# Patient Record
Sex: MALE | Race: WHITE | NOT HISPANIC OR LATINO | Employment: OTHER | URBAN - NONMETROPOLITAN AREA
[De-identification: names, ages, dates, MRNs, and addresses within clinical notes are randomized per-mention and may not be internally consistent; named-entity substitution may affect disease eponyms.]

---

## 2017-02-15 ENCOUNTER — COMMUNICATION - GICH (OUTPATIENT)
Dept: INTERNAL MEDICINE | Facility: OTHER | Age: 82
End: 2017-02-15

## 2017-02-15 DIAGNOSIS — I10 ESSENTIAL (PRIMARY) HYPERTENSION: ICD-10-CM

## 2017-02-17 ENCOUNTER — HISTORY (OUTPATIENT)
Dept: INTERNAL MEDICINE | Facility: OTHER | Age: 82
End: 2017-02-17

## 2017-02-17 ENCOUNTER — OFFICE VISIT - GICH (OUTPATIENT)
Dept: INTERNAL MEDICINE | Facility: OTHER | Age: 82
End: 2017-02-17

## 2017-02-17 DIAGNOSIS — R30.0 DYSURIA: ICD-10-CM

## 2017-02-17 DIAGNOSIS — Z53.29 PROCEDURE AND TREATMENT NOT CARRIED OUT BECAUSE OF PATIENT'S DECISION FOR OTHER REASONS: ICD-10-CM

## 2017-02-17 DIAGNOSIS — I10 ESSENTIAL (PRIMARY) HYPERTENSION: ICD-10-CM

## 2017-02-17 DIAGNOSIS — N47.8 OTHER DISORDERS OF PREPUCE: ICD-10-CM

## 2017-02-17 LAB
A/G RATIO - HISTORICAL: 1.1 (ref 1–2)
ALBUMIN SERPL-MCNC: 4 G/DL (ref 3.5–5.7)
ALP SERPL-CCNC: 60 IU/L (ref 34–104)
ALT (SGPT) - HISTORICAL: 7 IU/L (ref 7–52)
ANION GAP - HISTORICAL: 5 (ref 5–18)
AST SERPL-CCNC: 12 IU/L (ref 13–39)
BACTERIA URINE: ABNORMAL BACTERIA/HPF
BILIRUB SERPL-MCNC: 0.6 MG/DL (ref 0.3–1)
BILIRUB UR QL: NEGATIVE
BUN SERPL-MCNC: 17 MG/DL (ref 7–25)
BUN/CREAT RATIO - HISTORICAL: 14
CALCIUM SERPL-MCNC: 9.3 MG/DL (ref 8.6–10.3)
CHLORIDE SERPLBLD-SCNC: 106 MMOL/L (ref 98–107)
CLARITY, URINE: CLEAR CLARITY
CO2 SERPL-SCNC: 24 MMOL/L (ref 21–31)
COLOR UR: YELLOW COLOR
CREAT SERPL-MCNC: 1.19 MG/DL (ref 0.7–1.3)
EPITHELIAL CELLS: ABNORMAL EPI/HPF
ERYTHROCYTE [DISTWIDTH] IN BLOOD BY AUTOMATED COUNT: 11.2 % (ref 11.5–15.5)
GFR IF NOT AFRICAN AMERICAN - HISTORICAL: 59 ML/MIN/1.73M2
GLOBULIN - HISTORICAL: 3.8 G/DL (ref 2–3.7)
GLUCOSE SERPL-MCNC: 139 MG/DL (ref 70–105)
GLUCOSE URINE: NEGATIVE MG/DL
HCT VFR BLD AUTO: 43.1 % (ref 37–53)
HEMOGLOBIN: 14.4 G/DL (ref 13.5–17.5)
KETONES UR QL: NEGATIVE MG/DL
LEUKOCYTE ESTERASE URINE: NEGATIVE
MCH RBC QN AUTO: 30.4 PG (ref 26–34)
MCHC RBC AUTO-ENTMCNC: 33.4 G/DL (ref 32–36)
MCV RBC AUTO: 91 FL (ref 80–100)
NITRITE UR QL STRIP: NEGATIVE
OCCULT BLOOD,URINE - HISTORICAL: ABNORMAL
PH UR: 5.5 [PH]
PLATELET # BLD AUTO: 297 THOU/CU MM (ref 140–440)
PMV BLD: 8.4 FL (ref 6.5–11)
POTASSIUM SERPL-SCNC: 3.9 MMOL/L (ref 3.5–5.1)
PROT SERPL-MCNC: 7.8 G/DL (ref 6.4–8.9)
PROTEIN QUALITATIVE,URINE - HISTORICAL: NEGATIVE MG/DL
RBC - HISTORICAL: ABNORMAL /HPF
RED BLOOD COUNT - HISTORICAL: 4.72 MIL/CU MM (ref 4.3–5.9)
SODIUM SERPL-SCNC: 135 MMOL/L (ref 133–143)
SP GR UR STRIP: 1.01
UROBILINOGEN,QUALITATIVE - HISTORICAL: NORMAL EU/DL
WBC - HISTORICAL: ABNORMAL /HPF
WHITE BLOOD COUNT - HISTORICAL: 11.7 THOU/CU MM (ref 4.5–11)

## 2017-02-17 ASSESSMENT — PATIENT HEALTH QUESTIONNAIRE - PHQ9: SUM OF ALL RESPONSES TO PHQ QUESTIONS 1-9: 0

## 2017-02-19 LAB
CULTURE - HISTORICAL: ABNORMAL
CULTURE - HISTORICAL: ABNORMAL
SUSCEPTIBILITY RESULT - HISTORICAL: ABNORMAL

## 2017-02-20 ENCOUNTER — COMMUNICATION - GICH (OUTPATIENT)
Dept: PEDIATRICS | Facility: OTHER | Age: 82
End: 2017-02-20

## 2017-02-20 DIAGNOSIS — N30.00 ACUTE CYSTITIS WITHOUT HEMATURIA: ICD-10-CM

## 2017-03-03 ENCOUNTER — HISTORY (OUTPATIENT)
Dept: UROLOGY | Facility: OTHER | Age: 82
End: 2017-03-03

## 2017-03-03 ENCOUNTER — OFFICE VISIT - GICH (OUTPATIENT)
Dept: UROLOGY | Facility: OTHER | Age: 82
End: 2017-03-03

## 2017-03-03 DIAGNOSIS — N47.1 PHIMOSIS: ICD-10-CM

## 2017-03-14 ENCOUNTER — HOSPITAL ENCOUNTER (OUTPATIENT)
Dept: SURGERY | Facility: OTHER | Age: 82
Discharge: HOME | End: 2017-03-14
Attending: UROLOGY | Admitting: UROLOGY

## 2017-03-14 ENCOUNTER — SURGERY (OUTPATIENT)
Dept: SURGERY | Facility: OTHER | Age: 82
End: 2017-03-14

## 2017-03-14 DIAGNOSIS — N47.1 PHIMOSIS: ICD-10-CM

## 2017-03-14 DIAGNOSIS — N47.8 OTHER DISORDERS OF PREPUCE: ICD-10-CM

## 2017-03-22 ENCOUNTER — HISTORY (OUTPATIENT)
Dept: UROLOGY | Facility: OTHER | Age: 82
End: 2017-03-22

## 2017-03-23 ENCOUNTER — HISTORY (OUTPATIENT)
Dept: FAMILY MEDICINE | Facility: OTHER | Age: 82
End: 2017-03-23

## 2017-03-23 ENCOUNTER — OFFICE VISIT - GICH (OUTPATIENT)
Dept: FAMILY MEDICINE | Facility: OTHER | Age: 82
End: 2017-03-23

## 2017-03-23 DIAGNOSIS — J06.9 ACUTE UPPER RESPIRATORY INFECTION: ICD-10-CM

## 2017-03-27 ENCOUNTER — COMMUNICATION - GICH (OUTPATIENT)
Dept: FAMILY MEDICINE | Facility: OTHER | Age: 82
End: 2017-03-27

## 2017-03-27 DIAGNOSIS — J40 BRONCHITIS: ICD-10-CM

## 2017-04-02 ENCOUNTER — HISTORY (OUTPATIENT)
Dept: EMERGENCY MEDICINE | Facility: OTHER | Age: 82
End: 2017-04-02

## 2017-04-18 ENCOUNTER — AMBULATORY - GICH (OUTPATIENT)
Dept: SCHEDULING | Facility: OTHER | Age: 82
End: 2017-04-18

## 2017-04-18 ENCOUNTER — OFFICE VISIT - GICH (OUTPATIENT)
Dept: INTERNAL MEDICINE | Facility: OTHER | Age: 82
End: 2017-04-18

## 2017-04-18 ENCOUNTER — HISTORY (OUTPATIENT)
Dept: INTERNAL MEDICINE | Facility: OTHER | Age: 82
End: 2017-04-18

## 2017-04-18 DIAGNOSIS — N00.7 ACUTE NEPHRITIC SYNDROME WITH DIFFUSE CRESCENTIC GLOMERULONEPHRITIS: ICD-10-CM

## 2017-04-18 DIAGNOSIS — B96.5 PSEUDOMONAS (AERUGINOSA) (MALLEI) (PSEUDOMALLEI) AS THE CAUSE OF DISEASES CLASSIFIED ELSEWHERE: ICD-10-CM

## 2017-04-18 DIAGNOSIS — N17.9 ACUTE KIDNEY FAILURE (H): ICD-10-CM

## 2017-04-18 DIAGNOSIS — N01.3 RAPIDLY PROGRESSIVE NEPHRITIC SYNDROME WITH DIFFUSE MESANGIAL PROLIFERATIVE GLOMERULONEPHRITIS: ICD-10-CM

## 2017-04-18 DIAGNOSIS — D53.9 NUTRITIONAL ANEMIA: ICD-10-CM

## 2017-04-18 DIAGNOSIS — R76.8 OTHER SPECIFIED ABNORMAL IMMUNOLOGICAL FINDINGS IN SERUM: ICD-10-CM

## 2017-04-18 DIAGNOSIS — Z09 ENCOUNTER FOR FOLLOW-UP EXAMINATION AFTER COMPLETED TREATMENT FOR CONDITIONS OTHER THAN MALIGNANT NEOPLASM: ICD-10-CM

## 2017-04-18 DIAGNOSIS — N39.0 URINARY TRACT INFECTION: ICD-10-CM

## 2017-04-18 DIAGNOSIS — R60.0 LOCALIZED EDEMA: ICD-10-CM

## 2017-04-18 LAB
A/G RATIO - HISTORICAL: 0.9 (ref 1–2)
ALBUMIN SERPL-MCNC: 3.1 G/DL (ref 3.5–5.7)
ALP SERPL-CCNC: 68 IU/L (ref 34–104)
ALT (SGPT) - HISTORICAL: 17 IU/L (ref 7–52)
ANION GAP - HISTORICAL: 10 (ref 5–18)
AST SERPL-CCNC: 14 IU/L (ref 13–39)
BILIRUB SERPL-MCNC: 0.3 MG/DL (ref 0.3–1)
BUN SERPL-MCNC: 109 MG/DL (ref 7–25)
BUN/CREAT RATIO - HISTORICAL: 22
CALCIUM SERPL-MCNC: 8 MG/DL (ref 8.6–10.3)
CHLORIDE SERPLBLD-SCNC: 106 MMOL/L (ref 98–107)
CO2 SERPL-SCNC: 20 MMOL/L (ref 21–31)
CREAT SERPL-MCNC: 4.97 MG/DL (ref 0.7–1.3)
EOSINOPHIL NFR BLD AUTO: 1 %
EOSINOPHILS - ABS (DIFF) - HISTORICAL: 0.2 THOU/CU MM
ERYTHROCYTE [DISTWIDTH] IN BLOOD BY AUTOMATED COUNT: 15 % (ref 11.5–15.5)
GFR IF NOT AFRICAN AMERICAN - HISTORICAL: 11 ML/MIN/1.73M2
GLOBULIN - HISTORICAL: 3.4 G/DL (ref 2–3.7)
GLUCOSE SERPL-MCNC: 158 MG/DL (ref 70–105)
HCT VFR BLD AUTO: 24 % (ref 37–53)
HEMOGLOBIN: 7.9 G/DL (ref 13.5–17.5)
LYMPHOCYTES - ABS (DIFF) - HISTORICAL: 0.8 THOU/CU MM (ref 0.9–2.9)
LYMPHOCYTES NFR BLD AUTO: 4 % (ref 20–44)
MANUAL NRBC PER 100 CELLS - HISTORICAL: 0 /100 CELLS
MCH RBC QN AUTO: 28.9 PG (ref 26–34)
MCHC RBC AUTO-ENTMCNC: 33 G/DL (ref 32–36)
MCV RBC AUTO: 88 FL (ref 80–100)
MONOCYTES - ABS (DIFF) - HISTORICAL: 0.6 THOU/CU MM
MONOCYTES NFR BLD AUTO: 3 %
NEUTROPHILS - ABS (DIFF) - HISTORICAL: 19 THOU/CU MM (ref 1.7–7)
NEUTROPHILS NFR BLD AUTO: 92 % (ref 42–72)
NRBC - HISTORICAL: 0 %
PHOSPHATE SERPL-MCNC: 2.8 MG/DL (ref 2.5–5)
PLATELET # BLD AUTO: 326 THOU/CU MM (ref 140–440)
PMV BLD: 7.2 FL (ref 6.5–11)
POIKILOCYTOSIS: ABNORMAL
POTASSIUM SERPL-SCNC: 5 MMOL/L (ref 3.5–5.1)
PROT SERPL-MCNC: 6.5 G/DL (ref 6.4–8.9)
RED BLOOD COUNT - HISTORICAL: 2.73 MIL/CU MM (ref 4.3–5.9)
SODIUM SERPL-SCNC: 136 MMOL/L (ref 133–143)
TOTAL COUNTED - HISTORICAL: 100
WBC ADJUSTED - HISTORICAL: 20.6 THOU/CU MM
WHITE BLOOD COUNT - HISTORICAL: 20.6 THOU/CU MM (ref 4.5–11)

## 2017-04-18 ASSESSMENT — PATIENT HEALTH QUESTIONNAIRE - PHQ9: SUM OF ALL RESPONSES TO PHQ QUESTIONS 1-9: 0

## 2017-04-24 ENCOUNTER — AMBULATORY - GICH (OUTPATIENT)
Dept: LAB | Facility: OTHER | Age: 82
End: 2017-04-24

## 2017-04-24 ENCOUNTER — HISTORY (OUTPATIENT)
Dept: UROLOGY | Facility: OTHER | Age: 82
End: 2017-04-24

## 2017-04-24 ENCOUNTER — OFFICE VISIT - GICH (OUTPATIENT)
Dept: UROLOGY | Facility: OTHER | Age: 82
End: 2017-04-24

## 2017-04-24 DIAGNOSIS — N47.8 OTHER DISORDERS OF PREPUCE: ICD-10-CM

## 2017-04-24 DIAGNOSIS — N48.83 ACQUIRED BURIED PENIS: ICD-10-CM

## 2017-04-24 DIAGNOSIS — D53.9 NUTRITIONAL ANEMIA: ICD-10-CM

## 2017-04-24 DIAGNOSIS — N17.9 ACUTE KIDNEY FAILURE (H): ICD-10-CM

## 2017-04-24 DIAGNOSIS — R31.9 HEMATURIA: ICD-10-CM

## 2017-04-24 LAB
A/G RATIO - HISTORICAL: 1.5 (ref 1–2)
ALBUMIN SERPL-MCNC: 3.2 G/DL (ref 3.5–5.7)
ALP SERPL-CCNC: 68 IU/L (ref 34–104)
ALT (SGPT) - HISTORICAL: 13 IU/L (ref 7–52)
ANION GAP - HISTORICAL: 13 (ref 5–18)
AST SERPL-CCNC: 12 IU/L (ref 13–39)
BILIRUB SERPL-MCNC: 0.5 MG/DL (ref 0.3–1)
BUN SERPL-MCNC: 97 MG/DL (ref 7–25)
BUN/CREAT RATIO - HISTORICAL: 21
CALCIUM SERPL-MCNC: 8 MG/DL (ref 8.6–10.3)
CHLORIDE SERPLBLD-SCNC: 108 MMOL/L (ref 98–107)
CO2 SERPL-SCNC: 19 MMOL/L (ref 21–31)
CREAT SERPL-MCNC: 4.56 MG/DL (ref 0.7–1.3)
ERYTHROCYTE [DISTWIDTH] IN BLOOD BY AUTOMATED COUNT: 16.6 % (ref 11.5–15.5)
GFR IF NOT AFRICAN AMERICAN - HISTORICAL: 12 ML/MIN/1.73M2
GLOBULIN - HISTORICAL: 2.2 G/DL (ref 2–3.7)
GLUCOSE SERPL-MCNC: 91 MG/DL (ref 70–105)
HCT VFR BLD AUTO: 20.4 % (ref 37–53)
HEMOGLOBIN: 7 G/DL (ref 13.5–17.5)
MCH RBC QN AUTO: 29.7 PG (ref 26–34)
MCHC RBC AUTO-ENTMCNC: 34.3 G/DL (ref 32–36)
MCV RBC AUTO: 87 FL (ref 80–100)
PLATELET # BLD AUTO: 203 THOU/CU MM (ref 140–440)
PMV BLD: 7.4 FL (ref 6.5–11)
POTASSIUM SERPL-SCNC: 5 MMOL/L (ref 3.5–5.1)
PROT SERPL-MCNC: 5.4 G/DL (ref 6.4–8.9)
RED BLOOD COUNT - HISTORICAL: 2.36 MIL/CU MM (ref 4.3–5.9)
SODIUM SERPL-SCNC: 140 MMOL/L (ref 133–143)
WHITE BLOOD COUNT - HISTORICAL: 13.1 THOU/CU MM (ref 4.5–11)

## 2017-04-25 ENCOUNTER — OFFICE VISIT - GICH (OUTPATIENT)
Dept: INTERNAL MEDICINE | Facility: OTHER | Age: 82
End: 2017-04-25

## 2017-04-25 ENCOUNTER — HISTORY (OUTPATIENT)
Dept: INTERNAL MEDICINE | Facility: OTHER | Age: 82
End: 2017-04-25

## 2017-04-25 DIAGNOSIS — R76.8 OTHER SPECIFIED ABNORMAL IMMUNOLOGICAL FINDINGS IN SERUM: ICD-10-CM

## 2017-04-25 DIAGNOSIS — N00.7 ACUTE NEPHRITIC SYNDROME WITH DIFFUSE CRESCENTIC GLOMERULONEPHRITIS: ICD-10-CM

## 2017-04-25 DIAGNOSIS — N17.8 OTHER ACUTE KIDNEY FAILURE (CODE): ICD-10-CM

## 2017-04-25 DIAGNOSIS — E87.20 ACIDOSIS: ICD-10-CM

## 2017-04-25 DIAGNOSIS — D64.9 ANEMIA: ICD-10-CM

## 2017-04-25 DIAGNOSIS — N01.3 RAPIDLY PROGRESSIVE NEPHRITIC SYNDROME WITH DIFFUSE MESANGIAL PROLIFERATIVE GLOMERULONEPHRITIS: ICD-10-CM

## 2017-04-26 ENCOUNTER — HOSPITAL ENCOUNTER (OUTPATIENT)
Dept: INFUSION THERAPY | Facility: OTHER | Age: 82
End: 2017-04-26

## 2017-04-27 ENCOUNTER — AMBULATORY - GICH (OUTPATIENT)
Dept: SCHEDULING | Facility: OTHER | Age: 82
End: 2017-04-27

## 2017-04-30 ENCOUNTER — HISTORY (OUTPATIENT)
Dept: EMERGENCY MEDICINE | Facility: OTHER | Age: 82
End: 2017-04-30

## 2017-05-01 ENCOUNTER — HISTORY (OUTPATIENT)
Dept: EMERGENCY MEDICINE | Facility: OTHER | Age: 82
End: 2017-05-01

## 2017-05-04 ENCOUNTER — HISTORY (OUTPATIENT)
Dept: MEDSURG UNIT | Facility: OTHER | Age: 82
End: 2017-05-04

## 2017-05-05 ENCOUNTER — AMBULATORY - GICH (OUTPATIENT)
Dept: FAMILY MEDICINE | Facility: OTHER | Age: 82
End: 2017-05-05

## 2017-05-05 ENCOUNTER — COMMUNICATION - GICH (OUTPATIENT)
Dept: INTERNAL MEDICINE | Facility: OTHER | Age: 82
End: 2017-05-05

## 2017-05-05 DIAGNOSIS — E11.65 TYPE 2 DIABETES MELLITUS WITH HYPERGLYCEMIA (H): ICD-10-CM

## 2017-05-05 DIAGNOSIS — R73.9 HYPERGLYCEMIA: ICD-10-CM

## 2017-05-05 DIAGNOSIS — Z79.4 LONG TERM CURRENT USE OF INSULIN (H): ICD-10-CM

## 2017-05-05 DIAGNOSIS — I10 ESSENTIAL (PRIMARY) HYPERTENSION: ICD-10-CM

## 2017-05-05 DIAGNOSIS — J18.9 PNEUMONIA: ICD-10-CM

## 2017-05-09 ENCOUNTER — AMBULATORY - GICH (OUTPATIENT)
Dept: LAB | Facility: OTHER | Age: 82
End: 2017-05-09

## 2017-05-09 ENCOUNTER — HOSPITAL ENCOUNTER (OUTPATIENT)
Dept: RADIOLOGY | Facility: OTHER | Age: 82
End: 2017-05-09
Attending: INTERNAL MEDICINE

## 2017-05-09 ENCOUNTER — COMMUNICATION - GICH (OUTPATIENT)
Dept: INTERNAL MEDICINE | Facility: OTHER | Age: 82
End: 2017-05-09

## 2017-05-09 ENCOUNTER — HISTORY (OUTPATIENT)
Dept: INTERNAL MEDICINE | Facility: OTHER | Age: 82
End: 2017-05-09

## 2017-05-09 ENCOUNTER — OFFICE VISIT - GICH (OUTPATIENT)
Dept: INTERNAL MEDICINE | Facility: OTHER | Age: 82
End: 2017-05-09

## 2017-05-09 ENCOUNTER — AMBULATORY - GICH (OUTPATIENT)
Dept: EDUCATION SERVICES | Facility: OTHER | Age: 82
End: 2017-05-09

## 2017-05-09 DIAGNOSIS — E11.22 TYPE 2 DIABETES MELLITUS WITH DIABETIC CHRONIC KIDNEY DISEASE (H): ICD-10-CM

## 2017-05-09 DIAGNOSIS — R73.9 HYPERGLYCEMIA: ICD-10-CM

## 2017-05-09 DIAGNOSIS — N18.5 CHRONIC KIDNEY DISEASE, STAGE V (H): ICD-10-CM

## 2017-05-09 DIAGNOSIS — Z09 ENCOUNTER FOR FOLLOW-UP EXAMINATION AFTER COMPLETED TREATMENT FOR CONDITIONS OTHER THAN MALIGNANT NEOPLASM: ICD-10-CM

## 2017-05-09 DIAGNOSIS — J18.9 PNEUMONIA: ICD-10-CM

## 2017-05-09 DIAGNOSIS — I10 ESSENTIAL (PRIMARY) HYPERTENSION: ICD-10-CM

## 2017-05-09 LAB
ALBUMIN SERPL-MCNC: 3.4 G/DL (ref 3.5–5.7)
ANION GAP - HISTORICAL: 10 (ref 5–18)
BUN SERPL-MCNC: 68 MG/DL (ref 7–25)
BUN/CREAT RATIO - HISTORICAL: 15
CALCIUM SERPL-MCNC: 8.4 MG/DL (ref 8.6–10.3)
CHLORIDE SERPLBLD-SCNC: 100 MMOL/L (ref 98–107)
CO2 SERPL-SCNC: 20 MMOL/L (ref 21–31)
CREAT SERPL-MCNC: 4.41 MG/DL (ref 0.7–1.3)
ERYTHROCYTE [DISTWIDTH] IN BLOOD BY AUTOMATED COUNT: 16.4 % (ref 11.5–15.5)
GFR IF NOT AFRICAN AMERICAN - HISTORICAL: 13 ML/MIN/1.73M2
GLUCOSE SERPL-MCNC: 200 MG/DL (ref 70–105)
HCT VFR BLD AUTO: 32.1 % (ref 37–53)
HEMOGLOBIN: 10.6 G/DL (ref 13.5–17.5)
MCH RBC QN AUTO: 29.1 PG (ref 26–34)
MCHC RBC AUTO-ENTMCNC: 32.9 G/DL (ref 32–36)
MCV RBC AUTO: 89 FL (ref 80–100)
PHOSPHATE SERPL-MCNC: 2.2 MG/DL (ref 2.5–5)
PLATELET # BLD AUTO: 274 THOU/CU MM (ref 140–440)
PMV BLD: 5.8 FL (ref 6.5–11)
POTASSIUM SERPL-SCNC: 4.6 MMOL/L (ref 3.5–5.1)
RED BLOOD COUNT - HISTORICAL: 3.63 MIL/CU MM (ref 4.3–5.9)
SODIUM SERPL-SCNC: 130 MMOL/L (ref 133–143)
WHITE BLOOD COUNT - HISTORICAL: 9.4 THOU/CU MM (ref 4.5–11)

## 2017-05-09 ASSESSMENT — PATIENT HEALTH QUESTIONNAIRE - PHQ9: SUM OF ALL RESPONSES TO PHQ QUESTIONS 1-9: 0

## 2017-05-11 ENCOUNTER — TRANSFERRED RECORDS (OUTPATIENT)
Dept: HEALTH INFORMATION MANAGEMENT | Facility: CLINIC | Age: 82
End: 2017-05-11

## 2017-05-11 ENCOUNTER — HISTORY (OUTPATIENT)
Dept: EMERGENCY MEDICINE | Facility: OTHER | Age: 82
End: 2017-05-11

## 2017-05-11 ENCOUNTER — MEDICAL CORRESPONDENCE (OUTPATIENT)
Facility: CLINIC | Age: 82
End: 2017-05-11
Payer: COMMERCIAL

## 2017-05-11 PROCEDURE — 93306 TTE W/DOPPLER COMPLETE: CPT | Mod: 26 | Performed by: INTERNAL MEDICINE

## 2017-05-17 ENCOUNTER — HISTORY (OUTPATIENT)
Dept: EMERGENCY MEDICINE | Facility: OTHER | Age: 82
End: 2017-05-17

## 2017-05-18 ENCOUNTER — COMMUNICATION - GICH (OUTPATIENT)
Dept: FAMILY MEDICINE | Facility: OTHER | Age: 82
End: 2017-05-18

## 2017-05-19 ENCOUNTER — AMBULATORY - GICH (OUTPATIENT)
Dept: UROLOGY | Facility: OTHER | Age: 82
End: 2017-05-19

## 2017-05-19 ENCOUNTER — HISTORY (OUTPATIENT)
Dept: UROLOGY | Facility: OTHER | Age: 82
End: 2017-05-19

## 2017-05-19 DIAGNOSIS — R31.9 HEMATURIA: ICD-10-CM

## 2017-05-19 DIAGNOSIS — N48.83 ACQUIRED BURIED PENIS: ICD-10-CM

## 2017-05-24 ENCOUNTER — COMMUNICATION - GICH (OUTPATIENT)
Dept: FAMILY MEDICINE | Facility: OTHER | Age: 82
End: 2017-05-24

## 2017-05-25 ENCOUNTER — AMBULATORY - GICH (OUTPATIENT)
Dept: SCHEDULING | Facility: OTHER | Age: 82
End: 2017-05-25

## 2017-06-01 ENCOUNTER — HISTORY (OUTPATIENT)
Dept: INTERNAL MEDICINE | Facility: OTHER | Age: 82
End: 2017-06-01

## 2017-06-01 ENCOUNTER — OFFICE VISIT - GICH (OUTPATIENT)
Dept: INTERNAL MEDICINE | Facility: OTHER | Age: 82
End: 2017-06-01

## 2017-06-01 DIAGNOSIS — D64.9 ANEMIA: ICD-10-CM

## 2017-06-01 DIAGNOSIS — N18.5 CHRONIC KIDNEY DISEASE, STAGE V (H): ICD-10-CM

## 2017-06-01 DIAGNOSIS — L03.116 CELLULITIS OF LEFT LOWER EXTREMITY: ICD-10-CM

## 2017-06-01 DIAGNOSIS — N01.3 RAPIDLY PROGRESSIVE NEPHRITIC SYNDROME WITH DIFFUSE MESANGIAL PROLIFERATIVE GLOMERULONEPHRITIS: ICD-10-CM

## 2017-06-01 ASSESSMENT — PATIENT HEALTH QUESTIONNAIRE - PHQ9: SUM OF ALL RESPONSES TO PHQ QUESTIONS 1-9: 0

## 2017-06-09 ENCOUNTER — HISTORY (OUTPATIENT)
Dept: INTERNAL MEDICINE | Facility: OTHER | Age: 82
End: 2017-06-09

## 2017-06-09 ENCOUNTER — HISTORY (OUTPATIENT)
Dept: MEDSURG UNIT | Facility: OTHER | Age: 82
End: 2017-06-09

## 2017-06-09 ENCOUNTER — OFFICE VISIT - GICH (OUTPATIENT)
Dept: INTERNAL MEDICINE | Facility: OTHER | Age: 82
End: 2017-06-09

## 2017-06-09 DIAGNOSIS — D84.9 IMMUNODEFICIENCY (H): ICD-10-CM

## 2017-06-09 DIAGNOSIS — R60.0 LOCALIZED EDEMA: ICD-10-CM

## 2017-06-09 DIAGNOSIS — N01.3 RAPIDLY PROGRESSIVE NEPHRITIC SYNDROME WITH DIFFUSE MESANGIAL PROLIFERATIVE GLOMERULONEPHRITIS: ICD-10-CM

## 2017-06-09 DIAGNOSIS — L03.116 CELLULITIS OF LEFT LOWER EXTREMITY: ICD-10-CM

## 2017-06-09 DIAGNOSIS — N18.5 CHRONIC KIDNEY DISEASE, STAGE V (H): ICD-10-CM

## 2017-06-09 LAB
A/G RATIO - HISTORICAL: 1.2 (ref 1–2)
ABSOLUTE BASOPHILS - HISTORICAL: 0 THOU/CU MM
ABSOLUTE EOSINOPHILS - HISTORICAL: 0 THOU/CU MM
ABSOLUTE LYMPHOCYTES - HISTORICAL: 0.9 THOU/CU MM (ref 0.9–2.9)
ABSOLUTE MONOCYTES - HISTORICAL: 0.3 THOU/CU MM
ABSOLUTE NEUTROPHILS - HISTORICAL: 21.4 THOU/CU MM (ref 1.7–7)
ALBUMIN SERPL-MCNC: 3.1 G/DL (ref 3.5–5.7)
ALP SERPL-CCNC: 72 IU/L (ref 34–104)
ALT (SGPT) - HISTORICAL: 10 IU/L (ref 7–52)
ANION GAP - HISTORICAL: 15 (ref 5–18)
AST SERPL-CCNC: 10 IU/L (ref 13–39)
BASOPHILS # BLD AUTO: 0.1 %
BILIRUB SERPL-MCNC: 0.4 MG/DL (ref 0.3–1)
BUN SERPL-MCNC: 115 MG/DL (ref 7–25)
BUN/CREAT RATIO - HISTORICAL: 22
C-REACTIVE PROTEIN - HISTORICAL: 11.3 MG/DL
CALCIUM SERPL-MCNC: 8.3 MG/DL (ref 8.6–10.3)
CHLORIDE SERPLBLD-SCNC: 100 MMOL/L (ref 98–107)
CO2 SERPL-SCNC: 20 MMOL/L (ref 21–31)
CREAT SERPL-MCNC: 5.18 MG/DL (ref 0.7–1.3)
EOSINOPHIL NFR BLD AUTO: 0 %
ERYTHROCYTE [DISTWIDTH] IN BLOOD BY AUTOMATED COUNT: 19 % (ref 11.5–15.5)
ERYTHROCYTE [SEDIMENTATION RATE] IN BLOOD: 85 MM/HR
GFR IF NOT AFRICAN AMERICAN - HISTORICAL: 11 ML/MIN/1.73M2
GLOBULIN - HISTORICAL: 2.6 G/DL (ref 2–3.7)
GLUCOSE SERPL-MCNC: 220 MG/DL (ref 70–105)
HCT VFR BLD AUTO: 22.7 % (ref 37–53)
HEMOGLOBIN: 7.6 G/DL (ref 13.5–17.5)
LYMPHOCYTES NFR BLD AUTO: 3.8 % (ref 20–44)
MCH RBC QN AUTO: 30 PG (ref 26–34)
MCHC RBC AUTO-ENTMCNC: 33.5 G/DL (ref 32–36)
MCV RBC AUTO: 90 FL (ref 80–100)
MONOCYTES NFR BLD AUTO: 1.2 %
NEUTROPHILS NFR BLD AUTO: 92.3 % (ref 42–72)
PLATELET # BLD AUTO: 219 THOU/CU MM (ref 140–440)
PMV BLD: 10 FL (ref 6.5–11)
POTASSIUM SERPL-SCNC: 4.5 MMOL/L (ref 3.5–5.1)
PROT SERPL-MCNC: 5.7 G/DL (ref 6.4–8.9)
RED BLOOD COUNT - HISTORICAL: 2.53 MIL/CU MM (ref 4.3–5.9)
SODIUM SERPL-SCNC: 135 MMOL/L (ref 133–143)
WHITE BLOOD COUNT - HISTORICAL: 23.1 THOU/CU MM (ref 4.5–11)

## 2017-06-09 ASSESSMENT — PATIENT HEALTH QUESTIONNAIRE - PHQ9: SUM OF ALL RESPONSES TO PHQ QUESTIONS 1-9: 0

## 2017-06-13 ENCOUNTER — AMBULATORY - GICH (OUTPATIENT)
Dept: EDUCATION SERVICES | Facility: OTHER | Age: 82
End: 2017-06-13

## 2017-06-13 DIAGNOSIS — Z79.4 LONG TERM CURRENT USE OF INSULIN (H): ICD-10-CM

## 2017-06-13 DIAGNOSIS — E11.622 TYPE 2 DIABETES MELLITUS WITH OTHER SKIN ULCER (CODE) (H): ICD-10-CM

## 2017-06-14 LAB
CULTURE - HISTORICAL: NORMAL
CULTURE - HISTORICAL: NORMAL

## 2017-06-15 ENCOUNTER — COMMUNICATION - GICH (OUTPATIENT)
Dept: FAMILY MEDICINE | Facility: OTHER | Age: 82
End: 2017-06-15

## 2017-06-23 ENCOUNTER — HISTORY (OUTPATIENT)
Dept: INTERNAL MEDICINE | Facility: OTHER | Age: 82
End: 2017-06-23

## 2017-06-23 ENCOUNTER — OFFICE VISIT - GICH (OUTPATIENT)
Dept: INTERNAL MEDICINE | Facility: OTHER | Age: 82
End: 2017-06-23

## 2017-06-23 DIAGNOSIS — R60.0 LOCALIZED EDEMA: ICD-10-CM

## 2017-06-23 DIAGNOSIS — Z09 ENCOUNTER FOR FOLLOW-UP EXAMINATION AFTER COMPLETED TREATMENT FOR CONDITIONS OTHER THAN MALIGNANT NEOPLASM: ICD-10-CM

## 2017-06-23 DIAGNOSIS — N01.3 RAPIDLY PROGRESSIVE NEPHRITIC SYNDROME WITH DIFFUSE MESANGIAL PROLIFERATIVE GLOMERULONEPHRITIS: ICD-10-CM

## 2017-06-23 DIAGNOSIS — N18.5 CHRONIC KIDNEY DISEASE, STAGE V (H): ICD-10-CM

## 2017-06-23 DIAGNOSIS — I10 ESSENTIAL (PRIMARY) HYPERTENSION: ICD-10-CM

## 2017-06-23 DIAGNOSIS — L03.116 CELLULITIS OF LEFT LOWER EXTREMITY: ICD-10-CM

## 2017-06-23 DIAGNOSIS — I89.0 LYMPHEDEMA, NOT ELSEWHERE CLASSIFIED: ICD-10-CM

## 2017-06-30 ENCOUNTER — AMBULATORY - GICH (OUTPATIENT)
Dept: SCHEDULING | Facility: OTHER | Age: 82
End: 2017-06-30

## 2017-07-07 ENCOUNTER — OFFICE VISIT - GICH (OUTPATIENT)
Dept: FAMILY MEDICINE | Facility: OTHER | Age: 82
End: 2017-07-07

## 2017-07-07 ENCOUNTER — HOSPITAL ENCOUNTER (OUTPATIENT)
Dept: RADIOLOGY | Facility: OTHER | Age: 82
End: 2017-07-07
Attending: FAMILY MEDICINE

## 2017-07-07 ENCOUNTER — HISTORY (OUTPATIENT)
Dept: FAMILY MEDICINE | Facility: OTHER | Age: 82
End: 2017-07-07

## 2017-07-07 ENCOUNTER — COMMUNICATION - GICH (OUTPATIENT)
Dept: FAMILY MEDICINE | Facility: OTHER | Age: 82
End: 2017-07-07

## 2017-07-07 DIAGNOSIS — M54.9 DORSALGIA: ICD-10-CM

## 2017-07-07 DIAGNOSIS — N39.0 URINARY TRACT INFECTION: ICD-10-CM

## 2017-07-07 DIAGNOSIS — M48.50XA COLLAPSED VERTEBRA, NOT ELSEWHERE CLASSIFIED, SITE UNSPECIFIED, INITIAL ENCOUNTER FOR FRACTURE (H): ICD-10-CM

## 2017-07-07 LAB
BACTERIA URINE: ABNORMAL BACTERIA/HPF
BILIRUB UR QL: NEGATIVE
CLARITY, URINE: ABNORMAL CLARITY
COLOR UR: YELLOW COLOR
EPITHELIAL CELLS: ABNORMAL EPI/HPF
GLUCOSE URINE: NEGATIVE MG/DL
KETONES UR QL: NEGATIVE MG/DL
LEUKOCYTE ESTERASE URINE: ABNORMAL
NITRITE UR QL STRIP: POSITIVE
OCCULT BLOOD,URINE - HISTORICAL: ABNORMAL
PH UR: 5 [PH]
PROTEIN QUALITATIVE,URINE - HISTORICAL: 100 MG/DL
RBC - HISTORICAL: >100 /HPF
SP GR UR STRIP: 1.01
UROBILINOGEN,QUALITATIVE - HISTORICAL: NORMAL EU/DL
WBC - HISTORICAL: >100 /HPF

## 2017-07-11 ENCOUNTER — HOSPITAL ENCOUNTER (OUTPATIENT)
Dept: PHYSICAL THERAPY | Facility: OTHER | Age: 82
Setting detail: THERAPIES SERIES
End: 2017-07-11
Attending: INTERNAL MEDICINE

## 2017-07-11 DIAGNOSIS — I89.0 LYMPHEDEMA, NOT ELSEWHERE CLASSIFIED: ICD-10-CM

## 2017-07-11 DIAGNOSIS — R60.0 LOCALIZED EDEMA: ICD-10-CM

## 2017-07-12 ENCOUNTER — HOSPITAL ENCOUNTER (OUTPATIENT)
Dept: PHYSICAL THERAPY | Facility: OTHER | Age: 82
Setting detail: THERAPIES SERIES
End: 2017-07-12

## 2017-07-18 ENCOUNTER — HOSPITAL ENCOUNTER (OUTPATIENT)
Dept: PHYSICAL THERAPY | Facility: OTHER | Age: 82
Setting detail: THERAPIES SERIES
End: 2017-07-18
Attending: INTERNAL MEDICINE

## 2017-07-20 ENCOUNTER — COMMUNICATION - GICH (OUTPATIENT)
Dept: FAMILY MEDICINE | Facility: OTHER | Age: 82
End: 2017-07-20

## 2017-07-20 ENCOUNTER — HOSPITAL ENCOUNTER (OUTPATIENT)
Dept: PHYSICAL THERAPY | Facility: OTHER | Age: 82
Setting detail: THERAPIES SERIES
End: 2017-07-20
Attending: INTERNAL MEDICINE

## 2017-07-20 DIAGNOSIS — R12 HEARTBURN: ICD-10-CM

## 2017-07-27 ENCOUNTER — AMBULATORY - GICH (OUTPATIENT)
Dept: SCHEDULING | Facility: OTHER | Age: 82
End: 2017-07-27

## 2017-07-27 ENCOUNTER — OFFICE VISIT - GICH (OUTPATIENT)
Dept: INTERNAL MEDICINE | Facility: OTHER | Age: 82
End: 2017-07-27

## 2017-07-27 ENCOUNTER — HISTORY (OUTPATIENT)
Dept: INTERNAL MEDICINE | Facility: OTHER | Age: 82
End: 2017-07-27

## 2017-07-27 DIAGNOSIS — R60.0 LOCALIZED EDEMA: ICD-10-CM

## 2017-07-27 DIAGNOSIS — N18.5 CHRONIC KIDNEY DISEASE, STAGE V (H): ICD-10-CM

## 2017-07-27 DIAGNOSIS — R73.9 HYPERGLYCEMIA: ICD-10-CM

## 2017-07-27 DIAGNOSIS — R76.8 OTHER SPECIFIED ABNORMAL IMMUNOLOGICAL FINDINGS IN SERUM: ICD-10-CM

## 2017-07-27 DIAGNOSIS — N01.3 RAPIDLY PROGRESSIVE NEPHRITIC SYNDROME WITH DIFFUSE MESANGIAL PROLIFERATIVE GLOMERULONEPHRITIS: ICD-10-CM

## 2017-07-27 DIAGNOSIS — D84.9 IMMUNODEFICIENCY (H): ICD-10-CM

## 2017-07-27 DIAGNOSIS — I89.0 LYMPHEDEMA, NOT ELSEWHERE CLASSIFIED: ICD-10-CM

## 2017-07-27 DIAGNOSIS — S39.012A STRAIN OF MUSCLE, FASCIA AND TENDON OF LOWER BACK, INITIAL ENCOUNTER: ICD-10-CM

## 2017-07-27 ASSESSMENT — PATIENT HEALTH QUESTIONNAIRE - PHQ9: SUM OF ALL RESPONSES TO PHQ QUESTIONS 1-9: 0

## 2017-08-29 ENCOUNTER — AMBULATORY - GICH (OUTPATIENT)
Dept: SCHEDULING | Facility: OTHER | Age: 82
End: 2017-08-29

## 2017-09-02 ENCOUNTER — COMMUNICATION - GICH (OUTPATIENT)
Dept: FAMILY MEDICINE | Facility: OTHER | Age: 82
End: 2017-09-02

## 2017-09-02 DIAGNOSIS — R33.9 RETENTION OF URINE: ICD-10-CM

## 2017-10-03 ENCOUNTER — COMMUNICATION - GICH (OUTPATIENT)
Dept: INTERNAL MEDICINE | Facility: OTHER | Age: 82
End: 2017-10-03

## 2017-10-03 DIAGNOSIS — R60.0 LOCALIZED EDEMA: ICD-10-CM

## 2017-10-19 ENCOUNTER — AMBULATORY - GICH (OUTPATIENT)
Dept: SCHEDULING | Facility: OTHER | Age: 82
End: 2017-10-19

## 2017-10-25 ENCOUNTER — OFFICE VISIT - GICH (OUTPATIENT)
Dept: INTERNAL MEDICINE | Facility: OTHER | Age: 82
End: 2017-10-25

## 2017-10-25 ENCOUNTER — HISTORY (OUTPATIENT)
Dept: INTERNAL MEDICINE | Facility: OTHER | Age: 82
End: 2017-10-25

## 2017-10-25 DIAGNOSIS — D84.9 IMMUNODEFICIENCY (H): ICD-10-CM

## 2017-10-25 DIAGNOSIS — R76.8 OTHER SPECIFIED ABNORMAL IMMUNOLOGICAL FINDINGS IN SERUM: ICD-10-CM

## 2017-10-25 DIAGNOSIS — D51.9 VITAMIN B12 DEFICIENCY ANEMIA: ICD-10-CM

## 2017-10-25 DIAGNOSIS — N01.3 RAPIDLY PROGRESSIVE NEPHRITIC SYNDROME WITH DIFFUSE MESANGIAL PROLIFERATIVE GLOMERULONEPHRITIS: ICD-10-CM

## 2017-10-25 DIAGNOSIS — N18.5 CHRONIC KIDNEY DISEASE, STAGE V (H): ICD-10-CM

## 2017-10-30 ENCOUNTER — COMMUNICATION - GICH (OUTPATIENT)
Dept: INTERNAL MEDICINE | Facility: OTHER | Age: 82
End: 2017-10-30

## 2017-10-30 DIAGNOSIS — R33.9 RETENTION OF URINE: ICD-10-CM

## 2017-11-01 ENCOUNTER — AMBULATORY - GICH (OUTPATIENT)
Dept: FAMILY MEDICINE | Facility: OTHER | Age: 82
End: 2017-11-01

## 2017-11-01 DIAGNOSIS — D51.9 VITAMIN B12 DEFICIENCY ANEMIA: ICD-10-CM

## 2017-11-08 ENCOUNTER — AMBULATORY - GICH (OUTPATIENT)
Dept: FAMILY MEDICINE | Facility: OTHER | Age: 82
End: 2017-11-08

## 2017-11-15 ENCOUNTER — AMBULATORY - GICH (OUTPATIENT)
Dept: FAMILY MEDICINE | Facility: OTHER | Age: 82
End: 2017-11-15

## 2017-12-06 ENCOUNTER — AMBULATORY - GICH (OUTPATIENT)
Dept: FAMILY MEDICINE | Facility: OTHER | Age: 82
End: 2017-12-06

## 2017-12-06 DIAGNOSIS — D51.9 VITAMIN B12 DEFICIENCY ANEMIA: ICD-10-CM

## 2017-12-13 ENCOUNTER — AMBULATORY - GICH (OUTPATIENT)
Dept: FAMILY MEDICINE | Facility: OTHER | Age: 82
End: 2017-12-13

## 2017-12-15 ENCOUNTER — AMBULATORY - GICH (OUTPATIENT)
Dept: SCHEDULING | Facility: OTHER | Age: 82
End: 2017-12-15

## 2017-12-27 ENCOUNTER — AMBULATORY - GICH (OUTPATIENT)
Dept: FAMILY MEDICINE | Facility: OTHER | Age: 82
End: 2017-12-27

## 2017-12-27 DIAGNOSIS — D51.9 VITAMIN B12 DEFICIENCY ANEMIA: ICD-10-CM

## 2017-12-27 NOTE — PROGRESS NOTES
Patient Information     Patient Name MRN Sex Altaf Neves [id number] Male [id number]      Progress Notes by Valentino Machado MD at 2017  9:40 AM     Author:  Valentino Machado MD Service:  (none) Author Type:  Physician     Filed:  2017  6:08 PM Encounter Date:  2017 Status:  Signed     :  Valentino Machado MD (Physician)            Nursing Notes:   Angelica Lei  2017  9:54 AM  Signed  Patient presents to the clinic for swelling and redness of the left lower leg over the past several weeks.  Warmth and redness is present from the left ankle to above the knee over the past 2 days.  Patient denies fever at this time.    Angelica Lei LPN        2017 9:44 AM    Altaf Chao presents to clinic today for:   Chief Complaint    Patient presents with      Leg Pain/problem     HPI: Mr. Chao is a 83 y.o. male who presents today for evaluation of above.     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 Went to ER. Got steri-strips.     Wife noted streaking redness from left shin wound on Tuesday - getting worse - she advised he get in for evaluation today.   Reports some fatigue and some left shin pain, overall has minimal symptoms.    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  Planned to recheck labs today, however he states he just had labs drawn.     Anemia, orders placed to recheck blood counts today however he just had labs drawn and declines.      Mr. Chao's Body mass index is 27.86 kg/(m^2). This is out of the normal range for a 83 y.o. Normal range for ages 18+ is between 18.5 and 24.9. To lose weight we reviewed risks and benefits of  appropriate options such as diet, exercise, and medications. Patient's strategy will be  none; patient is not ready to act   BP Readings from Last 1 Encounters:06/01/17 : 124/66  Mr. Espinoza blood pressure is out of the normal range for adults. Per JNC-8 guidelines normal adult blood pressure is < 120/80, pre-hypertensive is between 120/80 and 139/89, and hypertension is 140/90 or greater. Risks of hypertension were discussed. Patient's strategy will be increased activity and reduced salt intake    Functional Capacity: less than or about 4 METS.  Uses walker.   Patient reports no current symptoms of fevers, chills, nausea/vomiting.   No cough. No shortness of breath.   No change in bowel/bladder habits. No melena, hematochezia. No Hematuria.   No palpitations.  No orthopnea/paroxysmal nocturnal dyspnea   No vision or hearing issues.   No significant mood issues   mild bruising.     ALESSANDRA:  No flowsheet data found.    PHQ9:  PHQ Depression Screening 5/9/2017 6/1/2017   Date of PHQ exam (doc flow) 5/9/2017 6/1/2017   1. Lack of interest/pleasure 0 - Not at all 0 - Not at all   2. Feeling down/depressed 0 - Not at all 0 - Not at all   PHQ-2 TOTAL SCORE 0 0   3. Trouble sleeping 0 - Not at all 0 - Not at all   4. Decreased energy 0 - Not at all 0 - Not at all   5. Appetite change 0 - Not at all 0 - Not at all   6. Feelings of failure 0 - Not at all 0 - Not at all   7. Trouble concentrating 0 - Not at all 0 - Not at all   8. Activity level 0 - Not at all 0 - Not at all   9. Hurting yourself 0 - Not at all 0 - Not at all   PHQ-9 TOTAL SCORE 0 0   PHQ-9 Severity Level none none   Functional Impairment not difficult at all not difficult at all        I have personally reviewed the past medical history, past surgical history, medications, allergies, family and social history as listed below, on 6/1/2017.    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     Acquired buried penis      Refusal of statin medication at discharge 02/17/2017     H/O total hip arthroplasty - Left 6/30/2014 06/30/2014     Dyslipidemia 04/10/2014     Bilateral leg edema 01/08/2014     Hip stiffness 01/08/2014     H/O bilateral inguinal hernia repair 09/04/2013     History of tobacco abuse 08/15/2013     Pityriasis rosea 08/07/2012     Essential hypertension 08/07/2012     Past Medical History:     Diagnosis  Date     Acute renal failure (ARF) (HC) 04/2017     ESSENTIAL HYPERTENSION 8/7/2012     Osteoarthrosis, unspecified whether generalized or localized, pelvic region and thigh      Other and unspecified hyperlipidemia      Pityriasis rosea      Right inguinal hernia 8/12/2013    Right Inguinal hernia with incarceration, massive [[id number]][      Tachycardia, unspecified      Past Surgical History:      Procedure  Laterality Date     CIRCUMCISION  03/14/2017    Dr. [facility]       GENITAL SURGERY MALE  8/20/13    Dorsal Slit       HERNIA REPAIR Right 8/20/13    RIH with mesh       HERNIA REPAIR Left 4/15/14    LIH with mesh       RI SUBTALAR ATHROEREISIS      bone Spurs excision on Toe       RI TOTAL HIP ARTHROPLASTY Left 6/30/14     Current Outpatient Prescriptions       Medication  Sig Dispense Refill     amLODIPine (NORVASC) 5 mg tablet Take 1 tablet by mouth once daily. 90 tablet 3     blood sugar diagnostic (BLOOD GLUCOSE TEST) strip Dispense item covered by pt ins. E11.65 IDDM type II, uncontrolled  "- Test 4 times/day. Reason: New diabetes 100 Strip prn     blood-glucose meter Dispense meter, test strips, lancets covered by pt ins. E11.65 IDDM type II, uncontrolled - Test 4 times/day. Reason: New diabetes 1 Device 0     cephalexin (KEFLEX) 250 mg capsule Take 1 capsule by mouth 4 times daily for 10 days. 40 capsule 0     dicloxacillin (DYNAPEN) 250 mg capsule Take 1 capsule by mouth 4 times daily before meals and at bedtime for 10 days. 40 capsule 0     famotidine (PEPCID) 20 mg tablet Take 20 mg by mouth once daily.  2     furosemide (LASIX) 20 mg tablet Take 1 tablet by mouth every morning. 90 tablet 3     insulin aspart (NOVOLOG) 100 unit/mL solution for injection Inject 0-5 Units subcutaneous 3 times daily with meals. 100 mL prn     lancets Dispense item covered by pt ins. E11.65 IDDM type II, uncontrolled - Test 3 times/day. (Wants Barrel lancets) 100 Each 11     lancets Dispense item covered by pt ins. E11.65 IDDM type II, uncontrolled - Test 4 times/day. Reason: New diabetes 100 Each prn     LANTUS SOLOSTAR 100 unit/mL (3 mL) pen Inject 3 Units subcutaneous at bedtime.  99     lisinopril (PRINIVIL; ZESTRIL) 2.5 mg tablet Take 1 tablet by mouth once daily in the evening. 90 tablet 3     pen needle, diabetic 31 gauge x 5/16\" For administering insulin at home. Dx: R73.9 350 Each 3     predniSONE (DELTASONE) 20 mg tablet Take 60 mg by mouth once daily.       sodium bicarbonate 650 mg tablet Take 1 tablet by mouth 3 times daily.  0     tamsulosin (FLOMAX) 0.4 mg capsule Take 1 capsule by mouth once daily after a meal. 30 capsule 3     No Known Allergies  Family History       Problem   Relation Age of Onset     Other  Son 12     neuroblastoma at 13 yo  at 14.        Genetic  No Family History      No known FHx of DM, CAD, CVA       Family Status     Relation  Status     Mother  at age 94     Father      Sister Alive     Brother     Lung CA - 2-3 ppd tobacco      Brother  at " "age 67    Aneurysm      Brother Alive     Brother Alive     Brother Alive     Child Alive     Child Alive     Son  at age 14    neuroblastoma at 11 yo  at 14.       Son      No Family History      Social History     Social History        Marital status:       Spouse name: Lucita     Number of children:  3     Years of education:  N/A     Occupational History       Retired      Social History Main Topics          Smoking status:   Former Smoker      Types:  Cigarettes      Quit date:  1976      Smokeless tobacco:   Never Used      Alcohol use   0.5 oz/week     1 Glasses of wine per week        Comment: occasionally       Drug use:   No      Sexual activity:   Not Currently      Other Topics  Concern     Not on file      Social History Narrative      - Wife Lucita.     3 kids - oldest son - neuroblastoma at 11 yo  at 14.     [employer] - Worked in [employer] -- [employer] and .            Pertinent ROS was performed and was negative as noted in HPI above.     EXAM:   Vitals:     17 0946   BP: 124/66   Pulse: 72   Temp: 98.3  F (36.8  C)   TempSrc: Tympanic   Weight: 88.1 kg (194 lb 3 oz)   Height: 1.778 m (5' 10\")     BP Readings from Last 3 Encounters:    17 124/66   17 160/97   17 143/72     Wt Readings from Last 3 Encounters:    17 88.1 kg (194 lb 3 oz)   17 84.8 kg (187 lb)   17 85.3 kg (188 lb)     Estimated body mass index is 27.86 kg/(m^2) as calculated from the following:    Height as of this encounter: 1.778 m (5' 10\").    Weight as of this encounter: 88.1 kg (194 lb 3 oz).     EXAM:  Constitutional: well groomed / good hygiene, casual dress  Lymphatic Exam: shoddy left inguinal adenopathy.   Pulmonary: Lungs are clear to auscultation bilaterally, without wheezes or crackles  Cardiovascular Exam: regular rate and rhythm, peripheral pulses brisk, 1-2+ pedal edema present  Gastrointestinal " Exam: Obese  Integument: Cellulitis of left shin, streaking up lateral / anterior left lower leg to knee. Open area with steri-strips, draining serous fluid on anterior mid shin.   Neurologic Exam: CN 3-12 grossly intact   Musculoskeletal Exam: Walks with walker. Gait and station appear grossly normal  Psychiatric Exam: Awake and Alert, Affect and mood appropriate  Speech is fluent, Thought process is normal    INVESTIGATIONS:  Results for orders placed or performed during the hospital encounter of 05/11/17      Protime-INR      Result  Value Ref Range    INR 1.1 <1.3    PROTIME 11.0 (L) 11.9 - 15.2 sec   Comprehensive Metabolic Panel      Result  Value Ref Range    SODIUM 136 133 - 143 mmol/L    POTASSIUM 4.6 3.5 - 5.1 mmol/L    CHLORIDE 101 98 - 107 mmol/L    CO2,TOTAL 22 21 - 31 mmol/L    ANION GAP 13 5 - 18                    GLUCOSE 162 (H) 70 - 105 mg/dL    CALCIUM 8.1 (L) 8.6 - 10.3 mg/dL    BUN 67 (H) 7 - 25 mg/dL    CREATININE 4.30 (H) 0.70 - 1.30 mg/dL    BUN/CREAT RATIO           16                    GFR if African American 16 (L) >60 ml/min/1.73m2    GFR if not African American 13 (L) >60 ml/min/1.73m2    ALBUMIN 3.1 (L) 3.5 - 5.7 g/dL    PROTEIN,TOTAL 5.9 (L) 6.4 - 8.9 g/dL    GLOBULIN                  2.8 2.0 - 3.7 g/dL    A/G RATIO 1.1 1.0 - 2.0                    BILIRUBIN,TOTAL 0.4 0.3 - 1.0 mg/dL    ALK PHOSPHATASE 68 34 - 104 IU/L    ALT (SGPT) 10 7 - 52 IU/L    AST (SGOT) 11 (L) 13 - 39 IU/L   Magnesium      Result  Value Ref Range    MAGNESIUM 1.8 (L) 1.9 - 2.7 mg/dL   Brain Natriuretic Peptide      Result  Value Ref Range    BRAIN EMRE PEPTIDE  (H) <100 pg/mL   Urinalysis with Reflex Microscopic if Positive      Result  Value Ref Range    COLOR                     Yellow Yellow Color    CLARITY                   Clear Clear Clarity    SPECIFIC GRAVITY,URINE    1.010 1.010, 1.015, 1.020, 1.025                    PH,URINE                  6.0 6.0, 7.0, 8.0, 5.5, 6.5, 7.5, 8.5                     UROBILINOGEN,QUALITATIVE  Normal Normal EU/dl    PROTEIN, URINE 100 (A) Negative mg/dL    GLUCOSE, URINE 100 (A) Negative mg/dL    KETONES,URINE             Negative Negative mg/dL    BILIRUBIN,URINE           Negative Negative                    OCCULT BLOOD,URINE        Large (A) Negative                    NITRITE                   Negative Negative                    LEUKOCYTE ESTERASE        Negative Negative                   CBC WITH AUTO DIFFERENTIAL      Result  Value Ref Range    WHITE BLOOD COUNT         9.5 4.5 - 11.0 thou/cu mm    RED BLOOD COUNT           3.36 (L) 4.30 - 5.90 mil/cu mm    HEMOGLOBIN                9.6 (L) 13.5 - 17.5 g/dL    HEMATOCRIT                29.8 (L) 37.0 - 53.0 %    MCV                       89 80 - 100 fL    MCH                       28.7 26.0 - 34.0 pg    MCHC                      32.4 32.0 - 36.0 g/dL    RDW                       15.7 (H) 11.5 - 15.5 %    PLATELET COUNT            270 140 - 440 thou/cu mm    MPV                       5.5 (L) 6.5 - 11.0 fL    NEUTROPHILS               86.9 (H) 42.0 - 72.0 %    LYMPHOCYTES               8.2 (L) 20.0 - 44.0 %    MONOCYTES                 4.4 <12.0 %    EOSINOPHILS               0.3 <8.0 %    BASOPHILS                 0.1 <3.0 %    ABSOLUTE NEUTROPHILS      8.3 (H) 1.7 - 7.0 thou/cu mm    ABSOLUTE LYMPHOCYTES      0.8 (L) 0.9 - 2.9 thou/cu mm    ABSOLUTE MONOCYTES        0.4 <0.9 thou/cu mm    ABSOLUTE EOSINOPHILS      0.0 <0.5 thou/cu mm    ABSOLUTE BASOPHILS        0.0 <0.3 thou/cu mm   URINALYSIS MICROSCOPIC      Result  Value Ref Range    RBC 6-10 (A) 0-2, None Seen /HPF    WBC 6-10 (A) 0-2, 3-5, None Seen /HPF    BACTERIA                  Few None Seen, Rare, Occasional, Few Bacteria/HPF    EPITHELIAL CELLS          None Seen None Seen, Few Epi/HPF   Hemoglobin  A1C      Result  Value Ref Range    HEMOGLOBIN A1C MONITORING (POCT) 6.1 4.0 - 6.2 %    ESTIMATED AVERAGE GLUCOSE  128 mg/dL   Glucose Random      Result   Value Ref Range    GLUCOSE,RANDOM 415 (H) 70 - 105 mg/dL   PHOSPHORUS      Result  Value Ref Range    PHOSPHORUS 2.5 2.5 - 5.0 mg/dL   CBC W PLT NO DIFF      Result  Value Ref Range    WHITE BLOOD COUNT         12.1 (H) 4.5 - 11.0 thou/cu mm    RED BLOOD COUNT           3.12 (L) 4.30 - 5.90 mil/cu mm    HEMOGLOBIN                9.0 (L) 13.5 - 17.5 g/dL    HEMATOCRIT                27.1 (L) 37.0 - 53.0 %    MCV                       87 80 - 100 fL    MCH                       29.0 26.0 - 34.0 pg    MCHC                      33.4 32.0 - 36.0 g/dL    RDW                       16.0 (H) 11.5 - 15.5 %    PLATELET COUNT            242 140 - 440 thou/cu mm    MPV                       6.3 (L) 6.5 - 11.0 fL   BASIC METABOLIC PANEL      Result  Value Ref Range    SODIUM 136 133 - 143 mmol/L    POTASSIUM 4.2 3.5 - 5.1 mmol/L    CHLORIDE 104 98 - 107 mmol/L    CO2,TOTAL 22 21 - 31 mmol/L    ANION GAP 10 5 - 18                    GLUCOSE 50 (L) 70 - 105 mg/dL    CALCIUM 7.9 (L) 8.6 - 10.3 mg/dL    BUN 68 (H) 7 - 25 mg/dL    CREATININE 4.24 (H) 0.70 - 1.30 mg/dL    BUN/CREAT RATIO           16                    GFR if African American 16 (L) >60 ml/min/1.73m2    GFR if not African American 13 (L) >60 ml/min/1.73m2   BASIC METABOLIC PANEL      Result  Value Ref Range    SODIUM 138 133 - 143 mmol/L    POTASSIUM 4.1 3.5 - 5.1 mmol/L    CHLORIDE 103 98 - 107 mmol/L    CO2,TOTAL 24 21 - 31 mmol/L    ANION GAP 11 5 - 18                    GLUCOSE 99 70 - 105 mg/dL    CALCIUM 8.1 (L) 8.6 - 10.3 mg/dL    BUN 71 (H) 7 - 25 mg/dL    CREATININE 4.23 (H) 0.70 - 1.30 mg/dL    BUN/CREAT RATIO           17                    GFR if African American 16 (L) >60 ml/min/1.73m2    GFR if not African American 14 (L) >60 ml/min/1.73m2   PHOSPHORUS      Result  Value Ref Range    PHOSPHORUS 2.1 (L) 2.5 - 5.0 mg/dL   CBC WITH AUTO DIFFERENTIAL      Result  Value Ref Range    WHITE BLOOD COUNT         8.2 4.5 - 11.0 thou/cu mm    RED BLOOD COUNT            3.12 (L) 4.30 - 5.90 mil/cu mm    HEMOGLOBIN                9.0 (L) 13.5 - 17.5 g/dL    HEMATOCRIT                26.8 (L) 37.0 - 53.0 %    MCV                       86 80 - 100 fL    MCH                       28.9 26.0 - 34.0 pg    MCHC                      33.6 32.0 - 36.0 g/dL    RDW                       16.3 (H) 11.5 - 15.5 %    PLATELET COUNT            224 140 - 440 thou/cu mm    MPV                       6.4 (L) 6.5 - 11.0 fL    NEUTROPHILS               74.6 (H) 42.0 - 72.0 %    LYMPHOCYTES               18.3 (L) 20.0 - 44.0 %    MONOCYTES                 6.8 <12.0 %    EOSINOPHILS               0.0 <8.0 %    BASOPHILS                 0.3 <3.0 %    ABSOLUTE NEUTROPHILS      6.1 1.7 - 7.0 thou/cu mm    ABSOLUTE LYMPHOCYTES      1.5 0.9 - 2.9 thou/cu mm    ABSOLUTE MONOCYTES        0.6 <0.9 thou/cu mm    ABSOLUTE EOSINOPHILS      0.0 <0.5 thou/cu mm    ABSOLUTE BASOPHILS        0.0 <0.3 thou/cu mm   BASIC METABOLIC PANEL      Result  Value Ref Range    SODIUM 136 133 - 143 mmol/L    POTASSIUM 4.6 3.5 - 5.1 mmol/L    CHLORIDE 101 98 - 107 mmol/L    CO2,TOTAL 22 21 - 31 mmol/L    ANION GAP 13 5 - 18                    GLUCOSE 124 (H) 70 - 105 mg/dL    CALCIUM 8.1 (L) 8.6 - 10.3 mg/dL    BUN 71 (H) 7 - 25 mg/dL    CREATININE 4.21 (H) 0.70 - 1.30 mg/dL    BUN/CREAT RATIO           17                    GFR if African American 16 (L) >60 ml/min/1.73m2    GFR if not African American 14 (L) >60 ml/min/1.73m2       ASSESSMENT AND PLAN:  Altaf was seen today for leg pain/problem.    Diagnoses and all orders for this visit:    Cellulitis of leg without foot, left  -     cephalexin (KEFLEX) 250 mg capsule; Take 1 capsule by mouth 4 times daily for 10 days.  -     dicloxacillin (DYNAPEN) 250 mg capsule; Take 1 capsule by mouth 4 times daily before meals and at bedtime for 10 days.    CKD (chronic kidney disease) stage 5, GFR 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 ml/min (HC)  -     Cancel: RENAL FUNCTION PANEL; Future    Primary pauci-immune  necrotizing and crescentic glomerulonephritis    Anemia, unspecified type  -     Cancel: CBC WITH DIFFERENTIAL; Future      lab results and schedule of future lab studies reviewed with patient, reviewed diet, exercise and weight control, recommended sodium restriction    -- Expected clinical course discussed   -- Medications and their side effects discussed    Discussed hospitalization, patient feels okay at this time and wants to try oral antibiotics as an outpatient.  Advised close follow-up if new or worsening symptoms.    The ASCVD Risk score (Keiry DC Jr, et al., 2013) failed to calculate for the following reasons:    The 2013 ASCVD risk score is only valid for ages 40 to 79    Altaf is also recommended to eat a heart-healthy diet, do regular aerobic exercises, maintain a desirable body weight, and avoid tobacco products. These recommendations are from the American Heart Association (AHA) which stresses the importance of lifestyle changes to lower cardiovascular disease risk.     Return for -- Keep clinic follow-up on 6/9/2017 as scheduled, follow-up cellulitis.    Patient Instructions   1. Cellulitis of leg without foot, left    Start:  - cephalexin (KEFLEX) 250 mg capsule; Take 1 capsule by mouth 4 times daily for 10 days.  Dispense: 40 capsule; Refill: 0  - dicloxacillin (DYNAPEN) 250 mg capsule; Take 1 capsule by mouth 4 times daily before meals and at bedtime for 10 days.  Dispense: 40 capsule; Refill: 0    2. CKD (chronic kidney disease) stage 5, GFR 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 ml/min (HC)  3. Primary pauci-immune necrotizing and crescentic glomerulonephritis  4. Anemia, unspecified type      -- Labs just checked today by nephrology.    -- Keep clinic follow-up on 6/9/2017 as scheduled, follow-up cellulitis    Valentino Machado MD

## 2017-12-27 NOTE — PROGRESS NOTES
Patient Information     Patient Name MRN Sex     Altaf Chao [id number] Male 1934      Progress Notes by Valentino Machado MD at 2017 10:40 AM     Author:  Valentino Machado MD Service:  (none) Author Type:  Physician     Filed:  2017 12:45 PM Encounter Date:  2017 Status:  Signed     :  Valentino Machado MD (Physician)            Nursing Notes:   Angelica Lei  2017 10:49 AM  Signed  Patient presents to the clinic for follow up with left leg cellulitis.      Angelica Lei LPN        2017 10:36 AM    Altaf Chao presents to clinic today for:   Chief Complaint    Patient presents with      Follow Up     HPI: Mr. Chao is a 83 y.o. male who presents today for evaluation of above.     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  Kidney function had stabilized at CKD stage V range.  He has been following with nephrology.    Cellulitis developed about a week ago.  Reports that initially got better with the Keflex and dicloxacillin.  However the last couple days he's had significant worsening of the swelling, warmth, redness and pain.  Reports this morning is very warm and tender.    Advised that due to his outpatient oral antibiotic failure, needs IV antibiotics and hospitalization.  He expresses understanding.  I spoke with the hospitalist who like blood cultures inflammatory labs and CBC and CMP ordered.  These have been placed.    + He is much more ill appearing today than previously.  He is quite pale.    Mr. Chao's Body mass index is 26.4 kg/(m^2). This is out of the normal range for a 83  y.o. Normal range for ages 18+ is between 18.5 and 24.9. To lose weight we reviewed risks and benefits of appropriate options such as diet, exercise, and medications. Patient's strategy will be  none; patient is not ready to act   BP Readings from Last 1 Encounters:06/09/17 : 130/72  Mr. Espinoza blood pressure is out of the normal range for adults. Per JNC-8 guidelines normal adult blood pressure is < 120/80, pre-hypertensive is between 120/80 and 139/89, and hypertension is 140/90 or greater. Risks of hypertension were discussed. Patient's strategy will be reduced salt intake    Functional Capacity: less than or about 4 METS. + Walks with walker  Patient reports no current symptoms of nausea/vomiting.   No cough. No reported shortness of breath.   No change in bowel/bladder habits. No melena, hematochezia. No Hematuria.   No palpitations.  No orthopnea/paroxysmal nocturnal dyspnea   No vision or hearing issues.   No significant mood issues   No bruising.     ALESSANDRA:  No flowsheet data found.    PHQ9:  PHQ Depression Screening 6/1/2017 6/9/2017   Date of PHQ exam (doc flow) 6/1/2017 6/9/2017   1. Lack of interest/pleasure 0 - Not at all 0 - Not at all   2. Feeling down/depressed 0 - Not at all 0 - Not at all   PHQ-2 TOTAL SCORE 0 0   3. Trouble sleeping 0 - Not at all 0 - Not at all   4. Decreased energy 0 - Not at all 0 - Not at all   5. Appetite change 0 - Not at all 0 - Not at all   6. Feelings of failure 0 - Not at all 0 - Not at all   7. Trouble concentrating 0 - Not at all 0 - Not at all   8. Activity level 0 - Not at all 0 - Not at all   9. Hurting yourself 0 - Not at all 0 - Not at all   PHQ-9 TOTAL SCORE 0 0   PHQ-9 Severity Level none none   Functional Impairment not difficult at all not difficult at all        I have personally reviewed the past medical history, past surgical history, medications, allergies, family and social history as listed below, on 6/9/2017.    Patient Active Problem List      Diagnosis  Date Noted     Immunocompromised patient (HC) 2017     Cellulitis 2017     Bilateral edema of lower extremity 2017     Steroid-induced hyperglycemia 2017     Hematoma 2017     Anemia of chronic disease 2017     CKD (chronic kidney disease) stage 5, GFR 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 ml/min (HC) 2017     Anemia 2017     Metabolic acidosis 2017     Acute crescentic glomerulonephritis 2017     Primary pauci-immune necrotizing and crescentic glomerulonephritis 2017     Antineutrophil cytoplasmic antibody (ANCA) positive 2017     Acquired buried penis      Refusal of statin medication at discharge 2017     H/O total hip arthroplasty - Left 2014     Dyslipidemia 04/10/2014     Bilateral leg edema 2014     Hip stiffness 2014     H/O bilateral inguinal hernia repair 2013     History of tobacco abuse 08/15/2013     Pityriasis rosea 2012     Essential hypertension 2012     Past Medical History:     Diagnosis  Date     Acute renal failure (ARF) (HC) 2017     ESSENTIAL HYPERTENSION 2012     Osteoarthrosis, unspecified whether generalized or localized, pelvic region and thigh      Other and unspecified hyperlipidemia      Pityriasis rosea      Right inguinal hernia 2013    Right Inguinal hernia with incarceration, massive [[id number]][      Tachycardia, unspecified      Past Surgical History:      Procedure  Laterality Date     CIRCUMCISION  2017    Dr. Heidi GREENBERG       GENITAL SURGERY MALE  13    Dorsal Slit       HERNIA REPAIR Right 13    RIH with mesh       HERNIA REPAIR Left 4/15/14    LIH with mesh       NE SUBTALAR ATHROEREISIS      bone Spurs excision on Toe       NE TOTAL HIP ARTHROPLASTY Left 14     No current outpatient prescriptions on file.     No Known Allergies  Family History       Problem   Relation Age of Onset     Other  Son 12     neuroblastoma at 13 yo  at 14.         "Genetic  No Family History      No known FHx of DM, CAD, CVA       Family Status     Relation  Status     Mother  at age 94     Father      Sister Alive     Brother     Lung CA - 2-3 ppd tobacco      Brother  at age 67    Aneurysm      Brother Alive     Brother Alive     Brother Alive     Child Alive     Child Alive     Son  at age 14    neuroblastoma at 13 yo  at 14.       Son      No Family History      Social History     Social History        Marital status:       Spouse name: Lucita     Number of children:  3     Years of education:  N/A     Occupational History       Retired      Social History Main Topics          Smoking status:   Former Smoker      Types:  Cigarettes      Quit date:  1976      Smokeless tobacco:   Never Used      Alcohol use   0.5 oz/week     1 Glasses of wine per week        Comment: occasionally       Drug use:   No      Sexual activity:   Not Currently      Other Topics  Concern     Not on file      Social History Narrative      - Wife Lucita.     [place] kids - oldest son - neuroblastoma at 13 yo  at 14.     [employer] - Worked in [employer] -- [place] and .          Pertinent ROS was performed and was negative as noted in HPI above.     EXAM:   Vitals:     17 1038   BP: 130/72   Pulse: 84   Temp: 99.8  F (37.7  C)   TempSrc: Tympanic   Weight: 83.5 kg (184 lb)     BP Readings from Last 3 Encounters:    17 138/56   17 130/72   17 124/66     Wt Readings from Last 3 Encounters:    17 83.5 kg (184 lb)   17 88.1 kg (194 lb 3 oz)   17 84.8 kg (187 lb)     Estimated body mass index is 26.4 kg/(m^2) as calculated from the following:    Height as of 17: 1.778 m (5' 10\").    Weight as of this encounter: 83.5 kg (184 lb).     EXAM:  Constitutional: well groomed / good hygiene, casual dress, pale and ill-appearing.  Eyes:  Extraocular muscles " intact, Sclera non-icteric, Conjunctiva without erythema  Lymphatic Exam: Non-palpable nodes in neck, clavicular regions  Pulmonary: Lungs are clear to auscultation bilaterally, without wheezes or crackles  Cardiovascular Exam: regular rate and rhythm, 1-2+ pedal edema present  Gastrointestinal Exam: Obese, Soft, non-tender, non-distended, positive bowel sounds  Integument: Left lower extremity with swelling, warmth, tenderness, serous fluid drainage from new anterior pretibial skin wound.  Neurologic Exam: CN 3-12 grossly intact   Musculoskeletal Exam: Mild generalized weakness.  Walks with walker.  Psychiatric Exam: Awake and Alert, Affect and mood appropriate    INVESTIGATIONS:  Results for orders placed or performed in visit on 06/09/17      COMPLETE METABOLIC PANEL      Result  Value Ref Range    SODIUM 135 133 - 143 mmol/L    POTASSIUM 4.5 3.5 - 5.1 mmol/L    CHLORIDE 100 98 - 107 mmol/L    CO2,TOTAL 20 (L) 21 - 31 mmol/L    ANION GAP 15 5 - 18                    GLUCOSE 220 (H) 70 - 105 mg/dL    CALCIUM 8.3 (L) 8.6 - 10.3 mg/dL     (H) 7 - 25 mg/dL    CREATININE 5.18 (H) 0.70 - 1.30 mg/dL    BUN/CREAT RATIO           22                    GFR if African American 13 (L) >60 ml/min/1.73m2    GFR if not African American 11 (L) >60 ml/min/1.73m2    ALBUMIN 3.1 (L) 3.5 - 5.7 g/dL    PROTEIN,TOTAL 5.7 (L) 6.4 - 8.9 g/dL    GLOBULIN                  2.6 2.0 - 3.7 g/dL    A/G RATIO 1.2 1.0 - 2.0                    BILIRUBIN,TOTAL 0.4 0.3 - 1.0 mg/dL    ALK PHOSPHATASE 72 34 - 104 IU/L    ALT (SGPT) 10 7 - 52 IU/L    AST (SGOT) 10 (L) 13 - 39 IU/L   CRP, inflammatory      Result  Value Ref Range    C-REACTIVE PROTEIN 11.298 (H) <1.000 mg/dL   CBC WITH AUTO DIFFERENTIAL      Result  Value Ref Range    WHITE BLOOD COUNT         23.1 (H) 4.5 - 11.0 thou/cu mm    RED BLOOD COUNT           2.53 (L) 4.30 - 5.90 mil/cu mm    HEMOGLOBIN                7.6 (L) 13.5 - 17.5 g/dL    HEMATOCRIT                22.7 (L) 37.0  - 53.0 %    MCV                       90 80 - 100 fL    MCH                       30.0 26.0 - 34.0 pg    MCHC                      33.5 32.0 - 36.0 g/dL    RDW                       19.0 (H) 11.5 - 15.5 %    PLATELET COUNT            219 140 - 440 thou/cu mm    MPV                       10.0 6.5 - 11.0 fL    NEUTROPHILS               92.3 (H) 42.0 - 72.0 %    LYMPHOCYTES               3.8 (L) 20.0 - 44.0 %    MONOCYTES                 1.2 <12.0 %    EOSINOPHILS               0.0 <8.0 %    BASOPHILS                 0.1 <3.0 %    ABSOLUTE NEUTROPHILS      21.4 (H) 1.7 - 7.0 thou/cu mm    ABSOLUTE LYMPHOCYTES      0.9 0.9 - 2.9 thou/cu mm    ABSOLUTE MONOCYTES        0.3 <0.9 thou/cu mm    ABSOLUTE EOSINOPHILS      0.0 <0.5 thou/cu mm    ABSOLUTE BASOPHILS        0.0 <0.3 thou/cu mm       ASSESSMENT AND PLAN:  Altaf was seen today for follow up.    Diagnoses and all orders for this visit:    Cellulitis of left lower extremity - Failed outpatient treatment  -     CBC WITH DIFFERENTIAL; Future  -     COMPLETE METABOLIC PANEL; Future  -     BLOOD CULTURE; Future  -     BLOOD CULTURE; Future  -     SEDIMENTATION RATE; Future  -     CRP, inflammatory; Future  -     CBC WITH DIFFERENTIAL  -     COMPLETE METABOLIC PANEL  -     BLOOD CULTURE  -     BLOOD CULTURE  -     SEDIMENTATION RATE  -     CRP, inflammatory  -     CBC WITH AUTO DIFFERENTIAL    Bilateral edema of lower extremity    CKD (chronic kidney disease) stage 5, GFR 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 ml/min (HC)    Primary pauci-immune necrotizing and crescentic glomerulonephritis    Immunocompromised patient (HC)      -- Expected clinical course discussed     Return if symptoms worsen or fail to improve.    Patient Instructions   Left leg cellulitis, initially improved, however with subsequent failure of outpatient regimen cephalexin and dicloxacillin.    Recommend hospitalization.    Spoke with hospitalist who accepted patient for admission.  He wanted CRP, ESR, blood cultures, CBC  and CMP ordered.    Needs IV antibiotics.    Return as needed for follow-up with Dr. Machado.    Clinic : [phone]  Appointment line: [phone]      Valentino Machado MD

## 2017-12-28 NOTE — TELEPHONE ENCOUNTER
Patient Information     Patient Name MRN Sex Altaf Neves [id number] Male 1934      Telephone Encounter by Ambrose Mejia RN at 2017  9:38 AM     Author:  Ambrose Mejia RN Service:  (none) Author Type:  NURS- Registered Nurse     Filed:  2017  9:45 AM Encounter Date:  2017 Status:  Signed     :  Ambrose Mejia RN (NURS- Registered Nurse)            [facility]    Office visit in the past 12 months or per provider note.    Last visit with Valentino Machado MD was on: 17 in [facility]  Next visit with Valentino Machado MD is on: 10/25/17  Next visit with Family Practice is on: No future appointment listed in this department    Max refill for 12 months from last office visit or per provider note.    Chart review shows that patient was seen by PCP last on 17. Office visit notes on that date do review use of flomax as noted below:    He continues on Flomax and states he does seem to empty his bladder fairly well.    No changes noted in relation to use of flomax in office visit notes on that date. Patient was to follow up in 3 months time. Office visit already scheduled as noted above. Writer will refill rx as requested at this time.    Prescription refilled per RN Medication Refill Policy.................... Ambrose Mejia RN ....................  2017   9:44 AM

## 2017-12-28 NOTE — TELEPHONE ENCOUNTER
Patient Information     Patient Name MRN Sex Altfa Neves [id number] Male 1934      Telephone Encounter by Kat Saucedo MD at 2017  2:14 PM     Author:  Kat Saucedo MD Service:  (none) Author Type:  Physician     Filed:  2017  2:16 PM Encounter Date:  2017 Status:  Signed     :  Kat Saucedo MD (Physician)            Call pt, he has bladder infection, sent prescription for antibiotics that he needs to start.  Also, it looks like he has a compression fracture in his spine. Recommend he use tylenol as needed this weekend and follow-up next week in clinic at main [facility]    Kat Kidd MD  2:16 PM 2017

## 2017-12-28 NOTE — PROGRESS NOTES
Patient Information     Patient Name MRN Sex     Altaf Chao [id number] Male 1934      Progress Notes by Valentino Machado MD at 2017  1:20 PM     Author:  Valentino Machado MD Service:  (none) Author Type:  Physician     Filed:  2017  2:00 PM Encounter Date:  2017 Status:  Signed     :  Valentino Machado MD (Physician)            Nursing Notes:   Sapna Wallace  2017  1:44 PM  Signed  Patient presents to follow up from his hospital stay on -.  Sapna Wallace LPN .............2017  1:37 PM    Altaf Chao presents to clinic today for:   Chief Complaint    Patient presents with      Follow Up     HPI: Mr. Chao is a 83 y.o. male who presents today for evaluation of above.     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  He has had a lot of leg swelling issues since his hip replacements but much worse since developing acute autoimmune glomerulonephritis.  He still follows with nephrology.  His creatinine dropped into the 5.0 range.  Glomerular filtration rate is 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.    Patient was treated initially with oral antibiotics but failed outpatient treatment and was subsequently hospitalized.  He has now finished up his antibiotics and reports feeling much better.    + weights are stable, about 181 lbs. Urinating well. Reports feeling good.  States that his appetite is good.  He has been active again with the [employer] [facility] [employer].    Advised patient, that since his leg swelling issues seem to be such  a persistent problem, he likely has some lymphedema in conjunction with dependent edema.  Recommended physical therapy/lymphedema evaluation and treatment.  He does have some below knee compression stockings at home but states that they get quite tight and are quite painful especially of the left leg.  He still has a small draining ulceration on the left anterior pretibial area.  Bandages there but is dry at this time.      Hypertension is controlled.  Initially was elevated but recheck came back normal.  He has been getting systolic blood pressures in the 120s and 130s at home.    Mr. Russells Body mass index is 26.09 kg/(m^2). This is out of the normal range for a 83 y.o. Normal range for ages 18+ is between 18.5 and 24.9. To lose weight we reviewed risks and benefits of appropriate options such as diet, exercise, and medications. Patient's strategy will be  self-directed nutrition plan and self-directed exercise program   BP Readings from Last 1 Encounters:06/23/17 : 138/76  Mr. Espinoza blood pressure is out of the normal range for adults. Per JNC-8 guidelines normal adult blood pressure is < 120/80, pre-hypertensive is between 120/80 and 139/89, and hypertension is 140/90 or greater. Risks of hypertension were discussed. Patient's strategy will be weight loss, increased activity and reduced salt intake    Functional Capacity: less than 4 METS.   + reports stable shortness of breath on exertion.   Patient reports no current symptoms of fevers, chills, nausea/vomiting.   No cough. No resting shortness of breath.   No change in bowel/bladder habits. No melena, hematochezia. No Hematuria.   No rashes. No palpitations.  + left leg wound -- is still draining, but much less than before.   No orthopnea/paroxysmal nocturnal dyspnea   No vision or hearing issues.   No significant mood issues   No bruising.     ALESSANDRA:  No flowsheet data found.    PHQ9:  PHQ Depression Screening 6/9/2017 6/23/2017   Date of PHQ exam (doc flow)  6/9/2017 6/23/2017   1. Lack of interest/pleasure 0 - Not at all 0 - Not at all   2. Feeling down/depressed 0 - Not at all 0 - Not at all   PHQ-2 TOTAL SCORE 0 0   3. Trouble sleeping 0 - Not at all -   4. Decreased energy 0 - Not at all -   5. Appetite change 0 - Not at all -   6. Feelings of failure 0 - Not at all -   7. Trouble concentrating 0 - Not at all -   8. Activity level 0 - Not at all -   9. Hurting yourself 0 - Not at all -   PHQ-9 TOTAL SCORE 0 -   PHQ-9 Severity Level none -   Functional Impairment not difficult at all -        I have personally reviewed the past medical history, past surgical history, medications, allergies, family and social history as listed below, on 6/23/2017.    Patient Active Problem List      Diagnosis Date Noted     Lymphedema of both lower extremities 06/23/2017     Immunocompromised patient (HC) 06/09/2017     Cellulitis 06/09/2017     Bilateral edema of lower extremity 05/11/2017     Steroid-induced hyperglycemia 05/04/2017     Hematoma 05/04/2017     Anemia of chronic disease 05/03/2017     CKD (chronic kidney disease) stage 5, GFR 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 ml/min (HC) 05/01/2017     Anemia 04/25/2017     Metabolic acidosis 04/25/2017     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     Acquired buried penis      Refusal of statin medication at discharge 02/17/2017     H/O total hip arthroplasty - Left 6/30/2014 06/30/2014     Dyslipidemia 04/10/2014     Bilateral leg edema 01/08/2014     Hip stiffness 01/08/2014     H/O bilateral inguinal hernia repair 09/04/2013     History of tobacco abuse 08/15/2013     Pityriasis rosea 08/07/2012     Essential hypertension 08/07/2012     Past Medical History:     Diagnosis  Date     Acute renal failure (ARF) (HC) 04/2017     ESSENTIAL HYPERTENSION 8/7/2012     Osteoarthrosis, unspecified whether  "generalized or localized, pelvic region and thigh      Other and unspecified hyperlipidemia      Pityriasis rosea      Right inguinal hernia 8/12/2013    Right Inguinal hernia with incarceration, massive [[id number]][      Tachycardia, unspecified      Past Surgical History:      Procedure  Laterality Date     CIRCUMCISION  03/14/2017    Dr. Heidi GREENBERG       GENITAL SURGERY MALE  8/20/13    Dorsal Slit       HERNIA REPAIR Right 8/20/13    RIH with mesh       HERNIA REPAIR Left 4/15/14    LIH with mesh       NC SUBTALAR ATHROEREISIS      bone Spurs excision on Toe       NC TOTAL HIP ARTHROPLASTY Left 6/30/14     Current Outpatient Prescriptions       Medication  Sig Dispense Refill     amLODIPine (NORVASC) 5 mg tablet Take 1 tablet by mouth once daily. 90 tablet 3     blood sugar diagnostic (BLOOD GLUCOSE TEST) strip Dispense item covered by pt ins. E11.65 IDDM type II, uncontrolled - Test 4 times/day. Reason: New diabetes 100 Strip prn     blood-glucose meter Dispense meter, test strips, lancets covered by pt ins. E11.65 IDDM type II, uncontrolled - Test 4 times/day. Reason: New diabetes 1 Device 0     famotidine (PEPCID) 20 mg tablet Take 20 mg by mouth once daily.  2     furosemide (LASIX) 40 mg tablet Take 1 tablet by mouth every morning. 30 tablet 1     insulin aspart (NOVOLOG) 100 unit/mL solution for injection Inject 0-5 Units subcutaneous 3 times daily with meals. 100 mL prn     lancets Dispense item covered by pt ins. E11.65 IDDM type II, uncontrolled - Test 3 times/day. (Wants Barrel lancets) 100 Each 11     LANTUS SOLOSTAR 100 unit/mL (3 mL) pen Inject 2 Units subcutaneous at bedtime.       pen needle, diabetic 31 gauge x 5/16\" For administering insulin at home. Dx: R73.9 350 Each 3     predniSONE (DELTASONE) 10 mg tablet Take 3 tablets by mouth once daily with a meal. 10 tablet 0     sodium bicarbonate 650 mg tablet Take 1 tablet by mouth once daily.       tamsulosin (FLOMAX) 0.4 mg capsule Take 1 capsule " by mouth once daily after a meal. 30 capsule 3     No Known Allergies  Family History       Problem   Relation Age of Onset     Other  Son 12     neuroblastoma at 11 yo  at 14.        Genetic  No Family History      No known FHx of DM, CAD, CVA       Family Status     Relation  Status     Mother  at age 94     Father      Sister Alive     Brother     Lung CA - 2-3 ppd tobacco      Brother  at age 67    Aneurysm      Brother Alive     Brother Alive     Brother Alive     Child Alive     Child Alive     Son  at age 14    neuroblastoma at 11 yo  at 14.       Son      No Family History      Social History     Social History        Marital status:       Spouse name: Lucita     Number of children:  3     Years of education:  N/A     Occupational History       Retired      Social History Main Topics          Smoking status:   Former Smoker      Types:  Cigarettes      Quit date:  1976      Smokeless tobacco:   Never Used      Alcohol use   0.5 oz/week     1 Glasses of wine per week        Comment: occasionally       Drug use:   No      Sexual activity:   Not Currently      Other Topics  Concern     Not on file      Social History Narrative      - Wife Lucita.     3 kids - oldest son - neuroblastoma at 11 yo  at 14.     [employer] - Worked in Adult [employer] -- [place] and .          Pertinent ROS was performed and was negative as noted in HPI above.     EXAM:   Vitals:     17 1339   BP: 138/76   Pulse: 84   Temp: 97  F (36.1  C)   TempSrc: Tympanic   Weight: 82.5 kg (181 lb 12.8 oz)     BP Readings from Last 3 Encounters:    17 138/76   17 155/75   17 130/72     Wt Readings from Last 3 Encounters:    17 82.5 kg (181 lb 12.8 oz)   17 86.4 kg (190 lb 7.6 oz)   17 83.5 kg (184 lb)     Estimated body mass index is 26.09 kg/(m^2) as calculated from the following:    Height  "as of 6/1/17: 1.778 m (5' 10\").    Weight as of this encounter: 82.5 kg (181 lb 12.8 oz).     EXAM:  Constitutional: well groomed / good hygiene, casual dress  Lymphatic Exam: Non-palpable nodes in neck, clavicular regions  Pulmonary: Few bilateral basilar crackles, otherwise lungs are clear to auscultation bilaterally  Cardiovascular Exam: regular rate and rhythm, 2-3+ pedal edema present  Gastrointestinal Exam: Obese, Soft, non-tender, non-distended, positive bowel sounds   Integument: bilateral lower extremities still with significant edema.  Dry bandage overlying small left pretibial shin ulceration, did have some drainage recently.  Neurologic Exam: CN 3-12 grossly intact   Musculoskeletal Exam: + walks with walker. Gait and station appear grossly normal  Psychiatric Exam: Awake and Alert, Affect and mood appropriate  Speech is fluent, Thought process is normal    INVESTIGATIONS:  Results for orders placed or performed during the hospital encounter of 06/09/17      FOLIC ACID      Result  Value Ref Range    FOLIC ACID 11.4 >=5.2 ng/mL   Hemoglobin  A1C      Result  Value Ref Range    HEMOGLOBIN A1C MONITORING (POCT) 7.2 (H) 4.0 - 6.2 %    ESTIMATED AVERAGE GLUCOSE  160 mg/dL   IRON PLUS IRON BINDING CAP      Result  Value Ref Range    IRON 20 (L) 50 - 212 ug/dL    UIBC (UNSATURATED)  142.68 mg/dL    IRON BINDING CAPACITY, CALCULATED  162.68 (L) 245.00 - 400.00 ug/dL    IRON, % SATURATION  12 (L) 20 - 55 %   RETICULOCYTES      Result  Value Ref Range    RETIC%                    4.3 (H) 0.5 - 2.0 %    RETIC (ABSOLUTE) 0.086 >0.018 mil/uL   FERRITIN      Result  Value Ref Range    FERRITIN 1206.9 (H) 23.9 - 336.2 ng/mL   VITAMIN B12      Result  Value Ref Range    VITAMIN B12 163 (L) 180 - 914 pg/mL   BASIC METABOLIC PANEL      Result  Value Ref Range    SODIUM 136 133 - 143 mmol/L    POTASSIUM 4.7 3.5 - 5.1 mmol/L    CHLORIDE 103 98 - 107 mmol/L    CO2,TOTAL 19 (L) 21 - 31 mmol/L    ANION GAP 14 5 - 18           "          GLUCOSE 121 (H) 70 - 105 mg/dL    CALCIUM 7.9 (L) 8.6 - 10.3 mg/dL     (H) 7 - 25 mg/dL    CREATININE 5.49 (H) 0.70 - 1.30 mg/dL    BUN/CREAT RATIO           21                    GFR if African American 12 (L) >60 ml/min/1.73m2    GFR if not African American 10 (L) >60 ml/min/1.73m2   CBC WITH AUTO DIFFERENTIAL      Result  Value Ref Range    WHITE BLOOD COUNT         16.3 (H) 4.5 - 11.0 thou/cu mm    RED BLOOD COUNT           2.01 (L) 4.30 - 5.90 mil/cu mm    HEMOGLOBIN                5.8 (LL) 13.5 - 17.5 g/dL    HEMATOCRIT                18.1 (L) 37.0 - 53.0 %    MCV                       90 80 - 100 fL    MCH                       28.9 26.0 - 34.0 pg    MCHC                      32.0 32.0 - 36.0 g/dL    RDW                       18.6 (H) 11.5 - 15.5 %    PLATELET COUNT            169 140 - 440 thou/cu mm    MPV                       10.2 6.5 - 11.0 fL    NEUTROPHILS               83.1 (H) 42.0 - 72.0 %    LYMPHOCYTES               11.4 (L) 20.0 - 44.0 %    MONOCYTES                 3.1 <12.0 %    EOSINOPHILS               0.2 <8.0 %    BASOPHILS                 0.1 <3.0 %    ABSOLUTE NEUTROPHILS      13.6 (H) 1.7 - 7.0 thou/cu mm    ABSOLUTE LYMPHOCYTES      1.9 0.9 - 2.9 thou/cu mm    ABSOLUTE MONOCYTES        0.5 <0.9 thou/cu mm    ABSOLUTE EOSINOPHILS      0.0 <0.5 thou/cu mm    ABSOLUTE BASOPHILS        0.0 <0.3 thou/cu mm   VANCOMYCIN RANDOM      Result  Value Ref Range    VANCOMYCIN,RANDOM 10.6 7.0 - 45.0 ug/mL    DATE OF LAST DOSE,RANDOM  6/9/2017                    TIME OF LAST DOSE,RANDOM  3:09 PM                   CBC W PLT NO DIFF      Result  Value Ref Range    WHITE BLOOD COUNT         18.7 (H) 4.5 - 11.0 thou/cu mm    RED BLOOD COUNT           2.98 (L) 4.30 - 5.90 mil/cu mm    HEMOGLOBIN                9.1 (L) 13.5 - 17.5 g/dL    HEMATOCRIT                26.5 (L) 37.0 - 53.0 %    MCV                       89 80 - 100 fL    MCH                       30.5 26.0 - 34.0 pg    MCHC                       34.3 32.0 - 36.0 g/dL    RDW                       17.3 (H) 11.5 - 15.5 %    PLATELET COUNT            167 140 - 440 thou/cu mm    MPV                       10.5 6.5 - 11.0 fL   BASIC METABOLIC PANEL      Result  Value Ref Range    SODIUM 134 133 - 143 mmol/L    POTASSIUM 4.6 3.5 - 5.1 mmol/L    CHLORIDE 103 98 - 107 mmol/L    CO2,TOTAL 19 (L) 21 - 31 mmol/L    ANION GAP 12 5 - 18                    GLUCOSE 363 (H) 70 - 105 mg/dL    CALCIUM 7.8 (L) 8.6 - 10.3 mg/dL     (H) 7 - 25 mg/dL    CREATININE 5.10 (H) 0.70 - 1.30 mg/dL    BUN/CREAT RATIO           21                    GFR if African American 13 (L) >60 ml/min/1.73m2    GFR if not African American 11 (L) >60 ml/min/1.73m2   CBC WITH AUTO DIFFERENTIAL      Result  Value Ref Range    WHITE BLOOD COUNT         13.2 (H) 4.5 - 11.0 thou/cu mm    RED BLOOD COUNT           2.82 (L) 4.30 - 5.90 mil/cu mm    HEMOGLOBIN                8.5 (L) 13.5 - 17.5 g/dL    HEMATOCRIT                24.7 (L) 37.0 - 53.0 %    MCV                       88 80 - 100 fL    MCH                       30.1 26.0 - 34.0 pg    MCHC                      34.4 32.0 - 36.0 g/dL    RDW                       17.4 (H) 11.5 - 15.5 %    PLATELET COUNT            182 140 - 440 thou/cu mm    MPV                       10.0 6.5 - 11.0 fL    NEUTROPHILS               84.0 (H) 42.0 - 72.0 %    LYMPHOCYTES               10.0 (L) 20.0 - 44.0 %    MONOCYTES                 4.3 <12.0 %    EOSINOPHILS               0.5 <8.0 %    BASOPHILS                 0.0 <3.0 %    ABSOLUTE NEUTROPHILS      11.1 (H) 1.7 - 7.0 thou/cu mm    ABSOLUTE LYMPHOCYTES      1.3 0.9 - 2.9 thou/cu mm    ABSOLUTE MONOCYTES        0.6 <0.9 thou/cu mm    ABSOLUTE EOSINOPHILS      0.1 <0.5 thou/cu mm    ABSOLUTE BASOPHILS        0.0 <0.3 thou/cu mm   BASIC METABOLIC PANEL      Result  Value Ref Range    SODIUM 137 133 - 143 mmol/L    POTASSIUM 4.6 3.5 - 5.1 mmol/L    CHLORIDE 105 98 - 107 mmol/L    CO2,TOTAL 20 (L) 21  - 31 mmol/L    ANION GAP 12 5 - 18                    GLUCOSE 79 70 - 105 mg/dL    CALCIUM 7.7 (L) 8.6 - 10.3 mg/dL     (H) 7 - 25 mg/dL    CREATININE 5.13 (H) 0.70 - 1.30 mg/dL    BUN/CREAT RATIO           20                    GFR if African American 13 (L) >60 ml/min/1.73m2    GFR if not African American 11 (L) >60 ml/min/1.73m2   TYPE & SCREEN      Result  Value Ref Range    ABORH                     O Rh Positive                    ANTIBODY SCREEN Negative Negative                    SPECIMEN EXPIRATION DATE/TIME 6/13/2017 06:00        ASSESSMENT AND PLAN:  Altaf was seen today for follow up.    Diagnoses and all orders for this visit:    Hospital discharge follow-up    Cellulitis of left lower extremity    Bilateral edema of lower extremity  -     AMB CONSULT TO PHYSICAL THERAPY; Future    CKD (chronic kidney disease) stage 5, GFR 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 ml/min (HC)    Primary pauci-immune necrotizing and crescentic glomerulonephritis    Lymphedema of both lower extremities  -     AMB CONSULT TO PHYSICAL THERAPY; Future    Essential hypertension    lab results and schedule of future lab studies reviewed with patient, reviewed diet, exercise and weight control, recommended sodium restriction, cardiovascular risk and specific lipid/LDL goals reviewed    -- Expected clinical course discussed   -- Medications and their side effects discussed    25 minutes spent in face-to-face interaction with patient with greater than 50% spent in counseling and care coordination of listed medical problems.      Altaf is also recommended to eat a heart-healthy diet, do regular aerobic exercises, maintain a desirable body weight, and avoid tobacco products. These recommendations are from the American Heart Association (AHA) which stresses the importance of lifestyle changes to lower cardiovascular disease risk.     Return in about 1 month (around 7/23/2017) for -- follow-up legs / edema.    Patient Instructions   The left leg  cellulitis is looking much better.    Ongoing issues with chronic leg swelling, likely that you have some underlying lymphedema.    Physical therapy referral sent  - they will call with date/time of appointment.    -- Angelia -- Start: Lymphedema evaluation / treatments.     Weights are stable.     -- Watch and reduce your sodium / salt intake.     Get your legs up to above your heart level - 2 to 4 times daily to help with leg swelling.     Consider wearing compression stockings -- put on in AM and remove in PM.     Compression Stockings:   -- Knee high   -- 20 mmHg   -- Wear when you get up until bedtime   -- For more fashionable compression stockings try [employer], [email or web address], [phone]      Return in approximately 1 month(s), or sooner as needed for follow-up with Dr. Machado.  -- follow-up legs / edema    Clinic : [phone]  Appointment line: [phone]      Valentino Machado MD

## 2017-12-28 NOTE — PROGRESS NOTES
Patient Information     Patient Name MRN Sex     Altaf Chao [id number] Male 1934      Progress Notes by Valentino Machado MD at 2017 11:20 AM     Author:  Valentino Machado MD Service:  (none) Author Type:  Physician     Filed:  2017  1:52 AM Encounter Date:  2017 Status:  Signed     :  Valentino Machado MD (Physician)            Nursing Notes:   Angelica Lei  2017 11:51 AM  Signed  Patient presents to the clinic for follow up with left leg cellulitis/swelling.    Angelica Lei LPN        2017 11:42 AM    Altaf Chao presents to clinic today for:   Chief Complaint    Patient presents with      Follow Up     HPI: Mr. Chao is a 83 y.o. male who presents today for evaluation of above.     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  She did some leg wraps.  States that they really help.  He's been doing some compression stockings at home, leg elevation treatments -- states that these have been helping now as well.    Patient is brought in by his wife today.    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  He's been having a lot of bruising because of this steroid use.    Patient continues to be immunocompromised at this time.    Chronic kidney disease, creatinine recently elevated, he wanted his labs reviewed from [facility].    -- Outside lab records requested.  See below.  These  are reviewed with him and his wife today.  He is not taking any NSAIDs.  He does continue to take bicarbonate tablets daily.    He continues on Flomax and states he does seem to empty his bladder fairly well.    Low back pain, having a lot of localized low back pain.  Certain positions he does very well has no pain at all however when he standing for a little while or even walking with his walker having a lot more back pain.  I had him stand up and show me where his back is hurting and when he uses his walker what happens.  He is actually standing very hunched over leaning over his walker.  Advised that he probably has some lumbar strain or sprain, overworking his low back.  I raised his walker up today 1 inch on all 4 legs and he reports substantial improvement in low back pain with this.  Recommended some stretching, exercises printed and reviewed with him and his wife today.  Warm packs as needed.    Mr. Espinoza Body mass index is 23.98 kg/(m^2). This is within the normal range for a 83 y.o. Normal range for ages 18+ is between 18.5 and 24.9.   BP Readings from Last 1 Encounters:07/27/17 : 122/70  Mr. Espinoza blood pressure is out of the normal range for adults. Per JNC-8 guidelines normal adult blood pressure is < 120/80, pre-hypertensive is between 120/80 and 139/89, and hypertension is 140/90 or greater. Risks of hypertension were discussed. Patient's strategy will be weight loss, increased activity and reduced salt intake    Functional Capacity: about 4 METS.  -- using walker more. Getting stronger. Leg swelling is doing better with lymphedema treatments.   Patient reports no current symptoms of fevers, chills, nausea/vomiting.   No cough. No shortness of breath.   No change in bowel/bladder habits. No melena, hematochezia. No Hematuria.   No rashes. No palpitations.  No orthopnea/paroxysmal nocturnal dyspnea   No vision or hearing issues.   No significant mood issues   + very easy bruising.     ALESSANDRA:  No  flowsheet data found.    PHQ9:  PHQ Depression Screening 7/7/2017 7/27/2017   Date of PHQ exam (doc flow) 7/7/2017 7/27/2017   1. Lack of interest/pleasure 0 - Not at all 0 - Not at all   2. Feeling down/depressed 0 - Not at all 0 - Not at all   PHQ-2 TOTAL SCORE 0 0   3. Trouble sleeping - 0 - Not at all   4. Decreased energy - 0 - Not at all   5. Appetite change - 0 - Not at all   6. Feelings of failure - 0 - Not at all   7. Trouble concentrating - 0 - Not at all   8. Activity level - 0 - Not at all   9. Hurting yourself - 0 - Not at all   PHQ-9 TOTAL SCORE - 0   PHQ-9 Severity Level - none   Functional Impairment - not difficult at all   Some recent data might be hidden          I have personally reviewed the past medical history, past surgical history, medications, allergies, family and social history as listed below, on 7/27/2017.    Patient Active Problem List      Diagnosis Date Noted     Lymphedema of both lower extremities 06/23/2017     Immunocompromised patient (HC) 06/09/2017     Bilateral edema of lower extremity 05/11/2017     Steroid-induced hyperglycemia 05/04/2017     Hematoma 05/04/2017     Anemia of chronic disease 05/03/2017     CKD (chronic kidney disease) stage 5, GFR 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 ml/min (HC) 05/01/2017     Anemia 04/25/2017     Metabolic acidosis 04/25/2017     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     Acquired buried penis      Refusal of statin medication at discharge 02/17/2017     H/O total hip arthroplasty - Left 6/30/2014 06/30/2014     Dyslipidemia 04/10/2014     Bilateral leg edema 01/08/2014     Hip stiffness 01/08/2014     H/O bilateral inguinal hernia repair 09/04/2013     History of tobacco abuse 08/15/2013     Pityriasis rosea 08/07/2012     Essential hypertension 08/07/2012     Past Medical History:     Diagnosis  Date      "Acute renal failure (ARF) () 04/2017     ESSENTIAL HYPERTENSION 8/7/2012     Osteoarthrosis, unspecified whether generalized or localized, pelvic region and thigh      Other and unspecified hyperlipidemia      Pityriasis rosea      Right inguinal hernia 8/12/2013    Right Inguinal hernia with incarceration, massive [[id number]][      Tachycardia, unspecified      Past Surgical History:      Procedure  Laterality Date     CIRCUMCISION  03/14/2017    Dr. Heidi GREENBERG       GENITAL SURGERY MALE  8/20/13    Dorsal Slit       HERNIA REPAIR Right 8/20/13    RIH with mesh       HERNIA REPAIR Left 4/15/14    LIH with mesh       MD SUBTALAR ATHROEREISIS      bone Spurs excision on Toe       MD TOTAL HIP ARTHROPLASTY Left 6/30/14     Current Outpatient Prescriptions       Medication  Sig Dispense Refill     amLODIPine (NORVASC) 5 mg tablet Take 1 tablet by mouth once daily. 90 tablet 3     blood sugar diagnostic (BLOOD GLUCOSE TEST) strip Dispense item covered by pt ins. E11.65 IDDM type II, uncontrolled - Test 4 times/day. Reason: New diabetes 100 Strip prn     blood-glucose meter Dispense meter, test strips, lancets covered by pt ins. E11.65 IDDM type II, uncontrolled - Test 4 times/day. Reason: New diabetes 1 Device 0     famotidine (PEPCID) 20 mg tablet TAKE 1 TABLET BY MOUTH EVERY DAY 90 tablet 3     furosemide (LASIX) 40 mg tablet Take 1 tablet by mouth every morning. 30 tablet 1     insulin aspart (NOVOLOG) 100 unit/mL solution for injection Inject 0-5 Units subcutaneous 3 times daily with meals. 100 mL prn     lancets Dispense item covered by pt ins. E11.65 IDDM type II, uncontrolled - Test 3 times/day. (Wants Barrel lancets) 100 Each 11     LANTUS SOLOSTAR 100 unit/mL (3 mL) pen Inject 2 Units subcutaneous at bedtime.       pen needle, diabetic 31 gauge x 5/16\" For administering insulin at home. Dx: R73.9 350 Each 3     predniSONE (DELTASONE) 20 mg tablet Take 20 mg by mouth once daily. Tapering down from 30 mg daily.  " 1     sodium bicarbonate 650 mg tablet Take 1 tablet by mouth once daily.       tamsulosin (FLOMAX) 0.4 mg capsule Take 1 capsule by mouth once daily after a meal. 30 capsule 3     No Known Allergies  Family History       Problem   Relation Age of Onset     Other  Son 12     neuroblastoma at 13 yo  at 14.        Genetic  No Family History      No known FHx of DM, CAD, CVA       Family Status     Relation  Status     Mother  at age 94     Father      Sister Alive     Brother     Lung CA - 2-3 ppd tobacco      Brother  at age 67    Aneurysm      Brother Alive     Brother Alive     Brother Alive     Child Alive     Child Alive     Son  at age 14    neuroblastoma at 13 yo  at 14.       Son      No Family History      Social History     Social History        Marital status:       Spouse name: Lucita     Number of children:  3     Years of education:  N/A     Occupational History       Retired      Social History Main Topics          Smoking status:   Former Smoker      Types:  Cigarettes      Quit date:  1976      Smokeless tobacco:   Never Used      Alcohol use   0.5 oz/week     1 Glasses of wine per week        Comment: occasionally       Drug use:   No      Sexual activity:   Not Currently      Other Topics  Concern     Not on file      Social History Narrative      - Wife Lucita.     3 kids - oldest son - neuroblastoma at 13 yo  at 14.     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          Pertinent ROS was performed and was negative as noted in HPI above.     EXAM:   Vitals:     17 1144   BP: 122/70   Pulse: 96   Weight: 75.8 kg (167 lb 2 oz)     BP Readings from Last 3 Encounters:    17 122/70   17 128/84   17 138/76     Wt Readings from Last 3 Encounters:    17 75.8 kg (167 lb 2 oz)   17 81.2 kg (179 lb)   17 82.5 kg (181 lb 12.8 oz)  "    Estimated body mass index is 23.98 kg/(m^2) as calculated from the following:    Height as of 6/1/17: 1.778 m (5' 10\").    Weight as of this encounter: 75.8 kg (167 lb 2 oz).     EXAM:  Constitutional: Pleasant, alert, appropriate appearance for age. No acute distress  Lymphatic Exam: Non-palpable nodes in neck, clavicular regions  Pulmonary: Lungs are clear to auscultation bilaterally, without wheezes or crackles  Cardiovascular Exam: regular rate and rhythm, trace pedal edema present  Gastrointestinal Exam: Soft, non-tender, non-distended, positive bowel sounds  Integument: multiple bruises on arms.   Neurologic Exam: CN 3-12 grossly intact   Musculoskeletal Exam: walks with walker. Moves upper and lower extremities symmetrically, No focal weakness  Psychiatric Exam: Awake and Alert, Affect and mood appropriate  Speech is fluent, Thought process is normal    INVESTIGATIONS:  Results for orders placed or performed in visit on 07/07/17      URINALYSIS W REFLEX MICROSCOPIC IF POSITIVE      Result  Value Ref Range    COLOR                     Yellow Yellow Color    CLARITY                   Cloudy (A) Clear Clarity    SPECIFIC GRAVITY,URINE    1.010 1.010, 1.015, 1.020, 1.025                    PH,URINE                  5.0 (A) 6.0, 7.0, 8.0, 5.5, 6.5, 7.5, 8.5                    UROBILINOGEN,QUALITATIVE  Normal Normal EU/dl    PROTEIN, URINE 100 (A) Negative mg/dL    GLUCOSE, URINE Negative Negative mg/dL    KETONES,URINE             Negative Negative mg/dL    BILIRUBIN,URINE           Negative Negative                    OCCULT BLOOD,URINE        Large (A) Negative                    NITRITE                   Positive (A) Negative                    LEUKOCYTE ESTERASE        Large (A) Negative                   URINALYSIS MICROSCOPIC      Result  Value Ref Range    RBC >100 (A) 0-2, None Seen /HPF    WBC >100 (A) 0-2, 3-5, None Seen /HPF    BACTERIA                  Many (A) None Seen, Rare, Occasional, Few " Bacteria/HPF    EPITHELIAL CELLS          None Seen None Seen, Few Epi/HPF       ASSESSMENT AND PLAN:  Altaf was seen today for follow up.    Diagnoses and all orders for this visit:    Lymphedema of both lower extremities    Bilateral edema of lower extremity    Steroid-induced hyperglycemia    Immunocompromised patient (HC)    Primary pauci-immune necrotizing and crescentic glomerulonephritis    Antineutrophil cytoplasmic antibody (ANCA) positive    CKD (chronic kidney disease) stage 5, GFR 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 ml/min (HC)    Lumbar strain, initial encounter    lab results and schedule of future lab studies reviewed with patient, reviewed diet, exercise and weight control, recommended sodium restriction, cardiovascular risk and specific lipid/LDL goals reviewed    -- Expected clinical course discussed   -- Medications and their side effects discussed    25 minutes spent in face-to-face interaction with patient and wife with greater than 50% spent in counseling and care coordination of listed medical problems.      The ASCVD Risk score (Weott ROSIE Jr, et al., 2013) failed to calculate for the following reasons:    The 2013 ASCVD risk score is only valid for ages 40 to 79    Altaf is also recommended to eat a heart-healthy diet, do regular aerobic exercises, maintain a desirable body weight, and avoid tobacco products. These recommendations are from the American Heart Association (AHA) which stresses the importance of lifestyle changes to lower cardiovascular disease risk.     Return in about 3 months (around 10/27/2017) for -- follow-up blood pressure, leg swelling.    Patient Instructions     Leg swelling is doing much better!  Glad the lymphedema treatments have been so helpful.     Continue current recommendations from Angelia.     Blood pressures are well controlled today.    CREATININE (mg/dL)    Date Value   06/12/2017 5.13 (H)      Weight is down 12 pounds in past 20 days.     Start:  Mucinex DM - helps thin the  phlegm and settle the cough, without causing drowsiness.   - Maximum strength (buy the generic)    Use walker with higher legs. Suspect you are having lumbar strain / sprain.   -- see printed information.   -- use warm packs as needed.       Outside records requested -- Creatinine 7/21/2017 -- at [facility] was 5.25.     Return in approximately 3 month(s), or sooner as needed for follow-up with Dr. Machado.  -- follow-up blood pressure, leg swelling    Clinic : [phone]  Appointment line: [phone]      Valentino Machado MD

## 2017-12-28 NOTE — TELEPHONE ENCOUNTER
Patient Information     Patient Name MRN Sex Altaf Neves [id number] Male 1934      Telephone Encounter by Kelly Palacios RN at 2017  1:15 PM     Author:  Kelly Palacios RN Service:  (none) Author Type:  NURS- Registered Nurse     Filed:  2017  1:20 PM Encounter Date:  2017 Status:  Signed     :  Kelly Palacios RN (NURS- Registered Nurse)            Request physician consideration to refill Famotidine 20 mgas requested. Listed as historical on snapshot.   H2 Blockers  Office visit in the past 12 months or per provider note.  Last visit with Valentino Machado MD was on 17  Max refill for 12 months from last office visit or per provider note.  Unable to complete prescription refill per RN Medication Refill Policy.................... Kelly Palacios RN ....................  2017   1:16 PM

## 2017-12-28 NOTE — TELEPHONE ENCOUNTER
Patient Information     Patient Name MRN Sex Altaf Neves [id number] Male 1934      Telephone Encounter by Helena Means at 2017  2:51 PM     Author:  Helena Means Service:  (none) Author Type:  (none)     Filed:  2017  2:52 PM Encounter Date:  2017 Status:  Signed     :  Helena Means            Patient notified of results and instructions as requested by Dr Saucedo.

## 2017-12-28 NOTE — TELEPHONE ENCOUNTER
Patient Information     Patient Name MRN Sex Altaf Neves [id number] Male 1934      Telephone Encounter by Mary Ann Rodríguez RN at 10/5/2017 11:48 AM     Author:  Mary Ann Rodríguez RN Service:  (none) Author Type:  NURS- Registered Nurse     Filed:  10/5/2017 11:51 AM Encounter Date:  10/3/2017 Status:  Signed     :  Mary Ann Rodríguez RN (NURS- Registered Nurse)            furosemide (LASIX) 40 mg tablet  TAKE 1 TABLET BY MOUTH EVERY MORNING       Disp: 30 tablet Refills: 1    Class: eRx Start: 10/3/2017    For: Bilateral leg edema  Documented:3 months ago  Last refill:2017  To be filled at: [facility] [address] [address] [address].Phone: [phone]    Last visit with KIMANI MACHADO was on: 2017 in [facility]  PCP:  Kimani Machado MD    Lasix noted on current medication list as prescribed by outside MD Gilberto Adams on 17    Appointment scheduled for 10/25/17 with Dr. Machado    Will route to Kimani Machado MD for review and consideration of refill.  Unable to complete prescription refill per RN Medication Refill Policy.................... MARY ANN RODRÍGUEZ RN ....................  10/5/2017   11:49 AM

## 2017-12-28 NOTE — TELEPHONE ENCOUNTER
"Patient Information     Patient Name MRN Altaf Tsang [id number] Male 1934      Telephone Encounter by Mary Ann Rodríguez RN at 10/30/2017  9:44 AM     Author:  Mary Ann Rodríguez RN Service:  (none) Author Type:  NURS- Registered Nurse     Filed:  10/30/2017  9:52 AM Encounter Date:  10/30/2017 Status:  Signed     :  Mary Ann Rodríguez RN (NURS- Registered Nurse)            [facility]    Office visit in the past 12 months or per provider note.    Last visit with KIMANI MURILLO was on: 10/25/2017 in [place] [facility]  Next visit with KIMANI MURILLO is on: No future appointment listed with this provider  Next visit with Internal Medicine is on: No future appointment listed in this department    Max refill for 12 months from last office visit or per provider note.    Per OV on 17  \"He continues on Flomax and states he does seem to empty his bladder fairly well.\"    No medication changes noted in LOV on 10/25/17 and patient to follow up in 6 months..    Prescription refilled per RN Medication Refill Policy.................... MARY ANN RODRÍGUEZ RN ....................  10/30/2017   9:51 AM              "

## 2017-12-28 NOTE — PATIENT INSTRUCTIONS
Patient Information     Patient Name MRN Sex Altaf Neves [id number] Male 1934      Patient Instructions by Valentino Machado MD at 2017  1:20 PM     Author:  Valentino Mahcado MD  Service:  (none) Author Type:  Physician     Filed:  2017  1:51 PM  Encounter Date:  2017 Status:  Addendum     :  Valentino Machado MD (Physician)        Related Notes: Original Note by Valentino Machado MD (Physician) filed at 2017  1:50 PM            The left leg cellulitis is looking much better.    Ongoing issues with chronic leg swelling, likely that you have some underlying lymphedema.    Physical therapy referral sent  - they will call with date/time of appointment.    -- Angelia -- Start: Lymphedema evaluation / treatments.     Weights are stable.     -- Watch and reduce your sodium / salt intake.     Get your legs up to above your heart level - 2 to 4 times daily to help with leg swelling.     Consider wearing compression stockings -- put on in AM and remove in PM.     Compression Stockings:   -- Knee high   -- 20 mmHg   -- Wear when you get up until bedtime   -- For more fashionable compression stockings try [employer], http://[employer], 618.328.9976      Return in approximately 1 month(s), or sooner as needed for follow-up with Dr. Machado.  -- follow-up legs / edema    Clinic : [phone]  Appointment line: [phone]

## 2017-12-28 NOTE — PROGRESS NOTES
Patient Information     Patient Name MRN Sex Altaf Neves [id number] Male 1934      Progress Notes by Mar Samayoa PT at 2017  2:24 PM     Author:  Mar Samayoa PT Service:  (none) Author Type:  PT- Physical Therapist     Filed:  2017  3:19 PM Date of Service:  2017  2:24 PM Status:  Signed     :  Mar Samayoa PT (PT- Physical Therapist)            [facility]  Outpatient PT - Daily Note        Date of Service: 2017   Visit #2    Patient Name: Altaf Chao   YOB: 1934   Referring MD/Provider: Valentino Machado MD  Diagnosis:bilateral edema of lower extremities, chronic kidney disease   Treatment Diagnosis:bilateral lower extremity edema  Insurance: Medicare  Start of Service: 17  Certification Dates: Start of Service: 17   Medicare/MA Re-Cert Due: 17      Subjective        Pain Ratin = no pain, comfortable / Location:  legs    No issues with wraps, no discomfort. Still has wraps on lower legs.    Objective  Measurements Performed:  seated  Circumferential Measurements:    Left Right   2017     MTP 24.3 cm  25.3    5cm+ 22.5  22.4    ankle 26.4  25.6    30cm distal to SPB 30.0  29.7    20cm distal to SPB 36.0  34.9    10cm distal to SPB 38.3  38.3                        Today's Intervention:    Lower extremities cleansed, eucerin lotion applied.   Application of compression wraps with tg, molelast, artiflex, comprilan 8cm x3, figure 8 pattern. patient instructed to keep wraps in place for 2 days as long as there is no pain.    Home Exercise Program: leg elevation    Assessment    Therapist Assessment:  Patient presents with bilateral lower extremity lymphedema due to chronic kidney disease. Lymphedema appears to be stage 3, moderate severity. Will use compression to reduce edema and transition to use of compression garment.           Goals:  Patient goal (time reference required): reduce leg  swelling .  Long term goal: Lymphedema is to remain stable for 12 months following the completion of treatment.       Functional goals: Patient is to be independent in their Home Exercise Program in 8 weeks.  Decrease volume excess by 10 percent to improve cosmesis, fit of clothing and/or shoes, and to reduce the risk of infection in 8 weeks.  Patient or caregiver will be independent with self bandaging in 8 weeks.  Patient or caregiver will be independent with donning and doffing compression garments in 8 weeks.     Patient participated in goal selection and understand(s) the plan of care: Yes   Patient Potential for Achieving Desired Outcome:  Fair      Response to Intervention:  Tolerated wraps well, decrease in edema       Plan    Treatment Plan / Targeted Outcomes:     Frequency:   16 visits     Duration of Treatment: 8 weeks    Planned Interventions:  Possible physical therapy interventions include but are not limited to:   Home Exercise Program development  Therapeutic Exercise (ROM & Strengthening)  Manual Therapy  Therapeutic Activities    Plan for next visit:  Reassess edema    Student or PTA has been instructed in and demonstrates skills necessary to carry out above stated treatment plan: No    Thank you for your referral to [facility].  Please call with any questions, concerns or comments.  [phone]

## 2017-12-28 NOTE — PROGRESS NOTES
Patient Information     Patient Name MRN Sex Altaf Neves [id number] Male 1934      Progress Notes by Valentino Machado MD at 10/25/2017 10:40 AM     Author:  Valentino Machado MD Service:  (none) Author Type:  Physician     Filed:  10/25/2017  1:01 PM Encounter Date:  10/25/2017 Status:  Signed     :  Valentino Machado MD (Physician)            Nursing Notes:   Andreina Herman  10/25/2017 10:56 AM  Signed  Pt presents for follow up anemia and kidney failure.  Andreina Herman    Altaf Chao presents to clinic today for:   Chief Complaint     Patient presents with       Follow Up      Anemia and Kidney failure     HPI: Mr. Chao is a 83 y.o. male who presents today for evaluation of above.     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 Reduced prednisone down 5 mg daily -- now taking 10 mg daily.     Anemia, improved with injections from nephrology, likely erythropoietin.  He just saw nephrology last week.  They do not give him another Procrit injection because his hemoglobin level was 12.0.    Upon review of his labs, he is noted to have iron deficiency of vitamin B12.  B12 level previously was in the 100 range.  Start B12 injections.  First one today.    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  See above.    Mr. Chao's Body mass index is 24.62 kg/(m^2). This is within the normal range for a 83 y.o. Normal range for ages 18+ is between 18.5 and 24.9.   BP Readings from Last 1 Encounters:10/25/17 : 134/78  Mr. Russells blood pressure is out of the normal range for adults. Per JNC-8 guidelines normal adult blood pressure is  < 120/80, pre-hypertensive is between 120/80 and 139/89, and hypertension is 140/90 or greater. Risks of hypertension were discussed. Patient's strategy will be reduced salt intake    Functional Capacity: about  4 METS.  Walks with a cane/walker.  Mobility is moderately impaired.  Patient reports no current symptoms of fevers, chills, nausea/vomiting.   No cough. No resting shortness of breath.   No change in bowel/bladder habits. No melena, hematochezia. No Hematuria.   No rashes. No palpitations.  No orthopnea/paroxysmal nocturnal dyspnea   No vision or hearing issues.   No significant mood issues   + Easy bruising.     ALESSANDRA:  No flowsheet data found.    PHQ9:  PHQ Depression Screening 7/27/2017 10/25/2017   Date of PHQ exam (doc flow) 7/27/2017 10/25/2017   1. Lack of interest/pleasure 0 - Not at all 0 - Not at all   2. Feeling down/depressed 0 - Not at all 0 - Not at all   PHQ-2 TOTAL SCORE 0 0   3. Trouble sleeping 0 - Not at all -   4. Decreased energy 0 - Not at all -   5. Appetite change 0 - Not at all -   6. Feelings of failure 0 - Not at all -   7. Trouble concentrating 0 - Not at all -   8. Activity level 0 - Not at all -   9. Hurting yourself 0 - Not at all -   PHQ-9 TOTAL SCORE 0 -   PHQ-9 Severity Level none -   Functional Impairment not difficult at all -   Some recent data might be hidden          I have personally reviewed the past medical history, past surgical history, medications, allergies, family and social history as listed below, on 10/25/2017.    Patient Active Problem List      Diagnosis Date Noted     Anemia due to vitamin B12 deficiency 10/25/2017     Lymphedema of both lower extremities 06/23/2017     Immunocompromised patient (HC) 06/09/2017     Bilateral edema of lower extremity 05/11/2017     Steroid-induced hyperglycemia 05/04/2017     Hematoma 05/04/2017     Anemia of chronic disease 05/03/2017     CKD (chronic kidney disease) stage 5, GFR 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 ml/min (HC) 05/01/2017      Anemia 04/25/2017     Metabolic acidosis 04/25/2017     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     Acquired buried penis      Refusal of statin medication at discharge 02/17/2017     H/O total hip arthroplasty - Left 6/30/2014 06/30/2014     Dyslipidemia 04/10/2014     Bilateral leg edema 01/08/2014     Hip stiffness 01/08/2014     H/O bilateral inguinal hernia repair 09/04/2013     History of tobacco abuse 08/15/2013     Pityriasis rosea 08/07/2012     Essential hypertension 08/07/2012     Past Medical History:     Diagnosis  Date     Acute renal failure (ARF) (HC) 04/2017     ESSENTIAL HYPERTENSION 8/7/2012     Osteoarthrosis, unspecified whether generalized or localized, pelvic region and thigh      Other and unspecified hyperlipidemia      Pityriasis rosea      Right inguinal hernia 8/12/2013    Right Inguinal hernia with incarceration, massive [[id number]][      Tachycardia, unspecified      Past Surgical History:      Procedure  Laterality Date     CIRCUMCISION  03/14/2017    Dr. [facility]       GENITAL SURGERY MALE  8/20/13    Dorsal Slit       HERNIA REPAIR Right 8/20/13    RIH with mesh       HERNIA REPAIR Left 4/15/14    LIH with mesh       TX SUBTALAR ATHROEREISIS      bone Spurs excision on Toe       TX TOTAL HIP ARTHROPLASTY Left 6/30/14     Current Outpatient Prescriptions       Medication  Sig Dispense Refill     amLODIPine (NORVASC) 5 mg tablet Take 1 tablet by mouth once daily. 90 tablet 3     blood sugar diagnostic (BLOOD GLUCOSE TEST) strip Dispense item covered by pt ins. E11.65 IDDM type II, uncontrolled - Test 4 times/day. Reason: New diabetes 100 Strip prn     blood-glucose meter Dispense meter, test strips, lancets covered by pt ins. E11.65 IDDM type II, uncontrolled - Test 4 times/day. Reason: New diabetes 1 Device 0     cyanocobalamin  "(VITAMIN B12) 1,000 mcg/mL injection Inject 1 mL intramuscular Weekly x4 doses, then every 3-4 weeks.  1000 mcg 20     famotidine (PEPCID) 20 mg tablet TAKE 1 TABLET BY MOUTH EVERY DAY 90 tablet 3     furosemide (LASIX) 40 mg tablet TAKE 1 TABLET BY MOUTH EVERY MORNING 90 tablet 3     insulin aspart (NOVOLOG) 100 unit/mL solution for injection Inject 0-5 Units subcutaneous 3 times daily with meals. 100 mL prn     lancets Dispense item covered by pt ins. E11.65 IDDM type II, uncontrolled - Test 3 times/day. (Wants Barrel lancets) 100 Each 11     LANTUS SOLOSTAR 100 unit/mL (3 mL) pen Inject 2 Units subcutaneous at bedtime.       pen needle, diabetic 31 gauge x 5/16\" For administering insulin at home. Dx: R73.9 350 Each 3     predniSONE (DELTASONE) 20 mg tablet Take 20 mg by mouth once daily. Tapering down from 30 mg daily.  1     sodium bicarbonate 650 mg tablet Take 1 tablet by mouth once daily.       tamsulosin (FLOMAX) 0.4 mg capsule Take 1 capsule by mouth once daily after a meal. 30 capsule 1     No Known Allergies  Family History       Problem   Relation Age of Onset     Other  Son 12     neuroblastoma at 13 yo  at 14.        Genetic  No Family History      No known FHx of DM, CAD, CVA       Family Status     Relation  Status     Mother  at age 94     Father      Sister Alive     Brother     Lung CA - 2-3 ppd tobacco      Brother  at age 67    Aneurysm      Brother Alive     Brother Alive     Brother Alive     Child Alive     Child Alive     Son  at age 14    neuroblastoma at 13 yo  at 14.       Son      No Family History      Social History     Social History        Marital status:       Spouse name: Lucita     Number of children:  3     Years of education:  N/A     Occupational History       Retired      Social History Main Topics          Smoking status:   Former Smoker      Types:  Cigarettes      Quit date:  1976      Smokeless tobacco:   Never Used " "     Alcohol use   0.5 oz/week     1 Glasses of wine per week        Comment: occasionally       Drug use:   No      Sexual activity:   Not Currently      Other Topics  Concern     Not on file      Social History Narrative      - Wife Claudia Rose kids - oldest son - neuroblastoma at 11 yo  at 14.     [employer] - Worked in Adult [employer] -- [place] and .          Pertinent ROS was performed and was negative as noted in HPI above.     EXAM:   Vitals:     10/25/17 1051   BP: 134/78   Pulse: 72   Weight: 77.8 kg (171 lb 9.6 oz)     BP Readings from Last 3 Encounters:    10/25/17 134/78   17 122/70   17 128/84     Wt Readings from Last 3 Encounters:    10/25/17 77.8 kg (171 lb 9.6 oz)   17 75.8 kg (167 lb 2 oz)   17 81.2 kg (179 lb)     Estimated body mass index is 24.62 kg/(m^2) as calculated from the following:    Height as of 17: 1.778 m (5' 10\").    Weight as of this encounter: 77.8 kg (171 lb 9.6 oz).     EXAM:  Constitutional: well groomed / good hygiene, casual dress  Eyes:  Extraocular muscles intact, Sclera non-icteric, Conjunctiva without erythema  Lymphatic Exam: Non-palpable nodes in neck, clavicular regions  Pulmonary: Lungs are clear to auscultation bilaterally, without wheezes or crackles  Cardiovascular Exam: regular rate and rhythm, 1+ pedal edema present  Gastrointestinal Exam: Soft, non-tender, non-distended, positive bowel sounds  Integument: + easy bruising.   Neurologic Exam: CN 3-12 grossly intact   Musculoskeletal Exam: walks with cane. Moves slowly.   Psychiatric Exam: Awake and Alert, Affect and mood appropriate  Speech is fluent, Thought process is normal    INVESTIGATIONS:  VITAMIN B12 (pg/mL)    Date Value   06/10/2017 163 (L)        Results for orders placed or performed in visit on 17      URINALYSIS W REFLEX MICROSCOPIC IF POSITIVE      Result  Value Ref Range    COLOR                     Yellow " Yellow Color    CLARITY                   Cloudy (A) Clear Clarity    SPECIFIC GRAVITY,URINE    1.010 1.010, 1.015, 1.020, 1.025                    PH,URINE                  5.0 (A) 6.0, 7.0, 8.0, 5.5, 6.5, 7.5, 8.5                    UROBILINOGEN,QUALITATIVE  Normal Normal EU/dl    PROTEIN, URINE 100 (A) Negative mg/dL    GLUCOSE, URINE Negative Negative mg/dL    KETONES,URINE             Negative Negative mg/dL    BILIRUBIN,URINE           Negative Negative                    OCCULT BLOOD,URINE        Large (A) Negative                    NITRITE                   Positive (A) Negative                    LEUKOCYTE ESTERASE        Large (A) Negative                   URINALYSIS MICROSCOPIC      Result  Value Ref Range    RBC >100 (A) 0-2, None Seen /HPF    WBC >100 (A) 0-2, 3-5, None Seen /HPF    BACTERIA                  Many (A) None Seen, Rare, Occasional, Few Bacteria/HPF    EPITHELIAL CELLS          None Seen None Seen, Few Epi/HPF       ASSESSMENT AND PLAN:  Altaf was seen today for follow up.    Diagnoses and all orders for this visit:    Anemia due to vitamin B12 deficiency, unspecified B12 deficiency type  -     cyanocobalamin 1,000 mcg injection (VITAMIN B12); Inject 1 mL intramuscular one time.  -     cyanocobalamin (VITAMIN B12) 1,000 mcg/mL injection; Inject 1 mL intramuscular Weekly x4 doses, then every 3-4 weeks.   -     NM ADMIN VACC INITIAL    CKD (chronic kidney disease) stage 5, GFR 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 ml/min (HC)    Primary pauci-immune necrotizing and crescentic glomerulonephritis    Antineutrophil cytoplasmic antibody (ANCA) positive    Immunocompromised patient (HC)    lab results and schedule of future lab studies reviewed with patient, reviewed diet, exercise and weight control, recommended sodium restriction, cardiovascular risk and specific lipid/LDL goals reviewed    -- Expected clinical course discussed   -- Medications and their side effects discussed    Altaf is also recommended to eat a  heart-healthy diet, do regular aerobic exercises, maintain a desirable body weight, and avoid tobacco products. These recommendations are from the American Heart Association (AHA) which stresses the importance of lifestyle changes to lower cardiovascular disease risk.     Return in about 6 months (around 4/25/2018) for -- Follow-up vitamin B12 deficiency.    Patient Instructions   [facility] labs 10/17/2017   - sodium 144, creatinine 5.06, potassium 4.4, chloride 107, CO2 22, BUN 86, GFR 11, glucose 133, albumin 3.4, phosphorus 3.2.      - Vitamin D 39  - iron level showed total iron was 132, TIBC 230, iron saturation 57, transferrin 164, PTH 95.5    Hemoglobin 12.0    Vitamin B12 deficiency:    Vitamin B12 deficiency can cause numerous symptoms.  Fatigue and malaise, low energy levels, low blood counts or anemia, poor mood or depression, neuropathy or pins and needles/burning sensation of the hands and feet.    -- trial of B12 shot today.     -- Omeprazole (Proton Pump Inhibitors in general) and metformin can lower B12 levels (inhibits absorption).      -- Schedule B12 shot once weekly in clinic -- with shot nurse x4 doses ---- then every 3 to 4 weeks.       Return in approximately 6 month(s), or sooner as needed for follow-up with Dr. Machado.  -- Follow-up vitamin B12 deficiency    Clinic : [phone]  Appointment line: [phone]      Valentino Machado MD

## 2017-12-28 NOTE — TELEPHONE ENCOUNTER
Patient Information     Patient Name MRN Sex Altaf Neves [id number] Male 1934      Telephone Encounter by Elicia Tapia NP at 6/15/2017 10:54 AM     Author:  Elicia Tapia NP Service:  (none) Author Type:  PHYS- Nurse Practitioner     Filed:  6/15/2017 10:57 AM Encounter Date:  6/15/2017 Status:  Signed     :  Elicia Tapia NP (PHYS- Nurse Practitioner)            Follow up call today re: blood sugars. Pt misunderstood when to check blood sugars so has not been checking at lunch time.     Fasting the past 2 days have been relatively good (172, 116).     He had a high after eating some food at [employer]: 476.   Took 5 units Novolog and woke up with a 116.     Continue Lantus 2 units for now. Continue Novolog 1 unit with food at meals plus add sliding scale if 150 or higher.   Start checking before meals 3x/day.     Follow up next week.     Elicia Tapia NP ....................  6/15/2017   10:57 AM

## 2017-12-28 NOTE — PROGRESS NOTES
Patient Information     Patient Name MRN Sex Altaf Neves [id number] Male 1934      Progress Notes by Mar Samayoa PT at 2017  2:02 PM     Author:  Mar Samayoa PT Service:  (none) Author Type:  PT- Physical Therapist     Filed:  2017  2:20 PM Date of Service:  2017  2:02 PM Status:  Signed     :  Mar Samayoa PT (PT- Physical Therapist)            [facility]  Outpatient PT - Daily Note        Date of Service: 2017   Visit #3    Patient Name: Altaf Chao   YOB: 1934   Referring MD/Provider: Valentino Machado MD  Diagnosis:bilateral edema of lower extremities, chronic kidney disease   Treatment Diagnosis:bilateral lower extremity edema  Insurance: Medicare  Start of Service: 17  Certification Dates: Start of Service: 17   Medicare/[place] Re-Cert Due: 17      Subjective        Pain Ratin = no pain, comfortable / Location:  legs    No issues with wraps, no discomfort. Legs looked really good when wraps were taken off. Slowly start to swell during the day. Interested in compression stockings    Objective    Today's Intervention:    Measured for compression stockings, knee high  Ankle right 27.2cm left 26.6  Calf rigth 37.3 left 36.0  Length 42.5 cm    patinet plans to stop at [employer] and purchase stockings. Will bring to next visit along with spouse to work on donning.    Home Exercise Program: leg elevation    Assessment    Therapist Assessment:  Patient presents with bilateral lower extremity lymphedema due to chronic kidney disease. Lymphedema appears to be stage 3, moderate severity. Will use compression to reduce edema and transition to use of compression garment.           Goals:  Patient goal (time reference required): reduce leg swelling .  Long term goal: Lymphedema is to remain stable for 12 months following the completion of treatment.       Functional goals: Patient is to be independent in  their Home Exercise Program in 8 weeks.  Decrease volume excess by 10 percent to improve cosmesis, fit of clothing and/or shoes, and to reduce the risk of infection in 8 weeks.  Patient or caregiver will be independent with self bandaging in 8 weeks.  Patient or caregiver will be independent with donning and doffing compression garments in 8 weeks.     Patient participated in goal selection and understand(s) the plan of care: Yes   Patient Potential for Achieving Desired Outcome:  Fair      Response to Intervention:  Tolerated wraps well, decrease in edema       Plan    Treatment Plan / Targeted Outcomes:     Frequency:   16 visits     Duration of Treatment: 8 weeks    Planned Interventions:  Possible physical therapy interventions include but are not limited to:   Home Exercise Program development  Therapeutic Exercise (ROM & Strengthening)  Manual Therapy  Therapeutic Activities    Plan for next visit:  Work with patient and spouse on donning stockings.    Student or PTA has been instructed in and demonstrates skills necessary to carry out above stated treatment plan: No    Thank you for your referral to [facility].  Please call with any questions, concerns or comments.  [phone]

## 2017-12-28 NOTE — PROGRESS NOTES
Patient Information     Patient Name MRN Sex Altaf Neves [id number] Male 1934      Progress Notes by Elicia Tapia NP at 2017  9:30 AM     Author:  Elicia Tapia NP Service:  (none) Author Type:  PHYS- Nurse Practitioner     Filed:  2017 11:50 AM Encounter Date:  2017 Status:  Signed     :  Elicia Tapia NP (PHYS- Nurse Practitioner)            Subjective:  Pt and wife present to clinic today for diabetes type 2, education and medication management. Diabetes prednisone induced and difficult control, CKD 5. Was admitted to hospital Friday evening,  and discharged  for infection of leg wound, cellulitis. Currently wound is covered with bandage, no drainage. Pt reports prednisone dose decreased from 60 mg/day to 30 mg/day yesterday. Will need to see how this affects blood sugars.     Blood sugar log book reviewed. Fasting all within goal range. Then rest of day increases to 200's. However, most recent blood sugar check was  before hospital admission. Fasting this morning was 292, which is unusual for him. Had been using sliding scale. No hypoglycemia. Will be seeing nephrologist Dr. DOSHI in [place] .     Also reports his lisinopril was stopped recently.     Reviewed carbs and insulin dosing. Recent A1c : 7.2      Past Medical History:     Diagnosis  Date     Acute renal failure (ARF) () 2017     ESSENTIAL HYPERTENSION 2012     Osteoarthrosis, unspecified whether generalized or localized, pelvic region and thigh      Other and unspecified hyperlipidemia      Pityriasis rosea      Right inguinal hernia 2013    Right Inguinal hernia with incarceration, massive [[id number]][      Tachycardia, unspecified        Past Surgical History:      Procedure  Laterality Date     CIRCUMCISION  2017    Dr. Heidi GREENBERG       GENITAL SURGERY MALE  13    Dorsal Slit       HERNIA REPAIR Right 13    [facility] with mesh        "HERNIA REPAIR Left 4/15/14    [facility] with mesh       MT SUBTALAR ATHROEREISIS      bone Spurs excision on Toe       MT TOTAL HIP ARTHROPLASTY Left 14       Review of patient's allergies indicates no known allergies.    Current Outpatient Prescriptions on File Prior to Visit       Medication  Sig Dispense Refill     amLODIPine (NORVASC) 5 mg tablet Take 1 tablet by mouth once daily. 90 tablet 3     blood sugar diagnostic (BLOOD GLUCOSE TEST) strip Dispense item covered by pt ins. E11.65 IDDM type II, uncontrolled - Test 4 times/day. Reason: New diabetes 100 Strip prn     blood-glucose meter Dispense meter, test strips, lancets covered by pt ins. E11.65 IDDM type II, uncontrolled - Test 4 times/day. Reason: New diabetes 1 Device 0     famotidine (PEPCID) 20 mg tablet Take 20 mg by mouth once daily.  2     furosemide (LASIX) 40 mg tablet Take 1 tablet by mouth every morning. 30 tablet 1     insulin aspart (NOVOLOG) 100 unit/mL solution for injection Inject 0-5 Units subcutaneous 3 times daily with meals. 100 mL prn     lancets Dispense item covered by pt ins. E11.65 IDDM type II, uncontrolled - Test 3 times/day. (Wants Barrel lancets) 100 Each 11     LANTUS SOLOSTAR 100 unit/mL (3 mL) pen Inject 2 Units subcutaneous at bedtime.       levoFLOXacin (LEVAQUIN) 250 mg tablet Take 1 tablet by mouth every 48 hours for 6 days. 3 tablet 0     pen needle, diabetic 31 gauge x 5/16\" For administering insulin at home. Dx: R73.9 350 Each 3     predniSONE (DELTASONE) 10 mg tablet Take 3 tablets by mouth once daily with a meal. 10 tablet 0     sodium bicarbonate 650 mg tablet Take 1 tablet by mouth once daily.       tamsulosin (FLOMAX) 0.4 mg capsule Take 1 capsule by mouth once daily after a meal. 30 capsule 3     No current facility-administered medications on file prior to visit.        Family History       Problem   Relation Age of Onset     Other  Son 12     neuroblastoma at 13 yo  at 14.        Genetic  No Family History  " "    No known FHx of DM, CAD, CVA         Social History     Social History        Marital status:       Spouse name: Lucita     Number of children:  3     Years of education:  N/A     Occupational History       Retired      Social History Main Topics          Smoking status:   Former Smoker      Types:  Cigarettes      Quit date:  1976      Smokeless tobacco:   Never Used      Alcohol use   0.5 oz/week     1 Glasses of wine per week        Comment: occasionally       Drug use:   No      Sexual activity:   Not Currently      Other Topics  Concern     Not on file      Social History Narrative      - Wife Lucita.     3 kids - oldest son - neuroblastoma at 11 yo  at 14.     [employer] [facility] [employer] - Worked in [employer] -- [place] and .            Review of Systems: \"I feel good today.\"  General:  Denies fever, night sweats  HEENT: Denies blurred vision  Respiratory: Denies difficulty breathing, SOB, cough  Cardiac: Denies chest pain, chest pressure  Gastrointestinal:  Denies abdominal pain, constipation, diarrhea  Endocrine:  Denies polydipsia, polyphagia, polyuria   +leaking fluid from right arm.       Objective:   HEMOGLOBIN A1C MONITORING (POCT) (%)    Date Value   2017 7.2 (H)     HEMOGLOBIN A1C SCREENING (% Total Hgb)    Date Value   04/10/2014 5.7     SODIUM      Date Value Ref Range Status   2017 137 133 - 143 mmol/L Final     POTASSIUM      Date Value Ref Range Status   2017 4.6 3.5 - 5.1 mmol/L Final     CHLORIDE      Date Value Ref Range Status   2017 105 98 - 107 mmol/L Final     CO2,TOTAL      Date Value Ref Range Status   2017 20 (L) 21 - 31 mmol/L Final     ANION GAP      Date Value Ref Range Status   2017 12 5 - 18                 Final     GLUCOSE      Date Value Ref Range Status   2017 79 70 - 105 mg/dL Final     BUN      Date Value Ref Range Status   2017 101 (H) 7 - 25 mg/dL Final "     CREATININE      Date Value Ref Range Status   06/12/2017 5.13 (H) 0.70 - 1.30 mg/dL Final     BUN/CREAT RATIO                Date Value Ref Range Status   06/12/2017 20                 Final     CALCIUM      Date Value Ref Range Status   06/12/2017 7.7 (L) 8.6 - 10.3 mg/dL Final     LDL CHOLESTEROL (mg/dL)    Date Value   09/11/2014 150 (H)     TRIGLYCERIDES (mg/dL)    Date Value   09/11/2014 113        Pleasant and alert without distress. Affect normal.   Skin color pink, ecchymotic, thin.   Extremities with edema. Small wound left shin, no drainage. No surrounding redness. Right arm and hand edema, ecchymosis--appears to be from infiltrated IVs in antecubital and wrist area from recent hospital stay.   Gait is stable, uses walker.       Assessment/Plan:  1. Diabetes type 2, with prednisone induced hyperglycemia and CKD 5 without dialysis   A. Continue Lantus 2 units at bedtime   B. Novolog 1 unit each meal if blood sugar 100 or higher and eating 2 carb choices   C. Novolog sliding scale    D. If with reduced prednisone from 60 mg to 30 mg, watch blood sugars closely. If fasting blood sugars consistently less than 100, may trial stopping Lantus. If PreLunch and PreSupper blood sugars less than 100, then trial stopping scheduled insulin and just do sliding scale if get over 150.   Goal blood sugars 100-200. Avoid hypoglycemia with co-morbidities.    E. Follow up in 2 weeks, may have follow up phone call Thursday, June 15th      A total of 25 minutes was spent with this patient, of which more than 50% was spent in counseling and/or coordination of care regarding diabetes type 2, reviewed chart and labs, blood sugar log book (last recorded June 8th), carbs and insulin plan, treatment plan.

## 2017-12-28 NOTE — PATIENT INSTRUCTIONS
Patient Information     Patient Name MRN Sex Altaf Neves [id number] Male 1934      Patient Instructions by Valentino Machado MD at 2017 11:20 AM     Author:  Valentino Machado MD  Service:  (none) Author Type:  Physician     Filed:  2017 12:07 PM  Encounter Date:  2017 Status:  Addendum     :  Valentino Machado MD (Physician)        Related Notes: Original Note by Valentino Machado MD (Physician) filed at 2017 11:59 AM            Leg swelling is doing much better!  Glad the lymphedema treatments have been so helpful.     Continue current recommendations from Angelia.     Blood pressures are well controlled today.    CREATININE (mg/dL)    Date Value   2017 5.13 (H)      Weight is down 12 pounds in past 20 days.     Start:  Mucinex DM - helps thin the phlegm and settle the cough, without causing drowsiness.   - Maximum strength (buy the generic)    Use walker with higher legs. Suspect you are having lumbar strain / sprain.   -- see printed information.   -- use warm packs as needed.       Outside records requested -- Creatinine 2017 -- at [facility] was 5.25.     Return in approximately 3 month(s), or sooner as needed for follow-up with Dr. Machado.  -- follow-up blood pressure, leg swelling    Clinic : [phone]  Appointment line: [phone]

## 2017-12-28 NOTE — PROGRESS NOTES
Patient Information     Patient Name MRN Sex Altaf Neves [id number] Male 1934      Progress Notes by Kat Saucedo MD at 2017 12:45 PM     Author:  Kat Saucedo MD Service:  (none) Author Type:  Physician     Filed:  2017  3:19 PM Encounter Date:  2017 Status:  Signed     :  Kat Saucedo MD (Physician)            SUBJECTIVE:   Altaf Chao is a 83 y.o. male seen for evaluation who presents with low back pain.  DOI:   Mechanism of Injury:  Unknown  Gradual onset of low back pain over the past week, worse when changing positions in bed.  Constant dull ache    Description of pain:  moderate aching and burning constant   Radiation of pain: no    Pain rated 7/10 at it's worst and 2/10 at it's best.  Pain course: unchanged  Worse with: turning or rotating affected area  Improved by: nothing tried  ROM: decreased due to pain  Numbness, tingling, weakness of extremities:  no  Bladder or bowel dysfunction:  Increase urination and some burning, no blood in urine  Other associated symptoms: yes,   Previous injury:  No fever, chills  Problems taking NSAIDS in the past:  yes    Past Medical History:     Diagnosis  Date     Acute renal failure (ARF) () 2017     ESSENTIAL HYPERTENSION 2012     Osteoarthrosis, unspecified whether generalized or localized, pelvic region and thigh      Other and unspecified hyperlipidemia      Pityriasis rosea      Right inguinal hernia 2013    Right Inguinal hernia with incarceration, massive [[id number]][      Tachycardia, unspecified      Past Surgical History:      Procedure  Laterality Date     CIRCUMCISION  2017    Dr. Heidi [facility]       GENITAL SURGERY MALE  13    Dorsal Slit       HERNIA REPAIR Right 13    [facility] with mesh       HERNIA REPAIR Left 4/15/14    [facility] with mesh       NH SUBTALAR ATHROEREISIS      bone Spurs excision on Toe       NH TOTAL HIP ARTHROPLASTY Left 14      Allergies: Review of  "patient's allergies indicates no known allergies.  Current Outpatient Prescriptions       Medication  Sig Dispense Refill     amLODIPine (NORVASC) 5 mg tablet Take 1 tablet by mouth once daily. 90 tablet 3     blood sugar diagnostic (BLOOD GLUCOSE TEST) strip Dispense item covered by pt ins. E11.65 IDDM type II, uncontrolled - Test 4 times/day. Reason: New diabetes 100 Strip prn     blood-glucose meter Dispense meter, test strips, lancets covered by pt ins. E11.65 IDDM type II, uncontrolled - Test 4 times/day. Reason: New diabetes 1 Device 0     ciprofloxacin HCl (CIPRO) 500 mg tablet Take 1 tablet by mouth 2 times daily for 10 days. 20 tablet 0     famotidine (PEPCID) 20 mg tablet Take 20 mg by mouth once daily.  2     furosemide (LASIX) 40 mg tablet Take 1 tablet by mouth every morning. 30 tablet 1     insulin aspart (NOVOLOG) 100 unit/mL solution for injection Inject 0-5 Units subcutaneous 3 times daily with meals. 100 mL prn     lancets Dispense item covered by pt ins. E11.65 IDDM type II, uncontrolled - Test 3 times/day. (Wants Barrel lancets) 100 Each 11     LANTUS SOLOSTAR 100 unit/mL (3 mL) pen Inject 2 Units subcutaneous at bedtime.       pen needle, diabetic 31 gauge x 5/16\" For administering insulin at home. Dx: R73.9 350 Each 3     predniSONE (DELTASONE) 10 mg tablet Take 3 tablets by mouth once daily with a meal. 10 tablet 0     sodium bicarbonate 650 mg tablet Take 1 tablet by mouth once daily.       tamsulosin (FLOMAX) 0.4 mg capsule Take 1 capsule by mouth once daily after a meal. 30 capsule 3     No current facility-administered medications for this visit.      Medications have been reviewed by me and are current to the best of my knowledge and ability.      OBJECTIVE:  /84  Pulse (!) 116  Wt 81.2 kg (179 lb)  BMI 25.68 kg/m2.  General: healthy,alert,cooperative  Inspection of back: normal; no gross deformities, erythema, edema, ecchymosis, atrophy or fasiculations.  Palpation:  vertebrae " nontender  ROM: flexion decreased   FABERE normal bilaterally, Femoral trochanter, sciatic notch, and sacroiliac areas bilaterally non tender, normal toe walk- heel walk, Toe and foot dorsi-flexor strength normal.  Special maneuvers: Left Straight Leg Raise negative and Right Straight Leg Raise negative  Strength: normal  Neurovascular: capillary refill immediate  X-ray: sent from  clinic to main building for xray, multiple compression fracture  Abdomen normal BS, soft , non tender, no masses, skin exam normal, gait normal.    Recent Labs       07/07/17   1318   COLOR  Yellow   CLARITY  Cloudy A   SPECGRAV  1.010   PHURINE  5.0 A   UROBILINOGEN  Normal   PROTEINUA  100 A       Recent Labs       07/07/17   1318   GLUCOSEUA  Negative   KETONESUA  Negative   BILIURINE  Negative   BLOODUA  Large A   NITRITE  Positive A   LEUKOCYTE  Large A       ASSESSMENT:    ICD-10-CM    1. Back pain, unspecified back location, unspecified back pain laterality, unspecified chronicity M54.9 URINALYSIS W REFLEX MICROSCOPIC IF POSITIVE      URINALYSIS W REFLEX MICROSCOPIC IF POSITIVE      URINALYSIS MICROSCOPIC      URINALYSIS MICROSCOPIC   2. Mid back pain M54.9 XR SPINE THORACIC 3 VIEWS      XR SPINE LUMBAR 4 VIEWS   3. Urinary tract infection, site unspecified N39.0 ciprofloxacin HCl (CIPRO) 500 mg tablet       PLAN:  Start on cipro, awaiting culture  Multiple compression fractures, unknown age, now on prednisone  Discussed pain management, possible kyphoplasty  Needs follow-up next week, if no improvement in pain, will proceed with possible kypho    The following are recommended to the patient: rest the injured area as much as practical, instructed to use OTC pain meds.  Return to clinic in 1 weeks or as needed if these symptoms worsen or fail to improve as anticipated.    Kat Kidd MD  3:18 PM 7/11/2017

## 2017-12-29 NOTE — PATIENT INSTRUCTIONS
Patient Information     Patient Name MRN Sex Altaf Neves [id number] Male 1934      Patient Instructions by Valentino Machado MD at 10/25/2017 10:40 AM     Author:  Valentino Machado MD  Service:  (none) Author Type:  Physician     Filed:  10/25/2017 11:02 AM  Encounter Date:  10/25/2017 Status:  Addendum     :  Valentino Machado MD (Physician)        Related Notes: Original Note by Valentino Machado MD (Physician) filed at 10/25/2017 11:02 AM            [facility] labs 10/17/2017   - sodium 144, creatinine 5.06, potassium 4.4, chloride 107, CO2 22, BUN 86, GFR 11, glucose 133, albumin 3.4, phosphorus 3.2.      - Vitamin D 39  - iron level showed total iron was 132, TIBC 230, iron saturation 57, transferrin 164, PTH 95.5    Hemoglobin 12.0    Vitamin B12 deficiency:    Vitamin B12 deficiency can cause numerous symptoms.  Fatigue and malaise, low energy levels, low blood counts or anemia, poor mood or depression, neuropathy or pins and needles/burning sensation of the hands and feet.    -- trial of B12 shot today.     -- Omeprazole (Proton Pump Inhibitors in general) and metformin can lower B12 levels (inhibits absorption).      -- Schedule B12 shot once weekly in clinic -- with shot nurse x4 doses ---- then every 3 to 4 weeks.       Return in approximately 6 month(s), or sooner as needed for follow-up with Dr. Machado.  -- Follow-up vitamin B12 deficiency    Clinic : [phone]  Appointment line: [phone]

## 2017-12-29 NOTE — PATIENT INSTRUCTIONS
Patient Information     Patient Name MRN Sex Altaf Neves [id number] Male 1934      Patient Instructions by Valentino Machado MD at 2017 10:40 AM     Author:  Valentino Machado MD Service:  (none) Author Type:  Physician     Filed:  2017 10:57 AM Encounter Date:  2017 Status:  Signed     :  Valentino Machado MD (Physician)            Left leg cellulitis, initially improved, however with subsequent failure of outpatient regimen cephalexin and dicloxacillin.    Recommend hospitalization.    Spoke with hospitalist who accepted patient for admission.  He wanted CRP, ESR, blood cultures, CBC and CMP ordered.    Needs IV antibiotics.    Return as needed for follow-up with Dr. Machado.    Clinic : [phone]  Appointment line: [phone]

## 2017-12-29 NOTE — PATIENT INSTRUCTIONS
Patient Information     Patient Name MRN Sex Altaf Neves [id number] Male 1934      Patient Instructions by Elicia Tapia NP at 2017  9:30 AM     Author:  Elicia Tapia NP  Service:  (none) Author Type:  PHYS- Nurse Practitioner     Filed:  2017 10:10 AM  Encounter Date:  2017 Status:  Addendum     :  Elicia Tapia NP (PHYS- Nurse Practitioner)        Related Notes: Original Note by Elicia Tapia NP (PHYS- Nurse Practitioner) filed at 2017  9:24 AM            Continue Lantus 2 units at bedtime    Novolog 1 unit with each meal if blood sugar 100 or higher and eating 2 carb choices (apple, crackers, toast, grapes,cereal, pasta)     1. If seeing blood sugars trending down with decrease in prednisone------If fasting blood sugars less than 100 consistently, then stop Lantus.   If before lunch and before supper blood sugars, less than 100, then stop scheduled Novolog and just do sliding scale and see me to review blood sugars.     Follow up in 2 weeks.     Novolog SLIDING SCALE for correction of high blood sugars.    Blood Glucose       Units of Novolog insulin    150  to  199 = 1 unit of extra insulin    200  to  249 = 2 units of extra insulin    250  to  299 = 3 units of extra insulin    300  to  349 = 4 units of extra insulin    Equal to or greater than 350 =  5 units of extra insulin    Call me Thursday to review blood sugars. Elicia [phone]    May cover leaking areas with gauze and paper tape.         Diabetes is a progressive disease and takes a lot of work and dedication on a daily basis to keep in good control. I am here to help support you during this process.       Check blood sugar 3 times a day  Before breakfast, before supper, and 2 hours after the start of supper    Record in log book and bring glucometer and log book to every clinic visit    Watch carb intake/portions 2-4 carb choices (30-60 gm) at each meal three times a day and 0-1 carb  "choices (0-15 gm) for snacks between meals. If desire to lose weight, try to stay closer to lower end of carb choices at mealtimes and no snacks.    Activity recommendations: 150 min/week      Recommendations    To best take care of your self with diabetes, I recommend the followin. Diabetes can affect your eyes/vision: please schedule annual dilated eye exams with your eye doctor    2. Diabetes can increase your risk of cavities, gum disease and tooth/bone loss: please schedule routine dental checks and regular flossing/brushing    3. Diabetes can affect the nerves in your body, especially the long nerve down to your feet. Please routinely check feet to ensure skin intact, no persistant reddened areas, and no sores that are not healing or may be infected. If you see a sore or reddened area that is concerning to you, please see your provider right away.     4. I also recommend good blood pressure (less than 140/90), \"bad\" cholesterol LDL less than 100, A1c less than 8, take a daily aspirin if your provider recommends, and no tobacco use.     5. On a routine basis, your provider will check labs to make sure kidneys are working properly, check cholesterol levels, and average blood sugar (A1c).    6. Diabetes increases your risk for stroke and heart attack. I recommend trying to keep blood sugar in good control by reaching the goals stated below.     Goals  Fasting and premeal:   2 hours after the start of a meal: less than 200  Bedtime: 100-160  A1c: less than 8          "

## 2017-12-29 NOTE — PATIENT INSTRUCTIONS
Patient Information     Patient Name MRN Sex Altaf Neves [id number] Male 1934      Patient Instructions by Valentino Machado MD at 2017  9:40 AM     Author:  Valentino Machado MD  Service:  (none) Author Type:  Physician     Filed:  2017 10:00 AM  Encounter Date:  2017 Status:  Addendum     :  Valentino Machado MD (Physician)        Related Notes: Original Note by Valentino Machado MD (Physician) filed at 2017  9:59 AM            1. Cellulitis of leg without foot, left    Start:  - cephalexin (KEFLEX) 250 mg capsule; Take 1 capsule by mouth 4 times daily for 10 days.  Dispense: 40 capsule; Refill: 0  - dicloxacillin (DYNAPEN) 250 mg capsule; Take 1 capsule by mouth 4 times daily before meals and at bedtime for 10 days.  Dispense: 40 capsule; Refill: 0    2. CKD (chronic kidney disease) stage 5, GFR 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 ml/min (HC)  3. Primary pauci-immune necrotizing and crescentic glomerulonephritis  4. Anemia, unspecified type      -- Labs just checked today by nephrology.    -- Keep clinic follow-up on 2017 as scheduled, follow-up cellulitis

## 2017-12-29 NOTE — DISCHARGE SUMMARY
Patient Information     Patient Name MRN Sex Altaf Neves [id number] Male 1934      Discharge Summaries by Mar Samayoa PT at 2017 10:27 AM     Author:  Mar Samayoa PT Service:  (none) Author Type:  PT- Physical Therapist     Filed:  2017 11:32 AM Date of Service:  2017 10:27 AM Status:  Signed     :  Mar Samayoa PT (PT- Physical Therapist)            [facility]  Outpatient PT - Daily Note/Discharge Summary        Date of Service: 2017   Visit #4    Patient Name: Altaf Chao   YOB: 1934   Referring MD/Provider: Valentino Machado MD  Diagnosis:bilateral edema of lower extremities, chronic kidney disease   Treatment Diagnosis:bilateral lower extremity edema  Insurance: Medicare  Start of Service: 17  Certification Dates: Start of Service: 17   Medicare/MA Re-Cert Due: 17      Subjective        Pain Ratin = no pain, comfortable / Location:  legs    Acquired stockings. Happy with decrease in swelling with compression.    G codes and Modifier based on patient's presentation and clinical judgement:     Goal Primary G Code and Modifier:    The Patient's G Code Goal would be: Other PT/OT Primary    The Patient's Impairment, Limitation or Restriction Modifier goal would be best described as: CJ - 20% - 40% Impairment.      Discharge Primary G Code and Modifier:      The Patient's status upon Discharge is Other PT/OT Primary    The Patient's Impairment, Limitation or Restriction Modifier would be best described as CJ - 20% - 40% Impairment.     Objective    Today's Intervention:    Patient and spouse present. Spouse feels comfortable donning stockings.    Stockings applied to lower legs, appropriate fit. Advised to wear daily during waking hours, off at night.    Home Exercise Program: leg elevation    Assessment    Therapist Assessment:  Patient presents with bilateral lower extremity lymphedema  due to chronic kidney disease. Lymphedema appears to be stage 3, moderate severity. Will use compression to reduce edema and transition to use of compression garment.           Goals:  Patient goal (time reference required): reduce leg swelling . NA  Long term goal: Lymphedema is to remain stable for 12 months following the completion of treatment.       Functional goals: Patient is to be independent in their Home Exercise Program in 8 weeks.  Decrease volume excess by 10 percent to improve cosmesis, fit of clothing and/or shoes, and to reduce the risk of infection in 8 weeks. NA  Patient or caregiver will be independent with self bandaging in 8 weeks. NA  Patient or caregiver will be independent with donning and doffing compression garments in 8 weeks. Goal met     Patient participated in goal selection and understand(s) the plan of care: Yes   Patient Potential for Achieving Desired Outcome:  Fair      Response to Intervention:  Tolerated stockings well.       Plan    Treatment Plan / Targeted Outcomes:     Frequency:   16 visits     Duration of Treatment: 8 weeks    Planned Interventions:  Possible physical therapy interventions include but are not limited to:   Home Exercise Program development  Therapeutic Exercise (ROM & Strengthening)  Manual Therapy  Therapeutic Activities    Plan for next visit: PT discontinued    Student or PTA has been instructed in and demonstrates skills necessary to carry out above stated treatment plan: No    Thank you for your referral to [facility] & [facility].  Please call with any questions, concerns or comments.  [phone]

## 2017-12-30 NOTE — NURSING NOTE
Patient Information     Patient Name MRN Sex Altaf Neves [id number] Male 1934      Nursing Note by Angelica Lei at 2017 10:40 AM     Author:  Angelica Lei Service:  (none) Author Type:  (none)     Filed:  2017 10:49 AM Encounter Date:  2017 Status:  Signed     :  Angelica Lei            Patient presents to the clinic for follow up with left leg cellulitis.      Angelica Lei LPN        2017 10:36 AM

## 2017-12-30 NOTE — NURSING NOTE
Patient Information     Patient Name MRN Sex Altaf Neves [id number] Male 1934      Nursing Note by Angelica Lei at 2017  9:40 AM     Author:  Angelica Lei Service:  (none) Author Type:  (none)     Filed:  2017  9:54 AM Encounter Date:  2017 Status:  Signed     :  Angelica Lei            Patient presents to the clinic for swelling and redness of the left lower leg over the past several weeks.  Warmth and redness is present from the left ankle to above the knee over the past 2 days.  Patient denies fever at this time.    Angelica Lei LPN        2017 9:44 AM

## 2017-12-30 NOTE — NURSING NOTE
Patient Information     Patient Name MRN Altaf Tsang [id number] Male 1934      Nursing Note by Sapna Wallace at 2017  1:20 PM     Author:  Sapna Wallace Service:  (none) Author Type:  (none)     Filed:  2017  1:44 PM Encounter Date:  2017 Status:  Signed     :  Sapna Wallace            Patient presents to follow up from his hospital stay on -.  Sapna Wallace LPN .............2017  1:37 PM

## 2017-12-30 NOTE — NURSING NOTE
Patient Information     Patient Name MRN Sex Altaf Neves [id number] Male 1934      Nursing Note by Andreina Herman at 10/25/2017 10:40 AM     Author:  Andreina Herman Service:  (none) Author Type:  (none)     Filed:  10/25/2017 10:56 AM Encounter Date:  10/25/2017 Status:  Signed     :  Andreina Herman            Pt presents for follow up anemia and kidney failure.  Andreina Herman

## 2017-12-30 NOTE — INITIAL ASSESSMENTS
Patient Information     Patient Name MRN Sex Altaf Neves [id number] Male 1934      Initial Assessments by Mar Samayoa PT at 2017  2:27 PM     Author:  Mar Samayoa PT Service:  (none) Author Type:  PT- Physical Therapist     Filed:  2017  3:50 PM Date of Service:  2017  2:27 PM Status:  Signed     :  Mar Samayoa PT (PT- Physical Therapist)            [facility]  Outpatient PT - Initial Evaluation  Lymphedema Lower Extremity Eval        Date of Service: 2017     Patient Name: Altaf Chao   YOB: 1934   Referring MD/Provider: Valentino Machado MD  Diagnosis: bilateral edema of lower extremities, chronic kidney disease  Treatment Diagnosis: bilateral lower extremity edema  Insurance: Medicare  Start of Service: 17  Certification Dates: Start of Service: 17   Medicare/MA Re-Cert Due: 17    Living Situations:  Independent in Living Situation     Preadmission Functional Mobility: Independent with Assistive Device,       Walkers,  Type:  2 Wheeled Walker  Precautions:  none  Cognition:  Oriented to Person, Place, and Time.     Were cultural / age or other special adaptations needed? No      Patient is a vulnerable adult: No      Patient is aware of diagnosis: Yes      Risks and benefits explained: Yes    Subjective      History of Onset: chronic lower leg edema associated with kidney failure. Recent hospitilzation for ANCA-associated glomerulonephritis. Hands were also very swollen, they are better. Edema is better in morning when he wakes, increases when on off. Will elevated 1.5 to 2 hours during the day. Had stockings, would bind too tight below knees.     Weeping Fluid: during hospitalization at contusion site anterior left lower leg.       Initial Pain Ratin = no pain, comfortable / Location:  NA  Aggrevating Factors: dependent position  Easing Factors: elevation  Functional Limitations: increased  edema increases difficulty with walking due to heaviness    Patient is free of the following contraindications to complete decongestive therapy:  Acute infection-not on antibiotics  Uncompensated congestive heart failure  Active malignancy-non palliative  Acute DVT prior to medical management  Obstructive Peripheral Arterial Disease  Patient does have chronic kidney disease stage 5, but has been cleared by Dr. Portillo, Nephrology at [facility], for treatment.    Current Medications:  Reviewed (see chart)    Drug Allergies:  Reviewed (see chart)  ?   Latex Allergy:  none    Significant PMHX:    Reviewed, see EHR    Previous Lymphedema Therapy: none    G codes and Modifier based on patient's presentation and clinical judgement:     Current Primary G Code and Modifier:    Per the Patient's intake and/or assessment the Primary G Code is: Other PT/OT Primary .   The Patient's Impairment, Limitation or Restriction Modifier would be best described as: CK - 40% - 60% Impairment.     Goal Primary G Code and Modifier:    The Patient's G Code Goal would be: Other PT/OT Primary    The Patient's Impairment, Limitation or Restriction Modifier goal would be best described as: CJ - 20% - 40% Impairment.           EXAMINATION  Items left blank indicate that the test was inappropriate or not meaningful at the time of evaluation and therefore not performed.    Test and Measures  Posture:  flexed posture seated and standing  Location and Description of Edema:   Lower extremities, noted stage 3 skin changes  Edema (Tone & Texture):   Fibrotic, soft, dry skin        Skin Integrity:      Condition:  Open Wounds left anterior/lateral lower leg     Skin Temperature:  Cool    Sensation:  General Sensation Intact    Stemmer's Sign:  Positive (not able to pinch skin on 2nd digit).     Pulses:    Location Symmetrical Diminished Absent   Femoral x     Dorsal Pedal  x        Measurements Performed:  seated  Circumferential  Measurements:    Left Right   7/11/2017     MTP 25.4 cm  26.5    5cm+ 25.2  26.5    ankle 29.5  29.0    30cm distal to SPB 36.0  36.3    20cm distal to SPB 40.2  37.5    10cm distal to SPB 37.2  38.0                   Today's Intervention:   Advised daily lotion use due to dryness of skin to reduce risk for open areas and infection.    Compression wraps applied to lower extremities in seated position with: tg size 7, molelast, artiflex, comprilan 8cm x2    assisted patient with donning shoes. Able to ambulate safety with FWW.    Patient will keep wraps in place until appointment with PT 7/12/17 at 2:30pm. Will remove wraps with any discomfort.    ASSESSMENT      Therapist Assessment / Clinical Impression:  Patient presents with bilateral lower extremity lymphedema due to chronic kidney disease. Lymphedema appears to be stage 3, moderate severity. Will use compression to reduce edema and transition to use of compression garment.    Functional Impairment(s): Difficulty with Ambulation:  due to leg edema, heaviness     Prior Function:  Not Limited    Physical Impairment(s):       MUSCULOSKELETAL:  Edema      Goals:  Patient goal (time reference required): reduce leg swelling .  Long term goal: Lymphedema is to remain stable for 12 months following the completion of treatment.      Functional goals: Patient is to be independent in their Home Exercise Program in 8 weeks.  Decrease volume excess by 10 percent to improve cosmesis, fit of clothing and/or shoes, and to reduce the risk of infection in 8 weeks.  Patient or caregiver will be independent with self bandaging in 8 weeks.  Patient or caregiver will be independent with donning and doffing compression garments in 8 weeks.    Patient participated in goal selection and understand(s) the plan of care: Yes   Patient Potential for Achieving Desired Outcome:  Fair    Response to Intervention: tolerated wraps well    PLAN    Treatment Plan / Targeted Outcomes:      Frequency:   16 visits     Duration of Treatment: 8 weeks     Planned Interventions:  Possible physical therapy interventions include but are not limited to:   x Therapeutic Exercise (ROM & strengthening) x Gait Training   x Manual Therapy  Medical Equipment    Self Care/Home Management Training  Vasopneumatic Device   x Therapeutic Activity  Other     Plan for next visit:  Assess tolerance of compression wraps and change in edema    Student or PTA has been instructed in and demonstrates skills necessary to carry out above stated treatment plan: No    Thank you for your referral to [facility].  Please call with any questions, concerns or comments.  [phone]    The signature, of the referring medical provider, on this document indicates certification of the above prescribed plan of care and is medically necessary.

## 2017-12-30 NOTE — NURSING NOTE
Patient Information     Patient Name MRN Sex Altaf Neves [id number] Male 1934      Nursing Note by Angelica Lei at 2017 11:20 AM     Author:  Angelica Lei Service:  (none) Author Type:  (none)     Filed:  2017 11:51 AM Encounter Date:  2017 Status:  Signed     :  Angelica Lei            Patient presents to the clinic for follow up with left leg cellulitis/swelling.    Angelica Lei LPN        2017 11:42 AM

## 2018-01-03 NOTE — PROCEDURES
Patient Information     Patient Name MRN Sex Altaf Neves [id number] Male 1934      Procedures by Bethel Gordon MD at 3/14/2017  1:41 PM     Author:  Bethel Gordon MD Service:  (none) Author Type:  Physician     Filed:  3/14/2017  1:45 PM Date of Service:  3/14/2017  1:41 PM Status:  Signed     :  Bethel Gordon MD (Physician)            Preoperative diagnosis  Buried penis    Postoperative diagnosis  Buried penis    Procedure performed  Plastic operation to repair buried penis    Surgeon  Bethel Gordon MD    Surgeon(s)/Proceduralist(s) and Assistants (if any)  Surgeon(s):  Bethel Gordon MD  Assistant: Chaitanya Hassan CST  Circulator: Ayah Talbert RN  Circulator Set Up: Ruma Rodriguez RN  PACU Nurse: Jerilyn Main RN; Lisa Lainez RN  Pre Op Nurse: Moraima Jauregui RN  Phase II Nurse: Sharon Sotelo RN  Scrub: Lisa Ellis CST    Specimen(s)  no    (EBL) Estimated blood loss (ml)  5    Anesthesia  General endotracheal anesthesia  10 mL local injection    Complications  None    Indications  82 y.o. male agreed to undergo the above named procedure after discussion of the alternatives, risks and benefits.  He had been circumcised many years ago and since developed a buried penis and the glans could not be examined..  Informed consent was obtained.    Procedure  The patient was taken to the operating room and placed supine on the operating table.  Pre-operative antibiotics were administered.  Bilateral lower extremity SCDs were placed.  After induction of general anesthesia the patient was maintained in supine position.  The patient's genitalia was trimmed, prepped and draped in a sterile fashion and a time-out was performed.      A penile block was performed prior to the procedure with instillation of 5mL of 0.5% bupivacaine at the 10 and 2 o'clock position as well as circumferentially around the buried penis.    A straight clamp was used to crush the suprapubic skin  at the 12:00 and 6:00 position.  I incised these areas and bleeding vessels were cauterized.   I closed in a Y-Z plasty fashion with 3-0 chromic suture thereby allowing exposure of the glans penis.  Ointment with gauze and Coban wrap were loosely applied to the incision.    The patient was awakened, extubated and taken to recovery unit without difficulty.   At the end of the procedure all instrument, needle and sponge counts were correct x 2.    Plan  Follow up in 6 weeks for a wound check

## 2018-01-03 NOTE — NURSING NOTE
Patient Information     Patient Name MRN Altaf Tsang [id number] Male 1934      Nursing Note by Angelica Lei at 2017  2:50 PM     Author:  Angelica Lei Service:  (none) Author Type:  (none)     Filed:  2017  9:31 AM Encounter Date:  2017 Status:  Signed     :  Angelica Lei            Patient presents to the clinic for medication management.    Angelica Lei LPN        2017 9:19 AM

## 2018-01-03 NOTE — INTERVAL H&P NOTE
Patient Information     Patient Name MRN Sex Altaf Neves [id number] Male 1934      Interval H&P Note by Bethel Gordon MD at 3/14/2017 11:05 AM     Author:  Bethel Gordon MD Service:  (none) Author Type:  Physician     Filed:  3/14/2017 11:05 AM Date of Service:  3/14/2017 11:05 AM Status:  Signed     :  Bethel Gordon MD (Physician)            History and Physical Update    The history and physical has been reviewed and the patient has been examined.  There are no interim changes to the patient's history or physical condition.    BETHEL GORDON MD          Source Note     Author:  Bethel Gordon MD Service:  (none) Author Type:  Physician    Filed:  3/3/2017  8:43 AM Date of Service:  3/3/2017  8:30 AM Status:  Signed    :  Bethel Gordon MD (Physician)              I was asked to see this patient by Valentino Machado MD and provide my opinion about the following:  Phimosis    Type of Visit  Consult    Chief Complaint  Phimosis    HPI  Mr. Chao is a 82 y.o. uncircumcised male who presents with phimosis.  He first noticed problems with his phimosis about 1 year ago.  It does interfere with voiding.  It does cause discomfort.  He has had a recent UTI diagnosed as well as a UTI about 1 year ago.      Past Medical History  He  has a past medical history of ESSENTIAL HYPERTENSION (2012); Osteoarthrosis, unspecified whether generalized or localized, pelvic region and thigh; Other and unspecified hyperlipidemia; Pityriasis rosea; Right inguinal hernia (2013); and Tachycardia, unspecified.  Patient Active Problem List     Diagnosis  Code     Pityriasis rosea L42     ESSENTIAL HYPERTENSION I10     History of tobacco abuse Z87.891     H/O bilateral inguinal hernia repair Z98.890, Z87.19     Bilateral leg edema R60.0     Hip stiffness M25.659     Dyslipidemia E78.5     H/O total hip arthroplasty - Left 2014 Z96.649     S/P total hip arthroplasty Z96.649     Foreskin problem N47.8      Refusal of statin medication at discharge Z53.29       Past Surgical History  He  has a past surgical history that includes subtalar athroereisis; hernia repair (Right, 13); genital surgery male (13); hernia repair (Left, 4/15/14); and total hip arthroplasty (Left, 14).    Medications  He has a current medication list which includes the following prescription(s): amlodipine and lisinopril.    Allergies  No Known Allergies    Social History  He  reports that he quit smoking about 41 years ago. His smoking use included Cigarettes. He has never used smokeless tobacco. He reports that he drinks about 0.5 oz of alcohol per week  He reports that he does not use illicit drugs.  No drug abuse.    Family History  Family History       Problem   Relation Age of Onset     Other  Son 12     neuroblastoma at 11 yo  at 14.        Genetic  No Family History      No known FHx of DM, CAD, CVA         Review of Systems  I personally reviewed the ROS with the patient.    Nursing Notes:   Eula Vides  3/3/2017  8:34 AM  Signed  Here for foreskin problem.  Review of Systems:    Weight loss:    No     Recent fever/chills:  No   Night sweats:   Yes  Current skin rash:  No   Recent hair loss:  No  Heat intolerance:  No   Cold intolerance:  No  Chest pain:   No   Palpitations:   No  Shortness of breath:  No   Wheezing:   No  Constipation:    Yes   Diarrhea:   No   Nausea:   No   Vomiting:   No   Kidney/side pain:  No   Back pain:   No  Frequent headaches:  No   Dizziness:     No  Leg swelling:   No   Calf pain:    No        Parents, brothers or sisters with history of kidney cancer?   No  Parents, brothers or sisters with history of bladder cancer: No    Eula Vides LPN  3/3/2017  8:18 AM          Physical Exam  Vitals:     17 0816   BP: 110/60   Resp: 12   Weight: 85.1 kg (187 lb 9.6 oz)     Constitutional: No acute distress.  Alert and cooperative   Head: NCAT  Eyes: Conjunctivae normal  Cardiovascular:  Regular rate and rhythm  Pulmonary/Chest: Respirations are even and non-labored bilaterally, no audible wheezing  CTAB  Abdominal: Soft. No distension, tenderness, masses or guarding.   Neurological: A + O x 3.  Cranial Nerves II-XII grossly intact.  Extremities: SUSI x 4, Warm. No clubbing.  No cyanosis.    Skin: Pink, warm and dry.  No visible rashes noted.  Psychiatric:  Normal mood and affect  Back:  No left CVA tenderness.  No right CVA tenderness.  Genitourinary  Uncircumcised phallus with tight foreskin, unable to retract  Some areas c/w BXO  Normal pubic hair distribution.  Testicles descended bilaterally.    Labs  CREATININE (mg/dL)    Date Value   02/17/2017 1.19         Recent Labs       02/17/17   0954   COLOR  Yellow   CLARITY  Clear   SPECGRAV  1.015   PHURINE  5.5   UROBILINOGEN  Normal   PROTEINUA  Negative       Recent Labs       02/17/17   0954   GLUCOSEUA  Negative   KETONESUA  Negative   BLOODUA  Small A   NITRITE  Negative   LEUKOCYTE  Negative     Lab Results      Component  Value Date/Time    WBCUA None Seen 02/17/2017 09:54 AM    RBCUA 3-5 (A) 02/17/2017 09:54 AM    BACTERIAUA None Seen 02/17/2017 09:54 AM    EPITHELIALUA None Seen 02/17/2017 09:54 AM       Assessment  Mr. Chao is a 82 y.o. uncircumcised male who presents with symptomatic (UTIs and difficulty voiding) phimosis.    We discussed the surgery and risks in detail today including, but are not limited to, anesthesia risks, infection, bleeding, injury to adjacent structures including the urethra, issues with wound healing, altered penile sensation, appearance of decrease in penile size and need to abstain from sexual activity for 6 weeks to allow complete healing.     Plan  The patient was scheduled and consented for circumcision in the OR under choice anesthesia.

## 2018-01-03 NOTE — PROGRESS NOTES
Patient Information     Patient Name MRN Sex Altaf Neves [id number] Male 1934      Progress Notes by Valentino Machado MD at 2017  2:50 PM     Author:  Valentino Machado MD Service:  (none) Author Type:  Physician     Filed:  2017 12:48 PM Encounter Date:  2017 Status:  Signed     :  Valentino Machado MD (Physician)            Nursing Notes:   Angelica Lei  2017  9:31 AM  Signed  Patient presents to the clinic for medication management.    Angelica Lei LPN        2017 9:19 AM    Altaf Chao presents to clinic today for:   Chief Complaint    Patient presents with      Medication Management     HPI: Mr. Chao is a 82 y.o. male who presents today for evaluation of above.     (I10) Essential hypertension  (primary encounter diagnosis)  (N47.8) Foreskin problem  (R30.0) Dysuria  (Z53.29) Refusal of statin medication at discharge     Hypertension, doing well.  Tolerating medications.  Check labs today.  Medications refilled.    Doing Very well after hip replacements and hernia surgery -- reports ambulation is much easier.  No abdominal pain issues.    Patient does report having some foreskin problems however, states that this seems to have been only a problem since his large abdominal hernia surgery.  States that the skin somewhat rough he is worried that it may be contributing to infection issues.  He is having some dysuria at the moment.  - Check urinalysis.    Hyperlipidemia, we discussed statin therapy today, he refuses.  Chart updated.    Mr. Chao's Body mass index is 26.93 kg/(m^2). This is within the normal range for a 82 y.o. Normal range for ages 18-64 is between 18.5 and 24.9; normal range for ages 65+ is 23-30.   BP Readings from Last 1 Encounters:17 : 124/72  Mr. Russells blood pressure is out of the normal range for adults. Per JNC-8 guidelines normal adult blood pressure is < 120/80, pre-hypertensive is between 120/80 and 139/89, and hypertension is  140/90 or greater. Risks of hypertension were discussed. Patient's strategy will be weight loss, increased activity and reduced salt intake    Functional Capacity: > 4 METS.   Reports that he can climb a flight of stairs without any chest pain/heaviness or shortness of breath.   Patient reports no current symptoms of fevers, chills, nausea/vomiting.   No cough. No shortness of breath.   No change in bowel habits. No melena, hematochezia. No Hematuria.     + some dysuria, cloudy urine. Taking cranberry tablets and pushing oral hydration.     No rashes. No palpitations.  No orthopnea/paroxysmal nocturnal dyspnea   No vision or hearing issues.   No significant mood issues   No bruising.     ALESSANDRA:  No flowsheet data found.    PHQ9:  PHQ Depression Screening 8/5/2016 2/17/2017   Date of PHQ exam (doc flow) 8/5/2016 2/17/2017   1. Lack of interest/pleasure 0 - Not at all 0 - Not at all   2. Feeling down/depressed 0 - Not at all 0 - Not at all   PHQ-2 TOTAL SCORE 0 0   3. Trouble sleeping 0 - Not at all 0 - Not at all   4. Decreased energy 0 - Not at all 0 - Not at all   5. Appetite change 0 - Not at all 0 - Not at all   6. Feelings of failure 0 - Not at all 0 - Not at all   7. Trouble concentrating 0 - Not at all 0 - Not at all   8. Activity level 0 - Not at all 0 - Not at all   9. Hurting yourself 0 - Not at all 0 - Not at all   PHQ-9 TOTAL SCORE 0 0   PHQ-9 Severity Level none none   Functional Impairment not difficult at all not difficult at all        I have personally reviewed the past medical history, past surgical history, medications, allergies, family and social history as listed below, on 2/17/2017.    Patient Active Problem List      Diagnosis Date Noted     Foreskin problem 02/17/2017     Refusal of statin medication at discharge 02/17/2017     S/P total hip arthroplasty 09/18/2014     H/O total hip arthroplasty - Left 6/30/2014 06/30/2014     Dyslipidemia 04/10/2014     Bilateral leg edema 01/08/2014     Hip  stiffness 2014     H/O bilateral inguinal hernia repair 2013     History of tobacco abuse 08/15/2013     Pityriasis rosea 2012     ESSENTIAL HYPERTENSION 2012     Past Medical History      Diagnosis   Date     ESSENTIAL HYPERTENSION  2012     Osteoarthrosis, unspecified whether generalized or localized, pelvic region and thigh       Other and unspecified hyperlipidemia       Pityriasis rosea       Right inguinal hernia  2013     Right Inguinal hernia with incarceration, massive [[id number]][      Tachycardia, unspecified       Past Surgical History       Procedure   Laterality Date     Pr subtalar athroereisis        bone Spurs excision on Toe       Hernia repair  Right 13     RIH with mesh       Genital surgery male   13     Dorsal Slit       Hernia repair  Left 4/15/14     LIH with mesh       Pr total hip arthroplasty  Left 14     Current Outpatient Prescriptions       Medication  Sig Dispense Refill     amLODIPine (NORVASC) 5 mg tablet Take 1 tablet by mouth once daily. 90 tablet 3     lisinopril (PRINIVIL; ZESTRIL) 2.5 mg tablet Take 1 tablet by mouth once daily in the evening. 90 tablet 3     No Known Allergies  Family History       Problem   Relation Age of Onset     Genetic  No Family History      No known FHx of DM, CAD, CVA       Other  Son 12     neuroblastoma at 11 yo  at 14.        Family Status     Relation  Status     Mother  at age 94     Father      Sister Alive     Brother     Lung CA - 2-3 ppd tobacco      Brother  at age 67    Aneurysm      Brother Alive     Brother Alive     Brother Alive     Child Alive     Child Alive     Son  at age 14    neuroblastoma at 11 yo  at 14.       Social History     Social History        Marital status:       Spouse name: Lucita     Number of children:  3     Years of education:  N/A     Occupational History       Retired      Social History Main Topics          Smoking  "status:   Former Smoker      Types:  Cigarettes      Quit date:  1976      Smokeless tobacco:   Never Used      Alcohol use   0.5 oz/week     1 Glasses of wine per week        Comment: occasionally       Drug use:   No      Sexual activity:   Not on file      Other Topics  Concern     Not on file      Social History Narrative      - Wife Claudia Rose kids - oldest son - neuroblastoma at 11 yo  at 14.     [employer] [facility] [employer] - Worked in [employer] -- [employer] and . D         Pertinent ROS was performed and was negative as noted in HPI above.     EXAM:   Vitals:     17 0920   BP: 124/72   Pulse: 72   Temp: 98  F (36.7  C)   TempSrc: Tympanic   Weight: 86.4 kg (190 lb 6 oz)   Height: 1.791 m (5' 10.5\")     BP Readings from Last 3 Encounters:    17 124/72   16 130/74   11/16/15 138/70     Wt Readings from Last 3 Encounters:    17 86.4 kg (190 lb 6 oz)   16 89.1 kg (196 lb 6 oz)   11/16/15 88.1 kg (194 lb 3.2 oz)     Estimated body mass index is 26.93 kg/(m^2) as calculated from the following:    Height as of this encounter: 1.791 m (5' 10.5\").    Weight as of this encounter: 86.4 kg (190 lb 6 oz).     EXAM:  Constitutional: Pleasant, alert, appropriate appearance for age. No acute distress  ENT: Normocephalic, Atraumatic, Thyroid without nodules or tenderness   Nose/Mouth: Oral pharynx without erythema or exudates, Mucous membranes are moist, Nose is patent bilaterally, no rhinorrhea, Dental hygeine adequate and Dentition is intact   Eyes:  Extraocular muscles intact, Sclera non-icteric, Conjunctiva without erythema  Lymphatic Exam: Non-palpable nodes in neck, clavicular regions  Pulmonary: Lungs are clear to auscultation bilaterally, without wheezes or crackles  Cardiovascular Exam: regular rate and rhythm, brisk carotid upstroke without bruits, peripheral pulses very brisk, no pedal edema, no murmur, click, rub or gallop " appreciated  Gastrointestinal Exam: Soft, non-tender, non-distended, positive bowel sounds   Integument: No abnormal rashes, sores, or ulcerations noted  Neurologic Exam: CN 2-12 grossly intact Nonfocal; symmetric DTRs, normal gross motor movement, tone, and coordination. No tremor.  Sensation intact to light touch in upper and lower extremities  Musculoskeletal Exam: Moves upper and lower extremities symmetrically, No focal weakness  Gait and station appear grossly normal  Psychiatric Exam: Awake and Alert, Affect and mood appropriate  Speech is fluent, Thought process is normal    INVESTIGATIONS:  Results for orders placed or performed in visit on 02/17/17      CBC W PLT NO DIFF      Result  Value Ref Range    WHITE BLOOD COUNT         11.7 (H) 4.5 - 11.0 thou/cu mm    RED BLOOD COUNT           4.72 4.30 - 5.90 mil/cu mm    HEMOGLOBIN                14.4 13.5 - 17.5 g/dL    HEMATOCRIT                43.1 37.0 - 53.0 %    MCV                       91 80 - 100 fL    MCH                       30.4 26.0 - 34.0 pg    MCHC                      33.4 32.0 - 36.0 g/dL    RDW                       11.2 (L) 11.5 - 15.5 %    PLATELET COUNT            297 140 - 440 thou/cu mm    MPV                       8.4 6.5 - 11.0 fL   COMP METABOLIC PANEL      Result  Value Ref Range    SODIUM 135 133 - 143 mmol/L    POTASSIUM 3.9 3.5 - 5.1 mmol/L    CHLORIDE 106 98 - 107 mmol/L    CO2,TOTAL 24 21 - 31 mmol/L    ANION GAP 5 5 - 18                    GLUCOSE 139 (H) 70 - 105 mg/dL    CALCIUM 9.3 8.6 - 10.3 mg/dL    BUN 17 7 - 25 mg/dL    CREATININE 1.19 0.70 - 1.30 mg/dL    BUN/CREAT RATIO           14                    GFR if African American >60 >60 ml/min/1.73m2    GFR if not African American 59 (L) >60 ml/min/1.73m2    ALBUMIN 4.0 3.5 - 5.7 g/dL    PROTEIN,TOTAL 7.8 6.4 - 8.9 g/dL    GLOBULIN                  3.8 (H) 2.0 - 3.7 g/dL    A/G RATIO 1.1 1.0 - 2.0                    BILIRUBIN,TOTAL 0.6 0.3 - 1.0 mg/dL    ALK PHOSPHATASE 60  34 - 104 IU/L    ALT (SGPT) 7 7 - 52 IU/L    AST (SGOT) 12 (L) 13 - 39 IU/L   URINALYSIS W REFLEX MICROSCOPIC IF POSITIVE      Result  Value Ref Range    COLOR                     Yellow Yellow Color    CLARITY                   Clear Clear Clarity    SPECIFIC GRAVITY,URINE    1.015 1.010, 1.015, 1.020, 1.025                    PH,URINE                  5.5 6.0, 7.0, 8.0, 5.5, 6.5, 7.5, 8.5                    UROBILINOGEN,QUALITATIVE  Normal Normal EU/dl    PROTEIN, URINE Negative Negative mg/dL    GLUCOSE, URINE Negative Negative mg/dL    KETONES,URINE             Negative Negative mg/dL    BILIRUBIN,URINE           Negative Negative                    OCCULT BLOOD,URINE        Small (A) Negative                    NITRITE                   Negative Negative                    LEUKOCYTE ESTERASE        Negative Negative                   URINALYSIS MICROSCOPIC      Result  Value Ref Range    RBC 3-5 (A) 0-2, None Seen /HPF    WBC None Seen 0-2, 3-5, None Seen /HPF    BACTERIA                  None Seen None Seen, Rare, Occasional, Few Bacteria/HPF    EPITHELIAL CELLS          None Seen None Seen, Few Epi/HPF       ASSESSMENT AND PLAN:  Altaf was seen today for medication management.    Diagnoses and all orders for this visit:    Essential hypertension  -     CBC W PLT NO DIFF; Future  -     COMPLETE METABOLIC PANEL; Future  -     amLODIPine (NORVASC) 5 mg tablet; Take 1 tablet by mouth once daily.  -     lisinopril (PRINIVIL; ZESTRIL) 2.5 mg tablet; Take 1 tablet by mouth once daily in the evening.    Foreskin problem  -     AMB CONSULT TO UROLOGY; Future    Dysuria  -     URINALYSIS W REFLEX MICROSCOPIC IF POSITIVE; Future  -     URINE CULTURE; Future  -     CBC W PLT NO DIFF; Future  -     COMPLETE METABOLIC PANEL; Future    Refusal of statin medication at discharge      lab results and schedule of future lab studies reviewed with patient, reviewed diet, exercise and weight control, recommended sodium  restriction, cardiovascular risk and specific lipid/LDL goals reviewed -- Declines Statins.     -- Expected clinical course discussed   -- Medications and their side effects discussed    25 minutes spent in face-to-face interaction with patient (separate from separately billed procedures) with greater than 50% spent in counseling and care coordination of listed medical problems.      The ASCVD Risk score (Keiryerica VELEZ Jr., et al., 2013) failed to calculate for the following reasons:    The 2013 ASCVD risk score is only valid for ages 40 to 79    Altaf is also recommended to eat a heart-healthy diet, do regular aerobic exercises, maintain a desirable body weight, and avoid tobacco products. These recommendations are from the American Heart Association (AHA) which stresses the importance of lifestyle changes to lower cardiovascular disease risk.     Return in about 1 year (around 2/17/2018).    Patient Instructions   Medications refilled.     Labs today.     Urinalysis and Urine Culture ordered.   -- if infection, will notify you of results.     Due to foreskin issues -- Urology clinic referral was sent - Dr. Gordon - they will call with date/time of appointment.       Return in approximately 1 year, or sooner as needed for follow-up with Dr. Machado.    Clinic : [phone]  Appointment line: [phone]      Valentino Machado MD

## 2018-01-03 NOTE — PROGRESS NOTES
Patient Information     Patient Name MRN Sex Altaf Neves [id number] Male 1934      Progress Notes by Bethel Gordon MD at 3/3/2017  8:30 AM     Author:  Bethel Gordon MD  Service:  (none) Author Type:  Physician     Filed:  3/3/2017  8:43 AM  Encounter Date:  3/3/2017 Status:  Addendum     :  Bethel Gordon MD (Physician)        Related Notes: Original Note by Bethel Gordon MD (Physician) filed at 3/3/2017  8:37 AM            I was asked to see this patient by Valentino Machado MD and provide my opinion about the following:  Phimosis    Type of Visit  Consult    Chief Complaint  Phimosis    HPI  Mr. Chao is a 82 y.o. uncircumcised male who presents with phimosis.  He first noticed problems with his phimosis about 1 year ago.  It does interfere with voiding.  It does cause discomfort.  He has had a recent UTI diagnosed as well as a UTI about 1 year ago.      Past Medical History  He  has a past medical history of ESSENTIAL HYPERTENSION (2012); Osteoarthrosis, unspecified whether generalized or localized, pelvic region and thigh; Other and unspecified hyperlipidemia; Pityriasis rosea; Right inguinal hernia (2013); and Tachycardia, unspecified.  Patient Active Problem List     Diagnosis  Code     Pityriasis rosea L42     ESSENTIAL HYPERTENSION I10     History of tobacco abuse Z87.891     H/O bilateral inguinal hernia repair Z98.890, Z87.19     Bilateral leg edema R60.0     Hip stiffness M25.659     Dyslipidemia E78.5     H/O total hip arthroplasty - Left 2014 Z96.649     S/P total hip arthroplasty Z96.649     Foreskin problem N47.8     Refusal of statin medication at discharge Z53.29       Past Surgical History  He  has a past surgical history that includes subtalar athroereisis; hernia repair (Right, 13); genital surgery male (13); hernia repair (Left, 4/15/14); and total hip arthroplasty (Left, 14).    Medications  He has a current medication list which includes  the following prescription(s): amlodipine and lisinopril.    Allergies  No Known Allergies    Social History  He  reports that he quit smoking about 41 years ago. His smoking use included Cigarettes. He has never used smokeless tobacco. He reports that he drinks about 0.5 oz of alcohol per week  He reports that he does not use illicit drugs.  No drug abuse.    Family History  Family History       Problem   Relation Age of Onset     Other  Son 12     neuroblastoma at 13 yo  at 14.        Genetic  No Family History      No known FHx of DM, CAD, CVA         Review of Systems  I personally reviewed the ROS with the patient.    Nursing Notes:   Eula Vides  3/3/2017  8:34 AM  Signed  Here for foreskin problem.  Review of Systems:    Weight loss:    No     Recent fever/chills:  No   Night sweats:   Yes  Current skin rash:  No   Recent hair loss:  No  Heat intolerance:  No   Cold intolerance:  No  Chest pain:   No   Palpitations:   No  Shortness of breath:  No   Wheezing:   No  Constipation:    Yes   Diarrhea:   No   Nausea:   No   Vomiting:   No   Kidney/side pain:  No   Back pain:   No  Frequent headaches:  No   Dizziness:     No  Leg swelling:   No   Calf pain:    No        Parents, brothers or sisters with history of kidney cancer?   No  Parents, brothers or sisters with history of bladder cancer: No    Eula Vides LPN  3/3/2017  8:18 AM          Physical Exam  Vitals:     17 0816   BP: 110/60   Resp: 12   Weight: 85.1 kg (187 lb 9.6 oz)     Constitutional: No acute distress.  Alert and cooperative   Head: NCAT  Eyes: Conjunctivae normal  Cardiovascular: Regular rate and rhythm  Pulmonary/Chest: Respirations are even and non-labored bilaterally, no audible wheezing  CTAB  Abdominal: Soft. No distension, tenderness, masses or guarding.   Neurological: A + O x 3.  Cranial Nerves II-XII grossly intact.  Extremities: SUSI x 4, Warm. No clubbing.  No cyanosis.    Skin: Pink, warm and dry.  No visible  rashes noted.  Psychiatric:  Normal mood and affect  Back:  No left CVA tenderness.  No right CVA tenderness.  Genitourinary  Uncircumcised phallus with tight foreskin, unable to retract  Some areas c/w BXO  Normal pubic hair distribution.  Testicles descended bilaterally.    Labs  CREATININE (mg/dL)    Date Value   02/17/2017 1.19         Recent Labs       02/17/17   0954   COLOR  Yellow   CLARITY  Clear   SPECGRAV  1.015   PHURINE  5.5   UROBILINOGEN  Normal   PROTEINUA  Negative       Recent Labs       02/17/17   0954   GLUCOSEUA  Negative   KETONESUA  Negative   BLOODUA  Small A   NITRITE  Negative   LEUKOCYTE  Negative     Lab Results      Component  Value Date/Time    WBCUA None Seen 02/17/2017 09:54 AM    RBCUA 3-5 (A) 02/17/2017 09:54 AM    BACTERIAUA None Seen 02/17/2017 09:54 AM    EPITHELIALUA None Seen 02/17/2017 09:54 AM       Assessment  Mr. Chao is a 82 y.o. uncircumcised male who presents with symptomatic (UTIs and difficulty voiding) phimosis.    We discussed the surgery and risks in detail today including, but are not limited to, anesthesia risks, infection, bleeding, injury to adjacent structures including the urethra, issues with wound healing, altered penile sensation, appearance of decrease in penile size and need to abstain from sexual activity for 6 weeks to allow complete healing.     Plan  The patient was scheduled and consented for circumcision in the OR under choice anesthesia.

## 2018-01-03 NOTE — OR ANESTHESIA
Patient Information     Patient Name MRN Sex Altaf Neves [id number] Male 1934      OR Anesthesia by Ashley Milian MD at 3/14/2017  2:58 PM     Author:  Ashley Milian MD Service:  (none) Author Type:  PHYS- Anesthesiologist     Filed:  3/14/2017  2:58 PM Date of Service:  3/14/2017  2:58 PM Status:  Signed     :  Ashley Milian MD (PHYS- Anesthesiologist)            Anesthesia Post Operative Care Note    Name: Altaf Chao  MRN:   [id number]  :    1934       Procedure Done:  See Surgeon Note   Case Cancelled for Anesthetic Reason:  No      Anesthesia Technique    Anesthetic Type:  General     Airway Management:  LMA     Oral Trauma:  No    Intraoperative Course   Hemodynamics:  Stable    Ventilation Normal:  Yes Lung Sounds:  Normal      PACU Course      Nondepolarizer Used: No    Reintubation:  No   Hemodynamics:  Stable      Hydration: Euvolemic   Temperature:  36.1 - 38.3      Mental Status:  Awake, alert, follows commands   Pain Management:  Adequate   Regional Block:  No   Anesthesia Complications:  None      Vital Signs:  Temp: 96.8  F (36  C)  Pulse: 70  BP: 113/68  Resp: 22  SpO2: 97 %    O2 Device: Room Air    NON-VERBAL PAIN SCALE  Face: No particular expression or smile  Activity (Movement): Lying quietly, normal position  Guarding: Lying quietly  Physiologic I (Vital Signs): Stable vital signs/no change 4 hrs  Physiologic II: Warm, dry, skin  Total Score: 0    Level of Nausea: None        Active Lines:  Patient Lines/Drains/Airways Status    Active Line     Name: Placement date: Placement time: Site: Days:    PERIPHERAL VAD Left Wrist 20 17   1035   Wrist   less than 1                Intake & Output:       Labs:  No results for input(s): VV8MXVWWCGS, IOE9XEQJGMPN, PHARTERIAL, QJO4WFOJXNIC, V6GVKHANSZOJ in the last 24 hours.    No results for input(s): MAGNESIUM in the last 24 hours.    No results for input(s): GLUCOSEMETER in the last 720  hours.        HOME HOLGUIN MD ....................  3/14/2017   2:58 PM

## 2018-01-03 NOTE — H&P
Patient Information     Patient Name MRN Sex Altaf Neves [id number] Male 1934      H&P (View-Only) by Bethel Gordon MD at 3/3/2017  8:30 AM     Author:  Bethel Gordon MD Service:  (none) Author Type:  Physician     Filed:  3/3/2017  8:43 AM Date of Service:  3/3/2017  8:30 AM Status:  Signed     :  Bethel Gordon MD (Physician)            I was asked to see this patient by Valentino Machado MD and provide my opinion about the following:  Phimosis    Type of Visit  Consult    Chief Complaint  Phimosis    HPI  Mr. Chao is a 82 y.o. uncircumcised male who presents with phimosis.  He first noticed problems with his phimosis about 1 year ago.  It does interfere with voiding.  It does cause discomfort.  He has had a recent UTI diagnosed as well as a UTI about 1 year ago.      Past Medical History  He  has a past medical history of ESSENTIAL HYPERTENSION (2012); Osteoarthrosis, unspecified whether generalized or localized, pelvic region and thigh; Other and unspecified hyperlipidemia; Pityriasis rosea; Right inguinal hernia (2013); and Tachycardia, unspecified.  Patient Active Problem List     Diagnosis  Code     Pityriasis rosea L42     ESSENTIAL HYPERTENSION I10     History of tobacco abuse Z87.891     H/O bilateral inguinal hernia repair Z98.890, Z87.19     Bilateral leg edema R60.0     Hip stiffness M25.659     Dyslipidemia E78.5     H/O total hip arthroplasty - Left 2014 Z96.649     S/P total hip arthroplasty Z96.649     Foreskin problem N47.8     Refusal of statin medication at discharge Z53.29       Past Surgical History  He  has a past surgical history that includes subtalar athroereisis; hernia repair (Right, 13); genital surgery male (13); hernia repair (Left, 4/15/14); and total hip arthroplasty (Left, 14).    Medications  He has a current medication list which includes the following prescription(s): amlodipine and lisinopril.    Allergies  No Known  Allergies    Social History  He  reports that he quit smoking about 41 years ago. His smoking use included Cigarettes. He has never used smokeless tobacco. He reports that he drinks about 0.5 oz of alcohol per week  He reports that he does not use illicit drugs.  No drug abuse.    Family History  Family History       Problem   Relation Age of Onset     Other  Son 12     neuroblastoma at 11 yo  at 14.        Genetic  No Family History      No known FHx of DM, CAD, CVA         Review of Systems  I personally reviewed the ROS with the patient.    Nursing Notes:   Eula Vides  3/3/2017  8:34 AM  Signed  Here for foreskin problem.  Review of Systems:    Weight loss:    No     Recent fever/chills:  No   Night sweats:   Yes  Current skin rash:  No   Recent hair loss:  No  Heat intolerance:  No   Cold intolerance:  No  Chest pain:   No   Palpitations:   No  Shortness of breath:  No   Wheezing:   No  Constipation:    Yes   Diarrhea:   No   Nausea:   No   Vomiting:   No   Kidney/side pain:  No   Back pain:   No  Frequent headaches:  No   Dizziness:     No  Leg swelling:   No   Calf pain:    No        Parents, brothers or sisters with history of kidney cancer?   No  Parents, brothers or sisters with history of bladder cancer: No    Eula Vides LPN  3/3/2017  8:18 AM          Physical Exam  Vitals:     17 0816   BP: 110/60   Resp: 12   Weight: 85.1 kg (187 lb 9.6 oz)     Constitutional: No acute distress.  Alert and cooperative   Head: NCAT  Eyes: Conjunctivae normal  Cardiovascular: Regular rate and rhythm  Pulmonary/Chest: Respirations are even and non-labored bilaterally, no audible wheezing  CTAB  Abdominal: Soft. No distension, tenderness, masses or guarding.   Neurological: A + O x 3.  Cranial Nerves II-XII grossly intact.  Extremities: SUSI x 4, Warm. No clubbing.  No cyanosis.    Skin: Pink, warm and dry.  No visible rashes noted.  Psychiatric:  Normal mood and affect  Back:  No left CVA tenderness.   No right CVA tenderness.  Genitourinary  Uncircumcised phallus with tight foreskin, unable to retract  Some areas c/w BXO  Normal pubic hair distribution.  Testicles descended bilaterally.    Labs  CREATININE (mg/dL)    Date Value   02/17/2017 1.19         Recent Labs       02/17/17   0954   COLOR  Yellow   CLARITY  Clear   SPECGRAV  1.015   PHURINE  5.5   UROBILINOGEN  Normal   PROTEINUA  Negative       Recent Labs       02/17/17   0954   GLUCOSEUA  Negative   KETONESUA  Negative   BLOODUA  Small A   NITRITE  Negative   LEUKOCYTE  Negative     Lab Results      Component  Value Date/Time    WBCUA None Seen 02/17/2017 09:54 AM    RBCUA 3-5 (A) 02/17/2017 09:54 AM    BACTERIAUA None Seen 02/17/2017 09:54 AM    EPITHELIALUA None Seen 02/17/2017 09:54 AM       Assessment  Mr. Chao is a 82 y.o. uncircumcised male who presents with symptomatic (UTIs and difficulty voiding) phimosis.    We discussed the surgery and risks in detail today including, but are not limited to, anesthesia risks, infection, bleeding, injury to adjacent structures including the urethra, issues with wound healing, altered penile sensation, appearance of decrease in penile size and need to abstain from sexual activity for 6 weeks to allow complete healing.     Plan  The patient was scheduled and consented for circumcision in the OR under choice anesthesia.

## 2018-01-03 NOTE — PATIENT INSTRUCTIONS
Patient Information     Patient Name MRN Sex Altaf Neves [id number] Male 1934      Patient Instructions by Valentino Machado MD at 2017  2:50 PM     Author:  Valentino Machado MD  Service:  (none) Author Type:  Physician     Filed:  2017  9:35 AM  Encounter Date:  2017 Status:  Addendum     :  Valentino Machado MD (Physician)        Related Notes: Original Note by Valentino Machado MD (Physician) filed at 2017  9:35 AM            Medications refilled.     Labs today.     Urinalysis and Urine Culture ordered.   -- if infection, will notify you of results.     Due to foreskin issues -- Urology clinic referral was sent - Dr. Gordon - they will call with date/time of appointment.       Return in approximately 1 year, or sooner as needed for follow-up with Dr. Machado.    Clinic : [phone]  Appointment line: [phone]

## 2018-01-03 NOTE — NURSING NOTE
Patient Information     Patient Name MRN Altaf Tsang [id number] Male 1934      Nursing Note by Eula Vides at 3/3/2017  8:30 AM     Author:  Eula Vides Service:  (none) Author Type:  (none)     Filed:  3/3/2017  8:34 AM Encounter Date:  3/3/2017 Status:  Signed     :  Eula Vides            Here for foreskin problem.  Review of Systems:    Weight loss:    No     Recent fever/chills:  No   Night sweats:   Yes  Current skin rash:  No   Recent hair loss:  No  Heat intolerance:  No   Cold intolerance:  No  Chest pain:   No   Palpitations:   No  Shortness of breath:  No   Wheezing:   No  Constipation:    Yes   Diarrhea:   No   Nausea:   No   Vomiting:   No   Kidney/side pain:  No   Back pain:   No  Frequent headaches:  No   Dizziness:     No  Leg swelling:   No   Calf pain:    No        Parents, brothers or sisters with history of kidney cancer?   No  Parents, brothers or sisters with history of bladder cancer: No    Eula Vides LPN  3/3/2017  8:18 AM

## 2018-01-03 NOTE — OR POSTOP
Patient Information     Patient Name MRN Sex Altaf Neves [id number] Male 1934      OR PostOp by Sharon Sotelo RN at 3/14/2017  3:05 PM     Author:  Sharon Sotelo RN Service:  (none) Author Type:  NURS- Registered Nurse     Filed:  3/14/2017  3:09 PM Date of Service:  3/14/2017  3:05 PM Status:  Signed     :  Sharon Sotelo RN (NURS- Registered Nurse)            Discharge Note    Data:  Altaf Chao has been discharged home at 1505 via wheelchair accompanied by Registered Nurse and Family.      Action:  Written discharge/follow-up instructions were provided to patient. Prescriptions were written and sent with patient.  Belongings sent with patient. Medications from home sent with patient/family: Not Applicable  Equipment none .     Response:  Patient verbalized understanding of discharge instructions, reason for discharge, and necessary follow-up appointments.     Sharon Sotelo RN

## 2018-01-03 NOTE — OR NURSING
Patient Information     Patient Name MRN Sex Altaf Neves [id number] Male 1934      OR Nursing by Ayah Talbert RN at 3/14/2017  1:16 PM     Author:  Ayah Talbert RN Service:  (none) Author Type:  NURS- Registered Nurse     Filed:  3/14/2017  1:16 PM Date of Service:  3/14/2017  1:16 PM Status:  Signed     :  Ayah Talbert RN (NURS- Registered Nurse)            Hands off report received from Monet Jauregui RN before transfer to Operating Room.

## 2018-01-03 NOTE — TELEPHONE ENCOUNTER
Patient Information     Patient Name MRN Sex Altaf Neves [id number] Male 1934      Telephone Encounter by Mary Ann Tapia RN at 2/15/2017 10:40 AM     Author:  Mary Ann Tapia RN Service:  (none) Author Type:  NURS- Registered Nurse     Filed:  2/15/2017 11:27 AM Encounter Date:  2/15/2017 Status:  Signed     :  Mary Ann Tapia RN (NURS- Registered Nurse)            amLODIPine (NORVASC) 5 mg tablet  TAKE ONE TABLET BY MOUTH ONE TIME DAILY       Disp: 90 tablet Refills: 3    Class: eRx Start: 2/15/2017    For: Essential hypertension  Originally ordered: 3 years ago by Kimani Machado MD  Last refill: 2016  LISINOPRIL 2.5 MG TABLET  In chart as: lisinopril (PRINIVIL; ZESTRIL) 2.5 mg tablet  TAKE ONE TABLET BY MOUTH ONE TIME DAILY IN THE EVENING       Disp: 90 tablet Refills: 3    Class: eRx Start: 2/15/2017    For: Essential hypertension  Originally ordered: 3 years ago by Kimani Machado MD  Last refill: 2016  To [facility] filled at: [facility] [address] [place] [address].Phone: [phone]    Last visit with KIMANI MACHADO was on: 2016 in [facility]  Next visit with KIMANI MACHADO is on: No future appointment listed with this provider  Next visit with Internal Medicine is on: No future appointment listed in this department  BP Readings from Last 4 Encounters:    16 130/74   11/16/15 138/70   10/14/14 98/50   14 99/49       Lab test requirements:  Creatinine and Potassium annually, if ordering lab, order BMP.  CREATININE (mg/dL)    Date Value   2015 1.16     POTASSIUM (mmol/L)    Date Value   2015 4.3     Patient states

## 2018-01-03 NOTE — TELEPHONE ENCOUNTER
Patient Information     Patient Name MRN Altaf Tsang [id number] Male 1934      Telephone Encounter by Smitha Gloria at 2017  8:16 AM     Author:  Smitha Gloria Service:  (none) Author Type:  (none)     Filed:  2017  8:17 AM Encounter Date:  2017 Status:  Signed     :  Smitha Gloria            Patient notified of the medication at the pharmacy.  Smitha Gloria LPN ....................  2017   8:17 AM

## 2018-01-03 NOTE — OR NURSING
Patient Information     Patient Name MRN Sex Altaf Neves [id number] Male 1934      OR Nursing by Jerilyn Main RN at 3/14/2017  2:16 PM     Author:  Jerilyn Main RN Service:  (none) Author Type:  NURS- Registered Nurse     Filed:  3/14/2017  2:17 PM Date of Service:  3/14/2017  2:16 PM Status:  Signed     :  Jerilyn Main RN (NURS- Registered Nurse)            PACU Respiratory Event Documentation     1) Episodes of Apnea greater than or equal to 10 seconds:no  2) Bradypnea - less than 8 breaths per minute: no  3) Pain score on 0 to 10 scale: no  4) Pain-sedation mismatch (yes or no):no  5) Repeated 02 desaturation less than 90% (yes or no): no    Anesthesia notified? (yes or no): no  Any of the above events occuring repeatedly in separate 30 minute intervals may be considered recurrent PACU respiratory events.    PACU Transfer Note    Altaf Chao transferred to DSU via cart.  Equipment used for transport:  None.  Accompanied by:  Registered Nurse    Patient stable and meets phase 1 discharge criteria for transport from PACU.

## 2018-01-03 NOTE — OR ANESTHESIA
Patient Information     Patient Name MRN Sex Altaf Neves [id number] Male 1934      OR Anesthesia by Ashley Milian MD at 3/14/2017 10:55 AM     Author:  Ashley Milian MD  Service:  (none) Author Type:  PHYS- Anesthesiologist     Filed:  3/14/2017 10:58 AM  Date of Service:  3/14/2017 10:55 AM Status:  Addendum     :  Ashley Milian MD (PHYS- Anesthesiologist)        Related Notes: Original Note by Ashley Milian MD (PHYS- Anesthesiologist) filed at 3/14/2017 10:57 AM                                                           ANESTHESIA ASSESSMENT    Date: 3/14/17 Time: 10:55 AM      Patient:  Altaf Chao    Procedure(s) (LRB):  CIRCUMCISION ADULT (N/A)    Past Medical History      Diagnosis   Date     ESSENTIAL HYPERTENSION  2012     Osteoarthrosis, unspecified whether generalized or localized, pelvic region and thigh       Other and unspecified hyperlipidemia       Pityriasis rosea       Right inguinal hernia  2013     Right Inguinal hernia with incarceration, massive [[id number]][      Tachycardia, unspecified         Past Surgical History       Procedure   Laterality Date     Pr subtalar athroereisis        bone Spurs excision on Toe       Hernia repair  Right 13     RIH with mesh       Genital surgery male   13     Dorsal Slit       Hernia repair  Left 4/15/14     LIH with mesh       Pr total hip arthroplasty  Left 14       Family History       Problem   Relation Age of Onset     Other  Son 12     neuroblastoma at 11 yo  at 14.        Genetic  No Family History      No known FHx of DM, CAD, CVA         Patient Active Problem List     Diagnosis  Code     Pityriasis rosea L42     ESSENTIAL HYPERTENSION I10     History of tobacco abuse Z87.891     H/O bilateral inguinal hernia repair Z98.890, Z87.19     Bilateral leg edema R60.0     Hip stiffness M25.659     Dyslipidemia E78.5     H/O total hip arthroplasty - Left 2014 Z96.649     S/P total  "hip arthroplasty Z96.649     Foreskin problem N47.8     Refusal of statin medication at discharge Z53.29       Prescriptions Prior to Admission       Medication  Sig Dispense Refill     amLODIPine (NORVASC) 5 mg tablet Take 1 tablet by mouth once daily. 90 tablet 3     lisinopril (PRINIVIL; ZESTRIL) 2.5 mg tablet Take 1 tablet by mouth once daily in the evening. 90 tablet 3       Allergies:No Known Allergies    Review of Systems:  GERD: No  Chest pain: No  Shortness of breath: No (Sinus congestion today.)  Recent fever: No  Poor exercise tolerance: No  Bleeding tendency: No  Pregnant: No  Anesthesia Complications: Other  (Urinary retention following repair of incarcerated hernia under GETA + ITN; also had a black eye following same surgery possibly from eye tape.)      History     Smoking Status       Former Smoker      Types: Cigarettes     Quit date: 1976   Smokeless Tobacco       Never Used      Social History     Social History        Marital status:       Spouse name: Lucita     Number of children:  3     Years of education:  N/A     Occupational History       Retired      Social History Main Topics          Smoking status:   Former Smoker      Types:  Cigarettes      Quit date:  1976      Smokeless tobacco:   Never Used      Alcohol use   0.5 oz/week     1 Glasses of wine per week        Comment: occasionally       Drug use:   No      Sexual activity:   Not on file      Other Topics  Concern     Not on file      Social History Narrative      - Wife Lucita.     3 kids - oldest son - neuroblastoma at 11 yo  at 14.     [employer] - Worked in Adult [employer] -- [place] and . [place]           Physical Examination:  Visit Vitals       /72     Pulse 83     Temp 98.6  F (37  C)     Resp 16     Ht 1.791 m (5' 10.5\")     Wt 85.1 kg (187 lb 9.6 oz)     SpO2 95%     BMI 26.54 kg/m2    Body mass index is 26.54 kg/(m^2). Body surface area is 2.06 " meters squared.  Dental Condition: Good (Multiple gold crowns on the back.)    Mallampati Score (Airway): II (Decreased TMD.)  Cardiovascular: Normal  Pulmonary: Normal  Other: N/A    [facility]    No results for input(s): SODIUM, POTASSIUM, CHLORIDE, PO2XYIPV, ANIONGAP, BUN, CREATININE, BUNCREARATIO, CALCIUM, GLUCOSE, GLUCOSEMETER, KETONES, MAGNESIUM, WBC, HGB, HCT, PLT, ABORH, RHTYPE, PREGURINE, BHCGQL, HCGBETAQUANT, INR in the last 72 hours.          Assessment/Plan:  ASA Class: III  Risk of dental injury discussed: Yes  NPO status confirmed: Yes  Anesthetic Plan:  General (Patient prefers GA - LMA okay.)  Risk/Benefit/Alt discussed: Yes  Questions answered: Yes  Emergency Case?: No  Labs/ECG/Radiology Reviewed?: Yes      H&P Reviewed.  Patient Examined.      Provider Electronic Signature:  HOME HOLGUIN MD

## 2018-01-03 NOTE — H&P
Patient Information     Patient Name MRN Sex Altaf Neves [id number] Male 1934      H&P by Bethel Gordon MD at 3/3/2017  8:30 AM     Author:  Bethel Gordon MD Service:  (none) Author Type:  Physician     Filed:  3/3/2017  8:43 AM Encounter Date:  3/3/2017 Status:  Signed     :  Bethel Gordon MD (Physician)            I was asked to see this patient by Valentino Machado MD and provide my opinion about the following:  Phimosis    Type of Visit  Consult    Chief Complaint  Phimosis    HPI  Mr. Chao is a 82 y.o. uncircumcised male who presents with phimosis.  He first noticed problems with his phimosis about 1 year ago.  It does interfere with voiding.  It does cause discomfort.  He has had a recent UTI diagnosed as well as a UTI about 1 year ago.      Past Medical History  He  has a past medical history of ESSENTIAL HYPERTENSION (2012); Osteoarthrosis, unspecified whether generalized or localized, pelvic region and thigh; Other and unspecified hyperlipidemia; Pityriasis rosea; Right inguinal hernia (2013); and Tachycardia, unspecified.  Patient Active Problem List     Diagnosis  Code     Pityriasis rosea L42     ESSENTIAL HYPERTENSION I10     History of tobacco abuse Z87.891     H/O bilateral inguinal hernia repair Z98.890, Z87.19     Bilateral leg edema R60.0     Hip stiffness M25.659     Dyslipidemia E78.5     H/O total hip arthroplasty - Left 2014 Z96.649     S/P total hip arthroplasty Z96.649     Foreskin problem N47.8     Refusal of statin medication at discharge Z53.29       Past Surgical History  He  has a past surgical history that includes subtalar athroereisis; hernia repair (Right, 13); genital surgery male (13); hernia repair (Left, 4/15/14); and total hip arthroplasty (Left, 14).    Medications  He has a current medication list which includes the following prescription(s): amlodipine and lisinopril.    Allergies  No Known Allergies    Social History  He   reports that he quit smoking about 41 years ago. His smoking use included Cigarettes. He has never used smokeless tobacco. He reports that he drinks about 0.5 oz of alcohol per week  He reports that he does not use illicit drugs.  No drug abuse.    Family History  Family History       Problem   Relation Age of Onset     Other  Son 12     neuroblastoma at 11 yo  at 14.        Genetic  No Family History      No known FHx of DM, CAD, CVA         Review of Systems  I personally reviewed the ROS with the patient.    Nursing Notes:   Eula Vides  3/3/2017  8:34 AM  Signed  Here for foreskin problem.  Review of Systems:    Weight loss:    No     Recent fever/chills:  No   Night sweats:   Yes  Current skin rash:  No   Recent hair loss:  No  Heat intolerance:  No   Cold intolerance:  No  Chest pain:   No   Palpitations:   No  Shortness of breath:  No   Wheezing:   No  Constipation:    Yes   Diarrhea:   No   Nausea:   No   Vomiting:   No   Kidney/side pain:  No   Back pain:   No  Frequent headaches:  No   Dizziness:     No  Leg swelling:   No   Calf pain:    No        Parents, brothers or sisters with history of kidney cancer?   No  Parents, brothers or sisters with history of bladder cancer: No    Eual Vides LPN  3/3/2017  8:18 AM          Physical Exam  Vitals:     17 0816   BP: 110/60   Resp: 12   Weight: 85.1 kg (187 lb 9.6 oz)     Constitutional: No acute distress.  Alert and cooperative   Head: NCAT  Eyes: Conjunctivae normal  Cardiovascular: Regular rate and rhythm  Pulmonary/Chest: Respirations are even and non-labored bilaterally, no audible wheezing  CTAB  Abdominal: Soft. No distension, tenderness, masses or guarding.   Neurological: A + O x 3.  Cranial Nerves II-XII grossly intact.  Extremities: SUSI x 4, Warm. No clubbing.  No cyanosis.    Skin: Pink, warm and dry.  No visible rashes noted.  Psychiatric:  Normal mood and affect  Back:  No left CVA tenderness.  No right CVA  tenderness.  Genitourinary  Uncircumcised phallus with tight foreskin, unable to retract  Some areas c/w BXO  Normal pubic hair distribution.  Testicles descended bilaterally.    Labs  CREATININE (mg/dL)    Date Value   02/17/2017 1.19         Recent Labs       02/17/17   0954   COLOR  Yellow   CLARITY  Clear   SPECGRAV  1.015   PHURINE  5.5   UROBILINOGEN  Normal   PROTEINUA  Negative       Recent Labs       02/17/17   0954   GLUCOSEUA  Negative   KETONESUA  Negative   BLOODUA  Small A   NITRITE  Negative   LEUKOCYTE  Negative     Lab Results      Component  Value Date/Time    WBCUA None Seen 02/17/2017 09:54 AM    RBCUA 3-5 (A) 02/17/2017 09:54 AM    BACTERIAUA None Seen 02/17/2017 09:54 AM    EPITHELIALUA None Seen 02/17/2017 09:54 AM       Assessment  Mr. Chao is a 82 y.o. uncircumcised male who presents with symptomatic (UTIs and difficulty voiding) phimosis.    We discussed the surgery and risks in detail today including, but are not limited to, anesthesia risks, infection, bleeding, injury to adjacent structures including the urethra, issues with wound healing, altered penile sensation, appearance of decrease in penile size and need to abstain from sexual activity for 6 weeks to allow complete healing.     Plan  The patient was scheduled and consented for circumcision in the OR under choice anesthesia.

## 2018-01-03 NOTE — TELEPHONE ENCOUNTER
Patient Information     Patient Name MRN Sex Altaf Neves [id number] Male 1934      Telephone Encounter by Hansel Pineda MD at 2017  7:56 AM     Author:  Hansel Pineda MD Service:  (none) Author Type:  Physician     Filed:  2017  7:56 AM Encounter Date:  2017 Status:  Signed     :  Hansel Pineda MD (Physician)            ----- Message from Kee Tavera RN sent at 2017  7:53 AM CST -----  Patient was not started on an antibiotic when seen on 17 for dysuria.  KEE TAVERA RN ....................  2017   7:52 AM

## 2018-01-04 NOTE — PROGRESS NOTES
Patient Information     Patient Name MRN Sex Altaf Neves [id number] Male 1934      Progress Notes by Luis Oswald MD at 3/23/2017 11:30 AM     Author:  Luis Oswald MD Service:  (none) Author Type:  Physician     Filed:  3/24/2017 10:03 AM Encounter Date:  3/23/2017 Status:  Signed     :  Luis Oswald MD (Physician)            SUBJECTIVE:    Altaf Chao is a 82 y.o. male who presents for chest cold    HPI Comments: Patient arrives here for chest cold. It's been going on for about 2 weeks. Associated with some chills. Not eating decrease appetite. Also reports increasing fatigue. 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      No Known Allergies,   Family History       Problem   Relation Age of Onset     Other  Son 12     neuroblastoma at 13 yo  at 14.        Genetic  No Family History      No known FHx of DM, CAD, CVA     ,   Current Outpatient Prescriptions on File Prior to Visit       Medication  Sig Dispense Refill     amLODIPine (NORVASC) 5 mg tablet Take 1 tablet by mouth once daily. 90 tablet 3     lisinopril (PRINIVIL; ZESTRIL) 2.5 mg tablet Take 1 tablet by mouth once daily in the evening. 90 tablet 3     No current facility-administered medications on file prior to visit.    ,   Past Surgical History       Procedure   Laterality Date     Pr subtalar athroereisis        bone Spurs excision on Toe       Hernia repair  Right 13     RIH with mesh       Genital surgery male   13     Dorsal Slit       Hernia repair  Left 4/15/14     LIH with mesh       Pr total hip arthroplasty  Left 14     Circumcision   2017     Dr. Heidi GREENBERG     ,   Social History        Substance Use Topics          Smoking status:   Former Smoker      Types:  Cigarettes      Quit date:  1976      Smokeless tobacco:   Never Used      Alcohol use   0.5 oz/week     1 Glasses of wine per week        Comment: occasionally      and   Social History        Substance  Use Topics          Smoking status:   Former Smoker      Types:  Cigarettes      Quit date:  1/1/1976      Smokeless tobacco:   Never Used      Alcohol use   0.5 oz/week     1 Glasses of wine per week        Comment: occasionally         REVIEW OF SYSTEMS:  ROS    OBJECTIVE:  /64  Pulse 100  Temp 97.6  F (36.4  C) (Temporal)  Wt 85.8 kg (189 lb 3.2 oz)  BMI 26.76 kg/m2    EXAM:   Physical Exam   Constitutional: He is well-developed, well-nourished, and in no distress.   Cardiovascular: Normal rate, regular rhythm and normal heart sounds.    No murmur heard.  Pulmonary/Chest: Effort normal and breath sounds normal. No respiratory distress. He has no wheezes.   Abdominal: Soft.   Musculoskeletal: Normal range of motion.   Neurological: He is alert.     Chest x-ray unremarkable    ASSESSMENT/PLAN:    ICD-10-CM    1. URI, acute J06.9 codeine-guaiFENesin (ROBITUSSIN AC)  mg/5 mL liquid        Plan:  Likely URI. Recommended supportive care. Start Robitussin. If no improvement into next week call consider antibiotic therapy.

## 2018-01-04 NOTE — PROGRESS NOTES
Patient Information     Patient Name MRN Sex     Altaf Chao [id number] Male 1934      Progress Notes by Valentino Machado MD at 2017  9:10 AM     Author:  Valentino Machado MD Service:  (none) Author Type:  Physician     Filed:  2017 11:05 AM Encounter Date:  2017 Status:  Signed     :  Valentino Machado MD (Physician)            Nursing Notes:   Rocio Montelongo  2017  9:09 AM  Signed  Patient is here for follow up anemia, kidney .  Rocio Montelongo LPN ....................2017  8:58 AM    Altaf Chao presents to clinic today for:   Chief Complaint     Patient presents with       Follow Up      anemia , kidneys     HPI: Mr. Chao is a 83 y.o. male who presents today for evaluation of above.     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 Getting rituximab infusion tomorrow.     + appetite is getting better. Albumin is getting better.     Anemia is slightly worse. States that overall he is starting to feel better.  Eating better.  Food is starting to taste good again.    Patient still having metabolic acidosis.   Creatinine is elevated.  Start bicarbonate tablets.    Mr. Chao's Body mass index is 27.84 kg/(m^2). This is within the normal range for a 83 y.o. Normal range for ages 18-64 is between 18.5 and 24.9; normal range for ages 65+ is 23-30.   BP Readings from Last 1 Encounters:17 : 128/80  Mr. Espinoza blood pressure is out of the normal range for adults. Per JNC-8 guidelines normal adult blood pressure is < 120/80, pre-hypertensive is between 120/80 and 139/89, and hypertension is 140/90 or  greater. Risks of hypertension were discussed. Patient's strategy will be increased activity and reduced salt intake    Functional Capacity: about 4 METS.   Reports that he can climb a flight of stairs without any chest pain/heaviness   + stable shortness of breath on exertion noted.   Patient reports no current symptoms of fevers, chills, nausea/vomiting.   No cough. No resting shortness of breath.   No change in bowel/bladder habits. No melena, hematochezia. No Hematuria.   No palpitations.  No orthopnea/paroxysmal nocturnal dyspnea   No vision or hearing issues.   No significant mood issues   No bruising.     ALESSANDRA:  No flowsheet data found.    PHQ9:  PHQ Depression Screening 4/18/2017 4/25/2017   Date of PHQ exam (doc flow) 4/18/2017 -   1. Lack of interest/pleasure 0 - Not at all 0 - Not at all   2. Feeling down/depressed 0 - Not at all 0 - Not at all   PHQ-2 TOTAL SCORE 0 0   3. Trouble sleeping 0 - Not at all -   4. Decreased energy 0 - Not at all -   5. Appetite change 0 - Not at all -   6. Feelings of failure 0 - Not at all -   7. Trouble concentrating 0 - Not at all -   8. Activity level 0 - Not at all -   9. Hurting yourself 0 - Not at all -   PHQ-9 TOTAL SCORE 0 -   PHQ-9 Severity Level none -   Functional Impairment not difficult at all -        I have personally reviewed the past medical history, past surgical history, medications, allergies, family and social history as listed below, on 4/25/2017.    Patient Active Problem List      Diagnosis Date Noted     Anemia 04/25/2017     Metabolic acidosis 04/25/2017     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     Acquired buried penis      Refusal of statin medication at discharge 02/17/2017     S/P total hip arthroplasty 09/18/2014     H/O total hip arthroplasty - Left 6/30/2014 06/30/2014     Dyslipidemia  04/10/2014     Bilateral leg edema 2014     Hip stiffness 2014     H/O bilateral inguinal hernia repair 2013     History of tobacco abuse 08/15/2013     Pityriasis rosea 2012     ESSENTIAL HYPERTENSION 2012     Past Medical History:     Diagnosis  Date     ESSENTIAL HYPERTENSION 2012     Osteoarthrosis, unspecified whether generalized or localized, pelvic region and thigh      Other and unspecified hyperlipidemia      Pityriasis rosea      Right inguinal hernia 2013    Right Inguinal hernia with incarceration, massive [[id number]][      Tachycardia, unspecified      Past Surgical History:      Procedure  Laterality Date     CIRCUMCISION  2017    Dr. Heidi GREENBERG       GENITAL SURGERY MALE  13    Dorsal Slit       HERNIA REPAIR Right 13    RIH with mesh       HERNIA REPAIR Left 4/15/14    LIH with mesh       MS SUBTALAR ATHROEREISIS      bone Spurs excision on Toe       MS TOTAL HIP ARTHROPLASTY Left 14     Current Outpatient Prescriptions       Medication  Sig Dispense Refill     amLODIPine (NORVASC) 5 mg tablet Take 1 tablet by mouth once daily. 90 tablet 3     famotidine (PEPCID) 20 mg tablet Take 20 mg by mouth once daily.  2     lisinopril (PRINIVIL; ZESTRIL) 2.5 mg tablet Take 1 tablet by mouth once daily in the evening. 90 tablet 3     predniSONE (DELTASONE) 20 mg tablet Take 60 mg by mouth once daily.       RENVELA 800 mg tab tablet Take 800 mg by mouth 3 times daily.  1     sodium bicarbonate 650 mg tablet Take 1 tablet by mouth 3 times daily. -- For Kidneys 270 tablet 3     torsemide (DEMADEX) 10 mg tablet Take 1 tablet by mouth EVERY OTHER day, total of 3 tablets - for leg swelling 3 tablet 0     No Known Allergies  Family History       Problem   Relation Age of Onset     Other  Son 12     neuroblastoma at 13 yo  at 14.        Genetic  No Family History      No known FHx of DM, CAD, CVA       Family Status     Relation  Status     Mother   "at age 94     Father      Sister Alive     Brother     Lung CA - 2-3 ppd tobacco      Brother  at age 67    Aneurysm      Brother Alive     Brother Alive     Brother Alive     Child Alive     Child Alive     Son  at age 14    neuroblastoma at 11 yo  at 14.       Son      No Family History      Social History     Social History        Marital status:       Spouse name: Lucita     Number of children:  3     Years of education:  N/A     Occupational History       Retired      Social History Main Topics          Smoking status:   Former Smoker      Types:  Cigarettes      Quit date:  1976      Smokeless tobacco:   Never Used      Alcohol use   0.5 oz/week     1 Glasses of wine per week        Comment: occasionally       Drug use:   No      Sexual activity:   Not on file      Other Topics  Concern     Not on file      Social History Narrative      - Wife Lucita.     3 kids - oldest son - neuroblastoma at 11 yo  at 14.     [employer] [employer] - Worked in [employer] -- [place] and . [employer]           Pertinent ROS was performed and was negative as noted in HPI above.     EXAM:   Vitals:     17 0855   BP: 128/80   Pulse: 82   Weight: 88 kg (194 lb)   Height: 1.778 m (5' 10\")     BP Readings from Last 3 Encounters:    17 128/80   17 138/62   17 148/80     Wt Readings from Last 3 Encounters:    17 88 kg (194 lb)   17 87.3 kg (192 lb 6.4 oz)   17 88.2 kg (194 lb 6 oz)     Estimated body mass index is 27.84 kg/(m^2) as calculated from the following:    Height as of this encounter: 1.778 m (5' 10\").    Weight as of this encounter: 88 kg (194 lb).     EXAM:  Constitutional: well groomed / good hygiene, casual dress -- older than stated age.   Lymphatic Exam: Non-palpable nodes in neck, clavicular regions  Pulmonary: Lungs are clear to auscultation bilaterally, without wheezes or " crackles  Cardiovascular Exam: regular rate and rhythm, 1-2+ diffuse edema present  Gastrointestinal Exam: Obese, Soft, non-tender, non-distended, positive bowel sounds  Integument: multiple small superficial bruises on arms.   Neurologic Exam: CN 3-12 grossly intact   Musculoskeletal Exam: walks with walker. + Very edematous, generalized.   Psychiatric Exam: Awake and Alert, Affect and mood appropriate  Speech is fluent, Thought process is normal    INVESTIGATIONS:  - see below.     Recent Labs         04/24/17   0720  04/18/17   1012  04/02/17   0950   WBC  13.1 H  20.6 H  12.9 H   HGB  7.0 L  7.9 L  7.9 L   HCT  20.4 L  24.0 L  24.1 L   MCV  87  88  87   PLT  203  326  438   INR   --    --   1.4 H       Recent Labs         04/24/17   0720  04/18/17   1012  04/02/17   0950   SODIUM  140  136  130 L   POTASSIUM  5.0  5.0  5.6 H   CHLORIDE  108 H  106  95 L   OI8TBABR  19 L  20 L  17 L   BUN  97 H  109 H  96 H   CREATININE  4.56 H  4.97 H  10.06 HH   GLUCOSE  91  158 H  98   CALCIUM  8.0 L  8.0 L  7.7 L     Recent Labs         04/24/17   0720  04/18/17   1012  04/02/17   0950   PHOSPHORUS   --   2.8   --    ALBUMIN  3.2 L  3.1 L  2.5 L   BILITOTAL  0.5  0.3  0.4   ALT  13  17  10   AST  12 L  14  13   PROTEIN  5.4 L  6.5  6.3 L     Recent Labs       04/02/17   0950   TROPONINI  <0.030   BNP  366 H         ASSESSMENT AND PLAN:  Altaf was seen today for follow up.    Diagnoses and all orders for this visit:    Acute renal failure with other specified pathological lesion in kidney (HC)  -     sodium bicarbonate 650 mg tablet; Take 1 tablet by mouth 3 times daily. -- For Kidneys    Antineutrophil cytoplasmic antibody (ANCA) positive    Primary pauci-immune necrotizing and crescentic glomerulonephritis    Acute crescentic glomerulonephritis    Anemia, unspecified type    Metabolic acidosis  -     sodium bicarbonate 650 mg tablet; Take 1 tablet by mouth 3 times daily. -- For Kidneys      lab results and schedule of  future lab studies reviewed with patient, reviewed diet, exercise and weight control, recommended sodium restriction, cardiovascular risk and specific lipid/LDL goals reviewed    -- Expected clinical course discussed   -- Medications and their side effects discussed    25 minutes spent in face-to-face interaction with patient (separate from separately billed procedures) with greater than 50% spent in counseling and care coordination of listed medical problems.      The ASCVD Risk score (Keiryerica VELEZ Jr, et al., 2013) failed to calculate for the following reasons:    The 2013 ASCVD risk score is only valid for ages 40 to 79    Return in about 6 months (around 10/25/2017).    Patient Instructions     1. Acute renal failure with other specified pathological lesion in kidney (HC)    Blood acid is getting abnormal (dropping your Total CO2 level).   Start:  - sodium bicarbonate 650 mg tablet; Take 1 tablet by mouth 3 times daily. -- For Kidneys  Dispense: 270 tablet; Refill: 3    2. Antineutrophil cytoplasmic antibody (ANCA) positive  3. Primary pauci-immune necrotizing and crescentic glomerulonephritis  4. Acute crescentic glomerulonephritis    5. Anemia, unspecified type    -- Hemoglobin has dropped slightly - but this could be due to dilution, due to all the fluid you are holding in your skin.     6. Metabolic acidosis  - sodium bicarbonate 650 mg tablet; Take 1 tablet by mouth 3 times daily. -- For Kidneys  Dispense: 270 tablet; Refill: 3        Results for orders placed or performed in visit on 04/24/17      CBC W PLT NO DIFF      Result  Value Ref Range    WHITE BLOOD COUNT         13.1 (H) 4.5 - 11.0 thou/cu mm    RED BLOOD COUNT           2.36 (L) 4.30 - 5.90 mil/cu mm    HEMOGLOBIN                7.0 (L) 13.5 - 17.5 g/dL    HEMATOCRIT                20.4 (L) 37.0 - 53.0 %    MCV                       87 80 - 100 fL    MCH                       29.7 26.0 - 34.0 pg    MCHC                      34.3 32.0 - 36.0 g/dL     RDW                       16.6 (H) 11.5 - 15.5 %    PLATELET COUNT            203 140 - 440 thou/cu mm    MPV                       7.4 6.5 - 11.0 fL   COMPLETE METABOLIC PANEL      Result  Value Ref Range    SODIUM 140 133 - 143 mmol/L    POTASSIUM 5.0 3.5 - 5.1 mmol/L    CHLORIDE 108 (H) 98 - 107 mmol/L    CO2,TOTAL 19 (L) 21 - 31 mmol/L    ANION GAP 13 5 - 18                    GLUCOSE 91 70 - 105 mg/dL    CALCIUM 8.0 (L) 8.6 - 10.3 mg/dL    BUN 97 (H) 7 - 25 mg/dL    CREATININE 4.56 (H) 0.70 - 1.30 mg/dL    BUN/CREAT RATIO           21                    GFR if African American 15 (L) >60 ml/min/1.73m2    GFR if not African American 12 (L) >60 ml/min/1.73m2    ALBUMIN 3.2 (L) 3.5 - 5.7 g/dL    PROTEIN,TOTAL 5.4 (L) 6.4 - 8.9 g/dL    GLOBULIN                  2.2 2.0 - 3.7 g/dL    A/G RATIO 1.5 1.0 - 2.0                    BILIRUBIN,TOTAL 0.5 0.3 - 1.0 mg/dL    ALK PHOSPHATASE 68 34 - 104 IU/L    ALT (SGPT) 13 7 - 52 IU/L    AST (SGOT) 12 (L) 13 - 39 IU/L          Return in approximately 6 month(s), or sooner as needed for follow-up with Dr. Machado.    Clinic : [phone]  Appointment line: [phone]      Valentino Machado MD

## 2018-01-04 NOTE — TELEPHONE ENCOUNTER
Patient Information     Patient Name MRN Sex Altaf Neves [id number] Male 1934      Telephone Encounter by Chrissy Dumas at 3/27/2017 11:03 AM     Author:  Chrissy Dumas Service:  (none) Author Type:  (none)     Filed:  3/27/2017 11:06 AM Encounter Date:  3/27/2017 Status:  Signed     :  Chrissy Dumas            Pt was seen on the  and he is not better, still has the chills,cough has phlegm and is wondering about a different medication, was told to call if he is not better.  Chrissy Dumas ....................  3/27/2017   11:05 AM

## 2018-01-04 NOTE — NURSING NOTE
Patient Information     Patient Name MRN Altaf Tsang [id number] Male 1934      Nursing Note by Nicky Smalls at 3/23/2017 11:30 AM     Author:  Nicky Smalls Service:  (none) Author Type:  (none)     Filed:  3/23/2017 11:37 AM Encounter Date:  3/23/2017 Status:  Signed     :  Nicky Smalls            Patient here for chest cold that started after surgery two weeks ago. Cough, chills and fever. Nicky Smalls LPN .......................3/23/2017  11:34 AM

## 2018-01-04 NOTE — NURSING NOTE
Patient Information     Patient Name MRN Sex Altaf Neves [id number] Male 1934      Nursing Note by Rocio Montelongo at 2017  9:10 AM     Author:  Rocio Montelongo Service:  (none) Author Type:  (none)     Filed:  2017  9:09 AM Encounter Date:  2017 Status:  Signed     :  Rocio Montelongo            Patient is here for follow up anemia, kidney .  Rocio Montelongo LPN ....................2017  8:58 AM

## 2018-01-04 NOTE — PATIENT INSTRUCTIONS
Patient Information     Patient Name MRN Sex Altaf Neves [id number] Male 1934      Patient Instructions by Valentino Machado MD at 2017  9:10 AM     Author:  Valentino Machado MD  Service:  (none) Author Type:  Physician     Filed:  2017  9:19 AM  Encounter Date:  2017 Status:  Addendum     :  Valentino Machado MD (Physician)        Related Notes: Original Note by Valentino Machado MD (Physician) filed at 2017  9:18 AM            1. Acute renal failure with other specified pathological lesion in kidney (HC)    Blood acid is getting abnormal (dropping your Total CO2 level).   Start:  - sodium bicarbonate 650 mg tablet; Take 1 tablet by mouth 3 times daily. -- For Kidneys  Dispense: 270 tablet; Refill: 3    2. Antineutrophil cytoplasmic antibody (ANCA) positive  3. Primary pauci-immune necrotizing and crescentic glomerulonephritis  4. Acute crescentic glomerulonephritis    5. Anemia, unspecified type    -- Hemoglobin has dropped slightly - but this could be due to dilution, due to all the fluid you are holding in your skin.     6. Metabolic acidosis  - sodium bicarbonate 650 mg tablet; Take 1 tablet by mouth 3 times daily. -- For Kidneys  Dispense: 270 tablet; Refill: 3        Results for orders placed or performed in visit on 17      CBC W PLT NO DIFF      Result  Value Ref Range    WHITE BLOOD COUNT         13.1 (H) 4.5 - 11.0 thou/cu mm    RED BLOOD COUNT           2.36 (L) 4.30 - 5.90 mil/cu mm    HEMOGLOBIN                7.0 (L) 13.5 - 17.5 g/dL    HEMATOCRIT                20.4 (L) 37.0 - 53.0 %    MCV                       87 80 - 100 fL    MCH                       29.7 26.0 - 34.0 pg    MCHC                      34.3 32.0 - 36.0 g/dL    RDW                       16.6 (H) 11.5 - 15.5 %    PLATELET COUNT            203 140 - 440 thou/cu mm    MPV                       7.4 6.5 - 11.0 fL   COMPLETE METABOLIC PANEL      Result  Value Ref Range    SODIUM 140 133 - 143  mmol/L    POTASSIUM 5.0 3.5 - 5.1 mmol/L    CHLORIDE 108 (H) 98 - 107 mmol/L    CO2,TOTAL 19 (L) 21 - 31 mmol/L    ANION GAP 13 5 - 18                    GLUCOSE 91 70 - 105 mg/dL    CALCIUM 8.0 (L) 8.6 - 10.3 mg/dL    BUN 97 (H) 7 - 25 mg/dL    CREATININE 4.56 (H) 0.70 - 1.30 mg/dL    BUN/CREAT RATIO           21                    GFR if African American 15 (L) >60 ml/min/1.73m2    GFR if not African American 12 (L) >60 ml/min/1.73m2    ALBUMIN 3.2 (L) 3.5 - 5.7 g/dL    PROTEIN,TOTAL 5.4 (L) 6.4 - 8.9 g/dL    GLOBULIN                  2.2 2.0 - 3.7 g/dL    A/G RATIO 1.5 1.0 - 2.0                    BILIRUBIN,TOTAL 0.5 0.3 - 1.0 mg/dL    ALK PHOSPHATASE 68 34 - 104 IU/L    ALT (SGPT) 13 7 - 52 IU/L    AST (SGOT) 12 (L) 13 - 39 IU/L          Return in approximately 6 month(s), or sooner as needed for follow-up with Dr. Machado.    Clinic : [phone]  Appointment line: [phone]

## 2018-01-04 NOTE — PATIENT INSTRUCTIONS
Patient Information     Patient Name MRN Sex Altaf Neves [id number] Male 1934      Patient Instructions by Valentino Machado MD at 2017  9:30 AM     Author:  Valentino Machado MD  Service:  (none) Author Type:  Physician     Filed:  2017 10:32 AM  Encounter Date:  2017 Status:  Addendum     :  Valentino Machado MD (Physician)        Related Notes: Original Note by Valentino Machado MD (Physician) filed at 2017 10:30 AM            Significant Leg swelling noted on examination today.    Breathing is reported as stable.    Leg swelling could be due to your recent severe illness, poor nutrition status, heart failure, kidney failure, or a number of other causes.    Check labs today.    Take torsemide 10 mg tablet --- around lunchtime on , ---  --- and .    Outside Records Requested from [employer].     Use walker regularly.         Low-salt heart healthy diet:    Eat a diet that is low in sodium (salt). Salt increases fluid retention and should be avoided.  Limit salt in your diet to 1,500 milligrams (mg) per day.      - remove the salt shaker, do not have it on the table when you are eating, or in the kitchen when you are cooking     - do not eat seasoned salts, pickles, olives, salted soups and snacks, regular cold cuts and cheeses, regular TV dinners     - learn to read food labels:          - low sodium is 140 mg or less per serving.          - beware of foods with 400 - 600 mg sodium per serving     - make food choices that are considered heart healthy  - eat more fresh fruits and vegetables:       - aim for two or more servings of fruit each day       - eat three or more servings of vegetables each day  - eat whole grains  - eat more foods you make at home  - eat more chicken, fish, and lean pork  - eat less red meat    - bake, grill, or broil meats    - limit fried foods  - eat two to three servings of low-fat or  fat-free dairy foods each day  - use these sparingly, vegetable oil and spray, tub or squeeze margarine, low or non-fat salad dressing    - read labels to avoid trans-fats     Weigh yourself each morning after you urinate but before you eat breakfast.   Keep a record of your weight to show your caregiver.   A sudden weight gain can mean fluid buildup.     Call your Health Care Provider if you have any of the following: (identify yourself as a heart failure patient having the following symptoms):  - trouble breathing or shortness of breath more than usual    - new or worsening shortness of breath or occurring when lying flat  - chest discomfort (not relieved by nitroglycerin)  - swollen feet, ankles and legs more than usual  - fatigue, weakness, loss of energy  - coughing at night or chronic cough  - nausea with abdominal swelling, pain and tenderness      Some patients require extra doses of their water pill / Diuretic if:   - weight gain of  3 pounds in one day (1.4 kilograms)  - weight gain of 5 pounds in one week (2.3 kilograms)      As always, if you have questions or concerns about your health, please call your regular health care provider or your heart failure clinic.    FOR EMERGENCY DIAL 911        Return in approximately 1 week(s), or sooner as needed for follow-up with Dr. Machado.    - Follow-up kidney failure, anemia  - get labs 1 days prior to appointment    Clinic : [phone]  Appointment line: [phone]

## 2018-01-04 NOTE — PROGRESS NOTES
Patient Information     Patient Name MRN Sex Altaf Neves [id number] Male 1934      Progress Notes by Valentino Machado MD at 2017  9:30 AM     Author:  Valentino Machado MD Service:  (none) Author Type:  Physician     Filed:  2017  1:04 PM Encounter Date:  2017 Status:  Signed     :  Valentino Machado MD (Physician)            Nursing Notes:   Angelica Lei  2017  9:49 AM  Signed  Patient presents to the clinic for follow up with hospital stay for kidney failure.  Patient started to have bilateral leg swelling at the beginning of hospital stay on 17.      Angelica Lei LPN        2017 9:21 AM    Altaf Chao presents to clinic today for:   Chief Complaint    Patient presents with      Concerns     HPI: Mr. Chao is a 83 y.o. male who presents today for evaluation of above.     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  He was discharged home.   - Patient appears to be - overall with moderate improvement, still reports significant fatigue, significant leg swelling has developed.  Wife brings patient in today.  - Discharge summary and recommendations for outpatient provider are requested through care everywhere.      Outside hospital records reviewed.  -- Patient needs repeat labs today as well as labs in about 1 week.  -- Need to closely follow renal function, crescentic glomerulonephritis- currently on prednisone and rituximab, watch blood glucose and steroid side effects.     Based on what  "occurred in the visit today:  Previous medication(s) were discontinued or altered? Yes  Previous medication(s) were suspended pending consultation? No  New medication(s) started? Yes    Patient had extensive workup while hospitalized, also started on rituximab treatments.    -- Patient was started on oral Renvela TID -- for hyperphosphatemia.     Patient was noted to have acute tubular necrosis and interstitial nephritis in addition to greater than 50% of his glomeruli being involved by necrotizing and crescentic lesions.    [facility] 4/4/2017 -- IMPRESSION: Successful ultrasound-guided percutaneous random right renal biopsy. Histopathology:   Final Dx Right kidney, ultrasound guided needle core biopsies (includes [facility] pathology consultation):  - Primary diagnosis: ANCA-PR3 glomerulonephritis (PR3 positive/clinical).  - Pattern of injury: Severe necrotizing and crescentic glomerulonephritis.  - Class: Crescentic class.  - Additional findings: Acute tubular necrosis, extensive interstitial nephritis, foci of necrosis and hemorrhage (see comment).  - Electron microscopy pending.        Comments Renal biopsy clinical information sheet and additional clinical features are also reviewed, recent elevation in serum creatinine with mild hematuria and proteinuria, elevated ESR including strong-positive vasculitis screen serology (PR3 IgG 408 [normal less than 100], MPO negative). Preliminary findings similar to above were also discussed earlier with Dr. Dickson Portillo by Dr. Bubba Ricketts on 04/05/2017.   Final diagnosis follows [facility] renal pathology consultation (Dr. MARIANA Sanders), consultation comment follows: \"This renal biopsy shows an aggressive necrotizing and crescentic glomerulonephritis.  More than 50% of the glomeruli are involved by necrotizing and crescentic lesions.  There is also a component of acute tubular injury and interstitial nephritis present.  In addition, a focus of fibrinoid necrosis " "and hemorrhage is also noted in the interstitium.  There is moderately extensive tubular atrophy and interstitial fibrosis present.          Walking with walker. Patient is concerned about his balance and strength.    2-3+ Pitting generalized edema. Using walker. States that his legs are very weak and \"rubbery\".  Overall feels that his strength is down.    Now on prednisone, Rituxan. -- Next IV rituximab dose to be scheduled on 4/24/2017.    -- Also on Renvela -- due to his renal failure and hyperphosphatemia.    Per hospital records, patient was  Noted by physical therapy to have ADL impairment score of 32.79% impaired.    Patient was diagnosed with Pseudomonas UTI.  Sent home on ciprofloxacin.   --> Due to high dose prednisone, started on antibiotic prophylaxis with Bactrim DS, 3 times weekly.    Started on famotidine for heartburn.    Started on prednisone andSevelamer carbonate 800 mg tablet 3 times daily.    Lisinopril was discontinued.    Amlodipine/Norvasc was continued at 5 mg daily.    Low Albumin -- concerned about poor nutrition as a confounding factor to his edema.  Patient states he has been eating pickles, as well as some other salty foods.  We talked today about dietary changes and restrictions and need for sodium avoidance.    Mr. Chao's Body mass index is 27.5 kg/(m^2). This is within the normal range for a 83 y.o. Normal range for ages 18-64 is between 18.5 and 24.9; normal range for ages 65+ is 23-30.   BP Readings from Last 1 Encounters:04/18/17 : 148/80  Mr. Russells blood pressure is out of the normal range for adults. Per JNC-8 guidelines normal adult blood pressure is < 120/80, pre-hypertensive is between 120/80 and 139/89, and hypertension is 140/90 or greater. Risks of hypertension were discussed. Patient's strategy will be reduced salt intake    Functional Capacity: about 4 METS.    Denies exertional chest pain/heaviness or shortness of breath.   Patient reports no current symptoms of " fevers, chills, nausea/vomiting.   No cough. No shortness of breath.   No change in bowel/bladder habits. No melena, hematochezia. No Hematuria.   No rashes. No palpitations.  No orthopnea/paroxysmal nocturnal dyspnea   No vision or hearing issues.   No significant mood issues   No bruising.     ALESSANDRA:  No flowsheet data found.    PHQ9:  PHQ Depression Screening 3/23/2017 4/18/2017   Date of PHQ exam (doc flow) 3/23/2017 4/18/2017   1. Lack of interest/pleasure 0 - Not at all 0 - Not at all   2. Feeling down/depressed 0 - Not at all 0 - Not at all   PHQ-2 TOTAL SCORE 0 0   3. Trouble sleeping - 0 - Not at all   4. Decreased energy - 0 - Not at all   5. Appetite change - 0 - Not at all   6. Feelings of failure - 0 - Not at all   7. Trouble concentrating - 0 - Not at all   8. Activity level - 0 - Not at all   9. Hurting yourself - 0 - Not at all   PHQ-9 TOTAL SCORE - 0   PHQ-9 Severity Level - none   Functional Impairment - not difficult at all        I have personally reviewed the past medical history, past surgical history, medications, allergies, family and social history as listed below, on 4/18/2017.    Patient Active Problem List      Diagnosis Date Noted     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     Acquired buried penis      Foreskin problem 02/17/2017     Refusal of statin medication at discharge 02/17/2017     S/P total hip arthroplasty 09/18/2014     H/O total hip arthroplasty - Left 6/30/2014 06/30/2014     Dyslipidemia 04/10/2014     Bilateral leg edema 01/08/2014     Hip stiffness 01/08/2014     H/O bilateral inguinal hernia repair 09/04/2013     History of tobacco abuse 08/15/2013     Pityriasis rosea 08/07/2012     ESSENTIAL HYPERTENSION 08/07/2012     Past Medical History:     Diagnosis  Date     ESSENTIAL HYPERTENSION 8/7/2012     Osteoarthrosis,  unspecified whether generalized or localized, pelvic region and thigh      Other and unspecified hyperlipidemia      Pityriasis rosea      Right inguinal hernia 2013    Right Inguinal hernia with incarceration, massive [[id number]][      Tachycardia, unspecified      Past Surgical History:      Procedure  Laterality Date     CIRCUMCISION  2017    Dr. Gordon - [facility]       GENITAL SURGERY MALE  13    Dorsal Slit       HERNIA REPAIR Right 13    RIH with mesh       HERNIA REPAIR Left 4/15/14    LIH with mesh       KS SUBTALAR ATHROEREISIS      bone Spurs excision on Toe       KS TOTAL HIP ARTHROPLASTY Left 14     Current Outpatient Prescriptions       Medication  Sig Dispense Refill     amLODIPine (NORVASC) 5 mg tablet Take 1 tablet by mouth once daily. 90 tablet 3     famotidine (PEPCID) 20 mg tablet Take 20 mg by mouth once daily.  2     lisinopril (PRINIVIL; ZESTRIL) 2.5 mg tablet Take 1 tablet by mouth once daily in the evening. 90 tablet 3     predniSONE (DELTASONE) 20 mg tablet Take 60 mg by mouth once daily.       RENVELA 800 mg tab tablet Take 800 mg by mouth 3 times daily.  1     torsemide (DEMADEX) 10 mg tablet Take 1 tablet by mouth EVERY OTHER day, total of 3 tablets - for leg swelling 3 tablet 0     trimethoprim-sulfamethoxazole, 160-800 mg, (BACTRIM DS, SEPTRA DS) tablet Take 1 tablet by mouth. Daily on Mon, Wed, Fri       No Known Allergies  Family History       Problem   Relation Age of Onset     Other  Son 12     neuroblastoma at 13 yo  at 14.        Genetic  No Family History      No known FHx of DM, CAD, CVA       Family Status     Relation  Status     Mother  at age 94     Father      Sister Alive     Brother     Lung CA - 2-3 ppd tobacco      Brother  at age 67    Aneurysm      Brother Alive     Brother Alive     Brother Alive     Child Alive     Child Alive     Son  at age 14    neuroblastoma at 13 yo  at 14.       Son      No  "Family History      Social History     Social History        Marital status:       Spouse name: Lucita     Number of children:  3     Years of education:  N/A     Occupational History       Retired      Social History Main Topics          Smoking status:   Former Smoker      Types:  Cigarettes      Quit date:  1976      Smokeless tobacco:   Never Used      Alcohol use   0.5 oz/week     1 Glasses of wine per week        Comment: occasionally       Drug use:   No      Sexual activity:   Not on file      Other Topics  Concern     Not on file      Social History Narrative      - Wife Lucita.     3 kids - oldest son - neuroblastoma at 11 yo  at 14.     [employer] - Worked in [employer] -- [place] and . D           Complete ROS was performed and was negative unless otherwise noted in HPI above.     EXAM:   Vitals:     17 0925   BP: 148/80   Pulse: 84   Temp: 96.6  F (35.9  C)   TempSrc: Tympanic   Weight: 88.2 kg (194 lb 6 oz)   Height: 1.791 m (5' 10.5\")     BP Readings from Last 3 Encounters:    17 148/80   17 135/73   17 126/64     Wt Readings from Last 3 Encounters:    17 88.2 kg (194 lb 6 oz)   17 85.7 kg (189 lb)   17 85.8 kg (189 lb 3.2 oz)     Estimated body mass index is 27.5 kg/(m^2) as calculated from the following:    Height as of this encounter: 1.791 m (5' 10.5\").    Weight as of this encounter: 88.2 kg (194 lb 6 oz).     EXAM:  Constitutional: well groomed / good hygiene, casual dress, somewhat pale and ill-appearing.    ENT: Normocephalic, Atraumatic, Thyroid without nodules or tenderness   Nose/Mouth: Oral pharynx without erythema or exudates and Nose is patent bilaterally, no rhinorrhea   Eyes:  Extraocular muscles intact, Sclera non-icteric, Conjunctiva without erythema  Lymphatic Exam: Non-palpable nodes in neck, clavicular regions  Pulmonary: Lungs are clear to auscultation bilaterally, " without wheezes or crackles  Cardiovascular Exam: regular rate and rhythm, 2-3+ generalized pitting edema present  Gastrointestinal Exam: Soft, non-tender, non-distended, positive bowel sounds  Integument: Wound dressing and bandages on his right elbow.  These are clean dry and intact and are not removed today.    Neurologic Exam: CN 3-12 grossly intact   Musculoskeletal Exam: Significant edema.  Walks with walker.    Psychiatric Exam: Awake and Alert, Affect and mood appropriate  Speech is fluent, Thought process is normal    INVESTIGATIONS:  Hospital records requested and reviewed.  Pathology report reviewed with patient.    LABS:   Results for orders placed or performed in visit on 04/18/17      COMPLETE METABOLIC PANEL      Result  Value Ref Range    SODIUM 136 133 - 143 mmol/L    POTASSIUM 5.0 3.5 - 5.1 mmol/L    CHLORIDE 106 98 - 107 mmol/L    CO2,TOTAL 20 (L) 21 - 31 mmol/L    ANION GAP 10 5 - 18                    GLUCOSE 158 (H) 70 - 105 mg/dL    CALCIUM 8.0 (L) 8.6 - 10.3 mg/dL     (H) 7 - 25 mg/dL    CREATININE 4.97 (H) 0.70 - 1.30 mg/dL    BUN/CREAT RATIO           22                    GFR if African American 14 (L) >60 ml/min/1.73m2    GFR if not African American 11 (L) >60 ml/min/1.73m2    ALBUMIN 3.1 (L) 3.5 - 5.7 g/dL    PROTEIN,TOTAL 6.5 6.4 - 8.9 g/dL    GLOBULIN                  3.4 2.0 - 3.7 g/dL    A/G RATIO 0.9 (L) 1.0 - 2.0                    BILIRUBIN,TOTAL 0.3 0.3 - 1.0 mg/dL    ALK PHOSPHATASE 68 34 - 104 IU/L    ALT (SGPT) 17 7 - 52 IU/L    AST (SGOT) 14 13 - 39 IU/L   PHOSPHORUS      Result  Value Ref Range    PHOSPHORUS 2.8 2.5 - 5.0 mg/dL   CBC WITH AUTO DIFFERENTIAL      Result  Value Ref Range    WHITE BLOOD COUNT         20.6 (H) 4.5 - 11.0 thou/cu mm    RED BLOOD COUNT           2.73 (L) 4.30 - 5.90 mil/cu mm    HEMOGLOBIN                7.9 (L) 13.5 - 17.5 g/dL    HEMATOCRIT                24.0 (L) 37.0 - 53.0 %    MCV                       88 80 - 100 fL    MCH                        28.9 26.0 - 34.0 pg    MCHC                      33.0 32.0 - 36.0 g/dL    RDW                       15.0 11.5 - 15.5 %    PLATELET COUNT            326 140 - 440 thou/cu mm    MPV                       7.2 6.5 - 11.0 fL   MANUAL DIFFERENTIAL      Result  Value Ref Range    ADJUSTED WBC 20.6 thou/cu mm    NEUTROPHILS RELATIVE 92.0 (H) 42.0 - 72.0 %    LYMPHOCYTES RELATIVE 4.0 (L) 20.0 - 44.0 %    MONOCYTES RELATIVE 3.0 <12.0 %    EOSINOPHILS RELATIVE 1.0 <8.0 %    LYMPHOCYTES ABSOLUTE 0.8 (L) 0.9 - 2.9 thou/cu mm    MONOCYTES ABSOLUTE 0.6 <0.9 thou/cu mm    EOSINOPHILS ABSOLUTE 0.2 <0.5 thou/cu mm    NRBC                      0.0 %    TOTAL COUNTED 100     MANUAL NRBC  CELLS 0.00 /100 CELLS    POIKILOCYTOSIS 2+     NEUTROPHILS ABSOLUTE 19.0 (H) 1.7 - 7.0 thou/cu mm       ASSESSMENT AND PLAN:  Altaf was seen today for concerns.    Diagnoses and all orders for this visit:    Hospital discharge follow-up    Primary pauci-immune necrotizing and crescentic glomerulonephritis    Antineutrophil cytoplasmic antibody (ANCA) positive    Acute crescentic glomerulonephritis  -     COMPLETE METABOLIC PANEL; Future  -     COMPLETE METABOLIC PANEL    Acute renal failure, unspecified acute renal failure type  -     COMPLETE METABOLIC PANEL; Future  -     PHOSPHORUS; Future  -     COMPLETE METABOLIC PANEL  -     PHOSPHORUS  -     COMPLETE METABOLIC PANEL; Future    Unspecified deficiency anemia  -     CBC WITH DIFFERENTIAL; Future  -     CBC WITH DIFFERENTIAL  -     CBC WITH AUTO DIFFERENTIAL  -     CBC W PLT NO DIFF; Future  -     MANUAL DIFFERENTIAL; Future  -     MANUAL DIFFERENTIAL    Pseudomonas urinary tract infection    Bilateral leg edema  -     torsemide (DEMADEX) 10 mg tablet; Take 1 tablet by mouth EVERY OTHER day, total of 3 tablets - for leg swelling      lab results and schedule of future lab studies reviewed with patient, reviewed diet, exercise and weight control, recommended sodium restriction,  cardiovascular risk and specific lipid/LDL goals reviewed    -- Expected clinical course discussed   -- Medications and their side effects discussed    40 minutes spent in face-to-face interaction with patient (separate from separately billed procedures) with greater than 50% spent in counseling and care coordination of listed medical problems.      Return in about 8 days (around 4/26/2017) for - Follow-up kidney failure, anemia.    Patient Instructions   Significant Leg swelling noted on examination today.    Breathing is reported as stable.    Leg swelling could be due to your recent severe illness, poor nutrition status, heart failure, kidney failure, or a number of other causes.    Check labs today.    Take torsemide 10 mg tablet --- around lunchtime on Tuesday, April 18, --- Thursday, April 20 --- and Saturday, April 22.    Outside Records Requested from [facility].     Use walker regularly.         Low-salt heart healthy diet:    Eat a diet that is low in sodium (salt). Salt increases fluid retention and should be avoided.  Limit salt in your diet to 1,500 milligrams (mg) per day.      - remove the salt shaker, do not have it on the table when you are eating, or in the kitchen when you are cooking     - do not eat seasoned salts, pickles, olives, salted soups and snacks, regular cold cuts and cheeses, regular TV dinners     - learn to read food labels:          - low sodium is 140 mg or less per serving.          - beware of foods with 400 - 600 mg sodium per serving     - make food choices that are considered heart healthy  - eat more fresh fruits and vegetables:       - aim for two or more servings of fruit each day       - eat three or more servings of vegetables each day  - eat whole grains  - eat more foods you make at home  - eat more chicken, fish, and lean pork  - eat less red meat    - bake, grill, or broil meats    - limit fried foods  - eat two to three servings of low-fat or fat-free dairy  foods each day  - use these sparingly, vegetable oil and spray, tub or squeeze margarine, low or non-fat salad dressing    - read labels to avoid trans-fats     Weigh yourself each morning after you urinate but before you eat breakfast.   Keep a record of your weight to show your caregiver.   A sudden weight gain can mean fluid buildup.     Call your Health Care Provider if you have any of the following: (identify yourself as a heart failure patient having the following symptoms):  - trouble breathing or shortness of breath more than usual    - new or worsening shortness of breath or occurring when lying flat  - chest discomfort (not relieved by nitroglycerin)  - swollen feet, ankles and legs more than usual  - fatigue, weakness, loss of energy  - coughing at night or chronic cough  - nausea with abdominal swelling, pain and tenderness      Some patients require extra doses of their water pill / Diuretic if:   - weight gain of  3 pounds in one day (1.4 kilograms)  - weight gain of 5 pounds in one week (2.3 kilograms)      As always, if you have questions or concerns about your health, please call your regular health care provider or your heart failure clinic.    FOR EMERGENCY DIAL 911        Return in approximately 1 week(s), or sooner as needed for follow-up with Dr. Machado.    - Follow-up kidney failure, anemia  - get labs 1 days prior to appointment    Clinic : [phone]  Appointment line: [phone]      Valentino Machado MD

## 2018-01-04 NOTE — PROGRESS NOTES
Patient Information     Patient Name MRN Sex Altaf Neves [id number] Male 1934      Progress Notes by Moraima Jauregui RN at 2017  2:15 PM     Author:  Moraima Jauregui RN  Service:  (none) Author Type:  NURS- Registered Nurse     Filed:  2017  2:51 PM  Date of Service:  2017  2:15 PM Status:  Addendum     :  Moraima Jauregui RN (NURS- Registered Nurse)        Related Notes: Original Note by Moraima Jauregui RN (NURS- Registered Nurse) filed at 2017  2:40 PM            Pt here for Dose 1 of Rituxan.  No labs indicated nor ordered.  IV inserted, flushed, and infusing without incident.   Pt tolerated infusion without any incident.  No planned scheduled return at this time.  Pt is scheduled to meet with his nephrologist Misael in the near future.  Pt left ambulatory with wife at his side.

## 2018-01-04 NOTE — NURSING NOTE
Patient Information     Patient Name MRN Sex Altaf Neves [id number] Male 1934      Nursing Note by Andreina Peters RN at 2017  9:00 AM     Author:  Andreina Peters RN Service:  (none) Author Type:  NURS- Registered Nurse     Filed:  2017  9:06 AM Encounter Date:  2017 Status:  Signed     :  Andreina Peters RN (NURS- Registered Nurse)            Review of Systems:    Weight loss:    Yes     Recent fever/chills:  No   Night sweats:   No  Current skin rash:  No   Recent hair loss:  No  Heat intolerance:  No   Cold intolerance:  Yes  Chest pain:   No   Palpitations:   No  Shortness of breath:  No   Wheezing:   No  Constipation:    No   Diarrhea:   No   Nausea:   No   Vomiting:   No   Kidney/side pain:  No   Back pain:   No  Frequent headaches:  No   Dizziness:     No  Leg swelling:   Yes   Calf pain:    No

## 2018-01-04 NOTE — NURSING NOTE
Patient Information     Patient Name MRN Sex Altaf Neves [id number] Male 1934      Nursing Note by Angelica Lei at 2017  9:30 AM     Author:  Angelica Lei Service:  (none) Author Type:  (none)     Filed:  2017  9:49 AM Encounter Date:  2017 Status:  Signed     :  Angelica Lei            Patient presents to the clinic for follow up with hospital stay for kidney failure.  Patient started to have bilateral leg swelling at the beginning of hospital stay on 17.      Angelica Lei LPN        2017 9:21 AM

## 2018-01-04 NOTE — PROGRESS NOTES
Patient Information     Patient Name MRN Sex     Altaf Chao [id number] Male 1934      Progress Notes by Bethel Gordon MD at 2017  9:00 AM     Author:  Bethel Gordon MD Service:  (none) Author Type:  Physician     Filed:  2017 11:33 AM Encounter Date:  2017 Status:  Signed     :  Bethel Gordon MD (Physician)            Type of Visit  Established    Chief Complaint  Buried penis  Hematuria    HPI  Mr. Chao is a 83 y.o. male follows up with recent procedure for buried penis.  He underwent dorsal slit in .  I saw him with complaints of difficulty voiding.  On exam it was clear that he get a buried penis which complicated his voiding.  He underwent repair of this area 6 weeks ago.  He is voiding much better today and is very happy with the improvement.    He now complains of gross hematuria in the last 2 weeks.  He was an inpatient in [place] and diagnosed with glomerulonephritis.  He has not had a cystoscopy.  The bleeding persisted and included passing clots.  He is being managed for the renal failure associated with this new diagnosis.      Review of Systems  I reviewed the ROS the patient today.    Nursing Notes:   Andreina Peters RN  2017  9:06 AM  Signed  Review of Systems:    Weight loss:    Yes     Recent fever/chills:  No   Night sweats:   No  Current skin rash:  No   Recent hair loss:  No  Heat intolerance:  No   Cold intolerance:  Yes  Chest pain:   No   Palpitations:   No  Shortness of breath:  No   Wheezing:   No  Constipation:    No   Diarrhea:   No   Nausea:   No   Vomiting:   No   Kidney/side pain:  No   Back pain:   No  Frequent headaches:  No   Dizziness:     No  Leg swelling:   Yes   Calf pain:    No    Family History  Family History       Problem   Relation Age of Onset     Other  Son 12     neuroblastoma at 13 yo  at 14.        Genetic  No Family History      No known FHx of DM, CAD, CVA         Physical Exam  Vitals:     17 0904   BP: 138/62  "  Pulse: 84   Resp: 12   Weight: 87.3 kg (192 lb 6.4 oz)   Height: 1.803 m (5' 11\")   Constitutional: NAD, WDWN.  Cardiovascular: Regular rate.  Pulmonary/Chest: Respirations are even and non-labored bilaterally.  Abdominal: Soft. No distension, tenderness, masses or guarding. No CVA tenderness.  Extremities: SUSI x 4, Warm. No clubbing.  No cyanosis.    Skin: Pink, warm and dry.  No rashes noted.  Genitourinary: nonpalpable bladder    Labs  CREATININE (mg/dL)    Date Value   04/24/2017 4.56 (H)       Radiographic Studies  4/2/2017  CT Stone  IMPRESSION:       1.  Gates catheter in the bladder. Asymmetric bowel thickening in the bladder.   Recommend further evaluation for a bladder infection or neoplasm if clinically   indicated.    2.  Diverticulosis of the rectosigmoid. Mild adjacent inflammatory changes may   represent mild diverticulitis in the appropriate clinical setting. No signs of   perforation, abscess, or bleeding     Assessment  Mr. Chao is a 83 y.o. male follows up with recent buried penis repair who has healed well.  He is able to void better now after the procedure.  He does have acute renal failure from path diagnosed glomerulonephritis.  He has had persistent gross hematuria with clots in this last week. Which is most likely due to his glomerulonephritis    Plan  Cysto and PVR at next visit given the hematuria.        "

## 2018-01-05 NOTE — TELEPHONE ENCOUNTER
Patient Information     Patient Name MRN Sex Altaf Neves [id number] Male 1934      Telephone Encounter by Ruma Orozco at 2017  3:04 PM     Author:  Ruma Orozco Service:  (none) Author Type:  (none)     Filed:  2017  3:06 PM Encounter Date:  2017 Status:  Signed     :  Ruma Orozco            Per M/S/P nurse - patient desperately needs a hospital f/u next week.

## 2018-01-05 NOTE — TELEPHONE ENCOUNTER
Patient Information     Patient Name MRN Sex Altaf Neves [id number] Male 1934      Telephone Encounter by Elicia Tapia NP at 2017 10:07 AM     Author:  Elicia Tapia NP Service:  (none) Author Type:  PHYS- Nurse Practitioner     Filed:  2017 10:13 AM Encounter Date:  2017 Status:  Signed     :  Elicia Tapia NP (PHYS- Nurse Practitioner)            Spoke with patient re: blood sugars.     Continues to take Lantus 3 units and Novolog sliding scale.     Fastin-123  PreLunch: 110-143, one 221  PreSupper: 171-279    Plan:  1. Decrease Lantus 2 units  2. Continue Novolog sliding scale  3. Schedule Novolog 1 unit with lunch if blood sugar 100 or higher to help lower blood sugars by supper.     Pt did not add Novolog to breakfast and now lunch blood sugars are improved. Continue to watch closely.     Elicia Tapia NP ....................  2017   10:13 AM

## 2018-01-05 NOTE — PATIENT INSTRUCTIONS
Patient Information     Patient Name MRN Altaf Tsang [id number] Male 1934      Patient Instructions by Andreina Peters RN at 2017 10:00 AM     Author:  Andreina Peters RN Service:  (none) Author Type:  NURS- Registered Nurse     Filed:  2017 10:04 AM Encounter Date:  2017 Status:  Signed     :  Andreina Peters RN (NURS- Registered Nurse)            Home Care after Cystoscopy  Follow these guidelines for your care after your procedure.    Activity  No limitations    Bathing or showering  No limitations    Symptoms  You may notice some burning with urination but this usually resolves after 1-2 days.  You may also notice small amounts of blood in your urine.  Please increase water intake for the next few days to help with these symptoms.    Contacts  General Questions: [phone]  Appointments:  [phone]  Emergencies:  [phone]    When to call the clinic  If you develop any of the following symptoms please call the clinic immediately.  If the clinic is closed please be seen at an urgent care clinic or the Emergency Department.  - Burning with urination that worsens after 2 days  - Unable to urinate causing severe pelvic pain  - Fevers of greater than 101 degrees F  - Flank pain that is not responding to pain medication    Follow up  Please follow up as discussed at the appointment.

## 2018-01-05 NOTE — PROGRESS NOTES
Patient Information     Patient Name MRN Sex Altaf Neves [id number] Male 1934      Progress Notes by Bethel Gordon MD at 2017 10:00 AM     Author:  Bethel Gordon MD Service:  (none) Author Type:  Physician     Filed:  2017 10:33 AM Encounter Date:  2017 Status:  Signed     :  Bethel Gordon MD (Physician)            Preprocedure diagnosis  Hematuria    Postprocedure diagnosis  Hematuria  debris and bladder    Procedure  Flexible Cystourethroscopy and bladder barbotage    Surgeon  Bethel Gordon MD    Anesthesia  2% lidocaine jelly intraurethrally    Complications  None    Indications  83 y.o. male undergoing a flexible cystoscopy for the above mentioned indications.    Findings  Cystoscopic findings included a normal anterior urethra.    There was not a prominent median lobe.    The lateral lobes were obstructive in appearance.  The bladder appeared to be normal capacity.    There were no tumors, stones or foreign bodies.    there was a large amount of dependent debris as well as trabeculation and small diverticula throughout the bladder.  The orifices were slit-shaped and in their normal location.    Procedure  The patient was placed in supine position and prepped and draped in sterile fashion with lidocaine jelly per urethra for anesthesia.    I passed a lubricated 14F flexible cystoscope through the penile urethra and into the bladder.  I hand irrigated over 500 mL of saline in the bladder in order to remove a large amount of debris making visualization difficult.  Once this was completed the bladder was completely visualized.  The cystoscope was retroflexed and the bladder neck and prostate visualized.    The cystoscope was slowly withdrawn while visualizing the urethra and the procedure terminated.    The patient tolerated the procedure well.      Post-Void Residual  A post-void residual was measured by ultrasonic bladder scanner.  199 mL    Plan  follow up in 1 year with a  PVR

## 2018-01-05 NOTE — PATIENT INSTRUCTIONS
Patient Information     Patient Name MRN Sex Altaf Neves [id number] Male 1934      Patient Instructions by Valentino Machado MD at 2017  1:40 PM     Author:  Valentino Machado MD  Service:  (none) Author Type:  Physician     Filed:  2017  2:38 PM  Encounter Date:  2017 Status:  Addendum     :  Valentino Machado MD (Physician)        Related Notes: Original Note by Valentino Machado MD (Physician) filed at 2017  2:38 PM            Labs yesterday showed kidney function has improved slightly, previously creatinine was 5.1 now it is down to 4.4.  Random blood sugar was quite high at 200.  Your albumin level which is a generic measurement of nutrition has been improving.    White blood cell count is normal.  Hemoglobin has improved previously as well as 6.7, now up to 10.6.    Continue antibiotics until gone.     Chest x-ray today.    --> Eat what you want -- use more insulin to cover the food.     Return in approximately 3 month(s), or sooner as needed for follow-up with Dr. Machado.  -- follow-up breathing    Clinic : [phone]  Appointment line: [phone]

## 2018-01-05 NOTE — PROGRESS NOTES
Patient Information     Patient Name MRN Sex     Altaf Chao [id number] Male 1934      Progress Notes by Valentino Machado MD at 2017  1:40 PM     Author:  Valentino Machado MD Service:  (none) Author Type:  Physician     Filed:  5/10/2017  1:27 PM Encounter Date:  2017 Status:  Signed     :  Valentino Machado MD (Physician)            Nursing Notes:   Angelica Lei  2017  2:30 PM  Signed  Patient presents to the clinic for hospital follow up with diabetes and kidney complications.    Angelica Lei LPN        2017 1:46 PM    Altaf Chao presents to clinic today for:   Chief Complaint    Patient presents with      Hospital F/U     HPI: Mr. Chao is a 83 y.o. male who presents today for evaluation of above.     (Z09) Hospital discharge follow-up  (primary encounter diagnosis)  (J18.9) Pneumonia due to infectious organism, unspecified laterality, unspecified part of lung  (R73.9) Steroid-induced hyperglycemia     - Patient was recently hospitalized at [facility] from 2017 until 2017.  He was hospitalized with pneumonia.  Also has stage V chronic kidney disease, recently diagnosed with glomerulonephritis.  Following with nephrology.  Needs labs and chest x-ray rechecked today.  These are ordered.  - Patient appears to be doing well since discharge - reports his energy is slowly improving.  His edema is quite generalized but is also reportedly improving slightly.  States that he is urinating well.  - Discharge summary and recommendations for outpatient provider are reviewed.  -- Home Care - not indicated.    Based on what occurred in the visit today:  Previous medication(s) were discontinued or altered? No  Previous medication(s) were suspended pending consultation? No  New medication(s) started? No    Patient was discharged on Ceftin 500 mg daily, renal dosing.  Blood culture times one can back positive for Escherichia coli.  He was initially treated with  cefepime.    Patient is on high-dose steroids due to his glomerulonephritis.  Patient has had some significant anemia recently.  He has required several units of blood transfusions.   HEMOGLOBIN                (g/dL)    Date Value   05/09/2017 10.6 (L)      Due to his steroid-induced hyperglycemia, currently taking insulin.  He just saw diabetic education today, he is frustrated now that he needs to change his diet.  Advised patient he does not have diabetes prior to the onset of his high-dose steroids and that his sugars are high due to the steroids.  Advised to increase his insulin dosing to cover whatever foods he wants to eat.  He states he is 83 and he has lived a good life and he understands life will not go on forever.  He wants to continue to eat whatever foods he wants in moderation.  Advised that this is probably just fine.     Chest x-ray today shows that his pleural effusions have improved.      Mr. Russells Body mass index is 29.13 kg/(m^2). This is out of the normal range for a 83 y.o. Normal range for ages 18+ is between 18.5 and 24.9. To lose weight we reviewed risks and benefits of appropriate options such as diet, exercise, and medications. Patient's strategy will be  self-directed nutrition plan and self-directed exercise program   BP Readings from Last 1 Encounters:05/09/17 : 142/66  Mr. Espinoza blood pressure is out of the normal range for adults. Per JNC-8 guidelines normal adult blood pressure is < 120/80, pre-hypertensive is between 120/80 and 139/89, and hypertension is 140/90 or greater. Risks of hypertension were discussed. Patient's strategy will be weight loss, increased activity and reduced salt intake    Functional Capacity: about 4 METS. - using walker. + shortness of breath on exertion noted.   Patient reports no current symptoms of fevers, chills, nausea/vomiting.   No cough. No resting shortness of breath.   No change in bowel/bladder habits. No melena, hematochezia. No Hematuria.    No rashes. No palpitations.  No orthopnea/paroxysmal nocturnal dyspnea   No vision or hearing issues.   No significant mood issues   + Easy bruising.     ALESSANDRA:  No flowsheet data found.    PHQ9:  PHQ Depression Screening 4/25/2017 5/9/2017   Date of PHQ exam (doc flow) - 5/9/2017   1. Lack of interest/pleasure 0 - Not at all 0 - Not at all   2. Feeling down/depressed 0 - Not at all 0 - Not at all   PHQ-2 TOTAL SCORE 0 0   3. Trouble sleeping - 0 - Not at all   4. Decreased energy - 0 - Not at all   5. Appetite change - 0 - Not at all   6. Feelings of failure - 0 - Not at all   7. Trouble concentrating - 0 - Not at all   8. Activity level - 0 - Not at all   9. Hurting yourself - 0 - Not at all   PHQ-9 TOTAL SCORE - 0   PHQ-9 Severity Level - none   Functional Impairment - not difficult at all        I have personally reviewed the past medical history, past surgical history, medications, allergies, family and social history as listed below, on 5/9/2017.    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     Acquired buried penis      Refusal of statin medication at discharge 02/17/2017     S/P total hip arthroplasty 09/18/2014     H/O total hip arthroplasty - Left 6/30/2014 06/30/2014     Dyslipidemia 04/10/2014     Bilateral leg edema 01/08/2014     Hip stiffness 01/08/2014     H/O bilateral inguinal hernia repair 09/04/2013     History of  tobacco abuse 08/15/2013     Pityriasis rosea 08/07/2012     Essential hypertension 08/07/2012     Past Medical History:     Diagnosis  Date     Acute renal failure (ARF) () 04/2017     ESSENTIAL HYPERTENSION 8/7/2012     Osteoarthrosis, unspecified whether generalized or localized, pelvic region and thigh      Other and unspecified hyperlipidemia      Pityriasis rosea      Right inguinal hernia 8/12/2013    Right Inguinal hernia with incarceration, massive [[id number]][      Tachycardia, unspecified      Past Surgical History:      Procedure  Laterality Date     CIRCUMCISION  03/14/2017    Dr. Heidi GREENBERG       GENITAL SURGERY MALE  8/20/13    Dorsal Slit       HERNIA REPAIR Right 8/20/13    RIH with mesh       HERNIA REPAIR Left 4/15/14    LIH with mesh       NV SUBTALAR ATHROEREISIS      bone Spurs excision on Toe       NV TOTAL HIP ARTHROPLASTY Left 6/30/14     Current Outpatient Prescriptions       Medication  Sig Dispense Refill     amLODIPine (NORVASC) 5 mg tablet Take 1 tablet by mouth once daily. 90 tablet 3     blood sugar diagnostic (BLOOD GLUCOSE TEST) strip Dispense item covered by pt ins. E11.65 IDDM type II, uncontrolled - Test 4 times/day. Reason: New diabetes 100 Strip prn     blood-glucose meter Dispense meter, test strips, lancets covered by pt ins. E11.65 IDDM type II, uncontrolled - Test 4 times/day. Reason: New diabetes 1 Device 0     cefuroxime axetil (CEFTIN) 500 mg tablet Take 1 tablet by mouth once daily for 10 days. Indications: Lower Respiratory Infections 10 tablet 0     famotidine (PEPCID) 20 mg tablet Take 20 mg by mouth once daily.  2     furosemide (LASIX) 20 mg tablet Take 20 mg by mouth every Monday, Wednesday and Friday.       insulin aspart (NOVOLOG) 100 unit/mL solution for injection Inject 0-5 Units subcutaneous 3 times daily with meals. 100 mL prn     insulin glargine (LANTUS) 100 unit/mL injection Inject 4 Units subcutaneous before bedtime. 10 mL prn     lancets Dispense item  "covered by pt ins. E11.65 IDDM type II, uncontrolled - Test 3 times/day. (Wants Barrel lancets) 100 Each 11     lancets Dispense item covered by pt ins. E11.65 IDDM type II, uncontrolled - Test 4 times/day. Reason: New diabetes 100 Each prn     lisinopril (PRINIVIL; ZESTRIL) 2.5 mg tablet Take 1 tablet by mouth once daily in the evening. 90 tablet 3     pen needle, diabetic 31 gauge x 5/16\" For administering insulin at home. Dx: R73.9 350 Each 3     predniSONE (DELTASONE) 20 mg tablet Take 60 mg by mouth once daily.       RENVELA 800 mg tab tablet Take 800 mg by mouth 3 times daily.  1     sodium bicarbonate 650 mg tablet Take 1 tablet by mouth 3 times daily.  0     tamsulosin (FLOMAX) 0.4 mg capsule Take 1 capsule by mouth once daily after a meal. 30 capsule 3     No Known Allergies  Family History       Problem   Relation Age of Onset     Other  Son 12     neuroblastoma at 11 yo  at 14.        Genetic  No Family History      No known FHx of DM, CAD, CVA       Family Status     Relation  Status     Mother  at age 94     Father      Sister Alive     Brother     Lung CA - 2-3 ppd tobacco      Brother  at age 67    Aneurysm      Brother Alive     Brother Alive     Brother Alive     Child Alive     Child Alive     Son  at age 14    neuroblastoma at 11 yo  at 14.       Son      No Family History      Social History     Social History        Marital status:       Spouse name: Lucita     Number of children:  3     Years of education:  N/A     Occupational History       Retired      Social History Main Topics          Smoking status:   Former Smoker      Types:  Cigarettes      Quit date:  1976      Smokeless tobacco:   Never Used      Alcohol use   0.5 oz/week     1 Glasses of wine per week        Comment: occasionally       Drug use:   No      Sexual activity:   Not on file      Other Topics  Concern     Not on file      Social History Narrative      - Wife " "Lucita.     3 kids - oldest son - neuroblastoma at 13 yo  at 14.     [employer] - Worked in [employer] -- [place] and . [employer]           Complete ROS was performed and was negative unless otherwise noted in HPI above.     EXAM:   Vitals:     17 1347   BP: 142/66   Pulse: 84   Temp: 97.2  F (36.2  C)   TempSrc: Tympanic   Weight: 92.1 kg (203 lb)   Height: 1.778 m (5' 10\")     BP Readings from Last 3 Encounters:    17 142/66   17 143/73   17 163/97     Wt Readings from Last 3 Encounters:    17 92.1 kg (203 lb)   17 89.5 kg (197 lb 5 oz)   17 89.4 kg (197 lb)     Estimated body mass index is 29.13 kg/(m^2) as calculated from the following:    Height as of this encounter: 1.778 m (5' 10\").    Weight as of this encounter: 92.1 kg (203 lb).     EXAM:  Constitutional: well groomed / good hygiene, casual dress, + chronically ill appearing.   ENT: Normocephalic, Atraumatic  Eyes:  Extraocular muscles intact, Sclera non-icteric, Conjunctiva without erythema  Lymphatic Exam: Non-palpable nodes in neck, clavicular regions  Pulmonary: Mild exertional dyspnea.  and Few bilateral basilar crackles, otherwise lungs are clear to auscultation bilaterally  Cardiovascular Exam: regular rate and rhythm, 2+ pedal edema present  Gastrointestinal Exam: Obese  Soft, non-tender, non-distended, positive bowel sounds  Integument: + easy bruising on arms  Neurologic Exam: CN 3-12 grossly intact   Musculoskeletal Exam: Walks slowly with his walker.    Psychiatric Exam: Awake and Alert, Affect and mood appropriate  Speech is fluent, Thought process is normal    INVESTIGATIONS:  Hospital records reviewed.      LABS:   Results for orders placed or performed in visit on 17      CBC W PLT NO DIFF      Result  Value Ref Range    WHITE BLOOD COUNT         9.4 4.5 - 11.0 thou/cu mm    RED BLOOD COUNT           3.63 (L) 4.30 - 5.90 mil/cu mm    HEMOGLOBIN   "              10.6 (L) 13.5 - 17.5 g/dL    HEMATOCRIT                32.1 (L) 37.0 - 53.0 %    MCV                       89 80 - 100 fL    MCH                       29.1 26.0 - 34.0 pg    MCHC                      32.9 32.0 - 36.0 g/dL    RDW                       16.4 (H) 11.5 - 15.5 %    PLATELET COUNT            274 140 - 440 thou/cu mm    MPV                       5.8 (L) 6.5 - 11.0 fL   RENAL FUNCTION PANEL      Result  Value Ref Range    SODIUM 130 (L) 133 - 143 mmol/L    POTASSIUM 4.6 3.5 - 5.1 mmol/L    CHLORIDE 100 98 - 107 mmol/L    CO2,TOTAL 20 (L) 21 - 31 mmol/L    ANION GAP 10 5 - 18                    GLUCOSE 200 (H) 70 - 105 mg/dL    CALCIUM 8.4 (L) 8.6 - 10.3 mg/dL    BUN 68 (H) 7 - 25 mg/dL    CREATININE 4.41 (H) 0.70 - 1.30 mg/dL    BUN/CREAT RATIO           15                    GFR if African American 16 (L) >60 ml/min/1.73m2    GFR if not African American 13 (L) >60 ml/min/1.73m2    PHOSPHORUS 2.2 (L) 2.5 - 5.0 mg/dL    ALBUMIN 3.4 (L) 3.5 - 5.7 g/dL     HEMOGLOBIN A1C MONITORING (POCT) (%)    Date Value   05/02/2017 7.2 (H)     HEMOGLOBIN A1C SCREENING (% Total Hgb)    Date Value   04/10/2014 5.7        ASSESSMENT AND PLAN:  Altaf was seen today for hospital f/u.    Diagnoses and all orders for this visit:    Hospital discharge follow-up  -     XR CHEST 2 VIEWS PA AND LATERAL; Future    Pneumonia due to infectious organism, unspecified laterality, unspecified part of lung  -     XR CHEST 2 VIEWS PA AND LATERAL; Future    Steroid-induced hyperglycemia  -     lancets; Dispense item covered by pt ins. E11.65 IDDM type II, uncontrolled - Test 3 times/day. (Wants Barrel lancets)    lab results and schedule of future lab studies reviewed with patient, reviewed diet, exercise and weight control, recommended sodium restriction, cardiovascular risk and specific lipid/LDL goals reviewed    -- Expected clinical course discussed   -- Medications and their side effects discussed    25 minutes spent in  face-to-face interaction with patient (separate from separately billed procedures) with greater than 50% spent in counseling and care coordination of listed medical problems.      Return in about 3 months (around 8/9/2017) for -- follow-up breathing.    Patient Instructions   Labs yesterday showed kidney function has improved slightly, previously creatinine was 5.1 now it is down to 4.4.  Random blood sugar was quite high at 200.  Your albumin level which is a generic measurement of nutrition has been improving.    White blood cell count is normal.  Hemoglobin has improved previously as well as 6.7, now up to 10.6.    Continue antibiotics until gone.     Chest x-ray today.    --> Eat what you want -- use more insulin to cover the food.     Return in approximately 3 month(s), or sooner as needed for follow-up with Dr. Machado.  -- follow-up breathing    Clinic : [phone]  Appointment line: [phone]    Valentino Machado MD

## 2018-01-05 NOTE — TELEPHONE ENCOUNTER
Patient Information     Patient Name MRN Sex Altaf Neves [id number] Male 1934      Telephone Encounter by Angelica Lei at 2017  9:21 AM     Author:  Angelica Lei Service:  (none) Author Type:  (none)     Filed:  2017  9:21 AM Encounter Date:  2017 Status:  Signed     :  Angelica Lei            Patient notified of providers note and will come in prior to diabetic education office visit.    Angelica Lei LPN        2017 9:21 AM

## 2018-01-05 NOTE — PATIENT INSTRUCTIONS
Patient Information     Patient Name MRN Sex Altaf Neves [id number] Male 1934      Patient Instructions by Elicia Tapia NP at 2017 12:30 PM     Author:  Elicia Tapia NP Service:  (none) Author Type:  PHYS- Nurse Practitioner     Filed:  2017  1:36 PM Encounter Date:  2017 Status:  Signed     :  Elicia Tapia NP (PHYS- Nurse Practitioner)            Novolog SLIDING SCALE for correction of high blood sugars.    Blood Glucose       Units of Novolog insulin    150  to  199 = 1 unit of extra insulin    200  to  249 = 2 units of extra insulin    250  to  299 = 3 units of extra insulin    300  to  349 = 4 units of extra insulin    Equal to or greater than 350 =  5 units of extra insulin    Decrease Lantus to 3 units at bedtime  May check bedtime blood sugar every other night to get a good idea of where you are running with your blood sugars.     If low blood sugar, less than 70, may drink 1/2 cup juice, fruit snacks--15 grams carb, 1 tbsp or less    Phone call next Wed, May 17th in morning and review blood sugars.       Diabetes is a progressive disease and takes a lot of work and dedication on a daily basis to keep in good control. I am here to help support you during this process.       Check blood sugar 4 times a day  Before breakfast, before lunch, before supper, and bedtime    Record in log book and bring glucometer and log book to every clinic visit    Watch carb intake/portions 2-3 carb choices (30-45 gm) at each meal three times a day and 0-1 carb choices (0-15 gm) for snacks between meals. If desire to lose weight, try to stay closer to lower end of carb choices at mealtimes and no snacks.    Activity recommendations: 150 min/week      Recommendations    To best take care of your self with diabetes, I recommend the followin. Diabetes can affect your eyes/vision: please schedule annual dilated eye exams with your eye doctor    2. Diabetes can increase your  "risk of cavities, gum disease and tooth/bone loss: please schedule routine dental checks and regular flossing/brushing    3. Diabetes can affect the nerves in your body, especially the long nerve down to your feet. Please routinely check feet to ensure skin intact, no persistant reddened areas, and no sores that are not healing or may be infected. If you see a sore or reddened area that is concerning to you, please see your provider right away.     4. I also recommend good blood pressure (less than 140/90), \"bad\" cholesterol LDL less than 100, A1c less than 8, take a daily aspirin if your provider recommends, and no tobacco use.     5. On a routine basis, your provider will check labs to make sure kidneys are working properly, check cholesterol levels, and average blood sugar (A1c).    6. Diabetes increases your risk for stroke and heart attack. I recommend trying to keep blood sugar in good control by reaching the goals stated below.     Goals  Fasting and premeal:   2 hours after the start of a meal: less than 200  Bedtime: 100-150  A1c: less than 8            "

## 2018-01-05 NOTE — NURSING NOTE
Patient Information     Patient Name MRN Sex Altaf Neves [id number] Male 1934      Nursing Note by Andreina Peters RN at 2017 10:00 AM     Author:  Andreina Peters RN Service:  (none) Author Type:  NURS- Registered Nurse     Filed:  2017 10:25 AM Encounter Date:  2017 Status:  Signed     :  Andreina Peters RN (NURS- Registered Nurse)            Patient positioned in supine position, perineum area prepped with chlorhexidene Gluconate and patient draped per sterile technique. Per verbal order read back by Bethel Gordon MD, Urojet 10mL 2% lidocaine jelly to be instilled into urethra.  Urojet- 10ml 2% Lidocaine jelly instilled into the urethra.    Urojet 2%  Lot#: [id number]  Expiration date:   : [employer]  NDC: [address]-7    Alexander Protocol    A. Pre-procedure verification complete yes  1-relevant information / documentation available, reviewed and properly matched to the patient; 2-consent accurate and complete, 3-equipment and supplies available    B. Site marking complete N/A  Site marked if not in continuous attendance with patient    C. TIME OUT completed yes  Time Out was conducted just prior to starting procedure to verify the eight required elements: 1-patient identity, 2-consent accurate and complete, 3-position, 4-correct side/site marked (if applicable), 5-procedure, 6-relevant images / results properly labeled and displayed (if applicable), 7-antibiotics / irrigation fluids (if applicable), 8-safety precautions.    After procedure perineum area rinsed. Discharge instructions reviewed with patient. Patient verbalized understanding of discharge instructions and discharged ambulatory.

## 2018-01-05 NOTE — TELEPHONE ENCOUNTER
Patient Information     Patient Name MRN Sex Altaf Neves [id number] Male 1934      Telephone Encounter by Valentino Machado MD at 2017  8:41 AM     Author:  Valentino Machado MD Service:  (none) Author Type:  Physician     Filed:  2017  8:42 AM Encounter Date:  2017 Status:  Signed     :  Valentino Machado MD (Physician)            Chest x-ray and labs ordered, please have patient get them on the morning of Tuesday, 2017.    -- He needs these for his posthospital follow-up visit.    Valentino Machado MD

## 2018-01-05 NOTE — NURSING NOTE
Patient Information     Patient Name MRN Sex Altaf Neves [id number] Male 1934      Nursing Note by Andreina Peters RN at 2017 10:00 AM     Author:  Andreina Peters RN Service:  (none) Author Type:  NURS- Registered Nurse     Filed:  2017 10:42 AM Encounter Date:  2017 Status:  Signed     :  Andreina Peters RN (NURS- Registered Nurse)            Ciprofloxacin 500mg tablet by mouth one time now ordered by Bethel Gordon MD.  Medication administered per verbal order   Lot # [id number]  Exp. Oct 2018  Patient tolerated well.  Andreina Peters RN..................2017  10:38 AM

## 2018-01-05 NOTE — PROGRESS NOTES
Patient Information     Patient Name MRN Sex Altaf Neves [id number] Male 1934      Progress Notes by Elicia Tapia NP at 2017 12:30 PM     Author:  Elicia Tapia NP Service:  (none) Author Type:  PHYS- Nurse Practitioner     Filed:  2017  2:42 PM Encounter Date:  2017 Status:  Signed     :  Elicia Tapia NP (PHYS- Nurse Practitioner)            Subjective:  Pt and spouse present to clinic today for diabetes type 2, education and medication management. 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 Pt followed by nephrology with GFR 12 and Cr 4.82. Want tighter blood sugar control to help protect kidneys from progressing, however also takes prednisone 60 mg daily.  He was started on Lantus 4 units and Novolog sliding scale. He has been doing this regimen for about 3-4 days. Doing well. Has had some low fastings, down to 51. Reviewed carbs and activity goals as able.     He is quite restricted on his food intake due to watching carbs, potassium, and sodium.   Occasionally had a couple blood sugars in evening up to 300's, otherwise fairly well controlled rest of day.     Will watch blood sugars more closely for another week and may need to schedule Novolog with lunch and/or supper. No bedtime blood sugars to see how Novolog affecting supper reading when high.         Past Medical History:     Diagnosis  Date     Acute renal failure (ARF) () 2017     ESSENTIAL HYPERTENSION 2012     Osteoarthrosis, unspecified whether generalized or localized, pelvic region and thigh      Other and unspecified hyperlipidemia      Pityriasis rosea      Right inguinal hernia 2013    Right Inguinal hernia with incarceration, massive [[id number]][      Tachycardia, unspecified        Past Surgical History:      Procedure  Laterality Date     CIRCUMCISION  2017    Dr. Heidi GREENBERG       GENITAL SURGERY MALE  13    Dorsal Slit       HERNIA REPAIR Right 13    [facility]  with mesh       HERNIA REPAIR Left 4/15/14    [facility] with mesh       OR SUBTALAR ATHROEREISIS      bone Spurs excision on Toe       OR TOTAL HIP ARTHROPLASTY Left 6/30/14       Review of patient's allergies indicates no known allergies.    Current Outpatient Prescriptions on File Prior to Visit       Medication  Sig Dispense Refill     amLODIPine (NORVASC) 5 mg tablet Take 1 tablet by mouth once daily. 90 tablet 3     blood sugar diagnostic (BLOOD GLUCOSE TEST) strip Dispense item covered by pt ins. E11.65 IDDM type II, uncontrolled - Test 4 times/day. Reason: New diabetes 100 Strip prn     blood-glucose meter Dispense meter, test strips, lancets covered by pt ins. E11.65 IDDM type II, uncontrolled - Test 4 times/day. Reason: New diabetes 1 Device 0     cefuroxime axetil (CEFTIN) 500 mg tablet Take 1 tablet by mouth once daily for 10 days. Indications: Lower Respiratory Infections 10 tablet 0     famotidine (PEPCID) 20 mg tablet Take 20 mg by mouth once daily.  2     furosemide (LASIX) 20 mg tablet Take 20 mg by mouth every Monday, Wednesday and Friday.       insulin aspart (NOVOLOG) 100 unit/mL solution for injection Inject 0-5 Units subcutaneous 3 times daily with meals. 100 mL prn     insulin glargine (LANTUS) 100 unit/mL injection Inject 4 Units subcutaneous before bedtime. 10 mL prn     lancets Dispense item covered by pt ins. E11.65 IDDM type II, uncontrolled - Test 4 times/day. Reason: New diabetes 100 Each prn     lisinopril (PRINIVIL; ZESTRIL) 2.5 mg tablet Take 1 tablet by mouth once daily in the evening. 90 tablet 3     predniSONE (DELTASONE) 20 mg tablet Take 60 mg by mouth once daily.       RENVELA 800 mg tab tablet Take 800 mg by mouth 3 times daily.  1     sodium bicarbonate 650 mg tablet Take 1 tablet by mouth 3 times daily.  0     tamsulosin (FLOMAX) 0.4 mg capsule Take 1 capsule by mouth once daily after a meal. 30 capsule 3     No current facility-administered medications on file prior to visit.         Family History       Problem   Relation Age of Onset     Other  Son 12     neuroblastoma at 13 yo  at 14.        Genetic  No Family History      No known FHx of DM, CAD, CVA         Social History     Social History        Marital status:       Spouse name: Lucita     Number of children:  3     Years of education:  N/A     Occupational History       Retired      Social History Main Topics          Smoking status:   Former Smoker      Types:  Cigarettes      Quit date:  1976      Smokeless tobacco:   Never Used      Alcohol use   0.5 oz/week     1 Glasses of wine per week        Comment: occasionally       Drug use:   No      Sexual activity:   Not on file      Other Topics  Concern     Not on file      Social History Narrative      - Wife Lucita.     3 kids - oldest son - neuroblastoma at 13 yo  at 14.     [employer] - Worked in Adult [employer] -- [place] and . D           Review of Systems:  General:  Denies weight changes   HEENT: Denies blurred vision  Neurologic: Denies parasthesias   Endocrine:  Denies polydipsia, polyphagia, polyuria         Objective:   HEMOGLOBIN A1C MONITORING (POCT) (%)    Date Value   2017 7.2 (H)     HEMOGLOBIN A1C SCREENING (% Total Hgb)    Date Value   04/10/2014 5.7     SODIUM      Date Value Ref Range Status   2017 130 (L) 133 - 143 mmol/L Final     POTASSIUM      Date Value Ref Range Status   2017 4.6 3.5 - 5.1 mmol/L Final     CHLORIDE      Date Value Ref Range Status   2017 100 98 - 107 mmol/L Final     CO2,TOTAL      Date Value Ref Range Status   2017 20 (L) 21 - 31 mmol/L Final     ANION GAP      Date Value Ref Range Status   2017 10 5 - 18                 Final     GLUCOSE      Date Value Ref Range Status   2017 200 (H) 70 - 105 mg/dL Final     BUN      Date Value Ref Range Status   2017 68 (H) 7 - 25 mg/dL Final     CREATININE      Date Value Ref Range  Status   05/09/2017 4.41 (H) 0.70 - 1.30 mg/dL Final     BUN/CREAT RATIO                Date Value Ref Range Status   05/09/2017 15                 Final     CALCIUM      Date Value Ref Range Status   05/09/2017 8.4 (L) 8.6 - 10.3 mg/dL Final     TRIGLYCERIDES (mg/dL)    Date Value   09/11/2014 113     LDL CHOLESTEROL (mg/dL)    Date Value   09/11/2014 150 (H)        Pleasant and alert without distress. Affect normal.   Skin color pink. Extremities without edema.   Gait is stable. Uses a cane.        Assessment/Plan:  1. Diabetes type 2, controlled, new diagnosis   A. Decrease lantus 3 units at bedtime   B. Novolog continue sliding scale   C. Reviewed carbs and activity   D. Reviewed treatment of low blood sugars   E. Will have follow up May 25th with nephrology--may be able to decrease Prednisone   F. Phone follow up next week to review blood sugars and possibly schedule Novolog if needed    A total of 45 minutes was spent with this patient, of which more than 50% was spent in counseling and/or coordination of care regarding diabetes type 2 steroid induced, reviewed blood sugars, insulin dosing, sliding scale, carbs and activity, treatment plan.

## 2018-01-05 NOTE — TELEPHONE ENCOUNTER
Patient Information     Patient Name MRN Sex Altaf Neves [id number] Male 1934      Telephone Encounter by Angelica Lei at 2017  3:41 PM     Author:  Angelica Lei Service:  (none) Author Type:  (none)     Filed:  2017  3:51 PM Encounter Date:  2017 Status:  Signed     :  Angelica Lei            Patient placed with Valentino Machado MD 17, at 1320.    Angelica Lei LPN        2017 3:41 PM

## 2018-01-05 NOTE — NURSING NOTE
Patient Information     Patient Name MRN Sex Altaf Neves [id number] Male 1934      Nursing Note by Andreina Peters RN at 2017 10:00 AM     Author:  Andreina Peters RN Service:  (none) Author Type:  NURS- Registered Nurse     Filed:  2017 10:13 AM Encounter Date:  2017 Status:  Signed     :  Andreina Peters RN (NURS- Registered Nurse)            Post-Void Residual  Verbal order read back from Bethel Gordon MD to perform a post-void residual bladder scan.  A post-void residual was measured by ultrasonic bladder scanner.  198 mL  Andreina Peters RN.........2017...10:10 AM

## 2018-01-05 NOTE — TELEPHONE ENCOUNTER
Patient Information     Patient Name MRN Sex Altaf Neves [id number] Male 1934      Telephone Encounter by Ambrose Mejia RN at 2017 10:19 AM     Author:  Ambrose Mejia RN Service:  (none) Author Type:  NURS- Registered Nurse     Filed:  2017 10:25 AM Encounter Date:  2017 Status:  Signed     :  Ambrose Mejia RN (NURS- Registered Nurse)            Diabetic Supplies    Office visit in the past 12 months.    Last visit with KIMANI MURILLO was on: 2017 in [facility]  Next visit with KIMANI MURILLO is on: 10/25/2017 in [facility]  Next visit with Internal Medicine is on: 10/25/2017 in [facility]    Max refill for 12 months from last office visit.    Needs not be listed on Med List to fill.  Need new RX every 12 months or if exceeds the limitations set by Medicare, then every 6 months.  Also when testing frequency is changed is there a need to obtain a new order.  A refill request does not need to be approved by the ordering physician-a beneficiary or their caregiver may request refills.  Physicians are not required to fill out additional forms such as home testing results for suppliers or provide additional documentation unless the supplier is audited and the  is requesting such documentation.      Faxed rx request received for patient's insulin pen needle tips. Chart review shows that patient currently with no order for insulin pen needles in chart, however patient was most recently started on insulin pens by Dr. Uribe on 17. Writer will send in rx for insulin pen needles as requested.    Prescription refilled per RN Medication Refill Policy.................... Ambrose Mejia RN ....................  2017   10:22 AM

## 2018-01-05 NOTE — NURSING NOTE
Patient Information     Patient Name MRN Sex Altaf Neves [id number] Male 1934      Nursing Note by Angelica Lei at 2017  1:40 PM     Author:  Angelica Lei Service:  (none) Author Type:  (none)     Filed:  2017  2:30 PM Encounter Date:  2017 Status:  Signed     :  Angelica Lei            Patient presents to the clinic for hospital follow up with diabetes and kidney complications.    Angelica Lei LPN        2017 1:46 PM

## 2018-01-05 NOTE — ADDENDUM NOTE
Patient Information     Patient Name MRN Sex Altaf Neves [id number] Male 1934      Addendum Note by Kimani Murillo MD at 2017  8:42 AM     Author:  Kimani Murillo MD Service:  (none) Author Type:  Physician     Filed:  2017  8:42 AM Encounter Date:  2017 Status:  Signed     :  Kimani Murillo MD (Physician)       Addended by: KIMANI MURILLO on: 2017 08:42 AM        Modules accepted: Orders

## 2018-01-05 NOTE — TELEPHONE ENCOUNTER
Patient Information     Patient Name MRN Sex Altaf Neves [id number] Male 1934      Telephone Encounter by Elicia Tapia NP at 2017 11:26 AM     Author:  Elicia Tapia NP Service:  (none) Author Type:  PHYS- Nurse Practitioner     Filed:  2017 11:51 AM Encounter Date:  2017 Status:  Signed     :  Elicia Tapia NP (PHYS- Nurse Practitioner)            Spoke with patient re: blood sugars.     Pt continues to have some fasting low blood sugars in the 60's. Highest fasting is 104. Blood sugars then vary the rest of the day.     Currently taking Lantus 3 units and Novolog sliding scale. He has never needed Novolog in morning but then runs higher by lunch and supper. He has also been using his sliding scale at bedtime and sometimes this is only 3-3.5 hours after taking Novolog with supper. This may be contributing to some of this low blood sugars by morning.     Fastin-104  PreLunch: 143-307  PreSupper: 146-395  Bedtime: 226    Plan:  1. Pt to stop using sliding scale at bedtime  2. Continue Lantus 3 units at bedtime  3. If blood sugar over 100 fasting, then take Novolog 2 units for food at breakfast to avoid large fluctuations in blood sugar by lunch. If less than 100 fasting, then NO Novolog.   4. Phone follow up next week.

## 2018-01-17 ENCOUNTER — AMBULATORY - GICH (OUTPATIENT)
Dept: FAMILY MEDICINE | Facility: OTHER | Age: 83
End: 2018-01-17

## 2018-01-25 VITALS
WEIGHT: 194.19 LBS | HEART RATE: 72 BPM | DIASTOLIC BLOOD PRESSURE: 66 MMHG | BODY MASS INDEX: 27.8 KG/M2 | TEMPERATURE: 98.3 F | SYSTOLIC BLOOD PRESSURE: 124 MMHG | DIASTOLIC BLOOD PRESSURE: 78 MMHG | SYSTOLIC BLOOD PRESSURE: 134 MMHG | HEIGHT: 70 IN | WEIGHT: 171.6 LBS | HEART RATE: 72 BPM | BODY MASS INDEX: 24.62 KG/M2

## 2018-01-25 VITALS
WEIGHT: 187 LBS | TEMPERATURE: 99.8 F | HEART RATE: 84 BPM | HEART RATE: 84 BPM | HEIGHT: 70 IN | BODY MASS INDEX: 27.21 KG/M2 | BODY MASS INDEX: 23.98 KG/M2 | DIASTOLIC BLOOD PRESSURE: 64 MMHG | DIASTOLIC BLOOD PRESSURE: 80 MMHG | BODY MASS INDEX: 29.06 KG/M2 | WEIGHT: 189.2 LBS | DIASTOLIC BLOOD PRESSURE: 70 MMHG | WEIGHT: 194.38 LBS | HEART RATE: 84 BPM | DIASTOLIC BLOOD PRESSURE: 72 MMHG | HEIGHT: 71 IN | BODY MASS INDEX: 26.76 KG/M2 | SYSTOLIC BLOOD PRESSURE: 126 MMHG | DIASTOLIC BLOOD PRESSURE: 66 MMHG | HEART RATE: 76 BPM | BODY MASS INDEX: 26.4 KG/M2 | WEIGHT: 203 LBS | WEIGHT: 167.13 LBS | TEMPERATURE: 97.6 F | HEIGHT: 70 IN | HEART RATE: 96 BPM | SYSTOLIC BLOOD PRESSURE: 130 MMHG | WEIGHT: 184 LBS | HEART RATE: 100 BPM | SYSTOLIC BLOOD PRESSURE: 148 MMHG | TEMPERATURE: 97.2 F | RESPIRATION RATE: 12 BRPM | SYSTOLIC BLOOD PRESSURE: 122 MMHG | SYSTOLIC BLOOD PRESSURE: 142 MMHG | BODY MASS INDEX: 26.77 KG/M2 | TEMPERATURE: 96.6 F

## 2018-01-25 VITALS
WEIGHT: 181.8 LBS | HEART RATE: 84 BPM | SYSTOLIC BLOOD PRESSURE: 138 MMHG | TEMPERATURE: 97 F | DIASTOLIC BLOOD PRESSURE: 76 MMHG | BODY MASS INDEX: 26.09 KG/M2

## 2018-01-25 VITALS
HEART RATE: 72 BPM | BODY MASS INDEX: 25.68 KG/M2 | SYSTOLIC BLOOD PRESSURE: 124 MMHG | WEIGHT: 190.38 LBS | DIASTOLIC BLOOD PRESSURE: 84 MMHG | WEIGHT: 179 LBS | SYSTOLIC BLOOD PRESSURE: 128 MMHG | HEART RATE: 116 BPM | DIASTOLIC BLOOD PRESSURE: 72 MMHG | TEMPERATURE: 98 F | HEIGHT: 71 IN | BODY MASS INDEX: 26.65 KG/M2

## 2018-01-25 VITALS
DIASTOLIC BLOOD PRESSURE: 80 MMHG | BODY MASS INDEX: 27.77 KG/M2 | SYSTOLIC BLOOD PRESSURE: 128 MMHG | HEIGHT: 70 IN | HEART RATE: 82 BPM | WEIGHT: 194 LBS

## 2018-01-25 VITALS — SYSTOLIC BLOOD PRESSURE: 110 MMHG | RESPIRATION RATE: 12 BRPM | DIASTOLIC BLOOD PRESSURE: 60 MMHG | WEIGHT: 187.6 LBS

## 2018-01-25 VITALS
HEART RATE: 84 BPM | HEIGHT: 71 IN | RESPIRATION RATE: 12 BRPM | SYSTOLIC BLOOD PRESSURE: 138 MMHG | WEIGHT: 192.4 LBS | BODY MASS INDEX: 26.94 KG/M2 | DIASTOLIC BLOOD PRESSURE: 62 MMHG

## 2018-01-31 ASSESSMENT — PATIENT HEALTH QUESTIONNAIRE - PHQ9
SUM OF ALL RESPONSES TO PHQ QUESTIONS 1-9: 0

## 2018-02-07 ENCOUNTER — AMBULATORY - GICH (OUTPATIENT)
Dept: FAMILY MEDICINE | Facility: OTHER | Age: 83
End: 2018-02-07

## 2018-02-08 ENCOUNTER — AMBULATORY - GICH (OUTPATIENT)
Dept: SCHEDULING | Facility: OTHER | Age: 83
End: 2018-02-08

## 2018-02-12 ENCOUNTER — DOCUMENTATION ONLY (OUTPATIENT)
Dept: FAMILY MEDICINE | Facility: OTHER | Age: 83
End: 2018-02-12

## 2018-02-12 PROBLEM — D63.8 ANEMIA OF CHRONIC DISEASE: Status: ACTIVE | Noted: 2017-05-03

## 2018-02-12 PROBLEM — N18.5 CKD (CHRONIC KIDNEY DISEASE) STAGE 5, GFR LESS THAN 15 ML/MIN (H): Status: ACTIVE | Noted: 2017-05-01

## 2018-02-12 PROBLEM — T14.8XXA HEMATOMA: Status: ACTIVE | Noted: 2017-05-04

## 2018-02-12 PROBLEM — N05.8 PRIMARY PAUCI-IMMUNE NECROTIZING AND CRESCENTIC GLOMERULONEPHRITIS: Status: ACTIVE | Noted: 2017-04-18

## 2018-02-12 PROBLEM — Z53.20 REFUSAL OF STATIN MEDICATION AT DISCHARGE: Status: ACTIVE | Noted: 2017-02-17

## 2018-02-12 PROBLEM — D51.9 ANEMIA DUE TO VITAMIN B12 DEFICIENCY: Status: ACTIVE | Noted: 2017-10-25

## 2018-02-12 PROBLEM — R60.0 BILATERAL EDEMA OF LOWER EXTREMITY: Status: ACTIVE | Noted: 2017-05-11

## 2018-02-12 PROBLEM — R76.8 ANTINEUTROPHIL CYTOPLASMIC ANTIBODY (ANCA) POSITIVE: Status: ACTIVE | Noted: 2017-04-18

## 2018-02-12 PROBLEM — I89.0 LYMPHEDEMA OF BOTH LOWER EXTREMITIES: Status: ACTIVE | Noted: 2017-06-23

## 2018-02-12 PROBLEM — N05.7 PRIMARY PAUCI-IMMUNE NECROTIZING AND CRESCENTIC GLOMERULONEPHRITIS: Status: ACTIVE | Noted: 2017-04-18

## 2018-02-12 PROBLEM — D64.9 ANEMIA: Status: ACTIVE | Noted: 2017-04-25

## 2018-02-12 PROBLEM — N00.7: Status: ACTIVE | Noted: 2017-04-18

## 2018-02-12 PROBLEM — R73.9 STEROID-INDUCED HYPERGLYCEMIA: Status: ACTIVE | Noted: 2017-05-04

## 2018-02-12 PROBLEM — N48.83 ACQUIRED BURIED PENIS: Status: ACTIVE | Noted: 2018-02-12

## 2018-02-12 PROBLEM — T38.0X5A STEROID-INDUCED HYPERGLYCEMIA: Status: ACTIVE | Noted: 2017-05-04

## 2018-02-12 PROBLEM — E87.20 METABOLIC ACIDOSIS: Status: ACTIVE | Noted: 2017-04-25

## 2018-02-12 PROBLEM — D84.9 IMMUNOCOMPROMISED PATIENT (H): Status: ACTIVE | Noted: 2017-06-09

## 2018-02-12 RX ORDER — AMLODIPINE BESYLATE 5 MG/1
5 TABLET ORAL DAILY
COMMUNITY
Start: 2017-02-17 | End: 2018-02-28

## 2018-02-12 RX ORDER — CYANOCOBALAMIN 1000 UG/ML
1000 INJECTION, SOLUTION INTRAMUSCULAR; SUBCUTANEOUS
COMMUNITY
Start: 2017-12-27 | End: 2018-11-28

## 2018-02-12 RX ORDER — FAMOTIDINE 20 MG/1
20 TABLET, FILM COATED ORAL DAILY
COMMUNITY
Start: 2017-07-25 | End: 2018-07-10

## 2018-02-12 RX ORDER — SODIUM BICARBONATE 650 MG/1
650 TABLET ORAL DAILY
COMMUNITY
End: 2020-03-24

## 2018-02-12 RX ORDER — INSULIN PUMP SYRINGE, 3 ML
EACH MISCELLANEOUS
COMMUNITY
Start: 2017-05-05 | End: 2021-07-14

## 2018-02-12 RX ORDER — FUROSEMIDE 40 MG
40 TABLET ORAL EVERY MORNING
COMMUNITY
Start: 2017-10-05 | End: 2018-08-14

## 2018-02-12 RX ORDER — TAMSULOSIN HYDROCHLORIDE 0.4 MG/1
0.4 CAPSULE ORAL DAILY
COMMUNITY
Start: 2017-10-30 | End: 2018-04-26

## 2018-02-12 RX ORDER — PEN NEEDLE, DIABETIC 30 GX5/16"
NEEDLE, DISPOSABLE MISCELLANEOUS
COMMUNITY
Start: 2017-05-09 | End: 2018-08-09

## 2018-02-12 RX ORDER — PREDNISONE 20 MG/1
20 TABLET ORAL DAILY
COMMUNITY
Start: 2017-06-30 | End: 2018-08-14 | Stop reason: DRUGHIGH

## 2018-02-12 NOTE — NURSING NOTE
Patient Information     Patient Name MRN Sex Altaf Neves [id number] Male 1934      Nursing Note by Mahsa Rodrigez RN at 2017  9:30 AM     Author:  Mahsa Rodrigez RN Service:  (none) Author Type:  NURS- Registered Nurse     Filed:  2017 10:12 AM Encounter Date:  2017 Status:  Signed     :  Mahsa Rodrigez RN (NURS- Registered Nurse)            Pt has had 4 B 12 shots already and now is to every 3 to 4 weeks for shots. Last was 17 so next due 17. MAHSA RODRIGEZ RN ....................  2017   10:11 AM

## 2018-02-28 ENCOUNTER — ALLIED HEALTH/NURSE VISIT (OUTPATIENT)
Dept: FAMILY MEDICINE | Facility: OTHER | Age: 83
End: 2018-02-28
Attending: INTERNAL MEDICINE
Payer: MEDICARE

## 2018-02-28 DIAGNOSIS — I10 ESSENTIAL HYPERTENSION: Primary | ICD-10-CM

## 2018-02-28 DIAGNOSIS — D51.9 ANEMIA DUE TO VITAMIN B12 DEFICIENCY: Primary | ICD-10-CM

## 2018-02-28 PROCEDURE — 96372 THER/PROPH/DIAG INJ SC/IM: CPT

## 2018-02-28 PROCEDURE — 99207 ZZC NO CHARGE NURSE ONLY: CPT

## 2018-02-28 PROCEDURE — 25000128 H RX IP 250 OP 636: Performed by: INTERNAL MEDICINE

## 2018-02-28 RX ORDER — CYANOCOBALAMIN 1000 UG/ML
1000 INJECTION, SOLUTION INTRAMUSCULAR; SUBCUTANEOUS
Status: DISCONTINUED | OUTPATIENT
Start: 2018-02-28 | End: 2018-07-26 | Stop reason: CLARIF

## 2018-02-28 RX ADMIN — CYANOCOBALAMIN 1000 MCG: 1000 INJECTION, SOLUTION INTRAMUSCULAR at 09:26

## 2018-03-07 RX ORDER — AMLODIPINE BESYLATE 5 MG/1
TABLET ORAL
Qty: 90 TABLET | Refills: 0 | Status: SHIPPED | OUTPATIENT
Start: 2018-03-07 | End: 2018-06-14

## 2018-03-07 NOTE — TELEPHONE ENCOUNTER
Prescription approved per [facility] Ref[facility].    Per last office visit notes on 10-25-17, patient has CKD with high creatinine reviewed by Valentino Machado. Patient was to continue on medications and return in 6 months. Appointment scheduled for 3-21-18 as requested by PCP.  Moraima Rodas RN on 3/7/2018 at 9:49 AM

## 2018-03-09 ENCOUNTER — DOCUMENTATION ONLY (OUTPATIENT)
Dept: FAMILY MEDICINE | Facility: OTHER | Age: 83
End: 2018-03-09

## 2018-03-21 ENCOUNTER — ALLIED HEALTH/NURSE VISIT (OUTPATIENT)
Dept: FAMILY MEDICINE | Facility: OTHER | Age: 83
End: 2018-03-21
Attending: INTERNAL MEDICINE
Payer: MEDICARE

## 2018-03-21 DIAGNOSIS — D51.9 ANEMIA DUE TO VITAMIN B12 DEFICIENCY: Primary | ICD-10-CM

## 2018-03-21 PROCEDURE — 96372 THER/PROPH/DIAG INJ SC/IM: CPT

## 2018-03-21 PROCEDURE — 25000128 H RX IP 250 OP 636: Performed by: INTERNAL MEDICINE

## 2018-03-21 RX ADMIN — CYANOCOBALAMIN 1000 MCG: 1000 INJECTION, SOLUTION INTRAMUSCULAR at 09:25

## 2018-04-10 RX ADMIN — CYANOCOBALAMIN 1000 MCG: 1000 INJECTION, SOLUTION INTRAMUSCULAR at 10:01

## 2018-04-11 ENCOUNTER — ALLIED HEALTH/NURSE VISIT (OUTPATIENT)
Dept: FAMILY MEDICINE | Facility: OTHER | Age: 83
End: 2018-04-11
Attending: INTERNAL MEDICINE
Payer: MEDICARE

## 2018-04-11 DIAGNOSIS — D63.8 ANEMIA OF CHRONIC DISEASE: Primary | ICD-10-CM

## 2018-04-11 PROCEDURE — 25000128 H RX IP 250 OP 636: Performed by: INTERNAL MEDICINE

## 2018-04-11 PROCEDURE — 96372 THER/PROPH/DIAG INJ SC/IM: CPT

## 2018-04-11 PROCEDURE — 99207 ZZC NO CHARGE NURSE ONLY: CPT

## 2018-04-26 DIAGNOSIS — R33.9 URINARY RETENTION: Primary | ICD-10-CM

## 2018-04-27 RX ORDER — TAMSULOSIN HYDROCHLORIDE 0.4 MG/1
CAPSULE ORAL
Qty: 90 CAPSULE | Refills: 1 | Status: SHIPPED | OUTPATIENT
Start: 2018-04-27 | End: 2018-08-14

## 2018-05-02 ENCOUNTER — ALLIED HEALTH/NURSE VISIT (OUTPATIENT)
Dept: FAMILY MEDICINE | Facility: OTHER | Age: 83
End: 2018-05-02
Attending: INTERNAL MEDICINE
Payer: MEDICARE

## 2018-05-02 DIAGNOSIS — D51.9 ANEMIA DUE TO VITAMIN B12 DEFICIENCY: Primary | ICD-10-CM

## 2018-05-02 PROCEDURE — 99207 ZZC NO CHARGE NURSE ONLY: CPT

## 2018-05-02 PROCEDURE — 96372 THER/PROPH/DIAG INJ SC/IM: CPT

## 2018-05-02 PROCEDURE — 25000128 H RX IP 250 OP 636: Performed by: INTERNAL MEDICINE

## 2018-05-02 RX ADMIN — CYANOCOBALAMIN 1000 MCG: 1000 INJECTION, SOLUTION INTRAMUSCULAR at 09:25

## 2018-05-08 DIAGNOSIS — R31.9 HEMATURIA: Primary | ICD-10-CM

## 2018-05-10 ENCOUNTER — OFFICE VISIT (OUTPATIENT)
Dept: UROLOGY | Facility: OTHER | Age: 83
End: 2018-05-10
Attending: UROLOGY
Payer: MEDICARE

## 2018-05-10 VITALS
BODY MASS INDEX: 24.39 KG/M2 | DIASTOLIC BLOOD PRESSURE: 80 MMHG | SYSTOLIC BLOOD PRESSURE: 144 MMHG | WEIGHT: 170 LBS | RESPIRATION RATE: 14 BRPM

## 2018-05-10 DIAGNOSIS — R33.9 URINARY RETENTION: Primary | ICD-10-CM

## 2018-05-10 PROCEDURE — 99213 OFFICE O/P EST LOW 20 MIN: CPT | Performed by: UROLOGY

## 2018-05-10 PROCEDURE — G0463 HOSPITAL OUTPT CLINIC VISIT: HCPCS | Mod: 25

## 2018-05-10 PROCEDURE — 51798 US URINE CAPACITY MEASURE: CPT | Performed by: UROLOGY

## 2018-05-10 RX ORDER — FINASTERIDE 5 MG/1
5 TABLET, FILM COATED ORAL DAILY
Qty: 90 TABLET | Refills: 3 | Status: SHIPPED | OUTPATIENT
Start: 2018-05-10 | End: 2019-04-21

## 2018-05-10 ASSESSMENT — PAIN SCALES - GENERAL: PAINLEVEL: NO PAIN (0)

## 2018-05-10 NOTE — PROGRESS NOTES
Type of Visit  Established    Chief Complaint  Urinary retention    HPI  Mr. Chao is a 83 y.o. 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  He has been taking Flomax for a few years.  He denies recent hematuria.  He has been voiding subjectively much better since the buried penis repair performed in the last year.  He denies frequent UTIs.      Review of Systems  I reviewed the ROS the patient today.    Nursing Notes:   Josee Eden LPN  5/10/2018  9:29 AM  Signed  Pt presents to clinic for a one year follow up on hematuria    Review of Systems:    Weight loss:    No     Recent fever/chills:  No   Night sweats:   No  Current skin rash:  No   Recent hair loss:  No  Heat intolerance:  No   Cold intolerance:  No  Chest pain:   No   Palpitations:   No  Shortness of breath:  No   Wheezing:   No  Constipation:    No   Diarrhea:   No   Nausea:   No   Vomiting:   No   Kidney/side pain:  No   Back pain:   No  Frequent headaches:  No   Dizziness:     No  Leg swelling:   No   Calf pain:    No    Post-Void Residual  A post-void residual was measured by ultrasonic bladder scanner.  >412  mL      Family History  Family History   Problem Relation Age of Onset     Other Son 12     neuroblastoma at 11 yo  at 14.      Genetic No Family History      No known FHx of DM, CAD, CVA       Physical Exam  /80 (BP Location: Left arm, Patient Position: Sitting, Cuff Size: Adult Regular)  Resp 14  Wt 77.1 kg (170 lb)  BMI 24.39 kg/m2  Constitutional: NAD, WDWN.  Cardiovascular: Regular rate.  Pulmonary/Chest: Respirations are even and non-labored bilaterally.  Abdominal: Soft. No distension, tenderness, masses or guarding. No CVA tenderness.  Extremities: SUSI x 4, Warm. No clubbing.  No cyanosis.    Skin: Pink, warm and dry.  No rashes noted.  Genitourinary: nonpalpable bladder    Labs  CREATININE (mg/dL)   Date Value   2017 4.56 (H)       Radiographic  Studies  4/2/2017  CT Stone  IMPRESSION:       1.  Gates catheter in the bladder. Asymmetric bowel thickening in the bladder.   Recommend further evaluation for a bladder infection or neoplasm if clinically   indicated.    2.  Diverticulosis of the rectosigmoid. Mild adjacent inflammatory changes may   represent mild diverticulitis in the appropriate clinical setting. No signs of   perforation, abscess, or bleeding     Post-Void Residual  A post-void residual was measured by ultrasonic bladder scanner.  412 mL today  199 mL (previously recorded)    Assessment  Mr. Chao is a 83 y.o. male follows up with recent buried penis repair who has healed well.  He is able to void better now after the procedure.  We discussed side effects of finasteride including, but not limited to, sexual dysfunction such as erectile issues/low libido and false decrease in PSA.    Plan  Start finasteride 5mg daily  Continue Flomax  Follow-up in 1 year with [facility]

## 2018-05-10 NOTE — MR AVS SNAPSHOT
"              After Visit Summary   5/10/2018    Altaf Chao    MRN: [id number]           Patient Information     Date Of Birth          4/9/1934        Visit Information        Provider Department      5/10/2018 9:00 AM Bethel Gordon MD [facility]        Today's Diagnoses     Urinary retention    -  1       Follow-ups after your visit        Your next 10 appointments already scheduled     May 23, 2018  9:00 AM CDT   Nurse Only with GH INJECTION NURSE   [facility] ([facility])    [address]  [place] [address] [phone]              Who to contact     If you have questions or need follow up information about today's clinic visit or your schedule please contact [facility] directly at [phone].  Normal or non-critical lab and imaging results will be communicated to you by Dapperhart, letter or phone within 4 business days after the clinic has received the results. If you do not hear from us within 7 days, please contact the clinic through Dapperhart or phone. If you have a critical or abnormal lab result, we will notify you by phone as soon as possible.  Submit refill requests through OBOOK or call your pharmacy and they will forward the refill request to us. Please allow 3 business days for your refill to be completed.          Additional Information About Your Visit        Dapperhart Information     OBOOK lets you send messages to your doctor, view your test results, renew your prescriptions, schedule appointments and more. To sign up, go to [email or web address] . Click on \"Log in\" on the left side of the screen, which will take you to the Welcome page. Then click on \"Sign up Now\" on the right side of the page.     You will be asked to enter the access code listed below, as well as some personal information. Please follow the directions to create your username and password.     Your access code is: " [email or web address]  Expires: 2018 10:31 AM     Your access code will  in 90 days. If you need help or a new code, please call your [place] clinic or [phone].        Care EveryWhere ID     This is your Care EveryWhere ID. This could be used by other organizations to access your [facility] medical records  [id number]        Your Vitals Were     Respirations BMI (Body Mass Index)                14 24.39 kg/m2           Blood Pressure from Last 3 Encounters:   05/10/18 144/80   10/25/17 134/78   17 122/70    Weight from Last 3 Encounters:   05/10/18 77.1 kg (170 lb)   10/25/17 77.8 kg (171 lb 9.6 oz)   17 75.8 kg (167 lb 2 oz)              We Performed the Following     POST-VOID RESIDUAL BLADDER SCAN          Today's Medication Changes          These changes are accurate as of 5/10/18  3:30 PM.  If you have any questions, ask your nurse or doctor.               Start taking these medicines.        Dose/Directions    finasteride 5 MG tablet   Commonly known as:  PROSCAR   Used for:  Urinary retention   Started by:  Bethel Gordon MD        Dose:  5 mg   Take 1 tablet (5 mg) by mouth daily   Quantity:  90 tablet   Refills:  3            Where to get your medicines      These medications were sent to [facility] [address] [facility] - [place], [place] - [address]  [address] [facility] [address]     Phone:  [phone]     finasteride 5 MG tablet                Primary Care Provider Office Phone # Fax #    Valentino Machado [phone] [address] [facility] [address]        Equal Access to Services     [facility]: [address], herbert arias, kelly elpe. So [facility] [phone].    ATENCIÓN: Si habla español, tiene a poe disposición servicios gratuitos de asistencia lingüística. Llame al [phone].    We comply with applicable federal civil rights laws and [place] laws. We do  not discriminate on the basis of race, color, national origin, age, disability, sex, sexual orientation, or gender identity.            Thank you!     Thank you for choosing [facility] [place]  for your care. Our goal is always to provide you with excellent care. Hearing back from our patients is one way we can continue to improve our services. Please take a few minutes to complete the written survey that you may receive in the mail after your visit with us. Thank you!             Your Updated Medication List - Protect others around you: Learn how to safely use, store and throw away your medicines at www.disposemymeds.org.          This list is accurate as of 5/10/18  3:30 PM.  Always use your most recent med list.                   Brand Name Dispense Instructions for use Diagnosis    amLODIPine 5 MG tablet    NORVASC    90 tablet    TAKE 1 TABLET BY MOUTH ONCE DAILY.    Essential hypertension       blood glucose lancets standard    no brand specified     Dispense item covered by pt ins. E11.65 IDDM type II, uncontrolled - Test 3 times/day. (Wants Barrel lancets)        blood glucose monitoring test strip    no brand specified     Dispense item covered by pt ins. E11.65 IDDM type II, uncontrolled - Test 4 times/day. Reason: New diabetes        cyanocobalamin 1000 MCG/ML injection    VITAMIN B12     Inject 1,000 mcg into the muscle        famotidine 20 MG tablet    PEPCID     Take 20 mg by mouth daily        FIFTY50 GLUCOSE METER 2.0 w/Device Kit      Dispense meter, test strips, lancets covered by pt ins. E11.65 IDDM type II, uncontrolled - Test 4 times/day. Reason: New diabetes        finasteride 5 MG tablet    PROSCAR    90 tablet    Take 1 tablet (5 mg) by mouth daily    Urinary retention       furosemide 40 MG tablet    LASIX     Take 40 mg by mouth every morning        insulin aspart 100 UNIT/ML injection    NovoLOG PEN     Inject 5 Units Subcutaneous 3 times daily (with meals)        LANTUS  "SOLOSTAR 100 UNIT/ML injection   Generic drug:  insulin glargine      Inject 2 Units Subcutaneous At Bedtime        pen needles 5/16\" 31G X 8 MM Misc      For administering insulin at home. Dx: R73.9        predniSONE 20 MG tablet    DELTASONE     Take 20 mg by mouth daily Tapering down from 30 mg daily.        sodium bicarbonate 650 MG tablet      Take 650 mg by mouth daily        tamsulosin 0.4 MG capsule    FLOMAX    90 capsule    TAKE 1 CAPSULE BY MOUTH ONCE DAILY AFTER A MEAL.    Urinary retention         "

## 2018-05-10 NOTE — NURSING NOTE
Pt presents to clinic for a one year follow up on hematuria    Review of Systems:    Weight loss:    No     Recent fever/chills:  No   Night sweats:   No  Current skin rash:  No   Recent hair loss:  No  Heat intolerance:  No   Cold intolerance:  No  Chest pain:   No   Palpitations:   No  Shortness of breath:  No   Wheezing:   No  Constipation:    No   Diarrhea:   No   Nausea:   No   Vomiting:   No   Kidney/side pain:  No   Back pain:   No  Frequent headaches:  No   Dizziness:     No  Leg swelling:   No   Calf pain:    No    Post-Void Residual  A post-void residual was measured by ultrasonic bladder scanner.  >412  mL

## 2018-05-25 ENCOUNTER — ALLIED HEALTH/NURSE VISIT (OUTPATIENT)
Dept: FAMILY MEDICINE | Facility: OTHER | Age: 83
End: 2018-05-25
Attending: INTERNAL MEDICINE
Payer: MEDICARE

## 2018-05-25 DIAGNOSIS — D51.9 ANEMIA DUE TO VITAMIN B12 DEFICIENCY: Primary | ICD-10-CM

## 2018-05-25 PROCEDURE — 25000128 H RX IP 250 OP 636: Performed by: INTERNAL MEDICINE

## 2018-05-25 PROCEDURE — 96372 THER/PROPH/DIAG INJ SC/IM: CPT

## 2018-05-25 RX ADMIN — CYANOCOBALAMIN 1000 MCG: 1000 INJECTION, SOLUTION INTRAMUSCULAR at 10:34

## 2018-06-14 ENCOUNTER — ALLIED HEALTH/NURSE VISIT (OUTPATIENT)
Dept: FAMILY MEDICINE | Facility: OTHER | Age: 83
End: 2018-06-14
Attending: INTERNAL MEDICINE
Payer: MEDICARE

## 2018-06-14 DIAGNOSIS — I10 ESSENTIAL HYPERTENSION: ICD-10-CM

## 2018-06-14 DIAGNOSIS — D51.9 ANEMIA DUE TO VITAMIN B12 DEFICIENCY: Primary | ICD-10-CM

## 2018-06-14 PROCEDURE — 96372 THER/PROPH/DIAG INJ SC/IM: CPT

## 2018-06-14 PROCEDURE — 25000128 H RX IP 250 OP 636: Performed by: INTERNAL MEDICINE

## 2018-06-14 RX ADMIN — CYANOCOBALAMIN 1000 MCG: 1000 INJECTION, SOLUTION INTRAMUSCULAR at 09:30

## 2018-06-14 NOTE — LETTER
June 18, 2018      Altaf Chao  [address] [place] [address]        A refill request was received from your pharmacy for Amlodipine.    Additional refills require an office visit with Dr. Machado for a follow up.    Please call [phone] to schedule appointment.      Sincerely,      Refill Nurse

## 2018-06-18 RX ORDER — AMLODIPINE BESYLATE 5 MG/1
TABLET ORAL
Qty: 90 TABLET | Refills: 0 | Status: SHIPPED | OUTPATIENT
Start: 2018-06-18 | End: 2018-08-14

## 2018-06-18 NOTE — TELEPHONE ENCOUNTER
Routing refill request to provider for review/approval because:  Labs out of range:  Creatinine  Patient needs to be seen because:  Patient to follow up in 6 months (4/2018)    Letter sent.    Dr. Machado out of office will route to covering team let for review and consideration.    Mary Ann Tapia RN on 6/18/2018 at 9:27 AM

## 2018-07-05 ENCOUNTER — ALLIED HEALTH/NURSE VISIT (OUTPATIENT)
Dept: FAMILY MEDICINE | Facility: OTHER | Age: 83
End: 2018-07-05
Attending: INTERNAL MEDICINE
Payer: MEDICARE

## 2018-07-05 DIAGNOSIS — D51.9 ANEMIA DUE TO VITAMIN B12 DEFICIENCY: Primary | ICD-10-CM

## 2018-07-05 PROCEDURE — 25000128 H RX IP 250 OP 636: Performed by: INTERNAL MEDICINE

## 2018-07-05 PROCEDURE — 96372 THER/PROPH/DIAG INJ SC/IM: CPT

## 2018-07-05 PROCEDURE — 99207 ZZC NO CHARGE NURSE ONLY: CPT

## 2018-07-05 RX ADMIN — CYANOCOBALAMIN 1000 MCG: 1000 INJECTION, SOLUTION INTRAMUSCULAR at 09:33

## 2018-07-10 DIAGNOSIS — R12 HEARTBURN: ICD-10-CM

## 2018-07-12 PROBLEM — R12 HEARTBURN: Status: ACTIVE | Noted: 2018-07-12

## 2018-07-12 RX ORDER — FAMOTIDINE 20 MG/1
TABLET, FILM COATED ORAL
Qty: 90 TABLET | Refills: 0 | Status: SHIPPED | OUTPATIENT
Start: 2018-07-12 | End: 2018-08-14

## 2018-07-19 ENCOUNTER — TRANSFERRED RECORDS (OUTPATIENT)
Dept: HEALTH INFORMATION MANAGEMENT | Facility: OTHER | Age: 83
End: 2018-07-19

## 2018-07-23 NOTE — PROGRESS NOTES
Patient Information     Patient Name  Altaf Chao MRN  [id number] Sex  Male   1934      Letter by Valentino Machado MD at      Author:  Valentino Machado MD Service:  (none) Author Type:  (none)    Filed:   Encounter Date:  2017 Status:  (Other)           Altaf Chao  [address]  [place] [address]          May 10, 2017    Dear Mr. Chao:    Following are the tests completed during your last clinic visit.  The results of these tests are included below.      2017  - Exam: XR CHEST 2 VIEWS PA AND LATERAL     History: Hospital discharge follow-up     Technique: 2 views.     Findings: Comparison is made with 2017.     Heart is stable in size. There are bilateral effusions, better seen on the lateral view. These are smaller than on the prior exam. No confluent infiltrate is seen. There is a hiatal hernia.     Impression: Decrease in size of pleural effusions.     Electronically Signed By: Mary Lou Kwan M.D. on 2017 2:50 PM    Results for orders placed or performed in visit on 17      CBC W PLT NO DIFF      Result  Value Ref Range    WHITE BLOOD COUNT         9.4 4.5 - 11.0 thou/cu mm    RED BLOOD COUNT           3.63 (L) 4.30 - 5.90 mil/cu mm    HEMOGLOBIN                10.6 (L) 13.5 - 17.5 g/dL    HEMATOCRIT                32.1 (L) 37.0 - 53.0 %    MCV                       89 80 - 100 fL    MCH                       29.1 26.0 - 34.0 pg    MCHC                      32.9 32.0 - 36.0 g/dL    RDW                       16.4 (H) 11.5 - 15.5 %    PLATELET COUNT            274 140 - 440 thou/cu mm    MPV                       5.8 (L) 6.5 - 11.0 fL   RENAL FUNCTION PANEL      Result  Value Ref Range    SODIUM 130 (L) 133 - 143 mmol/L    POTASSIUM 4.6 3.5 - 5.1 mmol/L    CHLORIDE 100 98 - 107 mmol/L    CO2,TOTAL 20 (L) 21 - 31 mmol/L    ANION GAP 10 5 - 18                    GLUCOSE 200 (H) 70 - 105 mg/dL    CALCIUM 8.4 (L) 8.6 - 10.3 mg/dL    BUN 68 (H) 7 - 25 mg/dL     CREATININE 4.41 (H) 0.70 - 1.30 mg/dL    BUN/CREAT RATIO           15                    GFR if African American 16 (L) >60 ml/min/1.73m2    GFR if not African American 13 (L) >60 ml/min/1.73m2    PHOSPHORUS 2.2 (L) 2.5 - 5.0 mg/dL    ALBUMIN 3.4 (L) 3.5 - 5.7 g/dL         If you have any further questions or problems contact my office at  [phone]   --- or send [employer] message --- otherwise schedule an appointment.    Clinic : [phone]  Appointment line: [phone]     Thank you,    Valentino Machado MD    Internal Medicine  [facility]     Reviewed and electronically signed by provider.

## 2018-07-23 NOTE — PROGRESS NOTES
Patient Information     Patient Name  Altaf Chao MRN  [id number] Sex  Male   1934      Letter by Valentino Machado MD at      Author:  Valentino Machado MD Service:  (none) Author Type:  (none)    Filed:   Encounter Date:  2017 Status:  (Other)           Altaf Chao  [address]  [facility] [address]          2017    Dear Mr. Chao:    Following are the tests completed during your last clinic visit.  The results of these tests are included below.      Kidney function has improved.    Low hemoglobin/Anemia persists.    Phosphorus levels have normalized.    If your bicarbonate level also called CO2, total -- is still low at your next lab recheck, would consider initiation of oral bicarbonate tablets.    Results for orders placed or performed in visit on 17      COMPLETE METABOLIC PANEL      Result  Value Ref Range    SODIUM 136 133 - 143 mmol/L    POTASSIUM 5.0 3.5 - 5.1 mmol/L    CHLORIDE 106 98 - 107 mmol/L    CO2,TOTAL 20 (L) 21 - 31 mmol/L    ANION GAP 10 5 - 18                    GLUCOSE 158 (H) 70 - 105 mg/dL    CALCIUM 8.0 (L) 8.6 - 10.3 mg/dL     (H) 7 - 25 mg/dL    CREATININE 4.97 (H) 0.70 - 1.30 mg/dL    BUN/CREAT RATIO           22                    GFR if African American 14 (L) >60 ml/min/1.73m2    GFR if not African American 11 (L) >60 ml/min/1.73m2    ALBUMIN 3.1 (L) 3.5 - 5.7 g/dL    PROTEIN,TOTAL 6.5 6.4 - 8.9 g/dL    GLOBULIN                  3.4 2.0 - 3.7 g/dL    A/G RATIO 0.9 (L) 1.0 - 2.0                    BILIRUBIN,TOTAL 0.3 0.3 - 1.0 mg/dL    ALK PHOSPHATASE 68 34 - 104 IU/L    ALT (SGPT) 17 7 - 52 IU/L    AST (SGOT) 14 13 - 39 IU/L   PHOSPHORUS      Result  Value Ref Range    PHOSPHORUS 2.8 2.5 - 5.0 mg/dL   CBC WITH AUTO DIFFERENTIAL      Result  Value Ref Range    WHITE BLOOD COUNT         20.6 (H) 4.5 - 11.0 thou/cu mm    RED BLOOD COUNT           2.73 (L) 4.30 - 5.90 mil/cu mm    HEMOGLOBIN                7.9 (L) 13.5 - 17.5 g/dL     HEMATOCRIT                24.0 (L) 37.0 - 53.0 %    MCV                       88 80 - 100 fL    MCH                       28.9 26.0 - 34.0 pg    MCHC                      33.0 32.0 - 36.0 g/dL    RDW                       15.0 11.5 - 15.5 %    PLATELET COUNT            326 140 - 440 thou/cu mm    MPV                       7.2 6.5 - 11.0 fL   MANUAL DIFFERENTIAL      Result  Value Ref Range    ADJUSTED WBC 20.6 thou/cu mm    NEUTROPHILS RELATIVE 92.0 (H) 42.0 - 72.0 %    LYMPHOCYTES RELATIVE 4.0 (L) 20.0 - 44.0 %    MONOCYTES RELATIVE 3.0 <12.0 %    EOSINOPHILS RELATIVE 1.0 <8.0 %    LYMPHOCYTES ABSOLUTE 0.8 (L) 0.9 - 2.9 thou/cu mm    MONOCYTES ABSOLUTE 0.6 <0.9 thou/cu mm    EOSINOPHILS ABSOLUTE 0.2 <0.5 thou/cu mm    NRBC                      0.0 %    TOTAL COUNTED 100     MANUAL NRBC  CELLS 0.00 /100 CELLS    POIKILOCYTOSIS 2+     NEUTROPHILS ABSOLUTE 19.0 (H) 1.7 - 7.0 thou/cu mm         If you have any further questions or problems contact my office at  [phone]   --- or send [employer] message --- otherwise schedule an appointment.    Clinic : [phone]  Appointment line: [phone]     Thank you,    Valentino Machado MD    Internal Medicine  [facility]     Reviewed and electronically signed by provider.

## 2018-07-23 NOTE — PROGRESS NOTES
Patient Information     Patient Name  Altaf Chao MRN  [id number] Sex  Male   1934      Letter by Valentino Machado MD at      Author:  Valentino Machado MD Service:  (none) Author Type:  (none)    Filed:   Encounter Date:  2017 Status:  (Other)           Altaf Chao  [address]  [facility] [address]          2017    Dear Mr. Chao:    Following are the tests completed during your last clinic visit.  The results of these tests are included below.      Hemoglobin levels have dropped slightly.    Kidney function has improved slightly but is still not very good.  Kidneys are only working at about 12%.    Results for orders placed or performed in visit on 17      CBC W PLT NO DIFF      Result  Value Ref Range    WHITE BLOOD COUNT         13.1 (H) 4.5 - 11.0 thou/cu mm    RED BLOOD COUNT           2.36 (L) 4.30 - 5.90 mil/cu mm    HEMOGLOBIN                7.0 (L) 13.5 - 17.5 g/dL    HEMATOCRIT                20.4 (L) 37.0 - 53.0 %    MCV                       87 80 - 100 fL    MCH                       29.7 26.0 - 34.0 pg    MCHC                      34.3 32.0 - 36.0 g/dL    RDW                       16.6 (H) 11.5 - 15.5 %    PLATELET COUNT            203 140 - 440 thou/cu mm    MPV                       7.4 6.5 - 11.0 fL   COMPLETE METABOLIC PANEL      Result  Value Ref Range    SODIUM 140 133 - 143 mmol/L    POTASSIUM 5.0 3.5 - 5.1 mmol/L    CHLORIDE 108 (H) 98 - 107 mmol/L    CO2,TOTAL 19 (L) 21 - 31 mmol/L    ANION GAP 13 5 - 18                    GLUCOSE 91 70 - 105 mg/dL    CALCIUM 8.0 (L) 8.6 - 10.3 mg/dL    BUN 97 (H) 7 - 25 mg/dL    CREATININE 4.56 (H) 0.70 - 1.30 mg/dL    BUN/CREAT RATIO           21                    GFR if African American 15 (L) >60 ml/min/1.73m2    GFR if not African American 12 (L) >60 ml/min/1.73m2    ALBUMIN 3.2 (L) 3.5 - 5.7 g/dL    PROTEIN,TOTAL 5.4 (L) 6.4 - 8.9 g/dL    GLOBULIN                  2.2 2.0 - 3.7 g/dL    A/G RATIO 1.5 1.0 - 2.0                     BILIRUBIN,TOTAL 0.5 0.3 - 1.0 mg/dL    ALK PHOSPHATASE 68 34 - 104 IU/L    ALT (SGPT) 13 7 - 52 IU/L    AST (SGOT) 12 (L) 13 - 39 IU/L         If you have any further questions or problems contact my office at  [phone]   --- or send [employer] message --- otherwise schedule an appointment.    Clinic : [phone]  Appointment line: [phone]     Thank you,    Valentino Machado MD    Internal Medicine  [facility]     Reviewed and electronically signed by provider.

## 2018-07-24 NOTE — PROGRESS NOTES
Patient Information     Patient Name  Altaf Chao MRN  [id number] Sex  Male   1934      Letter by Valentino Machado MD at      Author:  Valentino Machado MD Service:  (none) Author Type:  (none)    Filed:   Encounter Date:  2017 Status:  (Other)           Altaf Chao  [address] [facility] [address]          2017    Dear Mr. Chao:    Following are the tests completed during your last clinic visit.  The results of these tests are included below.      Hope you're recovery from this acute illness is quick.    Results for orders placed or performed in visit on 17      COMPLETE METABOLIC PANEL      Result  Value Ref Range    SODIUM 135 133 - 143 mmol/L    POTASSIUM 4.5 3.5 - 5.1 mmol/L    CHLORIDE 100 98 - 107 mmol/L    CO2,TOTAL 20 (L) 21 - 31 mmol/L    ANION GAP 15 5 - 18                    GLUCOSE 220 (H) 70 - 105 mg/dL    CALCIUM 8.3 (L) 8.6 - 10.3 mg/dL     (H) 7 - 25 mg/dL    CREATININE 5.18 (H) 0.70 - 1.30 mg/dL    BUN/CREAT RATIO           22                    GFR if African American 13 (L) >60 ml/min/1.73m2    GFR if not African American 11 (L) >60 ml/min/1.73m2    ALBUMIN 3.1 (L) 3.5 - 5.7 g/dL    PROTEIN,TOTAL 5.7 (L) 6.4 - 8.9 g/dL    GLOBULIN                  2.6 2.0 - 3.7 g/dL    A/G RATIO 1.2 1.0 - 2.0                    BILIRUBIN,TOTAL 0.4 0.3 - 1.0 mg/dL    ALK PHOSPHATASE 72 34 - 104 IU/L    ALT (SGPT) 10 7 - 52 IU/L    AST (SGOT) 10 (L) 13 - 39 IU/L   CRP, inflammatory      Result  Value Ref Range    C-REACTIVE PROTEIN 11.298 (H) <1.000 mg/dL   CBC WITH AUTO DIFFERENTIAL      Result  Value Ref Range    WHITE BLOOD COUNT         23.1 (H) 4.5 - 11.0 thou/cu mm    RED BLOOD COUNT           2.53 (L) 4.30 - 5.90 mil/cu mm    HEMOGLOBIN                7.6 (L) 13.5 - 17.5 g/dL    HEMATOCRIT                22.7 (L) 37.0 - 53.0 %    MCV                       90 80 - 100 fL    MCH                       30.0 26.0 - 34.0 pg    MCHC                      33.5 32.0 -  36.0 g/dL    RDW                       19.0 (H) 11.5 - 15.5 %    PLATELET COUNT            219 140 - 440 thou/cu mm    MPV                       10.0 6.5 - 11.0 fL    NEUTROPHILS               92.3 (H) 42.0 - 72.0 %    LYMPHOCYTES               3.8 (L) 20.0 - 44.0 %    MONOCYTES                 1.2 <12.0 %    EOSINOPHILS               0.0 <8.0 %    BASOPHILS                 0.1 <3.0 %    ABSOLUTE NEUTROPHILS      21.4 (H) 1.7 - 7.0 thou/cu mm    ABSOLUTE LYMPHOCYTES      0.9 0.9 - 2.9 thou/cu mm    ABSOLUTE MONOCYTES        0.3 <0.9 thou/cu mm    ABSOLUTE EOSINOPHILS      0.0 <0.5 thou/cu mm    ABSOLUTE BASOPHILS        0.0 <0.3 thou/cu mm         If you have any further questions or problems contact my office at  [phone]   --- or send [employer] message --- otherwise schedule an appointment.    Clinic : [phone]  Appointment line: [phone]     Thank you,    Valentino Machado MD    Internal Medicine  [facility] and [facility]     Reviewed and electronically signed by provider.

## 2018-07-24 NOTE — PROGRESS NOTES
Patient Information     Patient Name  Altaf Chao MRN  [id number] Sex  Male   1934      Letter by Valentino Machado MD at      Author:  Valentino Machado MD Service:  (none) Author Type:  (none)    Filed:   Encounter Date:  2017 Status:  (Other)           Altaf Chao  [address]  [facility] [address]          2017    Dear Mr. Chao:    Following are the tests completed during your last clinic visit.  The results of these tests are included below.      Urinalysis is abnormal, showing small amount of blood.  No obvious infection noted based on urinalysis alone.    Urine culture is pending.    Results for orders placed or performed in visit on 17      CBC W PLT NO DIFF      Result  Value Ref Range    WHITE BLOOD COUNT         11.7 (H) 4.5 - 11.0 thou/cu mm    RED BLOOD COUNT           4.72 4.30 - 5.90 mil/cu mm    HEMOGLOBIN                14.4 13.5 - 17.5 g/dL    HEMATOCRIT                43.1 37.0 - 53.0 %    MCV                       91 80 - 100 fL    MCH                       30.4 26.0 - 34.0 pg    MCHC                      33.4 32.0 - 36.0 g/dL    RDW                       11.2 (L) 11.5 - 15.5 %    PLATELET COUNT            297 140 - 440 thou/cu mm    MPV                       8.4 6.5 - 11.0 fL   COMP METABOLIC PANEL      Result  Value Ref Range    SODIUM 135 133 - 143 mmol/L    POTASSIUM 3.9 3.5 - 5.1 mmol/L    CHLORIDE 106 98 - 107 mmol/L    CO2,TOTAL 24 21 - 31 mmol/L    ANION GAP 5 5 - 18                    GLUCOSE 139 (H) 70 - 105 mg/dL    CALCIUM 9.3 8.6 - 10.3 mg/dL    BUN 17 7 - 25 mg/dL    CREATININE 1.19 0.70 - 1.30 mg/dL    BUN/CREAT RATIO           14                    GFR if African American >60 >60 ml/min/1.73m2    GFR if not African American 59 (L) >60 ml/min/1.73m2    ALBUMIN 4.0 3.5 - 5.7 g/dL    PROTEIN,TOTAL 7.8 6.4 - 8.9 g/dL    GLOBULIN                  3.8 (H) 2.0 - 3.7 g/dL    A/G RATIO 1.1 1.0 - 2.0                    BILIRUBIN,TOTAL 0.6 0.3 - 1.0  mg/dL    ALK PHOSPHATASE 60 34 - 104 IU/L    ALT (SGPT) 7 7 - 52 IU/L    AST (SGOT) 12 (L) 13 - 39 IU/L   URINALYSIS W REFLEX MICROSCOPIC IF POSITIVE      Result  Value Ref Range    COLOR                     Yellow Yellow Color    CLARITY                   Clear Clear Clarity    SPECIFIC GRAVITY,URINE    1.015 1.010, 1.015, 1.020, 1.025                    PH,URINE                  5.5 6.0, 7.0, 8.0, 5.5, 6.5, 7.5, 8.5                    UROBILINOGEN,QUALITATIVE  Normal Normal EU/dl    PROTEIN, URINE Negative Negative mg/dL    GLUCOSE, URINE Negative Negative mg/dL    KETONES,URINE             Negative Negative mg/dL    BILIRUBIN,URINE           Negative Negative                    OCCULT BLOOD,URINE        Small (A) Negative                    NITRITE                   Negative Negative                    LEUKOCYTE ESTERASE        Negative Negative                   URINALYSIS MICROSCOPIC      Result  Value Ref Range    RBC 3-5 (A) 0-2, None Seen /HPF    WBC None Seen 0-2, 3-5, None Seen /HPF    BACTERIA                  None Seen None Seen, Rare, Occasional, Few Bacteria/HPF    EPITHELIAL CELLS          None Seen None Seen, Few Epi/HPF         If you have any further questions or problems contact my office at  [phone]   --- or send [employer] message --- otherwise schedule an appointment.    Clinic : [phone]  Appointment line: [phone]     Thank you,    Valentino Machado MD    Internal Medicine  [facility] and [facility]     Reviewed and electronically signed by provider.

## 2018-07-26 ENCOUNTER — ALLIED HEALTH/NURSE VISIT (OUTPATIENT)
Dept: FAMILY MEDICINE | Facility: OTHER | Age: 83
End: 2018-07-26
Attending: INTERNAL MEDICINE
Payer: MEDICARE

## 2018-07-26 DIAGNOSIS — D51.9 ANEMIA DUE TO VITAMIN B12 DEFICIENCY, UNSPECIFIED B12 DEFICIENCY TYPE: Primary | ICD-10-CM

## 2018-07-26 PROCEDURE — 96372 THER/PROPH/DIAG INJ SC/IM: CPT

## 2018-07-26 PROCEDURE — 25000128 H RX IP 250 OP 636: Performed by: INTERNAL MEDICINE

## 2018-07-26 RX ORDER — CYANOCOBALAMIN 1000 UG/ML
1000 INJECTION, SOLUTION INTRAMUSCULAR; SUBCUTANEOUS CONTINUOUS PRN
Status: DISCONTINUED | OUTPATIENT
Start: 2018-07-26 | End: 2018-11-21

## 2018-07-26 RX ORDER — CYANOCOBALAMIN 1000 UG/ML
1000 INJECTION, SOLUTION INTRAMUSCULAR; SUBCUTANEOUS CONTINUOUS PRN
Status: DISCONTINUED | OUTPATIENT
Start: 2018-07-26 | End: 2018-07-26 | Stop reason: CLARIF

## 2018-07-26 RX ADMIN — CYANOCOBALAMIN 1000 MCG: 1000 INJECTION, SOLUTION INTRAMUSCULAR at 09:36

## 2018-08-09 DIAGNOSIS — R73.9 STEROID-INDUCED HYPERGLYCEMIA: Primary | ICD-10-CM

## 2018-08-09 DIAGNOSIS — T38.0X5A STEROID-INDUCED HYPERGLYCEMIA: Primary | ICD-10-CM

## 2018-08-10 PROBLEM — E87.5 HYPERKALEMIA: Status: ACTIVE | Noted: 2017-04-03

## 2018-08-10 PROBLEM — N39.0 PSEUDOMONAS URINARY TRACT INFECTION: Status: ACTIVE | Noted: 2017-04-15

## 2018-08-10 PROBLEM — E87.1 HYPONATREMIA: Status: ACTIVE | Noted: 2017-04-03

## 2018-08-10 PROBLEM — B96.5 PSEUDOMONAS URINARY TRACT INFECTION: Status: ACTIVE | Noted: 2017-04-15

## 2018-08-13 RX ORDER — PEN NEEDLE, DIABETIC 31 GX5/16"
NEEDLE, DISPOSABLE MISCELLANEOUS
Qty: 300 EACH | Refills: 0 | Status: SHIPPED | OUTPATIENT
Start: 2018-08-13 | End: 2018-08-14

## 2018-08-13 NOTE — TELEPHONE ENCOUNTER
Prescription approved per [facility] Refill Protocol.  LOV; 10/25/17    Mary Ann Tapia RN on 8/13/2018 at 4:08 PM

## 2018-08-14 ENCOUNTER — OFFICE VISIT (OUTPATIENT)
Dept: INTERNAL MEDICINE | Facility: OTHER | Age: 83
End: 2018-08-14
Attending: INTERNAL MEDICINE
Payer: COMMERCIAL

## 2018-08-14 VITALS
HEART RATE: 92 BPM | DIASTOLIC BLOOD PRESSURE: 70 MMHG | BODY MASS INDEX: 24.39 KG/M2 | WEIGHT: 170 LBS | SYSTOLIC BLOOD PRESSURE: 124 MMHG

## 2018-08-14 DIAGNOSIS — N18.5 CKD (CHRONIC KIDNEY DISEASE) STAGE 5, GFR LESS THAN 15 ML/MIN (H): ICD-10-CM

## 2018-08-14 DIAGNOSIS — N05.8 PRIMARY PAUCI-IMMUNE NECROTIZING AND CRESCENTIC GLOMERULONEPHRITIS: ICD-10-CM

## 2018-08-14 DIAGNOSIS — T38.0X5A STEROID-INDUCED HYPERGLYCEMIA: Primary | ICD-10-CM

## 2018-08-14 DIAGNOSIS — R73.9 STEROID-INDUCED HYPERGLYCEMIA: Primary | ICD-10-CM

## 2018-08-14 DIAGNOSIS — E78.2 MIXED HYPERLIPIDEMIA: ICD-10-CM

## 2018-08-14 DIAGNOSIS — R33.9 URINARY RETENTION: ICD-10-CM

## 2018-08-14 DIAGNOSIS — R12 HEARTBURN: ICD-10-CM

## 2018-08-14 DIAGNOSIS — N05.7 PRIMARY PAUCI-IMMUNE NECROTIZING AND CRESCENTIC GLOMERULONEPHRITIS: ICD-10-CM

## 2018-08-14 DIAGNOSIS — I10 ESSENTIAL HYPERTENSION: ICD-10-CM

## 2018-08-14 PROCEDURE — G0463 HOSPITAL OUTPT CLINIC VISIT: HCPCS

## 2018-08-14 PROCEDURE — 99214 OFFICE O/P EST MOD 30 MIN: CPT | Performed by: INTERNAL MEDICINE

## 2018-08-14 RX ORDER — TAMSULOSIN HYDROCHLORIDE 0.4 MG/1
CAPSULE ORAL
Qty: 90 CAPSULE | Refills: 3 | Status: SHIPPED | OUTPATIENT
Start: 2018-08-14 | End: 2020-03-24

## 2018-08-14 RX ORDER — FUROSEMIDE 40 MG
40 TABLET ORAL EVERY MORNING
Qty: 90 TABLET | Refills: 3 | Status: SHIPPED | OUTPATIENT
Start: 2018-08-14 | End: 2019-09-22

## 2018-08-14 RX ORDER — FAMOTIDINE 20 MG/1
20 TABLET, FILM COATED ORAL DAILY
Qty: 90 TABLET | Refills: 3 | Status: SHIPPED | OUTPATIENT
Start: 2018-08-14 | End: 2019-09-30

## 2018-08-14 RX ORDER — AMLODIPINE BESYLATE 5 MG/1
TABLET ORAL
Qty: 90 TABLET | Refills: 3 | Status: SHIPPED | OUTPATIENT
Start: 2018-08-14 | End: 2019-07-25

## 2018-08-14 RX ORDER — PREDNISONE 5 MG/1
2.5 TABLET ORAL DAILY
COMMUNITY
Start: 2018-07-19 | End: 2020-03-24

## 2018-08-14 ASSESSMENT — ANXIETY QUESTIONNAIRES
1. FEELING NERVOUS, ANXIOUS, OR ON EDGE: NOT AT ALL
GAD7 TOTAL SCORE: 0
7. FEELING AFRAID AS IF SOMETHING AWFUL MIGHT HAPPEN: NOT AT ALL
IF YOU CHECKED OFF ANY PROBLEMS ON THIS QUESTIONNAIRE, HOW DIFFICULT HAVE THESE PROBLEMS MADE IT FOR YOU TO DO YOUR WORK, TAKE CARE OF THINGS AT HOME, OR GET ALONG WITH OTHER PEOPLE: NOT DIFFICULT AT ALL
5. BEING SO RESTLESS THAT IT IS HARD TO SIT STILL: NOT AT ALL
3. WORRYING TOO MUCH ABOUT DIFFERENT THINGS: NOT AT ALL
2. NOT BEING ABLE TO STOP OR CONTROL WORRYING: NOT AT ALL
6. BECOMING EASILY ANNOYED OR IRRITABLE: NOT AT ALL

## 2018-08-14 ASSESSMENT — ENCOUNTER SYMPTOMS
CONFUSION: 0
LIGHT-HEADEDNESS: 0
ARTHRALGIAS: 0
PALPITATIONS: 0
WHEEZING: 0
MYALGIAS: 0
BRUISES/BLEEDS EASILY: 1
DYSURIA: 0
ABDOMINAL PAIN: 0
VOMITING: 0
FATIGUE: 1
COUGH: 0
NAUSEA: 0
DIARRHEA: 0
EYE PAIN: 0
HEMATURIA: 0
AGITATION: 0
DIZZINESS: 0
CHILLS: 0
SHORTNESS OF BREATH: 0
FEVER: 0

## 2018-08-14 ASSESSMENT — PAIN SCALES - GENERAL: PAINLEVEL: NO PAIN (0)

## 2018-08-14 ASSESSMENT — PATIENT HEALTH QUESTIONNAIRE - PHQ9: 5. POOR APPETITE OR OVEREATING: NOT AT ALL

## 2018-08-14 NOTE — PROGRESS NOTES
"Nursing Notes:   Angelica Lei LPN  8/14/2018 11:13 AM  Signed  Patient presents to the clinic for medication refills, patient only wants insulin pen needles filled at this time.      Chief Complaint   Patient presents with     Clinic Care Coordination - Follow-up       Initial There were no vitals taken for this visit. Estimated body mass index is 24.39 kg/(m^2) as calculated from the following:    Height as of 6/1/17: 5' 10\" (1.778 m).    Weight as of 5/10/18: 170 lb (77.1 kg).  Medication Reconciliation: complete    Angelica Lei LPN    Nursing note reviewed with patient.  Accurracy and completeness verified.   Mr. Chao is a 84 year old male who:  Patient presents with:  Clinic Care Coordination - Follow-up    HPI     ICD-10-CM    1. Steroid-induced hyperglycemia R73.9 insulin pen needle (B-D U/F) 31G X 8 MM    T38.0X5A    2. Essential hypertension I10 amLODIPine (NORVASC) 5 MG tablet     furosemide (LASIX) 40 MG tablet   3. CKD (chronic kidney disease) stage 5, GFR 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 ml/min (H) N18.5    4. 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 Mixed hyperlipidemia E78.2    6. Heartburn R12 famotidine (PEPCID) 20 MG tablet   7. Urinary retention R33.9 tamsulosin (FLOMAX) 0.4 MG capsule     Steroid-induced hyperglycemia, continues with very low-dose insulin.  Needs insulin pen refills.    Hypertension, at this point is doing well with Lasix and Norvasc.  Needs refills.    Stage V chronic kidney disease, this was due to glomerulonephritis.  Still following with nephrology.  Creatinine at baseline at this time is currently around 5.  Continues on prednisone.  Prednisone recently reduced.  Advised likely would need some insulin reduction as well.  States that depending on what he eats really affects his blood sugars.    Hyperlipidemia, currently controlled, managed with diet.    Heartburn, continue Pepcid.  Needs refills today.    Urinary retention, continues on Flomax and " finasteride.  Needs refills today.  Reports that he has been emptying his bladder better.    Functional Capacity: > or about  4 METS.   No orthopnea/paroxysmal nocturnal dyspnea  Review of Systems   Constitutional: Positive for fatigue. Negative for chills and fever.   HENT: Negative for congestion and hearing loss.    Eyes: Negative for pain and visual disturbance.   Respiratory: Negative for cough, shortness of breath and wheezing.    Cardiovascular: Negative for chest pain and palpitations.   Gastrointestinal: Negative for abdominal pain, diarrhea, nausea and vomiting.   Endocrine: Negative for cold intolerance and heat intolerance.   Genitourinary: Negative for dysuria and hematuria.   Musculoskeletal: Negative for arthralgias and myalgias.   Skin: Negative for pallor.   Allergic/Immunologic: Negative for immunocompromised state.   Neurological: Negative for dizziness and light-headedness.   Hematological: Bruises/bleeds easily.   Psychiatric/Behavioral: Negative for agitation and confusion.      ALESSANDRA:   ALESSANDRA-7 SCORE 8/14/2018   Total Score 0     PHQ9:  PHQ-9 SCORE 6/9/2017 7/27/2017 8/14/2018   Total Score 0 0 0       I have personally reviewed the past medical history, past surgical history, medications, allergies, family and social history as listed below, on 8/14/2018.    No Known Allergies    Current Outpatient Prescriptions   Medication Sig Dispense Refill     amLODIPine (NORVASC) 5 MG tablet TAKE 1 TABLET BY MOUTH ONCE DAILY. 90 tablet 3     blood glucose (NO BRAND SPECIFIED) lancets standard Dispense item covered by pt ins. E11.65 IDDM type II, uncontrolled - Test 3 times/day. (Wants Barrel lancets)       blood glucose monitoring (NO BRAND SPECIFIED) test strip Dispense item covered by pt ins. E11.65 IDDM type II, uncontrolled - Test 4 times/day. Reason: New diabetes       Blood Glucose Monitoring Suppl (FIFTY50 GLUCOSE METER 2.0) W/DEVICE KIT Dispense meter, test strips, lancets covered by pt ins. E11.65  IDDM type II, uncontrolled - Test 4 times/day. Reason: New diabetes       cyanocobalamin (VITAMIN B12) 1000 MCG/ML injection Inject 1,000 mcg into the muscle       famotidine (PEPCID) 20 MG tablet Take 1 tablet (20 mg) by mouth daily 90 tablet 3     finasteride (PROSCAR) 5 MG tablet Take 1 tablet (5 mg) by mouth daily 90 tablet 3     furosemide (LASIX) 40 MG tablet Take 1 tablet (40 mg) by mouth every morning 90 tablet 3     insulin aspart (NOVOLOG PEN) 100 UNIT/ML injection Inject 5 Units Subcutaneous 3 times daily (with meals)       insulin glargine (LANTUS SOLOSTAR) 100 UNIT/ML injection Inject 2 Units Subcutaneous At Bedtime       insulin pen needle (B-D U/F) 31G X 8 MM Inject Subcutaneous At Bedtime Use 1 pen needles daily or as directed. 300 each 3     predniSONE (DELTASONE) 5 MG tablet Take 2.5 mg by mouth daily       sodium bicarbonate 650 MG tablet Take 650 mg by mouth daily       tamsulosin (FLOMAX) 0.4 MG capsule TAKE 1 CAPSULE BY MOUTH ONCE DAILY AFTER A MEAL. 90 capsule 3     [DISCONTINUED] amLODIPine (NORVASC) 5 MG tablet TAKE 1 TABLET BY MOUTH ONCE DAILY. 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     Priority: Medium     Anemia due to vitamin B12 deficiency 10/25/2017     Priority: Medium     Lymphedema of both lower extremities 06/23/2017     Priority: Medium     Immunocompromised patient (H) 06/09/2017     Priority: Medium     Bilateral edema of lower extremity 05/11/2017     Priority: Medium     Hematoma 05/04/2017     Priority: Medium     Steroid-induced hyperglycemia 05/04/2017     Priority: Medium     Anemia of chronic disease 05/03/2017     Priority: Medium     CKD (chronic kidney disease) stage 5, GFR 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 ml/min (H) 05/01/2017     Priority: Medium     Anemia 04/25/2017     Priority: Medium      Metabolic acidosis 04/25/2017     Priority: Medium     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     Priority: Medium     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     Priority: Medium     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     Priority: Medium     Pseudomonas urinary tract infection 04/15/2017     Priority: Medium     Hyperkalemia 04/03/2017     Priority: Medium     Hyponatremia 04/03/2017     Priority: Medium     Refusal of statin medication at discharge 02/17/2017     Priority: Medium     H/O total hip arthroplasty 06/30/2014     Priority: Medium     Mixed hyperlipidemia 04/10/2014     Priority: Medium     Bilateral leg edema 01/08/2014     Priority: Medium     Hip stiffness 01/08/2014     Priority: Medium     H/O bilateral inguinal hernia repair 09/04/2013     Priority: Medium     History of tobacco abuse 08/15/2013     Priority: Medium     Essential hypertension 08/07/2012     Priority: Medium     Pityriasis rosea 08/07/2012     Priority: Medium     Past Medical History:   Diagnosis Date     Acute kidney failure (H)     04/2017     Essential (primary) hypertension     8/7/2012     Hyperlipidemia     No Comments Provided     Osteoarthritis of hip     No Comments Provided     Pityriasis rosea     No Comments Provided     Tachycardia     No Comments Provided     Unilateral inguinal hernia without obstruction or gangrene     8/12/2013,Right Inguinal hernia with incarceration, massive [[id number]][     Past Surgical History:   Procedure Laterality Date     CIRCUMCISION      03/14/2017,Dr. Heidi GREENBERG     OTHER SURGICAL HISTORY      99479.9,GA SUBTALAR ATHROEREISIS,bone Spurs excision on Toe     OTHER SURGICAL HISTORY      8/20/13,,HERNIA REPAIR,Right,RIH with mesh     OTHER SURGICAL HISTORY      8/20/13,04192,GENITAL SURGERY MALE,Dorsal Slit     OTHER SURGICAL HISTORY      4/15/14,,HERNIA REPAIR,Left,LIH with mesh     OTHER SURGICAL  "HISTORY      14,03542.0,RI TOTAL HIP ARTHROPLASTY,Left     Social History     Social History     Marital status:      Spouse name: Lucita     Number of children: N/A     Years of education: N/A     Social History Main Topics     Smoking status: Former Smoker     Types: Cigarettes     Quit date: 1976     Smokeless tobacco: Never Used     Alcohol use 0.5 oz/week      Comment: Alcoholic Drinks/day: occasionally     Drug use: None      Comment: Drug use: No     Sexual activity: Not Currently     Other Topics Concern     None     Social History Narrative     - Wife Lucita.   3 kids - oldest son - neuroblastoma at 13 yo  at 14.   [employer] - Worked in [employer] -- [place] and .     Family History   Problem Relation Age of Onset     Other - See Comments Son 12     neuroblastoma at 13 yo  at 14.     Genetic Disorder No family hx of      Genetic,No known FHx of DM, CAD, CVA       EXAM:   Vitals:    18 1050   BP: 124/70   BP Location: Left arm   Patient Position: Sitting   Cuff Size: Adult Regular   Pulse: 92   Weight: 170 lb (77.1 kg)       Current Pain Score: No Pain (0)     BP Readings from Last 3 Encounters:   18 124/70   05/10/18 144/80   10/25/17 134/78    Wt Readings from Last 3 Encounters:   18 170 lb (77.1 kg)   05/10/18 170 lb (77.1 kg)   10/25/17 171 lb 9.6 oz (77.8 kg)      Estimated body mass index is 24.39 kg/(m^2) as calculated from the following:    Height as of 17: 5' 10\" (1.778 m).    Weight as of this encounter: 170 lb (77.1 kg).     Physical Exam   Constitutional: He appears well-developed and well-nourished.   HENT:   Mouth/Throat: No oropharyngeal exudate.   Eyes: No scleral icterus.   Cardiovascular: Normal rate and regular rhythm.    Pulmonary/Chest: Effort normal. He has no wheezes.   Abdominal: Soft.   Musculoskeletal: He exhibits no edema, tenderness or deformity.   Lymphadenopathy:     He has " no cervical adenopathy.   Neurological: He is alert. No cranial nerve deficit.   Skin: Skin is warm.   + easy arm bruising   Psychiatric: He has a normal mood and affect.      INVESTIGATIONS:  Results for orders placed or performed during the hospital encounter of 07/07/17   XR Thoracic Spine 3 Views    Narrative    Procedure: XR SPINE THORACIC 3 VIEWS    HISTORY:  Mid back pain.    TECHNIQUE: Thoracic spine 3 views.    COMPARISON: Chest x-ray 05/11/2017    FINDINGS:    There are multiple compression deformities in the lower thoracic spine, new from prior studies. These are likely at the T10, T11, and T12 levels. There is mild disc space narrowing and degenerative endplate changes at several levels.    There is a hiatal hernia, unchanged.      Impression    Multiple compression fractures in the lower thoracic spine are technically age indeterminate but new from prior studies. Consider follow-up MRI.    Electronically Signed By: Moraima Arvizu M.D. on 7/7/2017 2:11 PM   XR Lumbar Spine G/E 4 Views    Narrative    Procedure: XR SPINE LUMBAR 4 VIEWS    HISTORY:  Mid back pain.    TECHNIQUE: Lumbar spine 3 views.    COMPARISON: CT abdomen pelvis 05/05/2017    FINDINGS:    There is moderate S-shaped scoliosis.  There are compression fractures of L1 and L2, new from the prior CT.  Disc spaces are narrowed at all levels with associated degenerative endplate changes and osteophytes. There is also multilevel facet arthrosis..      Impression    Compression fractures at L1 and L2, new from the prior CT but technically age indeterminate. Consider follow-up MRI.    Electronically Signed By: Moraima Arvizu M.D. on 7/7/2017 2:12 PM       ASSESSMENT AND PLAN:  Problem List Items Addressed This Visit        Digestive    Heartburn    Relevant Medications    famotidine (PEPCID) 20 MG tablet       Endocrine    Mixed hyperlipidemia    Steroid-induced hyperglycemia - Primary    Relevant Medications    insulin pen needle (B-D U/F)  31G X 8 MM       Circulatory    Essential hypertension    Relevant Medications    amLODIPine (NORVASC) 5 MG tablet    furosemide (LASIX) 40 MG tablet       Musculoskeletal and Integumentary    Primary pauci-immune necrotizing and crescentic glomerulonephritis       Urinary    CKD (chronic kidney disease) stage 5, GFR 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 ml/min (H)      Other Visit Diagnoses     Urinary retention        Relevant Medications    tamsulosin (FLOMAX) 0.4 MG capsule        reviewed diet, exercise and weight control, recommended sodium restriction  -- Expected clinical course discussed    -- Medications and their side effects discussed    Patient Instructions   Kidney function at [facility] has been stable.     + Will likely need to reduce your insulin dose, with lowering of your prednisone dose.     Medications refilled.     Return as needed for follow-up with Dr. Machado.    Clinic : [phone]  Appointment line: [phone]      Valentino Machado MD  Internal Medicine  [facility]

## 2018-08-14 NOTE — NURSING NOTE
"Patient presents to the clinic for medication refills, patient only wants insulin pen needles filled at this time.      Chief Complaint   Patient presents with     Clinic Care Coordination - Follow-up       Initial There were no vitals taken for this visit. Estimated body mass index is 24.39 kg/(m^2) as calculated from the following:    Height as of 6/1/17: 5' 10\" (1.778 m).    Weight as of 5/10/18: 170 lb (77.1 kg).  Medication Reconciliation: complete    Angelica Lei LPN    "

## 2018-08-14 NOTE — PATIENT INSTRUCTIONS
Kidney function at [facility] has been stable.     + Will likely need to reduce your insulin dose, with lowering of your prednisone dose.     Medications refilled.     Return as needed for follow-up with Dr. Machado.    Clinic : [phone]  Appointment line: [phone]

## 2018-08-14 NOTE — MR AVS SNAPSHOT
After Visit Summary   8/14/2018    Altaf Chao    MRN: [id number]           Patient Information     Date Of Birth          4/9/1934        Visit Information        Provider Department      8/14/2018 10:40 AM Valentino Machado MD [facility]        Today's Diagnoses     Steroid-induced hyperglycemia    -  1    Essential hypertension        CKD (chronic kidney disease) stage 5, GFR 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 ml/min (H)        Primary pauci-immune necrotizing and crescentic glomerulonephritis        Mixed hyperlipidemia        Heartburn        Urinary retention          Care Instructions    Kidney function at [facility] has been stable.     + Will likely need to reduce your insulin dose, with lowering of your prednisone dose.     Medications refilled.     Return as needed for follow-up with Dr. Machado.    Clinic : [phone]  Appointment line: [phone]            Follow-ups after your visit        Your next 10 appointments already scheduled     Aug 16, 2018  9:15 AM CDT   Nurse Only with [facility] INJECTION NURSE   [facility]    [address]  [place] [id number] [phone]            Sep 06, 2018 10:15 AM CDT   Nurse Only with GH INJECTION NURSE   [facility] ([facility])    [address]  [place] [id number] [phone]              Who to contact     If you have questions or need follow up information about today's clinic visit or your schedule please contact [facility] directly at [phone].  Normal or non-critical lab and imaging results will be communicated to you by MyChart, letter or phone within 4 business days after the clinic has received the results. If you do not hear from us within 7 days, please contact the clinic through MyChart or phone. If you have a critical or abnormal lab result, we will notify you by phone  as soon as possible.  Submit refill requests through [employer] or call your pharmacy and they will forward the refill request to us. Please allow 3 business days for your refill to be completed.          Additional Information About Your Visit        Care EveryWhere ID     This is your Care EveryWhere ID. This could be used by other organizations to access your [place] medical records  [id number]        Your Vitals Were     Pulse BMI (Body Mass Index)                92 24.39 kg/m2           Blood Pressure from Last 3 Encounters:   08/14/18 124/70   05/10/18 144/80   10/25/17 134/78    Weight from Last 3 Encounters:   08/14/18 170 lb (77.1 kg)   05/10/18 170 lb (77.1 kg)   10/25/17 171 lb 9.6 oz (77.8 kg)              Today, you had the following     No orders found for display         Today's Medication Changes          These changes are accurate as of 8/14/18 11:20 AM.  If you have any questions, ask your nurse or doctor.               These medicines have changed or have updated prescriptions.        Dose/Directions    famotidine 20 MG tablet   Commonly known as:  PEPCID   This may have changed:  See the new instructions.   Used for:  Heartburn   Changed by:  Valentino Machado MD        Dose:  20 mg   Take 1 tablet (20 mg) by mouth daily   Quantity:  90 tablet   Refills:  3       insulin pen needle 31G X 8 MM   Commonly known as:  B-D U/F   This may have changed:  See the new instructions.   Used for:  Steroid-induced hyperglycemia   Changed by:  Valentino Machado MD        Inject Subcutaneous At Bedtime Use 1 pen needles daily or as directed.   Quantity:  300 each   Refills:  3       predniSONE 5 MG tablet   Commonly known as:  DELTASONE   This may have changed:  Another medication with the same name was removed. Continue taking this medication, and follow the directions you see here.   Changed by:  Valentino Machado MD        Dose:  2.5 mg   Take 2.5 mg by mouth daily   Refills:  0            Where to get your  medicines      These medications were sent to [id number] [facility] - [place], [place] - [address]  [address] [place] [facility] [address]     Phone:  [phone]     amLODIPine 5 MG tablet    famotidine 20 MG tablet    furosemide 40 MG tablet    insulin pen needle 31G X 8 MM    tamsulosin 0.4 MG capsule                Primary Care Provider Office Phone # Fax #    Valentino Machado [phone]       [address]  [facility] [address]        Equal Access to Services     [facility]: Hadii aad ku hadasho Soomaali, waaxda luqadaha, qaybta kaalmada adeegyada, kelly cohen . So [facility] [phone].    ATENCIÓN: Si habla español, tiene a poe disposición servicios gratuitos de asistencia lingüística. [facility] [phone].    We comply with applicable federal civil rights laws and [place] laws. We do not discriminate on the basis of race, color, national origin, age, disability, sex, sexual orientation, or gender identity.            Thank you!     Thank you for choosing [facility] AND [facility]  for your care. Our goal is always to provide you with excellent care. Hearing back from our patients is one way we can continue to improve our services. Please take a few minutes to complete the written survey that you may receive in the mail after your visit with us. Thank you!             Your Updated Medication List - Protect others around you: Learn how to safely use, store and throw away your medicines at www.disposemymeds.org.          This list is accurate as of 8/14/18 11:20 AM.  Always use your most recent med list.                   Brand Name Dispense Instructions for use Diagnosis    amLODIPine 5 MG tablet    NORVASC    90 tablet    TAKE 1 TABLET BY MOUTH ONCE DAILY.    Essential hypertension       blood glucose lancets standard    no brand specified     Dispense item covered by pt ins. E11.65 IDDM type II, uncontrolled - Test 3 times/day.  (Wants Barrel lancets)        blood glucose monitoring test strip    no brand specified     Dispense item covered by pt ins. E11.65 IDDM type II, uncontrolled - Test 4 times/day. Reason: New diabetes        cyanocobalamin 1000 MCG/ML injection    VITAMIN B12     Inject 1,000 mcg into the muscle        famotidine 20 MG tablet    PEPCID    90 tablet    Take 1 tablet (20 mg) by mouth daily    Heartburn       FIFTY50 GLUCOSE METER 2.0 w/Device Kit      Dispense meter, test strips, lancets covered by pt ins. E11.65 IDDM type II, uncontrolled - Test 4 times/day. Reason: New diabetes        finasteride 5 MG tablet    PROSCAR    90 tablet    Take 1 tablet (5 mg) by mouth daily    Urinary retention       furosemide 40 MG tablet    LASIX    90 tablet    Take 1 tablet (40 mg) by mouth every morning    Essential hypertension       insulin aspart 100 UNIT/ML injection    NovoLOG PEN     Inject 5 Units Subcutaneous 3 times daily (with meals)        insulin pen needle 31G X 8 MM    B-D U/F    300 each    Inject Subcutaneous At Bedtime Use 1 pen needles daily or as directed.    Steroid-induced hyperglycemia       LANTUS SOLOSTAR 100 UNIT/ML injection   Generic drug:  insulin glargine      Inject 2 Units Subcutaneous At Bedtime        predniSONE 5 MG tablet    DELTASONE     Take 2.5 mg by mouth daily        sodium bicarbonate 650 MG tablet      Take 650 mg by mouth daily        tamsulosin 0.4 MG capsule    FLOMAX    90 capsule    TAKE 1 CAPSULE BY MOUTH ONCE DAILY AFTER A MEAL.    Urinary retention

## 2018-08-15 ASSESSMENT — PATIENT HEALTH QUESTIONNAIRE - PHQ9: SUM OF ALL RESPONSES TO PHQ QUESTIONS 1-9: 0

## 2018-08-15 ASSESSMENT — ANXIETY QUESTIONNAIRES: GAD7 TOTAL SCORE: 0

## 2018-08-16 ENCOUNTER — ALLIED HEALTH/NURSE VISIT (OUTPATIENT)
Dept: FAMILY MEDICINE | Facility: OTHER | Age: 83
End: 2018-08-16
Attending: INTERNAL MEDICINE
Payer: MEDICARE

## 2018-08-16 DIAGNOSIS — D64.9 ANEMIA: Primary | ICD-10-CM

## 2018-08-16 PROCEDURE — 96372 THER/PROPH/DIAG INJ SC/IM: CPT

## 2018-08-16 PROCEDURE — 25000128 H RX IP 250 OP 636: Performed by: INTERNAL MEDICINE

## 2018-08-16 RX ADMIN — CYANOCOBALAMIN 1000 MCG: 1000 INJECTION, SOLUTION INTRAMUSCULAR at 09:21

## 2018-09-06 ENCOUNTER — ALLIED HEALTH/NURSE VISIT (OUTPATIENT)
Dept: FAMILY MEDICINE | Facility: OTHER | Age: 83
End: 2018-09-06
Attending: INTERNAL MEDICINE
Payer: MEDICARE

## 2018-09-06 DIAGNOSIS — D63.8 ANEMIA OF CHRONIC DISEASE: Primary | ICD-10-CM

## 2018-09-06 PROCEDURE — 25000128 H RX IP 250 OP 636: Performed by: INTERNAL MEDICINE

## 2018-09-06 PROCEDURE — 96372 THER/PROPH/DIAG INJ SC/IM: CPT

## 2018-09-06 RX ADMIN — CYANOCOBALAMIN 1000 MCG: 1000 INJECTION, SOLUTION INTRAMUSCULAR at 10:12

## 2018-09-13 DIAGNOSIS — I10 ESSENTIAL HYPERTENSION: ICD-10-CM

## 2018-09-14 RX ORDER — AMLODIPINE BESYLATE 5 MG/1
TABLET ORAL
Qty: 90 TABLET | Refills: 0 | OUTPATIENT
Start: 2018-09-14

## 2018-09-14 NOTE — TELEPHONE ENCOUNTER
Too soon. Rx filled 8-14-18 for #90 X 3 refills. Refills left. Moraima Rodas RN on 9/14/2018 at 3:52 PM

## 2018-09-27 ENCOUNTER — ALLIED HEALTH/NURSE VISIT (OUTPATIENT)
Dept: FAMILY MEDICINE | Facility: OTHER | Age: 83
End: 2018-09-27
Attending: INTERNAL MEDICINE
Payer: MEDICARE

## 2018-09-27 DIAGNOSIS — D63.8 ANEMIA OF CHRONIC DISEASE: ICD-10-CM

## 2018-09-27 DIAGNOSIS — D64.9 ANEMIA: Primary | ICD-10-CM

## 2018-09-27 PROCEDURE — 96372 THER/PROPH/DIAG INJ SC/IM: CPT

## 2018-09-27 PROCEDURE — 25000128 H RX IP 250 OP 636: Performed by: INTERNAL MEDICINE

## 2018-09-27 RX ADMIN — CYANOCOBALAMIN 1000 MCG: 1000 INJECTION, SOLUTION INTRAMUSCULAR at 09:26

## 2018-10-09 ENCOUNTER — TRANSFERRED RECORDS (OUTPATIENT)
Dept: HEALTH INFORMATION MANAGEMENT | Facility: OTHER | Age: 83
End: 2018-10-09

## 2018-10-22 ENCOUNTER — ALLIED HEALTH/NURSE VISIT (OUTPATIENT)
Dept: FAMILY MEDICINE | Facility: OTHER | Age: 83
End: 2018-10-22
Attending: INTERNAL MEDICINE
Payer: MEDICARE

## 2018-10-22 DIAGNOSIS — D63.8 ANEMIA OF CHRONIC DISEASE: ICD-10-CM

## 2018-10-22 DIAGNOSIS — D51.9 ANEMIA DUE TO VITAMIN B12 DEFICIENCY: Primary | ICD-10-CM

## 2018-10-22 PROCEDURE — 25000128 H RX IP 250 OP 636: Performed by: INTERNAL MEDICINE

## 2018-10-22 PROCEDURE — 96372 THER/PROPH/DIAG INJ SC/IM: CPT

## 2018-10-22 RX ADMIN — CYANOCOBALAMIN 1000 MCG: 1000 INJECTION, SOLUTION INTRAMUSCULAR at 14:26

## 2018-11-08 DIAGNOSIS — T38.0X5A STEROID-INDUCED HYPERGLYCEMIA: ICD-10-CM

## 2018-11-08 DIAGNOSIS — R73.9 STEROID-INDUCED HYPERGLYCEMIA: ICD-10-CM

## 2018-11-12 ENCOUNTER — ALLIED HEALTH/NURSE VISIT (OUTPATIENT)
Dept: FAMILY MEDICINE | Facility: OTHER | Age: 83
End: 2018-11-12
Attending: INTERNAL MEDICINE
Payer: MEDICARE

## 2018-11-12 DIAGNOSIS — D64.9 ANEMIA: Primary | ICD-10-CM

## 2018-11-12 PROCEDURE — 96372 THER/PROPH/DIAG INJ SC/IM: CPT

## 2018-11-12 PROCEDURE — 25000128 H RX IP 250 OP 636: Performed by: INTERNAL MEDICINE

## 2018-11-12 RX ORDER — PEN NEEDLE, DIABETIC 31 GX5/16"
NEEDLE, DISPOSABLE MISCELLANEOUS
Refills: 0 | OUTPATIENT
Start: 2018-11-12

## 2018-11-12 RX ADMIN — CYANOCOBALAMIN 1000 MCG: 1000 INJECTION, SOLUTION INTRAMUSCULAR at 09:14

## 2018-11-12 NOTE — TELEPHONE ENCOUNTER
Filled 8-14-18 for #300 X 3 refills. [facility] confirmed receipt. Moraima Rodas RN on 11/12/2018 at 9:04 AM

## 2018-11-20 DIAGNOSIS — D51.9 ANEMIA DUE TO VITAMIN B12 DEFICIENCY, UNSPECIFIED B12 DEFICIENCY TYPE: ICD-10-CM

## 2018-11-20 NOTE — PROGRESS NOTES
Please reorder the B12 shot for Altaf he will  18. He presently does every 21 days . Last level was checked 6/10/2017 and was 183. Thank you . Elle Rodrigez RN on 2018 at 10:17 AM

## 2018-11-21 RX ORDER — CYANOCOBALAMIN 1000 UG/ML
1000 INJECTION, SOLUTION INTRAMUSCULAR; SUBCUTANEOUS CONTINUOUS PRN
Status: DISCONTINUED | OUTPATIENT
Start: 2018-11-21 | End: 2019-11-25

## 2018-12-03 ENCOUNTER — ALLIED HEALTH/NURSE VISIT (OUTPATIENT)
Dept: FAMILY MEDICINE | Facility: OTHER | Age: 83
End: 2018-12-03
Attending: INTERNAL MEDICINE
Payer: MEDICARE

## 2018-12-03 DIAGNOSIS — D63.8 ANEMIA OF CHRONIC DISEASE: Primary | ICD-10-CM

## 2018-12-03 PROCEDURE — 25000128 H RX IP 250 OP 636: Performed by: INTERNAL MEDICINE

## 2018-12-03 PROCEDURE — 96372 THER/PROPH/DIAG INJ SC/IM: CPT

## 2018-12-03 RX ADMIN — CYANOCOBALAMIN 1000 MCG: 1000 INJECTION, SOLUTION INTRAMUSCULAR; SUBCUTANEOUS at 09:36

## 2018-12-27 ENCOUNTER — ALLIED HEALTH/NURSE VISIT (OUTPATIENT)
Dept: FAMILY MEDICINE | Facility: OTHER | Age: 83
End: 2018-12-27
Attending: INTERNAL MEDICINE
Payer: MEDICARE

## 2018-12-27 DIAGNOSIS — D51.9 ANEMIA DUE TO VITAMIN B12 DEFICIENCY, UNSPECIFIED B12 DEFICIENCY TYPE: Primary | ICD-10-CM

## 2018-12-27 PROCEDURE — 25000128 H RX IP 250 OP 636: Performed by: INTERNAL MEDICINE

## 2018-12-27 PROCEDURE — 96372 THER/PROPH/DIAG INJ SC/IM: CPT

## 2018-12-27 RX ADMIN — CYANOCOBALAMIN 1000 MCG: 1000 INJECTION, SOLUTION INTRAMUSCULAR; SUBCUTANEOUS at 09:17

## 2018-12-27 NOTE — NURSING NOTE
Prior to injection, verified patient identity using patient's name and date of birth.  Due to injection administration, patient instructed to remain in clinic for 15 minutes  afterwards, and to report any adverse reaction to me immediately. Patient declined due to the weather.    B12    Drug Amount Wasted:  None.  Vial/Syringe: Single dose vial  Expiration Date:  Aug 2020  Zoila Romeo RN on 12/27/2018 at 9:15 AM

## 2019-01-17 ENCOUNTER — ALLIED HEALTH/NURSE VISIT (OUTPATIENT)
Dept: FAMILY MEDICINE | Facility: OTHER | Age: 84
End: 2019-01-17
Attending: INTERNAL MEDICINE
Payer: MEDICARE

## 2019-01-17 DIAGNOSIS — D51.9 ANEMIA DUE TO VITAMIN B12 DEFICIENCY: Primary | ICD-10-CM

## 2019-01-17 PROCEDURE — 25000128 H RX IP 250 OP 636: Performed by: INTERNAL MEDICINE

## 2019-01-17 PROCEDURE — 96372 THER/PROPH/DIAG INJ SC/IM: CPT

## 2019-01-17 RX ADMIN — CYANOCOBALAMIN 1000 MCG: 1000 INJECTION, SOLUTION INTRAMUSCULAR; SUBCUTANEOUS at 09:19

## 2019-01-17 NOTE — PROGRESS NOTES
Verified name and date of birth . New injections instructed to wait 15 min post injection in the lobby and to report any symptoms of a reaction to nursing .  Elle Rodrigez RN on 1/17/2019 at 9:24 AM

## 2019-02-07 ENCOUNTER — ALLIED HEALTH/NURSE VISIT (OUTPATIENT)
Dept: FAMILY MEDICINE | Facility: OTHER | Age: 84
End: 2019-02-07
Attending: INTERNAL MEDICINE
Payer: MEDICARE

## 2019-02-07 DIAGNOSIS — D63.8 ANEMIA OF CHRONIC DISEASE: Primary | ICD-10-CM

## 2019-02-07 PROCEDURE — 25000128 H RX IP 250 OP 636: Performed by: INTERNAL MEDICINE

## 2019-02-07 PROCEDURE — 96372 THER/PROPH/DIAG INJ SC/IM: CPT

## 2019-02-07 RX ADMIN — CYANOCOBALAMIN 1000 MCG: 1000 INJECTION, SOLUTION INTRAMUSCULAR; SUBCUTANEOUS at 09:15

## 2019-02-07 NOTE — PROGRESS NOTES
Verified name and date of birth . New injections instructed to wait 15 min post injection in the lobby and to report any symptoms of a reaction to nursing .  Elle Rodrigez RN on 2/7/2019 at 9:16 AM

## 2019-03-05 ENCOUNTER — ALLIED HEALTH/NURSE VISIT (OUTPATIENT)
Dept: FAMILY MEDICINE | Facility: OTHER | Age: 84
End: 2019-03-05
Attending: INTERNAL MEDICINE
Payer: MEDICARE

## 2019-03-05 DIAGNOSIS — D63.8 ANEMIA OF CHRONIC DISEASE: Primary | ICD-10-CM

## 2019-03-05 PROCEDURE — 96372 THER/PROPH/DIAG INJ SC/IM: CPT

## 2019-03-05 PROCEDURE — 25000128 H RX IP 250 OP 636: Performed by: INTERNAL MEDICINE

## 2019-03-05 RX ADMIN — CYANOCOBALAMIN 1000 MCG: 1000 INJECTION, SOLUTION INTRAMUSCULAR; SUBCUTANEOUS at 09:28

## 2019-03-07 ENCOUNTER — TRANSFERRED RECORDS (OUTPATIENT)
Dept: HEALTH INFORMATION MANAGEMENT | Facility: OTHER | Age: 84
End: 2019-03-07

## 2019-03-26 ENCOUNTER — ALLIED HEALTH/NURSE VISIT (OUTPATIENT)
Dept: FAMILY MEDICINE | Facility: OTHER | Age: 84
End: 2019-03-26
Attending: INTERNAL MEDICINE
Payer: MEDICARE

## 2019-03-26 DIAGNOSIS — D51.9 ANEMIA DUE TO VITAMIN B12 DEFICIENCY: ICD-10-CM

## 2019-03-26 DIAGNOSIS — D64.9 ANEMIA: Primary | ICD-10-CM

## 2019-03-26 PROCEDURE — 25000128 H RX IP 250 OP 636: Performed by: INTERNAL MEDICINE

## 2019-03-26 PROCEDURE — 96372 THER/PROPH/DIAG INJ SC/IM: CPT

## 2019-03-26 RX ADMIN — CYANOCOBALAMIN 1000 MCG: 1000 INJECTION, SOLUTION INTRAMUSCULAR; SUBCUTANEOUS at 09:39

## 2019-04-16 ENCOUNTER — ALLIED HEALTH/NURSE VISIT (OUTPATIENT)
Dept: FAMILY MEDICINE | Facility: OTHER | Age: 84
End: 2019-04-16
Attending: INTERNAL MEDICINE
Payer: MEDICARE

## 2019-04-16 DIAGNOSIS — Z23 NEED FOR VACCINATION: Primary | ICD-10-CM

## 2019-04-16 DIAGNOSIS — D63.8 ANEMIA OF CHRONIC DISEASE: ICD-10-CM

## 2019-04-16 DIAGNOSIS — Z23 NEED FOR PROPHYLACTIC VACCINATION AND INOCULATION AGAINST INFLUENZA: ICD-10-CM

## 2019-04-16 PROCEDURE — 25000128 H RX IP 250 OP 636: Performed by: INTERNAL MEDICINE

## 2019-04-16 PROCEDURE — G0009 ADMIN PNEUMOCOCCAL VACCINE: HCPCS

## 2019-04-16 PROCEDURE — 90471 IMMUNIZATION ADMIN: CPT

## 2019-04-16 PROCEDURE — 90662 IIV NO PRSV INCREASED AG IM: CPT

## 2019-04-16 PROCEDURE — 90670 PCV13 VACCINE IM: CPT

## 2019-04-16 PROCEDURE — G0008 ADMIN INFLUENZA VIRUS VAC: HCPCS

## 2019-04-16 PROCEDURE — 96372 THER/PROPH/DIAG INJ SC/IM: CPT

## 2019-04-16 RX ADMIN — CYANOCOBALAMIN 1000 MCG: 1000 INJECTION, SOLUTION INTRAMUSCULAR; SUBCUTANEOUS at 08:54

## 2019-04-16 NOTE — PROGRESS NOTES
Injectable Influenza Immunization Documentation    1.  Is the person to be vaccinated sick today?   No    2. Does the person to be vaccinated have an allergy to a component   of the vaccine?   No  Egg Allergy Algorithm Link    3. Has the person to be vaccinated ever had a serious reaction   to influenza vaccine in the past?   No    4. Has the person to be vaccinated ever had Guillain-Barré syndrome?   No  Has had Flu shot in the past . Pt denies allergies to yeast gelatin neosporin eggs thimerasol or latex or past reactions to vaccinations. Verified name and date of birth  Copy of MIIC given. Pt instructed to wait 15 min post injection in lobby and to report any reactions to nursing.  Pt to return 1 year for p 23 vaccine  Form completed by Elle Rodrigez RN on 4/16/2019 at 8:49 AM

## 2019-04-21 DIAGNOSIS — R33.9 URINARY RETENTION: ICD-10-CM

## 2019-04-24 RX ORDER — FINASTERIDE 5 MG/1
TABLET, FILM COATED ORAL
Qty: 90 TABLET | Refills: 0 | Status: SHIPPED | OUTPATIENT
Start: 2019-04-24 | End: 2019-07-25

## 2019-04-24 NOTE — TELEPHONE ENCOUNTER
"  Requested Prescriptions   Pending Prescriptions Disp Refills     finasteride (PROSCAR) 5 MG tablet [Pharmacy Med Name: FINASTERIDE 5 MG TABLET] 90 tablet 3     Sig: TAKE 1 TABLET BY MOUTH EVERY DAY       Alpha Blockers Passed - 4/21/2019  8:26 AM        Passed - Blood pressure under 140/90 in past 12 months     BP Readings from Last 3 Encounters:   08/14/18 124/70   05/10/18 144/80   10/25/17 134/78                 Passed - Recent (12 mo) or future (30 days) visit within the authorizing provider's specialty     Patient had office visit in the last 12 months or has a visit in the next 30 days with authorizing provider or within the authorizing provider's specialty.  See \"Patient Info\" tab in inbasket, or \"Choose Columns\" in Meds & Orders section of the refill encounter.              Passed - Patient does not have Tadalafil, Vardenafil, or Sildenafil on their medication list        Passed - Medication is active on med list        Passed - Patient is 18 years of age or older        LOV-08/14/2018-Urinary retention, continues on Flomax and finasteride.  Needs refills today.  Reports that he has been emptying his bladder better.       "

## 2019-05-01 DIAGNOSIS — T38.0X5A STEROID-INDUCED HYPERGLYCEMIA: Primary | ICD-10-CM

## 2019-05-01 DIAGNOSIS — R73.9 STEROID-INDUCED HYPERGLYCEMIA: Primary | ICD-10-CM

## 2019-05-03 NOTE — TELEPHONE ENCOUNTER
"Pharmacy sig of 4 unit does not match sig in chart of 2 unit. Contacted patient who states, \" I take 2 clicks of the Lantus at bedtime\". Parker'd up as patient states is taking. Patient will be due for annual OV in August 2019    Lantus  LOV-08/14/2018    Future Office visit:    Next 5 appointments (look out 90 days)    May 07, 2019  9:30 AM CDT  Nurse Only with  INJECTION NURSE  [facility] [address]  [place] [address]  [phone]           Routing refill request to provider for review/approval. Unable to complete prescription refill per RNMedication Refill Policy.................... Kelly Palacios ....................  5/3/2019   2:57 PM            "

## 2019-05-07 ENCOUNTER — ALLIED HEALTH/NURSE VISIT (OUTPATIENT)
Dept: FAMILY MEDICINE | Facility: OTHER | Age: 84
End: 2019-05-07
Attending: INTERNAL MEDICINE
Payer: MEDICARE

## 2019-05-07 DIAGNOSIS — D51.9 ANEMIA DUE TO VITAMIN B12 DEFICIENCY, UNSPECIFIED B12 DEFICIENCY TYPE: Primary | ICD-10-CM

## 2019-05-07 PROCEDURE — 25000128 H RX IP 250 OP 636: Performed by: INTERNAL MEDICINE

## 2019-05-07 PROCEDURE — 96372 THER/PROPH/DIAG INJ SC/IM: CPT

## 2019-05-07 RX ADMIN — CYANOCOBALAMIN 1000 MCG: 1000 INJECTION, SOLUTION INTRAMUSCULAR; SUBCUTANEOUS at 09:25

## 2019-05-07 NOTE — PROGRESS NOTES
Patient's  and name verified. B-12 injection given in right deltoid. Instructed patient to wait 15 mins in lobby and report any adverse side effects to nurse.    Jo Dominguez RN on 2019 at 9:42 AM

## 2019-05-28 ENCOUNTER — ALLIED HEALTH/NURSE VISIT (OUTPATIENT)
Dept: FAMILY MEDICINE | Facility: OTHER | Age: 84
End: 2019-05-28
Attending: INTERNAL MEDICINE
Payer: MEDICARE

## 2019-05-28 DIAGNOSIS — D51.9 ANEMIA DUE TO VITAMIN B12 DEFICIENCY, UNSPECIFIED B12 DEFICIENCY TYPE: Primary | ICD-10-CM

## 2019-05-28 PROCEDURE — 25000128 H RX IP 250 OP 636: Performed by: INTERNAL MEDICINE

## 2019-05-28 PROCEDURE — 96372 THER/PROPH/DIAG INJ SC/IM: CPT

## 2019-05-28 RX ADMIN — CYANOCOBALAMIN 1000 MCG: 1000 INJECTION, SOLUTION INTRAMUSCULAR; SUBCUTANEOUS at 10:46

## 2019-05-28 NOTE — PROGRESS NOTES
Verified patient's first and last name, and . Patient stated reason for visit today is to receive B 12 shot. Vitamin B 12 injection prepared and administered as ordered. Patient left clinic ambulatory.      Miya Mendes RN on 2019 at 10:49 AM

## 2019-06-03 ENCOUNTER — TRANSFERRED RECORDS (OUTPATIENT)
Dept: HEALTH INFORMATION MANAGEMENT | Facility: OTHER | Age: 84
End: 2019-06-03

## 2019-06-18 ENCOUNTER — ALLIED HEALTH/NURSE VISIT (OUTPATIENT)
Dept: FAMILY MEDICINE | Facility: OTHER | Age: 84
End: 2019-06-18
Attending: INTERNAL MEDICINE
Payer: MEDICARE

## 2019-06-18 DIAGNOSIS — D51.9 ANEMIA DUE TO VITAMIN B12 DEFICIENCY, UNSPECIFIED B12 DEFICIENCY TYPE: Primary | ICD-10-CM

## 2019-06-18 PROCEDURE — 96372 THER/PROPH/DIAG INJ SC/IM: CPT

## 2019-06-18 PROCEDURE — 25000128 H RX IP 250 OP 636: Performed by: INTERNAL MEDICINE

## 2019-06-18 RX ADMIN — CYANOCOBALAMIN 1000 MCG: 1000 INJECTION, SOLUTION INTRAMUSCULAR; SUBCUTANEOUS at 09:20

## 2019-06-18 NOTE — PROGRESS NOTES
Verified name and date of birth .(B12 shot) administered as ordered . Patient reports no concerns with previous injections . Patient  instructed  to wait 15 min post injection in the lobby and to report any symptoms of a reaction to nursing . Pt left ambulatory.

## 2019-06-20 ENCOUNTER — OFFICE VISIT (OUTPATIENT)
Dept: INTERNAL MEDICINE | Facility: OTHER | Age: 84
End: 2019-06-20
Attending: INTERNAL MEDICINE
Payer: MEDICARE

## 2019-06-20 VITALS
TEMPERATURE: 97 F | HEART RATE: 80 BPM | HEIGHT: 69 IN | WEIGHT: 174.13 LBS | BODY MASS INDEX: 25.79 KG/M2 | SYSTOLIC BLOOD PRESSURE: 126 MMHG | DIASTOLIC BLOOD PRESSURE: 68 MMHG | RESPIRATION RATE: 20 BRPM | OXYGEN SATURATION: 97 %

## 2019-06-20 DIAGNOSIS — D84.9 IMMUNOCOMPROMISED PATIENT (H): ICD-10-CM

## 2019-06-20 DIAGNOSIS — E78.2 MIXED HYPERLIPIDEMIA: ICD-10-CM

## 2019-06-20 DIAGNOSIS — Z01.818 PREOP GENERAL PHYSICAL EXAM: Primary | ICD-10-CM

## 2019-06-20 DIAGNOSIS — N05.8 PRIMARY PAUCI-IMMUNE NECROTIZING AND CRESCENTIC GLOMERULONEPHRITIS: ICD-10-CM

## 2019-06-20 DIAGNOSIS — R76.8 ANTINEUTROPHIL CYTOPLASMIC ANTIBODY (ANCA) POSITIVE: ICD-10-CM

## 2019-06-20 DIAGNOSIS — R73.9 STEROID-INDUCED HYPERGLYCEMIA: ICD-10-CM

## 2019-06-20 DIAGNOSIS — N18.5 CKD (CHRONIC KIDNEY DISEASE) STAGE 5, GFR LESS THAN 15 ML/MIN (H): ICD-10-CM

## 2019-06-20 DIAGNOSIS — R73.9 HYPERGLYCEMIA: ICD-10-CM

## 2019-06-20 DIAGNOSIS — N05.7 PRIMARY PAUCI-IMMUNE NECROTIZING AND CRESCENTIC GLOMERULONEPHRITIS: ICD-10-CM

## 2019-06-20 DIAGNOSIS — T38.0X5A STEROID-INDUCED HYPERGLYCEMIA: ICD-10-CM

## 2019-06-20 DIAGNOSIS — H25.9 AGE-RELATED CATARACT OF BOTH EYES, UNSPECIFIED AGE-RELATED CATARACT TYPE: ICD-10-CM

## 2019-06-20 LAB
ALBUMIN SERPL-MCNC: 4.4 G/DL (ref 3.5–5.7)
ALBUMIN UR-MCNC: ABNORMAL MG/DL
ALP SERPL-CCNC: 85 U/L (ref 34–104)
ALT SERPL W P-5'-P-CCNC: 8 U/L (ref 7–52)
ANION GAP SERPL CALCULATED.3IONS-SCNC: 11 MMOL/L (ref 3–14)
APPEARANCE UR: ABNORMAL
AST SERPL W P-5'-P-CCNC: 17 U/L (ref 13–39)
BACTERIA #/AREA URNS HPF: ABNORMAL /HPF
BILIRUB SERPL-MCNC: 0.5 MG/DL (ref 0.3–1)
BILIRUB UR QL STRIP: NEGATIVE
BUN SERPL-MCNC: 89 MG/DL (ref 7–25)
CALCIUM SERPL-MCNC: 9.3 MG/DL (ref 8.6–10.3)
CHLORIDE SERPL-SCNC: 106 MMOL/L (ref 98–107)
CHOLEST SERPL-MCNC: 194 MG/DL
CO2 SERPL-SCNC: 22 MMOL/L (ref 21–31)
COLOR UR AUTO: YELLOW
CREAT SERPL-MCNC: 4.88 MG/DL (ref 0.7–1.3)
ERYTHROCYTE [DISTWIDTH] IN BLOOD BY AUTOMATED COUNT: 12.6 % (ref 10–15)
GFR SERPL CREATININE-BSD FRML MDRD: 11 ML/MIN/{1.73_M2}
GLUCOSE SERPL-MCNC: 126 MG/DL (ref 70–105)
GLUCOSE UR STRIP-MCNC: NEGATIVE MG/DL
HBA1C MFR BLD: 6.5 % (ref 4–6)
HCT VFR BLD AUTO: 36.8 % (ref 40–53)
HDLC SERPL-MCNC: 30 MG/DL (ref 23–92)
HGB BLD-MCNC: 12.4 G/DL (ref 13.3–17.7)
HGB UR QL STRIP: ABNORMAL
KETONES UR STRIP-MCNC: NEGATIVE MG/DL
LDLC SERPL CALC-MCNC: 131 MG/DL
LEUKOCYTE ESTERASE UR QL STRIP: ABNORMAL
MCH RBC QN AUTO: 31 PG (ref 26.5–33)
MCHC RBC AUTO-ENTMCNC: 33.7 G/DL (ref 31.5–36.5)
MCV RBC AUTO: 92 FL (ref 78–100)
NITRATE UR QL: POSITIVE
NONHDLC SERPL-MCNC: 164 MG/DL
PH UR STRIP: 5.5 PH (ref 5–9)
PLATELET # BLD AUTO: 246 10E9/L (ref 150–450)
POTASSIUM SERPL-SCNC: 4.1 MMOL/L (ref 3.5–5.1)
PROT SERPL-MCNC: 6.8 G/DL (ref 6.4–8.9)
RBC # BLD AUTO: 4 10E12/L (ref 4.4–5.9)
RBC #/AREA URNS AUTO: ABNORMAL /HPF
SODIUM SERPL-SCNC: 139 MMOL/L (ref 134–144)
SOURCE: ABNORMAL
SP GR UR STRIP: 1.01 (ref 1–1.03)
TRIGL SERPL-MCNC: 164 MG/DL
UROBILINOGEN UR STRIP-ACNC: 0.2 EU/DL (ref 0.2–1)
WBC # BLD AUTO: 9.7 10E9/L (ref 4–11)
WBC #/AREA URNS AUTO: ABNORMAL /HPF

## 2019-06-20 PROCEDURE — 81001 URINALYSIS AUTO W/SCOPE: CPT | Mod: ZL | Performed by: INTERNAL MEDICINE

## 2019-06-20 PROCEDURE — G0463 HOSPITAL OUTPT CLINIC VISIT: HCPCS | Mod: 25

## 2019-06-20 PROCEDURE — 99214 OFFICE O/P EST MOD 30 MIN: CPT | Performed by: INTERNAL MEDICINE

## 2019-06-20 PROCEDURE — 80053 COMPREHEN METABOLIC PANEL: CPT | Mod: ZL | Performed by: INTERNAL MEDICINE

## 2019-06-20 PROCEDURE — 85027 COMPLETE CBC AUTOMATED: CPT | Mod: ZL | Performed by: INTERNAL MEDICINE

## 2019-06-20 PROCEDURE — 36415 COLL VENOUS BLD VENIPUNCTURE: CPT | Mod: ZL | Performed by: INTERNAL MEDICINE

## 2019-06-20 PROCEDURE — 83036 HEMOGLOBIN GLYCOSYLATED A1C: CPT | Mod: ZL | Performed by: INTERNAL MEDICINE

## 2019-06-20 PROCEDURE — 80061 LIPID PANEL: CPT | Mod: ZL | Performed by: INTERNAL MEDICINE

## 2019-06-20 PROCEDURE — 82043 UR ALBUMIN QUANTITATIVE: CPT | Mod: ZL | Performed by: INTERNAL MEDICINE

## 2019-06-20 PROCEDURE — G0463 HOSPITAL OUTPT CLINIC VISIT: HCPCS

## 2019-06-20 ASSESSMENT — ENCOUNTER SYMPTOMS
MYALGIAS: 0
HEMATURIA: 0
DIARRHEA: 0
FATIGUE: 1
PALPITATIONS: 0
COUGH: 0
CHILLS: 0
DYSURIA: 0
SHORTNESS OF BREATH: 0
ARTHRALGIAS: 0
VOMITING: 0
BRUISES/BLEEDS EASILY: 1
DIZZINESS: 0
CONFUSION: 0
FEVER: 0
NAUSEA: 0
WHEEZING: 0
EYE PAIN: 0
ABDOMINAL PAIN: 0
AGITATION: 0
LIGHT-HEADEDNESS: 0
BACK PAIN: 0

## 2019-06-20 ASSESSMENT — ANXIETY QUESTIONNAIRES
3. WORRYING TOO MUCH ABOUT DIFFERENT THINGS: NOT AT ALL
1. FEELING NERVOUS, ANXIOUS, OR ON EDGE: NOT AT ALL
2. NOT BEING ABLE TO STOP OR CONTROL WORRYING: NOT AT ALL
IF YOU CHECKED OFF ANY PROBLEMS ON THIS QUESTIONNAIRE, HOW DIFFICULT HAVE THESE PROBLEMS MADE IT FOR YOU TO DO YOUR WORK, TAKE CARE OF THINGS AT HOME, OR GET ALONG WITH OTHER PEOPLE: NOT DIFFICULT AT ALL
7. FEELING AFRAID AS IF SOMETHING AWFUL MIGHT HAPPEN: NOT AT ALL
GAD7 TOTAL SCORE: 0
6. BECOMING EASILY ANNOYED OR IRRITABLE: NOT AT ALL
5. BEING SO RESTLESS THAT IT IS HARD TO SIT STILL: NOT AT ALL

## 2019-06-20 ASSESSMENT — MIFFLIN-ST. JEOR: SCORE: 1457.27

## 2019-06-20 ASSESSMENT — PAIN SCALES - GENERAL: PAINLEVEL: NO PAIN (0)

## 2019-06-20 ASSESSMENT — PATIENT HEALTH QUESTIONNAIRE - PHQ9
SUM OF ALL RESPONSES TO PHQ QUESTIONS 1-9: 0
5. POOR APPETITE OR OVEREATING: NOT AT ALL

## 2019-06-20 NOTE — NURSING NOTE
"Patient presents to the clinic for pre-op exam.    Previous A1C is at goal of <8  No results found for: A1C  Urine microalbumin:creatine: n/a  Foot exam Podiatry 06/2019  Eye exam 05/2019    Tobacco User no  Patient is not on a daily aspirin  Patient is not on a Statin.  Blood pressure today of:     BP Readings from Last 1 Encounters:   08/14/18 124/70      is at the goal of <139/89 for diabetics.  Chief Complaint   Patient presents with     Pre-Op Exam       Initial /68 (BP Location: Right arm, Patient Position: Sitting, Cuff Size: Adult Regular)   Pulse 80   Temp 97  F (36.1  C) (Tympanic)   Resp 20   Ht 1.74 m (5' 8.5\")   Wt 79 kg (174 lb 2 oz)   SpO2 97%   BMI 26.09 kg/m   Estimated body mass index is 26.09 kg/m  as calculated from the following:    Height as of this encounter: 1.74 m (5' 8.5\").    Weight as of this encounter: 79 kg (174 lb 2 oz).  Medication Reconciliation: complete    Angelica Lei, CORINA          "

## 2019-06-20 NOTE — LETTER
June 26, 2019      Altaf Chao  [address] [place] [address]        Dear ,    We are writing to inform you of your test results.    Hemoglobin/anemia has improved.    LDL cholesterol level is slightly elevated.    Kidney function/creatinine has improved.    Hemoglobin A1c is slightly elevated, showing diabetes.    Urinalysis is concerning for possible bladder infection.    --- If you are having bladder infection symptoms, please let us know and we can collect another urine sample and check a urine culture    Resulted Orders   Lipid Profile   Result Value Ref Range    Cholesterol 194 <200 mg/dL    Triglycerides 164 (H) <150 mg/dL      Comment:      Borderline high:  150-199 mg/dl  High:             200-499 mg/dl  Very high:       >499 mg/dl      HDL Cholesterol 30 23 - 92 mg/dL    LDL Cholesterol Calculated 131 (H) <100 mg/dL      Comment:      Above desirable:  100-129 mg/dl  Borderline High:  130-159 mg/dL  High:             160-189 mg/dL  Very high:       >189 mg/dl      Non HDL Cholesterol 164 (H) <130 mg/dL      Comment:      Above Desirable:  130-159 mg/dl  Borderline high:  160-189 mg/dl  High:             190-219 mg/dl  Very high:       >219 mg/dl     Hemoglobin A1c   Result Value Ref Range    Hemoglobin A1C 6.5 (H) 4.0 - 6.0 %   CBC with platelets   Result Value Ref Range    WBC 9.7 4.0 - 11.0 10e9/L    RBC Count 4.00 (L) 4.4 - 5.9 10e12/L    Hemoglobin 12.4 (L) 13.3 - 17.7 g/dL    Hematocrit 36.8 (L) 40.0 - 53.0 %    MCV 92 78 - 100 fl    MCH 31.0 26.5 - 33.0 pg    MCHC 33.7 31.5 - 36.5 g/dL    RDW 12.6 10.0 - 15.0 %    Platelet Count 246 150 - 450 10e9/L   Comprehensive metabolic panel   Result Value Ref Range    Sodium 139 134 - 144 mmol/L    Potassium 4.1 3.5 - 5.1 mmol/L    Chloride 106 98 - 107 mmol/L    Carbon Dioxide 22 21 - 31 mmol/L    Anion Gap 11 3 - 14 mmol/L    Glucose 126 (H) 70 - 105 mg/dL    Urea Nitrogen 89 (H) 7 - 25 mg/dL    Creatinine 4.88 (H) 0.70 - 1.30 mg/dL    GFR  Estimate 11 (L) >60 mL/min/[1.73_m2]    GFR Estimate If Black 14 (L) >60 mL/min/[1.73_m2]    Calcium 9.3 8.6 - 10.3 mg/dL    Bilirubin Total 0.5 0.3 - 1.0 mg/dL    Albumin 4.4 3.5 - 5.7 g/dL    Protein Total 6.8 6.4 - 8.9 g/dL    Alkaline Phosphatase 85 34 - 104 U/L    ALT 8 7 - 52 U/L    AST 17 13 - 39 U/L   *UA reflex to Microscopic   Result Value Ref Range    Color Urine Yellow     Appearance Urine Slightly Cloudy       Comment:      CORRECTED ON 06/20 AT 1242: PREVIOUSLY REPORTED AS Clear    Glucose Urine Negative NEG^Negative mg/dL    Bilirubin Urine Negative NEG^Negative    Ketones Urine Negative NEG^Negative mg/dL    Specific Gravity Urine 1.010 1.000 - 1.030    Blood Urine Small (A) NEG^Negative    pH Urine 5.5 5.0 - 9.0 pH    Protein Albumin Urine Trace (A) NEG^Negative mg/dL    Urobilinogen Urine 0.2 0.2 - 1.0 EU/dL    Nitrite Urine Positive (A) NEG^Negative    Leukocyte Esterase Urine Large (A) NEG^Negative    Source Midstream Urine    Albumin Random Urine Quantitative with Creat Ratio   Result Value Ref Range    Creatinine Urine 60 mg/dL    Albumin Urine mg/L 75 mg/L    Albumin Urine mg/g Cr 124.67 (H) 0 - 17 mg/g Cr   Urine Microscopic   Result Value Ref Range    WBC Urine  (A) OTO5^0 - 5 /HPF    RBC Urine 5-10 (A) OTO2^O - 2 /HPF    Bacteria Urine Moderate (A) NEG^Negative /HPF       If you have any questions or concerns, please call the clinic at the number listed above.       Sincerely,        Valentino Machado MD

## 2019-06-20 NOTE — PATIENT INSTRUCTIONS
Before Your Surgery      Call your surgeon if there is any change in your health. This includes signs of a cold or flu (such as a sore throat, runny nose, cough, rash or fever).    Do not smoke, drink alcohol or take over the counter medicine (unless your surgeon or primary care doctor tells you to) for the 24 hours before and after surgery.    If you take prescribed drugs: Follow your doctor s orders about which medicines to take and which to stop until after surgery.    Eating and drinking prior to surgery: follow the instructions from your surgeon    Take a shower or bath the night before surgery. Use the soap your surgeon gave you to gently clean your skin. If you do not have soap from your surgeon, use your regular soap. Do not shave or scrub the surgery site.  Wear clean pajamas and have clean sheets on your bed.     Diabetes Medication Use  ----- SKIP lantus the night before surgery while NPO (fasting)  -----Hold short acting insulin (e.g. Novolog, Humalog) while NPO (fasting)

## 2019-06-20 NOTE — PROGRESS NOTES
[facility]  [address]  [place] [address]  [phone]  Dept: [phone]    PRE-OP EVALUATION:  Today's date: 2019    Altaf Chao (: 1934) presents for pre-operative evaluation assessment as requested by Dr. Haines.  He requires evaluation and anesthesia risk assessment prior to undergoing surgery/procedure for treatment of cataracts.    Proposed Surgery/ Procedure: Cataracts -- right then left eye  Date of Surgery/ Procedure: 19 and 19  Time of Surgery/ Procedure: unknown  Hospital/Surgical Facility: [facility]  Fax number for surgical facility: n/a  Primary Physician: Valentino Machado  Type of Anesthesia Anticipated: Local    Patient has a Health Care Directive or Living Will:  NO    1. NO - Do you have a history of heart attack, stroke, stent, bypass or surgery on an artery in the head, neck, heart or legs?  2. NO - Do you ever have any pain or discomfort in your chest?  3. NO - Do you have a history of  Heart Failure?  4. NO - Are you troubled by shortness of breath when: walking on the level, up a slight hill or at night?  5. NO - Do you currently have a cold, bronchitis or other respiratory infection?  6. NO - Do you have a cough, shortness of breath or wheezing?  7. NO - Do you sometimes get pains in the calves of your legs when you walk?  8. NO - Do you or anyone in your family have previous history of blood clots?  9. NO - Do you or does anyone in your family have a serious bleeding problem such as prolonged bleeding following surgeries or cuts?  10. NO - Have you ever had problems with anemia or been told to take iron pills?  11. NO - Have you had any abnormal blood loss such as black, tarry or bloody stools, or abnormal vaginal bleeding?  12. YES - Have you ever had a blood transfusion?  13. NO - Have you or any of your relatives ever had problems with anesthesia?  14. NO - Do you have sleep apnea, excessive snoring  or daytime drowsiness?  15. NO - Do you have any prosthetic heart valves?  16. YES- Do you have prosthetic joints?  17. NO - Is there any chance that you may be pregnant?      HPI:       ICD-10-CM    1. Preop general physical exam Z01.818    2. Age-related cataract of both eyes, unspecified age-related cataract type H25.9    3. Steroid-induced hyperglycemia R73.9     T38.0X5A    4. Mixed hyperlipidemia E78.2 Lipid Profile   5. Hyperglycemia R73.9 Albumin Random Urine Quantitative with Creat Ratio     Hemoglobin A1c     *UA reflex to Microscopic   6. CKD (chronic kidney disease) stage 5, GFR 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 ml/min (H) N18.5    7. 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 Immunocompromised patient (H) D84.9      HPI related to upcoming procedure: Patient has been having progressive vision issues, he is left eye dominant.  Currently scheduled for right eye cataract followed by left eye.  No eye pain.    History of steroid-induced hyperglycemia.  He is immunocompromised.  Has history of necrotizing glomerulonephritis.  Currently with stage V CKD.  Not on dialysis.  Still urinating okay.  Continues with Lasix 40 mg daily.    Blood pressures have been controlled.  Hypertension is well managed.    Has autoimmune antibodies, CHIDI positive.  Continues on prednisone.    Insulin-dependent diabetes, managing with mealtime and long-acting insulin.  Doing well.  Needs labs today.  He will hold his Lantus and not take mealtime insulin when n.p.o.    Mixed hyperlipidemia, check lipid panel.    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     Priority: Medium     Anemia due to vitamin B12 deficiency 10/25/2017     Priority: Medium     Lymphedema of both lower extremities  06/23/2017     Priority: Medium     Immunocompromised patient (H) 06/09/2017     Priority: Medium     Bilateral edema of lower extremity 05/11/2017     Priority: Medium     Hematoma 05/04/2017     Priority: Medium     Steroid-induced hyperglycemia 05/04/2017     Priority: Medium     Anemia of chronic disease 05/03/2017     Priority: Medium     CKD (chronic kidney disease) stage 5, GFR 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 ml/min (H) 05/01/2017     Priority: Medium     Anemia 04/25/2017     Priority: Medium     Metabolic acidosis 04/25/2017     Priority: Medium     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     Priority: Medium     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     Priority: Medium     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     Priority: Medium     Pseudomonas urinary tract infection 04/15/2017     Priority: Medium     Hyperkalemia 04/03/2017     Priority: Medium     Hyponatremia 04/03/2017     Priority: Medium     Refusal of statin medication at discharge 02/17/2017     Priority: Medium     H/O total hip arthroplasty 06/30/2014     Priority: Medium     Mixed hyperlipidemia 04/10/2014     Priority: Medium     Bilateral leg edema 01/08/2014     Priority: Medium     Hip stiffness 01/08/2014     Priority: Medium     H/O bilateral inguinal hernia repair 09/04/2013     Priority: Medium     History of tobacco abuse 08/15/2013     Priority: Medium     Essential hypertension 08/07/2012     Priority: Medium     Pityriasis rosea 08/07/2012     Priority: Medium      Past Medical History:   Diagnosis Date     Acute kidney failure (H)     04/2017     Essential (primary) hypertension     8/7/2012     Hyperlipidemia     No Comments Provided     Osteoarthritis of hip     No Comments Provided     Pityriasis rosea     No Comments Provided     Tachycardia     No Comments Provided     Unilateral inguinal hernia without obstruction or gangrene     8/12/2013,Right Inguinal hernia with  incarceration, massive [[id number]][     Past Surgical History:   Procedure Laterality Date     CIRCUMCISION      03/14/2017,Dr. Heidi GREENBERG     OTHER SURGICAL HISTORY      90082.9,[place] SUBTALAR ATHROEREISIS,bone Spurs excision on Toe     OTHER SURGICAL HISTORY      8/20/13,,HERNIA REPAIR,Right,RIH with mesh     OTHER SURGICAL HISTORY      8/20/13,09843,GENITAL SURGERY MALE,Dorsal Slit     OTHER SURGICAL HISTORY      4/15/14,,HERNIA REPAIR,Left,LIH with mesh     OTHER SURGICAL HISTORY      6/30/14,68014.0,[place] TOTAL HIP ARTHROPLASTY,Left     Current Outpatient Medications   Medication Sig Dispense Refill     amLODIPine (NORVASC) 5 MG tablet TAKE 1 TABLET BY MOUTH ONCE DAILY. 90 tablet 3     blood glucose (NO BRAND SPECIFIED) lancets standard Dispense item covered by pt ins. E11.65 IDDM type II, uncontrolled - Test 3 times/day. (Wants Barrel lancets)       blood glucose monitoring (NO BRAND SPECIFIED) test strip Dispense item covered by pt ins. E11.65 IDDM type II, uncontrolled - Test 4 times/day. Reason: New diabetes       Blood Glucose Monitoring Suppl (FIFTY50 GLUCOSE METER 2.0) W/DEVICE KIT Dispense meter, test strips, lancets covered by pt ins. E11.65 IDDM type II, uncontrolled - Test 4 times/day. Reason: New diabetes       famotidine (PEPCID) 20 MG tablet Take 1 tablet (20 mg) by mouth daily 90 tablet 3     finasteride (PROSCAR) 5 MG tablet TAKE 1 TABLET BY MOUTH EVERY DAY 90 tablet 0     furosemide (LASIX) 40 MG tablet Take 1 tablet (40 mg) by mouth every morning 90 tablet 3     insulin glargine (LANTUS SOLOSTAR PEN) 100 UNIT/ML pen Inject 2 Units Subcutaneous At Bedtime 15 mL 3     insulin pen needle (B-D U/F) 31G X 8 MM Inject Subcutaneous At Bedtime Use 1 pen needles daily or as directed. 300 each 3     predniSONE (DELTASONE) 5 MG tablet Take 2.5 mg by mouth daily       sodium bicarbonate 650 MG tablet Take 650 mg by mouth daily       tamsulosin (FLOMAX) 0.4 MG capsule TAKE 1 CAPSULE BY MOUTH ONCE DAILY  "AFTER A MEAL. 90 capsule 3     OTC products: no recent use of OTC ASA, NSAIDS or Steroids    No Known Allergies   Latex Allergy: NO    Social History     Tobacco Use     Smoking status: Former Smoker     Types: Cigarettes     Last attempt to quit: 1976     Years since quittin.4     Smokeless tobacco: Never Used   Substance Use Topics     Alcohol use: Yes     Alcohol/week: 0.5 oz     Comment: occ beer     History   Drug Use Unknown       REVIEW OF SYSTEMS:   Review of Systems   Constitutional: Positive for fatigue. Negative for chills and fever.   HENT: Negative for congestion and hearing loss.    Eyes: Positive for visual disturbance (  + cataracts). Negative for pain.   Respiratory: Negative for cough, shortness of breath and wheezing.    Cardiovascular: Negative for chest pain and palpitations.   Gastrointestinal: Negative for abdominal pain, diarrhea, nausea and vomiting.   Endocrine: Negative for cold intolerance and heat intolerance.   Genitourinary: Negative for dysuria and hematuria.   Musculoskeletal: Positive for gait problem. Negative for arthralgias, back pain and myalgias.   Skin: Negative for pallor.   Allergic/Immunologic: Negative for immunocompromised state.   Neurological: Negative for dizziness and light-headedness.   Hematological: Bruises/bleeds easily.   Psychiatric/Behavioral: Negative for agitation and confusion.       EXAM:   /68 (BP Location: Right arm, Patient Position: Sitting, Cuff Size: Adult Regular)   Pulse 80   Temp 97  F (36.1  C) (Tympanic)   Resp 20   Ht 1.74 m (5' 8.5\")   Wt 79 kg (174 lb 2 oz)   SpO2 97%   BMI 26.09 kg/m    Physical Exam   Constitutional: He appears well-developed and well-nourished. No distress.   HENT:   Head: Normocephalic and atraumatic.   Eyes: Conjunctivae are normal. No scleral icterus.   Neck: Neck supple.   Cardiovascular: Normal rate and regular rhythm.   Pulmonary/Chest: Effort normal and breath sounds normal.   Abdominal: Soft. " There is no tenderness.   Musculoskeletal: He exhibits no edema or deformity.   Lymphadenopathy:     He has no cervical adenopathy.   Neurological: He is alert.   Skin: Skin is warm and dry. No rash noted. He is not diaphoretic.   Some resolving bruises on the arms bilaterally   Psychiatric: He has a normal mood and affect.       DIAGNOSTICS:   No labs or EKG required for low risk surgery (cataract, skin procedure, breast biopsy, etc)    Recent Labs   Lab Test 06/12/17  0505 06/11/17  1043 06/11/17  0939 06/10/17  1629  05/11/17  0918  05/05/17  0533   HGB  --   --  8.5* 9.1*   < >  --    < >  --    PLT  --   --  182 167   < >  --    < >  --    INR  --   --   --   --   --  1.1  --  1.1    134  --   --    < >  --    < >  --    POTASSIUM 4.6 4.6  --   --    < >  --    < >  --    CR 5.13* 5.10*  --   --    < >  --    < >  --     < > = values in this interval not displayed.      Labs at [facility] 6/7/2019:  Hemoglobin 12.1.   5/31/2019 -creatinine 5.03  --- Both of these are stable.    IMPRESSION:   Reason for surgery/procedure: Cataracts -- right then left eye  Diagnosis/reason for consult: Preoperative cardiopulmonary risk stratification    The proposed surgical procedure is considered LOW risk.    REVISED CARDIAC RISK INDEX  The patient has the following serious cardiovascular risks for perioperative complications such as (MI, PE, VFib and 3  AV Block):  Diabetes Mellitus (on Insulin)  Serum Creatinine >2.0 mg/dl  INTERPRETATION: 2 risks: Class III (moderate risk - 6.6% complication rate)    The patient has the following additional risks for perioperative complications:  No identified additional risks      ICD-10-CM    1. Preop general physical exam Z01.818    2. Age-related cataract of both eyes, unspecified age-related cataract type H25.9    3. Steroid-induced hyperglycemia R73.9     T38.0X5A    4. Mixed hyperlipidemia E78.2 Lipid Profile   5. Hyperglycemia R73.9 Albumin Random Urine Quantitative with  Creat Ratio     Hemoglobin A1c     *UA reflex to Microscopic   6. CKD (chronic kidney disease) stage 5, GFR 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 ml/min (H) N18.5    7. 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 Immunocompromised patient (H) D84.9        RECOMMENDATIONS:       Anemia  Anemia and does not require treatment prior to surgery.  Monitor Hemoglobin postoperatively.      --Patient is to take all scheduled medications on the day of surgery EXCEPT for modifications listed below.    Diabetes Medication Use  ----- SKIP lantus the night before surgery while NPO (fasting)  -----Hold short acting insulin (e.g. Novolog, Humalog) while NPO (fasting)      APPROVAL GIVEN to proceed with proposed procedure, without further diagnostic evaluation       Signed Electronically by: Valentino Machado MD    Copy of this evaluation report is provided to requesting physician.    [facility] Preop Guidelines    Revised Cardiac Risk Index

## 2019-06-21 LAB
CREAT UR-MCNC: 60 MG/DL
MICROALBUMIN UR-MCNC: 75 MG/L
MICROALBUMIN/CREAT UR: 124.67 MG/G CR (ref 0–17)

## 2019-06-21 ASSESSMENT — ANXIETY QUESTIONNAIRES: GAD7 TOTAL SCORE: 0

## 2019-07-16 ENCOUNTER — ALLIED HEALTH/NURSE VISIT (OUTPATIENT)
Dept: FAMILY MEDICINE | Facility: OTHER | Age: 84
End: 2019-07-16
Attending: INTERNAL MEDICINE
Payer: MEDICARE

## 2019-07-16 DIAGNOSIS — D51.9 ANEMIA DUE TO VITAMIN B12 DEFICIENCY, UNSPECIFIED B12 DEFICIENCY TYPE: Primary | ICD-10-CM

## 2019-07-16 PROCEDURE — 25000128 H RX IP 250 OP 636: Performed by: INTERNAL MEDICINE

## 2019-07-16 PROCEDURE — 96372 THER/PROPH/DIAG INJ SC/IM: CPT

## 2019-07-16 RX ADMIN — CYANOCOBALAMIN 1000 MCG: 1000 INJECTION, SOLUTION INTRAMUSCULAR; SUBCUTANEOUS at 09:33

## 2019-07-16 NOTE — PROGRESS NOTES
Verified name and date of birth .(B12 shot) administered as ordered . Patient reports no concerns with previous injections . Patient  instructed  to wait 15 min post injection in the lobby and to report any symptoms of a reaction to nursing . Pt left ambulatory.  Elle Rodrigez RN on 7/16/2019 at 9:34 AM

## 2019-07-25 DIAGNOSIS — I10 ESSENTIAL HYPERTENSION: ICD-10-CM

## 2019-07-25 DIAGNOSIS — R33.9 URINARY RETENTION: ICD-10-CM

## 2019-07-25 RX ORDER — FINASTERIDE 5 MG/1
TABLET, FILM COATED ORAL
Qty: 90 TABLET | Refills: 0 | Status: SHIPPED | OUTPATIENT
Start: 2019-07-25 | End: 2020-03-24

## 2019-07-25 NOTE — TELEPHONE ENCOUNTER
To Urology  to call patient to schedule an appointment.  No further refills to be given until seen.  Andreina Peters RN......July 25, 2019...3:39 PM

## 2019-07-25 NOTE — TELEPHONE ENCOUNTER
5/10/18 Plan      Start finasteride 5mg daily  Continue Flomax  Follow-up in 1 year with [facility]      Refill given 4/29/19 for 90

## 2019-07-29 ENCOUNTER — OFFICE VISIT (OUTPATIENT)
Dept: UROLOGY | Facility: OTHER | Age: 84
End: 2019-07-29
Attending: UROLOGY
Payer: MEDICARE

## 2019-07-29 VITALS
RESPIRATION RATE: 20 BRPM | BODY MASS INDEX: 26.07 KG/M2 | SYSTOLIC BLOOD PRESSURE: 160 MMHG | DIASTOLIC BLOOD PRESSURE: 92 MMHG | WEIGHT: 174 LBS

## 2019-07-29 DIAGNOSIS — N40.1 URINARY RETENTION DUE TO BENIGN PROSTATIC HYPERPLASIA: ICD-10-CM

## 2019-07-29 DIAGNOSIS — R33.9 URINARY RETENTION: Primary | ICD-10-CM

## 2019-07-29 DIAGNOSIS — R33.8 URINARY RETENTION DUE TO BENIGN PROSTATIC HYPERPLASIA: ICD-10-CM

## 2019-07-29 PROCEDURE — 99213 OFFICE O/P EST LOW 20 MIN: CPT | Performed by: UROLOGY

## 2019-07-29 PROCEDURE — 51798 US URINE CAPACITY MEASURE: CPT | Performed by: UROLOGY

## 2019-07-29 PROCEDURE — G0463 HOSPITAL OUTPT CLINIC VISIT: HCPCS | Mod: 25

## 2019-07-29 RX ORDER — AMLODIPINE BESYLATE 5 MG/1
TABLET ORAL
Qty: 90 TABLET | Refills: 3 | Status: SHIPPED | OUTPATIENT
Start: 2019-07-29 | End: 2020-07-21

## 2019-07-29 RX ORDER — FINASTERIDE 5 MG/1
5 TABLET, FILM COATED ORAL DAILY
Qty: 90 TABLET | Refills: 3 | Status: SHIPPED | OUTPATIENT
Start: 2019-07-29 | End: 2020-07-29

## 2019-07-29 RX ORDER — TAMSULOSIN HYDROCHLORIDE 0.4 MG/1
0.4 CAPSULE ORAL EVERY EVENING
Qty: 90 CAPSULE | Refills: 3 | Status: SHIPPED | OUTPATIENT
Start: 2019-07-29 | End: 2020-07-29

## 2019-07-29 ASSESSMENT — PAIN SCALES - GENERAL: PAINLEVEL: NO PAIN (0)

## 2019-07-29 NOTE — PROGRESS NOTES
Type of Visit  Established    Chief Complaint  Urinary retention    HPI  Mr. Chao is a 85 y.o. 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  He has been taking Flomax for a few years.  About 1 year ago he started finasteride for maximal medical therapy.  He reports no UTIs in the last year.  No episodes of acute urinary retention requiring catheterization.  He denies dysuria and gross hematuria today.    He has been voiding subjectively much better since the buried penis repair performed 2 years ago.      Review of Systems  I reviewed the ROS the patient today.    Nursing Notes:   Josee Eden, LPN  2019  3:00 PM  Signed  Pt presents to clinic for a one year med follow up    Review of Systems:    Weight loss:    No     Recent fever/chills:  No   Night sweats:   No  Current skin rash:  No   Recent hair loss:  No  Heat intolerance:  No   Cold intolerance:  No  Chest pain:   No   Palpitations:   No  Shortness of breath:  No   Wheezing:   No  Constipation:    No   Diarrhea:   No   Nausea:   No   Vomiting:   No   Kidney/side pain:  No   Back pain:   No  Frequent headaches:  No   Dizziness:     No  Leg swelling:   No   Calf pain:    No    Post-Void Residual  A post-void residual was measured by ultrasonic bladder scanner.  290 mL      Family History  Family History   Problem Relation Age of Onset     Other Son 12     neuroblastoma at 11 yo  at 14.      Genetic No Family History      No known FHx of DM, CAD, CVA       Physical Exam  BP (!) 160/92 (BP Location: Left arm, Patient Position: Sitting, Cuff Size: Adult Regular)   Resp 20   Wt 78.9 kg (174 lb)   BMI 26.07 kg/m    Constitutional: NAD, WDWN.  Cardiovascular: Regular rate.  Pulmonary/Chest: Respirations are even and non-labored bilaterally.  Abdominal: Soft. No distension, tenderness, masses or guarding. No CVA tenderness.  Extremities: SUSI x 4, Warm. No clubbing.  No cyanosis.    Skin: Pink,  warm and dry.  No rashes noted.  Genitourinary: nonpalpable bladder    Labs  Results for LAUREN MURPHY (MRN [id number]) as of 7/29/2019 15:03   6/20/2019 12:19   Creatinine 4.88 (H)     Radiographic Studies  4/2/2017  CT Stone  IMPRESSION:       1.  Gates catheter in the bladder. Asymmetric bowel thickening in the bladder.   Recommend further evaluation for a bladder infection or neoplasm if clinically   indicated.    2.  Diverticulosis of the rectosigmoid. Mild adjacent inflammatory changes may   represent mild diverticulitis in the appropriate clinical setting. No signs of   perforation, abscess, or bleeding     Post-Void Residual  A post-void residual was measured by ultrasonic bladder scanner.  290 mL today  412 mL today  199 mL (previously recorded)    Assessment  Mr. Murphy is a 85 y.o. male follows up with recent buried penis repair who has healed well.  He is able to void better now after the procedure.  We discussed side effects of finasteride including, but not limited to, sexual dysfunction such as erectile issues/low libido and false decrease in PSA.    Plan  Continue Flomax and finasteride  Follow-up annually with [facility]

## 2019-07-29 NOTE — NURSING NOTE
Pt presents to clinic for a one year med follow up    Review of Systems:    Weight loss:    No     Recent fever/chills:  No   Night sweats:   No  Current skin rash:  No   Recent hair loss:  No  Heat intolerance:  No   Cold intolerance:  No  Chest pain:   No   Palpitations:   No  Shortness of breath:  No   Wheezing:   No  Constipation:    No   Diarrhea:   No   Nausea:   No   Vomiting:   No   Kidney/side pain:  No   Back pain:   No  Frequent headaches:  No   Dizziness:     No  Leg swelling:   No   Calf pain:    No    Post-Void Residual  A post-void residual was measured by ultrasonic bladder scanner.  290 mL

## 2019-07-29 NOTE — TELEPHONE ENCOUNTER
Routing refill request to provider for review/approval because:  Labs out of range:  Creatinine    LOV: 6/20/19  Mary Ann Tapia RN on 7/29/2019 at 2:19 PM

## 2019-08-06 ENCOUNTER — ALLIED HEALTH/NURSE VISIT (OUTPATIENT)
Dept: FAMILY MEDICINE | Facility: OTHER | Age: 84
End: 2019-08-06
Attending: INTERNAL MEDICINE
Payer: MEDICARE

## 2019-08-06 DIAGNOSIS — D51.9 ANEMIA DUE TO VITAMIN B12 DEFICIENCY, UNSPECIFIED B12 DEFICIENCY TYPE: Primary | ICD-10-CM

## 2019-08-06 PROCEDURE — 25000128 H RX IP 250 OP 636: Performed by: INTERNAL MEDICINE

## 2019-08-06 PROCEDURE — 96372 THER/PROPH/DIAG INJ SC/IM: CPT

## 2019-08-06 RX ADMIN — CYANOCOBALAMIN 1000 MCG: 1000 INJECTION, SOLUTION INTRAMUSCULAR; SUBCUTANEOUS at 09:21

## 2019-08-06 NOTE — PROGRESS NOTES
Verified name and date of birth .(B12 shot ) administered as ordered . Patient reports no concerns with previous injections . Patient  instructed  to wait 15 min post injection in the lobby and to report any symptoms of a reaction to nursing . Pt left ambulatory.  Elle Rodrigez RN on 8/6/2019 at 9:25 AM

## 2019-08-27 ENCOUNTER — ALLIED HEALTH/NURSE VISIT (OUTPATIENT)
Dept: FAMILY MEDICINE | Facility: OTHER | Age: 84
End: 2019-08-27
Attending: INTERNAL MEDICINE
Payer: MEDICARE

## 2019-08-27 VITALS — DIASTOLIC BLOOD PRESSURE: 72 MMHG | SYSTOLIC BLOOD PRESSURE: 132 MMHG

## 2019-08-27 DIAGNOSIS — D51.9 ANEMIA DUE TO VITAMIN B12 DEFICIENCY, UNSPECIFIED B12 DEFICIENCY TYPE: Primary | ICD-10-CM

## 2019-08-27 PROCEDURE — 25000128 H RX IP 250 OP 636: Performed by: INTERNAL MEDICINE

## 2019-08-27 PROCEDURE — 96372 THER/PROPH/DIAG INJ SC/IM: CPT

## 2019-08-27 RX ADMIN — CYANOCOBALAMIN 1000 MCG: 1000 INJECTION, SOLUTION INTRAMUSCULAR; SUBCUTANEOUS at 09:13

## 2019-08-27 NOTE — PROGRESS NOTES
Verified name and date of birth .(B 12 shot and a BP check for him as well ) administered as ordered . Patient reports no concerns with previous injections . Patient  instructed  to wait 15 min post injection in the lobby and to report any symptoms of a reaction to nursing . Pt left ambulatory.  /60 on first check then to 146/78 and then last check was 132/72

## 2019-09-17 ENCOUNTER — ALLIED HEALTH/NURSE VISIT (OUTPATIENT)
Dept: FAMILY MEDICINE | Facility: OTHER | Age: 84
End: 2019-09-17
Attending: INTERNAL MEDICINE
Payer: MEDICARE

## 2019-09-17 DIAGNOSIS — D51.9 ANEMIA DUE TO VITAMIN B12 DEFICIENCY: Primary | ICD-10-CM

## 2019-09-17 PROCEDURE — 96372 THER/PROPH/DIAG INJ SC/IM: CPT

## 2019-09-17 PROCEDURE — 25000128 H RX IP 250 OP 636: Performed by: INTERNAL MEDICINE

## 2019-09-17 RX ADMIN — CYANOCOBALAMIN 1000 MCG: 1000 INJECTION, SOLUTION INTRAMUSCULAR; SUBCUTANEOUS at 09:22

## 2019-09-17 NOTE — PROGRESS NOTES
The following medication was given:     MEDICATION: Vitamin B12  1000 mcg  ROUTE: IM  SITE: Deltoid - Right  DOSE: 1000 mcg/ml  LOT #: [id number]  :  [employer]  EXPIRATION DATE:  FEB 21  NDC: 2086-5089-52    Single dose vial-no waste    Pt tolerated procedure and left ambulatory.    Tg Andrade RN  ....................  9/17/2019   9:25 AM

## 2019-09-22 DIAGNOSIS — I10 ESSENTIAL HYPERTENSION: ICD-10-CM

## 2019-09-23 DIAGNOSIS — T38.0X5A STEROID-INDUCED HYPERGLYCEMIA: ICD-10-CM

## 2019-09-23 DIAGNOSIS — R73.9 STEROID-INDUCED HYPERGLYCEMIA: ICD-10-CM

## 2019-09-23 RX ORDER — FUROSEMIDE 40 MG
40 TABLET ORAL EVERY MORNING
Qty: 90 TABLET | Refills: 2 | Status: SHIPPED | OUTPATIENT
Start: 2019-09-23 | End: 2020-03-24

## 2019-09-23 NOTE — TELEPHONE ENCOUNTER
"Requested Prescriptions   Pending Prescriptions Disp Refills     furosemide (LASIX) 40 MG tablet [Pharmacy Med Name: FUROSEMIDE 40 MG TABLET] 90 tablet 3     Sig: TAKE 1 TABLET (40 MG) BY MOUTH EVERY MORNING       Diuretics (Including Combos) Protocol Failed - 9/22/2019  8:45 AM        Failed - Normal serum creatinine on file in past 12 months     Recent Labs   Lab Test 06/20/19  1219   CR 4.88*              Passed - Blood pressure under 140/90 in past 12 months     BP Readings from Last 3 Encounters:   08/27/19 132/72   07/29/19 (!) 160/92   06/20/19 126/68                 Passed - Recent (12 mo) or future (30 days) visit within the authorizing provider's specialty     Patient had office visit in the last 12 months or has a visit in the next 30 days with authorizing provider or within the authorizing provider's specialty.  See \"Patient Info\" tab in inbasket, or \"Choose Columns\" in Meds & Orders section of the refill encounter.              Passed - Medication is active on med list        Passed - Patient is age 18 or older        Passed - Normal serum potassium on file in past 12 months     Recent Labs   Lab Test 06/20/19  1219   POTASSIUM 4.1                    Passed - Normal serum sodium on file in past 12 months     Recent Labs   Lab Test 06/20/19  1219               LOV- 6/20/2019  Prescription refilled per RN Medication RefillPolicy.................... Keri Law RN ....................  9/23/2019   1:55 PM        "

## 2019-09-26 RX ORDER — PEN NEEDLE, DIABETIC 31 GX5/16"
NEEDLE, DISPOSABLE MISCELLANEOUS
Qty: 100 EACH | Refills: 0 | Status: SHIPPED | OUTPATIENT
Start: 2019-09-26 | End: 2019-12-20

## 2019-09-26 NOTE — TELEPHONE ENCOUNTER
"Requested Prescriptions   Pending Prescriptions Disp Refills     B-D U/F 31G X 8 MM insulin pen needle [Pharmacy Med Name: BD UF SHORT PEN NEEDLE [id number]]  3     Sig: INJECT SUBCUTANEOUS AT BEDTIME USE 1 PEN NEEDLES DAILY OR AS DIRECTED.       Diabetic Supplies Protocol Passed - 9/23/2019  4:14 PM        Passed - Medication is active on med list        Passed - Patient is 18 years of age or older        Passed - Recent (6 mo) or future (30 days) visit within the authorizing provider's specialty     Patient had office visit in the last 6 months or has a visit in the next 30 days with authorizing provider.  See \"Patient Info\" tab in inbasket, or \"Choose Columns\" in Meds & Orders section of the refill encounter.            lov 6/20/19  Kayy Ventura RN on 9/26/2019 at 9:32 AM    "

## 2019-09-30 DIAGNOSIS — R12 HEARTBURN: ICD-10-CM

## 2019-10-01 RX ORDER — FAMOTIDINE 20 MG/1
TABLET, FILM COATED ORAL
Qty: 90 TABLET | Refills: 2 | Status: SHIPPED | OUTPATIENT
Start: 2019-10-01 | End: 2020-03-24

## 2019-10-01 NOTE — TELEPHONE ENCOUNTER
"Requested Prescriptions   Pending Prescriptions Disp Refills     famotidine (PEPCID) 20 MG tablet [Pharmacy Med Name: FAMOTIDINE 20 MG TABLET] 90 tablet 3     Sig: TAKE 1 TABLET BY MOUTH EVERY DAY       H2 Blockers Protocol Passed - 9/30/2019  1:42 AM        Passed - Patient is age 12 or older        Passed - Recent (12 mo) or future (30 days) visit within the authorizing provider's specialty     Patient has had an office visit with the authorizing provider or a provider within the authorizing providers department within the previous 12 mos or has a future within next 30 days. See \"Patient Info\" tab in inbasket, or \"Choose Columns\" in Meds & Orders section of the refill encounter.              Passed - Medication is active on med list        LOV 6/20/19  Prescription approved per [facility].  Brenda J. Goodell, RN on 10/1/2019 at 3:47 PM    "

## 2019-10-08 ENCOUNTER — ALLIED HEALTH/NURSE VISIT (OUTPATIENT)
Dept: FAMILY MEDICINE | Facility: OTHER | Age: 84
End: 2019-10-08
Attending: INTERNAL MEDICINE
Payer: MEDICARE

## 2019-10-08 DIAGNOSIS — D51.9 ANEMIA DUE TO VITAMIN B12 DEFICIENCY: Primary | ICD-10-CM

## 2019-10-08 PROCEDURE — 96372 THER/PROPH/DIAG INJ SC/IM: CPT

## 2019-10-08 PROCEDURE — 25000128 H RX IP 250 OP 636: Performed by: INTERNAL MEDICINE

## 2019-10-08 RX ADMIN — CYANOCOBALAMIN 1000 MCG: 1000 INJECTION, SOLUTION INTRAMUSCULAR; SUBCUTANEOUS at 10:24

## 2019-10-08 NOTE — PROGRESS NOTES
The following medication was given:     MEDICATION: Vitamin B12  1000 mcg  ROUTE: IM  SITE: Deltoid - Left  DOSE: 1000 mcg/mL  LOT #: [id number]  :  [employer]  EXPIRATION DATE:  5/21  NDC: 78256-194-87  Single dose vial-no waste    Pt tolerated injection and left ambulatory.    Tg Andrade RN  ....................  10/8/2019   10:30 AM        .

## 2019-10-29 ENCOUNTER — ALLIED HEALTH/NURSE VISIT (OUTPATIENT)
Dept: FAMILY MEDICINE | Facility: OTHER | Age: 84
End: 2019-10-29
Attending: INTERNAL MEDICINE
Payer: MEDICARE

## 2019-10-29 DIAGNOSIS — D51.9 ANEMIA DUE TO VITAMIN B12 DEFICIENCY, UNSPECIFIED B12 DEFICIENCY TYPE: Primary | ICD-10-CM

## 2019-10-29 PROCEDURE — 96372 THER/PROPH/DIAG INJ SC/IM: CPT

## 2019-10-29 PROCEDURE — 25000128 H RX IP 250 OP 636: Performed by: INTERNAL MEDICINE

## 2019-10-29 RX ADMIN — CYANOCOBALAMIN 1000 MCG: 1000 INJECTION, SOLUTION INTRAMUSCULAR; SUBCUTANEOUS at 09:23

## 2019-10-29 NOTE — PROGRESS NOTES
Verified patient's first and last name, and . Patient stated reason for visit today is to receive B 12. Patient denied any concerns with previous injections. Vitamin B 12 injection prepared and administered IM into right deltoid as ordered. Administration of medication documented in MAR (see MAR for further information regarding dose, lot #, NDC #, expiration date). Patient left clinic ambulatory.       Miya WILDER, RN on 10/29/2019 at 9:24 AM

## 2019-11-19 ENCOUNTER — ALLIED HEALTH/NURSE VISIT (OUTPATIENT)
Dept: FAMILY MEDICINE | Facility: OTHER | Age: 84
End: 2019-11-19
Attending: INTERNAL MEDICINE
Payer: MEDICARE

## 2019-11-19 DIAGNOSIS — D51.9 ANEMIA DUE TO VITAMIN B12 DEFICIENCY, UNSPECIFIED B12 DEFICIENCY TYPE: Primary | ICD-10-CM

## 2019-11-19 PROCEDURE — 96372 THER/PROPH/DIAG INJ SC/IM: CPT

## 2019-11-19 PROCEDURE — 25000128 H RX IP 250 OP 636: Performed by: INTERNAL MEDICINE

## 2019-11-19 RX ADMIN — CYANOCOBALAMIN 1000 MCG: 1000 INJECTION, SOLUTION INTRAMUSCULAR; SUBCUTANEOUS at 09:28

## 2019-11-19 NOTE — PROGRESS NOTES
Verified patient's first and last name, and . Patient stated reason for visit today is to receive B 12. Patient denied adverse reactions and concerns with previous injections. Vitamin B 12 prepared and administered IM into left deltoid as ordered. Administration of medication documented in MAR (see MAR for further information regarding dose, lot #, NDC #, expiration date). Patient left clinic room ambulatory.       Miya WILDER, RN on 2019 at 9:29 AM

## 2019-11-19 NOTE — PROGRESS NOTES
Order renewal required for Vitamin B 12 injection. Order tian'd up. Will route to PCP for review and consideration.       Miya HYDEN, RN on 11/19/2019 at 9:31 AM

## 2019-11-19 NOTE — Clinical Note
Patient in need of order renewal for Vitamin B 12 injections. Order tian'd up for consideration. Miya HYDEN, RN on 11/19/2019 at 9:31 AM

## 2019-11-20 RX ORDER — CYANOCOBALAMIN 1000 UG/ML
1000 INJECTION, SOLUTION INTRAMUSCULAR; SUBCUTANEOUS CONTINUOUS PRN
Status: ACTIVE | OUTPATIENT
Start: 2019-12-10 | End: 2020-12-08

## 2019-12-19 DIAGNOSIS — R73.9 STEROID-INDUCED HYPERGLYCEMIA: ICD-10-CM

## 2019-12-19 DIAGNOSIS — T38.0X5A STEROID-INDUCED HYPERGLYCEMIA: ICD-10-CM

## 2019-12-20 RX ORDER — PEN NEEDLE, DIABETIC 31 GX5/16"
NEEDLE, DISPOSABLE MISCELLANEOUS
Qty: 100 EACH | Refills: 1 | Status: SHIPPED | OUTPATIENT
Start: 2019-12-20 | End: 2020-06-12

## 2019-12-20 NOTE — TELEPHONE ENCOUNTER
Patient is due for diabetes check with Dr. Machado.  Called him and he stated he will make an appointment in the near future.  Prescription approved per [facility] Refill Protocol.

## 2019-12-24 ENCOUNTER — ALLIED HEALTH/NURSE VISIT (OUTPATIENT)
Dept: FAMILY MEDICINE | Facility: OTHER | Age: 84
End: 2019-12-24
Attending: INTERNAL MEDICINE
Payer: MEDICARE

## 2019-12-24 DIAGNOSIS — D51.9 ANEMIA DUE TO VITAMIN B12 DEFICIENCY, UNSPECIFIED B12 DEFICIENCY TYPE: Primary | ICD-10-CM

## 2019-12-24 PROCEDURE — 96372 THER/PROPH/DIAG INJ SC/IM: CPT

## 2019-12-24 PROCEDURE — 25000128 H RX IP 250 OP 636: Performed by: INTERNAL MEDICINE

## 2019-12-24 RX ADMIN — CYANOCOBALAMIN 1000 MCG: 1000 INJECTION, SOLUTION INTRAMUSCULAR; SUBCUTANEOUS at 08:11

## 2019-12-24 NOTE — PROGRESS NOTES
Verified patient's first and last name, and . Patient stated reason for visit today is to receive B 12. Patient denied adverse reactions and concerns with previous injections. Vitamin B 12 prepared and administered IM as previously ordered by provider. Administration of medication documented in MAR (see MAR for further information regarding dose, lot #, NDC #, expiration date). Patient left clinic room ambulatory.       Miya WILDER, RN on 2019 at 8:12 AM

## 2020-01-15 ENCOUNTER — ALLIED HEALTH/NURSE VISIT (OUTPATIENT)
Dept: FAMILY MEDICINE | Facility: OTHER | Age: 85
End: 2020-01-15
Attending: INTERNAL MEDICINE
Payer: MEDICARE

## 2020-01-15 DIAGNOSIS — E53.8 VITAMIN B 12 DEFICIENCY: Primary | ICD-10-CM

## 2020-01-15 PROCEDURE — 25000128 H RX IP 250 OP 636: Performed by: INTERNAL MEDICINE

## 2020-01-15 PROCEDURE — 96372 THER/PROPH/DIAG INJ SC/IM: CPT

## 2020-01-15 RX ADMIN — CYANOCOBALAMIN 1000 MCG: 1000 INJECTION, SOLUTION INTRAMUSCULAR; SUBCUTANEOUS at 09:33

## 2020-01-15 NOTE — PROGRESS NOTES
Verified patient's name and . Patient stated reason for visit today is to receive B 12. Patient denied adverse reactions and concerns with previous injections. Vitamin B12 prepared and administered IM as previously ordered by provider. Administration of medication documented in MAR (see MAR for further information regarding dose, lot #, NDC #, expiration date). Patient left clinic ambulatory.       Miya HYDEN, RN on 1/15/2020 at 9:33 AM

## 2020-02-19 ENCOUNTER — ALLIED HEALTH/NURSE VISIT (OUTPATIENT)
Dept: FAMILY MEDICINE | Facility: OTHER | Age: 85
End: 2020-02-19
Attending: INTERNAL MEDICINE
Payer: MEDICARE

## 2020-02-19 DIAGNOSIS — E53.8 VITAMIN B 12 DEFICIENCY: Primary | ICD-10-CM

## 2020-02-19 PROCEDURE — 96372 THER/PROPH/DIAG INJ SC/IM: CPT

## 2020-02-19 PROCEDURE — 25000128 H RX IP 250 OP 636: Performed by: INTERNAL MEDICINE

## 2020-02-19 RX ADMIN — CYANOCOBALAMIN 1000 MCG: 1000 INJECTION, SOLUTION INTRAMUSCULAR; SUBCUTANEOUS at 09:35

## 2020-02-19 NOTE — PROGRESS NOTES
Verified patient's name and . Patient stated reason for visit today is to receive B 12. Patient denied adverse reactions and concerns with previous injections. Vitamin B 12 prepared and administered IM as previously ordered by provider. Administration of medication documented in MAR (see MAR for further information regarding dose, lot #, NDC #, expiration date). Patient left clinic room ambulatory.       Miya WILDER, RN on 2020 at 9:36 AM

## 2020-03-11 ENCOUNTER — ALLIED HEALTH/NURSE VISIT (OUTPATIENT)
Dept: FAMILY MEDICINE | Facility: OTHER | Age: 85
End: 2020-03-11
Attending: INTERNAL MEDICINE
Payer: MEDICARE

## 2020-03-11 DIAGNOSIS — E53.8 VITAMIN B 12 DEFICIENCY: Primary | ICD-10-CM

## 2020-03-11 PROCEDURE — 25000128 H RX IP 250 OP 636: Performed by: INTERNAL MEDICINE

## 2020-03-11 PROCEDURE — 96372 THER/PROPH/DIAG INJ SC/IM: CPT

## 2020-03-11 RX ADMIN — CYANOCOBALAMIN 1000 MCG: 1000 INJECTION, SOLUTION INTRAMUSCULAR; SUBCUTANEOUS at 09:24

## 2020-03-11 NOTE — PROGRESS NOTES
Verified patient's name and . Patient stated reason for visit today is to receive B 12. Patient denied adverse reactions and concerns with previous injections. Vitamin B 12 prepared and administered IM as ordered. Administration of medication documented in MAR (see MAR for further information regarding dose, lot #, NDC #, expiration date). Patient left clinic room ambulatory.       Miya HYDEN, RN on 3/11/2020 at 9:25 AM

## 2020-03-24 ENCOUNTER — VIRTUAL VISIT (OUTPATIENT)
Dept: INTERNAL MEDICINE | Facility: OTHER | Age: 85
End: 2020-03-24
Attending: INTERNAL MEDICINE
Payer: COMMERCIAL

## 2020-03-24 DIAGNOSIS — N05.8 PRIMARY PAUCI-IMMUNE NECROTIZING AND CRESCENTIC GLOMERULONEPHRITIS: ICD-10-CM

## 2020-03-24 DIAGNOSIS — N18.5 CKD (CHRONIC KIDNEY DISEASE) STAGE 5, GFR LESS THAN 15 ML/MIN (H): ICD-10-CM

## 2020-03-24 DIAGNOSIS — T38.0X5A STEROID-INDUCED HYPERGLYCEMIA: ICD-10-CM

## 2020-03-24 DIAGNOSIS — I10 BENIGN ESSENTIAL HYPERTENSION: Primary | ICD-10-CM

## 2020-03-24 DIAGNOSIS — R12 HEARTBURN: ICD-10-CM

## 2020-03-24 DIAGNOSIS — N05.7 PRIMARY PAUCI-IMMUNE NECROTIZING AND CRESCENTIC GLOMERULONEPHRITIS: ICD-10-CM

## 2020-03-24 DIAGNOSIS — R73.9 STEROID-INDUCED HYPERGLYCEMIA: ICD-10-CM

## 2020-03-24 PROCEDURE — 99213 OFFICE O/P EST LOW 20 MIN: CPT | Mod: TEL | Performed by: INTERNAL MEDICINE

## 2020-03-24 RX ORDER — PREDNISONE 5 MG/1
2.5 TABLET ORAL DAILY
Qty: 135 TABLET | Refills: 3 | Status: SHIPPED | OUTPATIENT
Start: 2020-03-24 | End: 2021-03-30

## 2020-03-24 RX ORDER — FAMOTIDINE 20 MG/1
20 TABLET, FILM COATED ORAL DAILY
Qty: 90 TABLET | Refills: 3 | Status: SHIPPED | OUTPATIENT
Start: 2020-03-24 | End: 2021-03-09

## 2020-03-24 RX ORDER — FUROSEMIDE 40 MG
40 TABLET ORAL EVERY MORNING
Qty: 90 TABLET | Refills: 3 | Status: SHIPPED | OUTPATIENT
Start: 2020-03-24 | End: 2021-03-19

## 2020-03-24 RX ORDER — SODIUM BICARBONATE 650 MG/1
650 TABLET ORAL DAILY
Qty: 90 TABLET | Refills: 3 | Status: SHIPPED | OUTPATIENT
Start: 2020-03-24 | End: 2021-03-22

## 2020-03-24 NOTE — PROGRESS NOTES
"Telephone visit for medication check, patient requesting only Prednisone refill-patient reports he is okay with Lantus prescription.  Previous A1C is at goal of <8  Lab Results   Component Value Date    A1C 6.5 06/20/2019     Urine microalbumin:creatine: n/a  Foot exam Podiatry every 8 weeks, 3-4-20  Eye exam Spring 2019    Tobacco User no  Patient is not on a daily aspirin  Patient is on a Statin.  Blood pressure today of:     BP Readings from Last 1 Encounters:   08/27/19 132/72      is at the goal of <139/89 for diabetics.    Angelica LINDAMonsterBetty LPN 3/24/2020 10:17 AM      Altaf Chao is a 85 year old male who is being evaluated via a billable telephone visit.      The patient has been notified of following:     \"This telephone visit will be conducted via a call between you and your physician/provider. We have found that certain health care needs can be provided without the need for a physical exam.  This service lets us provide the care you need with a short phone conversation.  If a prescription is necessary we can send it directly to your pharmacy.  If lab work is needed we can place an order for that and you can then stop by our lab to have the test done at a later time.    If during the course of the call the physician/provider feels a telephone visit is not appropriate, you will not be charged for this service.\"     Altaf Chao complains of    Chief Complaint   Patient presents with     Medication Problem       I have reviewed and updated the patient's Past Medical History, Social History, Family History and Medication List.    ALLERGIES  Patient has no known allergies.    Assessment/Plan:    ICD-10-CM    1. Benign essential hypertension  I10 furosemide (LASIX) 40 MG tablet   2. CKD (chronic kidney disease) stage 5, GFR 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 ml/min (H)  N18.5 predniSONE (DELTASONE) 5 MG tablet     sodium bicarbonate 650 MG tablet   3. Steroid-induced hyperglycemia  R73.9     T38.0X5A    4. 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  Tolerating current medication regimen.  Needs refills of furosemide.    Stage V chronic kidney disease, this is due to immune glomerulonephritis.  Continues on daily prednisone.  Currently 2.5 mg daily.  Due to the daily steroids, he has steroid-induced hyperglycemia.  Has been doing Lantus insulin 2 units at bedtime, reports he has plenty of insulin at this time.    Heartburn, ongoing.  Using famotidine.  Needs refills.  Does get heartburn if he does not use the medication.    Otherwise reports he is doing well.  He is trying to limit his outings from home to reduce his exposure to the virus pandemic.    Phone call duration:  15:14 minutes    Advised to call as needed, otherwise return as needed for follow-up with Dr. Machado.    Clinic : [phone]  Appointment line: [phone]     Valentino Machado MD

## 2020-04-01 ENCOUNTER — ALLIED HEALTH/NURSE VISIT (OUTPATIENT)
Dept: FAMILY MEDICINE | Facility: OTHER | Age: 85
End: 2020-04-01
Attending: INTERNAL MEDICINE
Payer: MEDICARE

## 2020-04-01 DIAGNOSIS — E53.8 VITAMIN B 12 DEFICIENCY: Primary | ICD-10-CM

## 2020-04-01 PROCEDURE — 25000128 H RX IP 250 OP 636: Performed by: INTERNAL MEDICINE

## 2020-04-01 PROCEDURE — 96372 THER/PROPH/DIAG INJ SC/IM: CPT

## 2020-04-01 RX ADMIN — CYANOCOBALAMIN 1000 MCG: 1000 INJECTION, SOLUTION INTRAMUSCULAR; SUBCUTANEOUS at 09:19

## 2020-04-01 NOTE — PROGRESS NOTES
Verified patient's name and . Patient stated reason for visit today is to receive B 12. Patient denied adverse reactions and concerns with previous injections. Vitamin B 12 prepared and administered IM as ordered. Administration of medication documented in MAR (see MAR for further information regarding dose, lot #, NDC #, expiration date). Patient left clinic room ambulatory.       Miya HYDEN, RN on 2020 at 9:20 AM

## 2020-04-22 ENCOUNTER — ALLIED HEALTH/NURSE VISIT (OUTPATIENT)
Dept: FAMILY MEDICINE | Facility: OTHER | Age: 85
End: 2020-04-22
Attending: INTERNAL MEDICINE
Payer: MEDICARE

## 2020-04-22 DIAGNOSIS — E53.8 VITAMIN B 12 DEFICIENCY: Primary | ICD-10-CM

## 2020-04-22 PROCEDURE — 25000128 H RX IP 250 OP 636: Performed by: INTERNAL MEDICINE

## 2020-04-22 PROCEDURE — 96372 THER/PROPH/DIAG INJ SC/IM: CPT

## 2020-04-22 RX ADMIN — CYANOCOBALAMIN 1000 MCG: 1000 INJECTION, SOLUTION INTRAMUSCULAR; SUBCUTANEOUS at 08:35

## 2020-04-22 NOTE — PROGRESS NOTES
Verified patient's first and last name, and . Patient stated reason for visit today is to receive B12. Patient denied any concerns with previous injections. B12 prepared and administered IM into left deltoid as ordered. Administration of medication documented in MAR (see MAR for further information regarding dose, lot #, NDC #, expiration date). Patient encouraged to wait in lobby for 15 minutes post-injection and notify staff immediately of any reaction.       Pete Siddiqui RN ....................  2020   8:25 AM

## 2020-05-13 ENCOUNTER — ALLIED HEALTH/NURSE VISIT (OUTPATIENT)
Dept: FAMILY MEDICINE | Facility: OTHER | Age: 85
End: 2020-05-13
Attending: INTERNAL MEDICINE
Payer: MEDICARE

## 2020-05-13 DIAGNOSIS — E53.8 VITAMIN B 12 DEFICIENCY: Primary | ICD-10-CM

## 2020-05-13 PROCEDURE — 25000128 H RX IP 250 OP 636: Performed by: INTERNAL MEDICINE

## 2020-05-13 PROCEDURE — 96372 THER/PROPH/DIAG INJ SC/IM: CPT

## 2020-05-13 RX ADMIN — CYANOCOBALAMIN 1000 MCG: 1000 INJECTION, SOLUTION INTRAMUSCULAR; SUBCUTANEOUS at 08:08

## 2020-05-13 NOTE — PROGRESS NOTES
Verified patient's name and . Patient stated reason for visit today is to receive B 12. Patient denied adverse reactions and concerns with previous injections. Vitamin B 12 prepared and administered IM as ordered. Administration of medication documented in MAR (see MAR for further information regarding dose, lot #, NDC #, expiration date). Patient left clinic room ambulatory.       Miya HYDEN, RN on 2020 at 8:08 AM

## 2020-05-23 DIAGNOSIS — T38.0X5A STEROID-INDUCED HYPERGLYCEMIA: ICD-10-CM

## 2020-05-23 DIAGNOSIS — R73.9 STEROID-INDUCED HYPERGLYCEMIA: ICD-10-CM

## 2020-05-27 RX ORDER — INSULIN GLARGINE 100 [IU]/ML
INJECTION, SOLUTION SUBCUTANEOUS
Refills: 3 | OUTPATIENT
Start: 2020-05-27

## 2020-05-27 NOTE — TELEPHONE ENCOUNTER
Refill denied at this time. Spoke to patient and he has an abundant supply of Lantus Solostar pen. Thea Noble RN on 5/27/2020 at 9:02 AM

## 2020-06-04 DIAGNOSIS — R73.9 STEROID-INDUCED HYPERGLYCEMIA: ICD-10-CM

## 2020-06-04 DIAGNOSIS — T38.0X5A STEROID-INDUCED HYPERGLYCEMIA: ICD-10-CM

## 2020-06-04 NOTE — TELEPHONE ENCOUNTER
Routing refill request to provider for review/approval because:  Labs not current:  HGBA1C    Last refill  Insulin Glargine 100unit/ml  5/5/2019  15ml 3 Refills    LOV:3/24/2020  Future 6/10/2020  Thea Noble RN on 6/4/2020 at 10:52 AM

## 2020-06-10 ENCOUNTER — ALLIED HEALTH/NURSE VISIT (OUTPATIENT)
Dept: FAMILY MEDICINE | Facility: OTHER | Age: 85
End: 2020-06-10
Attending: INTERNAL MEDICINE
Payer: MEDICARE

## 2020-06-10 DIAGNOSIS — E53.8 VITAMIN B 12 DEFICIENCY: Primary | ICD-10-CM

## 2020-06-10 PROCEDURE — 96372 THER/PROPH/DIAG INJ SC/IM: CPT

## 2020-06-10 PROCEDURE — 25000128 H RX IP 250 OP 636: Performed by: INTERNAL MEDICINE

## 2020-06-10 RX ADMIN — CYANOCOBALAMIN 1000 MCG: 1000 INJECTION, SOLUTION INTRAMUSCULAR; SUBCUTANEOUS at 08:16

## 2020-06-10 NOTE — PROGRESS NOTES
Injection  Verified patient's name and . Patient stated reason for visit today is to receive B 12. Patient denied adverse reactions and concerns with previous injections. Vitamin B 12 prepared and administered IM as ordered. Administration of medication documented in MAR (see MAR for further information regarding dose, lot #, NDC #, expiration date). Patient left clinic room ambulatory.       Miya HYDEN, RN on 6/10/2020 at 8:16 AM

## 2020-06-12 DIAGNOSIS — R73.9 STEROID-INDUCED HYPERGLYCEMIA: ICD-10-CM

## 2020-06-12 DIAGNOSIS — T38.0X5A STEROID-INDUCED HYPERGLYCEMIA: ICD-10-CM

## 2020-06-12 RX ORDER — PEN NEEDLE, DIABETIC 31 GX5/16"
NEEDLE, DISPOSABLE MISCELLANEOUS
Qty: 100 EACH | Refills: 1 | Status: SHIPPED | OUTPATIENT
Start: 2020-06-12 | End: 2021-01-25

## 2020-06-12 NOTE — TELEPHONE ENCOUNTER
Refill Request for: B-D U/F 31G X 8 MM insulin pen needle   Received From: [id number]  Last Written Prescription Date: 12/20/2019 for 100 each and 1 refills  LOV: 3/24/2020 with PCP via Virtual Visit  Next Appointment: No upcoming office visit on file during initial refill review with PCP  Protocol: Diabetic Supplies Protocol Passed - 06/12/2020 11:02 AM    Prescription approved per [facility] Medication Refill Protocol.      Miya HYDEN, RN on 6/12/2020 at 11:06 AM

## 2020-07-08 ENCOUNTER — ALLIED HEALTH/NURSE VISIT (OUTPATIENT)
Dept: FAMILY MEDICINE | Facility: OTHER | Age: 85
End: 2020-07-08
Attending: INTERNAL MEDICINE
Payer: MEDICARE

## 2020-07-08 DIAGNOSIS — D51.9 ANEMIA DUE TO VITAMIN B12 DEFICIENCY, UNSPECIFIED B12 DEFICIENCY TYPE: Primary | ICD-10-CM

## 2020-07-08 PROCEDURE — 96372 THER/PROPH/DIAG INJ SC/IM: CPT

## 2020-07-08 PROCEDURE — 25000128 H RX IP 250 OP 636: Performed by: INTERNAL MEDICINE

## 2020-07-08 RX ADMIN — CYANOCOBALAMIN 1000 MCG: 1000 INJECTION, SOLUTION INTRAMUSCULAR; SUBCUTANEOUS at 08:15

## 2020-07-08 NOTE — PROGRESS NOTES
Injection  Verified patient's name and . Patient stated reason for visit today is to receive B 12. Patient denied adverse reactions and concerns with previous injections. Vitamin B 12 prepared and administered IM as ordered. Administration of medication documented in MAR (see MAR for further information regarding dose, lot #, NDC #, expiration date). Patient left clinic room ambulatory.       Miya HYDEN, RN on 2020 at 8:15 AM

## 2020-07-29 ENCOUNTER — OFFICE VISIT (OUTPATIENT)
Dept: UROLOGY | Facility: OTHER | Age: 85
End: 2020-07-29
Attending: UROLOGY
Payer: MEDICARE

## 2020-07-29 VITALS
SYSTOLIC BLOOD PRESSURE: 124 MMHG | BODY MASS INDEX: 27.06 KG/M2 | DIASTOLIC BLOOD PRESSURE: 80 MMHG | OXYGEN SATURATION: 97 % | RESPIRATION RATE: 16 BRPM | HEART RATE: 100 BPM | WEIGHT: 180.6 LBS

## 2020-07-29 DIAGNOSIS — R33.9 URINARY RETENTION: Primary | ICD-10-CM

## 2020-07-29 PROCEDURE — 99213 OFFICE O/P EST LOW 20 MIN: CPT | Performed by: UROLOGY

## 2020-07-29 PROCEDURE — G0463 HOSPITAL OUTPT CLINIC VISIT: HCPCS

## 2020-07-29 PROCEDURE — 51798 US URINE CAPACITY MEASURE: CPT | Performed by: UROLOGY

## 2020-07-29 PROCEDURE — G0463 HOSPITAL OUTPT CLINIC VISIT: HCPCS | Mod: 25

## 2020-07-29 RX ORDER — TAMSULOSIN HYDROCHLORIDE 0.4 MG/1
0.4 CAPSULE ORAL EVERY EVENING
Qty: 90 CAPSULE | Refills: 3 | Status: SHIPPED | OUTPATIENT
Start: 2020-07-29 | End: 2021-07-14

## 2020-07-29 RX ORDER — FINASTERIDE 5 MG/1
5 TABLET, FILM COATED ORAL DAILY
Qty: 90 TABLET | Refills: 3 | Status: SHIPPED | OUTPATIENT
Start: 2020-07-29 | End: 2021-05-24

## 2020-07-29 ASSESSMENT — PAIN SCALES - GENERAL: PAINLEVEL: NO PAIN (0)

## 2020-07-29 NOTE — PROGRESS NOTES
Type of Visit  Established    Chief Complaint  Urinary retention    HPI  Mr. Chao is a 86 y.o. 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  He has been taking Flomax for a few years.  About 2 years ago he started finasteride and now takes maximal medical therapy with both medications.  No UTIs in the last year.  No episodes of acute urinary retention requiring an ED visit.  Denies new changes in dribbling or incontinence.    He has been voiding subjectively much better since the buried penis repair performed 3 years ago.      Review of Systems  I reviewed the ROS the patient today.    Nursing Notes:   Luba Kearney LPN  2020  1:52 PM  Addendum  Chief Complaint   Patient presents with     Follow Up     1 year urinary retention   Patient presents to the clinic today for a follow up for a 1 year urinary retention.  Review of Systems:    Weight loss:    No     Recent fever/chills:  No   Night sweats:   No  Current skin rash:  No   Recent hair loss:  No  Heat intolerance:  No   Cold intolerance:  No  Chest pain:   No   Palpitations:   No  Shortness of breath:  No   Wheezing:   No  Constipation:    No   Diarrhea:   No   Nausea:   No   Vomiting:   No   Kidney/side pain:  No   Back pain:   No  Frequent headaches:  No   Dizziness:     No  Leg swelling:   No   Calf pain:    No    Post-Void Residual  A post-void residual was measured by ultrasonic bladder scanner.  307 mL Patient just went to the bathroom before visit, had him empty bladder and new reading was 243 mL  Luba Kearney LPN  2020 1:35 PM      Medication Reconciliation: completed   Luba Kearney LPN  2020 1:14 PM     Family History  Family History   Problem Relation Age of Onset     Other Son 12     neuroblastoma at 11 yo  at 14.      Genetic No Family History      No known FHx of DM, CAD, CVA       Physical Exam  /80 (BP Location: Right arm, Patient Position: Sitting, Cuff Size: Adult  Regular)   Pulse 100   Resp 16   Wt 81.9 kg (180 lb 9.6 oz)   SpO2 97%   BMI 27.06 kg/m    Constitutional: NAD, WDWN.  Cardiovascular: Regular rate.  Pulmonary/Chest: Respirations are even and non-labored bilaterally.  Abdominal: Soft. No distension, tenderness, masses or guarding. No CVA tenderness.  Extremities: SUSI x 4, Warm. No clubbing.  No cyanosis.    Skin: Pink, warm and dry.  No rashes noted.  Genitourinary: nonpalpable bladder    Labs  Results for LAUREN MURPHY (MRN [id number]) as of 7/29/2019 15:03   6/20/2019 12:19   Creatinine 4.88 (H)     Radiographic Studies  4/2/2017  CT Stone  IMPRESSION:       1.  Gates catheter in the bladder. Asymmetric bowel thickening in the bladder.   Recommend further evaluation for a bladder infection or neoplasm if clinically   indicated.    2.  Diverticulosis of the rectosigmoid. Mild adjacent inflammatory changes may   represent mild diverticulitis in the appropriate clinical setting. No signs of   perforation, abscess, or bleeding     Post-Void Residual  A post-void residual was measured by ultrasonic bladder scanner.  243 mL today  290 mL (previously recorded)  412 mL (previously recorded)  199 mL (previously recorded)    Assessment  Mr. Murphy is a 86 y.o. male follows up with BPH causing weak stream.  Patient is doing well and would like to continue this regimen.  Elevated residual is stable and likely not contributing to CKD    Plan  Continue Flomax and finasteride  Follow-up annually with PVR

## 2020-07-29 NOTE — NURSING NOTE
Chief Complaint   Patient presents with     Follow Up     1 year urinary retention   Patient presents to the clinic today for a follow up for a 1 year urinary retention.  Review of Systems:    Weight loss:    No     Recent fever/chills:  No   Night sweats:   No  Current skin rash:  No   Recent hair loss:  No  Heat intolerance:  No   Cold intolerance:  No  Chest pain:   No   Palpitations:   No  Shortness of breath:  No   Wheezing:   No  Constipation:    No   Diarrhea:   No   Nausea:   No   Vomiting:   No   Kidney/side pain:  No   Back pain:   No  Frequent headaches:  No   Dizziness:     No  Leg swelling:   No   Calf pain:    No    Post-Void Residual  A post-void residual was measured by ultrasonic bladder scanner.  307 mL Patient just went to the bathroom before visit, had him empty bladder and new reading was 243 mL  Luba Kearney LPN  7/29/2020 1:35 PM      Medication Reconciliation: completed   Luba Kearney LPN  7/29/2020 1:14 PM

## 2020-08-05 ENCOUNTER — ALLIED HEALTH/NURSE VISIT (OUTPATIENT)
Dept: FAMILY MEDICINE | Facility: OTHER | Age: 85
End: 2020-08-05
Payer: MEDICARE

## 2020-08-05 DIAGNOSIS — E53.8 VITAMIN B 12 DEFICIENCY: Primary | ICD-10-CM

## 2020-08-05 PROCEDURE — 25000128 H RX IP 250 OP 636: Performed by: INTERNAL MEDICINE

## 2020-08-05 PROCEDURE — 96372 THER/PROPH/DIAG INJ SC/IM: CPT | Performed by: REGISTERED NURSE

## 2020-08-05 RX ADMIN — CYANOCOBALAMIN 1000 MCG: 1000 INJECTION, SOLUTION INTRAMUSCULAR; SUBCUTANEOUS at 08:09

## 2020-08-05 NOTE — PROGRESS NOTES
Injection  Verified patient's name and . Patient stated reason for visit today is to receive B-12. Patient denied adverse reactions and concerns with previous injections. B-12 prepared and administered IM as ordered. Administration of medication documented in MAR (see MAR for further information regarding dose, lot #, NDC #, expiration date). Patient left clinic room ambulatory.       Chantel Barajas RN, BSN on 2020 at 8:10 AM

## 2020-09-02 ENCOUNTER — ALLIED HEALTH/NURSE VISIT (OUTPATIENT)
Dept: FAMILY MEDICINE | Facility: OTHER | Age: 85
End: 2020-09-02
Attending: INTERNAL MEDICINE
Payer: MEDICARE

## 2020-09-02 DIAGNOSIS — E53.8 VITAMIN B 12 DEFICIENCY: Primary | ICD-10-CM

## 2020-09-02 PROCEDURE — 96372 THER/PROPH/DIAG INJ SC/IM: CPT | Performed by: REGISTERED NURSE

## 2020-09-02 PROCEDURE — 25000128 H RX IP 250 OP 636: Performed by: INTERNAL MEDICINE

## 2020-09-02 RX ADMIN — CYANOCOBALAMIN 1000 MCG: 1000 INJECTION, SOLUTION INTRAMUSCULAR; SUBCUTANEOUS at 13:45

## 2020-10-06 ENCOUNTER — ALLIED HEALTH/NURSE VISIT (OUTPATIENT)
Dept: FAMILY MEDICINE | Facility: OTHER | Age: 85
End: 2020-10-06
Attending: INTERNAL MEDICINE
Payer: MEDICARE

## 2020-10-06 DIAGNOSIS — E53.8 VITAMIN B 12 DEFICIENCY: Primary | ICD-10-CM

## 2020-10-06 PROCEDURE — 96372 THER/PROPH/DIAG INJ SC/IM: CPT | Performed by: INTERNAL MEDICINE

## 2020-10-06 PROCEDURE — 250N000011 HC RX IP 250 OP 636: Performed by: INTERNAL MEDICINE

## 2020-10-06 RX ADMIN — CYANOCOBALAMIN 1000 MCG: 1000 INJECTION, SOLUTION INTRAMUSCULAR; SUBCUTANEOUS at 09:21

## 2020-10-06 NOTE — PROGRESS NOTES
Verified patient's first and last name, and . Patient stated reason for visit today is to receive B12. Patient denied any concerns with previous injections. B12 prepared and administered IM into left deltoid as ordered. Administration of medication documented in MAR (see MAR for further information regarding dose, lot #, NDC #, expiration date). Patient encouraged to wait in lobby for 15 minutes post-injection and notify staff immediately of any reaction.       Pete Siddiqui RN ....................  10/6/2020   9:21 AM

## 2020-10-14 DIAGNOSIS — I10 ESSENTIAL HYPERTENSION: ICD-10-CM

## 2020-10-15 RX ORDER — AMLODIPINE BESYLATE 5 MG/1
TABLET ORAL
Qty: 90 TABLET | Refills: 0 | Status: SHIPPED | OUTPATIENT
Start: 2020-10-15 | End: 2021-01-06

## 2020-10-15 NOTE — TELEPHONE ENCOUNTER
[facility] sent Rx request for the following:    Norvasc 5mg tablet   Take one tablet daily.   Last Prescription Date:   07/21/2020  Last Fill Qty/Refills:         90, R-0    Last Office Visit:              03/24/2020  Future Office visit:            None     Routing refill request to provider as protocol failed. Patient states that he has some medication left at this time and this can wait for Dr. Machado.         Failed - Normal serum creatinine on file in past 12 months        Chioma Wilkes RN on 10/15/2020 at 8:44 AM

## 2020-11-03 ENCOUNTER — ALLIED HEALTH/NURSE VISIT (OUTPATIENT)
Dept: FAMILY MEDICINE | Facility: OTHER | Age: 85
End: 2020-11-03
Attending: INTERNAL MEDICINE
Payer: MEDICARE

## 2020-11-03 DIAGNOSIS — E53.8 VITAMIN B 12 DEFICIENCY: Primary | ICD-10-CM

## 2020-11-03 PROCEDURE — 250N000011 HC RX IP 250 OP 636: Performed by: INTERNAL MEDICINE

## 2020-11-03 PROCEDURE — 96372 THER/PROPH/DIAG INJ SC/IM: CPT | Performed by: INTERNAL MEDICINE

## 2020-11-03 RX ADMIN — CYANOCOBALAMIN 1000 MCG: 1000 INJECTION, SOLUTION INTRAMUSCULAR; SUBCUTANEOUS at 10:38

## 2020-11-03 NOTE — PROGRESS NOTES
Verified patient's first and last name, and . Patient stated reason for visit today is to receive B12. Patient denied any concerns with previous injections. B12 prepared and administered as ordered. Administration of medication documented in MAR (see MAR for further information regarding dose, lot #, NDC #, expiration date). Patient encouraged to wait in lobby for 15 minutes post-injection and notify staff immediately of any reaction.     Chioma HYDEN, RN on 11/3/2020 at 10:34 AM

## 2020-12-01 ENCOUNTER — ALLIED HEALTH/NURSE VISIT (OUTPATIENT)
Dept: FAMILY MEDICINE | Facility: OTHER | Age: 85
End: 2020-12-01
Attending: INTERNAL MEDICINE
Payer: MEDICARE

## 2020-12-01 DIAGNOSIS — D51.9 ANEMIA DUE TO VITAMIN B12 DEFICIENCY, UNSPECIFIED B12 DEFICIENCY TYPE: Primary | ICD-10-CM

## 2020-12-01 PROCEDURE — 250N000011 HC RX IP 250 OP 636: Performed by: INTERNAL MEDICINE

## 2020-12-01 PROCEDURE — 96372 THER/PROPH/DIAG INJ SC/IM: CPT | Performed by: INTERNAL MEDICINE

## 2020-12-01 RX ADMIN — CYANOCOBALAMIN 1000 MCG: 1000 INJECTION, SOLUTION INTRAMUSCULAR; SUBCUTANEOUS at 08:07

## 2020-12-01 NOTE — PROGRESS NOTES
Injection  Verified patient's name and . Patient stated reason for visit today is to receive B 12. Patient denied adverse reactions and concerns with previous injections. Vitamin B 12 prepared and administered IM as ordered. Administration of medication documented in MAR (see MAR for further information regarding dose, lot #, NDC #, expiration date). Patient left clinic room ambulatory.       Miya HYDEN, RN on 2020 at 8:13 AM

## 2020-12-23 ENCOUNTER — TELEPHONE (OUTPATIENT)
Dept: INTERNAL MEDICINE | Facility: OTHER | Age: 85
End: 2020-12-23

## 2020-12-23 DIAGNOSIS — D51.9 ANEMIA DUE TO VITAMIN B12 DEFICIENCY, UNSPECIFIED B12 DEFICIENCY TYPE: Primary | ICD-10-CM

## 2020-12-23 RX ORDER — CYANOCOBALAMIN 1000 UG/ML
1000 INJECTION, SOLUTION INTRAMUSCULAR; SUBCUTANEOUS CONTINUOUS PRN
Status: DISCONTINUED | OUTPATIENT
Start: 2020-12-29 | End: 2021-08-02

## 2020-12-23 NOTE — TELEPHONE ENCOUNTER
Order Request for Clinic Administered Medication  Order renewal required for Vitamin B 12 injections. Order tian'd up. Will route to PCP for review and consideration.       Miya HYDEN, RN on 12/23/2020 at 7:55 AM

## 2020-12-27 ENCOUNTER — HOSPITAL ENCOUNTER (EMERGENCY)
Facility: OTHER | Age: 85
Discharge: HOME | End: 2020-12-27
Attending: PHYSICIAN ASSISTANT | Admitting: PHYSICIAN ASSISTANT
Payer: MEDICARE

## 2020-12-27 VITALS
BODY MASS INDEX: 27.28 KG/M2 | DIASTOLIC BLOOD PRESSURE: 77 MMHG | HEIGHT: 68 IN | HEART RATE: 89 BPM | OXYGEN SATURATION: 99 % | TEMPERATURE: 98 F | SYSTOLIC BLOOD PRESSURE: 152 MMHG | RESPIRATION RATE: 16 BRPM | WEIGHT: 180 LBS

## 2020-12-27 DIAGNOSIS — S81.811A LACERATION OF LOWER LEG, RIGHT, INITIAL ENCOUNTER: ICD-10-CM

## 2020-12-27 PROCEDURE — 99283 EMERGENCY DEPT VISIT LOW MDM: CPT | Mod: 25 | Performed by: PHYSICIAN ASSISTANT

## 2020-12-27 PROCEDURE — 12035 INTMD RPR S/A/T/EXT 12.6-20: CPT | Performed by: PHYSICIAN ASSISTANT

## 2020-12-27 PROCEDURE — 250N000013 HC RX MED GY IP 250 OP 250 PS 637: Performed by: PHYSICIAN ASSISTANT

## 2020-12-27 PROCEDURE — 250N000011 HC RX IP 250 OP 636: Performed by: PHYSICIAN ASSISTANT

## 2020-12-27 PROCEDURE — 250N000009 HC RX 250: Performed by: PHYSICIAN ASSISTANT

## 2020-12-27 PROCEDURE — 90715 TDAP VACCINE 7 YRS/> IM: CPT | Performed by: PHYSICIAN ASSISTANT

## 2020-12-27 PROCEDURE — 90471 IMMUNIZATION ADMIN: CPT | Performed by: PHYSICIAN ASSISTANT

## 2020-12-27 RX ORDER — CEPHALEXIN 500 MG/1
500 CAPSULE ORAL 4 TIMES DAILY
Qty: 12 CAPSULE | Refills: 0 | Status: SHIPPED | OUTPATIENT
Start: 2020-12-27 | End: 2020-12-30

## 2020-12-27 RX ORDER — LIDOCAINE HYDROCHLORIDE AND EPINEPHRINE 10; 10 MG/ML; UG/ML
20 INJECTION, SOLUTION INFILTRATION; PERINEURAL ONCE
Status: COMPLETED | OUTPATIENT
Start: 2020-12-27 | End: 2020-12-27

## 2020-12-27 RX ADMIN — CEPHALEXIN 500 MG: 250 CAPSULE ORAL at 16:13

## 2020-12-27 RX ADMIN — LIDOCAINE HYDROCHLORIDE,EPINEPHRINE BITARTRATE 20 ML: 10; .01 INJECTION, SOLUTION INFILTRATION; PERINEURAL at 16:14

## 2020-12-27 RX ADMIN — TETANUS TOXOID, REDUCED DIPHTHERIA TOXOID AND ACELLULAR PERTUSSIS VACCINE, ADSORBED 0.5 ML: 5; 2.5; 8; 8; 2.5 SUSPENSION INTRAMUSCULAR at 14:44

## 2020-12-27 ASSESSMENT — ENCOUNTER SYMPTOMS
BRUISES/BLEEDS EASILY: 0
CHILLS: 0
CONFUSION: 0
BACK PAIN: 0
FEVER: 0
ADENOPATHY: 0
SHORTNESS OF BREATH: 0
WOUND: 1
ABDOMINAL PAIN: 0
CHEST TIGHTNESS: 0
HEMATURIA: 0

## 2020-12-27 ASSESSMENT — MIFFLIN-ST. JEOR: SCORE: 1470.97

## 2020-12-27 NOTE — DISCHARGE INSTRUCTIONS
Get plenty of fluids and rest.  You can take Tylenol to help with discomfort.  Keep your wound covered the rest of today.  Tomorrow you can remove the bandage, clean twice per day gently with soap and water, reapply bandage as needed.  Sutures should be removed approximately 10 to 14 days.  Be aware for signs of infection such as increased redness, purulent drainage, fevers of these occur seek medical attention.  We will prescribe a short course of antibiotics as this was a pretty bad.  A referral is placed for you to follow-up with wound care specialist, Viky Villela, follow-up with PCP as needed.

## 2020-12-27 NOTE — ED AVS SNAPSHOT
[facility]  [address]  [place] [address]  Phone: [phone]  Fax: [phone]                                    Altaf Chao   MRN: [id number]    Department: [facility]   Date of Visit: 12/27/2020           After Visit Summary Signature Page    I have received my discharge instructions, and my questions have been answered. I have discussed any challenges I see with this plan with the nurse or doctor.    ..........................................................................................................................................  Patient/Patient Representative Signature      ..........................................................................................................................................  Patient Representative Print Name and Relationship to Patient    ..................................................               ................................................  Date                                   Time    ..........................................................................................................................................  Reviewed by Signature/Title    ...................................................              ..............................................  Date                                               Time          22EPIC Rev 08/18

## 2020-12-27 NOTE — ED TRIAGE NOTES
Pt here by himself with c/o laceration to rt lower leg, pt reports cutting it on door frame of car about 1 hour ago, VSS, no acute distress, blood noted on pants and sock, pt brought back into Er to be evaluated

## 2020-12-28 NOTE — ED PROVIDER NOTES
History     Chief Complaint   Patient presents with     Laceration     HPI  Altaf Chao is a 86 year old male who is here by himself with c/o laceration to rt lower leg, pt reports cutting it on door frame of car about 1 hour ago. He drove himself home and unloaded groceries into the house and decided to look at his leg when he noticed the large laceration, minimal pain. No other injuries or complaints.     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     Priority: Medium     Anemia due to vitamin B12 deficiency 10/25/2017     Priority: Medium     Lymphedema of both lower extremities 06/23/2017     Priority: Medium     Immunocompromised patient (H) 06/09/2017     Priority: Medium     Bilateral edema of lower extremity 05/11/2017     Priority: Medium     Hematoma 05/04/2017     Priority: Medium     Steroid-induced hyperglycemia 05/04/2017     Priority: Medium     Anemia of chronic disease 05/03/2017     Priority: Medium     CKD (chronic kidney disease) stage 5, GFR 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 ml/min (H) 05/01/2017     Priority: Medium     Anemia 04/25/2017     Priority: Medium     Metabolic acidosis 04/25/2017     Priority: Medium     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     Priority: Medium     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     Priority: Medium     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     Priority: Medium     Pseudomonas urinary tract infection 04/15/2017     Priority: Medium     Hyperkalemia 04/03/2017     Priority: Medium     Hyponatremia 04/03/2017     Priority: Medium     Refusal of statin medication at discharge 02/17/2017     Priority: Medium     H/O total hip arthroplasty 06/30/2014     Priority: Medium     Mixed hyperlipidemia 04/10/2014     Priority: Medium     Bilateral leg edema 01/08/2014      Priority: Medium     Hip stiffness 2014     Priority: Medium     H/O bilateral inguinal hernia repair 2013     Priority: Medium     History of tobacco abuse 08/15/2013     Priority: Medium     Benign essential hypertension 2012     Priority: Medium     Pityriasis rosea 2012     Priority: Medium        Past Medical History:    Past Medical History:   Diagnosis Date     Acute kidney failure (H)      Essential (primary) hypertension      Hyperlipidemia      Osteoarthritis of hip      Pityriasis rosea      Tachycardia      Unilateral inguinal hernia without obstruction or gangrene        Past Surgical History:    Past Surgical History:   Procedure Laterality Date     CIRCUMCISION      2017,Dr. Heidi GREENBERG     OTHER SURGICAL HISTORY      92606.9,[place] SUBTALAR ATHROEREISIS,bone Spurs excision on Toe     OTHER SURGICAL HISTORY      13,,HERNIA REPAIR,Right,RIH with mesh     OTHER SURGICAL HISTORY      13,93821,GENITAL SURGERY MALE,Dorsal Slit     OTHER SURGICAL HISTORY      4/15/14,,HERNIA REPAIR,Left,LIH with mesh     OTHER SURGICAL HISTORY      14,26946.0,[place] TOTAL HIP ARTHROPLASTY,Left       Family History:    Family History   Problem Relation Age of Onset     Other - See Comments Son 12        neuroblastoma at 13 yo  at 14.     Genetic Disorder No family hx of         Genetic,No known FHx of DM, CAD, CVA       Social History:  Marital Status:   [2]  Social History     Tobacco Use     Smoking status: Former Smoker     Types: Cigarettes     Quit date: 1976     Years since quittin.0     Smokeless tobacco: Never Used   Substance Use Topics     Alcohol use: Yes     Alcohol/week: 0.8 standard drinks     Comment: occ beer     Drug use: Never        Medications:         cephALEXin (KEFLEX) 500 MG capsule       amLODIPine (NORVASC) 5 MG tablet       B-D U/F 31G X 8 MM insulin pen needle       blood glucose (NO BRAND SPECIFIED) lancets standard       blood  "glucose monitoring (NO BRAND SPECIFIED) test strip       Blood Glucose Monitoring Suppl (FIFTY50 GLUCOSE METER 2.0) W/DEVICE KIT       cholecalciferol (VITAMIN D3) 1000 units (25 mcg) capsule       famotidine (PEPCID) 20 MG tablet       finasteride (PROSCAR) 5 MG tablet       furosemide (LASIX) 40 MG tablet       insulin glargine (LANTUS SOLOSTAR) 100 UNIT/ML pen       predniSONE (DELTASONE) 5 MG tablet       sodium bicarbonate 650 MG tablet       tamsulosin (FLOMAX) 0.4 MG capsule          Review of Systems   Constitutional: Negative for chills and fever.   HENT: Negative for congestion.    Eyes: Negative for visual disturbance.   Respiratory: Negative for chest tightness and shortness of breath.    Cardiovascular: Negative for chest pain.   Gastrointestinal: Negative for abdominal pain.   Genitourinary: Negative for hematuria.   Musculoskeletal: Negative for back pain.   Skin: Positive for wound. Negative for rash.   Neurological: Negative for syncope.   Hematological: Negative for adenopathy. Does not bruise/bleed easily.   Psychiatric/Behavioral: Negative for confusion.       Physical Exam   BP: (!) 168/83  Pulse: 89  Temp: 98  F (36.7  C)  Resp: 16  Height: 172.7 cm (5' 8\")  Weight: 81.6 kg (180 lb)  SpO2: 99 %      Physical Exam  Constitutional:       General: He is not in acute distress.     Appearance: He is well-developed. He is not diaphoretic.   HENT:      Head: Normocephalic and atraumatic.   Eyes:      General: No scleral icterus.     Conjunctiva/sclera: Conjunctivae normal.   Neck:      Musculoskeletal: Neck supple.   Cardiovascular:      Rate and Rhythm: Normal rate and regular rhythm.   Pulmonary:      Effort: Pulmonary effort is normal.      Breath sounds: Normal breath sounds.   Abdominal:      Palpations: Abdomen is soft.      Tenderness: There is no abdominal tenderness.   Musculoskeletal: Normal range of motion.         General: No deformity.   Lymphadenopathy:      Cervical: No cervical " adenopathy.   Skin:     General: Skin is warm and dry.      Findings: No rash.      Comments: Large U-shaped wound/laceration on his right shin, ~ 15cm in total length   Neurological:      Mental Status: He is alert and oriented to person, place, and time. Mental status is at baseline.   Psychiatric:         Mood and Affect: Mood normal.         Behavior: Behavior normal.         Thought Content: Thought content normal.         Judgment: Judgment normal.         ED Course        [facility]    -Laceration Repair    Date/Time: 12/27/2020 6:57 PM  Performed by: Pop Allen PA  Authorized by: Pop Allen PA       ANESTHESIA (see MAR for exact dosages):     Anesthesia method:  Local infiltration    Local anesthetic:  Lidocaine 1% WITH epi  LACERATION DETAILS     Location:  Leg    Leg location:  R lower leg    Length (cm):  15    REPAIR TYPE:     Repair type:  Simple      EXPLORATION:     Hemostasis achieved with:  Epinephrine    Wound exploration: wound explored through full range of motion and entire depth of wound probed and visualized      Contaminated: no      TREATMENT:     Area cleansed with:  Saline    Amount of cleaning:  Extensive    Irrigation solution:  Sterile saline    Irrigation method:  Pressure wash    Visualized foreign bodies/material removed: no      SKIN REPAIR     Repair method:  Sutures    Suture size:  4-0    Suture material:  Nylon    Suture technique:  Simple interrupted    Number of sutures:  26    APPROXIMATION     Approximation:  Close    POST-PROCEDURE DETAILS     Dressing:  Non-adherent dressing      PROCEDURE   Patient Tolerance:  Patient tolerated the procedure well with no immediate complications                     Critical Care time:  none               No results found for this or any previous visit (from the past 24 hour(s)).    Medications   lidocaine 1% with EPINEPHrine 1:100,000 injection 20 mL (20 mLs Other Given by Other 12/27/20 1614)    Tdap (tetanus-diptheria-acell pertussis) (BOOSTRIX) injection 0.5 mL (0.5 mLs Intramuscular Given 12/27/20 1444)   cephALEXin (KEFLEX) capsule 500 mg (500 mg Oral Given 12/27/20 1613)       Assessments & Plan (with Medical Decision Making)   Pt nontoxic in NAD. Heart, lung, bowel sounds normal, abd soft, nontender to palpation, nondistended. VSS, afebrile.     He does have Large U-shaped wound/laceration on his right shin, ~ 15cm in total length. Good distal CMS. He reports no other injuries or areas of pain.    Pt given Boostrix.     Wound repaired, see procedure note above.     Given the extensive wound, multiple other health problems, We will start him on short course of Keflex and have him f/u with Viky Horne for wound care.     Sutures to be removed in 10-14 d.     Strict return precautions are given to the pt, they will return if symptoms are worsening or concerning. The pt understands and agrees with the plan and they are discharged.     Pop Allen PA-C    I have reviewed the nursing notes.    I have reviewed the findings, diagnosis, plan and need for follow up with the patient.       Discharge Medication List as of 12/27/2020  4:17 PM      START taking these medications    Details   cephALEXin (KEFLEX) 500 MG capsule Take 1 capsule (500 mg) by mouth 4 times daily for 3 days, Disp-12 capsule, R-0, E-Prescribe             Final diagnoses:   Laceration of lower leg, right, initial encounter       12/27/2020   [facility]     Pop Allen PA  12/27/20 1904

## 2020-12-29 ENCOUNTER — ALLIED HEALTH/NURSE VISIT (OUTPATIENT)
Dept: FAMILY MEDICINE | Facility: OTHER | Age: 85
End: 2020-12-29
Attending: INTERNAL MEDICINE
Payer: MEDICARE

## 2020-12-29 ENCOUNTER — HOSPITAL ENCOUNTER (EMERGENCY)
Facility: OTHER | Age: 85
Discharge: HOME | End: 2020-12-29
Attending: FAMILY MEDICINE | Admitting: FAMILY MEDICINE
Payer: MEDICARE

## 2020-12-29 VITALS
DIASTOLIC BLOOD PRESSURE: 86 MMHG | HEART RATE: 70 BPM | SYSTOLIC BLOOD PRESSURE: 151 MMHG | OXYGEN SATURATION: 98 % | HEIGHT: 68 IN | BODY MASS INDEX: 27.28 KG/M2 | TEMPERATURE: 97.5 F | WEIGHT: 180 LBS | RESPIRATION RATE: 18 BRPM

## 2020-12-29 DIAGNOSIS — E53.8 VITAMIN B 12 DEFICIENCY: Primary | ICD-10-CM

## 2020-12-29 DIAGNOSIS — Z51.89 ENCOUNTER FOR POST-TRAUMATIC WOUND CHECK: ICD-10-CM

## 2020-12-29 PROCEDURE — 99282 EMERGENCY DEPT VISIT SF MDM: CPT | Performed by: FAMILY MEDICINE

## 2020-12-29 PROCEDURE — 96372 THER/PROPH/DIAG INJ SC/IM: CPT | Performed by: INTERNAL MEDICINE

## 2020-12-29 PROCEDURE — 250N000011 HC RX IP 250 OP 636: Performed by: INTERNAL MEDICINE

## 2020-12-29 RX ORDER — CHOLECALCIFEROL (VITAMIN D3) 50 MCG
1 TABLET ORAL DAILY
COMMUNITY
Start: 2020-10-08 | End: 2021-03-30 | Stop reason: DRUGHIGH

## 2020-12-29 RX ORDER — CALCITRIOL 0.25 UG/1
CAPSULE, LIQUID FILLED ORAL
COMMUNITY
Start: 2020-12-06 | End: 2023-02-10

## 2020-12-29 RX ADMIN — CYANOCOBALAMIN 1000 MCG: 1000 INJECTION, SOLUTION INTRAMUSCULAR at 08:58

## 2020-12-29 ASSESSMENT — ENCOUNTER SYMPTOMS
DIAPHORESIS: 0
RESPIRATORY NEGATIVE: 1
GASTROINTESTINAL NEGATIVE: 1
CHILLS: 0
FEVER: 0
WOUND: 1
EYES NEGATIVE: 1
WEAKNESS: 0
NUMBNESS: 0

## 2020-12-29 ASSESSMENT — MIFFLIN-ST. JEOR: SCORE: 1470.97

## 2020-12-29 NOTE — ED AVS SNAPSHOT
[facility]  [address]  [place] [address]  Phone: [phone]  Fax: [phone]                                    Altaf Chao   MRN: [id number]    Department: [facility]   Date of Visit: 12/29/2020           After Visit Summary Signature Page    I have received my discharge instructions, and my questions have been answered. I have discussed any challenges I see with this plan with the nurse or doctor.    ..........................................................................................................................................  Patient/Patient Representative Signature      ..........................................................................................................................................  Patient Representative Print Name and Relationship to Patient    ..................................................               ................................................  Date                                   Time    ..........................................................................................................................................  Reviewed by Signature/Title    ...................................................              ..............................................  Date                                               Time          22EPIC Rev 08/18

## 2020-12-29 NOTE — ED NOTES
"Pt has 18 cm \"V\" shaped skintear/laceration that is repaired on right shin. 27 sutures counted. Skin is thin and fragile and sutures are not holding skin in some areas due to fragility. Small amount of bleeding. Wound irrigated with sterile water and left open to air for provider evaluation.Deborah Russ RN .............. 12/29/2020  12:31 PM    "

## 2020-12-29 NOTE — ED PROVIDER NOTES
History     Chief Complaint   Patient presents with     Wound Check     Laceration     HPI  Altaf Chao is a 86 year old male who presents for wound check after large flap laceration to his right anterior proximal shin occurring on 12/26, repaired in ED 12/27 and wife/dtr noting weeping from wound and skin stress/sutures pulling through with dressing changes today.  No f/c/s or sx of illness.  Compliant with abx.    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     Priority: Medium     Anemia due to vitamin B12 deficiency 10/25/2017     Priority: Medium     Lymphedema of both lower extremities 06/23/2017     Priority: Medium     Immunocompromised patient (H) 06/09/2017     Priority: Medium     Bilateral edema of lower extremity 05/11/2017     Priority: Medium     Hematoma 05/04/2017     Priority: Medium     Steroid-induced hyperglycemia 05/04/2017     Priority: Medium     Anemia of chronic disease 05/03/2017     Priority: Medium     CKD (chronic kidney disease) stage 5, GFR 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 ml/min (H) 05/01/2017     Priority: Medium     Anemia 04/25/2017     Priority: Medium     Metabolic acidosis 04/25/2017     Priority: Medium     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     Priority: Medium     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     Priority: Medium     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     Priority: Medium     Pseudomonas urinary tract infection 04/15/2017     Priority: Medium     Hyperkalemia 04/03/2017     Priority: Medium     Hyponatremia 04/03/2017     Priority: Medium     Refusal of statin medication at discharge 02/17/2017     Priority: Medium     H/O total hip arthroplasty 06/30/2014     Priority: Medium     Mixed hyperlipidemia 04/10/2014     Priority: Medium     Bilateral leg edema  2014     Priority: Medium     Hip stiffness 2014     Priority: Medium     H/O bilateral inguinal hernia repair 2013     Priority: Medium     History of tobacco abuse 08/15/2013     Priority: Medium     Benign essential hypertension 2012     Priority: Medium     Pityriasis rosea 2012     Priority: Medium        Past Medical History:    Past Medical History:   Diagnosis Date     Acute kidney failure (H)      Essential (primary) hypertension      Hyperlipidemia      Osteoarthritis of hip      Pityriasis rosea      Tachycardia      Unilateral inguinal hernia without obstruction or gangrene        Past Surgical History:    Past Surgical History:   Procedure Laterality Date     CIRCUMCISION      2017,Dr. Heidi GREENBERG     OTHER SURGICAL HISTORY      21915.9,[place] SUBTALAR ATHROEREISIS,bone Spurs excision on Toe     OTHER SURGICAL HISTORY      13,,HERNIA REPAIR,Right,RIH with mesh     OTHER SURGICAL HISTORY      13,15735,GENITAL SURGERY MALE,Dorsal Slit     OTHER SURGICAL HISTORY      4/15/14,,HERNIA REPAIR,Left,LIH with mesh     OTHER SURGICAL HISTORY      14,15916.0,[place] TOTAL HIP ARTHROPLASTY,Left       Family History:    Family History   Problem Relation Age of Onset     Other - See Comments Son 12        neuroblastoma at 13 yo  at 14.     Genetic Disorder No family hx of         Genetic,No known FHx of DM, CAD, CVA       Social History:  Marital Status:   [2]  Social History     Tobacco Use     Smoking status: Former Smoker     Types: Cigarettes     Quit date: 1976     Years since quittin.0     Smokeless tobacco: Never Used   Substance Use Topics     Alcohol use: Yes     Alcohol/week: 0.8 standard drinks     Comment: occ beer     Drug use: Never        Medications:         darbepoetin miller (ARANESP) 60 MCG/0.3ML injection       amLODIPine (NORVASC) 5 MG tablet       B-D U/F 31G X 8 MM insulin pen needle       blood glucose (NO BRAND SPECIFIED)  "lancets standard       blood glucose monitoring (NO BRAND SPECIFIED) test strip       Blood Glucose Monitoring Suppl (FIFTY50 GLUCOSE METER 2.0) W/DEVICE KIT       calcitRIOL (ROCALTROL) 0.25 MCG capsule       cephALEXin (KEFLEX) 500 MG capsule       cholecalciferol (VITAMIN D3) 1000 units (25 mcg) capsule       famotidine (PEPCID) 20 MG tablet       finasteride (PROSCAR) 5 MG tablet       furosemide (LASIX) 40 MG tablet       insulin glargine (LANTUS SOLOSTAR) 100 UNIT/ML pen       predniSONE (DELTASONE) 5 MG tablet       sodium bicarbonate 650 MG tablet       tamsulosin (FLOMAX) 0.4 MG capsule       vitamin D3 (CHOLECALCIFEROL) 50 mcg (2000 units) tablet          Review of Systems   Constitutional: Negative for chills, diaphoresis and fever.   HENT: Negative.    Eyes: Negative.    Respiratory: Negative.    Gastrointestinal: Negative.    Genitourinary: Negative.    Musculoskeletal:        Some swelling in the right lower leg/ankle.   Skin: Positive for wound.   Neurological: Negative for weakness and numbness.       Physical Exam   BP: (!) 170/84  Pulse: 91  Temp: 97.5  F (36.4  C)  Resp: 18  Height: 172.7 cm (5' 8\")  Weight: 81.6 kg (180 lb)  SpO2: 98 %      Physical Exam  Vitals signs and nursing note reviewed.   Constitutional:       General: He is not in acute distress.     Appearance: Normal appearance. He is normal weight. He is not toxic-appearing.   HENT:      Head: Normocephalic and atraumatic.   Cardiovascular:      Pulses: Normal pulses.      Comments: Normal right PT and DP pulses.    Musculoskeletal:         General: Swelling (1+ dependent edema below sock line in right lower leg/ankle.  ) present. No tenderness.      Right lower leg: He exhibits laceration.        Legs:    Skin:     General: Skin is warm and dry.   Neurological:      General: No focal deficit present.      Mental Status: He is alert.   Psychiatric:         Mood and Affect: Mood normal.         ED Course        Procedures           "     Critical Care time:  none               No results found for this or any previous visit (from the past 24 hour(s)).    Medications - No data to display    I have reassured patient that his wound appears to be healing well without infection and his repair looks excellent.  The thin skin at the wound margin will be reinforced with benzoin and steri-strips and reviewed periodic elevation to help relieve swelling.  Assessments & Plan (with Medical Decision Making)   86 year old male here for wound check of large flap laceration to proximal anterior lower leg with typical serosanguinous weeping that looks clean and darkening skin at the margins where the skin is thin and may not survive but will act as a dressing until healed.  Wound reinforced with benzoin and steristrips and recommending elevation above his heart for 20-40 minutes 2-4 times a day.  Frequent dressing changes to keep wound clean and dry.  Keep watching for signs of infection.     I have reviewed the nursing notes.    I have reviewed the findings, diagnosis, plan and need for follow up with the patient.       Discharge Medication List as of 12/29/2020  1:20 PM          Final diagnoses:   Encounter for post-traumatic wound check - right anterior shin large flap laceration s/p suture repair 12/27/20 12/29/2020   [facility]     Delon Herman MD  12/29/20 2018

## 2020-12-29 NOTE — ED NOTES
Steri strips applied to wound per MD order and dressed with non adherent and kerlex. Deborah Russ RN .............. 12/29/2020  1:31 PM

## 2020-12-29 NOTE — DISCHARGE INSTRUCTIONS
Chaitanya,  It was nice to meet you.  Your wound looks to be healing well without evidence of infection at this time.  The weeping around the wound is as expected for such a large flap laceration as are the dark skin changes around the lower margin of the wound where the skin is extremely thin.  This area of skin is likely too thin to survive but will act as a bio-dressing until new skin grows from underneath.      Leave the steri strips on until they fall off on their own.      Keep the wound clean and dry and avoid direct pressure.  The weeping will continue for many more days.  It starts out blood tinged and then starts to look yellow like urine.  Change dressings 2-3 times a day if needed.    Elevate your right leg over your heart for 20-40 minutes 2-3 times a day to decrease swelling below the wound in your lower leg.  (laying down flat in bed with a couple pillows under the leg works well)    Keep watching for signs of infection and return to the clinic for a wound check as needed.  Drink plenty of water and keep good control of your diabetes to avoid infections.    Sutures out in 12 more days, (either in clinic or the ED).    Dr. Espinoza Herman

## 2020-12-29 NOTE — ED TRIAGE NOTES
"Pt suffered laceration to left leg on the 27th, was seen here, had sutures.  Changed bandage this AM, drainage is shadowing though, wife believes some of the stiches \"popped\" and had concern over the amount of drainage. Painful with walking.  Pt brought in by daughter.  Swelling, bruising noted.   "

## 2021-01-06 ENCOUNTER — OFFICE VISIT (OUTPATIENT)
Dept: INTERNAL MEDICINE | Facility: OTHER | Age: 86
End: 2021-01-06
Attending: PHYSICIAN ASSISTANT
Payer: COMMERCIAL

## 2021-01-06 VITALS
BODY MASS INDEX: 26.23 KG/M2 | RESPIRATION RATE: 16 BRPM | OXYGEN SATURATION: 100 % | WEIGHT: 183.2 LBS | TEMPERATURE: 97.5 F | DIASTOLIC BLOOD PRESSURE: 78 MMHG | HEIGHT: 70 IN | HEART RATE: 88 BPM | SYSTOLIC BLOOD PRESSURE: 138 MMHG

## 2021-01-06 DIAGNOSIS — R73.9 STEROID-INDUCED HYPERGLYCEMIA: Primary | ICD-10-CM

## 2021-01-06 DIAGNOSIS — T38.0X5A STEROID-INDUCED HYPERGLYCEMIA: Primary | ICD-10-CM

## 2021-01-06 DIAGNOSIS — D84.9 IMMUNOCOMPROMISED PATIENT (H): ICD-10-CM

## 2021-01-06 DIAGNOSIS — N18.5 CKD (CHRONIC KIDNEY DISEASE) STAGE 5, GFR LESS THAN 15 ML/MIN (H): ICD-10-CM

## 2021-01-06 DIAGNOSIS — I10 ESSENTIAL HYPERTENSION: ICD-10-CM

## 2021-01-06 DIAGNOSIS — S81.811A LACERATION OF LOWER LEG, RIGHT, INITIAL ENCOUNTER: ICD-10-CM

## 2021-01-06 PROCEDURE — G0463 HOSPITAL OUTPT CLINIC VISIT: HCPCS | Mod: 25

## 2021-01-06 PROCEDURE — G0463 HOSPITAL OUTPT CLINIC VISIT: HCPCS

## 2021-01-06 PROCEDURE — 99214 OFFICE O/P EST MOD 30 MIN: CPT | Performed by: NURSE PRACTITIONER

## 2021-01-06 RX ORDER — AMLODIPINE BESYLATE 5 MG/1
TABLET ORAL
Qty: 90 TABLET | Refills: 0 | Status: SHIPPED | OUTPATIENT
Start: 2021-01-06 | End: 2021-03-30

## 2021-01-06 ASSESSMENT — PAIN SCALES - GENERAL: PAINLEVEL: NO PAIN (0)

## 2021-01-06 ASSESSMENT — MIFFLIN-ST. JEOR: SCORE: 1509.3

## 2021-01-06 NOTE — PATIENT INSTRUCTIONS
"S: Beba Rosa is a 45 year old female with a PMH of *** presenting to clinic today with a chief complaint of ***. Concern for  Health issues due to unle recently  and wants to check her overall health. She believes her long time male partner may be cheating and wants to get checked out. She denies vaginal discharge. She reports foul smell. LMP: 10/3/2017. She reports foul smelling menses. She reports history of STI in past.     ROS:  General: No fevers, chills  Head: No headache  Ears: No acute change in hearing.    CV: No chest pain or palpitations.  Resp: No shortness of breath.  No cough. No hemoptysis.  GI: No nausea, vomiting, constipation, diarrhea  : No urinary pains    Patient Active Problem List   Diagnosis     Health Care Home     Herpes simplex virus (HSV) infection     Illiteracy and low-level literacy     Abnormal Pap smear of cervix     Chlamydia     Lateral epicondylitis of right elbow     Carpal tunnel syndrome of right wrist       No current outpatient prescriptions on file.       O: /81  Pulse 67  Temp 98.4  F (36.9  C) (Oral)  Ht 5' 4.5\" (163.8 cm)  Wt 202 lb (91.6 kg)  LMP 10/03/2017  BMI 34.14 kg/m2   Gen:  Well nourished and in No acute  HEENT: PERRL; TMs normal color and landmarks; nasopharynx pink and moist; oropharynx pink and moist  Neck: supple without lymphadenopathy  CV:  RRR  - no murmurs noted   Pulm:  CTAB, no wheezes or crackles noted, good air entry   ABD: soft, nontender, no masses, no rebound, BS intact throughout, no hepatosplenomegaly  Extrem: no cyanosis, edema or clubbing  Psych: Euthymic      Assessment and Plan:  There are no diagnoses linked to this encounter.    This patient was seen and discussed with  ***    Carlita Singh DO  PGY 1    " Once Daily:  Cleanse wound with soapy water, rinse clear then gently pat dry.  Apply antibiotic ointment over wound then may apply a strip of the Maxorb AG along the right side of the wound if it begins to seep again, otherwise apply large pad and wrap with gauze and secure with tape.  Apply stockinette to help hold in place.  Please try to keep legs elevated above heart today to prevent wound from draining.  See Viky in clinic on Friday afternoon.

## 2021-01-06 NOTE — TELEPHONE ENCOUNTER
amLODIPine (NORVASC) 5 MG tablet  Last Written Prescription Date:  10/15/2020  Last Fill Quantity: 90,  # refills: 0   Last office visit: 03/24/2020 with prescribing provider:  Valentino Machado   Future Office Visit:   Next 5 appointments (look out 90 days)    Jan 08, 2021  1:40 PM  SHORT with Brenda Horne NP  [facility] [address]  [place] [address]  [phone]   Jan 26, 2021  9:00 AM  Nurse Only with  INJECTION NURSE  [facility] ([facility]) [address]   [place] [address]  [phone]       Calcium Channel Blockers Protocol Failed  Normal serum creatinine on file in past 12 months         Medication is being filled for 1 time refill only due to:  Patient needs labs creat. Will do limited supply, pt due for annual appt in march 2021.    Mia Montejo RN  ....................  1/6/2021   12:45 PM

## 2021-01-06 NOTE — NURSING NOTE
"Patient presents to the clinic today for an ED follow up and wound care.  Miya Barreto LPN 1/6/2021   9:54 AM    Chief Complaint   Patient presents with     ER F/U     WOUND CARE       Initial /78 (BP Location: Right arm, Patient Position: Sitting, Cuff Size: Adult Regular)   Pulse 88   Temp 97.5  F (36.4  C) (Tympanic)   Resp 16   Ht 1.765 m (5' 9.5\")   Wt 83.1 kg (183 lb 3.2 oz)   SpO2 100%   BMI 26.67 kg/m   Estimated body mass index is 26.67 kg/m  as calculated from the following:    Height as of this encounter: 1.765 m (5' 9.5\").    Weight as of this encounter: 83.1 kg (183 lb 3.2 oz).  Medication Reconciliation: complete  Miya Barreto LPN    "

## 2021-01-06 NOTE — PROGRESS NOTES
Subjective:  He is here today to follow-up on laceration of his right leg.  He injured his leg on December 26 when he hit it on his car to work.  He received 20 6 sutures.  He was also given tetanus booster and put on 3 days of oral antibiotics.  The following day there was seepage from the wound and he was seen back in the emergency department and some Steri-Strips were applied.  They had noted that there was a skin flap that was beginning to darken.  He states that he continues to have some seepage from the wound more at the base.  He has not had fever, chills, redness or surrounding warmth.  No odorous or purulent drainage.  He has very little pain.    Patient Active Problem List   Diagnosis     Acquired buried penis     Acute crescentic glomerulonephritis     Anemia     Anemia due to vitamin B12 deficiency     Anemia of chronic disease     Antineutrophil cytoplasmic antibody (ANCA) positive     Bilateral edema of lower extremity     Bilateral leg edema     CKD (chronic kidney disease) stage 5, GFR 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 ml/min (H)     Mixed hyperlipidemia     Benign essential hypertension     H/O bilateral inguinal hernia repair     H/O total hip arthroplasty     Hematoma     Hip stiffness     History of tobacco abuse     Immunocompromised patient (H)     Lymphedema of both lower extremities     Metabolic acidosis     Pityriasis rosea     Primary pauci-immune necrotizing and crescentic glomerulonephritis     Refusal of statin medication at discharge     Steroid-induced hyperglycemia     Heartburn     Hyperkalemia     Hyponatremia     Pseudomonas urinary tract infection     Past Medical History:   Diagnosis Date     Acute kidney failure (H)     04/2017     Essential (primary) hypertension     8/7/2012     Hyperlipidemia     No Comments Provided     Osteoarthritis of hip     No Comments Provided     Pityriasis rosea     No Comments Provided     Tachycardia     No Comments Provided     Unilateral inguinal hernia without  obstruction or gangrene     2013,Right Inguinal hernia with incarceration, massive [[id number]][     Past Surgical History:   Procedure Laterality Date     CIRCUMCISION      2017,Dr. Heidi GREENBERG     OTHER SURGICAL HISTORY      76048.9,[place] SUBTALAR ATHROEREISIS,bone Spurs excision on Toe     OTHER SURGICAL HISTORY      13,,HERNIA REPAIR,Right,RIH with mesh     OTHER SURGICAL HISTORY      13,70878,GENITAL SURGERY MALE,Dorsal Slit     OTHER SURGICAL HISTORY      4/15/14,,HERNIA REPAIR,Left,LIH with mesh     OTHER SURGICAL HISTORY      14,77533.0,VT TOTAL HIP ARTHROPLASTY,Left     Social History     Socioeconomic History     Marital status:      Spouse name: Luciat     Number of children: Not on file     Years of education: Not on file     Highest education level: Not on file   Occupational History     Not on file   Social Needs     Financial resource strain: Not on file     Food insecurity     Worry: Not on file     Inability: Not on file     Transportation needs     Medical: Not on file     Non-medical: Not on file   Tobacco Use     Smoking status: Former Smoker     Types: Cigarettes     Quit date: 1976     Years since quittin.0     Smokeless tobacco: Never Used   Substance and Sexual Activity     Alcohol use: Yes     Alcohol/week: 0.8 standard drinks     Comment: occ beer     Drug use: Never     Sexual activity: Not Currently   Lifestyle     Physical activity     Days per week: Not on file     Minutes per session: Not on file     Stress: Not on file   Relationships     Social connections     Talks on phone: Not on file     Gets together: Not on file     Attends Evangelical service: Not on file     Active member of club or organization: Not on file     Attends meetings of clubs or organizations: Not on file     Relationship status: Not on file     Intimate partner violence     Fear of current or ex partner: Not on file     Emotionally abused: Not on file     Physically  abused: Not on file     Forced sexual activity: Not on file   Other Topics Concern     Not on file   Social History Narrative     - Wife Lucita.   3 kids - oldest son - neuroblastoma at 11 yo  at 14.   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     Family History   Problem Relation Age of Onset     Other - See Comments Son 12        neuroblastoma at 11 yo  at 14.     Genetic Disorder No family hx of         Genetic,No known FHx of DM, CAD, CVA     Current Outpatient Medications   Medication Sig Dispense Refill     amLODIPine (NORVASC) 5 MG tablet TAKE 1 TABLET BY MOUTH EVERY DAY 90 tablet 0     B-D U/F 31G X 8 MM insulin pen needle INJECT SUBCUTANEOUS AT BEDTIME USE 1 PEN NEEDLES DAILY OR AS DIRECTED. 100 each 1     blood glucose (NO BRAND SPECIFIED) lancets standard Dispense item covered by pt ins. E11.65 IDDM type II, uncontrolled - Test 3 times/day. (Wants Barrel lancets)       blood glucose monitoring (NO BRAND SPECIFIED) test strip Dispense item covered by pt ins. E11.65 IDDM type II, uncontrolled - Test 4 times/day. Reason: New diabetes       Blood Glucose Monitoring Suppl (FIFTY50 GLUCOSE METER 2.0) W/DEVICE KIT Dispense meter, test strips, lancets covered by pt ins. E11.65 IDDM type II, uncontrolled - Test 4 times/day. Reason: New diabetes       calcitRIOL (ROCALTROL) 0.25 MCG capsule TAKE 1 CAP BY MOUTH EVERY MONDAY, WEDNESDAY AND FRIDAY.       cholecalciferol (VITAMIN D3) 1000 units (25 mcg) capsule Take 1 capsule (1,000 Units) by mouth daily       darbepoetin miller (ARANESP) 60 MCG/0.3ML injection Inject 60 mcg Subcutaneous       famotidine (PEPCID) 20 MG tablet Take 1 tablet (20 mg) by mouth daily 90 tablet 3     finasteride (PROSCAR) 5 MG tablet Take 1 tablet (5 mg) by mouth daily 90 tablet 3     furosemide (LASIX) 40 MG tablet Take 1 tablet (40 mg) by mouth every morning 90 tablet 3     insulin glargine (LANTUS SOLOSTAR) 100  "UNIT/ML pen Inject 2 Units Subcutaneous At Bedtime 15 mL 3     predniSONE (DELTASONE) 5 MG tablet Take 0.5 tablets (2.5 mg) by mouth daily 135 tablet 3     sodium bicarbonate 650 MG tablet Take 1 tablet (650 mg) by mouth daily 90 tablet 3     tamsulosin (FLOMAX) 0.4 MG capsule Take 1 capsule (0.4 mg) by mouth every evening 90 capsule 3     vitamin D3 (CHOLECALCIFEROL) 50 mcg (2000 units) tablet Take 1 tablet by mouth daily       Patient has no known allergies.      Review of Systems:  Review of Systems  See HPI  Objective:   /78 (BP Location: Right arm, Patient Position: Sitting, Cuff Size: Adult Regular)   Pulse 88   Temp 97.5  F (36.4  C) (Tympanic)   Resp 16   Ht 1.765 m (5' 9.5\")   Wt 83.1 kg (183 lb 3.2 oz)   SpO2 100%   BMI 26.67 kg/m    Physical Exam  Pleasant gentleman in no acute distress.  Affect normal.  Alert and oriented x4.  Right lower extremity has a U-shaped approximated laceration that has a dark moist skin flap that had serosanguineous fluid accumulated under the wound and is seeping at the base of the wound.  There is a scant amount of light pink serous fluid.  The sutures along the medial lateral wound are dry and scabbed.  Wound overall is well approximated.  Sutures were removed from the laceration and continues to be well approximated.  The skin flap was removed by cutting away with sterile scissors as this was lifting away from the wound and holding onto the serous drainage.  The open wound measures 4. 5 x 5.5 cm x 0.1 cm.  Along the lateral mid laceration there was an area that had light pink serous fluid seeping and patient had reported that prior to the suture being removed he was having some discomfort in this area from pulling.  That discomfort resolved after the suture was removed.  The wound was cleansed with Anasept then a piece of Maxorb AG was applied to the lateral wound that had some pink serous fluid draining and that subsided immediately.  5 x 9 ABD pad applied " over entire wound and secured with Curlex and Medipore tape and stockinette applied over.  Assessment:    ICD-10-CM    1. Steroid-induced hyperglycemia  R73.9     T38.0X5A    2. Laceration of lower leg, right, initial encounter  S81.811A WOUND CARE REFERRAL   3. CKD (chronic kidney disease) stage 5, GFR 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 ml/min (H)  N18.5    4. Immunocompromised patient (H)  D84.9        Plan:   Patient is immunocompromised and is at risk of poor wound healing and infection.  He is chronically on steroids.  I have asked that he keep leg elevated above his heart throughout most of today to prevent lateral wound from draining.  He otherwise will do dressing change daily by cleansing with soap and water, rinsing clear then gently drying and applying triple antibiotic ointment followed by ABD pad and Kerlix.  He may apply the Maxorb AG if there is any drainage from the lateral wound otherwise it does not need to be applied.  Follow-up in clinic on Friday.  Monitor closely for signs and symptoms of infection including odorous or purulent drainage, surrounding redness or warmth or increased pain.  All questions were answered to his satisfaction.  More than 30 minutes of face-to-face time spent with patient on record review, procedure and wound care, risk and disease management.    PAL Godinez   1/6/2021  11:38 AM

## 2021-01-08 ENCOUNTER — OFFICE VISIT (OUTPATIENT)
Dept: INTERNAL MEDICINE | Facility: OTHER | Age: 86
End: 2021-01-08
Attending: NURSE PRACTITIONER
Payer: MEDICARE

## 2021-01-08 VITALS
OXYGEN SATURATION: 97 % | BODY MASS INDEX: 26.61 KG/M2 | HEART RATE: 86 BPM | SYSTOLIC BLOOD PRESSURE: 132 MMHG | RESPIRATION RATE: 16 BRPM | TEMPERATURE: 97.6 F | WEIGHT: 182.8 LBS | DIASTOLIC BLOOD PRESSURE: 82 MMHG

## 2021-01-08 DIAGNOSIS — S81.811A LACERATION OF LOWER LEG, RIGHT, INITIAL ENCOUNTER: Primary | ICD-10-CM

## 2021-01-08 PROCEDURE — G0463 HOSPITAL OUTPT CLINIC VISIT: HCPCS

## 2021-01-08 PROCEDURE — 99212 OFFICE O/P EST SF 10 MIN: CPT | Performed by: NURSE PRACTITIONER

## 2021-01-08 PROCEDURE — G0463 HOSPITAL OUTPT CLINIC VISIT: HCPCS | Mod: 25

## 2021-01-08 ASSESSMENT — PAIN SCALES - GENERAL: PAINLEVEL: NO PAIN (0)

## 2021-01-08 NOTE — PROGRESS NOTES
Subjective:  He is here today to follow-up on laceration of lower leg and also the wound.  His wife is doing dressing change daily.  There has been no further drainage.    Patient Active Problem List   Diagnosis     Acquired buried penis     Acute crescentic glomerulonephritis     Anemia     Anemia due to vitamin B12 deficiency     Anemia of chronic disease     Antineutrophil cytoplasmic antibody (ANCA) positive     Bilateral edema of lower extremity     Bilateral leg edema     CKD (chronic kidney disease) stage 5, GFR 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 ml/min (H)     Mixed hyperlipidemia     Benign essential hypertension     H/O bilateral inguinal hernia repair     H/O total hip arthroplasty     Hematoma     Hip stiffness     History of tobacco abuse     Immunocompromised patient (H)     Lymphedema of both lower extremities     Metabolic acidosis     Pityriasis rosea     Primary pauci-immune necrotizing and crescentic glomerulonephritis     Refusal of statin medication at discharge     Steroid-induced hyperglycemia     Heartburn     Hyperkalemia     Hyponatremia     Pseudomonas urinary tract infection     Past Medical History:   Diagnosis Date     Acute kidney failure (H)     04/2017     Essential (primary) hypertension     8/7/2012     Hyperlipidemia     No Comments Provided     Osteoarthritis of hip     No Comments Provided     Pityriasis rosea     No Comments Provided     Tachycardia     No Comments Provided     Unilateral inguinal hernia without obstruction or gangrene     8/12/2013,Right Inguinal hernia with incarceration, massive [[id number]][     Past Surgical History:   Procedure Laterality Date     CIRCUMCISION      03/14/2017,Dr. Heidi GREENBERG     OTHER SURGICAL HISTORY      58938.9,[place] SUBTALAR ATHROEREISIS,bone Spurs excision on Toe     OTHER SURGICAL HISTORY      8/20/13,,HERNIA REPAIR,Right,RIH with mesh     OTHER SURGICAL HISTORY      8/20/13,16145,GENITAL SURGERY MALE,Dorsal Slit     OTHER SURGICAL HISTORY       4/15/14,,HERNIA REPAIR,Left,LIH with mesh     OTHER SURGICAL HISTORY      14,52202.0,AL TOTAL HIP ARTHROPLASTY,Left     Social History     Socioeconomic History     Marital status:      Spouse name: Lucita     Number of children: Not on file     Years of education: Not on file     Highest education level: Not on file   Occupational History     Not on file   Social Needs     Financial resource strain: Not on file     Food insecurity     Worry: Not on file     Inability: Not on file     Transportation needs     Medical: Not on file     Non-medical: Not on file   Tobacco Use     Smoking status: Former Smoker     Types: Cigarettes     Quit date: 1976     Years since quittin.0     Smokeless tobacco: Never Used   Substance and Sexual Activity     Alcohol use: Yes     Alcohol/week: 0.8 standard drinks     Comment: occ beer     Drug use: Never     Sexual activity: Not Currently   Lifestyle     Physical activity     Days per week: Not on file     Minutes per session: Not on file     Stress: Not on file   Relationships     Social connections     Talks on phone: Not on file     Gets together: Not on file     Attends Zoroastrianism service: Not on file     Active member of club or organization: Not on file     Attends meetings of clubs or organizations: Not on file     Relationship status: Not on file     Intimate partner violence     Fear of current or ex partner: Not on file     Emotionally abused: Not on file     Physically abused: Not on file     Forced sexual activity: Not on file   Other Topics Concern     Not on file   Social History Narrative     - Wife Claudia   3 kids - oldest son - neuroblastoma at 11 yo  at 14.   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     Family History   Problem Relation Age of Onset     Other - See Comments Son 12        neuroblastoma at 11 yo  at 14.     Genetic Disorder No family hx of          Genetic,No known FHx of DM, CAD, CVA     Current Outpatient Medications   Medication Sig Dispense Refill     amLODIPine (NORVASC) 5 MG tablet TAKE 1 TABLET BY MOUTH EVERY DAY 90 tablet 0     B-D U/F 31G X 8 MM insulin pen needle INJECT SUBCUTANEOUS AT BEDTIME USE 1 PEN NEEDLES DAILY OR AS DIRECTED. 100 each 1     blood glucose (NO BRAND SPECIFIED) lancets standard Dispense item covered by pt ins. E11.65 IDDM type II, uncontrolled - Test 3 times/day. (Wants Barrel lancets)       blood glucose monitoring (NO BRAND SPECIFIED) test strip Dispense item covered by pt ins. E11.65 IDDM type II, uncontrolled - Test 4 times/day. Reason: New diabetes       Blood Glucose Monitoring Suppl (FIFTY50 GLUCOSE METER 2.0) W/DEVICE KIT Dispense meter, test strips, lancets covered by pt ins. E11.65 IDDM type II, uncontrolled - Test 4 times/day. Reason: New diabetes       calcitRIOL (ROCALTROL) 0.25 MCG capsule TAKE 1 CAP BY MOUTH EVERY MONDAY, WEDNESDAY AND FRIDAY.       cholecalciferol (VITAMIN D3) 1000 units (25 mcg) capsule Take 1 capsule (1,000 Units) by mouth daily       darbepoetin miller (ARANESP) 60 MCG/0.3ML injection Inject 60 mcg Subcutaneous       famotidine (PEPCID) 20 MG tablet Take 1 tablet (20 mg) by mouth daily 90 tablet 3     finasteride (PROSCAR) 5 MG tablet Take 1 tablet (5 mg) by mouth daily 90 tablet 3     furosemide (LASIX) 40 MG tablet Take 1 tablet (40 mg) by mouth every morning 90 tablet 3     insulin glargine (LANTUS SOLOSTAR) 100 UNIT/ML pen Inject 2 Units Subcutaneous At Bedtime 15 mL 3     predniSONE (DELTASONE) 5 MG tablet Take 0.5 tablets (2.5 mg) by mouth daily 135 tablet 3     sodium bicarbonate 650 MG tablet Take 1 tablet (650 mg) by mouth daily 90 tablet 3     tamsulosin (FLOMAX) 0.4 MG capsule Take 1 capsule (0.4 mg) by mouth every evening 90 capsule 3     vitamin D3 (CHOLECALCIFEROL) 50 mcg (2000 units) tablet Take 1 tablet by mouth daily       Patient has no known allergies.      Review of  Systems:  Review of Systems  Denies fever, chills, redness and warmth around wound, swelling and odorous or purulent drainage.  Objective:   /82 (BP Location: Right arm, Patient Position: Sitting, Cuff Size: Adult Regular)   Pulse 86   Temp 97.6  F (36.4  C) (Tympanic)   Resp 16   Wt 82.9 kg (182 lb 12.8 oz)   SpO2 97%   BMI 26.61 kg/m    Physical Exam  Pleasant gentleman in no acute distress.  Affect.  Alert and oriented x4.  Right shin continues to have approximated laceration.  There is no drainage.  The wound measures 4 x 5 cm with dry pink wound bed.  Wound is irrigated with Anasept, antibiotic ointment applied followed by half of an ABD pad and secured with Kerlix.  Stockinette replaced.  Right foot DP PT 2+.  Capillary refill less than 3 seconds.  He does have 1+ edema of the right lower leg.    Assessment:    ICD-10-CM    1. Laceration of lower leg, right, initial encounter  S81.811A        Plan:   Continue with daily dressing change may use half of the ABD instead of the full ABD.  Continue to elevate leg above heart as much as able.  We will see him back next Tuesday or Wednesday, sooner with concerns.  Monitor closely for signs and symptoms of infection.      PAL Godinez   1/8/2021  1:59 PM

## 2021-01-08 NOTE — NURSING NOTE
"Patient presents to the clinic today for wound care.  Miya Barreto LPN 1/8/2021   1:38 PM    Chief Complaint   Patient presents with     WOUND CARE       Initial /82 (BP Location: Right arm, Patient Position: Sitting, Cuff Size: Adult Regular)   Pulse 86   Temp 97.6  F (36.4  C) (Tympanic)   Resp 16   Wt 82.9 kg (182 lb 12.8 oz)   SpO2 97%   BMI 26.61 kg/m   Estimated body mass index is 26.61 kg/m  as calculated from the following:    Height as of 1/6/21: 1.765 m (5' 9.5\").    Weight as of this encounter: 82.9 kg (182 lb 12.8 oz).  Medication Reconciliation: complete  Miya Barreto LPN    "

## 2021-01-13 ENCOUNTER — OFFICE VISIT (OUTPATIENT)
Dept: INTERNAL MEDICINE | Facility: OTHER | Age: 86
End: 2021-01-13
Attending: NURSE PRACTITIONER
Payer: COMMERCIAL

## 2021-01-13 VITALS
TEMPERATURE: 97.8 F | SYSTOLIC BLOOD PRESSURE: 156 MMHG | HEART RATE: 86 BPM | DIASTOLIC BLOOD PRESSURE: 76 MMHG | OXYGEN SATURATION: 99 % | BODY MASS INDEX: 26.26 KG/M2 | RESPIRATION RATE: 16 BRPM | WEIGHT: 180.4 LBS

## 2021-01-13 DIAGNOSIS — D84.9 IMMUNOCOMPROMISED PATIENT (H): ICD-10-CM

## 2021-01-13 DIAGNOSIS — S81.811A LACERATION OF LOWER LEG, RIGHT, INITIAL ENCOUNTER: Primary | ICD-10-CM

## 2021-01-13 DIAGNOSIS — T81.30XA WOUND DEHISCENCE: ICD-10-CM

## 2021-01-13 PROCEDURE — 99213 OFFICE O/P EST LOW 20 MIN: CPT | Performed by: NURSE PRACTITIONER

## 2021-01-13 PROCEDURE — G0463 HOSPITAL OUTPT CLINIC VISIT: HCPCS

## 2021-01-13 ASSESSMENT — PAIN SCALES - GENERAL: PAINLEVEL: NO PAIN (0)

## 2021-01-13 NOTE — PROGRESS NOTES
Subjective:  He is here today to follow-up on laceration of lower leg and also the wound.  He has been doing dressing change daily by cleansing with soap and water then applying triple antibiotic ointment and ABD gauze secured with Curlex.  He has been wearing a stockinette to keep this in place however he reports that since the swelling has gone down in his leg that the dressing continues to slide down.  For this reason it has been exposed to the air frequently.  He went bowling yesterday and had a really tough time keeping dressing in place.    Patient Active Problem List   Diagnosis     Acquired buried penis     Acute crescentic glomerulonephritis     Anemia     Anemia due to vitamin B12 deficiency     Anemia of chronic disease     Antineutrophil cytoplasmic antibody (ANCA) positive     Bilateral edema of lower extremity     Bilateral leg edema     CKD (chronic kidney disease) stage 5, GFR 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 ml/min (H)     Mixed hyperlipidemia     Benign essential hypertension     H/O bilateral inguinal hernia repair     H/O total hip arthroplasty     Hematoma     Hip stiffness     History of tobacco abuse     Immunocompromised patient (H)     Lymphedema of both lower extremities     Metabolic acidosis     Pityriasis rosea     Primary pauci-immune necrotizing and crescentic glomerulonephritis     Refusal of statin medication at discharge     Steroid-induced hyperglycemia     Heartburn     Hyperkalemia     Hyponatremia     Pseudomonas urinary tract infection     Past Medical History:   Diagnosis Date     Acute kidney failure (H)     04/2017     Essential (primary) hypertension     8/7/2012     Hyperlipidemia     No Comments Provided     Osteoarthritis of hip     No Comments Provided     Pityriasis rosea     No Comments Provided     Tachycardia     No Comments Provided     Unilateral inguinal hernia without obstruction or gangrene     8/12/2013,Right Inguinal hernia with incarceration, massive [[id number]][     Past Surgical  History:   Procedure Laterality Date     CIRCUMCISION      2017,Dr. Heidi GREENBERG     OTHER SURGICAL HISTORY      78038.9,[place] SUBTALAR ATHROEREISIS,bone Spurs excision on Toe     OTHER SURGICAL HISTORY      13,,HERNIA REPAIR,Right,RIH with mesh     OTHER SURGICAL HISTORY      13,96815,GENITAL SURGERY MALE,Dorsal Slit     OTHER SURGICAL HISTORY      4/15/14,,HERNIA REPAIR,Left,LIH with mesh     OTHER SURGICAL HISTORY      14,36308.0,[place] TOTAL HIP ARTHROPLASTY,Left     Social History     Socioeconomic History     Marital status:      Spouse name: Lucita     Number of children: Not on file     Years of education: Not on file     Highest education level: Not on file   Occupational History     Not on file   Social Needs     Financial resource strain: Not on file     Food insecurity     Worry: Not on file     Inability: Not on file     Transportation needs     Medical: Not on file     Non-medical: Not on file   Tobacco Use     Smoking status: Former Smoker     Types: Cigarettes     Quit date: 1976     Years since quittin.0     Smokeless tobacco: Never Used   Substance and Sexual Activity     Alcohol use: Yes     Alcohol/week: 0.8 standard drinks     Comment: occ beer     Drug use: Never     Sexual activity: Not Currently   Lifestyle     Physical activity     Days per week: Not on file     Minutes per session: Not on file     Stress: Not on file   Relationships     Social connections     Talks on phone: Not on file     Gets together: Not on file     Attends Restorationism service: Not on file     Active member of club or organization: Not on file     Attends meetings of clubs or organizations: Not on file     Relationship status: Not on file     Intimate partner violence     Fear of current or ex partner: Not on file     Emotionally abused: Not on file     Physically abused: Not on file     Forced sexual activity: Not on file   Other Topics Concern     Not on file   Social History  Narrative     - Wife Lucita.   3 kids - oldest son - neuroblastoma at 13 yo  at 14.   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     Family History   Problem Relation Age of Onset     Other - See Comments Son 12        neuroblastoma at 13 yo  at 14.     Genetic Disorder No family hx of         Genetic,No known FHx of DM, CAD, CVA     Current Outpatient Medications   Medication Sig Dispense Refill     amLODIPine (NORVASC) 5 MG tablet TAKE 1 TABLET BY MOUTH EVERY DAY 90 tablet 0     B-D U/F 31G X 8 MM insulin pen needle INJECT SUBCUTANEOUS AT BEDTIME USE 1 PEN NEEDLES DAILY OR AS DIRECTED. 100 each 1     blood glucose (NO BRAND SPECIFIED) lancets standard Dispense item covered by pt ins. E11.65 IDDM type II, uncontrolled - Test 3 times/day. (Wants Barrel lancets)       blood glucose monitoring (NO BRAND SPECIFIED) test strip Dispense item covered by pt ins. E11.65 IDDM type II, uncontrolled - Test 4 times/day. Reason: New diabetes       Blood Glucose Monitoring Suppl (FIFTY50 GLUCOSE METER 2.0) W/DEVICE KIT Dispense meter, test strips, lancets covered by pt ins. E11.65 IDDM type II, uncontrolled - Test 4 times/day. Reason: New diabetes       calcitRIOL (ROCALTROL) 0.25 MCG capsule TAKE 1 CAP BY MOUTH EVERY MONDAY, WEDNESDAY AND FRIDAY.       cholecalciferol (VITAMIN D3) 1000 units (25 mcg) capsule Take 1 capsule (1,000 Units) by mouth daily       darbepoetin miller (ARANESP) 60 MCG/0.3ML injection Inject 60 mcg Subcutaneous       famotidine (PEPCID) 20 MG tablet Take 1 tablet (20 mg) by mouth daily 90 tablet 3     finasteride (PROSCAR) 5 MG tablet Take 1 tablet (5 mg) by mouth daily 90 tablet 3     furosemide (LASIX) 40 MG tablet Take 1 tablet (40 mg) by mouth every morning 90 tablet 3     insulin glargine (LANTUS SOLOSTAR) 100 UNIT/ML pen Inject 2 Units Subcutaneous At Bedtime 15 mL 3     predniSONE (DELTASONE) 5 MG tablet Take 0.5 tablets (2.5  mg) by mouth daily 135 tablet 3     sodium bicarbonate 650 MG tablet Take 1 tablet (650 mg) by mouth daily 90 tablet 3     tamsulosin (FLOMAX) 0.4 MG capsule Take 1 capsule (0.4 mg) by mouth every evening 90 capsule 3     vitamin D3 (CHOLECALCIFEROL) 50 mcg (2000 units) tablet Take 1 tablet by mouth daily       Patient has no known allergies.      Review of Systems:  Review of Systems  Denies fever, chills, redness and warmth around wound, swelling and odorous or purulent drainage.  Objective:   BP (!) 156/76 (BP Location: Right arm, Patient Position: Sitting, Cuff Size: Adult Regular)   Pulse 86   Temp 97.8  F (36.6  C) (Tympanic)   Resp 16   Wt 81.8 kg (180 lb 6.4 oz)   SpO2 99%   BMI 26.26 kg/m    Physical Exam  Pleasant gentleman in no acute distress.  Affect.  Alert and oriented x4.  Right shin continues to have approximated laceration.  There is no drainage.  The wound measures 3.5 x 6.3 cm with dark brown firm eschar wound bed.  Wound is irrigated with Anasept, Hydrofera Blue dressing applied and secured with Medipore tape.  Stockinette replaced.  Right foot DP PT 2+.  Capillary refill less than 3 seconds.  He does have 1+ edema of the right lower leg.    Assessment:    ICD-10-CM    1. Laceration of lower leg, right, initial encounter  S81.811A    2. Wound dehiscence  T81.30XA    3. Immunocompromised patient (H)  D84.9        Plan:   Wound has developed eschar due to exposure to the area frequently when the dressing was not staying in place.  He is at increased risk of infection secondary to his chronic immunosuppression due to daily steroids.  He will monitor the leg closely for evidence of infection which include erythema, warmth, increased pain, odorous or purulent drainage through bandage, fever and chills.  Otherwise will be seen in clinic again on Monday.  Try to elevate leg above heart as able.  He will leave Hydrofera Blue dressing in place as this will provide autolytic debridement of eschar  but if for some reason the dressing needs to be replaced then I recommend that he return back to using the antibiotic ointment and ABD pad until he can be evaluated again.

## 2021-01-13 NOTE — NURSING NOTE
Patient is here for wound care on his right leg. States is feeling its getting better and swelling is decreasing.     Medication Reconciliation: complete    Yesi Poole LPN  1/13/2021 8:32 AM

## 2021-01-18 ENCOUNTER — OFFICE VISIT (OUTPATIENT)
Dept: INTERNAL MEDICINE | Facility: OTHER | Age: 86
End: 2021-01-18
Attending: NURSE PRACTITIONER
Payer: COMMERCIAL

## 2021-01-18 VITALS
RESPIRATION RATE: 20 BRPM | TEMPERATURE: 96.9 F | WEIGHT: 182 LBS | SYSTOLIC BLOOD PRESSURE: 134 MMHG | OXYGEN SATURATION: 94 % | HEART RATE: 66 BPM | DIASTOLIC BLOOD PRESSURE: 68 MMHG | BODY MASS INDEX: 26.49 KG/M2

## 2021-01-18 DIAGNOSIS — D84.9 IMMUNOCOMPROMISED PATIENT (H): ICD-10-CM

## 2021-01-18 DIAGNOSIS — T81.30XA WOUND DEHISCENCE: ICD-10-CM

## 2021-01-18 DIAGNOSIS — S81.811A LACERATION OF LOWER LEG, RIGHT, INITIAL ENCOUNTER: Primary | ICD-10-CM

## 2021-01-18 PROBLEM — E11.9 DIABETES MELLITUS, TYPE 2 (H): Status: ACTIVE | Noted: 2021-01-18

## 2021-01-18 PROCEDURE — 99213 OFFICE O/P EST LOW 20 MIN: CPT | Performed by: NURSE PRACTITIONER

## 2021-01-18 PROCEDURE — G0463 HOSPITAL OUTPT CLINIC VISIT: HCPCS

## 2021-01-18 ASSESSMENT — PAIN SCALES - GENERAL: PAINLEVEL: NO PAIN (0)

## 2021-01-18 NOTE — PROGRESS NOTES
Subjective:  He is here today to follow-up on laceration of lower leg and also the wound.  At last clinic visit this was treated with Hydrofera Blue dressing.  He has kept this in place.  He has not had any problems with the bandage.  He states that the swelling has gone down his leg even further.  He has been trying to elevate some.    Patient Active Problem List   Diagnosis     Acquired buried penis     Acute crescentic glomerulonephritis     Anemia     Anemia due to vitamin B12 deficiency     Anemia of chronic disease     Antineutrophil cytoplasmic antibody (ANCA) positive     Bilateral edema of lower extremity     Bilateral leg edema     CKD (chronic kidney disease) stage 5, GFR 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 ml/min (H)     Mixed hyperlipidemia     Benign essential hypertension     H/O bilateral inguinal hernia repair     H/O total hip arthroplasty     Hematoma     Hip stiffness     History of tobacco abuse     Immunocompromised patient (H)     Lymphedema of both lower extremities     Metabolic acidosis     Pityriasis rosea     Primary pauci-immune necrotizing and crescentic glomerulonephritis     Refusal of statin medication at discharge     Steroid-induced hyperglycemia     Heartburn     Hyperkalemia     Hyponatremia     Pseudomonas urinary tract infection     Diabetes mellitus, type 2 (H)     Past Medical History:   Diagnosis Date     Acute kidney failure (H)     04/2017     Essential (primary) hypertension     8/7/2012     Hyperlipidemia     No Comments Provided     Osteoarthritis of hip     No Comments Provided     Pityriasis rosea     No Comments Provided     Tachycardia     No Comments Provided     Unilateral inguinal hernia without obstruction or gangrene     8/12/2013,Right Inguinal hernia with incarceration, massive [[id number]][     Past Surgical History:   Procedure Laterality Date     CIRCUMCISION      03/14/2017,Dr. Heidi GREENBERG     OTHER SURGICAL HISTORY      89401.9,[place] SUBTALAR ATHROEREISIS,bone Spurs excision on Toe      OTHER SURGICAL HISTORY      13,,HERNIA REPAIR,Right,RIH with mesh     OTHER SURGICAL HISTORY      13,92611,GENITAL SURGERY MALE,Dorsal Slit     OTHER SURGICAL HISTORY      4/15/14,,HERNIA REPAIR,Left,LIH with mesh     OTHER SURGICAL HISTORY      14,65489.0,[place] TOTAL HIP ARTHROPLASTY,Left     Social History     Socioeconomic History     Marital status:      Spouse name: Lucita     Number of children: Not on file     Years of education: Not on file     Highest education level: Not on file   Occupational History     Not on file   Social Needs     Financial resource strain: Not on file     Food insecurity     Worry: Not on file     Inability: Not on file     Transportation needs     Medical: Not on file     Non-medical: Not on file   Tobacco Use     Smoking status: Former Smoker     Types: Cigarettes     Quit date: 1976     Years since quittin.0     Smokeless tobacco: Never Used   Substance and Sexual Activity     Alcohol use: Yes     Alcohol/week: 0.8 standard drinks     Comment: occ beer     Drug use: Never     Sexual activity: Not Currently   Lifestyle     Physical activity     Days per week: Not on file     Minutes per session: Not on file     Stress: Not on file   Relationships     Social connections     Talks on phone: Not on file     Gets together: Not on file     Attends Zoroastrian service: Not on file     Active member of club or organization: Not on file     Attends meetings of clubs or organizations: Not on file     Relationship status: Not on file     Intimate partner violence     Fear of current or ex partner: Not on file     Emotionally abused: Not on file     Physically abused: Not on file     Forced sexual activity: Not on file   Other Topics Concern     Not on file   Social History Narrative     - Wife Claudia Rose kids - oldest son - neuroblastoma at 13 yo  at 14.   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     Family History   Problem Relation Age of Onset     Other - See Comments Son 12        neuroblastoma at 13 yo  at 14.     Genetic Disorder No family hx of         Genetic,No known FHx of DM, CAD, CVA     Current Outpatient Medications   Medication Sig Dispense Refill     amLODIPine (NORVASC) 5 MG tablet TAKE 1 TABLET BY MOUTH EVERY DAY 90 tablet 0     B-D U/F 31G X 8 MM insulin pen needle INJECT SUBCUTANEOUS AT BEDTIME USE 1 PEN NEEDLES DAILY OR AS DIRECTED. 100 each 1     blood glucose (NO BRAND SPECIFIED) lancets standard Dispense item covered by pt ins. E11.65 IDDM type II, uncontrolled - Test 3 times/day. (Wants Barrel lancets)       blood glucose monitoring (NO BRAND SPECIFIED) test strip Dispense item covered by pt ins. E11.65 IDDM type II, uncontrolled - Test 4 times/day. Reason: New diabetes       Blood Glucose Monitoring Suppl (FIFTY50 GLUCOSE METER 2.0) W/DEVICE KIT Dispense meter, test strips, lancets covered by pt ins. E11.65 IDDM type II, uncontrolled - Test 4 times/day. Reason: New diabetes       calcitRIOL (ROCALTROL) 0.25 MCG capsule TAKE 1 CAP BY MOUTH EVERY MONDAY, WEDNESDAY AND FRIDAY.       cholecalciferol (VITAMIN D3) 1000 units (25 mcg) capsule Take 1 capsule (1,000 Units) by mouth daily       darbepoetin miller (ARANESP) 60 MCG/0.3ML injection Inject 60 mcg Subcutaneous       famotidine (PEPCID) 20 MG tablet Take 1 tablet (20 mg) by mouth daily 90 tablet 3     finasteride (PROSCAR) 5 MG tablet Take 1 tablet (5 mg) by mouth daily 90 tablet 3     furosemide (LASIX) 40 MG tablet Take 1 tablet (40 mg) by mouth every morning 90 tablet 3     insulin glargine (LANTUS SOLOSTAR) 100 UNIT/ML pen Inject 2 Units Subcutaneous At Bedtime 15 mL 3     predniSONE (DELTASONE) 5 MG tablet Take 0.5 tablets (2.5 mg) by mouth daily 135 tablet 3     sodium bicarbonate 650 MG tablet Take 1 tablet (650 mg) by mouth daily 90 tablet 3     tamsulosin (FLOMAX) 0.4 MG capsule Take 1 capsule (0.4 mg)  by mouth every evening 90 capsule 3     vitamin D3 (CHOLECALCIFEROL) 50 mcg (2000 units) tablet Take 1 tablet by mouth daily       Patient has no known allergies.      Review of Systems:  Review of Systems  Denies fever, chills, redness and warmth around wound, swelling and odorous or purulent drainage.    Objective:   /68   Pulse 66   Temp 96.9  F (36.1  C) (Tympanic)   Resp 20   Wt 82.6 kg (182 lb)   SpO2 94%   BMI 26.49 kg/m    Physical Exam  Pleasant gentleman in no acute distress.  Affect.  Alert and oriented x4.  Right shin continues to have approximated laceration.  There is no drainage.  The wound measures 3.3 x 5.3 cm with a mixture of yellow and brown soft eschar at 70% and light pink granulation tissue 30%.  Wound is irrigated with Anasept, Hydrofera Blue dressing applied and secured with Medipore tape.  Stockinette replaced.  Right foot DP PT 2+.  Capillary refill less than 3 seconds.  He does have 1+ edema of the right lower leg.    Assessment:    ICD-10-CM    1. Laceration of lower leg, right, initial encounter  S81.811A    2. Wound dehiscence  T81.30XA    3. Immunocompromised patient (H)  D84.9        Plan:   Wound is debriding nicely with autolytic debridement technique.  I encouraged him to leave dressing in place and to continue to elevate leg above heart as able.  Monitor closely for signs and symptoms of infection as he is at higher risk due to his immunocompromise status.  He will follow-up in clinic on Friday, sooner with concerns.  Patient does have well-controlled diabetes at this time.

## 2021-01-18 NOTE — NURSING NOTE
Chief Complaint   Patient presents with     WOUND CARE     left lower leg         Medication Reconciliation: complete    Lisa Brasher LPN

## 2021-01-22 ENCOUNTER — OFFICE VISIT (OUTPATIENT)
Dept: INTERNAL MEDICINE | Facility: OTHER | Age: 86
End: 2021-01-22
Attending: NURSE PRACTITIONER
Payer: MEDICARE

## 2021-01-22 VITALS
TEMPERATURE: 97.6 F | HEART RATE: 94 BPM | DIASTOLIC BLOOD PRESSURE: 90 MMHG | OXYGEN SATURATION: 100 % | RESPIRATION RATE: 20 BRPM | WEIGHT: 179.2 LBS | BODY MASS INDEX: 26.08 KG/M2 | SYSTOLIC BLOOD PRESSURE: 152 MMHG

## 2021-01-22 DIAGNOSIS — D84.9 IMMUNOCOMPROMISED PATIENT (H): ICD-10-CM

## 2021-01-22 DIAGNOSIS — S81.811A LACERATION OF LOWER LEG, RIGHT, INITIAL ENCOUNTER: Primary | ICD-10-CM

## 2021-01-22 DIAGNOSIS — T81.30XA WOUND DEHISCENCE: ICD-10-CM

## 2021-01-22 PROCEDURE — 99212 OFFICE O/P EST SF 10 MIN: CPT | Performed by: NURSE PRACTITIONER

## 2021-01-22 PROCEDURE — G0463 HOSPITAL OUTPT CLINIC VISIT: HCPCS

## 2021-01-22 ASSESSMENT — PAIN SCALES - GENERAL: PAINLEVEL: NO PAIN (0)

## 2021-01-22 NOTE — PROGRESS NOTES
Subjective:  He is here today to follow-up on laceration of lower leg and also the wound.  He has more swelling in his leg today because he drove home from [facility] today and has not had leg elevated.    Patient Active Problem List   Diagnosis     Acquired buried penis     Acute crescentic glomerulonephritis     Anemia     Anemia due to vitamin B12 deficiency     Anemia of chronic disease     Antineutrophil cytoplasmic antibody (ANCA) positive     Bilateral edema of lower extremity     Bilateral leg edema     CKD (chronic kidney disease) stage 5, GFR 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 ml/min (H)     Mixed hyperlipidemia     Benign essential hypertension     H/O bilateral inguinal hernia repair     H/O total hip arthroplasty     Hematoma     Hip stiffness     History of tobacco abuse     Immunocompromised patient (H)     Lymphedema of both lower extremities     Metabolic acidosis     Pityriasis rosea     Primary pauci-immune necrotizing and crescentic glomerulonephritis     Refusal of statin medication at discharge     Steroid-induced hyperglycemia     Heartburn     Hyperkalemia     Hyponatremia     Pseudomonas urinary tract infection     Diabetes mellitus, type 2 (H)     Past Medical History:   Diagnosis Date     Acute kidney failure (H)     04/2017     Essential (primary) hypertension     8/7/2012     Hyperlipidemia     No Comments Provided     Osteoarthritis of hip     No Comments Provided     Pityriasis rosea     No Comments Provided     Tachycardia     No Comments Provided     Unilateral inguinal hernia without obstruction or gangrene     8/12/2013,Right Inguinal hernia with incarceration, massive [[id number]][     Past Surgical History:   Procedure Laterality Date     CIRCUMCISION      03/14/2017,Dr. Heidi GREENBERG     OTHER SURGICAL HISTORY      67884.9,[place] SUBTALAR ATHROEREISIS,bone Spurs excision on Toe     OTHER SURGICAL HISTORY      8/20/13,,HERNIA REPAIR,Right,RIH with mesh     OTHER SURGICAL HISTORY       13,31795,GENITAL SURGERY MALE,Dorsal Slit     OTHER SURGICAL HISTORY      4/15/14,,HERNIA REPAIR,Left,LIH with mesh     OTHER SURGICAL HISTORY      14,73558.0,[place] TOTAL HIP ARTHROPLASTY,Left     Social History     Socioeconomic History     Marital status:      Spouse name: Lucita     Number of children: Not on file     Years of education: Not on file     Highest education level: Not on file   Occupational History     Not on file   Social Needs     Financial resource strain: Not on file     Food insecurity     Worry: Not on file     Inability: Not on file     Transportation needs     Medical: Not on file     Non-medical: Not on file   Tobacco Use     Smoking status: Former Smoker     Types: Cigarettes     Quit date: 1976     Years since quittin.0     Smokeless tobacco: Never Used   Substance and Sexual Activity     Alcohol use: Yes     Alcohol/week: 0.8 standard drinks     Comment: occ beer     Drug use: Never     Sexual activity: Not Currently   Lifestyle     Physical activity     Days per week: Not on file     Minutes per session: Not on file     Stress: Not on file   Relationships     Social connections     Talks on phone: Not on file     Gets together: Not on file     Attends Oriental orthodox service: Not on file     Active member of club or organization: Not on file     Attends meetings of clubs or organizations: Not on file     Relationship status: Not on file     Intimate partner violence     Fear of current or ex partner: Not on file     Emotionally abused: Not on file     Physically abused: Not on file     Forced sexual activity: Not on file   Other Topics Concern     Not on file   Social History Narrative     - Wife Lucita.   3 kids - oldest son - neuroblastoma at 11 yo  at 14.   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     Family History   Problem Relation Age of Onset     Other - See Comments Son 12         neuroblastoma at 11 yo  at 14.     Genetic Disorder No family hx of         Genetic,No known FHx of DM, CAD, CVA     Current Outpatient Medications   Medication Sig Dispense Refill     amLODIPine (NORVASC) 5 MG tablet TAKE 1 TABLET BY MOUTH EVERY DAY 90 tablet 0     B-D U/F 31G X 8 MM insulin pen needle INJECT SUBCUTANEOUS AT BEDTIME USE 1 PEN NEEDLES DAILY OR AS DIRECTED. 100 each 1     blood glucose (NO BRAND SPECIFIED) lancets standard Dispense item covered by pt ins. E11.65 IDDM type II, uncontrolled - Test 3 times/day. (Wants Barrel lancets)       blood glucose monitoring (NO BRAND SPECIFIED) test strip Dispense item covered by pt ins. E11.65 IDDM type II, uncontrolled - Test 4 times/day. Reason: New diabetes       Blood Glucose Monitoring Suppl (FIFTY50 GLUCOSE METER 2.0) W/DEVICE KIT Dispense meter, test strips, lancets covered by pt ins. E11.65 IDDM type II, uncontrolled - Test 4 times/day. Reason: New diabetes       calcitRIOL (ROCALTROL) 0.25 MCG capsule TAKE 1 CAP BY MOUTH EVERY MONDAY, WEDNESDAY AND FRIDAY.       cholecalciferol (VITAMIN D3) 1000 units (25 mcg) capsule Take 1 capsule (1,000 Units) by mouth daily       darbepoetin miller (ARANESP) 60 MCG/0.3ML injection Inject 60 mcg Subcutaneous       famotidine (PEPCID) 20 MG tablet Take 1 tablet (20 mg) by mouth daily 90 tablet 3     finasteride (PROSCAR) 5 MG tablet Take 1 tablet (5 mg) by mouth daily 90 tablet 3     furosemide (LASIX) 40 MG tablet Take 1 tablet (40 mg) by mouth every morning 90 tablet 3     insulin glargine (LANTUS SOLOSTAR) 100 UNIT/ML pen Inject 2 Units Subcutaneous At Bedtime 15 mL 3     predniSONE (DELTASONE) 5 MG tablet Take 0.5 tablets (2.5 mg) by mouth daily 135 tablet 3     sodium bicarbonate 650 MG tablet Take 1 tablet (650 mg) by mouth daily 90 tablet 3     tamsulosin (FLOMAX) 0.4 MG capsule Take 1 capsule (0.4 mg) by mouth every evening 90 capsule 3     vitamin D3 (CHOLECALCIFEROL) 50 mcg (2000 units) tablet Take 1  tablet by mouth daily       Patient has no known allergies.      Review of Systems:  Review of Systems  Denies fever, chills, redness and warmth around wound, swelling and odorous or purulent drainage.    Objective:   BP (!) 152/90   Pulse 94   Temp 97.6  F (36.4  C) (Tympanic)   Resp 20   Wt 81.3 kg (179 lb 3.2 oz)   SpO2 100%   BMI 26.08 kg/m    Physical Exam  Pleasant gentleman in no acute distress.  Affect.  Alert and oriented x4.  Right shin continues to have approximated laceration.  There is no drainage.  The wound measures 3.2 x 5.6 cm with a mixture of 50% yellow slough and 50% light pink granulation tissue.  Wound is irrigated with Anasept, Hydrofera Blue dressing applied and secured with Medipore tape.  Stockinette replaced.  Right foot DP PT 2+.  Capillary refill less than 3 seconds.  He does have 1+ edema of the right lower leg.    Assessment:    ICD-10-CM    1. Laceration of lower leg, right, initial encounter  S81.811A    2. Wound dehiscence  T81.30XA    3. Immunocompromised patient (H)  D84.9        Plan:   Wound is debriding nicely with autolytic debridement technique.  He will leave dressing in place and elevate leg above heart to help with the edema.  Follow-up next Monday for twice weekly dressing change.  Evaluate sooner if any evidence of infection, increased pain or other concerns.    PAL Godinez   1/22/2021  2:37 PM

## 2021-01-23 DIAGNOSIS — R73.9 STEROID-INDUCED HYPERGLYCEMIA: ICD-10-CM

## 2021-01-23 DIAGNOSIS — T38.0X5A STEROID-INDUCED HYPERGLYCEMIA: ICD-10-CM

## 2021-01-25 ENCOUNTER — OFFICE VISIT (OUTPATIENT)
Dept: INTERNAL MEDICINE | Facility: OTHER | Age: 86
End: 2021-01-25
Attending: NURSE PRACTITIONER
Payer: MEDICARE

## 2021-01-25 VITALS
DIASTOLIC BLOOD PRESSURE: 80 MMHG | BODY MASS INDEX: 26.2 KG/M2 | SYSTOLIC BLOOD PRESSURE: 144 MMHG | OXYGEN SATURATION: 97 % | TEMPERATURE: 97.4 F | HEART RATE: 90 BPM | RESPIRATION RATE: 20 BRPM | WEIGHT: 180 LBS

## 2021-01-25 DIAGNOSIS — T81.30XA WOUND DEHISCENCE: Primary | ICD-10-CM

## 2021-01-25 DIAGNOSIS — D84.9 IMMUNOCOMPROMISED PATIENT (H): ICD-10-CM

## 2021-01-25 PROCEDURE — G0463 HOSPITAL OUTPT CLINIC VISIT: HCPCS

## 2021-01-25 PROCEDURE — 99212 OFFICE O/P EST SF 10 MIN: CPT | Performed by: NURSE PRACTITIONER

## 2021-01-25 RX ORDER — PEN NEEDLE, DIABETIC 31 GX5/16"
NEEDLE, DISPOSABLE MISCELLANEOUS
Qty: 100 EACH | Refills: 1 | Status: SHIPPED | OUTPATIENT
Start: 2021-01-25 | End: 2021-08-19

## 2021-01-25 ASSESSMENT — PAIN SCALES - GENERAL: PAINLEVEL: NO PAIN (0)

## 2021-01-25 NOTE — TELEPHONE ENCOUNTER
[facility] sent Rx request for the following:   B-D U/F 31G X 8 MM insulin pen needle  Sig: INJECT SUBCUTANEOUS AT BEDTIME USE 1 PEN NEEDLES DAILY OR AS DIRECTED.    Last Prescription Date:   06/12/2020  Last Fill Qty/Refills:         100 each, R-1    Last Office Visit:              03/24/2020 (Tez)  Future Office visit:           01/27/2021 (Ozzie)   Diabetic Supplies Protocol Passed - 1/23/2021 10:07 AM     Prescription approved per G Refill Protocol.  Nori Devine RN ....................  1/25/2021   9:47 AM

## 2021-01-25 NOTE — NURSING NOTE
Chief Complaint   Patient presents with     Wound Check     Right shin         Medication Reconciliation: complete    Perla Jay, LPN

## 2021-01-25 NOTE — PROGRESS NOTES
Subjective:  He is here today to follow-up on dehisced laceration of right lower leg.  He has some swelling today but otherwise has been trying to elevate when he is at home.  This is helped.  He does not wear compression stockings.  He is not needed to change dressing.  It was last changed in clinic on Friday.    Patient Active Problem List   Diagnosis     Acquired buried penis     Acute crescentic glomerulonephritis     Anemia     Anemia due to vitamin B12 deficiency     Anemia of chronic disease     Antineutrophil cytoplasmic antibody (ANCA) positive     Bilateral edema of lower extremity     Bilateral leg edema     CKD (chronic kidney disease) stage 5, GFR 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 ml/min (H)     Mixed hyperlipidemia     Benign essential hypertension     H/O bilateral inguinal hernia repair     H/O total hip arthroplasty     Hematoma     Hip stiffness     History of tobacco abuse     Immunocompromised patient (H)     Lymphedema of both lower extremities     Metabolic acidosis     Pityriasis rosea     Primary pauci-immune necrotizing and crescentic glomerulonephritis     Refusal of statin medication at discharge     Steroid-induced hyperglycemia     Heartburn     Hyperkalemia     Hyponatremia     Pseudomonas urinary tract infection     Diabetes mellitus, type 2 (H)     Past Medical History:   Diagnosis Date     Acute kidney failure (H)     04/2017     Essential (primary) hypertension     8/7/2012     Hyperlipidemia     No Comments Provided     Osteoarthritis of hip     No Comments Provided     Pityriasis rosea     No Comments Provided     Tachycardia     No Comments Provided     Unilateral inguinal hernia without obstruction or gangrene     8/12/2013,Right Inguinal hernia with incarceration, massive [[id number]][     Past Surgical History:   Procedure Laterality Date     CIRCUMCISION      03/14/2017,Dr. Heidi GREENBERG     OTHER SURGICAL HISTORY      02834.9,[place] SUBTALAR ATHROEREISIS,bone Spurs excision on Toe     OTHER SURGICAL  HISTORY      13,,HERNIA REPAIR,Right,RIH with mesh     OTHER SURGICAL HISTORY      13,44652,GENITAL SURGERY MALE,Dorsal Slit     OTHER SURGICAL HISTORY      4/15/14,,HERNIA REPAIR,Left,LIH with mesh     OTHER SURGICAL HISTORY      14,39295.0,[place] TOTAL HIP ARTHROPLASTY,Left     Social History     Socioeconomic History     Marital status:      Spouse name: Lucita     Number of children: Not on file     Years of education: Not on file     Highest education level: Not on file   Occupational History     Not on file   Social Needs     Financial resource strain: Not on file     Food insecurity     Worry: Not on file     Inability: Not on file     Transportation needs     Medical: Not on file     Non-medical: Not on file   Tobacco Use     Smoking status: Former Smoker     Types: Cigarettes     Quit date: 1976     Years since quittin.0     Smokeless tobacco: Never Used   Substance and Sexual Activity     Alcohol use: Yes     Alcohol/week: 0.8 standard drinks     Comment: occ beer     Drug use: Never     Sexual activity: Not Currently   Lifestyle     Physical activity     Days per week: Not on file     Minutes per session: Not on file     Stress: Not on file   Relationships     Social connections     Talks on phone: Not on file     Gets together: Not on file     Attends Shinto service: Not on file     Active member of club or organization: Not on file     Attends meetings of clubs or organizations: Not on file     Relationship status: Not on file     Intimate partner violence     Fear of current or ex partner: Not on file     Emotionally abused: Not on file     Physically abused: Not on file     Forced sexual activity: Not on file   Other Topics Concern     Not on file   Social History Narrative     - Wife Claudia Rose kids - oldest son - neuroblastoma at 13 yo  at 14.   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      Family History   Problem Relation Age of Onset     Other - See Comments Son 12        neuroblastoma at 11 yo  at 14.     Genetic Disorder No family hx of         Genetic,No known FHx of DM, CAD, CVA     Current Outpatient Medications   Medication Sig Dispense Refill     amLODIPine (NORVASC) 5 MG tablet TAKE 1 TABLET BY MOUTH EVERY DAY 90 tablet 0     B-D U/F 31G X 8 MM insulin pen needle INJECT SUBCUTANEOUS AT BEDTIME USE 1 PEN NEEDLES DAILY OR AS DIRECTED. 100 each 1     blood glucose (NO BRAND SPECIFIED) lancets standard Dispense item covered by pt ins. E11.65 IDDM type II, uncontrolled - Test 3 times/day. (Wants Barrel lancets)       blood glucose monitoring (NO BRAND SPECIFIED) test strip Dispense item covered by pt ins. E11.65 IDDM type II, uncontrolled - Test 4 times/day. Reason: New diabetes       Blood Glucose Monitoring Suppl (FIFTY50 GLUCOSE METER 2.0) W/DEVICE KIT Dispense meter, test strips, lancets covered by pt ins. E11.65 IDDM type II, uncontrolled - Test 4 times/day. Reason: New diabetes       calcitRIOL (ROCALTROL) 0.25 MCG capsule TAKE 1 CAP BY MOUTH EVERY MONDAY, WEDNESDAY AND FRIDAY.       cholecalciferol (VITAMIN D3) 1000 units (25 mcg) capsule Take 1 capsule (1,000 Units) by mouth daily       darbepoetin miller (ARANESP) 60 MCG/0.3ML injection Inject 60 mcg Subcutaneous       famotidine (PEPCID) 20 MG tablet Take 1 tablet (20 mg) by mouth daily 90 tablet 3     finasteride (PROSCAR) 5 MG tablet Take 1 tablet (5 mg) by mouth daily 90 tablet 3     furosemide (LASIX) 40 MG tablet Take 1 tablet (40 mg) by mouth every morning 90 tablet 3     insulin glargine (LANTUS SOLOSTAR) 100 UNIT/ML pen Inject 2 Units Subcutaneous At Bedtime 15 mL 3     predniSONE (DELTASONE) 5 MG tablet Take 0.5 tablets (2.5 mg) by mouth daily 135 tablet 3     sodium bicarbonate 650 MG tablet Take 1 tablet (650 mg) by mouth daily 90 tablet 3     tamsulosin (FLOMAX) 0.4 MG capsule Take 1 capsule (0.4 mg) by mouth every  evening 90 capsule 3     vitamin D3 (CHOLECALCIFEROL) 50 mcg (2000 units) tablet Take 1 tablet by mouth daily       Patient has no known allergies.      Review of Systems:  Review of Systems  Denies fever, chills, redness and warmth around wound, swelling and odorous or purulent drainage.    Objective:   BP (!) 144/80 (BP Location: Right arm, Patient Position: Sitting, Cuff Size: Adult Regular)   Pulse 90   Temp 97.4  F (36.3  C)   Resp 20   Wt 81.6 kg (180 lb)   SpO2 97%   BMI 26.20 kg/m    Physical Exam  Pleasant gentleman in no acute distress.  Affect.  Alert and oriented x4.  The dehisced wound at the right shin measures 3 x 5.2 cm with a mixture of 50% yellow slough and 50% light pink granulation tissue.  Wound is irrigated with Anasept, Hydrofera Blue dressing applied and secured with Medipore tape.  Stockinette replaced.  Right foot DP PT 2+.  Capillary refill less than 3 seconds.  He does have 1+ edema of the right lower leg.    Assessment:    ICD-10-CM    1. Wound dehiscence  T81.30XA    2. Immunocompromised patient (H)  D84.9        Plan:   Wound is debriding nicely with autolytic debridement technique.  He will leave dressing in place and elevate leg above heart to help with the edema.  Follow-up next Wednesday for twice weekly dressing change.  Evaluate sooner if any evidence of infection, increased pain or other concerns.    PAL Godinez   1/25/2021  9:39 AM

## 2021-01-26 ENCOUNTER — ALLIED HEALTH/NURSE VISIT (OUTPATIENT)
Dept: FAMILY MEDICINE | Facility: OTHER | Age: 86
End: 2021-01-26
Attending: INTERNAL MEDICINE
Payer: MEDICARE

## 2021-01-26 DIAGNOSIS — E53.8 VITAMIN B 12 DEFICIENCY: Primary | ICD-10-CM

## 2021-01-26 PROCEDURE — 96372 THER/PROPH/DIAG INJ SC/IM: CPT | Performed by: INTERNAL MEDICINE

## 2021-01-26 PROCEDURE — 250N000011 HC RX IP 250 OP 636: Performed by: INTERNAL MEDICINE

## 2021-01-26 RX ADMIN — CYANOCOBALAMIN 1000 MCG: 1000 INJECTION, SOLUTION INTRAMUSCULAR at 08:38

## 2021-01-26 NOTE — PROGRESS NOTES
Injection  Verified patient's name and . Patient stated reason for visit today is to receive B12. Patient denied adverse reactions and concerns with previous injections. Vitamin B12 prepared and administered as ordered. Administration of medication documented in MAR (see MAR for further information regarding dose, lot #, NDC #, expiration date). Patient left clinic room ambulatory.     Miya WILDER, RN on 2021 at 8:38 AM

## 2021-01-27 ENCOUNTER — OFFICE VISIT (OUTPATIENT)
Dept: INTERNAL MEDICINE | Facility: OTHER | Age: 86
End: 2021-01-27
Attending: NURSE PRACTITIONER
Payer: MEDICARE

## 2021-01-27 VITALS
SYSTOLIC BLOOD PRESSURE: 132 MMHG | TEMPERATURE: 97 F | BODY MASS INDEX: 26.55 KG/M2 | HEART RATE: 52 BPM | DIASTOLIC BLOOD PRESSURE: 64 MMHG | OXYGEN SATURATION: 100 % | RESPIRATION RATE: 16 BRPM | WEIGHT: 182.4 LBS

## 2021-01-27 DIAGNOSIS — D84.9 IMMUNOCOMPROMISED PATIENT (H): ICD-10-CM

## 2021-01-27 DIAGNOSIS — T81.30XA WOUND DEHISCENCE: Primary | ICD-10-CM

## 2021-01-27 PROCEDURE — G0463 HOSPITAL OUTPT CLINIC VISIT: HCPCS

## 2021-01-27 PROCEDURE — 99212 OFFICE O/P EST SF 10 MIN: CPT | Performed by: NURSE PRACTITIONER

## 2021-01-27 ASSESSMENT — PAIN SCALES - GENERAL: PAINLEVEL: NO PAIN (0)

## 2021-01-27 NOTE — PROGRESS NOTES
Subjective:  He is here today to follow-up on dehisced laceration of right lower leg.  He has been working on leg elevation to control the edema.  He denies pain.    Patient Active Problem List   Diagnosis     Acquired buried penis     Acute crescentic glomerulonephritis     Anemia     Anemia due to vitamin B12 deficiency     Anemia of chronic disease     Antineutrophil cytoplasmic antibody (ANCA) positive     Bilateral edema of lower extremity     Bilateral leg edema     CKD (chronic kidney disease) stage 5, GFR 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 ml/min (H)     Mixed hyperlipidemia     Benign essential hypertension     H/O bilateral inguinal hernia repair     H/O total hip arthroplasty     Hematoma     Hip stiffness     History of tobacco abuse     Immunocompromised patient (H)     Lymphedema of both lower extremities     Metabolic acidosis     Pityriasis rosea     Primary pauci-immune necrotizing and crescentic glomerulonephritis     Refusal of statin medication at discharge     Steroid-induced hyperglycemia     Heartburn     Hyperkalemia     Hyponatremia     Pseudomonas urinary tract infection     Diabetes mellitus, type 2 (H)     Past Medical History:   Diagnosis Date     Acute kidney failure (H)     04/2017     Essential (primary) hypertension     8/7/2012     Hyperlipidemia     No Comments Provided     Osteoarthritis of hip     No Comments Provided     Pityriasis rosea     No Comments Provided     Tachycardia     No Comments Provided     Unilateral inguinal hernia without obstruction or gangrene     8/12/2013,Right Inguinal hernia with incarceration, massive [[id number]][     Past Surgical History:   Procedure Laterality Date     CIRCUMCISION      03/14/2017,Dr. Heidi GREENBERG     OTHER SURGICAL HISTORY      83115.9,[place] SUBTALAR ATHROEREISIS,bone Spurs excision on Toe     OTHER SURGICAL HISTORY      8/20/13,,HERNIA REPAIR,Right,RIH with mesh     OTHER SURGICAL HISTORY      8/20/13,44731,GENITAL SURGERY MALE,Dorsal Slit     OTHER  SURGICAL HISTORY      4/15/14,,HERNIA REPAIR,Left,LIH with mesh     OTHER SURGICAL HISTORY      14,47043.0,[place] TOTAL HIP ARTHROPLASTY,Left     Social History     Socioeconomic History     Marital status:      Spouse name: Lucita     Number of children: Not on file     Years of education: Not on file     Highest education level: Not on file   Occupational History     Not on file   Social Needs     Financial resource strain: Not on file     Food insecurity     Worry: Not on file     Inability: Not on file     Transportation needs     Medical: Not on file     Non-medical: Not on file   Tobacco Use     Smoking status: Former Smoker     Types: Cigarettes     Quit date: 1976     Years since quittin.1     Smokeless tobacco: Never Used   Substance and Sexual Activity     Alcohol use: Yes     Alcohol/week: 0.8 standard drinks     Comment: occ beer     Drug use: Never     Sexual activity: Not Currently   Lifestyle     Physical activity     Days per week: Not on file     Minutes per session: Not on file     Stress: Not on file   Relationships     Social connections     Talks on phone: Not on file     Gets together: Not on file     Attends Cheondoism service: Not on file     Active member of club or organization: Not on file     Attends meetings of clubs or organizations: Not on file     Relationship status: Not on file     Intimate partner violence     Fear of current or ex partner: Not on file     Emotionally abused: Not on file     Physically abused: Not on file     Forced sexual activity: Not on file   Other Topics Concern     Not on file   Social History Narrative     - Wife Claudia   3 kids - oldest son - neuroblastoma at 13 yo  at 14.   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     Family History   Problem Relation Age of Onset     Other - See Comments Son 12        neuroblastoma at 13 yo  at 14.     Genetic Disorder No  family hx of         Genetic,No known FHx of DM, CAD, CVA     Current Outpatient Medications   Medication Sig Dispense Refill     amLODIPine (NORVASC) 5 MG tablet TAKE 1 TABLET BY MOUTH EVERY DAY 90 tablet 0     B-D U/F 31G X 8 MM insulin pen needle INJECT SUBCUTANEOUS AT BEDTIME USE 1 PEN NEEDLES DAILY OR AS DIRECTED. 100 each 1     blood glucose (NO BRAND SPECIFIED) lancets standard Dispense item covered by pt ins. E11.65 IDDM type II, uncontrolled - Test 3 times/day. (Wants Barrel lancets)       blood glucose monitoring (NO BRAND SPECIFIED) test strip Dispense item covered by pt ins. E11.65 IDDM type II, uncontrolled - Test 4 times/day. Reason: New diabetes       Blood Glucose Monitoring Suppl (FIFTY50 GLUCOSE METER 2.0) W/DEVICE KIT Dispense meter, test strips, lancets covered by pt ins. E11.65 IDDM type II, uncontrolled - Test 4 times/day. Reason: New diabetes       calcitRIOL (ROCALTROL) 0.25 MCG capsule TAKE 1 CAP BY MOUTH EVERY MONDAY, WEDNESDAY AND FRIDAY.       cholecalciferol (VITAMIN D3) 1000 units (25 mcg) capsule Take 1 capsule (1,000 Units) by mouth daily       darbepoetin miller (ARANESP) 60 MCG/0.3ML injection Inject 60 mcg Subcutaneous       famotidine (PEPCID) 20 MG tablet Take 1 tablet (20 mg) by mouth daily 90 tablet 3     finasteride (PROSCAR) 5 MG tablet Take 1 tablet (5 mg) by mouth daily 90 tablet 3     furosemide (LASIX) 40 MG tablet Take 1 tablet (40 mg) by mouth every morning 90 tablet 3     insulin glargine (LANTUS SOLOSTAR) 100 UNIT/ML pen Inject 2 Units Subcutaneous At Bedtime 15 mL 3     predniSONE (DELTASONE) 5 MG tablet Take 0.5 tablets (2.5 mg) by mouth daily 135 tablet 3     sodium bicarbonate 650 MG tablet Take 1 tablet (650 mg) by mouth daily 90 tablet 3     tamsulosin (FLOMAX) 0.4 MG capsule Take 1 capsule (0.4 mg) by mouth every evening 90 capsule 3     vitamin D3 (CHOLECALCIFEROL) 50 mcg (2000 units) tablet Take 1 tablet by mouth daily       Patient has no known  allergies.      Review of Systems:  Review of Systems  Denies fever, chills, redness and warmth around wound, swelling and odorous or purulent drainage.    Objective:   /64 (BP Location: Right arm, Patient Position: Sitting, Cuff Size: Adult Regular)   Pulse 52   Temp 97  F (36.1  C) (Tympanic)   Resp 16   Wt 82.7 kg (182 lb 6.4 oz)   SpO2 100%   BMI 26.55 kg/m    Physical Exam  Pleasant gentleman in no acute distress.  Affect.  Alert and oriented x4.  The dehisced wound at the right shin measures has a mixture of 50% yellow slough and 50% light pink granulation tissue.  Wound is irrigated with Anasept, Hydrofera Blue dressing applied and secured with Medipore tape.  Stockinette replaced.  Right foot DP PT 2+.  Capillary refill less than 3 seconds.  He does have 1+ edema of the right lower leg.    Assessment:    ICD-10-CM    1. Wound dehiscence  T81.30XA    2. Immunocompromised patient (H)  D84.9        Plan:   Wound continues to improve with autolytic debridement.  We will see him back next Monday.  Leg elevation above heart as much as able.  Monitor for signs and symptoms of infection as he is at higher risk.    PAL Godinez   1/27/2021  8:18 AM

## 2021-01-27 NOTE — NURSING NOTE
Chief Complaint   Patient presents with     WOUND CARE     RIGHT LEG   Patient presents to the clinic today for wound care on right leg.    Medication Reconciliation: completed   Luba Kearney LPN  1/27/2021 7:48 AM   
19

## 2021-02-01 ENCOUNTER — OFFICE VISIT (OUTPATIENT)
Dept: INTERNAL MEDICINE | Facility: OTHER | Age: 86
End: 2021-02-01
Attending: NURSE PRACTITIONER
Payer: MEDICARE

## 2021-02-01 VITALS
DIASTOLIC BLOOD PRESSURE: 74 MMHG | HEART RATE: 88 BPM | RESPIRATION RATE: 18 BRPM | TEMPERATURE: 96.6 F | BODY MASS INDEX: 26.55 KG/M2 | SYSTOLIC BLOOD PRESSURE: 136 MMHG | WEIGHT: 182.4 LBS | OXYGEN SATURATION: 98 %

## 2021-02-01 DIAGNOSIS — R73.9 STEROID-INDUCED HYPERGLYCEMIA: ICD-10-CM

## 2021-02-01 DIAGNOSIS — T81.30XA WOUND DEHISCENCE: Primary | ICD-10-CM

## 2021-02-01 DIAGNOSIS — D84.9 IMMUNOCOMPROMISED PATIENT (H): ICD-10-CM

## 2021-02-01 DIAGNOSIS — T38.0X5A STEROID-INDUCED HYPERGLYCEMIA: ICD-10-CM

## 2021-02-01 PROCEDURE — G0463 HOSPITAL OUTPT CLINIC VISIT: HCPCS

## 2021-02-01 PROCEDURE — 99212 OFFICE O/P EST SF 10 MIN: CPT | Performed by: NURSE PRACTITIONER

## 2021-02-01 ASSESSMENT — PAIN SCALES - GENERAL: PAINLEVEL: NO PAIN (0)

## 2021-02-01 NOTE — NURSING NOTE
Chief Complaint   Patient presents with     WOUND CARE     right lower leg          Medication Reconciliation: complete    Lisa Brasher LPN

## 2021-02-01 NOTE — PROGRESS NOTES
Subjective:  He is here today to follow-up on dehisced laceration of right lower leg.  He has been working on leg elevation to control the edema.  He denies pain.    Patient Active Problem List   Diagnosis     Acquired buried penis     Acute crescentic glomerulonephritis     Anemia     Anemia due to vitamin B12 deficiency     Anemia of chronic disease     Antineutrophil cytoplasmic antibody (ANCA) positive     Bilateral edema of lower extremity     Bilateral leg edema     CKD (chronic kidney disease) stage 5, GFR 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 ml/min (H)     Mixed hyperlipidemia     Benign essential hypertension     H/O bilateral inguinal hernia repair     H/O total hip arthroplasty     Hematoma     Hip stiffness     History of tobacco abuse     Immunocompromised patient (H)     Lymphedema of both lower extremities     Metabolic acidosis     Pityriasis rosea     Primary pauci-immune necrotizing and crescentic glomerulonephritis     Refusal of statin medication at discharge     Steroid-induced hyperglycemia     Heartburn     Hyperkalemia     Hyponatremia     Pseudomonas urinary tract infection     Diabetes mellitus, type 2 (H)     Past Medical History:   Diagnosis Date     Acute kidney failure (H)     04/2017     Essential (primary) hypertension     8/7/2012     Hyperlipidemia     No Comments Provided     Osteoarthritis of hip     No Comments Provided     Pityriasis rosea     No Comments Provided     Tachycardia     No Comments Provided     Unilateral inguinal hernia without obstruction or gangrene     8/12/2013,Right Inguinal hernia with incarceration, massive [[id number]][     Past Surgical History:   Procedure Laterality Date     CIRCUMCISION      03/14/2017,Dr. Hiedi GREENBERG     OTHER SURGICAL HISTORY      07830.9,[place] SUBTALAR ATHROEREISIS,bone Spurs excision on Toe     OTHER SURGICAL HISTORY      8/20/13,,HERNIA REPAIR,Right,RIH with mesh     OTHER SURGICAL HISTORY      8/20/13,57788,GENITAL SURGERY MALE,Dorsal Slit     OTHER  SURGICAL HISTORY      4/15/14,,HERNIA REPAIR,Left,LIH with mesh     OTHER SURGICAL HISTORY      14,95104.0,[place] TOTAL HIP ARTHROPLASTY,Left     Social History     Socioeconomic History     Marital status:      Spouse name: Lucita     Number of children: Not on file     Years of education: Not on file     Highest education level: Not on file   Occupational History     Not on file   Social Needs     Financial resource strain: Not on file     Food insecurity     Worry: Not on file     Inability: Not on file     Transportation needs     Medical: Not on file     Non-medical: Not on file   Tobacco Use     Smoking status: Former Smoker     Types: Cigarettes     Quit date: 1976     Years since quittin.1     Smokeless tobacco: Never Used   Substance and Sexual Activity     Alcohol use: Yes     Alcohol/week: 0.8 standard drinks     Comment: occ beer     Drug use: Never     Sexual activity: Not Currently   Lifestyle     Physical activity     Days per week: Not on file     Minutes per session: Not on file     Stress: Not on file   Relationships     Social connections     Talks on phone: Not on file     Gets together: Not on file     Attends Druze service: Not on file     Active member of club or organization: Not on file     Attends meetings of clubs or organizations: Not on file     Relationship status: Not on file     Intimate partner violence     Fear of current or ex partner: Not on file     Emotionally abused: Not on file     Physically abused: Not on file     Forced sexual activity: Not on file   Other Topics Concern     Not on file   Social History Narrative     - Wife Claudia   3 kids - oldest son - neuroblastoma at 13 yo  at 14.   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     Family History   Problem Relation Age of Onset     Other - See Comments Son 12        neuroblastoma at 13 yo  at 14.     Genetic Disorder No  family hx of         Genetic,No known FHx of DM, CAD, CVA     Current Outpatient Medications   Medication Sig Dispense Refill     amLODIPine (NORVASC) 5 MG tablet TAKE 1 TABLET BY MOUTH EVERY DAY 90 tablet 0     B-D U/F 31G X 8 MM insulin pen needle INJECT SUBCUTANEOUS AT BEDTIME USE 1 PEN NEEDLES DAILY OR AS DIRECTED. 100 each 1     blood glucose (NO BRAND SPECIFIED) lancets standard Dispense item covered by pt ins. E11.65 IDDM type II, uncontrolled - Test 3 times/day. (Wants Barrel lancets)       blood glucose monitoring (NO BRAND SPECIFIED) test strip Dispense item covered by pt ins. E11.65 IDDM type II, uncontrolled - Test 4 times/day. Reason: New diabetes       Blood Glucose Monitoring Suppl (FIFTY50 GLUCOSE METER 2.0) W/DEVICE KIT Dispense meter, test strips, lancets covered by pt ins. E11.65 IDDM type II, uncontrolled - Test 4 times/day. Reason: New diabetes       calcitRIOL (ROCALTROL) 0.25 MCG capsule TAKE 1 CAP BY MOUTH EVERY MONDAY, WEDNESDAY AND FRIDAY.       cholecalciferol (VITAMIN D3) 1000 units (25 mcg) capsule Take 1 capsule (1,000 Units) by mouth daily       darbepoetin miller (ARANESP) 60 MCG/0.3ML injection Inject 60 mcg Subcutaneous       famotidine (PEPCID) 20 MG tablet Take 1 tablet (20 mg) by mouth daily 90 tablet 3     finasteride (PROSCAR) 5 MG tablet Take 1 tablet (5 mg) by mouth daily 90 tablet 3     furosemide (LASIX) 40 MG tablet Take 1 tablet (40 mg) by mouth every morning 90 tablet 3     insulin glargine (LANTUS SOLOSTAR) 100 UNIT/ML pen Inject 2 Units Subcutaneous At Bedtime 15 mL 3     predniSONE (DELTASONE) 5 MG tablet Take 0.5 tablets (2.5 mg) by mouth daily 135 tablet 3     sodium bicarbonate 650 MG tablet Take 1 tablet (650 mg) by mouth daily 90 tablet 3     tamsulosin (FLOMAX) 0.4 MG capsule Take 1 capsule (0.4 mg) by mouth every evening 90 capsule 3     vitamin D3 (CHOLECALCIFEROL) 50 mcg (2000 units) tablet Take 1 tablet by mouth daily       Patient has no known  allergies.      Review of Systems:  Review of Systems  Denies fever, chills, redness and warmth around wound, swelling and odorous or purulent drainage.    Objective:   /74   Pulse 88   Temp 96.6  F (35.9  C) (Tympanic)   Resp 18   Wt 82.7 kg (182 lb 6.4 oz)   SpO2 98%   BMI 26.55 kg/m    Physical Exam  Pleasant gentleman in no acute distress.  Affect.  Alert and oriented x4.  The dehisced wound at the right shin measures has a mixture of 30% yellow slough and 70% light pink granulation tissue.  Wound measures 3 x 5.2 x 0.1 cm.  Wound is irrigated with Anasept, Hydrofera Blue dressing applied and secured with Medipore tape.  Stockinette replaced.  Right foot DP PT 2+.  Capillary refill less than 3 seconds.  He does have 1+ edema of the right lower leg.    Assessment:    ICD-10-CM    1. Wound dehiscence  T81.30XA    2. Immunocompromised patient (H)  D84.9    3. Steroid-induced hyperglycemia  R73.9     T38.0X5A        Plan:   Wound continues to improve with autolytic debridement.  We will see him back next Wednesday.  Leg elevation above heart as much as able.  Monitor for signs and symptoms of infection as he is at higher risk.    PAL Godinez   2/1/2021  9:33 AM

## 2021-02-03 ENCOUNTER — OFFICE VISIT (OUTPATIENT)
Dept: INTERNAL MEDICINE | Facility: OTHER | Age: 86
End: 2021-02-03
Attending: NURSE PRACTITIONER
Payer: MEDICARE

## 2021-02-03 VITALS
HEART RATE: 88 BPM | DIASTOLIC BLOOD PRESSURE: 84 MMHG | TEMPERATURE: 96.2 F | WEIGHT: 182.2 LBS | OXYGEN SATURATION: 100 % | BODY MASS INDEX: 26.52 KG/M2 | RESPIRATION RATE: 20 BRPM | SYSTOLIC BLOOD PRESSURE: 148 MMHG

## 2021-02-03 DIAGNOSIS — D84.9 IMMUNOCOMPROMISED PATIENT (H): ICD-10-CM

## 2021-02-03 DIAGNOSIS — T81.30XA WOUND DEHISCENCE: Primary | ICD-10-CM

## 2021-02-03 DIAGNOSIS — T38.0X5A STEROID-INDUCED HYPERGLYCEMIA: ICD-10-CM

## 2021-02-03 DIAGNOSIS — R73.9 STEROID-INDUCED HYPERGLYCEMIA: ICD-10-CM

## 2021-02-03 PROCEDURE — G0463 HOSPITAL OUTPT CLINIC VISIT: HCPCS

## 2021-02-03 PROCEDURE — G0463 HOSPITAL OUTPT CLINIC VISIT: HCPCS | Mod: 25

## 2021-02-03 PROCEDURE — 99212 OFFICE O/P EST SF 10 MIN: CPT | Performed by: NURSE PRACTITIONER

## 2021-02-03 ASSESSMENT — PAIN SCALES - GENERAL: PAINLEVEL: NO PAIN (0)

## 2021-02-03 NOTE — NURSING NOTE
"Chief Complaint   Patient presents with     WOUND CARE       Initial BP (!) 148/84 (BP Location: Right arm, Patient Position: Chair, Cuff Size: Adult Regular)   Pulse 88   Temp 96.2  F (35.7  C) (Tympanic)   Resp 20   Wt 82.6 kg (182 lb 3.2 oz)   SpO2 100%   BMI 26.52 kg/m   Estimated body mass index is 26.52 kg/m  as calculated from the following:    Height as of 1/6/21: 1.765 m (5' 9.5\").    Weight as of this encounter: 82.6 kg (182 lb 3.2 oz).  Medication Reconciliation: complete      Maria L Linn LPN on 2/3/2021 at 9:19 AM   "

## 2021-02-03 NOTE — PROGRESS NOTES
Subjective:  He is here today to follow-up on dehisced laceration of right lower leg.  He has been working on leg elevation to control the edema.  He denies pain and has not noted drainage through bandage.    Patient Active Problem List   Diagnosis     Acquired buried penis     Acute crescentic glomerulonephritis     Anemia     Anemia due to vitamin B12 deficiency     Anemia of chronic disease     Antineutrophil cytoplasmic antibody (ANCA) positive     Bilateral edema of lower extremity     Bilateral leg edema     CKD (chronic kidney disease) stage 5, GFR 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 ml/min (H)     Mixed hyperlipidemia     Benign essential hypertension     H/O bilateral inguinal hernia repair     H/O total hip arthroplasty     Hematoma     Hip stiffness     History of tobacco abuse     Immunocompromised patient (H)     Lymphedema of both lower extremities     Metabolic acidosis     Pityriasis rosea     Primary pauci-immune necrotizing and crescentic glomerulonephritis     Refusal of statin medication at discharge     Steroid-induced hyperglycemia     Heartburn     Hyperkalemia     Hyponatremia     Pseudomonas urinary tract infection     Diabetes mellitus, type 2 (H)     Past Medical History:   Diagnosis Date     Acute kidney failure (H)     04/2017     Essential (primary) hypertension     8/7/2012     Hyperlipidemia     No Comments Provided     Osteoarthritis of hip     No Comments Provided     Pityriasis rosea     No Comments Provided     Tachycardia     No Comments Provided     Unilateral inguinal hernia without obstruction or gangrene     8/12/2013,Right Inguinal hernia with incarceration, massive [[id number]][     Past Surgical History:   Procedure Laterality Date     CIRCUMCISION      03/14/2017,Dr. Heidi GREENBERG     OTHER SURGICAL HISTORY      10420.9,[place] SUBTALAR ATHROEREISIS,bone Spurs excision on Toe     OTHER SURGICAL HISTORY      8/20/13,,HERNIA REPAIR,Right,RIH with mesh     OTHER SURGICAL HISTORY       13,87427,GENITAL SURGERY MALE,Dorsal Slit     OTHER SURGICAL HISTORY      4/15/14,,HERNIA REPAIR,Left,LIH with mesh     OTHER SURGICAL HISTORY      14,64874.0,[place] TOTAL HIP ARTHROPLASTY,Left     Social History     Socioeconomic History     Marital status:      Spouse name: Lucita     Number of children: Not on file     Years of education: Not on file     Highest education level: Not on file   Occupational History     Not on file   Social Needs     Financial resource strain: Not on file     Food insecurity     Worry: Not on file     Inability: Not on file     Transportation needs     Medical: Not on file     Non-medical: Not on file   Tobacco Use     Smoking status: Former Smoker     Types: Cigarettes     Quit date: 1976     Years since quittin.1     Smokeless tobacco: Never Used   Substance and Sexual Activity     Alcohol use: Yes     Alcohol/week: 0.8 standard drinks     Comment: occ beer     Drug use: Never     Sexual activity: Not Currently   Lifestyle     Physical activity     Days per week: Not on file     Minutes per session: Not on file     Stress: Not on file   Relationships     Social connections     Talks on phone: Not on file     Gets together: Not on file     Attends Baptism service: Not on file     Active member of club or organization: Not on file     Attends meetings of clubs or organizations: Not on file     Relationship status: Not on file     Intimate partner violence     Fear of current or ex partner: Not on file     Emotionally abused: Not on file     Physically abused: Not on file     Forced sexual activity: Not on file   Other Topics Concern     Not on file   Social History Narrative     - Wife Lucita.   3 kids - oldest son - neuroblastoma at 11 yo  at 14.   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     Family History   Problem Relation Age of Onset     Other - See Comments Son 12         neuroblastoma at 13 yo  at 14.     Genetic Disorder No family hx of         Genetic,No known FHx of DM, CAD, CVA     Current Outpatient Medications   Medication Sig Dispense Refill     amLODIPine (NORVASC) 5 MG tablet TAKE 1 TABLET BY MOUTH EVERY DAY 90 tablet 0     B-D U/F 31G X 8 MM insulin pen needle INJECT SUBCUTANEOUS AT BEDTIME USE 1 PEN NEEDLES DAILY OR AS DIRECTED. 100 each 1     blood glucose (NO BRAND SPECIFIED) lancets standard Dispense item covered by pt ins. E11.65 IDDM type II, uncontrolled - Test 3 times/day. (Wants Barrel lancets)       blood glucose monitoring (NO BRAND SPECIFIED) test strip Dispense item covered by pt ins. E11.65 IDDM type II, uncontrolled - Test 4 times/day. Reason: New diabetes       Blood Glucose Monitoring Suppl (FIFTY50 GLUCOSE METER 2.0) W/DEVICE KIT Dispense meter, test strips, lancets covered by pt ins. E11.65 IDDM type II, uncontrolled - Test 4 times/day. Reason: New diabetes       calcitRIOL (ROCALTROL) 0.25 MCG capsule TAKE 1 CAP BY MOUTH EVERY MONDAY, WEDNESDAY AND FRIDAY.       cholecalciferol (VITAMIN D3) 1000 units (25 mcg) capsule Take 1 capsule (1,000 Units) by mouth daily       darbepoetin miller (ARANESP) 60 MCG/0.3ML injection Inject 60 mcg Subcutaneous       famotidine (PEPCID) 20 MG tablet Take 1 tablet (20 mg) by mouth daily 90 tablet 3     finasteride (PROSCAR) 5 MG tablet Take 1 tablet (5 mg) by mouth daily 90 tablet 3     furosemide (LASIX) 40 MG tablet Take 1 tablet (40 mg) by mouth every morning 90 tablet 3     insulin glargine (LANTUS SOLOSTAR) 100 UNIT/ML pen Inject 2 Units Subcutaneous At Bedtime 15 mL 3     predniSONE (DELTASONE) 5 MG tablet Take 0.5 tablets (2.5 mg) by mouth daily 135 tablet 3     sodium bicarbonate 650 MG tablet Take 1 tablet (650 mg) by mouth daily 90 tablet 3     tamsulosin (FLOMAX) 0.4 MG capsule Take 1 capsule (0.4 mg) by mouth every evening 90 capsule 3     vitamin D3 (CHOLECALCIFEROL) 50 mcg (2000 units) tablet Take 1  tablet by mouth daily       Patient has no known allergies.      Review of Systems:  Review of Systems  Denies fever, chills, redness and warmth around wound, swelling and odorous or purulent drainage.    Objective:   BP (!) 148/84 (BP Location: Right arm, Patient Position: Chair, Cuff Size: Adult Regular)   Pulse 88   Temp 96.2  F (35.7  C) (Tympanic)   Resp 20   Wt 82.6 kg (182 lb 3.2 oz)   SpO2 100%   BMI 26.52 kg/m    Physical Exam  Pleasant gentleman in no acute distress.  Affect.  Alert and oriented x4.  The dehisced wound at the right shin measures has a mixture of 30% yellow brown slough and 70%  pink granulation tissue.  Wound measures 3 x 5.2 x 0.1 cm.  Wound is irrigated with Anasept, Hydrofera Blue dressing applied and secured with Medipore tape.  Stockinette replaced.  Right foot DP PT 2+.  Capillary refill less than 3 seconds.  He does have 1+ edema of the right lower leg.    Assessment:    ICD-10-CM    1. Wound dehiscence  T81.30XA    2. Immunocompromised patient (H)  D84.9    3. Steroid-induced hyperglycemia  R73.9     T38.0X5A        Plan:   Wound continues to improve with autolytic debridement.  We will see him back next Tuesday.  Leg elevation above heart as much as able.  Monitor for signs and symptoms of infection as he is at higher risk.    PAL Godinez   2/3/2021   9:30 AM

## 2021-02-09 ENCOUNTER — OFFICE VISIT (OUTPATIENT)
Dept: INTERNAL MEDICINE | Facility: OTHER | Age: 86
End: 2021-02-09
Attending: INTERNAL MEDICINE
Payer: MEDICARE

## 2021-02-09 VITALS
SYSTOLIC BLOOD PRESSURE: 160 MMHG | TEMPERATURE: 96.1 F | HEART RATE: 81 BPM | BODY MASS INDEX: 26.32 KG/M2 | DIASTOLIC BLOOD PRESSURE: 82 MMHG | OXYGEN SATURATION: 99 % | RESPIRATION RATE: 20 BRPM | WEIGHT: 180.8 LBS

## 2021-02-09 DIAGNOSIS — T38.0X5A STEROID-INDUCED HYPERGLYCEMIA: ICD-10-CM

## 2021-02-09 DIAGNOSIS — R73.9 STEROID-INDUCED HYPERGLYCEMIA: ICD-10-CM

## 2021-02-09 DIAGNOSIS — D84.9 IMMUNOCOMPROMISED PATIENT (H): ICD-10-CM

## 2021-02-09 DIAGNOSIS — T81.30XA WOUND DEHISCENCE: Primary | ICD-10-CM

## 2021-02-09 PROCEDURE — 99212 OFFICE O/P EST SF 10 MIN: CPT | Performed by: NURSE PRACTITIONER

## 2021-02-09 PROCEDURE — G0463 HOSPITAL OUTPT CLINIC VISIT: HCPCS

## 2021-02-09 NOTE — NURSING NOTE
Chief Complaint   Patient presents with     WOUND CARE     Right shin         Medication Reconciliation: complete    Perla Jay, LPN

## 2021-02-09 NOTE — PROGRESS NOTES
Subjective:  He is here today to follow-up on dehisced laceration of right lower leg.  He has been working on leg elevation to control the edema.  He denies pain and has not noted drainage through bandage.  He is immunocompromise secondary to chronic steroid use.    Patient Active Problem List   Diagnosis     Acquired buried penis     Acute crescentic glomerulonephritis     Anemia     Anemia due to vitamin B12 deficiency     Anemia of chronic disease     Antineutrophil cytoplasmic antibody (ANCA) positive     Bilateral edema of lower extremity     Bilateral leg edema     CKD (chronic kidney disease) stage 5, GFR 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 ml/min (H)     Mixed hyperlipidemia     Benign essential hypertension     H/O bilateral inguinal hernia repair     H/O total hip arthroplasty     Hematoma     Hip stiffness     History of tobacco abuse     Immunocompromised patient (H)     Lymphedema of both lower extremities     Metabolic acidosis     Pityriasis rosea     Primary pauci-immune necrotizing and crescentic glomerulonephritis     Refusal of statin medication at discharge     Steroid-induced hyperglycemia     Heartburn     Hyperkalemia     Hyponatremia     Pseudomonas urinary tract infection     Diabetes mellitus, type 2 (H)     Past Medical History:   Diagnosis Date     Acute kidney failure (H)     04/2017     Essential (primary) hypertension     8/7/2012     Hyperlipidemia     No Comments Provided     Osteoarthritis of hip     No Comments Provided     Pityriasis rosea     No Comments Provided     Tachycardia     No Comments Provided     Unilateral inguinal hernia without obstruction or gangrene     8/12/2013,Right Inguinal hernia with incarceration, massive [[id number]][     Past Surgical History:   Procedure Laterality Date     CIRCUMCISION      03/14/2017,Dr. Heidi GREENBERG     OTHER SURGICAL HISTORY      18713.9,[place] SUBTALAR ATHROEREISIS,bone Spurs excision on Toe     OTHER SURGICAL HISTORY      8/20/13,,HERNIA REPAIR,Right,[facility]  with mesh     OTHER SURGICAL HISTORY      13,42770,GENITAL SURGERY MALE,Dorsal Slit     OTHER SURGICAL HISTORY      4/15/14,,HERNIA REPAIR,Left,LIH with mesh     OTHER SURGICAL HISTORY      14,86168.0,[place] TOTAL HIP ARTHROPLASTY,Left     Social History     Socioeconomic History     Marital status:      Spouse name: Lucita     Number of children: Not on file     Years of education: Not on file     Highest education level: Not on file   Occupational History     Not on file   Social Needs     Financial resource strain: Not on file     Food insecurity     Worry: Not on file     Inability: Not on file     Transportation needs     Medical: Not on file     Non-medical: Not on file   Tobacco Use     Smoking status: Former Smoker     Types: Cigarettes     Quit date: 1976     Years since quittin.1     Smokeless tobacco: Never Used   Substance and Sexual Activity     Alcohol use: Yes     Alcohol/week: 0.8 standard drinks     Comment: occ beer     Drug use: Never     Sexual activity: Not Currently   Lifestyle     Physical activity     Days per week: Not on file     Minutes per session: Not on file     Stress: Not on file   Relationships     Social connections     Talks on phone: Not on file     Gets together: Not on file     Attends Congregation service: Not on file     Active member of club or organization: Not on file     Attends meetings of clubs or organizations: Not on file     Relationship status: Not on file     Intimate partner violence     Fear of current or ex partner: Not on file     Emotionally abused: Not on file     Physically abused: Not on file     Forced sexual activity: Not on file   Other Topics Concern     Not on file   Social History Narrative     - Wife Claudia Rose kids - oldest son - neuroblastoma at 13 yo  at 14.   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     Family History   Problem Relation Age of Onset      Other - See Comments Son 12        neuroblastoma at 11 yo  at 14.     Genetic Disorder No family hx of         Genetic,No known FHx of DM, CAD, CVA     Current Outpatient Medications   Medication Sig Dispense Refill     amLODIPine (NORVASC) 5 MG tablet TAKE 1 TABLET BY MOUTH EVERY DAY 90 tablet 0     B-D U/F 31G X 8 MM insulin pen needle INJECT SUBCUTANEOUS AT BEDTIME USE 1 PEN NEEDLES DAILY OR AS DIRECTED. 100 each 1     blood glucose (NO BRAND SPECIFIED) lancets standard Dispense item covered by pt ins. E11.65 IDDM type II, uncontrolled - Test 3 times/day. (Wants Barrel lancets)       blood glucose monitoring (NO BRAND SPECIFIED) test strip Dispense item covered by pt ins. E11.65 IDDM type II, uncontrolled - Test 4 times/day. Reason: New diabetes       Blood Glucose Monitoring Suppl (FIFTY50 GLUCOSE METER 2.0) W/DEVICE KIT Dispense meter, test strips, lancets covered by pt ins. E11.65 IDDM type II, uncontrolled - Test 4 times/day. Reason: New diabetes       calcitRIOL (ROCALTROL) 0.25 MCG capsule TAKE 1 CAP BY MOUTH EVERY MONDAY, WEDNESDAY AND FRIDAY.       cholecalciferol (VITAMIN D3) 1000 units (25 mcg) capsule Take 1 capsule (1,000 Units) by mouth daily       darbepoetin miller (ARANESP) 60 MCG/0.3ML injection Inject 60 mcg Subcutaneous       famotidine (PEPCID) 20 MG tablet Take 1 tablet (20 mg) by mouth daily 90 tablet 3     finasteride (PROSCAR) 5 MG tablet Take 1 tablet (5 mg) by mouth daily 90 tablet 3     furosemide (LASIX) 40 MG tablet Take 1 tablet (40 mg) by mouth every morning 90 tablet 3     insulin glargine (LANTUS SOLOSTAR) 100 UNIT/ML pen Inject 2 Units Subcutaneous At Bedtime 15 mL 3     predniSONE (DELTASONE) 5 MG tablet Take 0.5 tablets (2.5 mg) by mouth daily 135 tablet 3     sodium bicarbonate 650 MG tablet Take 1 tablet (650 mg) by mouth daily 90 tablet 3     tamsulosin (FLOMAX) 0.4 MG capsule Take 1 capsule (0.4 mg) by mouth every evening 90 capsule 3     vitamin D3 (CHOLECALCIFEROL)  50 mcg (2000 units) tablet Take 1 tablet by mouth daily       Patient has no known allergies.      Review of Systems:  Review of Systems  Denies fever, chills, redness and warmth around wound, swelling and odorous or purulent drainage.    Objective:   BP (!) 160/82 (BP Location: Right arm, Patient Position: Sitting, Cuff Size: Adult Regular)   Pulse 81   Temp 96.1  F (35.6  C)   Resp 20   Wt 82 kg (180 lb 12.8 oz)   SpO2 99%   BMI 26.32 kg/m    Physical Exam  Pleasant gentleman in no acute distress.  Affect.  Alert and oriented x4.  The dehisced wound at the right shin measures has a mixture of 25% yellow brown slough and 75%  pink granulation tissue.  Wound measures 2.8 x 5.2 x 0.1 cm.  Wound is irrigated with Anasept, Hydrofera Blue dressing applied and secured with Medipore tape.  Stockinette replaced.  Right foot DP PT 2+.  Capillary refill less than 3 seconds.  He does have 1+ edema of the right lower leg.    Assessment:    ICD-10-CM    1. Wound dehiscence  T81.30XA    2. Immunocompromised patient (H)  D84.9    3. Steroid-induced hyperglycemia  R73.9     T38.0X5A        Plan:   Wound continues to improve with autolytic debridement.  Follow-up again on Friday and elevate leg above heart as able to reduce swelling.  Monitor for signs and symptoms of infection and follow-up with any concerns.    PAL Godinez   2/9/2021  8:37 AM

## 2021-02-12 ENCOUNTER — OFFICE VISIT (OUTPATIENT)
Dept: INTERNAL MEDICINE | Facility: OTHER | Age: 86
End: 2021-02-12
Attending: INTERNAL MEDICINE
Payer: MEDICARE

## 2021-02-12 VITALS
SYSTOLIC BLOOD PRESSURE: 136 MMHG | RESPIRATION RATE: 16 BRPM | TEMPERATURE: 96.3 F | DIASTOLIC BLOOD PRESSURE: 80 MMHG | BODY MASS INDEX: 26.46 KG/M2 | WEIGHT: 181.8 LBS | HEART RATE: 88 BPM

## 2021-02-12 DIAGNOSIS — T38.0X5A STEROID-INDUCED HYPERGLYCEMIA: ICD-10-CM

## 2021-02-12 DIAGNOSIS — T81.30XA WOUND DEHISCENCE: Primary | ICD-10-CM

## 2021-02-12 DIAGNOSIS — R73.9 STEROID-INDUCED HYPERGLYCEMIA: ICD-10-CM

## 2021-02-12 DIAGNOSIS — D84.9 IMMUNOCOMPROMISED PATIENT (H): ICD-10-CM

## 2021-02-12 PROCEDURE — 99212 OFFICE O/P EST SF 10 MIN: CPT | Performed by: NURSE PRACTITIONER

## 2021-02-12 PROCEDURE — G0463 HOSPITAL OUTPT CLINIC VISIT: HCPCS

## 2021-02-12 PROCEDURE — G0463 HOSPITAL OUTPT CLINIC VISIT: HCPCS | Mod: 25

## 2021-02-12 ASSESSMENT — PAIN SCALES - GENERAL: PAINLEVEL: NO PAIN (0)

## 2021-02-12 NOTE — PROGRESS NOTES
Subjective:  He is here today to follow-up on dehisced laceration of right lower leg.  Overall this is been going pretty well.  He denies any pain.  He is immunocompromise due to chronic steroid use.  Has steroid-induced hyperglycemia.    Patient Active Problem List   Diagnosis     Acquired buried penis     Acute crescentic glomerulonephritis     Anemia     Anemia due to vitamin B12 deficiency     Anemia of chronic disease     Antineutrophil cytoplasmic antibody (ANCA) positive     Bilateral edema of lower extremity     Bilateral leg edema     CKD (chronic kidney disease) stage 5, GFR 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 ml/min (H)     Mixed hyperlipidemia     Benign essential hypertension     H/O bilateral inguinal hernia repair     H/O total hip arthroplasty     Hematoma     Hip stiffness     History of tobacco abuse     Immunocompromised patient (H)     Lymphedema of both lower extremities     Metabolic acidosis     Pityriasis rosea     Primary pauci-immune necrotizing and crescentic glomerulonephritis     Refusal of statin medication at discharge     Steroid-induced hyperglycemia     Heartburn     Hyperkalemia     Hyponatremia     Pseudomonas urinary tract infection     Diabetes mellitus, type 2 (H)     Past Medical History:   Diagnosis Date     Acute kidney failure (H)     04/2017     Essential (primary) hypertension     8/7/2012     Hyperlipidemia     No Comments Provided     Osteoarthritis of hip     No Comments Provided     Pityriasis rosea     No Comments Provided     Tachycardia     No Comments Provided     Unilateral inguinal hernia without obstruction or gangrene     8/12/2013,Right Inguinal hernia with incarceration, massive [[id number]][     Past Surgical History:   Procedure Laterality Date     CIRCUMCISION      03/14/2017,Dr. Heidi GREENBERG     OTHER SURGICAL HISTORY      49854.9,[place] SUBTALAR ATHROEREISIS,bone Spurs excision on Toe     OTHER SURGICAL HISTORY      8/20/13,,HERNIA REPAIR,Right,RIH with mesh     OTHER SURGICAL  HISTORY      13,07077,GENITAL SURGERY MALE,Dorsal Slit     OTHER SURGICAL HISTORY      4/15/14,,HERNIA REPAIR,Left,LIH with mesh     OTHER SURGICAL HISTORY      14,24402.0,[place] TOTAL HIP ARTHROPLASTY,Left     Social History     Socioeconomic History     Marital status:      Spouse name: Lucita     Number of children: Not on file     Years of education: Not on file     Highest education level: Not on file   Occupational History     Not on file   Social Needs     Financial resource strain: Not on file     Food insecurity     Worry: Not on file     Inability: Not on file     Transportation needs     Medical: Not on file     Non-medical: Not on file   Tobacco Use     Smoking status: Former Smoker     Types: Cigarettes     Quit date: 1976     Years since quittin.1     Smokeless tobacco: Never Used   Substance and Sexual Activity     Alcohol use: Yes     Alcohol/week: 0.8 standard drinks     Comment: occ beer     Drug use: Never     Sexual activity: Not Currently   Lifestyle     Physical activity     Days per week: Not on file     Minutes per session: Not on file     Stress: Not on file   Relationships     Social connections     Talks on phone: Not on file     Gets together: Not on file     Attends Qu[employer] service: Not on file     Active member of club or organization: Not on file     Attends meetings of clubs or organizations: Not on file     Relationship status: Not on file     Intimate partner violence     Fear of current or ex partner: Not on file     Emotionally abused: Not on file     Physically abused: Not on file     Forced sexual activity: Not on file   Other Topics Concern     Not on file   Social History Narrative     - Wife Claudia   3 kids - oldest son - neuroblastoma at 13 yo  at 14.   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     Family History   Problem Relation Age of Onset     Other - See Comments Son 12         neuroblastoma at 11 yo  at 14.     Genetic Disorder No family hx of         Genetic,No known FHx of DM, CAD, CVA     Current Outpatient Medications   Medication Sig Dispense Refill     amLODIPine (NORVASC) 5 MG tablet TAKE 1 TABLET BY MOUTH EVERY DAY 90 tablet 0     B-D U/F 31G X 8 MM insulin pen needle INJECT SUBCUTANEOUS AT BEDTIME USE 1 PEN NEEDLES DAILY OR AS DIRECTED. 100 each 1     blood glucose (NO BRAND SPECIFIED) lancets standard Dispense item covered by pt ins. E11.65 IDDM type II, uncontrolled - Test 3 times/day. (Wants Barrel lancets)       blood glucose monitoring (NO BRAND SPECIFIED) test strip Dispense item covered by pt ins. E11.65 IDDM type II, uncontrolled - Test 4 times/day. Reason: New diabetes       Blood Glucose Monitoring Suppl (FIFTY50 GLUCOSE METER 2.0) W/DEVICE KIT Dispense meter, test strips, lancets covered by pt ins. E11.65 IDDM type II, uncontrolled - Test 4 times/day. Reason: New diabetes       calcitRIOL (ROCALTROL) 0.25 MCG capsule TAKE 1 CAP BY MOUTH EVERY MONDAY, WEDNESDAY AND FRIDAY.       cholecalciferol (VITAMIN D3) 1000 units (25 mcg) capsule Take 1 capsule (1,000 Units) by mouth daily       darbepoetin miller (ARANESP) 60 MCG/0.3ML injection Inject 60 mcg Subcutaneous       famotidine (PEPCID) 20 MG tablet Take 1 tablet (20 mg) by mouth daily 90 tablet 3     finasteride (PROSCAR) 5 MG tablet Take 1 tablet (5 mg) by mouth daily 90 tablet 3     furosemide (LASIX) 40 MG tablet Take 1 tablet (40 mg) by mouth every morning 90 tablet 3     insulin glargine (LANTUS SOLOSTAR) 100 UNIT/ML pen Inject 2 Units Subcutaneous At Bedtime 15 mL 3     predniSONE (DELTASONE) 5 MG tablet Take 0.5 tablets (2.5 mg) by mouth daily 135 tablet 3     sodium bicarbonate 650 MG tablet Take 1 tablet (650 mg) by mouth daily 90 tablet 3     tamsulosin (FLOMAX) 0.4 MG capsule Take 1 capsule (0.4 mg) by mouth every evening 90 capsule 3     vitamin D3 (CHOLECALCIFEROL) 50 mcg (2000 units) tablet Take  1 tablet by mouth daily       Patient has no known allergies.      Review of Systems:  Review of Systems  Denies fever, chills, redness and warmth around wound, swelling and odorous or purulent drainage.    Objective:   /80 (BP Location: Right arm, Patient Position: Sitting, Cuff Size: Adult Regular)   Pulse 88   Temp 96.3  F (35.7  C) (Tympanic)   Resp 16   Wt 82.5 kg (181 lb 12.8 oz)   BMI 26.46 kg/m    Physical Exam  Pleasant gentleman in no acute distress.  Affect.  Alert and oriented x4.  The dehisced wound at the right shin measures has a mixture of 25% yellow brown slough and 75%  pink granulation tissue.  Wound measures 2 x 5 x 0.1 cm.  Wound is irrigated with Anasept, Hydrofera Blue dressing applied and secured with Medipore tape.  Stockinette replaced.  Right foot DP PT 2+.  Capillary refill less than 3 seconds.  He does have 1+ edema of the right lower leg.    Assessment:    ICD-10-CM    1. Wound dehiscence  T81.30XA    2. Immunocompromised patient (H)  D84.9    3. Steroid-induced hyperglycemia  R73.9     T38.0X5A        Plan:   Wound continues to improve with autolytic debridement.  Follow-up again on Tuesday and elevate leg above heart as able to reduce swelling.  Monitor for signs and symptoms of infection and follow-up with any concerns.    PAL Godinez   2/12/2021  8:59 AM

## 2021-02-12 NOTE — NURSING NOTE
"Chief Complaint   Patient presents with     WOUND CARE       Initial /80 (BP Location: Right arm, Patient Position: Sitting, Cuff Size: Adult Regular)   Pulse 88   Temp 96.3  F (35.7  C) (Tympanic)   Resp 16   Wt 82.5 kg (181 lb 12.8 oz)   BMI 26.46 kg/m   Estimated body mass index is 26.46 kg/m  as calculated from the following:    Height as of 1/6/21: 1.765 m (5' 9.5\").    Weight as of this encounter: 82.5 kg (181 lb 12.8 oz).  Medication Reconciliation: complete  Miya Barreto LPN  "

## 2021-02-16 ENCOUNTER — OFFICE VISIT (OUTPATIENT)
Dept: INTERNAL MEDICINE | Facility: OTHER | Age: 86
End: 2021-02-16
Attending: NURSE PRACTITIONER
Payer: COMMERCIAL

## 2021-02-16 VITALS
HEART RATE: 85 BPM | RESPIRATION RATE: 20 BRPM | TEMPERATURE: 97.5 F | WEIGHT: 180.8 LBS | SYSTOLIC BLOOD PRESSURE: 138 MMHG | DIASTOLIC BLOOD PRESSURE: 74 MMHG | BODY MASS INDEX: 26.32 KG/M2 | OXYGEN SATURATION: 98 %

## 2021-02-16 DIAGNOSIS — T38.0X5A STEROID-INDUCED HYPERGLYCEMIA: ICD-10-CM

## 2021-02-16 DIAGNOSIS — D84.9 IMMUNOCOMPROMISED PATIENT (H): ICD-10-CM

## 2021-02-16 DIAGNOSIS — R73.9 STEROID-INDUCED HYPERGLYCEMIA: ICD-10-CM

## 2021-02-16 DIAGNOSIS — T81.30XA WOUND DEHISCENCE: Primary | ICD-10-CM

## 2021-02-16 PROCEDURE — 99213 OFFICE O/P EST LOW 20 MIN: CPT | Performed by: NURSE PRACTITIONER

## 2021-02-16 PROCEDURE — G0463 HOSPITAL OUTPT CLINIC VISIT: HCPCS

## 2021-02-16 ASSESSMENT — PAIN SCALES - GENERAL: PAINLEVEL: NO PAIN (0)

## 2021-02-16 NOTE — NURSING NOTE
Chief Complaint   Patient presents with     WOUND CARE     right shin         Medication Reconciliation: complete    Lisa Brasher LPN

## 2021-02-16 NOTE — PROGRESS NOTES
Subjective:  He is here today to follow-up on dehisced laceration of right lower leg.  He denies any pain.  He is immunocompromise due to chronic steroid use.  Has steroid-induced hyperglycemia.  Overall wound has been improving nicely.    Patient Active Problem List   Diagnosis     Acquired buried penis     Acute crescentic glomerulonephritis     Anemia     Anemia due to vitamin B12 deficiency     Anemia of chronic disease     Antineutrophil cytoplasmic antibody (ANCA) positive     Bilateral edema of lower extremity     Bilateral leg edema     CKD (chronic kidney disease) stage 5, GFR 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 ml/min (H)     Mixed hyperlipidemia     Benign essential hypertension     H/O bilateral inguinal hernia repair     H/O total hip arthroplasty     Hematoma     Hip stiffness     History of tobacco abuse     Immunocompromised patient (H)     Lymphedema of both lower extremities     Metabolic acidosis     Pityriasis rosea     Primary pauci-immune necrotizing and crescentic glomerulonephritis     Refusal of statin medication at discharge     Steroid-induced hyperglycemia     Heartburn     Hyperkalemia     Hyponatremia     Pseudomonas urinary tract infection     Diabetes mellitus, type 2 (H)     Past Medical History:   Diagnosis Date     Acute kidney failure (H)     04/2017     Essential (primary) hypertension     8/7/2012     Hyperlipidemia     No Comments Provided     Osteoarthritis of hip     No Comments Provided     Pityriasis rosea     No Comments Provided     Tachycardia     No Comments Provided     Unilateral inguinal hernia without obstruction or gangrene     8/12/2013,Right Inguinal hernia with incarceration, massive [[id number]][     Past Surgical History:   Procedure Laterality Date     CIRCUMCISION      03/14/2017,Dr. Heidi GREENBERG     OTHER SURGICAL HISTORY      63706.9,LA SUBTALAR ATHROEREISIS,bone Spurs excision on Toe     OTHER SURGICAL HISTORY      8/20/13,,HERNIA REPAIR,Right,RIH with mesh     OTHER SURGICAL  HISTORY      13,76431,GENITAL SURGERY MALE,Dorsal Slit     OTHER SURGICAL HISTORY      4/15/14,,HERNIA REPAIR,Left,LIH with mesh     OTHER SURGICAL HISTORY      14,32133.0,VA TOTAL HIP ARTHROPLASTY,Left     Social History     Socioeconomic History     Marital status:      Spouse name: Lucita     Number of children: Not on file     Years of education: Not on file     Highest education level: Not on file   Occupational History     Not on file   Social Needs     Financial resource strain: Not on file     Food insecurity     Worry: Not on file     Inability: Not on file     Transportation needs     Medical: Not on file     Non-medical: Not on file   Tobacco Use     Smoking status: Former Smoker     Types: Cigarettes     Quit date: 1976     Years since quittin.1     Smokeless tobacco: Never Used   Substance and Sexual Activity     Alcohol use: Yes     Alcohol/week: 0.8 standard drinks     Comment: occ beer     Drug use: Never     Sexual activity: Not Currently   Lifestyle     Physical activity     Days per week: Not on file     Minutes per session: Not on file     Stress: Not on file   Relationships     Social connections     Talks on phone: Not on file     Gets together: Not on file     Attends Sikhism service: Not on file     Active member of club or organization: Not on file     Attends meetings of clubs or organizations: Not on file     Relationship status: Not on file     Intimate partner violence     Fear of current or ex partner: Not on file     Emotionally abused: Not on file     Physically abused: Not on file     Forced sexual activity: Not on file   Other Topics Concern     Not on file   Social History Narrative     - Wife Claudia   3 kids - oldest son - neuroblastoma at 13 yo  at 14.   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     Family History   Problem Relation Age of Onset     Other - See Comments Son 12         neuroblastoma at 13 yo  at 14.     Genetic Disorder No family hx of         Genetic,No known FHx of DM, CAD, CVA     Current Outpatient Medications   Medication Sig Dispense Refill     amLODIPine (NORVASC) 5 MG tablet TAKE 1 TABLET BY MOUTH EVERY DAY 90 tablet 0     B-D U/F 31G X 8 MM insulin pen needle INJECT SUBCUTANEOUS AT BEDTIME USE 1 PEN NEEDLES DAILY OR AS DIRECTED. 100 each 1     blood glucose (NO BRAND SPECIFIED) lancets standard Dispense item covered by pt ins. E11.65 IDDM type II, uncontrolled - Test 3 times/day. (Wants Barrel lancets)       blood glucose monitoring (NO BRAND SPECIFIED) test strip Dispense item covered by pt ins. E11.65 IDDM type II, uncontrolled - Test 4 times/day. Reason: New diabetes       Blood Glucose Monitoring Suppl (FIFTY50 GLUCOSE METER 2.0) W/DEVICE KIT Dispense meter, test strips, lancets covered by pt ins. E11.65 IDDM type II, uncontrolled - Test 4 times/day. Reason: New diabetes       calcitRIOL (ROCALTROL) 0.25 MCG capsule TAKE 1 CAP BY MOUTH EVERY MONDAY, WEDNESDAY AND FRIDAY.       cholecalciferol (VITAMIN D3) 1000 units (25 mcg) capsule Take 1 capsule (1,000 Units) by mouth daily       darbepoetin miller (ARANESP) 60 MCG/0.3ML injection Inject 60 mcg Subcutaneous       famotidine (PEPCID) 20 MG tablet Take 1 tablet (20 mg) by mouth daily 90 tablet 3     finasteride (PROSCAR) 5 MG tablet Take 1 tablet (5 mg) by mouth daily 90 tablet 3     furosemide (LASIX) 40 MG tablet Take 1 tablet (40 mg) by mouth every morning 90 tablet 3     insulin glargine (LANTUS SOLOSTAR) 100 UNIT/ML pen Inject 2 Units Subcutaneous At Bedtime 15 mL 3     predniSONE (DELTASONE) 5 MG tablet Take 0.5 tablets (2.5 mg) by mouth daily 135 tablet 3     sodium bicarbonate 650 MG tablet Take 1 tablet (650 mg) by mouth daily 90 tablet 3     tamsulosin (FLOMAX) 0.4 MG capsule Take 1 capsule (0.4 mg) by mouth every evening 90 capsule 3     vitamin D3 (CHOLECALCIFEROL) 50 mcg (2000 units) tablet Take  1 tablet by mouth daily       Patient has no known allergies.      Review of Systems:  Review of Systems  Denies fever, chills, redness and warmth around wound, swelling and odorous or purulent drainage.    Objective:   /74   Pulse 85   Temp 97.5  F (36.4  C) (Tympanic)   Resp 20   Wt 82 kg (180 lb 12.8 oz)   SpO2 98%   BMI 26.32 kg/m    Physical Exam  Pleasant gentleman in no acute distress.  Affect.  Alert and oriented x4.  The dehisced wound at the right shin measures has a mixture of 25% yellow brown slough and 75%  pink granulation tissue.  Wound measures 1.9 x 4.9 x 0.1 cm.  Wound is irrigated with Anasept, Hydrofera Blue dressing applied and secured with Medipore tape.  Stockinette replaced.  Right foot DP PT 2+.  Capillary refill less than 3 seconds.  He does have 1+ edema of the right lower leg.    Assessment:    ICD-10-CM    1. Wound dehiscence  T81.30XA    2. Immunocompromised patient (H)  D84.9    3. Steroid-induced hyperglycemia  R73.9     T38.0X5A        Plan:   Wound continues to improve with autolytic debridement.  Follow-up again next week and elevate leg above heart as able to reduce swelling.  Monitor for signs and symptoms of infection and follow-up with any concerns.    PAL Godinez   2/16/2021  8:01 AM

## 2021-02-22 ENCOUNTER — OFFICE VISIT (OUTPATIENT)
Dept: INTERNAL MEDICINE | Facility: OTHER | Age: 86
End: 2021-02-22
Attending: NURSE PRACTITIONER
Payer: COMMERCIAL

## 2021-02-22 VITALS
SYSTOLIC BLOOD PRESSURE: 136 MMHG | RESPIRATION RATE: 16 BRPM | DIASTOLIC BLOOD PRESSURE: 80 MMHG | TEMPERATURE: 96.8 F | WEIGHT: 181.2 LBS | OXYGEN SATURATION: 100 % | HEART RATE: 84 BPM | BODY MASS INDEX: 26.37 KG/M2

## 2021-02-22 DIAGNOSIS — D84.9 IMMUNOCOMPROMISED PATIENT (H): ICD-10-CM

## 2021-02-22 DIAGNOSIS — T81.30XA WOUND DEHISCENCE: Primary | ICD-10-CM

## 2021-02-22 DIAGNOSIS — R73.9 STEROID-INDUCED HYPERGLYCEMIA: ICD-10-CM

## 2021-02-22 DIAGNOSIS — S81.811A NONINFECTED SKIN TEAR OF LEG, RIGHT, INITIAL ENCOUNTER: ICD-10-CM

## 2021-02-22 DIAGNOSIS — T38.0X5A STEROID-INDUCED HYPERGLYCEMIA: ICD-10-CM

## 2021-02-22 PROCEDURE — G0463 HOSPITAL OUTPT CLINIC VISIT: HCPCS

## 2021-02-22 PROCEDURE — 99213 OFFICE O/P EST LOW 20 MIN: CPT | Performed by: NURSE PRACTITIONER

## 2021-02-22 PROCEDURE — G0463 HOSPITAL OUTPT CLINIC VISIT: HCPCS | Mod: 25

## 2021-02-22 ASSESSMENT — PAIN SCALES - GENERAL: PAINLEVEL: NO PAIN (0)

## 2021-02-22 NOTE — NURSING NOTE
"Chief Complaint   Patient presents with     WOUND CARE     Right [place]       Initial /80 (BP Location: Right arm, Patient Position: Sitting, Cuff Size: Adult Regular)   Pulse 84   Temp 96.8  F (36  C) (Tympanic)   Resp 16   Wt 82.2 kg (181 lb 3.2 oz)   SpO2 100%   BMI 26.37 kg/m   Estimated body mass index is 26.37 kg/m  as calculated from the following:    Height as of 1/6/21: 1.765 m (5' 9.5\").    Weight as of this encounter: 82.2 kg (181 lb 3.2 oz).  Medication Reconciliation: complete  Miya Barreto LPN  "

## 2021-02-22 NOTE — PROGRESS NOTES
Subjective:  He is here today to follow-up on dehisced laceration of right lower leg.  He denies any pain.  He is immunocompromise due to chronic steroid use.  Has steroid-induced hyperglycemia.  Overall wound has been improving nicely.    Patient Active Problem List   Diagnosis     Acquired buried penis     Acute crescentic glomerulonephritis     Anemia     Anemia due to vitamin B12 deficiency     Anemia of chronic disease     Antineutrophil cytoplasmic antibody (ANCA) positive     Bilateral edema of lower extremity     Bilateral leg edema     CKD (chronic kidney disease) stage 5, GFR 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 ml/min (H)     Mixed hyperlipidemia     Benign essential hypertension     H/O bilateral inguinal hernia repair     H/O total hip arthroplasty     Hematoma     Hip stiffness     History of tobacco abuse     Immunocompromised patient (H)     Lymphedema of both lower extremities     Metabolic acidosis     Pityriasis rosea     Primary pauci-immune necrotizing and crescentic glomerulonephritis     Refusal of statin medication at discharge     Steroid-induced hyperglycemia     Heartburn     Hyperkalemia     Hyponatremia     Pseudomonas urinary tract infection     Diabetes mellitus, type 2 (H)     Past Medical History:   Diagnosis Date     Acute kidney failure (H)     04/2017     Essential (primary) hypertension     8/7/2012     Hyperlipidemia     No Comments Provided     Osteoarthritis of hip     No Comments Provided     Pityriasis rosea     No Comments Provided     Tachycardia     No Comments Provided     Unilateral inguinal hernia without obstruction or gangrene     8/12/2013,Right Inguinal hernia with incarceration, massive [[id number]][     Past Surgical History:   Procedure Laterality Date     CIRCUMCISION      03/14/2017,Dr. Heidi GREENBERG     OTHER SURGICAL HISTORY      63602.9,SD SUBTALAR ATHROEREISIS,bone Spurs excision on Toe     OTHER SURGICAL HISTORY      8/20/13,,HERNIA REPAIR,Right,RIH with mesh     OTHER SURGICAL  HISTORY      13,52279,GENITAL SURGERY MALE,Dorsal Slit     OTHER SURGICAL HISTORY      4/15/14,,HERNIA REPAIR,Left,LIH with mesh     OTHER SURGICAL HISTORY      14,07163.0,DE TOTAL HIP ARTHROPLASTY,Left     Social History     Socioeconomic History     Marital status:      Spouse name: Lucita     Number of children: Not on file     Years of education: Not on file     Highest education level: Not on file   Occupational History     Not on file   Social Needs     Financial resource strain: Not on file     Food insecurity     Worry: Not on file     Inability: Not on file     Transportation needs     Medical: Not on file     Non-medical: Not on file   Tobacco Use     Smoking status: Former Smoker     Types: Cigarettes     Quit date: 1976     Years since quittin.1     Smokeless tobacco: Never Used   Substance and Sexual Activity     Alcohol use: Yes     Alcohol/week: 0.8 standard drinks     Comment: occ beer     Drug use: Never     Sexual activity: Not Currently   Lifestyle     Physical activity     Days per week: Not on file     Minutes per session: Not on file     Stress: Not on file   Relationships     Social connections     Talks on phone: Not on file     Gets together: Not on file     Attends Scientologist service: Not on file     Active member of club or organization: Not on file     Attends meetings of clubs or organizations: Not on file     Relationship status: Not on file     Intimate partner violence     Fear of current or ex partner: Not on file     Emotionally abused: Not on file     Physically abused: Not on file     Forced sexual activity: Not on file   Other Topics Concern     Not on file   Social History Narrative     - Wife Claudia   3 kids - oldest son - neuroblastoma at 13 yo  at 14.   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     Family History   Problem Relation Age of Onset     Other - See Comments Son 12         neuroblastoma at 13 yo  at 14.     Genetic Disorder No family hx of         Genetic,No known FHx of DM, CAD, CVA     Current Outpatient Medications   Medication Sig Dispense Refill     amLODIPine (NORVASC) 5 MG tablet TAKE 1 TABLET BY MOUTH EVERY DAY 90 tablet 0     B-D U/F 31G X 8 MM insulin pen needle INJECT SUBCUTANEOUS AT BEDTIME USE 1 PEN NEEDLES DAILY OR AS DIRECTED. 100 each 1     blood glucose (NO BRAND SPECIFIED) lancets standard Dispense item covered by pt ins. E11.65 IDDM type II, uncontrolled - Test 3 times/day. (Wants Barrel lancets)       blood glucose monitoring (NO BRAND SPECIFIED) test strip Dispense item covered by pt ins. E11.65 IDDM type II, uncontrolled - Test 4 times/day. Reason: New diabetes       Blood Glucose Monitoring Suppl (FIFTY50 GLUCOSE METER 2.0) W/DEVICE KIT Dispense meter, test strips, lancets covered by pt ins. E11.65 IDDM type II, uncontrolled - Test 4 times/day. Reason: New diabetes       calcitRIOL (ROCALTROL) 0.25 MCG capsule TAKE 1 CAP BY MOUTH EVERY MONDAY, WEDNESDAY AND FRIDAY.       cholecalciferol (VITAMIN D3) 1000 units (25 mcg) capsule Take 1 capsule (1,000 Units) by mouth daily       darbepoetin miller (ARANESP) 60 MCG/0.3ML injection Inject 60 mcg Subcutaneous       famotidine (PEPCID) 20 MG tablet Take 1 tablet (20 mg) by mouth daily 90 tablet 3     finasteride (PROSCAR) 5 MG tablet Take 1 tablet (5 mg) by mouth daily 90 tablet 3     furosemide (LASIX) 40 MG tablet Take 1 tablet (40 mg) by mouth every morning 90 tablet 3     insulin glargine (LANTUS SOLOSTAR) 100 UNIT/ML pen Inject 2 Units Subcutaneous At Bedtime 15 mL 3     predniSONE (DELTASONE) 5 MG tablet Take 0.5 tablets (2.5 mg) by mouth daily 135 tablet 3     sodium bicarbonate 650 MG tablet Take 1 tablet (650 mg) by mouth daily 90 tablet 3     tamsulosin (FLOMAX) 0.4 MG capsule Take 1 capsule (0.4 mg) by mouth every evening 90 capsule 3     vitamin D3 (CHOLECALCIFEROL) 50 mcg (2000 units) tablet Take  1 tablet by mouth daily       Patient has no known allergies.      Review of Systems:  Review of Systems  Denies fever, chills, redness and warmth around wound, swelling and odorous or purulent drainage.    Objective:   /80 (BP Location: Right arm, Patient Position: Sitting, Cuff Size: Adult Regular)   Pulse 84   Temp 96.8  F (36  C) (Tympanic)   Resp 16   Wt 82.2 kg (181 lb 3.2 oz)   SpO2 100%   BMI 26.37 kg/m    Physical Exam  Pleasant gentleman in no acute distress.  Affect.  Alert and oriented x4.  The dehisced wound at the right shin measures has a mixture of 30% yellow brown slough and 70%  pink granulation tissue.  Wound measures 2 x 5 x 0.1 cm.  Wound is irrigated with Anasept, 3 inch Allevyn gentle border light dressing applied.    He has a new skin tear along the lateral calf which appears to have developed from tape that has been holding on his previous dressing that was removed by nursing staff.  Patient is having some bleeding.  Was able to get the bleeding to stop with pressure and application of calcium alginate.  The wound was cleansed with saline wash and Anasept then calcium alginate applied followed by 4 inch Allevyn gentle border light foam dressing.  Stockinette replaced.  Right foot DP PT 2+.  Capillary refill less than 3 seconds.  He does have 1+ edema of the right lower leg.    Assessment:    ICD-10-CM    1. Wound dehiscence  T81.30XA    2. Immunocompromised patient (H)  D84.9    3. Steroid-induced hyperglycemia  R73.9     T38.0X5A    4. Noninfected skin tear of leg, right, initial encounter  S81.811A        Plan:   Follow-up again on Monday.  Leave dressings in place.  Once he gets home recommend that he elevate leg above heart for at least an hour to help prevent recurrence of the bleeding.  Be cautious with any trauma to the wound bed as this can cause further bleeding.  Monitor closely for signs and symptoms of infection and follow-up sooner with any concerns.    Brenda BLANK  PAL Horne   2/22/2021  9:04 AM

## 2021-02-23 ENCOUNTER — ALLIED HEALTH/NURSE VISIT (OUTPATIENT)
Dept: FAMILY MEDICINE | Facility: OTHER | Age: 86
End: 2021-02-23
Attending: INTERNAL MEDICINE
Payer: MEDICARE

## 2021-02-23 DIAGNOSIS — E53.8 VITAMIN B 12 DEFICIENCY: Primary | ICD-10-CM

## 2021-02-23 PROCEDURE — 250N000011 HC RX IP 250 OP 636: Performed by: INTERNAL MEDICINE

## 2021-02-23 PROCEDURE — 96372 THER/PROPH/DIAG INJ SC/IM: CPT | Performed by: INTERNAL MEDICINE

## 2021-02-23 RX ADMIN — CYANOCOBALAMIN 1000 MCG: 1000 INJECTION, SOLUTION INTRAMUSCULAR at 10:34

## 2021-02-23 NOTE — PROGRESS NOTES
Injection  Verified patient's name and . Patient stated reason for visit today is to receive B12. Patient denied adverse reactions and concerns with previous injections. Vitamin B12 prepared and administered as ordered. Administration of medication documented in MAR (see MAR for further information regarding dose, lot #, NDC #, expiration date). Patient left clinic room ambulatory.       Miya WILDER, RN on 2021 at 10:38 AM

## 2021-02-26 ENCOUNTER — IMMUNIZATION (OUTPATIENT)
Dept: FAMILY MEDICINE | Facility: OTHER | Age: 86
End: 2021-02-26
Attending: INTERNAL MEDICINE
Payer: MEDICARE

## 2021-02-26 ENCOUNTER — OFFICE VISIT (OUTPATIENT)
Dept: INTERNAL MEDICINE | Facility: OTHER | Age: 86
End: 2021-02-26
Attending: NURSE PRACTITIONER
Payer: MEDICARE

## 2021-02-26 VITALS
TEMPERATURE: 97.5 F | OXYGEN SATURATION: 98 % | DIASTOLIC BLOOD PRESSURE: 74 MMHG | RESPIRATION RATE: 16 BRPM | HEART RATE: 70 BPM | SYSTOLIC BLOOD PRESSURE: 128 MMHG | BODY MASS INDEX: 26.03 KG/M2 | WEIGHT: 178.8 LBS

## 2021-02-26 DIAGNOSIS — S81.811A NONINFECTED SKIN TEAR OF LEG, RIGHT, INITIAL ENCOUNTER: ICD-10-CM

## 2021-02-26 DIAGNOSIS — D84.9 IMMUNOCOMPROMISED PATIENT (H): ICD-10-CM

## 2021-02-26 DIAGNOSIS — T81.30XA WOUND DEHISCENCE: Primary | ICD-10-CM

## 2021-02-26 DIAGNOSIS — T38.0X5A STEROID-INDUCED HYPERGLYCEMIA: ICD-10-CM

## 2021-02-26 DIAGNOSIS — R73.9 STEROID-INDUCED HYPERGLYCEMIA: ICD-10-CM

## 2021-02-26 PROCEDURE — 97597 DBRDMT OPN WND 1ST 20 CM/<: CPT | Performed by: NURSE PRACTITIONER

## 2021-02-26 PROCEDURE — G0463 HOSPITAL OUTPT CLINIC VISIT: HCPCS

## 2021-02-26 PROCEDURE — 99212 OFFICE O/P EST SF 10 MIN: CPT | Mod: 25 | Performed by: NURSE PRACTITIONER

## 2021-02-26 PROCEDURE — G0463 HOSPITAL OUTPT CLINIC VISIT: HCPCS | Mod: 25

## 2021-02-26 PROCEDURE — 91300 PR COVID VAC PFIZER DIL RECON 30 MCG/0.3 ML IM: CPT

## 2021-02-26 ASSESSMENT — PAIN SCALES - GENERAL: PAINLEVEL: NO PAIN (0)

## 2021-02-26 NOTE — NURSING NOTE
Chief Complaint   Patient presents with     WOUND CARE     right shin   Patient presents to the clinic today for wound care on right shin.    Medication Reconciliation: completed   Luba Kearney LPN  2/26/2021 8:25 AM

## 2021-02-26 NOTE — PROGRESS NOTES
Subjective:  He is here today to follow-up on dehisced laceration of right lower leg.  At last visit he also had a new skin tear on the right lateral calf.  He did not have any further bleeding after last visit.  He has been elevating his leg as able.    Patient Active Problem List   Diagnosis     Acquired buried penis     Acute crescentic glomerulonephritis     Anemia     Anemia due to vitamin B12 deficiency     Anemia of chronic disease     Antineutrophil cytoplasmic antibody (ANCA) positive     Bilateral edema of lower extremity     Bilateral leg edema     CKD (chronic kidney disease) stage 5, GFR 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 ml/min (H)     Mixed hyperlipidemia     Benign essential hypertension     H/O bilateral inguinal hernia repair     H/O total hip arthroplasty     Hematoma     Hip stiffness     History of tobacco abuse     Immunocompromised patient (H)     Lymphedema of both lower extremities     Metabolic acidosis     Pityriasis rosea     Primary pauci-immune necrotizing and crescentic glomerulonephritis     Refusal of statin medication at discharge     Steroid-induced hyperglycemia     Heartburn     Hyperkalemia     Hyponatremia     Pseudomonas urinary tract infection     Diabetes mellitus, type 2 (H)     Past Medical History:   Diagnosis Date     Acute kidney failure (H)     04/2017     Essential (primary) hypertension     8/7/2012     Hyperlipidemia     No Comments Provided     Osteoarthritis of hip     No Comments Provided     Pityriasis rosea     No Comments Provided     Tachycardia     No Comments Provided     Unilateral inguinal hernia without obstruction or gangrene     8/12/2013,Right Inguinal hernia with incarceration, massive [[id number]][     Past Surgical History:   Procedure Laterality Date     CIRCUMCISION      03/14/2017,Dr. Heidi GREENBERG     OTHER SURGICAL HISTORY      33572.9,[place] SUBTALAR ATHROEREISIS,bone Spurs excision on Toe     OTHER SURGICAL HISTORY      8/20/13,,HERNIA REPAIR,Right,RIH with mesh      OTHER SURGICAL HISTORY      13,90279,GENITAL SURGERY MALE,Dorsal Slit     OTHER SURGICAL HISTORY      4/15/14,,HERNIA REPAIR,Left,LIH with mesh     OTHER SURGICAL HISTORY      14,57485.0,[place] TOTAL HIP ARTHROPLASTY,Left     Social History     Socioeconomic History     Marital status:      Spouse name: Lucita     Number of children: Not on file     Years of education: Not on file     Highest education level: Not on file   Occupational History     Not on file   Social Needs     Financial resource strain: Not on file     Food insecurity     Worry: Not on file     Inability: Not on file     Transportation needs     Medical: Not on file     Non-medical: Not on file   Tobacco Use     Smoking status: Former Smoker     Types: Cigarettes     Quit date: 1976     Years since quittin.1     Smokeless tobacco: Never Used   Substance and Sexual Activity     Alcohol use: Yes     Alcohol/week: 0.8 standard drinks     Comment: occ beer     Drug use: Never     Sexual activity: Not Currently   Lifestyle     Physical activity     Days per week: Not on file     Minutes per session: Not on file     Stress: Not on file   Relationships     Social connections     Talks on phone: Not on file     Gets together: Not on file     Attends [employer] service: Not on file     Active member of club or organization: Not on file     Attends meetings of clubs or organizations: Not on file     Relationship status: Not on file     Intimate partner violence     Fear of current or ex partner: Not on file     Emotionally abused: Not on file     Physically abused: Not on file     Forced sexual activity: Not on file   Other Topics Concern     Not on file   Social History Narrative     - Wife Claudia Rose kids - oldest son - neuroblastoma at 13 yo  at 14.   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     Family History   Problem Relation Age of Onset     Other - See  Comments Son 12        neuroblastoma at 13 yo  at 14.     Genetic Disorder No family hx of         Genetic,No known FHx of DM, CAD, CVA     Current Outpatient Medications   Medication Sig Dispense Refill     amLODIPine (NORVASC) 5 MG tablet TAKE 1 TABLET BY MOUTH EVERY DAY 90 tablet 0     B-D U/F 31G X 8 MM insulin pen needle INJECT SUBCUTANEOUS AT BEDTIME USE 1 PEN NEEDLES DAILY OR AS DIRECTED. 100 each 1     blood glucose (NO BRAND SPECIFIED) lancets standard Dispense item covered by pt ins. E11.65 IDDM type II, uncontrolled - Test 3 times/day. (Wants Barrel lancets)       blood glucose monitoring (NO BRAND SPECIFIED) test strip Dispense item covered by pt ins. E11.65 IDDM type II, uncontrolled - Test 4 times/day. Reason: New diabetes       Blood Glucose Monitoring Suppl (FIFTY50 GLUCOSE METER 2.0) W/DEVICE KIT Dispense meter, test strips, lancets covered by pt ins. E11.65 IDDM type II, uncontrolled - Test 4 times/day. Reason: New diabetes       calcitRIOL (ROCALTROL) 0.25 MCG capsule TAKE 1 CAP BY MOUTH EVERY MONDAY, WEDNESDAY AND FRIDAY.       cholecalciferol (VITAMIN D3) 1000 units (25 mcg) capsule Take 1 capsule (1,000 Units) by mouth daily       darbepoetin miller (ARANESP) 60 MCG/0.3ML injection Inject 60 mcg Subcutaneous       famotidine (PEPCID) 20 MG tablet Take 1 tablet (20 mg) by mouth daily 90 tablet 3     finasteride (PROSCAR) 5 MG tablet Take 1 tablet (5 mg) by mouth daily 90 tablet 3     furosemide (LASIX) 40 MG tablet Take 1 tablet (40 mg) by mouth every morning 90 tablet 3     insulin glargine (LANTUS SOLOSTAR) 100 UNIT/ML pen Inject 2 Units Subcutaneous At Bedtime 15 mL 3     predniSONE (DELTASONE) 5 MG tablet Take 0.5 tablets (2.5 mg) by mouth daily 135 tablet 3     sodium bicarbonate 650 MG tablet Take 1 tablet (650 mg) by mouth daily 90 tablet 3     tamsulosin (FLOMAX) 0.4 MG capsule Take 1 capsule (0.4 mg) by mouth every evening 90 capsule 3     vitamin D3 (CHOLECALCIFEROL) 50 mcg (2000  units) tablet Take 1 tablet by mouth daily       Patient has no known allergies.      Review of Systems:  Review of Systems  Denies fever, chills, redness and warmth around wound, swelling and odorous or purulent drainage.    Objective:   /74 (BP Location: Right arm, Patient Position: Sitting, Cuff Size: Adult Regular)   Pulse 70   Temp 97.5  F (36.4  C) (Tympanic)   Resp 16   Wt 81.1 kg (178 lb 12.8 oz)   SpO2 98%   BMI 26.03 kg/m    Physical Exam  Pleasant gentleman in no acute distress.  Affect.  Alert and oriented x4.  The dehisced wound at the right shin measures has a mixture of 70% yellow brown slough and 30%  pink granulation tissue.  Wound measures 1.6 x 4 x 0.1 cm.  Moderate amount of mixture of serous and light brown drainage.  Slough was sharply debrided with 15 blade scalpel with a small amount of slough remaining over the wound bed.  Wound is irrigated with Anasept, Santyl debridement ointment applied to slough, calcium alginate placed over the wound followed by Allevyn gentle border light dressing applied.   Skin tear right lateral calf with light pink wound bed no slough.  Wound measures 2.8 x 5 x 0.1 cm with small to moderate amount of serosanguineous nonodorous drainage.  The wound was cleansed with saline wash and Anasept then calcium alginate applied followed by 4 inch Allevyn gentle border light foam dressing.  Stockinette replaced.  Right foot DP PT 2+.  Capillary refill less than 3 seconds.  He does have 1+ edema of the right lower leg.    Assessment:    ICD-10-CM    1. Wound dehiscence  T81.30XA DEBRIDEMENT WOUND UP TO 20 SQ CM   2. Immunocompromised patient (H)  D84.9    3. Steroid-induced hyperglycemia  R73.9     T38.0X5A    4. Noninfected skin tear of leg, right, initial encounter  S81.811A        Plan:   Follow-up again on Monday.  Leave dressings in place.  Continue to elevate legs above heart as much as able.  Monitor for signs and symptoms of infection and follow-up with  any concerns.    Brenda Horne, PAL   2/26/2021  9:09 AM

## 2021-03-01 ENCOUNTER — TRANSFERRED RECORDS (OUTPATIENT)
Dept: HEALTH INFORMATION MANAGEMENT | Facility: OTHER | Age: 86
End: 2021-03-01

## 2021-03-01 ENCOUNTER — OFFICE VISIT (OUTPATIENT)
Dept: INTERNAL MEDICINE | Facility: OTHER | Age: 86
End: 2021-03-01
Attending: NURSE PRACTITIONER
Payer: COMMERCIAL

## 2021-03-01 VITALS
WEIGHT: 180 LBS | SYSTOLIC BLOOD PRESSURE: 132 MMHG | DIASTOLIC BLOOD PRESSURE: 72 MMHG | RESPIRATION RATE: 20 BRPM | OXYGEN SATURATION: 97 % | TEMPERATURE: 97.3 F | HEART RATE: 77 BPM | BODY MASS INDEX: 26.2 KG/M2

## 2021-03-01 DIAGNOSIS — R73.9 STEROID-INDUCED HYPERGLYCEMIA: ICD-10-CM

## 2021-03-01 DIAGNOSIS — T81.30XA WOUND DEHISCENCE: Primary | ICD-10-CM

## 2021-03-01 DIAGNOSIS — S81.811A NONINFECTED SKIN TEAR OF LEG, RIGHT, INITIAL ENCOUNTER: ICD-10-CM

## 2021-03-01 DIAGNOSIS — D84.9 IMMUNOCOMPROMISED PATIENT (H): ICD-10-CM

## 2021-03-01 DIAGNOSIS — T38.0X5A STEROID-INDUCED HYPERGLYCEMIA: ICD-10-CM

## 2021-03-01 PROCEDURE — G0463 HOSPITAL OUTPT CLINIC VISIT: HCPCS

## 2021-03-01 PROCEDURE — 99213 OFFICE O/P EST LOW 20 MIN: CPT | Performed by: NURSE PRACTITIONER

## 2021-03-01 ASSESSMENT — PAIN SCALES - GENERAL: PAINLEVEL: NO PAIN (0)

## 2021-03-01 NOTE — PROGRESS NOTES
Subjective:  He is here today to follow-up on dehisced laceration of right lower leg and skin tear.  He has left bandage in place.  He denies any concerns.  He is immunocompromise and is on daily steroids.  Has not noticed any evidence of infection.    Patient Active Problem List   Diagnosis     Acquired buried penis     Acute crescentic glomerulonephritis     Anemia     Anemia due to vitamin B12 deficiency     Anemia of chronic disease     Antineutrophil cytoplasmic antibody (ANCA) positive     Bilateral edema of lower extremity     Bilateral leg edema     CKD (chronic kidney disease) stage 5, GFR 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 ml/min (H)     Mixed hyperlipidemia     Benign essential hypertension     H/O bilateral inguinal hernia repair     H/O total hip arthroplasty     Hematoma     Hip stiffness     History of tobacco abuse     Immunocompromised patient (H)     Lymphedema of both lower extremities     Metabolic acidosis     Pityriasis rosea     Primary pauci-immune necrotizing and crescentic glomerulonephritis     Refusal of statin medication at discharge     Steroid-induced hyperglycemia     Heartburn     Hyperkalemia     Hyponatremia     Pseudomonas urinary tract infection     Diabetes mellitus, type 2 (H)     Past Medical History:   Diagnosis Date     Acute kidney failure (H)     04/2017     Essential (primary) hypertension     8/7/2012     Hyperlipidemia     No Comments Provided     Osteoarthritis of hip     No Comments Provided     Pityriasis rosea     No Comments Provided     Tachycardia     No Comments Provided     Unilateral inguinal hernia without obstruction or gangrene     8/12/2013,Right Inguinal hernia with incarceration, massive [[id number]][     Past Surgical History:   Procedure Laterality Date     CIRCUMCISION      03/14/2017,Dr. Heidi GREENBERG     OTHER SURGICAL HISTORY      59528.9,[place] SUBTALAR ATHROEREISIS,bone Spurs excision on Toe     OTHER SURGICAL HISTORY      8/20/13,,HERNIA REPAIR,Right,RIH with mesh      OTHER SURGICAL HISTORY      13,40753,GENITAL SURGERY MALE,Dorsal Slit     OTHER SURGICAL HISTORY      4/15/14,,HERNIA REPAIR,Left,LIH with mesh     OTHER SURGICAL HISTORY      14,93909.0,[place] TOTAL HIP ARTHROPLASTY,Left     Social History     Socioeconomic History     Marital status:      Spouse name: Lucita     Number of children: Not on file     Years of education: Not on file     Highest education level: Not on file   Occupational History     Not on file   Social Needs     Financial resource strain: Not on file     Food insecurity     Worry: Not on file     Inability: Not on file     Transportation needs     Medical: Not on file     Non-medical: Not on file   Tobacco Use     Smoking status: Former Smoker     Types: Cigarettes     Quit date: 1976     Years since quittin.1     Smokeless tobacco: Never Used   Substance and Sexual Activity     Alcohol use: Yes     Alcohol/week: 0.8 standard drinks     Comment: occ beer     Drug use: Never     Sexual activity: Not Currently   Lifestyle     Physical activity     Days per week: Not on file     Minutes per session: Not on file     Stress: Not on file   Relationships     Social connections     Talks on phone: Not on file     Gets together: Not on file     Attends Jewish service: Not on file     Active member of club or organization: Not on file     Attends meetings of clubs or organizations: Not on file     Relationship status: Not on file     Intimate partner violence     Fear of current or ex partner: Not on file     Emotionally abused: Not on file     Physically abused: Not on file     Forced sexual activity: Not on file   Other Topics Concern     Not on file   Social History Narrative     - Wife Claudia Rose kids - oldest son - neuroblastoma at 11 yo  at 14.   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     Family History   Problem Relation Age of Onset     Other - See  Comments Son 12        neuroblastoma at 11 yo  at 14.     Genetic Disorder No family hx of         Genetic,No known FHx of DM, CAD, CVA     Current Outpatient Medications   Medication Sig Dispense Refill     amLODIPine (NORVASC) 5 MG tablet TAKE 1 TABLET BY MOUTH EVERY DAY 90 tablet 0     B-D U/F 31G X 8 MM insulin pen needle INJECT SUBCUTANEOUS AT BEDTIME USE 1 PEN NEEDLES DAILY OR AS DIRECTED. 100 each 1     blood glucose (NO BRAND SPECIFIED) lancets standard Dispense item covered by pt ins. E11.65 IDDM type II, uncontrolled - Test 3 times/day. (Wants Barrel lancets)       blood glucose monitoring (NO BRAND SPECIFIED) test strip Dispense item covered by pt ins. E11.65 IDDM type II, uncontrolled - Test 4 times/day. Reason: New diabetes       Blood Glucose Monitoring Suppl (FIFTY50 GLUCOSE METER 2.0) W/DEVICE KIT Dispense meter, test strips, lancets covered by pt ins. E11.65 IDDM type II, uncontrolled - Test 4 times/day. Reason: New diabetes       calcitRIOL (ROCALTROL) 0.25 MCG capsule TAKE 1 CAP BY MOUTH EVERY MONDAY, WEDNESDAY AND FRIDAY.       cholecalciferol (VITAMIN D3) 1000 units (25 mcg) capsule Take 1 capsule (1,000 Units) by mouth daily       darbepoetin miller (ARANESP) 60 MCG/0.3ML injection Inject 60 mcg Subcutaneous       famotidine (PEPCID) 20 MG tablet Take 1 tablet (20 mg) by mouth daily 90 tablet 3     finasteride (PROSCAR) 5 MG tablet Take 1 tablet (5 mg) by mouth daily 90 tablet 3     furosemide (LASIX) 40 MG tablet Take 1 tablet (40 mg) by mouth every morning 90 tablet 3     insulin glargine (LANTUS SOLOSTAR) 100 UNIT/ML pen Inject 2 Units Subcutaneous At Bedtime 15 mL 3     predniSONE (DELTASONE) 5 MG tablet Take 0.5 tablets (2.5 mg) by mouth daily 135 tablet 3     sodium bicarbonate 650 MG tablet Take 1 tablet (650 mg) by mouth daily 90 tablet 3     tamsulosin (FLOMAX) 0.4 MG capsule Take 1 capsule (0.4 mg) by mouth every evening 90 capsule 3     vitamin D3 (CHOLECALCIFEROL) 50 mcg (2000  units) tablet Take 1 tablet by mouth daily       Patient has no known allergies.      Review of Systems:  Review of Systems  Denies fever, chills, redness and warmth around wound, swelling and odorous or purulent drainage.    Objective:   /72   Pulse 77   Temp 97.3  F (36.3  C) (Tympanic)   Resp 20   Wt 81.6 kg (180 lb)   SpO2 97%   BMI 26.20 kg/m    Physical Exam  Pleasant gentleman in no acute distress.  Affect.  Alert and oriented x4.  The dehisced wound at the right shin measures has a mixture of 80% yellow brown slough and 20%  pink granulation tissue.  Wound measures 1.6 x 4 x 0.1 cm.  Moderate amount of mixture of serous and light brown drainage.  There are now some granulating islands coming through the slough.  No surrounding erythema warmth or maceration.  Wound is irrigated with Anasept, Santyl debridement ointment applied, calcium alginate placed over wound followed by 3 inch Allevyn gentle border light dressing.  Skin tear right lateral calf with light pink wound bed no slough.  Wound measures 2 x 2.4 x 0.1 cm with small to moderate amount of serosanguineous nonodorous drainage.  The wound was cleansed with saline wash and Anasept  followed by 4 inch Allevyn gentle border light foam dressing.  Stockinette replaced.  Right foot DP PT 2+.  Capillary refill less than 3 seconds.  He does have 1+ edema of the right lower leg.    Assessment:    ICD-10-CM    1. Wound dehiscence  T81.30XA    2. Noninfected skin tear of leg, right, initial encounter  S81.811A    3. Immunocompromised patient (H)  D84.9    4. Steroid-induced hyperglycemia  R73.9     T38.0X5A        Plan:   Continue with leg elevation.  Monitor closely for signs and symptoms of infection, he is aware that he is at increased risk.  Follow-up with any concerns otherwise we will see him back on Wednesday.    PAL Godinez   3/1/2021  8:08 AM

## 2021-03-03 ENCOUNTER — OFFICE VISIT (OUTPATIENT)
Dept: INTERNAL MEDICINE | Facility: OTHER | Age: 86
End: 2021-03-03
Attending: NURSE PRACTITIONER
Payer: COMMERCIAL

## 2021-03-03 VITALS
HEART RATE: 87 BPM | WEIGHT: 182 LBS | SYSTOLIC BLOOD PRESSURE: 144 MMHG | OXYGEN SATURATION: 99 % | TEMPERATURE: 96.8 F | RESPIRATION RATE: 20 BRPM | BODY MASS INDEX: 26.49 KG/M2 | DIASTOLIC BLOOD PRESSURE: 70 MMHG

## 2021-03-03 DIAGNOSIS — R73.9 STEROID-INDUCED HYPERGLYCEMIA: ICD-10-CM

## 2021-03-03 DIAGNOSIS — S81.811A NONINFECTED SKIN TEAR OF LEG, RIGHT, INITIAL ENCOUNTER: ICD-10-CM

## 2021-03-03 DIAGNOSIS — T81.30XA WOUND DEHISCENCE: Primary | ICD-10-CM

## 2021-03-03 DIAGNOSIS — T38.0X5A STEROID-INDUCED HYPERGLYCEMIA: ICD-10-CM

## 2021-03-03 DIAGNOSIS — D84.9 IMMUNOCOMPROMISED PATIENT (H): ICD-10-CM

## 2021-03-03 PROCEDURE — G0463 HOSPITAL OUTPT CLINIC VISIT: HCPCS

## 2021-03-03 PROCEDURE — 99213 OFFICE O/P EST LOW 20 MIN: CPT | Performed by: NURSE PRACTITIONER

## 2021-03-03 ASSESSMENT — PAIN SCALES - GENERAL: PAINLEVEL: NO PAIN (0)

## 2021-03-03 NOTE — PROGRESS NOTES
Subjective:  He is here today to follow-up on dehisced laceration of right lower leg and skin tear.  He has left bandage in place.  He denies any concerns.  He is immunocompromise and is on daily steroids.  Has not noticed any evidence of infection.  He is pleased with overall improvement of the wound.    Patient Active Problem List   Diagnosis     Acquired buried penis     Acute crescentic glomerulonephritis     Anemia     Anemia due to vitamin B12 deficiency     Anemia of chronic disease     Antineutrophil cytoplasmic antibody (ANCA) positive     Bilateral edema of lower extremity     Bilateral leg edema     CKD (chronic kidney disease) stage 5, GFR 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 ml/min (H)     Mixed hyperlipidemia     Benign essential hypertension     H/O bilateral inguinal hernia repair     H/O total hip arthroplasty     Hematoma     Hip stiffness     History of tobacco abuse     Immunocompromised patient (H)     Lymphedema of both lower extremities     Metabolic acidosis     Pityriasis rosea     Primary pauci-immune necrotizing and crescentic glomerulonephritis     Refusal of statin medication at discharge     Steroid-induced hyperglycemia     Heartburn     Hyperkalemia     Hyponatremia     Pseudomonas urinary tract infection     Diabetes mellitus, type 2 (H)     Past Medical History:   Diagnosis Date     Acute kidney failure (H)     04/2017     Essential (primary) hypertension     8/7/2012     Hyperlipidemia     No Comments Provided     Osteoarthritis of hip     No Comments Provided     Pityriasis rosea     No Comments Provided     Tachycardia     No Comments Provided     Unilateral inguinal hernia without obstruction or gangrene     8/12/2013,Right Inguinal hernia with incarceration, massive [[id number]][     Past Surgical History:   Procedure Laterality Date     CIRCUMCISION      03/14/2017,Dr. Heidi GREENBERG     OTHER SURGICAL HISTORY      77133.9,[place] SUBTALAR ATHROEREISIS,bone Spurs excision on Toe     OTHER SURGICAL HISTORY       13,,HERNIA REPAIR,Right,RIH with mesh     OTHER SURGICAL HISTORY      13,72561,GENITAL SURGERY MALE,Dorsal Slit     OTHER SURGICAL HISTORY      4/15/14,,HERNIA REPAIR,Left,LIH with mesh     OTHER SURGICAL HISTORY      14,66731.0,[place] TOTAL HIP ARTHROPLASTY,Left     Social History     Socioeconomic History     Marital status:      Spouse name: Lucita     Number of children: Not on file     Years of education: Not on file     Highest education level: Not on file   Occupational History     Not on file   Social Needs     Financial resource strain: Not on file     Food insecurity     Worry: Not on file     Inability: Not on file     Transportation needs     Medical: Not on file     Non-medical: Not on file   Tobacco Use     Smoking status: Former Smoker     Types: Cigarettes     Quit date: 1976     Years since quittin.2     Smokeless tobacco: Never Used   Substance and Sexual Activity     Alcohol use: Yes     Alcohol/week: 0.8 standard drinks     Comment: occ beer     Drug use: Never     Sexual activity: Not Currently   Lifestyle     Physical activity     Days per week: Not on file     Minutes per session: Not on file     Stress: Not on file   Relationships     Social connections     Talks on phone: Not on file     Gets together: Not on file     Attends Buddhist service: Not on file     Active member of club or organization: Not on file     Attends meetings of clubs or organizations: Not on file     Relationship status: Not on file     Intimate partner violence     Fear of current or ex partner: Not on file     Emotionally abused: Not on file     Physically abused: Not on file     Forced sexual activity: Not on file   Other Topics Concern     Not on file   Social History Narrative     - Wife Claudia Rose kids - oldest son - neuroblastoma at 11 yo  at 14.   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     Family  History   Problem Relation Age of Onset     Other - See Comments Son 12        neuroblastoma at 11 yo  at 14.     Genetic Disorder No family hx of         Genetic,No known FHx of DM, CAD, CVA     Current Outpatient Medications   Medication Sig Dispense Refill     amLODIPine (NORVASC) 5 MG tablet TAKE 1 TABLET BY MOUTH EVERY DAY 90 tablet 0     B-D U/F 31G X 8 MM insulin pen needle INJECT SUBCUTANEOUS AT BEDTIME USE 1 PEN NEEDLES DAILY OR AS DIRECTED. 100 each 1     blood glucose (NO BRAND SPECIFIED) lancets standard Dispense item covered by pt ins. E11.65 IDDM type II, uncontrolled - Test 3 times/day. (Wants Barrel lancets)       blood glucose monitoring (NO BRAND SPECIFIED) test strip Dispense item covered by pt ins. E11.65 IDDM type II, uncontrolled - Test 4 times/day. Reason: New diabetes       Blood Glucose Monitoring Suppl (FIFTY50 GLUCOSE METER 2.0) W/DEVICE KIT Dispense meter, test strips, lancets covered by pt ins. E11.65 IDDM type II, uncontrolled - Test 4 times/day. Reason: New diabetes       calcitRIOL (ROCALTROL) 0.25 MCG capsule TAKE 1 CAP BY MOUTH EVERY MONDAY, WEDNESDAY AND FRIDAY.       cholecalciferol (VITAMIN D3) 1000 units (25 mcg) capsule Take 1 capsule (1,000 Units) by mouth daily       darbepoetin miller (ARANESP) 60 MCG/0.3ML injection Inject 60 mcg Subcutaneous       famotidine (PEPCID) 20 MG tablet Take 1 tablet (20 mg) by mouth daily 90 tablet 3     finasteride (PROSCAR) 5 MG tablet Take 1 tablet (5 mg) by mouth daily 90 tablet 3     furosemide (LASIX) 40 MG tablet Take 1 tablet (40 mg) by mouth every morning 90 tablet 3     insulin glargine (LANTUS SOLOSTAR) 100 UNIT/ML pen Inject 2 Units Subcutaneous At Bedtime 15 mL 3     predniSONE (DELTASONE) 5 MG tablet Take 0.5 tablets (2.5 mg) by mouth daily 135 tablet 3     sodium bicarbonate 650 MG tablet Take 1 tablet (650 mg) by mouth daily 90 tablet 3     tamsulosin (FLOMAX) 0.4 MG capsule Take 1 capsule (0.4 mg) by mouth every evening 90  capsule 3     vitamin D3 (CHOLECALCIFEROL) 50 mcg (2000 units) tablet Take 1 tablet by mouth daily       Patient has no known allergies.      Review of Systems:  Review of Systems  Denies fever, chills, redness and warmth around wound, swelling and odorous or purulent drainage.    Objective:   BP (!) 144/70   Pulse 87   Temp 96.8  F (36  C) (Tympanic)   Resp 20   Wt 82.6 kg (182 lb)   SpO2 99%   BMI 26.49 kg/m    Physical Exam  Pleasant gentleman in no acute distress.  Affect.  Alert and oriented x4.  The dehisced wound at the right shin measures has a mixture of 70% yellow brown slough and 30% pink granulation tissue.  Wound measures 1.5 x 4 x 0.1 cm.  Moderate amount of mixture of serous and light brown drainage.  There are now some granulating islands coming through the slough.  No surrounding erythema warmth or maceration.  Wound is irrigated with Anasept, Santyl debridement ointment applied, calcium alginate placed over wound followed by 3 inch Allevyn gentle border light dressing.  Skin tear right lateral calf with light pink wound bed no slough.  Wound measures 2 x 2.3 x 0.1 cm with small to moderate amount of serosanguineous nonodorous drainage.  The wound was cleansed with saline wash and Anasept  followed by 4 inch Allevyn gentle border light foam dressing.  Stockinette replaced.  Right foot DP PT 2+.  Capillary refill less than 3 seconds.  He does have 1+ edema of the right lower leg.    Assessment:    ICD-10-CM    1. Wound dehiscence  T81.30XA    2. Noninfected skin tear of leg, right, initial encounter  S81.811A    3. Immunocompromised patient (H)  D84.9    4. Steroid-induced hyperglycemia  R73.9     T38.0X5A        Plan:   Continue with leg elevation.  Monitor closely for signs and symptoms of infection, he is aware that he is at increased risk.  Follow-up with any concerns otherwise we will see him back on Tuesday.    PAL Godinez   3/3/2021  8:57 AM

## 2021-03-09 DIAGNOSIS — R12 HEARTBURN: ICD-10-CM

## 2021-03-09 RX ORDER — FAMOTIDINE 20 MG/1
TABLET, FILM COATED ORAL
Qty: 90 TABLET | Refills: 0 | Status: SHIPPED | OUTPATIENT
Start: 2021-03-09 | End: 2021-03-30

## 2021-03-09 NOTE — TELEPHONE ENCOUNTER
"Prescription approved per [facility] Refill Protocol.  Requested Prescriptions   Pending Prescriptions Disp Refills     famotidine (PEPCID) 20 MG tablet [Pharmacy Med Name: FAMOTIDINE 20 MG TABLET] 90 tablet 3     Sig: TAKE 1 TABLET BY MOUTH EVERY DAY       H2 Blockers Protocol Passed - 3/9/2021 12:10 AM        Passed - Patient is age 12 or older        Passed - Recent (12 mo) or future (30 days) visit within the authorizing provider's specialty     Patient has had an office visit with the authorizing provider or a provider within the authorizing providers department within the previous 12 mos or has a future within next 30 days. See \"Patient Info\" tab in inbasket, or \"Choose Columns\" in Meds & Orders section of the refill encounter.              Passed - Medication is active on med list           Due for annual exam with Valentino Machado last was 3/24/2020. Nena refill given and letter sent. Prescription approved per [facility] Refill Protocol.  Kayy Ventura RN on 3/9/2021 at 10:04 AM    "

## 2021-03-09 NOTE — LETTER
[facility]  [address]  [place] [address]  [phone]      March 9, 2021    Altaf Chao  [address]  [facility] [id number]    Dear Altaf Chao,     We recently received a refill request(s) prescribed by Valentino Machado.      We have refilled your medication for one time only.    In order to get any additional refills, call our scheduling center at [phone] to request a visit with your primary care provider for a medication management exam.    Thank you,       Refill NELLA

## 2021-03-10 ENCOUNTER — OFFICE VISIT (OUTPATIENT)
Dept: INTERNAL MEDICINE | Facility: OTHER | Age: 86
End: 2021-03-10
Attending: NURSE PRACTITIONER
Payer: COMMERCIAL

## 2021-03-10 VITALS
BODY MASS INDEX: 26.43 KG/M2 | RESPIRATION RATE: 20 BRPM | OXYGEN SATURATION: 99 % | SYSTOLIC BLOOD PRESSURE: 136 MMHG | DIASTOLIC BLOOD PRESSURE: 68 MMHG | TEMPERATURE: 97 F | WEIGHT: 181.6 LBS | HEART RATE: 82 BPM

## 2021-03-10 DIAGNOSIS — T38.0X5A STEROID-INDUCED HYPERGLYCEMIA: ICD-10-CM

## 2021-03-10 DIAGNOSIS — S81.811A NONINFECTED SKIN TEAR OF LEG, RIGHT, INITIAL ENCOUNTER: ICD-10-CM

## 2021-03-10 DIAGNOSIS — D84.9 IMMUNOCOMPROMISED PATIENT (H): ICD-10-CM

## 2021-03-10 DIAGNOSIS — T81.30XA WOUND DEHISCENCE: Primary | ICD-10-CM

## 2021-03-10 DIAGNOSIS — R73.9 STEROID-INDUCED HYPERGLYCEMIA: ICD-10-CM

## 2021-03-10 PROCEDURE — 99213 OFFICE O/P EST LOW 20 MIN: CPT | Performed by: NURSE PRACTITIONER

## 2021-03-10 PROCEDURE — G0463 HOSPITAL OUTPT CLINIC VISIT: HCPCS

## 2021-03-10 ASSESSMENT — PAIN SCALES - GENERAL: PAINLEVEL: NO PAIN (0)

## 2021-03-10 NOTE — PROGRESS NOTES
Subjective:  He is here today to follow-up on dehisced laceration of right lower leg and skin tear.   He is immunocompromise and is on daily steroids.  Has not noticed any evidence of infection.  He is pleased with overall improvement of the wound.    Patient Active Problem List   Diagnosis     Acquired buried penis     Acute crescentic glomerulonephritis     Anemia     Anemia due to vitamin B12 deficiency     Anemia of chronic disease     Antineutrophil cytoplasmic antibody (ANCA) positive     Bilateral edema of lower extremity     Bilateral leg edema     CKD (chronic kidney disease) stage 5, GFR 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 ml/min (H)     Mixed hyperlipidemia     Benign essential hypertension     H/O bilateral inguinal hernia repair     H/O total hip arthroplasty     Hematoma     Hip stiffness     History of tobacco abuse     Immunocompromised patient (H)     Lymphedema of both lower extremities     Metabolic acidosis     Pityriasis rosea     Primary pauci-immune necrotizing and crescentic glomerulonephritis     Refusal of statin medication at discharge     Steroid-induced hyperglycemia     Heartburn     Hyperkalemia     Hyponatremia     Pseudomonas urinary tract infection     Diabetes mellitus, type 2 (H)     Past Medical History:   Diagnosis Date     Acute kidney failure (H)     04/2017     Essential (primary) hypertension     8/7/2012     Hyperlipidemia     No Comments Provided     Osteoarthritis of hip     No Comments Provided     Pityriasis rosea     No Comments Provided     Tachycardia     No Comments Provided     Unilateral inguinal hernia without obstruction or gangrene     8/12/2013,Right Inguinal hernia with incarceration, massive [[id number]][     Past Surgical History:   Procedure Laterality Date     CIRCUMCISION      03/14/2017,Dr. Heidi GREENBERG     OTHER SURGICAL HISTORY      51674.9,IN SUBTALAR ATHROEREISIS,bone Spurs excision on Toe     OTHER SURGICAL HISTORY      8/20/13,,HERNIA REPAIR,Right,RIH with mesh      OTHER SURGICAL HISTORY      13,58411,GENITAL SURGERY MALE,Dorsal Slit     OTHER SURGICAL HISTORY      4/15/14,,HERNIA REPAIR,Left,LIH with mesh     OTHER SURGICAL HISTORY      14,54248.0,[place] TOTAL HIP ARTHROPLASTY,Left     Social History     Socioeconomic History     Marital status:      Spouse name: Lucita     Number of children: Not on file     Years of education: Not on file     Highest education level: Not on file   Occupational History     Not on file   Social Needs     Financial resource strain: Not on file     Food insecurity     Worry: Not on file     Inability: Not on file     Transportation needs     Medical: Not on file     Non-medical: Not on file   Tobacco Use     Smoking status: Former Smoker     Types: Cigarettes     Quit date: 1976     Years since quittin.2     Smokeless tobacco: Never Used   Substance and Sexual Activity     Alcohol use: Yes     Alcohol/week: 0.8 standard drinks     Comment: occ beer     Drug use: Never     Sexual activity: Not Currently   Lifestyle     Physical activity     Days per week: Not on file     Minutes per session: Not on file     Stress: Not on file   Relationships     Social connections     Talks on phone: Not on file     Gets together: Not on file     Attends Bahai service: Not on file     Active member of club or organization: Not on file     Attends meetings of clubs or organizations: Not on file     Relationship status: Not on file     Intimate partner violence     Fear of current or ex partner: Not on file     Emotionally abused: Not on file     Physically abused: Not on file     Forced sexual activity: Not on file   Other Topics Concern     Not on file   Social History Narrative     - Wife Claudia Rose kids - oldest son - neuroblastoma at 11 yo  at 14.   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     Family History   Problem Relation Age of Onset     Other - See  Comments Son 12        neuroblastoma at 11 yo  at 14.     Genetic Disorder No family hx of         Genetic,No known FHx of DM, CAD, CVA     Current Outpatient Medications   Medication Sig Dispense Refill     amLODIPine (NORVASC) 5 MG tablet TAKE 1 TABLET BY MOUTH EVERY DAY 90 tablet 0     B-D U/F 31G X 8 MM insulin pen needle INJECT SUBCUTANEOUS AT BEDTIME USE 1 PEN NEEDLES DAILY OR AS DIRECTED. 100 each 1     blood glucose (NO BRAND SPECIFIED) lancets standard Dispense item covered by pt ins. E11.65 IDDM type II, uncontrolled - Test 3 times/day. (Wants Barrel lancets)       blood glucose monitoring (NO BRAND SPECIFIED) test strip Dispense item covered by pt ins. E11.65 IDDM type II, uncontrolled - Test 4 times/day. Reason: New diabetes       Blood Glucose Monitoring Suppl (FIFTY50 GLUCOSE METER 2.0) W/DEVICE KIT Dispense meter, test strips, lancets covered by pt ins. E11.65 IDDM type II, uncontrolled - Test 4 times/day. Reason: New diabetes       calcitRIOL (ROCALTROL) 0.25 MCG capsule TAKE 1 CAP BY MOUTH EVERY MONDAY, WEDNESDAY AND FRIDAY.       cholecalciferol (VITAMIN D3) 1000 units (25 mcg) capsule Take 1 capsule (1,000 Units) by mouth daily       darbepoetin miller (ARANESP) 60 MCG/0.3ML injection Inject 60 mcg Subcutaneous       famotidine (PEPCID) 20 MG tablet TAKE 1 TABLET BY MOUTH EVERY DAY 90 tablet 0     finasteride (PROSCAR) 5 MG tablet Take 1 tablet (5 mg) by mouth daily 90 tablet 3     furosemide (LASIX) 40 MG tablet Take 1 tablet (40 mg) by mouth every morning 90 tablet 3     insulin glargine (LANTUS SOLOSTAR) 100 UNIT/ML pen Inject 2 Units Subcutaneous At Bedtime 15 mL 3     predniSONE (DELTASONE) 5 MG tablet Take 0.5 tablets (2.5 mg) by mouth daily 135 tablet 3     sodium bicarbonate 650 MG tablet Take 1 tablet (650 mg) by mouth daily 90 tablet 3     tamsulosin (FLOMAX) 0.4 MG capsule Take 1 capsule (0.4 mg) by mouth every evening 90 capsule 3     vitamin D3 (CHOLECALCIFEROL) 50 mcg (2000 units)  tablet Take 1 tablet by mouth daily       Patient has no known allergies.      Review of Systems:  Review of Systems  Denies fever, chills, redness and warmth around wound, swelling and odorous or purulent drainage.    Objective:   /68   Pulse 82   Temp 97  F (36.1  C) (Tympanic)   Resp 20   Wt 82.4 kg (181 lb 9.6 oz)   SpO2 99%   BMI 26.43 kg/m    Physical Exam  Pleasant gentleman in no acute distress.  Affect.  Alert and oriented x4.  The dehisced wound at the right shin measures has a mixture of 70% yellow brown slough and 30% pink granulation tissue with new granulating islands next throughout.  Wound measures 1.5 x 3.8 x 0.1 cm.  Moderate amount of mixture of serous and light brown drainage.  No surrounding erythema warmth or maceration.  Wound is irrigated with Anasept, Santyl debridement ointment applied, calcium alginate placed over wound followed by 4 inch Allevyn gentle border light dressing.  Skin tear right lateral calf has closed.  Stockinette replaced.  Right foot DP PT 2+.  Capillary refill less than 3 seconds.  He does have 1+ edema of the right lower leg.    Assessment:    ICD-10-CM    1. Wound dehiscence  T81.30XA    2. Noninfected skin tear of leg, right, initial encounter  S81.811A    3. Immunocompromised patient (H)  D84.9    4. Steroid-induced hyperglycemia  R73.9     T38.0X5A        Plan:   He will be seen again on Friday for dressing change.  Wound overall showing improvement even though slow.  Patient is immunocompromised leading to a slow healing.  Continue to monitor for evidence of infection and report any concerns.    PAL Godinez   3/10/2021  8:33 AM

## 2021-03-12 ENCOUNTER — OFFICE VISIT (OUTPATIENT)
Dept: INTERNAL MEDICINE | Facility: OTHER | Age: 86
End: 2021-03-12
Attending: NURSE PRACTITIONER
Payer: MEDICARE

## 2021-03-12 VITALS
BODY MASS INDEX: 26.13 KG/M2 | TEMPERATURE: 97.3 F | HEART RATE: 85 BPM | OXYGEN SATURATION: 98 % | DIASTOLIC BLOOD PRESSURE: 82 MMHG | HEIGHT: 70 IN | WEIGHT: 182.5 LBS | RESPIRATION RATE: 16 BRPM | SYSTOLIC BLOOD PRESSURE: 148 MMHG

## 2021-03-12 DIAGNOSIS — D84.9 IMMUNOCOMPROMISED PATIENT (H): ICD-10-CM

## 2021-03-12 DIAGNOSIS — S81.811A NONINFECTED SKIN TEAR OF LEG, RIGHT, INITIAL ENCOUNTER: ICD-10-CM

## 2021-03-12 DIAGNOSIS — T81.30XA WOUND DEHISCENCE: Primary | ICD-10-CM

## 2021-03-12 PROCEDURE — 99212 OFFICE O/P EST SF 10 MIN: CPT | Performed by: NURSE PRACTITIONER

## 2021-03-12 PROCEDURE — G0463 HOSPITAL OUTPT CLINIC VISIT: HCPCS

## 2021-03-12 ASSESSMENT — PAIN SCALES - GENERAL: PAINLEVEL: NO PAIN (0)

## 2021-03-12 ASSESSMENT — MIFFLIN-ST. JEOR: SCORE: 1514.06

## 2021-03-12 NOTE — PROGRESS NOTES
Subjective:  He is here today to follow-up on dehisced laceration of right lower leg and skin tear.   He is immunocompromise and is on daily steroids.  Has not noticed any evidence of infection.  He is pleased with overall improvement of the wound.  Not able to do wound care on his own and family unable to assist.    Patient Active Problem List   Diagnosis     Acquired buried penis     Acute crescentic glomerulonephritis     Anemia     Anemia due to vitamin B12 deficiency     Anemia of chronic disease     Antineutrophil cytoplasmic antibody (ANCA) positive     Bilateral edema of lower extremity     Bilateral leg edema     CKD (chronic kidney disease) stage 5, GFR 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 ml/min (H)     Mixed hyperlipidemia     Benign essential hypertension     H/O bilateral inguinal hernia repair     H/O total hip arthroplasty     Hematoma     Hip stiffness     History of tobacco abuse     Immunocompromised patient (H)     Lymphedema of both lower extremities     Metabolic acidosis     Pityriasis rosea     Primary pauci-immune necrotizing and crescentic glomerulonephritis     Refusal of statin medication at discharge     Steroid-induced hyperglycemia     Heartburn     Hyperkalemia     Hyponatremia     Pseudomonas urinary tract infection     Diabetes mellitus, type 2 (H)     Past Medical History:   Diagnosis Date     Acute kidney failure (H)     04/2017     Essential (primary) hypertension     8/7/2012     Hyperlipidemia     No Comments Provided     Osteoarthritis of hip     No Comments Provided     Pityriasis rosea     No Comments Provided     Tachycardia     No Comments Provided     Unilateral inguinal hernia without obstruction or gangrene     8/12/2013,Right Inguinal hernia with incarceration, massive [[id number]][     Past Surgical History:   Procedure Laterality Date     CIRCUMCISION      03/14/2017,Dr. Heidi GREENBERG     OTHER SURGICAL HISTORY      83239.9,RI SUBTALAR ATHROEREISIS,bone Spurs excision on Toe     OTHER SURGICAL  HISTORY      13,,HERNIA REPAIR,Right,RIH with mesh     OTHER SURGICAL HISTORY      13,92624,GENITAL SURGERY MALE,Dorsal Slit     OTHER SURGICAL HISTORY      4/15/14,,HERNIA REPAIR,Left,LIH with mesh     OTHER SURGICAL HISTORY      14,11224.0,[place] TOTAL HIP ARTHROPLASTY,Left     Social History     Socioeconomic History     Marital status:      Spouse name: Lucita     Number of children: Not on file     Years of education: Not on file     Highest education level: Not on file   Occupational History     Not on file   Social Needs     Financial resource strain: Not on file     Food insecurity     Worry: Not on file     Inability: Not on file     Transportation needs     Medical: Not on file     Non-medical: Not on file   Tobacco Use     Smoking status: Former Smoker     Types: Cigarettes     Quit date: 1976     Years since quittin.2     Smokeless tobacco: Never Used   Substance and Sexual Activity     Alcohol use: Yes     Alcohol/week: 0.8 standard drinks     Comment: occ beer     Drug use: Never     Sexual activity: Not Currently   Lifestyle     Physical activity     Days per week: Not on file     Minutes per session: Not on file     Stress: Not on file   Relationships     Social connections     Talks on phone: Not on file     Gets together: Not on file     Attends Yarsani service: Not on file     Active member of club or organization: Not on file     Attends meetings of clubs or organizations: Not on file     Relationship status: Not on file     Intimate partner violence     Fear of current or ex partner: Not on file     Emotionally abused: Not on file     Physically abused: Not on file     Forced sexual activity: Not on file   Other Topics Concern     Not on file   Social History Narrative     - Wife Claudia Rose kids - oldest son - neuroblastoma at 13 yo  at 14.   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      Family History   Problem Relation Age of Onset     Other - See Comments Son 12        neuroblastoma at 11 yo  at 14.     Genetic Disorder No family hx of         Genetic,No known FHx of DM, CAD, CVA     Current Outpatient Medications   Medication Sig Dispense Refill     amLODIPine (NORVASC) 5 MG tablet TAKE 1 TABLET BY MOUTH EVERY DAY 90 tablet 0     B-D U/F 31G X 8 MM insulin pen needle INJECT SUBCUTANEOUS AT BEDTIME USE 1 PEN NEEDLES DAILY OR AS DIRECTED. 100 each 1     blood glucose (NO BRAND SPECIFIED) lancets standard Dispense item covered by pt ins. E11.65 IDDM type II, uncontrolled - Test 3 times/day. (Wants Barrel lancets)       blood glucose monitoring (NO BRAND SPECIFIED) test strip Dispense item covered by pt ins. E11.65 IDDM type II, uncontrolled - Test 4 times/day. Reason: New diabetes       Blood Glucose Monitoring Suppl (FIFTY50 GLUCOSE METER 2.0) W/DEVICE KIT Dispense meter, test strips, lancets covered by pt ins. E11.65 IDDM type II, uncontrolled - Test 4 times/day. Reason: New diabetes       calcitRIOL (ROCALTROL) 0.25 MCG capsule TAKE 1 CAP BY MOUTH EVERY MONDAY, WEDNESDAY AND FRIDAY.       cholecalciferol (VITAMIN D3) 1000 units (25 mcg) capsule Take 1 capsule (1,000 Units) by mouth daily       darbepoetin miller (ARANESP) 60 MCG/0.3ML injection Inject 60 mcg Subcutaneous       famotidine (PEPCID) 20 MG tablet TAKE 1 TABLET BY MOUTH EVERY DAY 90 tablet 0     finasteride (PROSCAR) 5 MG tablet Take 1 tablet (5 mg) by mouth daily 90 tablet 3     furosemide (LASIX) 40 MG tablet Take 1 tablet (40 mg) by mouth every morning 90 tablet 3     insulin glargine (LANTUS SOLOSTAR) 100 UNIT/ML pen Inject 2 Units Subcutaneous At Bedtime 15 mL 3     predniSONE (DELTASONE) 5 MG tablet Take 0.5 tablets (2.5 mg) by mouth daily 135 tablet 3     sodium bicarbonate 650 MG tablet Take 1 tablet (650 mg) by mouth daily 90 tablet 3     tamsulosin (FLOMAX) 0.4 MG capsule Take 1 capsule (0.4 mg) by mouth every evening  "90 capsule 3     vitamin D3 (CHOLECALCIFEROL) 50 mcg (2000 units) tablet Take 1 tablet by mouth daily       Patient has no known allergies.      Review of Systems:  Review of Systems  Denies fever, chills, redness and warmth around wound, swelling and odorous or purulent drainage.    Objective:   BP (!) 148/82   Pulse 85   Temp 97.3  F (36.3  C) (Temporal)   Resp 16   Ht 1.778 m (5' 10\")   Wt 82.8 kg (182 lb 8 oz)   SpO2 98%   BMI 26.19 kg/m    Physical Exam  Pleasant gentleman in no acute distress.  Affect.  Alert and oriented x4.  The dehisced wound at the right shin measures has a mixture of 70% yellow brown slough and 30% pink granulation tissue with new granulating islands next throughout.  Wound measures 1.5 x 3.5 x 0.1 cm.  Moderate amount of mixture of serous and light brown drainage.  No surrounding erythema warmth or maceration.  Wound is irrigated with Anasept, Santyl debridement ointment applied, calcium alginate placed over wound followed by 4 inch Allevyn gentle border light dressing.      Assessment:    ICD-10-CM    1. Wound dehiscence  T81.30XA    2. Noninfected skin tear of leg, right, initial encounter  S81.811A    3. Immunocompromised patient (H)  D84.9        Plan:   He will be seen again on Tuesday for dressing change.  Wound overall showing improvement even though slow.  Patient is immunocompromised leading to a slow healing.  Continue to monitor for evidence of infection and report any concerns.    PAL Godinez   3/12/2021  3:08 PM            "

## 2021-03-12 NOTE — NURSING NOTE
"Chief Complaint   Patient presents with     WOUND CARE         Initial BP (!) 148/82   Pulse 85   Temp 97.3  F (36.3  C) (Temporal)   Resp 16   Ht 1.778 m (5' 10\")   Wt 82.8 kg (182 lb 8 oz)   SpO2 98%   BMI 26.19 kg/m   Estimated body mass index is 26.19 kg/m  as calculated from the following:    Height as of this encounter: 1.778 m (5' 10\").    Weight as of this encounter: 82.8 kg (182 lb 8 oz).         Medication Reconciliation: Complete      Norma J. Gosselin, LPN   "

## 2021-03-16 ENCOUNTER — OFFICE VISIT (OUTPATIENT)
Dept: INTERNAL MEDICINE | Facility: OTHER | Age: 86
End: 2021-03-16
Attending: NURSE PRACTITIONER
Payer: MEDICARE

## 2021-03-16 VITALS
WEIGHT: 181.2 LBS | SYSTOLIC BLOOD PRESSURE: 130 MMHG | RESPIRATION RATE: 18 BRPM | TEMPERATURE: 96.6 F | DIASTOLIC BLOOD PRESSURE: 68 MMHG | OXYGEN SATURATION: 99 % | BODY MASS INDEX: 26 KG/M2 | HEART RATE: 92 BPM

## 2021-03-16 DIAGNOSIS — T38.0X5A STEROID-INDUCED HYPERGLYCEMIA: ICD-10-CM

## 2021-03-16 DIAGNOSIS — D84.9 IMMUNOCOMPROMISED PATIENT (H): ICD-10-CM

## 2021-03-16 DIAGNOSIS — R73.9 STEROID-INDUCED HYPERGLYCEMIA: ICD-10-CM

## 2021-03-16 DIAGNOSIS — T81.30XA WOUND DEHISCENCE: Primary | ICD-10-CM

## 2021-03-16 PROCEDURE — 99212 OFFICE O/P EST SF 10 MIN: CPT | Performed by: NURSE PRACTITIONER

## 2021-03-16 PROCEDURE — G0463 HOSPITAL OUTPT CLINIC VISIT: HCPCS

## 2021-03-16 ASSESSMENT — PAIN SCALES - GENERAL: PAINLEVEL: NO PAIN (0)

## 2021-03-16 NOTE — PROGRESS NOTES
Subjective:  He is here today to follow-up on dehisced laceration of right lower leg.   He is immunocompromise and is on daily steroids.  No evidence of infection noted.    Patient Active Problem List   Diagnosis     Acquired buried penis     Acute crescentic glomerulonephritis     Anemia     Anemia due to vitamin B12 deficiency     Anemia of chronic disease     Antineutrophil cytoplasmic antibody (ANCA) positive     Bilateral edema of lower extremity     Bilateral leg edema     CKD (chronic kidney disease) stage 5, GFR 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 ml/min (H)     Mixed hyperlipidemia     Benign essential hypertension     H/O bilateral inguinal hernia repair     H/O total hip arthroplasty     Hematoma     Hip stiffness     History of tobacco abuse     Immunocompromised patient (H)     Lymphedema of both lower extremities     Metabolic acidosis     Pityriasis rosea     Primary pauci-immune necrotizing and crescentic glomerulonephritis     Refusal of statin medication at discharge     Steroid-induced hyperglycemia     Heartburn     Hyperkalemia     Hyponatremia     Pseudomonas urinary tract infection     Diabetes mellitus, type 2 (H)     Past Medical History:   Diagnosis Date     Acute kidney failure (H)     04/2017     Essential (primary) hypertension     8/7/2012     Hyperlipidemia     No Comments Provided     Osteoarthritis of hip     No Comments Provided     Pityriasis rosea     No Comments Provided     Tachycardia     No Comments Provided     Unilateral inguinal hernia without obstruction or gangrene     8/12/2013,Right Inguinal hernia with incarceration, massive [[id number]][     Past Surgical History:   Procedure Laterality Date     CIRCUMCISION      03/14/2017,Dr. Heidi GREENBERG     OTHER SURGICAL HISTORY      10856.9,[place] SUBTALAR ATHROEREISIS,bone Spurs excision on Toe     OTHER SURGICAL HISTORY      8/20/13,,HERNIA REPAIR,Right,RIH with mesh     OTHER SURGICAL HISTORY      8/20/13,04043,GENITAL SURGERY MALE,Dorsal Slit      OTHER SURGICAL HISTORY      4/15/14,,HERNIA REPAIR,Left,LIH with mesh     OTHER SURGICAL HISTORY      14,73559.0,[place] TOTAL HIP ARTHROPLASTY,Left     Social History     Socioeconomic History     Marital status:      Spouse name: Lucita     Number of children: Not on file     Years of education: Not on file     Highest education level: Not on file   Occupational History     Not on file   Social Needs     Financial resource strain: Not on file     Food insecurity     Worry: Not on file     Inability: Not on file     Transportation needs     Medical: Not on file     Non-medical: Not on file   Tobacco Use     Smoking status: Former Smoker     Types: Cigarettes     Quit date: 1976     Years since quittin.2     Smokeless tobacco: Never Used   Substance and Sexual Activity     Alcohol use: Yes     Alcohol/week: 0.8 standard drinks     Comment: occ beer     Drug use: Never     Sexual activity: Not Currently   Lifestyle     Physical activity     Days per week: Not on file     Minutes per session: Not on file     Stress: Not on file   Relationships     Social connections     Talks on phone: Not on file     Gets together: Not on file     Attends [employer] service: Not on file     Active member of club or organization: Not on file     Attends meetings of clubs or organizations: Not on file     Relationship status: Not on file     Intimate partner violence     Fear of current or ex partner: Not on file     Emotionally abused: Not on file     Physically abused: Not on file     Forced sexual activity: Not on file   Other Topics Concern     Not on file   Social History Narrative     - Wife Claudia   3 kids - oldest son - neuroblastoma at 11 yo  at 14.   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     Family History   Problem Relation Age of Onset     Other - See Comments Son 12        neuroblastoma at 11 yo  at 14.     Genetic Disorder  No family hx of         Genetic,No known FHx of DM, CAD, CVA     Current Outpatient Medications   Medication Sig Dispense Refill     amLODIPine (NORVASC) 5 MG tablet TAKE 1 TABLET BY MOUTH EVERY DAY 90 tablet 0     B-D U/F 31G X 8 MM insulin pen needle INJECT SUBCUTANEOUS AT BEDTIME USE 1 PEN NEEDLES DAILY OR AS DIRECTED. 100 each 1     blood glucose (NO BRAND SPECIFIED) lancets standard Dispense item covered by pt ins. E11.65 IDDM type II, uncontrolled - Test 3 times/day. (Wants Barrel lancets)       blood glucose monitoring (NO BRAND SPECIFIED) test strip Dispense item covered by pt ins. E11.65 IDDM type II, uncontrolled - Test 4 times/day. Reason: New diabetes       Blood Glucose Monitoring Suppl (FIFTY50 GLUCOSE METER 2.0) W/DEVICE KIT Dispense meter, test strips, lancets covered by pt ins. E11.65 IDDM type II, uncontrolled - Test 4 times/day. Reason: New diabetes       calcitRIOL (ROCALTROL) 0.25 MCG capsule TAKE 1 CAP BY MOUTH EVERY MONDAY, WEDNESDAY AND FRIDAY.       cholecalciferol (VITAMIN D3) 1000 units (25 mcg) capsule Take 1 capsule (1,000 Units) by mouth daily       darbepoetin miller (ARANESP) 60 MCG/0.3ML injection Inject 60 mcg Subcutaneous       famotidine (PEPCID) 20 MG tablet TAKE 1 TABLET BY MOUTH EVERY DAY 90 tablet 0     finasteride (PROSCAR) 5 MG tablet Take 1 tablet (5 mg) by mouth daily 90 tablet 3     furosemide (LASIX) 40 MG tablet Take 1 tablet (40 mg) by mouth every morning 90 tablet 3     insulin glargine (LANTUS SOLOSTAR) 100 UNIT/ML pen Inject 2 Units Subcutaneous At Bedtime 15 mL 3     predniSONE (DELTASONE) 5 MG tablet Take 0.5 tablets (2.5 mg) by mouth daily 135 tablet 3     sodium bicarbonate 650 MG tablet Take 1 tablet (650 mg) by mouth daily 90 tablet 3     tamsulosin (FLOMAX) 0.4 MG capsule Take 1 capsule (0.4 mg) by mouth every evening 90 capsule 3     vitamin D3 (CHOLECALCIFEROL) 50 mcg (2000 units) tablet Take 1 tablet by mouth daily       Patient has no known  allergies.      Review of Systems:  Review of Systems  Denies fever, chills, redness and warmth around wound, swelling and odorous or purulent drainage.    Objective:   /68   Pulse 92   Temp 96.6  F (35.9  C) (Tympanic)   Resp 18   Wt 82.2 kg (181 lb 3.2 oz)   SpO2 99%   BMI 26.00 kg/m    Physical Exam  Pleasant gentleman in no acute distress.  Affect.  Alert and oriented x4.  The dehisced wound at the right shin measures has a mixture of 70% yellow slough and 30% pink granulation tissue with expanding granulating islands next throughout.  Wound measures 1.5 x 3.5 x 0.3 cm.  Moderate amount of mixture of serous and light brown drainage.  No surrounding erythema warmth or maceration.  Wound is irrigated with Anasept, Santyl debridement ointment applied, calcium alginate placed over wound followed by 4 inch Allevyn gentle border light dressing.      Assessment:    ICD-10-CM    1. Wound dehiscence  T81.30XA    2. Immunocompromised patient (H)  D84.9    3. Steroid-induced hyperglycemia  R73.9     T38.0X5A        Plan:   He will be seen again on Friday for dressing change.  Wound overall showing improvement, wound deeper due to less slough in wound bed.  Patient is immunocompromised leading to a slow healing.  Continue to monitor for evidence of infection and report any concerns.    PAL Godinez   3/16/2021  9:24 AM

## 2021-03-19 ENCOUNTER — OFFICE VISIT (OUTPATIENT)
Dept: INTERNAL MEDICINE | Facility: OTHER | Age: 86
End: 2021-03-19
Attending: NURSE PRACTITIONER
Payer: MEDICARE

## 2021-03-19 ENCOUNTER — IMMUNIZATION (OUTPATIENT)
Dept: FAMILY MEDICINE | Facility: OTHER | Age: 86
End: 2021-03-19
Attending: FAMILY MEDICINE
Payer: MEDICARE

## 2021-03-19 VITALS
HEART RATE: 66 BPM | BODY MASS INDEX: 25.83 KG/M2 | TEMPERATURE: 96.3 F | RESPIRATION RATE: 18 BRPM | OXYGEN SATURATION: 100 % | WEIGHT: 180 LBS | SYSTOLIC BLOOD PRESSURE: 138 MMHG | DIASTOLIC BLOOD PRESSURE: 72 MMHG

## 2021-03-19 DIAGNOSIS — T38.0X5A STEROID-INDUCED HYPERGLYCEMIA: ICD-10-CM

## 2021-03-19 DIAGNOSIS — N18.5 CKD (CHRONIC KIDNEY DISEASE) STAGE 5, GFR LESS THAN 15 ML/MIN (H): ICD-10-CM

## 2021-03-19 DIAGNOSIS — T81.30XA WOUND DEHISCENCE: Primary | ICD-10-CM

## 2021-03-19 DIAGNOSIS — I10 BENIGN ESSENTIAL HYPERTENSION: ICD-10-CM

## 2021-03-19 DIAGNOSIS — R73.9 STEROID-INDUCED HYPERGLYCEMIA: ICD-10-CM

## 2021-03-19 DIAGNOSIS — D84.9 IMMUNOCOMPROMISED PATIENT (H): ICD-10-CM

## 2021-03-19 PROCEDURE — 99212 OFFICE O/P EST SF 10 MIN: CPT | Performed by: NURSE PRACTITIONER

## 2021-03-19 PROCEDURE — G0463 HOSPITAL OUTPT CLINIC VISIT: HCPCS

## 2021-03-19 PROCEDURE — G0463 HOSPITAL OUTPT CLINIC VISIT: HCPCS | Mod: 25

## 2021-03-19 PROCEDURE — 91300 PR COVID VAC PFIZER DIL RECON 30 MCG/0.3 ML IM: CPT

## 2021-03-19 ASSESSMENT — PAIN SCALES - GENERAL: PAINLEVEL: NO PAIN (0)

## 2021-03-19 NOTE — PROGRESS NOTES
Subjective:  He is here today to follow-up on dehisced laceration of right lower leg.   He is immunocompromise and is on daily steroids.  No evidence of infection noted.  Pleased with improvement of wound.    Patient Active Problem List   Diagnosis     Acquired buried penis     Acute crescentic glomerulonephritis     Anemia     Anemia due to vitamin B12 deficiency     Anemia of chronic disease     Antineutrophil cytoplasmic antibody (ANCA) positive     Bilateral edema of lower extremity     Bilateral leg edema     CKD (chronic kidney disease) stage 5, GFR 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 ml/min (H)     Mixed hyperlipidemia     Benign essential hypertension     H/O bilateral inguinal hernia repair     H/O total hip arthroplasty     Hematoma     Hip stiffness     History of tobacco abuse     Immunocompromised patient (H)     Lymphedema of both lower extremities     Metabolic acidosis     Pityriasis rosea     Primary pauci-immune necrotizing and crescentic glomerulonephritis     Refusal of statin medication at discharge     Steroid-induced hyperglycemia     Heartburn     Hyperkalemia     Hyponatremia     Pseudomonas urinary tract infection     Diabetes mellitus, type 2 (H)     Past Medical History:   Diagnosis Date     Acute kidney failure (H)     04/2017     Essential (primary) hypertension     8/7/2012     Hyperlipidemia     No Comments Provided     Osteoarthritis of hip     No Comments Provided     Pityriasis rosea     No Comments Provided     Tachycardia     No Comments Provided     Unilateral inguinal hernia without obstruction or gangrene     8/12/2013,Right Inguinal hernia with incarceration, massive [[id number]][     Past Surgical History:   Procedure Laterality Date     CIRCUMCISION      03/14/2017,Dr. Heidi GREENBERG     OTHER SURGICAL HISTORY      91961.9,[place] SUBTALAR ATHROEREISIS,bone Spurs excision on Toe     OTHER SURGICAL HISTORY      8/20/13,,HERNIA REPAIR,Right,RIH with mesh     OTHER SURGICAL HISTORY       13,70966,GENITAL SURGERY MALE,Dorsal Slit     OTHER SURGICAL HISTORY      4/15/14,,HERNIA REPAIR,Left,LIH with mesh     OTHER SURGICAL HISTORY      14,20256.0,[place] TOTAL HIP ARTHROPLASTY,Left     Social History     Socioeconomic History     Marital status:      Spouse name: Lucita     Number of children: Not on file     Years of education: Not on file     Highest education level: Not on file   Occupational History     Not on file   Social Needs     Financial resource strain: Not on file     Food insecurity     Worry: Not on file     Inability: Not on file     Transportation needs     Medical: Not on file     Non-medical: Not on file   Tobacco Use     Smoking status: Former Smoker     Types: Cigarettes     Quit date: 1976     Years since quittin.2     Smokeless tobacco: Never Used   Substance and Sexual Activity     Alcohol use: Yes     Alcohol/week: 0.8 standard drinks     Comment: occ beer     Drug use: Never     Sexual activity: Not Currently   Lifestyle     Physical activity     Days per week: Not on file     Minutes per session: Not on file     Stress: Not on file   Relationships     Social connections     Talks on phone: Not on file     Gets together: Not on file     Attends Yazdanism service: Not on file     Active member of club or organization: Not on file     Attends meetings of clubs or organizations: Not on file     Relationship status: Not on file     Intimate partner violence     Fear of current or ex partner: Not on file     Emotionally abused: Not on file     Physically abused: Not on file     Forced sexual activity: Not on file   Other Topics Concern     Not on file   Social History Narrative     - Wife Claudia   3 kids - oldest son - neuroblastoma at 11 yo  at 14.   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     Family History   Problem Relation Age of Onset     Other - See Comments Son 12         neuroblastoma at 13 yo  at 14.     Genetic Disorder No family hx of         Genetic,No known FHx of DM, CAD, CVA     Current Outpatient Medications   Medication Sig Dispense Refill     amLODIPine (NORVASC) 5 MG tablet TAKE 1 TABLET BY MOUTH EVERY DAY 90 tablet 0     B-D U/F 31G X 8 MM insulin pen needle INJECT SUBCUTANEOUS AT BEDTIME USE 1 PEN NEEDLES DAILY OR AS DIRECTED. 100 each 1     blood glucose (NO BRAND SPECIFIED) lancets standard Dispense item covered by pt ins. E11.65 IDDM type II, uncontrolled - Test 3 times/day. (Wants Barrel lancets)       blood glucose monitoring (NO BRAND SPECIFIED) test strip Dispense item covered by pt ins. E11.65 IDDM type II, uncontrolled - Test 4 times/day. Reason: New diabetes       Blood Glucose Monitoring Suppl (FIFTY50 GLUCOSE METER 2.0) W/DEVICE KIT Dispense meter, test strips, lancets covered by pt ins. E11.65 IDDM type II, uncontrolled - Test 4 times/day. Reason: New diabetes       calcitRIOL (ROCALTROL) 0.25 MCG capsule TAKE 1 CAP BY MOUTH EVERY MONDAY, WEDNESDAY AND FRIDAY.       cholecalciferol (VITAMIN D3) 1000 units (25 mcg) capsule Take 1 capsule (1,000 Units) by mouth daily       darbepoetin miller (ARANESP) 60 MCG/0.3ML injection Inject 60 mcg Subcutaneous       famotidine (PEPCID) 20 MG tablet TAKE 1 TABLET BY MOUTH EVERY DAY 90 tablet 0     finasteride (PROSCAR) 5 MG tablet Take 1 tablet (5 mg) by mouth daily 90 tablet 3     furosemide (LASIX) 40 MG tablet Take 1 tablet (40 mg) by mouth every morning 90 tablet 3     insulin glargine (LANTUS SOLOSTAR) 100 UNIT/ML pen Inject 2 Units Subcutaneous At Bedtime 15 mL 3     predniSONE (DELTASONE) 5 MG tablet Take 0.5 tablets (2.5 mg) by mouth daily 135 tablet 3     sodium bicarbonate 650 MG tablet Take 1 tablet (650 mg) by mouth daily 90 tablet 3     tamsulosin (FLOMAX) 0.4 MG capsule Take 1 capsule (0.4 mg) by mouth every evening 90 capsule 3     vitamin D3 (CHOLECALCIFEROL) 50 mcg (2000 units) tablet Take 1 tablet by  mouth daily       Patient has no known allergies.      Review of Systems:  Review of Systems  Denies fever, chills, redness and warmth around wound, swelling and odorous or purulent drainage.    Objective:   /72   Pulse 66   Temp 96.3  F (35.7  C) (Tympanic)   Resp 18   Wt 81.6 kg (180 lb)   SpO2 100%   BMI 25.83 kg/m    Physical Exam  Pleasant gentleman in no acute distress.  Affect.  Alert and oriented x4.  The dehisced wound at the right shin measures has a mixture of 70% yellow slough and 30% pink granulation tissue with expanding granulating islands next throughout.  Wound measures 1.3 x 3.5 x 0.2 cm.  Moderate amount of mixture of serous and light brown drainage.  No surrounding erythema warmth or maceration.  Wound is irrigated with Anasept, Santyl debridement ointment applied, calcium alginate placed over wound followed by 4 inch Allevyn gentle border light dressing.      Assessment:    ICD-10-CM    1. Wound dehiscence  T81.30XA    2. Immunocompromised patient (H)  D84.9    3. Steroid-induced hyperglycemia  R73.9     T38.0X5A        Plan:   Continue with twice weekly dressing change.  Elevate leg above heart as able.  Monitor and report any evidence of infection.    PAL Godinez   3/19/2021  9:43 AM

## 2021-03-19 NOTE — TELEPHONE ENCOUNTER
Routing refill request to provider for review/approval because:  Labs out of range:  Creatinine    LOV today with Brenda Tapia RN on 3/19/2021 at 4:16 PM

## 2021-03-22 RX ORDER — SODIUM BICARBONATE 650 MG/1
650 TABLET ORAL DAILY
Qty: 90 TABLET | Refills: 3 | Status: SHIPPED | OUTPATIENT
Start: 2021-03-22 | End: 2021-10-15

## 2021-03-22 RX ORDER — FUROSEMIDE 40 MG
40 TABLET ORAL EVERY MORNING
Qty: 90 TABLET | Refills: 3 | Status: SHIPPED | OUTPATIENT
Start: 2021-03-22 | End: 2021-03-30

## 2021-03-23 ENCOUNTER — ALLIED HEALTH/NURSE VISIT (OUTPATIENT)
Dept: FAMILY MEDICINE | Facility: OTHER | Age: 86
End: 2021-03-23
Attending: INTERNAL MEDICINE
Payer: MEDICARE

## 2021-03-23 ENCOUNTER — OFFICE VISIT (OUTPATIENT)
Dept: INTERNAL MEDICINE | Facility: OTHER | Age: 86
End: 2021-03-23
Attending: NURSE PRACTITIONER
Payer: MEDICARE

## 2021-03-23 VITALS
WEIGHT: 180.6 LBS | RESPIRATION RATE: 20 BRPM | SYSTOLIC BLOOD PRESSURE: 136 MMHG | TEMPERATURE: 96.9 F | DIASTOLIC BLOOD PRESSURE: 70 MMHG | HEART RATE: 84 BPM | OXYGEN SATURATION: 96 % | BODY MASS INDEX: 25.91 KG/M2

## 2021-03-23 DIAGNOSIS — D84.9 IMMUNOCOMPROMISED PATIENT (H): ICD-10-CM

## 2021-03-23 DIAGNOSIS — R73.9 STEROID-INDUCED HYPERGLYCEMIA: ICD-10-CM

## 2021-03-23 DIAGNOSIS — T81.30XA WOUND DEHISCENCE: Primary | ICD-10-CM

## 2021-03-23 DIAGNOSIS — T38.0X5A STEROID-INDUCED HYPERGLYCEMIA: ICD-10-CM

## 2021-03-23 DIAGNOSIS — E53.8 VITAMIN B 12 DEFICIENCY: Primary | ICD-10-CM

## 2021-03-23 PROCEDURE — 250N000011 HC RX IP 250 OP 636: Performed by: INTERNAL MEDICINE

## 2021-03-23 PROCEDURE — 99212 OFFICE O/P EST SF 10 MIN: CPT | Performed by: NURSE PRACTITIONER

## 2021-03-23 PROCEDURE — 96372 THER/PROPH/DIAG INJ SC/IM: CPT | Performed by: INTERNAL MEDICINE

## 2021-03-23 PROCEDURE — G0463 HOSPITAL OUTPT CLINIC VISIT: HCPCS | Mod: 25

## 2021-03-23 PROCEDURE — G0463 HOSPITAL OUTPT CLINIC VISIT: HCPCS

## 2021-03-23 RX ADMIN — CYANOCOBALAMIN 1000 MCG: 1000 INJECTION, SOLUTION INTRAMUSCULAR at 09:32

## 2021-03-23 ASSESSMENT — PAIN SCALES - GENERAL: PAINLEVEL: NO PAIN (0)

## 2021-03-23 NOTE — PROGRESS NOTES
Subjective:  He is here today to follow-up on dehisced laceration of right lower leg.   He is immunocompromise and is on daily steroids.  No evidence of infection noted.      Patient Active Problem List   Diagnosis     Acquired buried penis     Acute crescentic glomerulonephritis     Anemia     Anemia due to vitamin B12 deficiency     Anemia of chronic disease     Antineutrophil cytoplasmic antibody (ANCA) positive     Bilateral edema of lower extremity     Bilateral leg edema     CKD (chronic kidney disease) stage 5, GFR 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 ml/min (H)     Mixed hyperlipidemia     Benign essential hypertension     H/O bilateral inguinal hernia repair     H/O total hip arthroplasty     Hematoma     Hip stiffness     History of tobacco abuse     Immunocompromised patient (H)     Lymphedema of both lower extremities     Metabolic acidosis     Pityriasis rosea     Primary pauci-immune necrotizing and crescentic glomerulonephritis     Refusal of statin medication at discharge     Steroid-induced hyperglycemia     Heartburn     Hyperkalemia     Hyponatremia     Pseudomonas urinary tract infection     Diabetes mellitus, type 2 (H)     Past Medical History:   Diagnosis Date     Acute kidney failure (H)     04/2017     Essential (primary) hypertension     8/7/2012     Hyperlipidemia     No Comments Provided     Osteoarthritis of hip     No Comments Provided     Pityriasis rosea     No Comments Provided     Tachycardia     No Comments Provided     Unilateral inguinal hernia without obstruction or gangrene     8/12/2013,Right Inguinal hernia with incarceration, massive [[id number]][     Past Surgical History:   Procedure Laterality Date     CIRCUMCISION      03/14/2017,Dr. Heidi GREENBERG     OTHER SURGICAL HISTORY      27279.9,[place] SUBTALAR ATHROEREISIS,bone Spurs excision on Toe     OTHER SURGICAL HISTORY      8/20/13,,HERNIA REPAIR,Right,RIH with mesh     OTHER SURGICAL HISTORY      8/20/13,98980,GENITAL SURGERY MALE,Dorsal Slit      OTHER SURGICAL HISTORY      4/15/14,,HERNIA REPAIR,Left,LIH with mesh     OTHER SURGICAL HISTORY      14,44239.0,[place] TOTAL HIP ARTHROPLASTY,Left     Social History     Socioeconomic History     Marital status:      Spouse name: Lucita     Number of children: Not on file     Years of education: Not on file     Highest education level: Not on file   Occupational History     Not on file   Social Needs     Financial resource strain: Not on file     Food insecurity     Worry: Not on file     Inability: Not on file     Transportation needs     Medical: Not on file     Non-medical: Not on file   Tobacco Use     Smoking status: Former Smoker     Types: Cigarettes     Quit date: 1976     Years since quittin.2     Smokeless tobacco: Never Used   Substance and Sexual Activity     Alcohol use: Yes     Alcohol/week: 0.8 standard drinks     Comment: occ beer     Drug use: Never     Sexual activity: Not Currently   Lifestyle     Physical activity     Days per week: Not on file     Minutes per session: Not on file     Stress: Not on file   Relationships     Social connections     Talks on phone: Not on file     Gets together: Not on file     Attends [employer] service: Not on file     Active member of club or organization: Not on file     Attends meetings of clubs or organizations: Not on file     Relationship status: Not on file     Intimate partner violence     Fear of current or ex partner: Not on file     Emotionally abused: Not on file     Physically abused: Not on file     Forced sexual activity: Not on file   Other Topics Concern     Not on file   Social History Narrative     - Wife Claudia   3 kids - oldest son - neuroblastoma at 13 yo  at 14.   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     Family History   Problem Relation Age of Onset     Other - See Comments Son 12        neuroblastoma at 13 yo  at 14.     Genetic Disorder  No family hx of         Genetic,No known FHx of DM, CAD, CVA     Current Outpatient Medications   Medication Sig Dispense Refill     amLODIPine (NORVASC) 5 MG tablet TAKE 1 TABLET BY MOUTH EVERY DAY 90 tablet 0     B-D U/F 31G X 8 MM insulin pen needle INJECT SUBCUTANEOUS AT BEDTIME USE 1 PEN NEEDLES DAILY OR AS DIRECTED. 100 each 1     blood glucose (NO BRAND SPECIFIED) lancets standard Dispense item covered by pt ins. E11.65 IDDM type II, uncontrolled - Test 3 times/day. (Wants Barrel lancets)       blood glucose monitoring (NO BRAND SPECIFIED) test strip Dispense item covered by pt ins. E11.65 IDDM type II, uncontrolled - Test 4 times/day. Reason: New diabetes       Blood Glucose Monitoring Suppl (FIFTY50 GLUCOSE METER 2.0) W/DEVICE KIT Dispense meter, test strips, lancets covered by pt ins. E11.65 IDDM type II, uncontrolled - Test 4 times/day. Reason: New diabetes       calcitRIOL (ROCALTROL) 0.25 MCG capsule TAKE 1 CAP BY MOUTH EVERY MONDAY, WEDNESDAY AND FRIDAY.       cholecalciferol (VITAMIN D3) 1000 units (25 mcg) capsule Take 1 capsule (1,000 Units) by mouth daily       darbepoetin miller (ARANESP) 60 MCG/0.3ML injection Inject 60 mcg Subcutaneous       famotidine (PEPCID) 20 MG tablet TAKE 1 TABLET BY MOUTH EVERY DAY 90 tablet 0     finasteride (PROSCAR) 5 MG tablet Take 1 tablet (5 mg) by mouth daily 90 tablet 3     furosemide (LASIX) 40 MG tablet TAKE 1 TABLET (40 MG) BY MOUTH EVERY MORNING 90 tablet 3     insulin glargine (LANTUS SOLOSTAR) 100 UNIT/ML pen Inject 2 Units Subcutaneous At Bedtime 15 mL 3     predniSONE (DELTASONE) 5 MG tablet Take 0.5 tablets (2.5 mg) by mouth daily 135 tablet 3     sodium bicarbonate 650 MG tablet TAKE 1 TABLET (650 MG) BY MOUTH DAILY 90 tablet 3     tamsulosin (FLOMAX) 0.4 MG capsule Take 1 capsule (0.4 mg) by mouth every evening 90 capsule 3     vitamin D3 (CHOLECALCIFEROL) 50 mcg (2000 units) tablet Take 1 tablet by mouth daily       Patient has no known  allergies.      Review of Systems:  Review of Systems  Denies fever, chills, redness and warmth around wound, swelling and odorous or purulent drainage.    Objective:   /70   Pulse 84   Temp 96.9  F (36.1  C) (Tympanic)   Resp 20   Wt 81.9 kg (180 lb 9.6 oz)   SpO2 96%   BMI 25.91 kg/m    Physical Exam  Pleasant gentleman in no acute distress.  Affect.  Alert and oriented x4.  The dehisced wound at the right shin measures has a mixture of 70% yellow slough and 30% pink granulation tissue with expanding granulating islands next throughout.  Wound measures 1 x 3 x 0.1 cm.  Moderate amount of mixture of serous and light brown drainage.  No surrounding erythema warmth or maceration.  Wound is irrigated with Anasept, Santyl debridement ointment applied, calcium alginate placed over wound followed by 4 inch Allevyn gentle border light dressing.      Assessment:    ICD-10-CM    1. Wound dehiscence  T81.30XA    2. Immunocompromised patient (H)  D84.9    3. Steroid-induced hyperglycemia  R73.9     T38.0X5A        Plan:   Continue with twice weekly dressing change.  Elevate leg above heart as able.  Monitor and report any evidence of infection.    PAL Godinez   3/23/2021  9:12 AM

## 2021-03-23 NOTE — PROGRESS NOTES
Verified patient's first and last name, and . Patient stated reason for visit today is to receive B12. Patient denied any concerns with previous injections. B12 prepared and administered IM into left deltoid as ordered. Administration of medication documented in MAR (see MAR for further information regarding dose, lot #, NDC #, expiration date). Patient encouraged to wait in lobby for 15 minutes post-injection and notify staff immediately of any reaction.       Pete Siddiqui RN ....................  3/23/2021   9:33 AM

## 2021-03-29 ENCOUNTER — OFFICE VISIT (OUTPATIENT)
Dept: INTERNAL MEDICINE | Facility: OTHER | Age: 86
End: 2021-03-29
Attending: NURSE PRACTITIONER
Payer: MEDICARE

## 2021-03-29 VITALS
RESPIRATION RATE: 20 BRPM | DIASTOLIC BLOOD PRESSURE: 86 MMHG | SYSTOLIC BLOOD PRESSURE: 134 MMHG | WEIGHT: 181.6 LBS | TEMPERATURE: 96.6 F | OXYGEN SATURATION: 99 % | HEART RATE: 78 BPM | BODY MASS INDEX: 26.06 KG/M2

## 2021-03-29 DIAGNOSIS — T81.30XA WOUND DEHISCENCE: Primary | ICD-10-CM

## 2021-03-29 DIAGNOSIS — R73.9 STEROID-INDUCED HYPERGLYCEMIA: ICD-10-CM

## 2021-03-29 DIAGNOSIS — T38.0X5A STEROID-INDUCED HYPERGLYCEMIA: ICD-10-CM

## 2021-03-29 DIAGNOSIS — D84.9 IMMUNOCOMPROMISED PATIENT (H): ICD-10-CM

## 2021-03-29 PROCEDURE — G0463 HOSPITAL OUTPT CLINIC VISIT: HCPCS

## 2021-03-29 PROCEDURE — 99212 OFFICE O/P EST SF 10 MIN: CPT | Performed by: NURSE PRACTITIONER

## 2021-03-29 ASSESSMENT — PAIN SCALES - GENERAL: PAINLEVEL: NO PAIN (0)

## 2021-03-29 NOTE — PROGRESS NOTES
Subjective:  He is here today to follow-up on dehisced laceration of right lower leg.   He is immunocompromise and is on daily steroids.  No evidence of infection noted.      Patient Active Problem List   Diagnosis     Acquired buried penis     Acute crescentic glomerulonephritis     Anemia     Anemia due to vitamin B12 deficiency     Anemia of chronic disease     Antineutrophil cytoplasmic antibody (ANCA) positive     Bilateral edema of lower extremity     Bilateral leg edema     CKD (chronic kidney disease) stage 5, GFR 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 ml/min (H)     Mixed hyperlipidemia     Benign essential hypertension     H/O bilateral inguinal hernia repair     H/O total hip arthroplasty     Hematoma     Hip stiffness     History of tobacco abuse     Immunocompromised patient (H)     Lymphedema of both lower extremities     Metabolic acidosis     Pityriasis rosea     Primary pauci-immune necrotizing and crescentic glomerulonephritis     Refusal of statin medication at discharge     Steroid-induced hyperglycemia     Heartburn     Hyperkalemia     Hyponatremia     Pseudomonas urinary tract infection     Diabetes mellitus, type 2 (H)     Past Medical History:   Diagnosis Date     Acute kidney failure (H)     04/2017     Essential (primary) hypertension     8/7/2012     Hyperlipidemia     No Comments Provided     Osteoarthritis of hip     No Comments Provided     Pityriasis rosea     No Comments Provided     Tachycardia     No Comments Provided     Unilateral inguinal hernia without obstruction or gangrene     8/12/2013,Right Inguinal hernia with incarceration, massive [[id number]][     Past Surgical History:   Procedure Laterality Date     CIRCUMCISION      03/14/2017,Dr. Heidi GREENBERG     OTHER SURGICAL HISTORY      08421.9,DC SUBTALAR ATHROEREISIS,bone Spurs excision on Toe     OTHER SURGICAL HISTORY      8/20/13,,HERNIA REPAIR,Right,RIH with mesh     OTHER SURGICAL HISTORY      8/20/13,80421,GENITAL SURGERY MALE,Dorsal Slit      OTHER SURGICAL HISTORY      4/15/14,,HERNIA REPAIR,Left,LIH with mesh     OTHER SURGICAL HISTORY      14,90844.0,NC TOTAL HIP ARTHROPLASTY,Left     Social History     Socioeconomic History     Marital status:      Spouse name: Lucita     Number of children: Not on file     Years of education: Not on file     Highest education level: Not on file   Occupational History     Not on file   Social Needs     Financial resource strain: Not on file     Food insecurity     Worry: Not on file     Inability: Not on file     Transportation needs     Medical: Not on file     Non-medical: Not on file   Tobacco Use     Smoking status: Former Smoker     Types: Cigarettes     Quit date: 1976     Years since quittin.2     Smokeless tobacco: Never Used   Substance and Sexual Activity     Alcohol use: Yes     Alcohol/week: 0.8 standard drinks     Comment: occ beer     Drug use: Never     Sexual activity: Not Currently   Lifestyle     Physical activity     Days per week: Not on file     Minutes per session: Not on file     Stress: Not on file   Relationships     Social connections     Talks on phone: Not on file     Gets together: Not on file     Attends L[employer]-day service: Not on file     Active member of club or organization: Not on file     Attends meetings of clubs or organizations: Not on file     Relationship status: Not on file     Intimate partner violence     Fear of current or ex partner: Not on file     Emotionally abused: Not on file     Physically abused: Not on file     Forced sexual activity: Not on file   Other Topics Concern     Not on file   Social History Narrative     - Wife Claudia   3 kids - oldest son - neuroblastoma at 13 yo  at 14.   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     Family History   Problem Relation Age of Onset     Other - See Comments Son 12        neuroblastoma at 13 yo  at 14.     Genetic Disorder  No family hx of         Genetic,No known FHx of DM, CAD, CVA     Current Outpatient Medications   Medication Sig Dispense Refill     amLODIPine (NORVASC) 5 MG tablet TAKE 1 TABLET BY MOUTH EVERY DAY 90 tablet 0     B-D U/F 31G X 8 MM insulin pen needle INJECT SUBCUTANEOUS AT BEDTIME USE 1 PEN NEEDLES DAILY OR AS DIRECTED. 100 each 1     blood glucose (NO BRAND SPECIFIED) lancets standard Dispense item covered by pt ins. E11.65 IDDM type II, uncontrolled - Test 3 times/day. (Wants Barrel lancets)       blood glucose monitoring (NO BRAND SPECIFIED) test strip Dispense item covered by pt ins. E11.65 IDDM type II, uncontrolled - Test 4 times/day. Reason: New diabetes       Blood Glucose Monitoring Suppl (FIFTY50 GLUCOSE METER 2.0) W/DEVICE KIT Dispense meter, test strips, lancets covered by pt ins. E11.65 IDDM type II, uncontrolled - Test 4 times/day. Reason: New diabetes       calcitRIOL (ROCALTROL) 0.25 MCG capsule TAKE 1 CAP BY MOUTH EVERY MONDAY, WEDNESDAY AND FRIDAY.       cholecalciferol (VITAMIN D3) 1000 units (25 mcg) capsule Take 1 capsule (1,000 Units) by mouth daily       darbepoetin miller (ARANESP) 60 MCG/0.3ML injection Inject 60 mcg Subcutaneous       famotidine (PEPCID) 20 MG tablet TAKE 1 TABLET BY MOUTH EVERY DAY 90 tablet 0     finasteride (PROSCAR) 5 MG tablet Take 1 tablet (5 mg) by mouth daily 90 tablet 3     furosemide (LASIX) 40 MG tablet TAKE 1 TABLET (40 MG) BY MOUTH EVERY MORNING 90 tablet 3     insulin glargine (LANTUS SOLOSTAR) 100 UNIT/ML pen Inject 2 Units Subcutaneous At Bedtime 15 mL 3     predniSONE (DELTASONE) 5 MG tablet Take 0.5 tablets (2.5 mg) by mouth daily 135 tablet 3     sodium bicarbonate 650 MG tablet TAKE 1 TABLET (650 MG) BY MOUTH DAILY 90 tablet 3     tamsulosin (FLOMAX) 0.4 MG capsule Take 1 capsule (0.4 mg) by mouth every evening 90 capsule 3     vitamin D3 (CHOLECALCIFEROL) 50 mcg (2000 units) tablet Take 1 tablet by mouth daily       Patient has no known  allergies.      Review of Systems:  Review of Systems  Denies fever, chills, redness and warmth around wound, swelling and odorous or purulent drainage.    Objective:   /86   Pulse 78   Temp 96.6  F (35.9  C) (Tympanic)   Resp 20   Wt 82.4 kg (181 lb 9.6 oz)   SpO2 99%   BMI 26.06 kg/m    Physical Exam  Pleasant gentleman in no acute distress.  Affect.  Alert and oriented x4.  The dehisced wound at the right shin  has a mixture of 20% yellow slough and 80% pink granulation tissue.  Wound measures 1 x 3 x 0.1 cm.  Moderate amount of mixture of serous and light brown drainage.  No surrounding erythema warmth or maceration.  Wound is irrigated with Anasept, Santyl debridement ointment applied, calcium alginate placed over wound followed by 3 inch Allevyn gentle border light dressing.      Assessment:    ICD-10-CM    1. Wound dehiscence  T81.30XA    2. Immunocompromised patient (H)  D84.9    3. Steroid-induced hyperglycemia  R73.9     T38.0X5A        Plan:   Continue with twice weekly dressing change.  Elevate leg above heart as able.  Monitor and report any evidence of infection.    PAL Godinez   3/29/2021  11:29 AM

## 2021-03-30 ENCOUNTER — OFFICE VISIT (OUTPATIENT)
Dept: INTERNAL MEDICINE | Facility: OTHER | Age: 86
End: 2021-03-30
Attending: INTERNAL MEDICINE
Payer: COMMERCIAL

## 2021-03-30 VITALS
SYSTOLIC BLOOD PRESSURE: 132 MMHG | TEMPERATURE: 96.1 F | BODY MASS INDEX: 26.81 KG/M2 | RESPIRATION RATE: 16 BRPM | HEIGHT: 69 IN | OXYGEN SATURATION: 97 % | HEART RATE: 102 BPM | DIASTOLIC BLOOD PRESSURE: 88 MMHG | WEIGHT: 181 LBS

## 2021-03-30 DIAGNOSIS — N05.7 PRIMARY PAUCI-IMMUNE NECROTIZING AND CRESCENTIC GLOMERULONEPHRITIS: ICD-10-CM

## 2021-03-30 DIAGNOSIS — N18.5 TYPE 2 DIABETES MELLITUS WITH STAGE 5 CHRONIC KIDNEY DISEASE NOT ON CHRONIC DIALYSIS, WITH LONG-TERM CURRENT USE OF INSULIN (H): ICD-10-CM

## 2021-03-30 DIAGNOSIS — E78.2 MIXED HYPERLIPIDEMIA: ICD-10-CM

## 2021-03-30 DIAGNOSIS — R12 HEARTBURN: ICD-10-CM

## 2021-03-30 DIAGNOSIS — Z00.00 ENCOUNTER FOR MEDICARE ANNUAL WELLNESS EXAM: ICD-10-CM

## 2021-03-30 DIAGNOSIS — E11.22 TYPE 2 DIABETES MELLITUS WITH STAGE 5 CHRONIC KIDNEY DISEASE NOT ON CHRONIC DIALYSIS, WITH LONG-TERM CURRENT USE OF INSULIN (H): ICD-10-CM

## 2021-03-30 DIAGNOSIS — N18.5 CKD (CHRONIC KIDNEY DISEASE) STAGE 5, GFR LESS THAN 15 ML/MIN (H): Primary | ICD-10-CM

## 2021-03-30 DIAGNOSIS — Z79.4 TYPE 2 DIABETES MELLITUS WITH STAGE 5 CHRONIC KIDNEY DISEASE NOT ON CHRONIC DIALYSIS, WITH LONG-TERM CURRENT USE OF INSULIN (H): ICD-10-CM

## 2021-03-30 DIAGNOSIS — I10 BENIGN ESSENTIAL HYPERTENSION: ICD-10-CM

## 2021-03-30 DIAGNOSIS — Z71.85 VACCINE COUNSELING: ICD-10-CM

## 2021-03-30 DIAGNOSIS — R82.71 BACTERIA IN URINE: ICD-10-CM

## 2021-03-30 DIAGNOSIS — N05.8 PRIMARY PAUCI-IMMUNE NECROTIZING AND CRESCENTIC GLOMERULONEPHRITIS: ICD-10-CM

## 2021-03-30 LAB
ALBUMIN SERPL-MCNC: 4.5 G/DL (ref 3.5–5.7)
ALBUMIN UR-MCNC: 10 MG/DL
ALP SERPL-CCNC: 71 U/L (ref 34–104)
ALT SERPL W P-5'-P-CCNC: 9 U/L (ref 7–52)
ANION GAP SERPL CALCULATED.3IONS-SCNC: 10 MMOL/L (ref 3–14)
APPEARANCE UR: ABNORMAL
AST SERPL W P-5'-P-CCNC: 15 U/L (ref 13–39)
BACTERIA #/AREA URNS HPF: ABNORMAL /HPF
BILIRUB SERPL-MCNC: 0.4 MG/DL (ref 0.3–1)
BILIRUB UR QL STRIP: NEGATIVE
BUN SERPL-MCNC: 65 MG/DL (ref 7–25)
CALCIUM SERPL-MCNC: 10.1 MG/DL (ref 8.6–10.3)
CHLORIDE SERPL-SCNC: 104 MMOL/L (ref 98–107)
CHOLEST SERPL-MCNC: 188 MG/DL
CO2 SERPL-SCNC: 25 MMOL/L (ref 21–31)
COLOR UR AUTO: ABNORMAL
CREAT SERPL-MCNC: 5.38 MG/DL (ref 0.7–1.3)
CREAT UR-MCNC: 31 MG/DL
ERYTHROCYTE [DISTWIDTH] IN BLOOD BY AUTOMATED COUNT: 13.2 % (ref 10–15)
GFR SERPL CREATININE-BSD FRML MDRD: 10 ML/MIN/{1.73_M2}
GLUCOSE SERPL-MCNC: 122 MG/DL (ref 70–105)
GLUCOSE UR STRIP-MCNC: NEGATIVE MG/DL
HBA1C MFR BLD: 5.9 % (ref 4–6)
HCT VFR BLD AUTO: 37.7 % (ref 40–53)
HDLC SERPL-MCNC: 33 MG/DL (ref 23–92)
HGB BLD-MCNC: 12.4 G/DL (ref 13.3–17.7)
HGB UR QL STRIP: ABNORMAL
KETONES UR STRIP-MCNC: NEGATIVE MG/DL
LDLC SERPL CALC-MCNC: 111 MG/DL
LEUKOCYTE ESTERASE UR QL STRIP: ABNORMAL
MCH RBC QN AUTO: 30.3 PG (ref 26.5–33)
MCHC RBC AUTO-ENTMCNC: 32.9 G/DL (ref 31.5–36.5)
MCV RBC AUTO: 92 FL (ref 78–100)
MICROALBUMIN UR-MCNC: 86 MG/L
MICROALBUMIN/CREAT UR: 277.81 MG/G CR (ref 0–17)
MUCOUS THREADS #/AREA URNS LPF: PRESENT /LPF
NITRATE UR QL: POSITIVE
NONHDLC SERPL-MCNC: 155 MG/DL
PH UR STRIP: 5.5 PH (ref 5–7)
PLATELET # BLD AUTO: 224 10E9/L (ref 150–450)
POTASSIUM SERPL-SCNC: 4.8 MMOL/L (ref 3.5–5.1)
PROT SERPL-MCNC: 7.5 G/DL (ref 6.4–8.9)
RBC # BLD AUTO: 4.09 10E12/L (ref 4.4–5.9)
RBC #/AREA URNS AUTO: 4 /HPF (ref 0–2)
SODIUM SERPL-SCNC: 139 MMOL/L (ref 134–144)
SOURCE: ABNORMAL
SP GR UR STRIP: 1.01 (ref 1–1.03)
TRIGL SERPL-MCNC: 218 MG/DL
TSH SERPL DL<=0.05 MIU/L-ACNC: 4.3 IU/ML (ref 0.34–5.6)
UROBILINOGEN UR STRIP-MCNC: NORMAL MG/DL (ref 0–2)
WBC # BLD AUTO: 8.6 10E9/L (ref 4–11)
WBC #/AREA URNS AUTO: 171 /HPF (ref 0–5)
WBC CLUMPS #/AREA URNS HPF: PRESENT /HPF

## 2021-03-30 PROCEDURE — 80053 COMPREHEN METABOLIC PANEL: CPT | Mod: ZL | Performed by: INTERNAL MEDICINE

## 2021-03-30 PROCEDURE — 84443 ASSAY THYROID STIM HORMONE: CPT | Mod: ZL | Performed by: INTERNAL MEDICINE

## 2021-03-30 PROCEDURE — 83036 HEMOGLOBIN GLYCOSYLATED A1C: CPT | Mod: ZL | Performed by: INTERNAL MEDICINE

## 2021-03-30 PROCEDURE — 87088 URINE BACTERIA CULTURE: CPT | Mod: ZL | Performed by: INTERNAL MEDICINE

## 2021-03-30 PROCEDURE — 99214 OFFICE O/P EST MOD 30 MIN: CPT | Mod: 25 | Performed by: INTERNAL MEDICINE

## 2021-03-30 PROCEDURE — 36415 COLL VENOUS BLD VENIPUNCTURE: CPT | Mod: ZL | Performed by: INTERNAL MEDICINE

## 2021-03-30 PROCEDURE — 82043 UR ALBUMIN QUANTITATIVE: CPT | Mod: ZL | Performed by: INTERNAL MEDICINE

## 2021-03-30 PROCEDURE — 80061 LIPID PANEL: CPT | Mod: ZL | Performed by: INTERNAL MEDICINE

## 2021-03-30 PROCEDURE — G0463 HOSPITAL OUTPT CLINIC VISIT: HCPCS

## 2021-03-30 PROCEDURE — 85027 COMPLETE CBC AUTOMATED: CPT | Mod: ZL | Performed by: INTERNAL MEDICINE

## 2021-03-30 PROCEDURE — 87086 URINE CULTURE/COLONY COUNT: CPT | Mod: ZL | Performed by: INTERNAL MEDICINE

## 2021-03-30 PROCEDURE — 81001 URINALYSIS AUTO W/SCOPE: CPT | Mod: ZL | Performed by: INTERNAL MEDICINE

## 2021-03-30 PROCEDURE — G0438 PPPS, INITIAL VISIT: HCPCS | Performed by: INTERNAL MEDICINE

## 2021-03-30 RX ORDER — FUROSEMIDE 40 MG
40 TABLET ORAL EVERY MORNING
Qty: 90 TABLET | Refills: 3 | Status: SHIPPED | OUTPATIENT
Start: 2021-03-30 | End: 2022-01-19

## 2021-03-30 RX ORDER — PREDNISONE 2.5 MG/1
2.5 TABLET ORAL DAILY
Qty: 90 TABLET | Refills: 3 | Status: SHIPPED | OUTPATIENT
Start: 2021-03-30 | End: 2022-01-19

## 2021-03-30 RX ORDER — AMLODIPINE BESYLATE 5 MG/1
5 TABLET ORAL DAILY
Qty: 90 TABLET | Refills: 3 | Status: SHIPPED | OUTPATIENT
Start: 2021-03-30 | End: 2022-04-05

## 2021-03-30 RX ORDER — FAMOTIDINE 20 MG/1
20 TABLET, FILM COATED ORAL DAILY
Qty: 90 TABLET | Refills: 3 | Status: SHIPPED | OUTPATIENT
Start: 2021-03-30 | End: 2022-01-19

## 2021-03-30 ASSESSMENT — PAIN SCALES - GENERAL: PAINLEVEL: NO PAIN (0)

## 2021-03-30 ASSESSMENT — ENCOUNTER SYMPTOMS
BRUISES/BLEEDS EASILY: 1
AGITATION: 0
CONFUSION: 0
FATIGUE: 1
CHILLS: 0
ABDOMINAL PAIN: 0
WHEEZING: 0
DIARRHEA: 0
DYSURIA: 0
DIZZINESS: 0
ARTHRALGIAS: 0
BACK PAIN: 0
LIGHT-HEADEDNESS: 0
HEMATURIA: 0
COUGH: 0
MYALGIAS: 0
FEVER: 0
VOMITING: 0
EYE PAIN: 0
SHORTNESS OF BREATH: 0
PALPITATIONS: 0
NAUSEA: 0

## 2021-03-30 ASSESSMENT — MIFFLIN-ST. JEOR: SCORE: 1492.88

## 2021-03-30 NOTE — LETTER
April 7, 2021      Altaf Chao  [address] [facility] [address]        Dear ,    We are writing to inform you of your test results.    Diabetes is well controlled.    Start Levaquin for the bladder infection.    Otherwise, Continue current medications.     Plan to recheck labs again in 3 to 4 months.     Valentino Machado MD        Resulted Orders   Hemoglobin A1c   Result Value Ref Range    Hemoglobin A1C 5.9 4.0 - 6.0 %   Lipid Profile   Result Value Ref Range    Cholesterol 188 <200 mg/dL    Triglycerides 218 (H) <150 mg/dL      Comment:      Borderline high:  150-199 mg/dl  High:             200-499 mg/dl  Very high:       >499 mg/dl      HDL Cholesterol 33 23 - 92 mg/dL    LDL Cholesterol Calculated 111 (H) <100 mg/dL      Comment:      Above desirable:  100-129 mg/dl  Borderline High:  130-159 mg/dL  High:             160-189 mg/dL  Very high:       >189 mg/dl      Non HDL Cholesterol 155 (H) <130 mg/dL      Comment:      Above Desirable:  130-159 mg/dl  Borderline high:  160-189 mg/dl  High:             190-219 mg/dl  Very high:       >219 mg/dl     CBC with platelets   Result Value Ref Range    WBC 8.6 4.0 - 11.0 10e9/L    RBC Count 4.09 (L) 4.4 - 5.9 10e12/L    Hemoglobin 12.4 (L) 13.3 - 17.7 g/dL    Hematocrit 37.7 (L) 40.0 - 53.0 %    MCV 92 78 - 100 fl    MCH 30.3 26.5 - 33.0 pg    MCHC 32.9 31.5 - 36.5 g/dL    RDW 13.2 10.0 - 15.0 %    Platelet Count 224 150 - 450 10e9/L   Comprehensive metabolic panel   Result Value Ref Range    Sodium 139 134 - 144 mmol/L    Potassium 4.8 3.5 - 5.1 mmol/L    Chloride 104 98 - 107 mmol/L    Carbon Dioxide 25 21 - 31 mmol/L    Anion Gap 10 3 - 14 mmol/L    Glucose 122 (H) 70 - 105 mg/dL    Urea Nitrogen 65 (H) 7 - 25 mg/dL    Creatinine 5.38 (H) 0.70 - 1.30 mg/dL    GFR Estimate 10 (L) >60 mL/min/[1.73_m2]    GFR Estimate If Black 12 (L) >60 mL/min/[1.73_m2]    Calcium 10.1 8.6 - 10.3 mg/dL    Bilirubin Total 0.4 0.3 - 1.0 mg/dL    Albumin 4.5 3.5 - 5.7  g/dL    Protein Total 7.5 6.4 - 8.9 g/dL    Alkaline Phosphatase 71 34 - 104 U/L    ALT 9 7 - 52 U/L    AST 15 13 - 39 U/L   TSH Reflex GH   Result Value Ref Range    TSH Reflex 4.30 0.34 - 5.60 IU/mL   UA reflex to Microscopic   Result Value Ref Range    Color Urine Light Yellow     Appearance Urine Slightly Cloudy     Glucose Urine Negative NEG^Negative mg/dL    Bilirubin Urine Negative NEG^Negative    Ketones Urine Negative NEG^Negative mg/dL    Specific Gravity Urine 1.007 1.003 - 1.035    Blood Urine Trace (A) NEG^Negative    pH Urine 5.5 5.0 - 7.0 pH    Protein Albumin Urine 10 (A) NEG^Negative mg/dL    Urobilinogen mg/dL Normal 0.0 - 2.0 mg/dL    Nitrite Urine Positive (A) NEG^Negative    Leukocyte Esterase Urine Large (A) NEG^Negative    Source Midstream Urine     RBC Urine 4 (H) 0 - 2 /HPF    WBC Urine 171 (H) 0 - 5 /HPF    WBC Clumps Present (A) NEG^Negative /HPF    Bacteria Urine Few (A) NEG^Negative /HPF    Mucous Urine Present (A) NEG^Negative /LPF   Albumin Random Urine Quantitative with Creat Ratio   Result Value Ref Range    Creatinine Urine 31 mg/dL    Albumin Urine mg/L 86 mg/L    Albumin Urine mg/g Cr 277.81 (H) 0 - 17 mg/g Cr   Urine Culture Aerobic Bacterial   Result Value Ref Range    Specimen Description Midstream Urine     Culture Micro (A)      >100,000 colonies/mL  Pseudomonas aeruginosa  Some antibiotics have been suppressed due to the known inducible beta lactamase activity.   Please contact Microbiology for more information.         If you have any questions or concerns, please call the clinic at the number listed above.       Sincerely,      Valentino Machado MD

## 2021-03-30 NOTE — PROGRESS NOTES
"SUBJECTIVE:   Altaf Chao is a 86 year old male who presents for Preventive Visit.  Patient has been advised of split billing requirements and indicates understanding: Yes  Are you in the first 12 months of your Medicare Part B coverage?  No    Physical Health:    In general, how would you rate your overall physical health? excellent    Outside of work, how many days during the week do you exercise? 2-3 days/week    Outside of work, approximately how many minutes a day do you exercise?greater than 60 minutes    If you drink alcohol do you typically have >3 drinks per day or >7 drinks per week? Not Applicable    Do you usually eat at least 4 servings of fruit and vegetables a day, include whole grains & fiber and avoid regularly eating high fat or \"junk\" foods? Yes    Do you have any problems taking medications regularly?  No    Do you have any side effects from medications? none    Needs assistance for the following daily activities: no assistance needed    Which of the following safety concerns are present in your home?  none identified     Hearing impairment: No    In the past 6 months, have you been bothered by leaking of urine? no    Mental Health:    In general, how would you rate your overall mental or emotional health? excellent  PHQ-2 Score: 0    Do you feel safe in your environment? Yes    Have you ever done Advance Care Planning? (For example, a Health Directive, POLST, or a discussion with a medical provider or your loved ones about your wishes): No, advance care planning information given to patient to review.  Patient declined advance care planning discussion at this time.    Additional concerns to address?  No    Fall risk:  Fallen 2 or more times in the past year?: No  Any fall with injury in the past year?: No    Cognitive Screenin) Repeat 3 items (Leader, Season, Table)    2) Clock draw: NORMAL  3) 3 item recall: Recalls 2 objects   Results: NORMAL clock, 1-2 items recalled: COGNITIVE " IMPAIRMENT LESS LIKELY    Mini-CogTM Copyright GREGORIO Ames. Licensed by the author for use in [facility]; reprinted with permission ([email or web address]@.[email or web address]). All rights reserved.      Do you have sleep apnea, excessive snoring or daytime drowsiness?: no  Reviewed and updated as needed this visit by clinical staff  Tobacco  Allergies  Meds  Problems  Med Hx  Surg Hx  Fam Hx  Soc Hx        Reviewed and updated as needed this visit by Provider  Tobacco  Allergies  Meds  Problems  Med Hx  Surg Hx  Fam Hx         Social History     Tobacco Use     Smoking status: Former Smoker     Types: Cigarettes     Quit date: 1976     Years since quittin.2     Smokeless tobacco: Never Used   Substance Use Topics     Alcohol use: Yes     Alcohol/week: 0.8 standard drinks     Comment: occ beer                           Current providers sharing in care for this patient include:   Patient Care Team:  Valentino Machado MD as PCP - General (Internal Medicine)  Bethel Gordon MD as Assigned Surgical Provider  Brenda Horne NP as Assigned PCP    The following health maintenance items are reviewed in Epic and correct as of today:  Health Maintenance   Topic Date Due     EYE EXAM  2021     ZOSTER IMMUNIZATION (2 of 2) 2021     A1C  2021     FALL RISK ASSESSMENT  2022     MEDICARE ANNUAL WELLNESS VISIT  2022     BMP  2022     LIPID  2022     MICROALBUMIN  2022     DIABETIC FOOT EXAM  2022     ADVANCE CARE PLANNING  2026     DTAP/TDAP/TD IMMUNIZATION (2 - Td) 2030     PHQ-2  Completed     INFLUENZA VACCINE  Completed     COVID-19 Vaccine  Completed     Pneumococcal Vaccine: Pediatrics (0 to 5 Years) and At-Risk Patients (6 to 64 Years)  Addressed     Pneumococcal Vaccine: 65+ Years  Addressed     IPV IMMUNIZATION  Aged Out     MENINGITIS IMMUNIZATION  Aged Out     Review of Systems   Constitutional: Positive for fatigue. Negative for chills and  "fever.        Golfs regularly in the summer   HENT: Negative for congestion and hearing loss.    Eyes: Negative for pain and visual disturbance.   Respiratory: Negative for cough, shortness of breath and wheezing.    Cardiovascular: Negative for chest pain and palpitations.   Gastrointestinal: Negative for abdominal pain, diarrhea, nausea and vomiting.   Endocrine: Negative for cold intolerance and heat intolerance.   Genitourinary: Negative for dysuria and hematuria.   Musculoskeletal: Positive for gait problem. Negative for arthralgias, back pain and myalgias.   Skin: Negative for pallor.   Allergic/Immunologic: Negative for immunocompromised state.   Neurological: Negative for dizziness and light-headedness.   Hematological: Bruises/bleeds easily.   Psychiatric/Behavioral: Negative for agitation and confusion.       OBJECTIVE:   /88   Pulse 102   Temp 96.1  F (35.6  C) (Tympanic)   Resp 16   Ht 1.755 m (5' 9.09\")   Wt 82.1 kg (181 lb)   SpO2 97%   BMI 26.66 kg/m   Estimated body mass index is 26.66 kg/m  as calculated from the following:    Height as of this encounter: 1.755 m (5' 9.09\").    Weight as of this encounter: 82.1 kg (181 lb).    Physical Exam  Constitutional:       General: He is not in acute distress.     Appearance: He is well-developed. He is not diaphoretic.   HENT:      Head: Normocephalic and atraumatic.   Eyes:      General: No scleral icterus.     Conjunctiva/sclera: Conjunctivae normal.   Neck:      Musculoskeletal: Neck supple.   Cardiovascular:      Rate and Rhythm: Normal rate and regular rhythm.   Pulmonary:      Effort: Pulmonary effort is normal.      Breath sounds: Normal breath sounds.   Abdominal:      Palpations: Abdomen is soft.      Tenderness: There is no abdominal tenderness.   Musculoskeletal:         General: No deformity.      Right lower leg: Edema present.      Left lower leg: Edema present.   Lymphadenopathy:      Cervical: No cervical adenopathy.   Skin:     " General: Skin is warm and dry.      Findings: No rash.      Comments: Some resolving bruises on the arms bilaterally  + wound on right leg - bandage intact   Neurological:      Mental Status: He is alert and oriented to person, place, and time. Mental status is at baseline.   Psychiatric:         Mood and Affect: Mood normal.         Behavior: Behavior normal.       Diagnostic Test Results:  Labs reviewed in Epic     + Abnormal UA - culture ordered / pending.     Results for orders placed or performed in visit on 03/30/21   Hemoglobin A1c     Status: None   Result Value Ref Range    Hemoglobin A1C 5.9 4.0 - 6.0 %   Lipid Profile     Status: Abnormal   Result Value Ref Range    Cholesterol 188 <200 mg/dL    Triglycerides 218 (H) <150 mg/dL    HDL Cholesterol 33 23 - 92 mg/dL    LDL Cholesterol Calculated 111 (H) <100 mg/dL    Non HDL Cholesterol 155 (H) <130 mg/dL   CBC with platelets     Status: Abnormal   Result Value Ref Range    WBC 8.6 4.0 - 11.0 10e9/L    RBC Count 4.09 (L) 4.4 - 5.9 10e12/L    Hemoglobin 12.4 (L) 13.3 - 17.7 g/dL    Hematocrit 37.7 (L) 40.0 - 53.0 %    MCV 92 78 - 100 fl    MCH 30.3 26.5 - 33.0 pg    MCHC 32.9 31.5 - 36.5 g/dL    RDW 13.2 10.0 - 15.0 %    Platelet Count 224 150 - 450 10e9/L   Comprehensive metabolic panel     Status: Abnormal   Result Value Ref Range    Sodium 139 134 - 144 mmol/L    Potassium 4.8 3.5 - 5.1 mmol/L    Chloride 104 98 - 107 mmol/L    Carbon Dioxide 25 21 - 31 mmol/L    Anion Gap 10 3 - 14 mmol/L    Glucose 122 (H) 70 - 105 mg/dL    Urea Nitrogen 65 (H) 7 - 25 mg/dL    Creatinine 5.38 (H) 0.70 - 1.30 mg/dL    GFR Estimate 10 (L) >60 mL/min/[1.73_m2]    GFR Estimate If Black 12 (L) >60 mL/min/[1.73_m2]    Calcium 10.1 8.6 - 10.3 mg/dL    Bilirubin Total 0.4 0.3 - 1.0 mg/dL    Albumin 4.5 3.5 - 5.7 g/dL    Protein Total 7.5 6.4 - 8.9 g/dL    Alkaline Phosphatase 71 34 - 104 U/L    ALT 9 7 - 52 U/L    AST 15 13 - 39 U/L   UA reflex to Microscopic     Status:  Abnormal   Result Value Ref Range    Color Urine Light Yellow     Appearance Urine Slightly Cloudy     Glucose Urine Negative NEG^Negative mg/dL    Bilirubin Urine Negative NEG^Negative    Ketones Urine Negative NEG^Negative mg/dL    Specific Gravity Urine 1.007 1.003 - 1.035    Blood Urine Trace (A) NEG^Negative    pH Urine 5.5 5.0 - 7.0 pH    Protein Albumin Urine 10 (A) NEG^Negative mg/dL    Urobilinogen mg/dL Normal 0.0 - 2.0 mg/dL    Nitrite Urine Positive (A) NEG^Negative    Leukocyte Esterase Urine Large (A) NEG^Negative    Source Midstream Urine     RBC Urine 4 (H) 0 - 2 /HPF    WBC Urine 171 (H) 0 - 5 /HPF    WBC Clumps Present (A) NEG^Negative /HPF    Bacteria Urine Few (A) NEG^Negative /HPF    Mucous Urine Present (A) NEG^Negative /LPF        ASSESSMENT / PLAN:       ICD-10-CM    1. CKD (chronic kidney disease) stage 5, GFR 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 ml/min (H)  N18.5 predniSONE (DELTASONE) 2.5 MG tablet   2. Primary pauci-immune necrotizing and crescentic glomerulonephritis  N05.8 predniSONE (DELTASONE) 2.5 MG tablet    N05.7    3. Encounter for Medicare annual wellness exam  Z00.00    4. Heartburn  R12 famotidine (PEPCID) 20 MG tablet   5. Benign essential hypertension  I10 amLODIPine (NORVASC) 5 MG tablet     furosemide (LASIX) 40 MG tablet     UA reflex to Microscopic     Albumin Random Urine Quantitative with Creat Ratio     CBC with platelets     Comprehensive metabolic panel     TSH Reflex GH     CBC with platelets     Comprehensive metabolic panel     TSH Reflex GH     UA reflex to Microscopic     Albumin Random Urine Quantitative with Creat Ratio   6. Mixed hyperlipidemia  E78.2 Lipid Profile     Lipid Profile   7. 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 Bacteria in urine  R82.71 Urine Culture Aerobic Bacterial   9. Vaccine counseling  Z71.89    Patient presents for annual Medicare wellness visit as well as follow-up multiple issues.    Stage V chronic kidney disease, GFR has been in the 10-11 range, for many months.  Overdue for lab work today.  Still following with nephrology.  Reports that he still makes urine fine and there is not talk at this time of dialysis.    Posse immune necrotizing and crescentic glomerulonephritis, ongoing.  Still taking prednisone 2.5 mg daily.  Needs updated prescriptions.  He is tired of cutting but is on tablets in half and would like new prescription of smaller tablets 2.5 mg.  Refill sent to pharmacy.    Heartburn, ongoing issue but much improved with famotidine.  Advised that he try using this only as needed for indigestion symptoms refill sent to pharmacy.    Hypertension, blood pressure initially was high, measuring 162/90.  Recheck was better.  He typically runs in the 130 range at home and at his previous clinic visits.  Advised to monitor and to let us know if blood pressure is elevated we likely will need to make some medication changes.  Continues on Norvasc and Lasix.  Needs refills.  Check labs.    Mixed hyperlipidemia, currently fasting.  He is not currently taking any statins.  Check labs.    Type 2 diabetes, has stage V chronic kidney disease.  Due for lab work.  Check hemoglobin A1c.  -Check urine microalbumin.    Bacteria noted in urine.    -- He is not having any burning or urinary tract infection symptoms at this time.  Culture ordered and pending.    Vaccine counseling today.  He declines pneumococcal vaccine and Shingrix vaccine.    +He did complete his COVID-19 vaccinations.    Patient has been advised of split billing requirements and indicates understanding: Yes    COUNSELING:  Reviewed preventive health counseling, as reflected in patient  "instructions  Special attention given to:       Regular exercise       Healthy diet/nutrition       Vision screening       Hearing screening       Dental care       Bladder control       Fall risk prevention       Immunizations    Estimated body mass index is 26.66 kg/m  as calculated from the following:    Height as of this encounter: 1.755 m (5' 9.09\").    Weight as of this encounter: 82.1 kg (181 lb).        He reports that he quit smoking about 45 years ago. His smoking use included cigarettes. He has never used smokeless tobacco.    Appropriate preventive services were discussed with this patient, including applicable screening as appropriate for cardiovascular disease, diabetes, osteopenia/osteoporosis, and glaucoma.  As appropriate for age/gender, discussed screening for colorectal cancer, prostate cancer, breast cancer, and cervical cancer. Checklist reviewing preventive services available has been given to the patient.    Reviewed patients plan of care and provided an AVS. The Complex Care Plan (for patients with higher acuity and needing more deliberate coordination of services) for Altaf meets the Care Plan requirement. This Care Plan has been established and reviewed with the Patient.    Counseling Resources:  ATP IV Guidelines  Pooled Cohorts Equation Calculator  Breast Cancer Risk Calculator  BRCA-Related Cancer Risk Assessment: FHS-7 Tool  FRAX Risk Assessment  ICSI Preventive Guidelines  Dietary Guidelines for Americans, 2010  USDA's MyPlate  ASA Prophylaxis  Lung CA Screening    Valentino Machado MD  [facility] AND [facility]  "

## 2021-03-30 NOTE — PATIENT INSTRUCTIONS
Patient Education   Personalized Prevention Plan  You are due for the preventive services outlined below.  Your care team is available to assist you in scheduling these services.  If you have already completed any of these items, please share that information with your care team to update in your medical record.  Health Maintenance Due   Topic Date Due     Diabetic Foot Exam  Never done     Zoster (Shingles) Vaccine (1 of 2) Never done     Pneumococcal Vaccine (1 of 2 - PPSV23) 06/11/2019     Pneumococcal Vaccine (1 of 2 - PPSV23) 06/11/2019     A1C Lab  09/20/2019     Eye Exam  06/01/2020     Basic Metabolic Panel  06/20/2020     Cholesterol Lab  06/20/2020     Kidney Microalbumin Urine Test  06/20/2020     Preventive Health Recommendations  See your health care provider every year to    Review health changes.     Discuss preventive care.      Review your medicines if your doctor has prescribed any.    Talk with your health care provider about whether you should have a test to screen for prostate cancer (PSA).    Every 3 years, have a diabetes test (fasting glucose). If you are at risk for diabetes, you should have this test more often.    Every 5 years, have a cholesterol test. Have this test more often if you are at risk for high cholesterol or heart disease.     Every 10 years, have a colonoscopy. Or, have a yearly FIT test (stool test). These exams will check for colon cancer.    Talk to with your health care provider about screening for Abdominal Aortic Aneurysm if you have a family history of AAA or have a history of smoking.    Shots:     Get a flu shot each year.     Get a tetanus shot every 10 years.     Talk to your doctor about your pneumonia vaccines. There are now two you should receive - Pneumovax (PPSV 23) and Prevnar (PCV 13).    Talk to your pharmacist about a shingles vaccine.     Talk to your doctor about the hepatitis B vaccine.    Nutrition:     Eat at least 5 servings of fruits and  vegetables each day.     Eat whole-grain bread, whole-wheat pasta and brown rice instead of white grains and rice.     Get adequate Calcium and Vitamin D.     Lifestyle    Exercise for at least 150 minutes a week (30 minutes a day, 5 days a week). This will help you control your weight and prevent disease.     Limit alcohol to one drink per day.     No smoking.     Wear sunscreen to prevent skin cancer.     See your dentist every six months for an exam and cleaning.     See your eye doctor every 1 to 2 years to screen for conditions such as glaucoma, macular degeneration and cataracts.    Personalized Prevention Plan  You are due for the preventive services outlined below.  Your care team is available to assist you in scheduling these services.  If you have already completed any of these items, please share that information with your care team to update in your medical record.  Health Maintenance   Topic Date Due     DIABETIC FOOT EXAM  Never done     ZOSTER IMMUNIZATION (1 of 2) Never done     Pneumococcal Vaccine: Pediatrics (0 to 5 Years) and At-Risk Patients (6 to 64 Years) (1 of 2 - PPSV23) 06/11/2019     Pneumococcal Vaccine: 65+ Years (1 of 2 - PPSV23) 06/11/2019     A1C  09/20/2019     EYE EXAM  06/01/2020     BMP  06/20/2020     LIPID  06/20/2020     MICROALBUMIN  06/20/2020     FALL RISK ASSESSMENT  03/29/2022     MEDICARE ANNUAL WELLNESS VISIT  03/30/2022     ADVANCE CARE PLANNING  03/30/2026     DTAP/TDAP/TD IMMUNIZATION (2 - Td) 12/27/2030     PHQ-2  Completed     INFLUENZA VACCINE  Completed     COVID-19 Vaccine  Completed     IPV IMMUNIZATION  Aged Out     MENINGITIS IMMUNIZATION  Aged Out         Glad you are feeling good!  Blood pressure is well controlled.   Diabetes has been well controlled.     Medications refilled.   Labs today.     Aspects of Diabetes:   Recent Labs   Lab Test 06/20/19  1219 06/12/17  0505 09/11/14  1037 09/11/14  1037 04/10/14  1229 04/10/14  1229   A1C 6.5*  --   --   --   --    --    *  --   --  150*  --  104   HDL 30  --   --  35  --  40*   TRIG 164*  --   --  113  --  81   ALT 8  --   --   --   --   --    CR 4.88* 5.13*   < > 1.01   < > 1.01   GFRESTIMATED 11*  --   --   --   --   --    GFRESTBLACK 14* 13*   < > >60   < > >60   POTASSIUM 4.1 4.6   < > 4.5   < > 4.3    < > = values in this interval not displayed.      Hemoglobin A1c  Goal range is under 8%. Best is 6.5 to 7   Blood Pressure 132/88 Goal to keep less than 140/90   Tobacco  reports that he quit smoking about 45 years ago. His smoking use included cigarettes. He has never used smokeless tobacco. Goal to abstain from tobacco   Aspirin or Plavix Anti-platelet therapy Aspirin or Plavix reduces risk of heart disease and stroke  -- sometimes used with other blood thinners, depending on bleeding risk and risk factors.    ACE/ARB Specific blood pressure meds These medications can reduce risk of kidney disease   Cholesterol Statins (Lipitor, Crestor, vs others) Statins reduce risk of heart disease and stroke   Eye Exam -- Do Yearly -- Annual diabetic eye exam   Healthy weight Wt Readings from Last 3 Encounters:   03/30/21 82.1 kg (181 lb)   03/29/21 82.4 kg (181 lb 9.6 oz)   03/23/21 81.9 kg (180 lb 9.6 oz)     Body mass index is 26.66 kg/m .  Goal BMI under 30, best is under 25.      -- Trying to exercise daily (goal at least 20 min/day) with moderate aerobic activity   -- Eat healthy (resources from ADA at http://www.diabetes.org/)   -- Taking good care of my feet. Consider seeing the Podiatrist   -- Check blood sugars as directed, record in log book and bring to every appointment    Insurance companies are grading you and I on your blood sugar control -- Goal is to get your A1c down to 7.9% or lower and NO Smoking!  -- Medicare and most insurance companies, will only cover Hemoglobin A1c labs to be rechecked every 91+ days.      Return for Diabetes labs and clinic follow-up appointment every 3 to 4 months.    Schedule lab  only appointment --- A few days AFTER: 6/28/21   Schedule clinic appointment with Dr. Machado -- Same day as labs, or 1-2 days later.

## 2021-03-31 ENCOUNTER — OFFICE VISIT (OUTPATIENT)
Dept: INTERNAL MEDICINE | Facility: OTHER | Age: 86
End: 2021-03-31
Attending: NURSE PRACTITIONER
Payer: MEDICARE

## 2021-03-31 VITALS
SYSTOLIC BLOOD PRESSURE: 138 MMHG | TEMPERATURE: 96.4 F | DIASTOLIC BLOOD PRESSURE: 72 MMHG | OXYGEN SATURATION: 100 % | HEART RATE: 72 BPM | BODY MASS INDEX: 26.48 KG/M2 | RESPIRATION RATE: 16 BRPM | WEIGHT: 179.8 LBS

## 2021-03-31 DIAGNOSIS — T81.30XA WOUND DEHISCENCE: Primary | ICD-10-CM

## 2021-03-31 DIAGNOSIS — D84.9 IMMUNOCOMPROMISED PATIENT (H): ICD-10-CM

## 2021-03-31 PROCEDURE — G0463 HOSPITAL OUTPT CLINIC VISIT: HCPCS

## 2021-03-31 PROCEDURE — 99212 OFFICE O/P EST SF 10 MIN: CPT | Performed by: NURSE PRACTITIONER

## 2021-03-31 ASSESSMENT — PAIN SCALES - GENERAL: PAINLEVEL: NO PAIN (0)

## 2021-03-31 NOTE — PROGRESS NOTES
Subjective:  He is here today to follow-up on dehisced laceration of right lower leg.   He is immunocompromise and is on daily steroids.  No evidence of infection noted.      Patient Active Problem List   Diagnosis     Acquired buried penis     Acute crescentic glomerulonephritis     Anemia     Anemia due to vitamin B12 deficiency     Anemia of chronic disease     Antineutrophil cytoplasmic antibody (ANCA) positive     Bilateral edema of lower extremity     Bilateral leg edema     CKD (chronic kidney disease) stage 5, GFR 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 ml/min (H)     Mixed hyperlipidemia     Benign essential hypertension     H/O bilateral inguinal hernia repair     H/O total hip arthroplasty     Hematoma     Hip stiffness     History of tobacco abuse     Immunocompromised patient (H)     Lymphedema of both lower extremities     Metabolic acidosis     Pityriasis rosea     Primary pauci-immune necrotizing and crescentic glomerulonephritis     Refusal of statin medication at discharge     Steroid-induced hyperglycemia     Heartburn     Hyperkalemia     Hyponatremia     Pseudomonas urinary tract infection     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, with long-term current use of insulin (H)     Past Medical History:   Diagnosis Date     Acute kidney failure (H)     04/2017     Essential (primary) hypertension     8/7/2012     Hyperlipidemia     No Comments Provided     Osteoarthritis of hip     No Comments Provided     Pityriasis rosea     No Comments Provided     Tachycardia     No Comments Provided     Unilateral inguinal hernia without obstruction or gangrene     8/12/2013,Right Inguinal hernia with incarceration, massive [[id number]][     Past Surgical History:   Procedure Laterality Date     CIRCUMCISION      03/14/2017,Dr. Heidi GREENBERG     OTHER SURGICAL HISTORY      19413.9,[place] SUBTALAR ATHROEREISIS,bone Spurs excision on Toe     OTHER SURGICAL HISTORY      8/20/13,,HERNIA  REPAIR,Right,RIH with mesh     OTHER SURGICAL HISTORY      13,67191,GENITAL SURGERY MALE,Dorsal Slit     OTHER SURGICAL HISTORY      4/15/14,,HERNIA REPAIR,Left,LIH with mesh     OTHER SURGICAL HISTORY      14,89256.0,[place] TOTAL HIP ARTHROPLASTY,Left     Social History     Socioeconomic History     Marital status:      Spouse name: Lucita     Number of children: Not on file     Years of education: Not on file     Highest education level: Not on file   Occupational History     Not on file   Social Needs     Financial resource strain: Not on file     Food insecurity     Worry: Not on file     Inability: Not on file     Transportation needs     Medical: Not on file     Non-medical: Not on file   Tobacco Use     Smoking status: Former Smoker     Types: Cigarettes     Quit date: 1976     Years since quittin.2     Smokeless tobacco: Never Used   Substance and Sexual Activity     Alcohol use: Yes     Alcohol/week: 0.8 standard drinks     Comment: occ beer     Drug use: Never     Sexual activity: Not Currently   Lifestyle     Physical activity     Days per week: Not on file     Minutes per session: Not on file     Stress: Not on file   Relationships     Social connections     Talks on phone: Not on file     Gets together: Not on file     Attends Sikh service: Not on file     Active member of club or organization: Not on file     Attends meetings of clubs or organizations: Not on file     Relationship status: Not on file     Intimate partner violence     Fear of current or ex partner: Not on file     Emotionally abused: Not on file     Physically abused: Not on file     Forced sexual activity: Not on file   Other Topics Concern     Not on file   Social History Narrative     - Wife Claudia Rose kids - oldest son - neuroblastoma at 13 yo  at 14.   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     Family History   Problem  Relation Age of Onset     Other - See Comments Son 12        neuroblastoma at 11 yo  at 14.     Genetic Disorder No family hx of         Genetic,No known FHx of DM, CAD, CVA     Current Outpatient Medications   Medication Sig Dispense Refill     amLODIPine (NORVASC) 5 MG tablet Take 1 tablet (5 mg) by mouth daily 90 tablet 3     B-D U/F 31G X 8 MM insulin pen needle INJECT SUBCUTANEOUS AT BEDTIME USE 1 PEN NEEDLES DAILY OR AS DIRECTED. 100 each 1     blood glucose (NO BRAND SPECIFIED) lancets standard Dispense item covered by pt ins. E11.65 IDDM type II, uncontrolled - Test 3 times/day. (Wants Barrel lancets)       blood glucose monitoring (NO BRAND SPECIFIED) test strip Dispense item covered by pt ins. E11.65 IDDM type II, uncontrolled - Test 4 times/day. Reason: New diabetes       Blood Glucose Monitoring Suppl (FIFTY50 GLUCOSE METER 2.0) W/DEVICE KIT Dispense meter, test strips, lancets covered by pt ins. E11.65 IDDM type II, uncontrolled - Test 4 times/day. Reason: New diabetes       calcitRIOL (ROCALTROL) 0.25 MCG capsule TAKE 1 CAP BY MOUTH EVERY MONDAY, WEDNESDAY AND FRIDAY.       cholecalciferol (VITAMIN D3) 1000 units (25 mcg) capsule Take 1 capsule (1,000 Units) by mouth daily       darbepoetin miller (ARANESP) 60 MCG/0.3ML injection Inject 60 mcg Subcutaneous       famotidine (PEPCID) 20 MG tablet Take 1 tablet (20 mg) by mouth daily -- for heartburn (Use as needed) 90 tablet 3     finasteride (PROSCAR) 5 MG tablet Take 1 tablet (5 mg) by mouth daily 90 tablet 3     furosemide (LASIX) 40 MG tablet Take 1 tablet (40 mg) by mouth every morning 90 tablet 3     insulin glargine (LANTUS SOLOSTAR) 100 UNIT/ML pen Inject 2 Units Subcutaneous At Bedtime 15 mL 3     predniSONE (DELTASONE) 2.5 MG tablet Take 1 tablet (2.5 mg) by mouth daily 90 tablet 3     sodium bicarbonate 650 MG tablet TAKE 1 TABLET (650 MG) BY MOUTH DAILY 90 tablet 3     tamsulosin (FLOMAX) 0.4 MG capsule Take 1 capsule (0.4 mg) by mouth  every evening 90 capsule 3     Patient has no known allergies.      Review of Systems:  Review of Systems  Denies fever, chills, redness and warmth around wound, swelling and odorous or purulent drainage.    Objective:   /72   Pulse 72   Temp 96.4  F (35.8  C) (Tympanic)   Resp 16   Wt 81.6 kg (179 lb 12.8 oz)   SpO2 100%   BMI 26.48 kg/m    Physical Exam  Pleasant gentleman in no acute distress.  Affect.  Alert and oriented x4.  The dehisced wound at the right shin  has a mixture of 10% yellow slough and 90% pink granulation tissue.  Wound measures 1 x 2.7 x 0.1 cm.  Moderate amount of mixture of serous and light brown drainage.  No surrounding erythema warmth or maceration.  Wound is irrigated with Anasept, Santyl debridement ointment applied, calcium alginate placed over wound followed by 3 inch Allevyn gentle border light dressing.      Assessment:    ICD-10-CM    1. Wound dehiscence  T81.30XA    2. Immunocompromised patient (H)  D84.9        Plan:   Continue with twice weekly dressing change.  Elevate leg above heart as able.  Monitor and report any evidence of infection.    PAL Godinez   3/31/2021  8:05 AM

## 2021-04-02 LAB
BACTERIA SPEC CULT: ABNORMAL
SPECIMEN SOURCE: ABNORMAL

## 2021-04-05 DIAGNOSIS — N30.01 ACUTE CYSTITIS WITH HEMATURIA: Primary | ICD-10-CM

## 2021-04-05 RX ORDER — LEVOFLOXACIN 250 MG/1
250 TABLET, FILM COATED ORAL
Qty: 7 TABLET | Refills: 0 | Status: SHIPPED | OUTPATIENT
Start: 2021-04-05 | End: 2021-04-18

## 2021-04-05 NOTE — PROGRESS NOTES
Discussed with LORENZO Zafar as well. Due to GFR of < 10 very limited in dosing capacity. Per micromedix, recommend 250mg PO Q 48 hours 7-10 doses. Risk of tendon rupture possible, however, due to GFR and nondialysis, minimal routes to take for treatment options. Routed to LPN to call patient.     Susan Waddell PA-C  4/5/2021  4:46 PM

## 2021-04-07 ENCOUNTER — OFFICE VISIT (OUTPATIENT)
Dept: INTERNAL MEDICINE | Facility: OTHER | Age: 86
End: 2021-04-07
Attending: NURSE PRACTITIONER
Payer: MEDICARE

## 2021-04-07 VITALS
HEART RATE: 91 BPM | DIASTOLIC BLOOD PRESSURE: 72 MMHG | OXYGEN SATURATION: 96 % | WEIGHT: 181.6 LBS | SYSTOLIC BLOOD PRESSURE: 138 MMHG | RESPIRATION RATE: 16 BRPM | TEMPERATURE: 97 F | BODY MASS INDEX: 26.74 KG/M2

## 2021-04-07 DIAGNOSIS — T81.30XA WOUND DEHISCENCE: Primary | ICD-10-CM

## 2021-04-07 DIAGNOSIS — D84.9 IMMUNOCOMPROMISED PATIENT (H): ICD-10-CM

## 2021-04-07 PROCEDURE — 99212 OFFICE O/P EST SF 10 MIN: CPT | Performed by: NURSE PRACTITIONER

## 2021-04-07 PROCEDURE — G0463 HOSPITAL OUTPT CLINIC VISIT: HCPCS

## 2021-04-07 ASSESSMENT — PAIN SCALES - GENERAL: PAINLEVEL: NO PAIN (0)

## 2021-04-07 NOTE — PROGRESS NOTES
Subjective:  He is here today to follow-up on dehisced laceration of right lower leg.   He is immunocompromise and is on daily steroids.  No evidence of infection noted.  He is with overall improvement of wound.    Patient Active Problem List   Diagnosis     Acquired buried penis     Acute crescentic glomerulonephritis     Anemia     Anemia due to vitamin B12 deficiency     Anemia of chronic disease     Antineutrophil cytoplasmic antibody (ANCA) positive     Bilateral edema of lower extremity     Bilateral leg edema     CKD (chronic kidney disease) stage 5, GFR 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 ml/min (H)     Mixed hyperlipidemia     Benign essential hypertension     H/O bilateral inguinal hernia repair     H/O total hip arthroplasty     Hematoma     Hip stiffness     History of tobacco abuse     Immunocompromised patient (H)     Lymphedema of both lower extremities     Metabolic acidosis     Pityriasis rosea     Primary pauci-immune necrotizing and crescentic glomerulonephritis     Refusal of statin medication at discharge     Steroid-induced hyperglycemia     Heartburn     Hyperkalemia     Hyponatremia     Pseudomonas urinary tract infection     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, with long-term current use of insulin (H)     Past Medical History:   Diagnosis Date     Acute kidney failure (H)     04/2017     Essential (primary) hypertension     8/7/2012     Hyperlipidemia     No Comments Provided     Osteoarthritis of hip     No Comments Provided     Pityriasis rosea     No Comments Provided     Tachycardia     No Comments Provided     Unilateral inguinal hernia without obstruction or gangrene     8/12/2013,Right Inguinal hernia with incarceration, massive [[id number]][     Past Surgical History:   Procedure Laterality Date     CIRCUMCISION      03/14/2017,Dr. Heidi GREENBERG     OTHER SURGICAL HISTORY      19834.9,[place] SUBTALAR ATHROEREISIS,bone Spurs excision on Toe     OTHER SURGICAL HISTORY       13,,HERNIA REPAIR,Right,RIH with mesh     OTHER SURGICAL HISTORY      13,27870,GENITAL SURGERY MALE,Dorsal Slit     OTHER SURGICAL HISTORY      4/15/14,,HERNIA REPAIR,Left,LIH with mesh     OTHER SURGICAL HISTORY      14,89917.0,[place] TOTAL HIP ARTHROPLASTY,Left     Social History     Socioeconomic History     Marital status:      Spouse name: Lucita     Number of children: Not on file     Years of education: Not on file     Highest education level: Not on file   Occupational History     Not on file   Social Needs     Financial resource strain: Not on file     Food insecurity     Worry: Not on file     Inability: Not on file     Transportation needs     Medical: Not on file     Non-medical: Not on file   Tobacco Use     Smoking status: Former Smoker     Types: Cigarettes     Quit date: 1976     Years since quittin.2     Smokeless tobacco: Never Used   Substance and Sexual Activity     Alcohol use: Yes     Alcohol/week: 0.8 standard drinks     Comment: occ beer     Drug use: Never     Sexual activity: Not Currently   Lifestyle     Physical activity     Days per week: Not on file     Minutes per session: Not on file     Stress: Not on file   Relationships     Social connections     Talks on phone: Not on file     Gets together: Not on file     Attends Pentecostalism service: Not on file     Active member of club or organization: Not on file     Attends meetings of clubs or organizations: Not on file     Relationship status: Not on file     Intimate partner violence     Fear of current or ex partner: Not on file     Emotionally abused: Not on file     Physically abused: Not on file     Forced sexual activity: Not on file   Other Topics Concern     Not on file   Social History Narrative     - Wife Claudia Rose kids - oldest son - neuroblastoma at 11 yo  at 14.   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     Family  History   Problem Relation Age of Onset     Other - See Comments Son 12        neuroblastoma at 11 yo  at 14.     Genetic Disorder No family hx of         Genetic,No known FHx of DM, CAD, CVA     Current Outpatient Medications   Medication Sig Dispense Refill     amLODIPine (NORVASC) 5 MG tablet Take 1 tablet (5 mg) by mouth daily 90 tablet 3     B-D U/F 31G X 8 MM insulin pen needle INJECT SUBCUTANEOUS AT BEDTIME USE 1 PEN NEEDLES DAILY OR AS DIRECTED. 100 each 1     blood glucose (NO BRAND SPECIFIED) lancets standard Dispense item covered by pt ins. E11.65 IDDM type II, uncontrolled - Test 3 times/day. (Wants Barrel lancets)       blood glucose monitoring (NO BRAND SPECIFIED) test strip Dispense item covered by pt ins. E11.65 IDDM type II, uncontrolled - Test 4 times/day. Reason: New diabetes       Blood Glucose Monitoring Suppl (FIFTY50 GLUCOSE METER 2.0) W/DEVICE KIT Dispense meter, test strips, lancets covered by pt ins. E11.65 IDDM type II, uncontrolled - Test 4 times/day. Reason: New diabetes       calcitRIOL (ROCALTROL) 0.25 MCG capsule TAKE 1 CAP BY MOUTH EVERY MONDAY, WEDNESDAY AND FRIDAY.       cholecalciferol (VITAMIN D3) 1000 units (25 mcg) capsule Take 1 capsule (1,000 Units) by mouth daily       darbepoetin miller (ARANESP) 60 MCG/0.3ML injection Inject 60 mcg Subcutaneous       famotidine (PEPCID) 20 MG tablet Take 1 tablet (20 mg) by mouth daily -- for heartburn (Use as needed) 90 tablet 3     finasteride (PROSCAR) 5 MG tablet Take 1 tablet (5 mg) by mouth daily 90 tablet 3     furosemide (LASIX) 40 MG tablet Take 1 tablet (40 mg) by mouth every morning 90 tablet 3     insulin glargine (LANTUS SOLOSTAR) 100 UNIT/ML pen Inject 2 Units Subcutaneous At Bedtime 15 mL 3     levofloxacin (LEVAQUIN) 250 MG tablet Take 1 tablet (250 mg) by mouth every 48 hours for 7 doses 7 tablet 0     predniSONE (DELTASONE) 2.5 MG tablet Take 1 tablet (2.5 mg) by mouth daily 90 tablet 3     sodium bicarbonate 650 MG  tablet TAKE 1 TABLET (650 MG) BY MOUTH DAILY 90 tablet 3     tamsulosin (FLOMAX) 0.4 MG capsule Take 1 capsule (0.4 mg) by mouth every evening 90 capsule 3     Patient has no known allergies.      Review of Systems:  Review of Systems  Denies fever, chills, redness and warmth around wound, swelling and odorous or purulent drainage.    Objective:   /72 (BP Location: Right arm, Patient Position: Sitting, Cuff Size: Adult Regular)   Pulse 91   Temp 97  F (36.1  C) (Tympanic)   Resp 16   Wt 82.4 kg (181 lb 9.6 oz)   SpO2 96%   BMI 26.74 kg/m    Physical Exam  Pleasant gentleman in no acute distress.  Affect.  Alert and oriented x4.  The dehisced wound at the right shin  has a mixture of 5% yellow slough and 95 % pink granulation tissue.  Wound measures 1 x 3 x 0.1 cm.  Moderate amount of mixture of serous and light brown drainage.  No surrounding erythema warmth or maceration.  Wound is irrigated with Anasept, Santyl debridement ointment applied, calcium alginate placed over wound followed by 3 inch Allevyn gentle border light dressing.      Assessment:    ICD-10-CM    1. Wound dehiscence  T81.30XA    2. Immunocompromised patient (H)  D84.9        Plan:   Continue with twice weekly dressing change.  Elevate leg above heart as able.  Monitor and report any evidence of infection.    PAL Godinez   4/7/2021  9:50 AM

## 2021-04-09 ENCOUNTER — OFFICE VISIT (OUTPATIENT)
Dept: INTERNAL MEDICINE | Facility: OTHER | Age: 86
End: 2021-04-09
Attending: NURSE PRACTITIONER
Payer: MEDICARE

## 2021-04-09 VITALS
DIASTOLIC BLOOD PRESSURE: 86 MMHG | OXYGEN SATURATION: 99 % | SYSTOLIC BLOOD PRESSURE: 134 MMHG | RESPIRATION RATE: 18 BRPM | WEIGHT: 181.2 LBS | HEART RATE: 85 BPM | BODY MASS INDEX: 26.69 KG/M2 | TEMPERATURE: 96.2 F

## 2021-04-09 DIAGNOSIS — D84.9 IMMUNOCOMPROMISED PATIENT (H): ICD-10-CM

## 2021-04-09 DIAGNOSIS — T81.30XA WOUND DEHISCENCE: Primary | ICD-10-CM

## 2021-04-09 PROCEDURE — 99212 OFFICE O/P EST SF 10 MIN: CPT | Performed by: NURSE PRACTITIONER

## 2021-04-09 PROCEDURE — G0463 HOSPITAL OUTPT CLINIC VISIT: HCPCS

## 2021-04-09 ASSESSMENT — PAIN SCALES - GENERAL: PAINLEVEL: NO PAIN (0)

## 2021-04-09 NOTE — PROGRESS NOTES
Subjective:  He is here today to follow-up on dehisced laceration of right lower leg.   He is immunocompromise and is on daily steroids.  No evidence of infection noted.  He is with overall improvement of wound.    Patient Active Problem List   Diagnosis     Acquired buried penis     Acute crescentic glomerulonephritis     Anemia     Anemia due to vitamin B12 deficiency     Anemia of chronic disease     Antineutrophil cytoplasmic antibody (ANCA) positive     Bilateral edema of lower extremity     Bilateral leg edema     CKD (chronic kidney disease) stage 5, GFR 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 ml/min (H)     Mixed hyperlipidemia     Benign essential hypertension     H/O bilateral inguinal hernia repair     H/O total hip arthroplasty     Hematoma     Hip stiffness     History of tobacco abuse     Immunocompromised patient (H)     Lymphedema of both lower extremities     Metabolic acidosis     Pityriasis rosea     Primary pauci-immune necrotizing and crescentic glomerulonephritis     Refusal of statin medication at discharge     Steroid-induced hyperglycemia     Heartburn     Hyperkalemia     Hyponatremia     Pseudomonas urinary tract infection     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, with long-term current use of insulin (H)     Past Medical History:   Diagnosis Date     Acute kidney failure (H)     04/2017     Essential (primary) hypertension     8/7/2012     Hyperlipidemia     No Comments Provided     Osteoarthritis of hip     No Comments Provided     Pityriasis rosea     No Comments Provided     Tachycardia     No Comments Provided     Unilateral inguinal hernia without obstruction or gangrene     8/12/2013,Right Inguinal hernia with incarceration, massive [[id number]][     Past Surgical History:   Procedure Laterality Date     CIRCUMCISION      03/14/2017,Dr. Heidi GREENBERG     OTHER SURGICAL HISTORY      98047.9,[place] SUBTALAR ATHROEREISIS,bone Spurs excision on Toe     OTHER SURGICAL HISTORY       13,,HERNIA REPAIR,Right,RIH with mesh     OTHER SURGICAL HISTORY      13,89838,GENITAL SURGERY MALE,Dorsal Slit     OTHER SURGICAL HISTORY      4/15/14,,HERNIA REPAIR,Left,LIH with mesh     OTHER SURGICAL HISTORY      14,34986.0,[place] TOTAL HIP ARTHROPLASTY,Left     Social History     Socioeconomic History     Marital status:      Spouse name: Lucita     Number of children: Not on file     Years of education: Not on file     Highest education level: Not on file   Occupational History     Not on file   Social Needs     Financial resource strain: Not on file     Food insecurity     Worry: Not on file     Inability: Not on file     Transportation needs     Medical: Not on file     Non-medical: Not on file   Tobacco Use     Smoking status: Former Smoker     Types: Cigarettes     Quit date: 1976     Years since quittin.3     Smokeless tobacco: Never Used   Substance and Sexual Activity     Alcohol use: Yes     Alcohol/week: 0.8 standard drinks     Comment: occ beer     Drug use: Never     Sexual activity: Not Currently   Lifestyle     Physical activity     Days per week: Not on file     Minutes per session: Not on file     Stress: Not on file   Relationships     Social connections     Talks on phone: Not on file     Gets together: Not on file     Attends Adventism service: Not on file     Active member of club or organization: Not on file     Attends meetings of clubs or organizations: Not on file     Relationship status: Not on file     Intimate partner violence     Fear of current or ex partner: Not on file     Emotionally abused: Not on file     Physically abused: Not on file     Forced sexual activity: Not on file   Other Topics Concern     Not on file   Social History Narrative     - Wife Claudia Rose kids - oldest son - neuroblastoma at 13 yo  at 14.   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     Family  History   Problem Relation Age of Onset     Other - See Comments Son 12        neuroblastoma at 11 yo  at 14.     Genetic Disorder No family hx of         Genetic,No known FHx of DM, CAD, CVA     Current Outpatient Medications   Medication Sig Dispense Refill     amLODIPine (NORVASC) 5 MG tablet Take 1 tablet (5 mg) by mouth daily 90 tablet 3     B-D U/F 31G X 8 MM insulin pen needle INJECT SUBCUTANEOUS AT BEDTIME USE 1 PEN NEEDLES DAILY OR AS DIRECTED. 100 each 1     blood glucose (NO BRAND SPECIFIED) lancets standard Dispense item covered by pt ins. E11.65 IDDM type II, uncontrolled - Test 3 times/day. (Wants Barrel lancets)       blood glucose monitoring (NO BRAND SPECIFIED) test strip Dispense item covered by pt ins. E11.65 IDDM type II, uncontrolled - Test 4 times/day. Reason: New diabetes       Blood Glucose Monitoring Suppl (FIFTY50 GLUCOSE METER 2.0) W/DEVICE KIT Dispense meter, test strips, lancets covered by pt ins. E11.65 IDDM type II, uncontrolled - Test 4 times/day. Reason: New diabetes       calcitRIOL (ROCALTROL) 0.25 MCG capsule TAKE 1 CAP BY MOUTH EVERY MONDAY, WEDNESDAY AND FRIDAY.       cholecalciferol (VITAMIN D3) 1000 units (25 mcg) capsule Take 1 capsule (1,000 Units) by mouth daily       darbepoetin miller (ARANESP) 60 MCG/0.3ML injection Inject 60 mcg Subcutaneous       famotidine (PEPCID) 20 MG tablet Take 1 tablet (20 mg) by mouth daily -- for heartburn (Use as needed) 90 tablet 3     finasteride (PROSCAR) 5 MG tablet Take 1 tablet (5 mg) by mouth daily 90 tablet 3     furosemide (LASIX) 40 MG tablet Take 1 tablet (40 mg) by mouth every morning 90 tablet 3     insulin glargine (LANTUS SOLOSTAR) 100 UNIT/ML pen Inject 2 Units Subcutaneous At Bedtime 15 mL 3     levofloxacin (LEVAQUIN) 250 MG tablet Take 1 tablet (250 mg) by mouth every 48 hours for 7 doses 7 tablet 0     predniSONE (DELTASONE) 2.5 MG tablet Take 1 tablet (2.5 mg) by mouth daily 90 tablet 3     sodium bicarbonate 650 MG  tablet TAKE 1 TABLET (650 MG) BY MOUTH DAILY 90 tablet 3     tamsulosin (FLOMAX) 0.4 MG capsule Take 1 capsule (0.4 mg) by mouth every evening 90 capsule 3     Patient has no known allergies.      Review of Systems:  Review of Systems  Denies fever, chills, redness and warmth around wound, swelling and odorous or purulent drainage.    Objective:   /86 (BP Location: Right arm, Patient Position: Sitting, Cuff Size: Adult Regular)   Pulse 85   Temp 96.2  F (35.7  C) (Tympanic)   Resp 18   Wt 82.2 kg (181 lb 3.2 oz)   SpO2 99%   BMI 26.69 kg/m    Physical Exam  Pleasant gentleman in no acute distress.  Affect.  Alert and oriented x4.  The dehisced wound at the right shin  has a  100 % pink granulation tissue.  Wound measures 0.7 x 2 cm.  Moderate amount of serosanguineous drainage.  No surrounding erythema warmth or maceration.  Wound is irrigated with Anasept then 3 inch Allevyn gentle border light dressing.      Assessment:    ICD-10-CM    1. Wound dehiscence  T81.30XA    2. Immunocompromised patient (H)  D84.9        Plan:   Continue with twice weekly dressing change.  Elevate leg above heart as able.  Monitor and report any evidence of infection.    PAL Godinez   4/9/2021  1:25 PM

## 2021-04-09 NOTE — NURSING NOTE
"Chief Complaint   Patient presents with     WOUND CARE       Initial /86 (BP Location: Right arm, Patient Position: Sitting, Cuff Size: Adult Regular)   Pulse 85   Temp 96.2  F (35.7  C) (Tympanic)   Resp 18   Wt 82.2 kg (181 lb 3.2 oz)   SpO2 99%   BMI 26.69 kg/m   Estimated body mass index is 26.69 kg/m  as calculated from the following:    Height as of 3/30/21: 1.755 m (5' 9.09\").    Weight as of this encounter: 82.2 kg (181 lb 3.2 oz).  Medication Reconciliation: complete    Debbie Castillo LPN                                                                    "

## 2021-04-13 ENCOUNTER — ALLIED HEALTH/NURSE VISIT (OUTPATIENT)
Dept: FAMILY MEDICINE | Facility: OTHER | Age: 86
End: 2021-04-13
Attending: INTERNAL MEDICINE
Payer: MEDICARE

## 2021-04-13 DIAGNOSIS — E53.8 B12 DEFICIENCY: Primary | ICD-10-CM

## 2021-04-13 PROCEDURE — 250N000011 HC RX IP 250 OP 636: Performed by: INTERNAL MEDICINE

## 2021-04-13 PROCEDURE — 96372 THER/PROPH/DIAG INJ SC/IM: CPT | Performed by: INTERNAL MEDICINE

## 2021-04-13 RX ADMIN — CYANOCOBALAMIN 1000 MCG: 1000 INJECTION, SOLUTION INTRAMUSCULAR at 10:12

## 2021-04-13 NOTE — PROGRESS NOTES
Verified patient's first and last name, and . Patient stated reason for visit today is to receive b12. Patient denied any concerns with previous injections. B12 prepared and administered IM as ordered. Administration of medication documented in MAR (see MAR for further information regarding dose, lot #, NDC #, expiration date). Patient encouraged to wait in lobby for 15 minutes post-injection and notify staff immediately of any reaction.       Mia Montejo RN  ....................  2021   10:10 AM

## 2021-04-14 ENCOUNTER — OFFICE VISIT (OUTPATIENT)
Dept: INTERNAL MEDICINE | Facility: OTHER | Age: 86
End: 2021-04-14
Attending: NURSE PRACTITIONER
Payer: MEDICARE

## 2021-04-14 VITALS
WEIGHT: 180.2 LBS | SYSTOLIC BLOOD PRESSURE: 124 MMHG | HEART RATE: 72 BPM | OXYGEN SATURATION: 100 % | RESPIRATION RATE: 16 BRPM | BODY MASS INDEX: 26.54 KG/M2 | DIASTOLIC BLOOD PRESSURE: 82 MMHG | TEMPERATURE: 96.7 F

## 2021-04-14 DIAGNOSIS — D84.9 IMMUNOCOMPROMISED PATIENT (H): ICD-10-CM

## 2021-04-14 DIAGNOSIS — T81.30XA WOUND DEHISCENCE: Primary | ICD-10-CM

## 2021-04-14 PROCEDURE — 99212 OFFICE O/P EST SF 10 MIN: CPT | Performed by: NURSE PRACTITIONER

## 2021-04-14 PROCEDURE — G0463 HOSPITAL OUTPT CLINIC VISIT: HCPCS | Mod: 25

## 2021-04-14 PROCEDURE — G0463 HOSPITAL OUTPT CLINIC VISIT: HCPCS

## 2021-04-14 ASSESSMENT — PAIN SCALES - GENERAL: PAINLEVEL: NO PAIN (0)

## 2021-04-14 NOTE — PROGRESS NOTES
Subjective:  He is here today to follow-up on dehisced laceration of right lower leg.   He is immunocompromise and is on daily steroids. He is with overall improvement of wound.    Patient Active Problem List   Diagnosis     Acquired buried penis     Acute crescentic glomerulonephritis     Anemia     Anemia due to vitamin B12 deficiency     Anemia of chronic disease     Antineutrophil cytoplasmic antibody (ANCA) positive     Bilateral edema of lower extremity     Bilateral leg edema     CKD (chronic kidney disease) stage 5, GFR 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 ml/min (H)     Mixed hyperlipidemia     Benign essential hypertension     H/O bilateral inguinal hernia repair     H/O total hip arthroplasty     Hematoma     Hip stiffness     History of tobacco abuse     Immunocompromised patient (H)     Lymphedema of both lower extremities     Metabolic acidosis     Pityriasis rosea     Primary pauci-immune necrotizing and crescentic glomerulonephritis     Refusal of statin medication at discharge     Steroid-induced hyperglycemia     Heartburn     Hyperkalemia     Hyponatremia     Pseudomonas urinary tract infection     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, with long-term current use of insulin (H)     Past Medical History:   Diagnosis Date     Acute kidney failure (H)     04/2017     Essential (primary) hypertension     8/7/2012     Hyperlipidemia     No Comments Provided     Osteoarthritis of hip     No Comments Provided     Pityriasis rosea     No Comments Provided     Tachycardia     No Comments Provided     Unilateral inguinal hernia without obstruction or gangrene     8/12/2013,Right Inguinal hernia with incarceration, massive [[id number]][     Past Surgical History:   Procedure Laterality Date     CIRCUMCISION      03/14/2017,Dr. Heidi GREENBERG     OTHER SURGICAL HISTORY      76242.9,[place] SUBTALAR ATHROEREISIS,bone Spurs excision on Toe     OTHER SURGICAL HISTORY      8/20/13,,HERNIA  REPAIR,Right,RIH with mesh     OTHER SURGICAL HISTORY      13,68796,GENITAL SURGERY MALE,Dorsal Slit     OTHER SURGICAL HISTORY      4/15/14,,HERNIA REPAIR,Left,LIH with mesh     OTHER SURGICAL HISTORY      14,83803.0,[place] TOTAL HIP ARTHROPLASTY,Left     Social History     Socioeconomic History     Marital status:      Spouse name: Lucita     Number of children: Not on file     Years of education: Not on file     Highest education level: Not on file   Occupational History     Not on file   Social Needs     Financial resource strain: Not on file     Food insecurity     Worry: Not on file     Inability: Not on file     Transportation needs     Medical: Not on file     Non-medical: Not on file   Tobacco Use     Smoking status: Former Smoker     Types: Cigarettes     Quit date: 1976     Years since quittin.3     Smokeless tobacco: Never Used   Substance and Sexual Activity     Alcohol use: Yes     Alcohol/week: 0.8 standard drinks     Comment: occ beer     Drug use: Never     Sexual activity: Not Currently   Lifestyle     Physical activity     Days per week: Not on file     Minutes per session: Not on file     Stress: Not on file   Relationships     Social connections     Talks on phone: Not on file     Gets together: Not on file     Attends Muslim service: Not on file     Active member of club or organization: Not on file     Attends meetings of clubs or organizations: Not on file     Relationship status: Not on file     Intimate partner violence     Fear of current or ex partner: Not on file     Emotionally abused: Not on file     Physically abused: Not on file     Forced sexual activity: Not on file   Other Topics Concern     Not on file   Social History Narrative     - Wife Claudia Rose kids - oldest son - neuroblastoma at 13 yo  at 14.   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     Family History   Problem  Relation Age of Onset     Other - See Comments Son 12        neuroblastoma at 13 yo  at 14.     Genetic Disorder No family hx of         Genetic,No known FHx of DM, CAD, CVA     Current Outpatient Medications   Medication Sig Dispense Refill     amLODIPine (NORVASC) 5 MG tablet Take 1 tablet (5 mg) by mouth daily 90 tablet 3     B-D U/F 31G X 8 MM insulin pen needle INJECT SUBCUTANEOUS AT BEDTIME USE 1 PEN NEEDLES DAILY OR AS DIRECTED. 100 each 1     blood glucose (NO BRAND SPECIFIED) lancets standard Dispense item covered by pt ins. E11.65 IDDM type II, uncontrolled - Test 3 times/day. (Wants Barrel lancets)       blood glucose monitoring (NO BRAND SPECIFIED) test strip Dispense item covered by pt ins. E11.65 IDDM type II, uncontrolled - Test 4 times/day. Reason: New diabetes       Blood Glucose Monitoring Suppl (FIFTY50 GLUCOSE METER 2.0) W/DEVICE KIT Dispense meter, test strips, lancets covered by pt ins. E11.65 IDDM type II, uncontrolled - Test 4 times/day. Reason: New diabetes       calcitRIOL (ROCALTROL) 0.25 MCG capsule TAKE 1 CAP BY MOUTH EVERY MONDAY, WEDNESDAY AND FRIDAY.       cholecalciferol (VITAMIN D3) 1000 units (25 mcg) capsule Take 1 capsule (1,000 Units) by mouth daily       darbepoetin miller (ARANESP) 60 MCG/0.3ML injection Inject 60 mcg Subcutaneous       famotidine (PEPCID) 20 MG tablet Take 1 tablet (20 mg) by mouth daily -- for heartburn (Use as needed) 90 tablet 3     finasteride (PROSCAR) 5 MG tablet Take 1 tablet (5 mg) by mouth daily 90 tablet 3     furosemide (LASIX) 40 MG tablet Take 1 tablet (40 mg) by mouth every morning 90 tablet 3     insulin glargine (LANTUS SOLOSTAR) 100 UNIT/ML pen Inject 2 Units Subcutaneous At Bedtime 15 mL 3     levofloxacin (LEVAQUIN) 250 MG tablet Take 1 tablet (250 mg) by mouth every 48 hours for 7 doses 7 tablet 0     predniSONE (DELTASONE) 2.5 MG tablet Take 1 tablet (2.5 mg) by mouth daily 90 tablet 3     sodium bicarbonate 650 MG tablet TAKE 1 TABLET  (650 MG) BY MOUTH DAILY 90 tablet 3     tamsulosin (FLOMAX) 0.4 MG capsule Take 1 capsule (0.4 mg) by mouth every evening 90 capsule 3     Patient has no known allergies.      Review of Systems:  Review of Systems  Denies fever, chills, redness and warmth around wound, swelling and odorous or purulent drainage.    Objective:   /82 (BP Location: Right arm, Patient Position: Chair, Cuff Size: Adult Regular)   Pulse 72   Temp 96.7  F (35.9  C) (Tympanic)   Resp 16   Wt 81.7 kg (180 lb 3.2 oz)   SpO2 100%   BMI 26.54 kg/m    Physical Exam  Pleasant gentleman in no acute distress.  Affect.  Alert and oriented x4.  The dehisced wound at the right shin  has a  100 % pink granulation tissue.  Wound measures 0.7 x 1.3 cm.  Small to moderate amount of serosanguineous drainage.  No surrounding erythema warmth or maceration.  Wound is irrigated with Anasept then 3 inch Allevyn gentle border light dressing.      Assessment:    ICD-10-CM    1. Wound dehiscence  T81.30XA    2. Immunocompromised patient (H)  D84.9        Plan:   Continue with twice weekly dressing change.  Elevate leg above heart as able.  Monitor and report any evidence of infection.    PAL Godinez   4/14/2021  7:58 AM

## 2021-04-14 NOTE — NURSING NOTE
"Chief Complaint   Patient presents with     WOUND CARE     Right Leg        Initial /82 (BP Location: Right arm, Patient Position: Chair, Cuff Size: Adult Regular)   Pulse 72   Temp 96.7  F (35.9  C) (Tympanic)   Resp 16   Wt 81.7 kg (180 lb 3.2 oz)   SpO2 100%   BMI 26.54 kg/m   Estimated body mass index is 26.54 kg/m  as calculated from the following:    Height as of 3/30/21: 1.755 m (5' 9.09\").    Weight as of this encounter: 81.7 kg (180 lb 3.2 oz).  Medication Reconciliation: complete      Maria L Linn LPN on 4/14/2021 at 7:44 AM   "

## 2021-04-19 ENCOUNTER — OFFICE VISIT (OUTPATIENT)
Dept: INTERNAL MEDICINE | Facility: OTHER | Age: 86
End: 2021-04-19
Attending: NURSE PRACTITIONER
Payer: MEDICARE

## 2021-04-19 VITALS
BODY MASS INDEX: 26.8 KG/M2 | SYSTOLIC BLOOD PRESSURE: 132 MMHG | DIASTOLIC BLOOD PRESSURE: 74 MMHG | RESPIRATION RATE: 18 BRPM | TEMPERATURE: 96.8 F | HEART RATE: 76 BPM | WEIGHT: 182 LBS | OXYGEN SATURATION: 97 %

## 2021-04-19 DIAGNOSIS — T81.30XA WOUND DEHISCENCE: Primary | ICD-10-CM

## 2021-04-19 DIAGNOSIS — D84.9 IMMUNOCOMPROMISED PATIENT (H): ICD-10-CM

## 2021-04-19 PROCEDURE — G0463 HOSPITAL OUTPT CLINIC VISIT: HCPCS

## 2021-04-19 PROCEDURE — 99212 OFFICE O/P EST SF 10 MIN: CPT | Performed by: NURSE PRACTITIONER

## 2021-04-19 ASSESSMENT — PAIN SCALES - GENERAL: PAINLEVEL: NO PAIN (0)

## 2021-04-19 NOTE — PROGRESS NOTES
Subjective:  He is here today to follow-up on dehisced laceration of right lower leg.   He is immunocompromise and is on daily steroids.  Wound has been healing nicely.    Patient Active Problem List   Diagnosis     Acquired buried penis     Acute crescentic glomerulonephritis     Anemia     Anemia due to vitamin B12 deficiency     Anemia of chronic disease     Antineutrophil cytoplasmic antibody (ANCA) positive     Bilateral edema of lower extremity     Bilateral leg edema     CKD (chronic kidney disease) stage 5, GFR 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 ml/min (H)     Mixed hyperlipidemia     Benign essential hypertension     H/O bilateral inguinal hernia repair     H/O total hip arthroplasty     Hematoma     Hip stiffness     History of tobacco abuse     Immunocompromised patient (H)     Lymphedema of both lower extremities     Metabolic acidosis     Pityriasis rosea     Primary pauci-immune necrotizing and crescentic glomerulonephritis     Refusal of statin medication at discharge     Steroid-induced hyperglycemia     Heartburn     Hyperkalemia     Hyponatremia     Pseudomonas urinary tract infection     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, with long-term current use of insulin (H)     Past Medical History:   Diagnosis Date     Acute kidney failure (H)     04/2017     Essential (primary) hypertension     8/7/2012     Hyperlipidemia     No Comments Provided     Osteoarthritis of hip     No Comments Provided     Pityriasis rosea     No Comments Provided     Tachycardia     No Comments Provided     Unilateral inguinal hernia without obstruction or gangrene     8/12/2013,Right Inguinal hernia with incarceration, massive [[id number]][     Past Surgical History:   Procedure Laterality Date     CIRCUMCISION      03/14/2017,Dr. Heidi GREENBERG     OTHER SURGICAL HISTORY      25575.9,[place] SUBTALAR ATHROEREISIS,bone Spurs excision on Toe     OTHER SURGICAL HISTORY      8/20/13,,HERNIA REPAIR,Right,[facility]  with mesh     OTHER SURGICAL HISTORY      13,29174,GENITAL SURGERY MALE,Dorsal Slit     OTHER SURGICAL HISTORY      4/15/14,,HERNIA REPAIR,Left,LIH with mesh     OTHER SURGICAL HISTORY      14,77937.0,[place] TOTAL HIP ARTHROPLASTY,Left     Social History     Socioeconomic History     Marital status:      Spouse name: Lucita     Number of children: Not on file     Years of education: Not on file     Highest education level: Not on file   Occupational History     Not on file   Social Needs     Financial resource strain: Not on file     Food insecurity     Worry: Not on file     Inability: Not on file     Transportation needs     Medical: Not on file     Non-medical: Not on file   Tobacco Use     Smoking status: Former Smoker     Types: Cigarettes     Quit date: 1976     Years since quittin.3     Smokeless tobacco: Never Used   Substance and Sexual Activity     Alcohol use: Yes     Alcohol/week: 0.8 standard drinks     Comment: occ beer     Drug use: Never     Sexual activity: Not Currently   Lifestyle     Physical activity     Days per week: Not on file     Minutes per session: Not on file     Stress: Not on file   Relationships     Social connections     Talks on phone: Not on file     Gets together: Not on file     Attends Scientology service: Not on file     Active member of club or organization: Not on file     Attends meetings of clubs or organizations: Not on file     Relationship status: Not on file     Intimate partner violence     Fear of current or ex partner: Not on file     Emotionally abused: Not on file     Physically abused: Not on file     Forced sexual activity: Not on file   Other Topics Concern     Not on file   Social History Narrative     - Wife Claudia Rose kids - oldest son - neuroblastoma at 11 yo  at 14.   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     Family History   Problem Relation Age of Onset      Other - See Comments Son 12        neuroblastoma at 13 yo  at 14.     Genetic Disorder No family hx of         Genetic,No known FHx of DM, CAD, CVA     Current Outpatient Medications   Medication Sig Dispense Refill     amLODIPine (NORVASC) 5 MG tablet Take 1 tablet (5 mg) by mouth daily 90 tablet 3     B-D U/F 31G X 8 MM insulin pen needle INJECT SUBCUTANEOUS AT BEDTIME USE 1 PEN NEEDLES DAILY OR AS DIRECTED. 100 each 1     blood glucose (NO BRAND SPECIFIED) lancets standard Dispense item covered by pt ins. E11.65 IDDM type II, uncontrolled - Test 3 times/day. (Wants Barrel lancets)       blood glucose monitoring (NO BRAND SPECIFIED) test strip Dispense item covered by pt ins. E11.65 IDDM type II, uncontrolled - Test 4 times/day. Reason: New diabetes       Blood Glucose Monitoring Suppl (FIFTY50 GLUCOSE METER 2.0) W/DEVICE KIT Dispense meter, test strips, lancets covered by pt ins. E11.65 IDDM type II, uncontrolled - Test 4 times/day. Reason: New diabetes       calcitRIOL (ROCALTROL) 0.25 MCG capsule TAKE 1 CAP BY MOUTH EVERY MONDAY, WEDNESDAY AND FRIDAY.       cholecalciferol (VITAMIN D3) 1000 units (25 mcg) capsule Take 1 capsule (1,000 Units) by mouth daily       darbepoetin miller (ARANESP) 60 MCG/0.3ML injection Inject 60 mcg Subcutaneous       famotidine (PEPCID) 20 MG tablet Take 1 tablet (20 mg) by mouth daily -- for heartburn (Use as needed) 90 tablet 3     finasteride (PROSCAR) 5 MG tablet Take 1 tablet (5 mg) by mouth daily 90 tablet 3     furosemide (LASIX) 40 MG tablet Take 1 tablet (40 mg) by mouth every morning 90 tablet 3     insulin glargine (LANTUS SOLOSTAR) 100 UNIT/ML pen Inject 2 Units Subcutaneous At Bedtime 15 mL 3     predniSONE (DELTASONE) 2.5 MG tablet Take 1 tablet (2.5 mg) by mouth daily 90 tablet 3     sodium bicarbonate 650 MG tablet TAKE 1 TABLET (650 MG) BY MOUTH DAILY 90 tablet 3     tamsulosin (FLOMAX) 0.4 MG capsule Take 1 capsule (0.4 mg) by mouth every evening 90 capsule 3      Patient has no known allergies.      Review of Systems:  Review of Systems  Denies fever, chills, redness and warmth around wound, swelling and odorous or purulent drainage.    Objective:   /74   Pulse 76   Temp 96.8  F (36  C) (Tympanic)   Resp 18   Wt 82.6 kg (182 lb)   SpO2 97%   BMI 26.80 kg/m    Physical Exam  Pleasant gentleman in no acute distress.  Affect.  Alert and oriented x4.  The dehisced wound at the right shin  has a  100 % pink granulation tissue.  Wound measures 0.7 x 1 cm.  Small to moderate amount of serosanguineous drainage.  No surrounding erythema warmth or maceration.  Wound is irrigated with Anasept then 3 inch Allevyn gentle border light dressing.      Assessment:    ICD-10-CM    1. Wound dehiscence  T81.30XA    2. Immunocompromised patient (H)  D84.9        Plan:   Continue with twice weekly dressing change.  Elevate leg above heart as able.  Monitor and report any evidence of infection.    PAL Godinez   4/19/2021  8:31 AM

## 2021-04-26 ENCOUNTER — OFFICE VISIT (OUTPATIENT)
Dept: INTERNAL MEDICINE | Facility: OTHER | Age: 86
End: 2021-04-26
Attending: NURSE PRACTITIONER
Payer: MEDICARE

## 2021-04-26 VITALS
HEART RATE: 82 BPM | OXYGEN SATURATION: 99 % | BODY MASS INDEX: 27.04 KG/M2 | DIASTOLIC BLOOD PRESSURE: 78 MMHG | RESPIRATION RATE: 18 BRPM | SYSTOLIC BLOOD PRESSURE: 138 MMHG | TEMPERATURE: 96.4 F | WEIGHT: 183.6 LBS

## 2021-04-26 DIAGNOSIS — D84.9 IMMUNOCOMPROMISED PATIENT (H): ICD-10-CM

## 2021-04-26 DIAGNOSIS — T81.30XA WOUND DEHISCENCE: Primary | ICD-10-CM

## 2021-04-26 DIAGNOSIS — S81.811A NONINFECTED SKIN TEAR OF LEG, RIGHT, INITIAL ENCOUNTER: ICD-10-CM

## 2021-04-26 PROCEDURE — 99212 OFFICE O/P EST SF 10 MIN: CPT | Performed by: NURSE PRACTITIONER

## 2021-04-26 PROCEDURE — G0463 HOSPITAL OUTPT CLINIC VISIT: HCPCS

## 2021-04-26 ASSESSMENT — PAIN SCALES - GENERAL: PAINLEVEL: NO PAIN (0)

## 2021-04-26 NOTE — PROGRESS NOTES
Subjective:  He is here today to follow-up on dehisced laceration of right lower leg.   He is immunocompromise and is on daily steroids.  Wound has been healing nicely.  He is pleased with progress.    Patient Active Problem List   Diagnosis     Acquired buried penis     Acute crescentic glomerulonephritis     Anemia     Anemia due to vitamin B12 deficiency     Anemia of chronic disease     Antineutrophil cytoplasmic antibody (ANCA) positive     Bilateral edema of lower extremity     Bilateral leg edema     CKD (chronic kidney disease) stage 5, GFR 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 ml/min (H)     Mixed hyperlipidemia     Benign essential hypertension     H/O bilateral inguinal hernia repair     H/O total hip arthroplasty     Hematoma     Hip stiffness     History of tobacco abuse     Immunocompromised patient (H)     Lymphedema of both lower extremities     Metabolic acidosis     Pityriasis rosea     Primary pauci-immune necrotizing and crescentic glomerulonephritis     Refusal of statin medication at discharge     Steroid-induced hyperglycemia     Heartburn     Hyperkalemia     Hyponatremia     Pseudomonas urinary tract infection     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, with long-term current use of insulin (H)     Past Medical History:   Diagnosis Date     Acute kidney failure (H)     04/2017     Essential (primary) hypertension     8/7/2012     Hyperlipidemia     No Comments Provided     Osteoarthritis of hip     No Comments Provided     Pityriasis rosea     No Comments Provided     Tachycardia     No Comments Provided     Unilateral inguinal hernia without obstruction or gangrene     8/12/2013,Right Inguinal hernia with incarceration, massive [[id number]][     Past Surgical History:   Procedure Laterality Date     CIRCUMCISION      03/14/2017,Dr. Heidi GREENBERG     OTHER SURGICAL HISTORY      23735.9,AR SUBTALAR ATHROEREISIS,bone Spurs excision on Toe     OTHER SURGICAL HISTORY       13,,HERNIA REPAIR,Right,RIH with mesh     OTHER SURGICAL HISTORY      13,59039,GENITAL SURGERY MALE,Dorsal Slit     OTHER SURGICAL HISTORY      4/15/14,,HERNIA REPAIR,Left,LIH with mesh     OTHER SURGICAL HISTORY      14,60822.0,OR TOTAL HIP ARTHROPLASTY,Left     Social History     Socioeconomic History     Marital status:      Spouse name: Lucita     Number of children: Not on file     Years of education: Not on file     Highest education level: Not on file   Occupational History     Not on file   Social Needs     Financial resource strain: Not on file     Food insecurity     Worry: Not on file     Inability: Not on file     Transportation needs     Medical: Not on file     Non-medical: Not on file   Tobacco Use     Smoking status: Former Smoker     Types: Cigarettes     Quit date: 1976     Years since quittin.3     Smokeless tobacco: Never Used   Substance and Sexual Activity     Alcohol use: Yes     Alcohol/week: 0.8 standard drinks     Comment: occ beer     Drug use: Never     Sexual activity: Not Currently   Lifestyle     Physical activity     Days per week: Not on file     Minutes per session: Not on file     Stress: Not on file   Relationships     Social connections     Talks on phone: Not on file     Gets together: Not on file     Attends Caodaism service: Not on file     Active member of club or organization: Not on file     Attends meetings of clubs or organizations: Not on file     Relationship status: Not on file     Intimate partner violence     Fear of current or ex partner: Not on file     Emotionally abused: Not on file     Physically abused: Not on file     Forced sexual activity: Not on file   Other Topics Concern     Not on file   Social History Narrative     - Wife Claudia Rose kids - oldest son - neuroblastoma at 13 yo  at 14.   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     Family  History   Problem Relation Age of Onset     Other - See Comments Son 12        neuroblastoma at 11 yo  at 14.     Genetic Disorder No family hx of         Genetic,No known FHx of DM, CAD, CVA     Current Outpatient Medications   Medication Sig Dispense Refill     amLODIPine (NORVASC) 5 MG tablet Take 1 tablet (5 mg) by mouth daily 90 tablet 3     B-D U/F 31G X 8 MM insulin pen needle INJECT SUBCUTANEOUS AT BEDTIME USE 1 PEN NEEDLES DAILY OR AS DIRECTED. 100 each 1     blood glucose (NO BRAND SPECIFIED) lancets standard Dispense item covered by pt ins. E11.65 IDDM type II, uncontrolled - Test 3 times/day. (Wants Barrel lancets)       blood glucose monitoring (NO BRAND SPECIFIED) test strip Dispense item covered by pt ins. E11.65 IDDM type II, uncontrolled - Test 4 times/day. Reason: New diabetes       Blood Glucose Monitoring Suppl (FIFTY50 GLUCOSE METER 2.0) W/DEVICE KIT Dispense meter, test strips, lancets covered by pt ins. E11.65 IDDM type II, uncontrolled - Test 4 times/day. Reason: New diabetes       calcitRIOL (ROCALTROL) 0.25 MCG capsule TAKE 1 CAP BY MOUTH EVERY MONDAY, WEDNESDAY AND FRIDAY.       cholecalciferol (VITAMIN D3) 1000 units (25 mcg) capsule Take 1 capsule (1,000 Units) by mouth daily       darbepoetin miller (ARANESP) 60 MCG/0.3ML injection Inject 60 mcg Subcutaneous       famotidine (PEPCID) 20 MG tablet Take 1 tablet (20 mg) by mouth daily -- for heartburn (Use as needed) 90 tablet 3     finasteride (PROSCAR) 5 MG tablet Take 1 tablet (5 mg) by mouth daily 90 tablet 3     furosemide (LASIX) 40 MG tablet Take 1 tablet (40 mg) by mouth every morning 90 tablet 3     insulin glargine (LANTUS SOLOSTAR) 100 UNIT/ML pen Inject 2 Units Subcutaneous At Bedtime 15 mL 3     predniSONE (DELTASONE) 2.5 MG tablet Take 1 tablet (2.5 mg) by mouth daily 90 tablet 3     sodium bicarbonate 650 MG tablet TAKE 1 TABLET (650 MG) BY MOUTH DAILY 90 tablet 3     tamsulosin (FLOMAX) 0.4 MG capsule Take 1 capsule (0.4  mg) by mouth every evening 90 capsule 3     Patient has no known allergies.      Review of Systems:  Review of Systems  Denies fever, chills, redness and warmth around wound, swelling and odorous or purulent drainage.    Objective:   /78   Pulse 82   Temp 96.4  F (35.8  C) (Tympanic)   Resp 18   Wt 83.3 kg (183 lb 9.6 oz)   SpO2 99%   BMI 27.04 kg/m    Physical Exam  Pleasant gentleman in no acute distress.  Affect.  Alert and oriented x4.  The dehisced wound at the right shin has closed.  He does have 2 small skin tears lateral to the closed wound that are very superficial and measures 0.9 x 2.3 x < 0.1 cm and 0.5 x 2.5 x < 0.1 cm.  Small amount of serosanguineous drainage.  No surrounding erythema warmth or maceration.  Wound is irrigated with Anasept then 3 inch Allevyn gentle border light dressing.      Assessment:    ICD-10-CM    1. Wound dehiscence  T81.30XA    2. Immunocompromised patient (H)  D84.9    3. Noninfected skin tear of leg, right, initial encounter  S81.811A        Plan:   Continue with twice weekly dressing change.  Elevate leg above heart as able.  Monitor and report any evidence of infection.  He has a follow-up appointment on Friday.  Hopefully skin tears will be healed at that point.    Brenda Horne, PAL   4/26/2021  7:58 AM

## 2021-04-30 ENCOUNTER — OFFICE VISIT (OUTPATIENT)
Dept: INTERNAL MEDICINE | Facility: OTHER | Age: 86
End: 2021-04-30
Attending: NURSE PRACTITIONER
Payer: MEDICARE

## 2021-04-30 VITALS
RESPIRATION RATE: 18 BRPM | TEMPERATURE: 96.2 F | DIASTOLIC BLOOD PRESSURE: 72 MMHG | SYSTOLIC BLOOD PRESSURE: 130 MMHG | WEIGHT: 181.2 LBS | OXYGEN SATURATION: 100 % | HEART RATE: 80 BPM | BODY MASS INDEX: 26.69 KG/M2

## 2021-04-30 DIAGNOSIS — T81.30XA WOUND DEHISCENCE: Primary | ICD-10-CM

## 2021-04-30 DIAGNOSIS — D84.9 IMMUNOCOMPROMISED PATIENT (H): ICD-10-CM

## 2021-04-30 DIAGNOSIS — S81.811A NONINFECTED SKIN TEAR OF LEG, RIGHT, INITIAL ENCOUNTER: ICD-10-CM

## 2021-04-30 PROCEDURE — 99212 OFFICE O/P EST SF 10 MIN: CPT | Performed by: NURSE PRACTITIONER

## 2021-04-30 PROCEDURE — G0463 HOSPITAL OUTPT CLINIC VISIT: HCPCS

## 2021-04-30 ASSESSMENT — PAIN SCALES - GENERAL: PAINLEVEL: NO PAIN (0)

## 2021-04-30 NOTE — PROGRESS NOTES
Subjective:  He is here today for follow-up.  He had a dehisced wound that did heal but then developed a skin tear from the adhesive on the dressing.  He states he has not had any leg pain.    Patient Active Problem List   Diagnosis     Acquired buried penis     Acute crescentic glomerulonephritis     Anemia     Anemia due to vitamin B12 deficiency     Anemia of chronic disease     Antineutrophil cytoplasmic antibody (ANCA) positive     Bilateral edema of lower extremity     Bilateral leg edema     CKD (chronic kidney disease) stage 5, GFR 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 ml/min (H)     Mixed hyperlipidemia     Benign essential hypertension     H/O bilateral inguinal hernia repair     H/O total hip arthroplasty     Hematoma     Hip stiffness     History of tobacco abuse     Immunocompromised patient (H)     Lymphedema of both lower extremities     Metabolic acidosis     Pityriasis rosea     Primary pauci-immune necrotizing and crescentic glomerulonephritis     Refusal of statin medication at discharge     Steroid-induced hyperglycemia     Heartburn     Hyperkalemia     Hyponatremia     Pseudomonas urinary tract infection     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, with long-term current use of insulin (H)     Past Medical History:   Diagnosis Date     Acute kidney failure (H)     04/2017     Essential (primary) hypertension     8/7/2012     Hyperlipidemia     No Comments Provided     Osteoarthritis of hip     No Comments Provided     Pityriasis rosea     No Comments Provided     Tachycardia     No Comments Provided     Unilateral inguinal hernia without obstruction or gangrene     8/12/2013,Right Inguinal hernia with incarceration, massive [[id number]][     Past Surgical History:   Procedure Laterality Date     CIRCUMCISION      03/14/2017,Dr. Heidi GREENBERG     OTHER SURGICAL HISTORY      56775.9,CO SUBTALAR ATHROEREISIS,bone Spurs excision on Toe     OTHER SURGICAL HISTORY       13,,HERNIA REPAIR,Right,RIH with mesh     OTHER SURGICAL HISTORY      13,78465,GENITAL SURGERY MALE,Dorsal Slit     OTHER SURGICAL HISTORY      4/15/14,,HERNIA REPAIR,Left,LIH with mesh     OTHER SURGICAL HISTORY      14,80687.0,CT TOTAL HIP ARTHROPLASTY,Left     Social History     Socioeconomic History     Marital status:      Spouse name: Lucita     Number of children: Not on file     Years of education: Not on file     Highest education level: Not on file   Occupational History     Not on file   Social Needs     Financial resource strain: Not on file     Food insecurity     Worry: Not on file     Inability: Not on file     Transportation needs     Medical: Not on file     Non-medical: Not on file   Tobacco Use     Smoking status: Former Smoker     Types: Cigarettes     Quit date: 1976     Years since quittin.3     Smokeless tobacco: Never Used   Substance and Sexual Activity     Alcohol use: Yes     Alcohol/week: 0.8 standard drinks     Comment: occ beer     Drug use: Never     Sexual activity: Not Currently   Lifestyle     Physical activity     Days per week: Not on file     Minutes per session: Not on file     Stress: Not on file   Relationships     Social connections     Talks on phone: Not on file     Gets together: Not on file     Attends Advent service: Not on file     Active member of club or organization: Not on file     Attends meetings of clubs or organizations: Not on file     Relationship status: Not on file     Intimate partner violence     Fear of current or ex partner: Not on file     Emotionally abused: Not on file     Physically abused: Not on file     Forced sexual activity: Not on file   Other Topics Concern     Not on file   Social History Narrative     - Wife Claudia Rose kids - oldest son - neuroblastoma at 13 yo  at 14.   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     Family  History   Problem Relation Age of Onset     Other - See Comments Son 12        neuroblastoma at 11 yo  at 14.     Genetic Disorder No family hx of         Genetic,No known FHx of DM, CAD, CVA     Current Outpatient Medications   Medication Sig Dispense Refill     amLODIPine (NORVASC) 5 MG tablet Take 1 tablet (5 mg) by mouth daily 90 tablet 3     B-D U/F 31G X 8 MM insulin pen needle INJECT SUBCUTANEOUS AT BEDTIME USE 1 PEN NEEDLES DAILY OR AS DIRECTED. 100 each 1     blood glucose (NO BRAND SPECIFIED) lancets standard Dispense item covered by pt ins. E11.65 IDDM type II, uncontrolled - Test 3 times/day. (Wants Barrel lancets)       blood glucose monitoring (NO BRAND SPECIFIED) test strip Dispense item covered by pt ins. E11.65 IDDM type II, uncontrolled - Test 4 times/day. Reason: New diabetes       Blood Glucose Monitoring Suppl (FIFTY50 GLUCOSE METER 2.0) W/DEVICE KIT Dispense meter, test strips, lancets covered by pt ins. E11.65 IDDM type II, uncontrolled - Test 4 times/day. Reason: New diabetes       calcitRIOL (ROCALTROL) 0.25 MCG capsule TAKE 1 CAP BY MOUTH EVERY MONDAY, WEDNESDAY AND FRIDAY.       cholecalciferol (VITAMIN D3) 1000 units (25 mcg) capsule Take 1 capsule (1,000 Units) by mouth daily       darbepoetin miller (ARANESP) 60 MCG/0.3ML injection Inject 60 mcg Subcutaneous       famotidine (PEPCID) 20 MG tablet Take 1 tablet (20 mg) by mouth daily -- for heartburn (Use as needed) 90 tablet 3     finasteride (PROSCAR) 5 MG tablet Take 1 tablet (5 mg) by mouth daily 90 tablet 3     furosemide (LASIX) 40 MG tablet Take 1 tablet (40 mg) by mouth every morning 90 tablet 3     insulin glargine (LANTUS SOLOSTAR) 100 UNIT/ML pen Inject 2 Units Subcutaneous At Bedtime 15 mL 3     predniSONE (DELTASONE) 2.5 MG tablet Take 1 tablet (2.5 mg) by mouth daily 90 tablet 3     sodium bicarbonate 650 MG tablet TAKE 1 TABLET (650 MG) BY MOUTH DAILY 90 tablet 3     tamsulosin (FLOMAX) 0.4 MG capsule Take 1 capsule (0.4  mg) by mouth every evening 90 capsule 3     Patient has no known allergies.      Review of Systems:  Review of Systems  Denies fever, chills, odorous and purulent drainage around dressing, redness or warmth of the leg.  Objective:   /72   Pulse 80   Temp 96.2  F (35.7  C) (Tympanic)   Resp 18   Wt 82.2 kg (181 lb 3.2 oz)   SpO2 100%   BMI 26.69 kg/m    Physical Exam  Pleasant gentleman no acute distress.  Affect normal.  Alert and oriented x4.  Bandage removed from right leg had only scant serosanguineous drainage.  The skin tear has closed.  No surrounding erythema warmth or maceration.    Assessment:    ICD-10-CM    1. Wound dehiscence  T81.30XA    2. Immunocompromised patient (H)  D84.9    3. Noninfected skin tear of leg, right, initial encounter  S81.811A        Plan:   Skin tear has healed.  Follow-up as needed.    PAL Godinez   4/30/2021  7:49 AM

## 2021-05-11 ENCOUNTER — ALLIED HEALTH/NURSE VISIT (OUTPATIENT)
Dept: FAMILY MEDICINE | Facility: OTHER | Age: 86
End: 2021-05-11
Attending: INTERNAL MEDICINE
Payer: MEDICARE

## 2021-05-11 DIAGNOSIS — E53.8 B12 DEFICIENCY: Primary | ICD-10-CM

## 2021-05-11 PROCEDURE — 250N000011 HC RX IP 250 OP 636: Performed by: INTERNAL MEDICINE

## 2021-05-11 PROCEDURE — 96372 THER/PROPH/DIAG INJ SC/IM: CPT | Performed by: INTERNAL MEDICINE

## 2021-05-11 RX ADMIN — CYANOCOBALAMIN 1000 MCG: 1000 INJECTION, SOLUTION INTRAMUSCULAR at 08:19

## 2021-05-11 NOTE — PROGRESS NOTES
Verified patient's first and last name, and . Patient stated reason for visit today is to receive a B12 injection. Patient denied any concerns with previous injections. B12 prepared and administered IM as ordered. Administration of medication documented in MAR (see MAR for further information regarding dose, lot #, NDC #, expiration date). Patient encouraged to wait in lobby for 15 minutes post-injection and notify staff immediately of any reaction.     Mia Montejo RN ....................  2021   8:16 AM

## 2021-05-24 DIAGNOSIS — R33.9 URINARY RETENTION: ICD-10-CM

## 2021-05-24 RX ORDER — FINASTERIDE 5 MG/1
TABLET, FILM COATED ORAL
Qty: 90 TABLET | Refills: 3 | Status: SHIPPED | OUTPATIENT
Start: 2021-05-24 | End: 2022-08-15

## 2021-06-01 ENCOUNTER — TRANSFERRED RECORDS (OUTPATIENT)
Dept: HEALTH INFORMATION MANAGEMENT | Facility: CLINIC | Age: 86
End: 2021-06-01
Payer: COMMERCIAL

## 2021-06-01 LAB — RETINOPATHY: NORMAL

## 2021-06-08 ENCOUNTER — ALLIED HEALTH/NURSE VISIT (OUTPATIENT)
Dept: FAMILY MEDICINE | Facility: OTHER | Age: 86
End: 2021-06-08
Attending: INTERNAL MEDICINE
Payer: MEDICARE

## 2021-06-08 DIAGNOSIS — E53.8 B12 DEFICIENCY: Primary | ICD-10-CM

## 2021-06-08 PROCEDURE — 96372 THER/PROPH/DIAG INJ SC/IM: CPT | Performed by: INTERNAL MEDICINE

## 2021-06-08 PROCEDURE — 250N000011 HC RX IP 250 OP 636: Performed by: INTERNAL MEDICINE

## 2021-06-08 RX ADMIN — CYANOCOBALAMIN 1000 MCG: 1000 INJECTION, SOLUTION INTRAMUSCULAR at 08:55

## 2021-06-08 NOTE — PROGRESS NOTES
Verified patient's first and last name, and . Patient stated reason for visit today is to receive a B12 injection. Patient denied any concerns with previous injections. B12 prepared and administered IM as ordered. Administration of medication documented in MAR (see MAR for further information regarding dose, lot #, NDC #, expiration date). Patient encouraged to wait in lobby for 15 minutes post-injection and notify staff immediately of any reaction.     Poonam Florez RN ....................  2021   8:57 AM

## 2021-06-22 ENCOUNTER — TRANSFERRED RECORDS (OUTPATIENT)
Dept: HEALTH INFORMATION MANAGEMENT | Facility: OTHER | Age: 86
End: 2021-06-22

## 2021-07-06 ENCOUNTER — ALLIED HEALTH/NURSE VISIT (OUTPATIENT)
Dept: FAMILY MEDICINE | Facility: OTHER | Age: 86
End: 2021-07-06
Attending: INTERNAL MEDICINE
Payer: MEDICARE

## 2021-07-06 DIAGNOSIS — E53.8 B12 DEFICIENCY: Primary | ICD-10-CM

## 2021-07-06 PROCEDURE — 96372 THER/PROPH/DIAG INJ SC/IM: CPT | Performed by: INTERNAL MEDICINE

## 2021-07-06 PROCEDURE — 250N000011 HC RX IP 250 OP 636: Performed by: INTERNAL MEDICINE

## 2021-07-06 RX ORDER — CYANOCOBALAMIN 1000 UG/ML
1000 INJECTION, SOLUTION INTRAMUSCULAR; SUBCUTANEOUS CONTINUOUS PRN
Status: DISCONTINUED | OUTPATIENT
Start: 2021-07-06 | End: 2023-01-01

## 2021-07-06 RX ADMIN — CYANOCOBALAMIN 1000 MCG: 1000 INJECTION, SOLUTION INTRAMUSCULAR; SUBCUTANEOUS at 08:54

## 2021-07-06 NOTE — PROGRESS NOTES
Verified patient's first and last name, and . Patient stated reason for visit today is to receive a B12 injection. Patient denied any concerns with previous injections. B12 prepared and administered IM as ordered. Administration of medication documented in MAR (see MAR for further information regarding dose, lot #, NDC #, expiration date). Patient encouraged to wait in lobby for 15 minutes post-injection and notify staff immediately of any reaction.     Mia Montejo RN ....................  2021   8:17 AM

## 2021-07-12 ENCOUNTER — LAB (OUTPATIENT)
Dept: LAB | Facility: OTHER | Age: 86
End: 2021-07-12
Attending: INTERNAL MEDICINE
Payer: MEDICARE

## 2021-07-12 ENCOUNTER — APPOINTMENT (OUTPATIENT)
Dept: LAB | Facility: OTHER | Age: 86
End: 2021-07-12
Attending: INTERNAL MEDICINE
Payer: MEDICARE

## 2021-07-12 DIAGNOSIS — Z79.4 TYPE 2 DIABETES MELLITUS WITH STAGE 5 CHRONIC KIDNEY DISEASE NOT ON CHRONIC DIALYSIS, WITH LONG-TERM CURRENT USE OF INSULIN (H): ICD-10-CM

## 2021-07-12 DIAGNOSIS — E11.22 TYPE 2 DIABETES MELLITUS WITH STAGE 5 CHRONIC KIDNEY DISEASE NOT ON CHRONIC DIALYSIS, WITH LONG-TERM CURRENT USE OF INSULIN (H): ICD-10-CM

## 2021-07-12 DIAGNOSIS — I10 BENIGN ESSENTIAL HYPERTENSION: ICD-10-CM

## 2021-07-12 DIAGNOSIS — E78.2 MIXED HYPERLIPIDEMIA: ICD-10-CM

## 2021-07-12 DIAGNOSIS — R82.90 ABNORMAL URINALYSIS: Primary | ICD-10-CM

## 2021-07-12 DIAGNOSIS — N18.5 TYPE 2 DIABETES MELLITUS WITH STAGE 5 CHRONIC KIDNEY DISEASE NOT ON CHRONIC DIALYSIS, WITH LONG-TERM CURRENT USE OF INSULIN (H): ICD-10-CM

## 2021-07-12 LAB
ALBUMIN SERPL-MCNC: 4.2 G/DL (ref 3.5–5.7)
ALBUMIN UR-MCNC: 30 MG/DL
ALP SERPL-CCNC: 58 U/L (ref 34–104)
ALT SERPL W P-5'-P-CCNC: 9 U/L (ref 7–52)
ANION GAP SERPL CALCULATED.3IONS-SCNC: 13 MMOL/L (ref 3–14)
APPEARANCE UR: ABNORMAL
AST SERPL W P-5'-P-CCNC: 12 U/L (ref 13–39)
BACTERIA #/AREA URNS HPF: ABNORMAL /HPF
BILIRUB SERPL-MCNC: 0.4 MG/DL (ref 0.3–1)
BILIRUB UR QL STRIP: NEGATIVE
BUN SERPL-MCNC: 71 MG/DL (ref 7–25)
CALCIUM SERPL-MCNC: 8.9 MG/DL (ref 8.6–10.3)
CHLORIDE BLD-SCNC: 105 MMOL/L (ref 98–107)
CHOLEST SERPL-MCNC: 162 MG/DL
CO2 SERPL-SCNC: 21 MMOL/L (ref 21–31)
COLOR UR AUTO: ABNORMAL
CREAT SERPL-MCNC: 5.65 MG/DL (ref 0.7–1.3)
ERYTHROCYTE [DISTWIDTH] IN BLOOD BY AUTOMATED COUNT: 13.3 % (ref 10–15)
FASTING STATUS PATIENT QL REPORTED: YES
GFR SERPL CREATININE-BSD FRML MDRD: 8 ML/MIN/1.73M2
GLUCOSE BLD-MCNC: 210 MG/DL (ref 70–105)
GLUCOSE UR STRIP-MCNC: 30 MG/DL
HBA1C MFR BLD: 6 % (ref 4–6.2)
HCT VFR BLD AUTO: 37.1 % (ref 40–53)
HDLC SERPL-MCNC: 32 MG/DL (ref 23–92)
HGB BLD-MCNC: 12.1 G/DL (ref 13.3–17.7)
HGB UR QL STRIP: ABNORMAL
KETONES UR STRIP-MCNC: NEGATIVE MG/DL
LDLC SERPL CALC-MCNC: 96 MG/DL
LEUKOCYTE ESTERASE UR QL STRIP: ABNORMAL
MCH RBC QN AUTO: 31 PG (ref 26.5–33)
MCHC RBC AUTO-ENTMCNC: 32.6 G/DL (ref 31.5–36.5)
MCV RBC AUTO: 95 FL (ref 78–100)
MUCOUS THREADS #/AREA URNS LPF: PRESENT /LPF
NITRATE UR QL: POSITIVE
NONHDLC SERPL-MCNC: 130 MG/DL
PH UR STRIP: 6 [PH] (ref 5–9)
PLATELET # BLD AUTO: 228 10E3/UL (ref 150–450)
POTASSIUM BLD-SCNC: 4 MMOL/L (ref 3.5–5.1)
PROT SERPL-MCNC: 6.8 G/DL (ref 6.4–8.9)
RBC # BLD AUTO: 3.9 10E6/UL (ref 4.4–5.9)
RBC URINE: 5 /HPF
SODIUM SERPL-SCNC: 139 MMOL/L (ref 134–144)
SP GR UR STRIP: 1.01 (ref 1–1.03)
TRIGL SERPL-MCNC: 170 MG/DL
TSH SERPL DL<=0.005 MIU/L-ACNC: 4.34 MU/L (ref 0.4–4)
UROBILINOGEN UR STRIP-MCNC: NORMAL MG/DL
WBC # BLD AUTO: 13.3 10E3/UL (ref 4–11)
WBC URINE: 159 /HPF

## 2021-07-12 PROCEDURE — 82040 ASSAY OF SERUM ALBUMIN: CPT | Mod: ZL

## 2021-07-12 PROCEDURE — 85027 COMPLETE CBC AUTOMATED: CPT | Mod: ZL

## 2021-07-12 PROCEDURE — 83036 HEMOGLOBIN GLYCOSYLATED A1C: CPT | Mod: ZL

## 2021-07-12 PROCEDURE — 82043 UR ALBUMIN QUANTITATIVE: CPT | Mod: ZL

## 2021-07-12 PROCEDURE — 36415 COLL VENOUS BLD VENIPUNCTURE: CPT | Mod: ZL

## 2021-07-12 PROCEDURE — 82247 BILIRUBIN TOTAL: CPT | Mod: ZL

## 2021-07-12 PROCEDURE — 83718 ASSAY OF LIPOPROTEIN: CPT | Mod: ZL

## 2021-07-12 PROCEDURE — 87086 URINE CULTURE/COLONY COUNT: CPT | Mod: ZL | Performed by: INTERNAL MEDICINE

## 2021-07-12 PROCEDURE — 84443 ASSAY THYROID STIM HORMONE: CPT | Mod: ZL

## 2021-07-12 PROCEDURE — 81001 URINALYSIS AUTO W/SCOPE: CPT | Mod: ZL

## 2021-07-13 LAB
CREAT UR-MCNC: 45 MG/DL
MICROALBUMIN UR-MCNC: 118 MG/DL
MICROALBUMIN/CREAT UR: 262.22 MG/G CR (ref 0–17)

## 2021-07-14 ENCOUNTER — OFFICE VISIT (OUTPATIENT)
Dept: UROLOGY | Facility: OTHER | Age: 86
End: 2021-07-14
Attending: UROLOGY
Payer: MEDICARE

## 2021-07-14 ENCOUNTER — OFFICE VISIT (OUTPATIENT)
Dept: INTERNAL MEDICINE | Facility: OTHER | Age: 86
End: 2021-07-14
Attending: INTERNAL MEDICINE
Payer: MEDICARE

## 2021-07-14 VITALS
WEIGHT: 183.25 LBS | BODY MASS INDEX: 26.99 KG/M2 | TEMPERATURE: 97.6 F | SYSTOLIC BLOOD PRESSURE: 138 MMHG | HEART RATE: 86 BPM | RESPIRATION RATE: 20 BRPM | DIASTOLIC BLOOD PRESSURE: 78 MMHG | OXYGEN SATURATION: 98 %

## 2021-07-14 VITALS
RESPIRATION RATE: 20 BRPM | DIASTOLIC BLOOD PRESSURE: 78 MMHG | WEIGHT: 183 LBS | HEART RATE: 86 BPM | BODY MASS INDEX: 26.95 KG/M2 | SYSTOLIC BLOOD PRESSURE: 138 MMHG

## 2021-07-14 DIAGNOSIS — N05.8 PRIMARY PAUCI-IMMUNE NECROTIZING AND CRESCENTIC GLOMERULONEPHRITIS: ICD-10-CM

## 2021-07-14 DIAGNOSIS — N05.7 PRIMARY PAUCI-IMMUNE NECROTIZING AND CRESCENTIC GLOMERULONEPHRITIS: ICD-10-CM

## 2021-07-14 DIAGNOSIS — N18.5 TYPE 2 DIABETES MELLITUS WITH STAGE 5 CHRONIC KIDNEY DISEASE NOT ON CHRONIC DIALYSIS, WITH LONG-TERM CURRENT USE OF INSULIN (H): Primary | ICD-10-CM

## 2021-07-14 DIAGNOSIS — T38.0X5A STEROID-INDUCED HYPERGLYCEMIA: ICD-10-CM

## 2021-07-14 DIAGNOSIS — R33.9 URINARY RETENTION: ICD-10-CM

## 2021-07-14 DIAGNOSIS — R33.8 URINARY RETENTION DUE TO BENIGN PROSTATIC HYPERPLASIA: Primary | ICD-10-CM

## 2021-07-14 DIAGNOSIS — D84.9 IMMUNOCOMPROMISED PATIENT (H): ICD-10-CM

## 2021-07-14 DIAGNOSIS — N18.5 CKD (CHRONIC KIDNEY DISEASE) STAGE 5, GFR LESS THAN 15 ML/MIN (H): ICD-10-CM

## 2021-07-14 DIAGNOSIS — E11.22 TYPE 2 DIABETES MELLITUS WITH STAGE 5 CHRONIC KIDNEY DISEASE NOT ON CHRONIC DIALYSIS, WITH LONG-TERM CURRENT USE OF INSULIN (H): Primary | ICD-10-CM

## 2021-07-14 DIAGNOSIS — D51.9 ANEMIA DUE TO VITAMIN B12 DEFICIENCY, UNSPECIFIED B12 DEFICIENCY TYPE: ICD-10-CM

## 2021-07-14 DIAGNOSIS — R73.9 STEROID-INDUCED HYPERGLYCEMIA: ICD-10-CM

## 2021-07-14 DIAGNOSIS — Z79.4 TYPE 2 DIABETES MELLITUS WITH STAGE 5 CHRONIC KIDNEY DISEASE NOT ON CHRONIC DIALYSIS, WITH LONG-TERM CURRENT USE OF INSULIN (H): Primary | ICD-10-CM

## 2021-07-14 DIAGNOSIS — N40.1 URINARY RETENTION DUE TO BENIGN PROSTATIC HYPERPLASIA: Primary | ICD-10-CM

## 2021-07-14 PROCEDURE — G0463 HOSPITAL OUTPT CLINIC VISIT: HCPCS | Mod: 25

## 2021-07-14 PROCEDURE — 51798 US URINE CAPACITY MEASURE: CPT | Performed by: UROLOGY

## 2021-07-14 PROCEDURE — 99213 OFFICE O/P EST LOW 20 MIN: CPT | Performed by: UROLOGY

## 2021-07-14 PROCEDURE — 99214 OFFICE O/P EST MOD 30 MIN: CPT | Performed by: INTERNAL MEDICINE

## 2021-07-14 PROCEDURE — G0463 HOSPITAL OUTPT CLINIC VISIT: HCPCS

## 2021-07-14 RX ORDER — TAMSULOSIN HYDROCHLORIDE 0.4 MG/1
0.4 CAPSULE ORAL EVERY EVENING
Qty: 90 CAPSULE | Refills: 3 | Status: SHIPPED | OUTPATIENT
Start: 2021-07-14 | End: 2022-06-28

## 2021-07-14 RX ORDER — CHOLECALCIFEROL (VITAMIN D3) 50 MCG
1 TABLET ORAL DAILY
COMMUNITY
Start: 2021-07-03 | End: 2022-07-27

## 2021-07-14 ASSESSMENT — ENCOUNTER SYMPTOMS
DIZZINESS: 0
ABDOMINAL PAIN: 0
ARTHRALGIAS: 0
NAUSEA: 0
DIARRHEA: 0
VOMITING: 0
CHILLS: 0
HEMATURIA: 0
EYE PAIN: 0
BACK PAIN: 0
FATIGUE: 1
DYSURIA: 0
WHEEZING: 0
CONFUSION: 0
AGITATION: 0
LIGHT-HEADEDNESS: 0
BRUISES/BLEEDS EASILY: 1
COUGH: 0
SHORTNESS OF BREATH: 0
MYALGIAS: 0
PALPITATIONS: 0
FEVER: 0

## 2021-07-14 ASSESSMENT — PAIN SCALES - GENERAL
PAINLEVEL: NO PAIN (0)
PAINLEVEL: NO PAIN (0)

## 2021-07-14 NOTE — PATIENT INSTRUCTIONS
Immunization History   Administered Date(s) Administered     COVID-19,PF,Pfizer 02/26/2021, 03/19/2021     Influenza (High Dose) 3 valent vaccine 04/16/2019, 10/10/2020     Pneumo Conj 13-V (2010&after) 04/16/2019     Tdap (Adacel,Boostrix) 12/27/2020      -- Consider Annual Flu / Influenza vaccination - Every Fall (Starting about October 1st)    -- Get the new shingles shot (2 in series) (Shingrix) - from one of the local pharmacies, at your convenience. -- Check cost/coverage.   -- or -- If these are very expensive, go to the Local Public Health Department      Blood pressure is well controlled.   Diabetes is well controlled.   Medications refilled.   Labs are stable.       Lab on 07/12/2021   Component Date Value Ref Range Status     Sodium 07/12/2021 139  134 - 144 mmol/L Final     Potassium 07/12/2021 4.0  3.5 - 5.1 mmol/L Final     Chloride 07/12/2021 105  98 - 107 mmol/L Final     Carbon Dioxide (CO2) 07/12/2021 21  21 - 31 mmol/L Final     Anion Gap 07/12/2021 13  3 - 14 mmol/L Final     Urea Nitrogen 07/12/2021 71* 7 - 25 mg/dL Final     Creatinine 07/12/2021 5.65* 0.70 - 1.30 mg/dL Final     Calcium 07/12/2021 8.9  8.6 - 10.3 mg/dL Final     Glucose 07/12/2021 210* 70 - 105 mg/dL Final     Alkaline Phosphatase 07/12/2021 58  34 - 104 U/L Final     AST 07/12/2021 12* 13 - 39 U/L Final     ALT 07/12/2021 9  7 - 52 U/L Final     Protein Total 07/12/2021 6.8  6.4 - 8.9 g/dL Final     Albumin 07/12/2021 4.2  3.5 - 5.7 g/dL Final     Bilirubin Total 07/12/2021 0.4  0.3 - 1.0 mg/dL Final     GFR Estimate 07/12/2021 8* >60 mL/min/1.73m2 Final    As of July 11, 2021, eGFR is calculated by the CKD-EPI creatinine equation, without race adjustment. eGFR can be influenced by muscle mass, exercise, and diet. The reported eGFR is an estimation only and is only applicable if the renal function is stable.     WBC Count 07/12/2021 13.3* 4.0 - 11.0 10e3/uL Final     RBC Count 07/12/2021 3.90* 4.40 - 5.90 10e6/uL Final      Hemoglobin 07/12/2021 12.1* 13.3 - 17.7 g/dL Final     Hematocrit 07/12/2021 37.1* 40.0 - 53.0 % Final     MCV 07/12/2021 95  78 - 100 fL Final     MCH 07/12/2021 31.0  26.5 - 33.0 pg Final     MCHC 07/12/2021 32.6  31.5 - 36.5 g/dL Final     RDW 07/12/2021 13.3  10.0 - 15.0 % Final     Platelet Count 07/12/2021 228  150 - 450 10e3/uL Final     Creatinine Urine mg/dL 07/12/2021 45  mg/dL Final     Albumin Urine mg/L 07/12/2021 118  mg/dL Final     Albumin Urine mg/g Cr 07/12/2021 262.22* 0.00 - 17.00 mg/g Cr Final     Color Urine 07/12/2021 Light Yellow  Colorless, Straw, Light Yellow, Yellow Final     Appearance Urine 07/12/2021 Slightly Cloudy* Clear Final     Glucose Urine 07/12/2021 30 * Negative mg/dL Final     Bilirubin Urine 07/12/2021 Negative  Negative Final     Ketones Urine 07/12/2021 Negative  Negative mg/dL Final     Specific Gravity Urine 07/12/2021 1.008  1.000 - 1.030 Final     Blood Urine 07/12/2021 Small* Negative Final     pH Urine 07/12/2021 6.0  5.0 - 9.0 Final     Protein Albumin Urine 07/12/2021 30 * Negative mg/dL Final     Urobilinogen Urine 07/12/2021 Normal  Normal, 2.0 mg/dL Final     Nitrite Urine 07/12/2021 Positive* Negative Final     Leukocyte Esterase Urine 07/12/2021 Large* Negative Final     Bacteria Urine 07/12/2021 Moderate* None Seen /HPF Final     RBC Urine 07/12/2021 5* <=2 /HPF Final     WBC Urine 07/12/2021 159* <=5 /HPF Final     Mucus Urine 07/12/2021 Present* None Seen /LPF Final     Cholesterol 07/12/2021 162  <200 mg/dL Final    Age 0-19 years  Desirable: <170 mg/dL  Borderline high:  170-199 mg/dl  High:            >199 mg/dl    Age 20 years and older  Desirable: <200 mg/dL     Triglycerides 07/12/2021 170* <150 mg/dL Final    0-9 years:  Normal:    Less than 75 mg/dL  Borderline high:  75-99 mg/dL  High:             Greater than or equal to 100 mg/dL    0-19 years:  Normal:    Less than 90 mg/dL  Borderline high:   mg/dL  High:             Greater than or equal  to 130 mg/dL    20 years and older:  Normal:    Less than 150 mg/dL  Borderline high:  150-199 mg/dL  High:             200-499 mg/dL  Very high:   Greater than or equal to 500 mg/dL     Direct Measure HDL 07/12/2021 32  23 - 92 mg/dL Final    0-19 years:       Greater than or equal to 45 mg/dL   Low: Less than 40 mg/dL   Borderline low: 40-44 mg/dL     20 years and older:   Female: Greater than or equal to 50 mg/dL   Male:   Greater than or equal to 40 mg/dL          LDL Cholesterol Calculated 07/12/2021 96  <=100 mg/dL Final    Age 0-19 years:  Desirable: 0-110 mg/dL   Borderline high: 110-129 mg/dL   High: >= 130 mg/dL    Age 20 years and older:  Desirable: <100mg/dL  Above desirable: 100-129 mg/dL   Borderline high: 130-159 mg/dL   High: 160-189 mg/dL   Very high: >= 190 mg/dL     Non HDL Cholesterol 07/12/2021 130* <130 mg/dL Final    0-19 years:  Desirable:          Less than 120 mg/dL  Borderline high:   120-144 mg/dL  High:                   Greater than or equal to 145 mg/dL    20 years and older:  Desirable:          130 mg/dL  Above Desirable: 130-159 mg/dL  Borderline high:   160-189 mg/dL  High:               190-219 mg/dL  Very high:     Greater than or equal to 220 mg/dL     Patient Fasting > 8hrs? 07/12/2021 Yes   Final     Hemoglobin A1C 07/12/2021 6.0  4.0 - 6.2 % Final     TSH 07/12/2021 4.34* 0.40 - 4.00 mU/L Final        Aspects of Diabetes:   Recent Labs   Lab Test 07/12/21  0926 03/30/21  1129 06/20/19  1219   A1C 6.0 5.9 6.5*   LDL 96 111* 131*   HDL 32 33 30   TRIG 170* 218* 164*   ALT 9 9 8   CR 5.65* 5.38* 4.88*   GFRESTIMATED 8* 10* 11*   GFRESTBLACK  --  12* 14*   POTASSIUM 4.0 4.8 4.1   TSH 4.34*  --   --       Hemoglobin A1c  Goal range is under 8%. Best is 6.5 to 7   Blood Pressure 162/78 Goal to keep less than 140/90   Tobacco  reports that he quit smoking about 45 years ago. His smoking use included cigarettes. He has never used smokeless tobacco. Goal to abstain from tobacco    Aspirin or Plavix Anti-platelet therapy Aspirin or Plavix reduces risk of heart disease and stroke  -- sometimes used with other blood thinners, depending on bleeding risk and risk factors.    ACE/ARB Specific blood pressure meds These medications can reduce risk of kidney disease   Cholesterol Statins (Lipitor, Crestor, vs others) Statins reduce risk of heart disease and stroke   Eye Exam -- Do Yearly -- Annual diabetic eye exam   Healthy weight Wt Readings from Last 3 Encounters:   07/14/21 83.1 kg (183 lb 4 oz)   04/30/21 82.2 kg (181 lb 3.2 oz)   04/26/21 83.3 kg (183 lb 9.6 oz)     Body mass index is 26.99 kg/m .  Goal BMI under 30, best is under 25.      -- Trying to exercise daily (goal at least 20 min/day) with moderate aerobic activity   -- Eat healthy (resources from ADA at http://www.diabetes.org/)   -- Taking good care of my feet. Consider seeing the Podiatrist   -- Check blood sugars as directed, record in log book and bring to every appointment    Insurance companies are grading you and I on your blood sugar control -- Goal is to get your A1c down to 7.9% or lower and NO Smoking!  -- Medicare and most insurance companies, will only cover Hemoglobin A1c labs to be rechecked every 91+ days.      Return for Diabetes labs and clinic follow-up appointment every 3 to 4 months.    Schedule lab only appointment --- A few days AFTER: 10/12/21   Schedule clinic appointment with Dr. Machado -- Same day as labs, or 1-2 days later.

## 2021-07-14 NOTE — LETTER
July 16, 2021      [facility]  [address] [place] [id number]        Dear ,    We are writing to inform you of your test results.    Flouroquinolone resistant Pseudomonas noted in the urine.      If you develop any bladder infection symptoms, this will require IV antibiotics.  Nothing oral is available.     Consider starting high-dose cranberry tablets with vitamin C 15,000 units twice daily.  This can sometimes help prevent bladder infections.  --Prescription was sent to [facility].    Electronically signed by:  Valentino Machado MD  on July 16, 2021 6:06 PM      Resulted Orders   Urine Culture   Result Value Ref Range    Culture >100,000 CFU/mL Pseudomonas aeruginosa (A)        If you have any questions or concerns, please call the clinic at the number listed above.       Sincerely,      Valentino Machado MD

## 2021-07-14 NOTE — NURSING NOTE
Lab Results   Component Value Date    A1C 6.0 07/12/2021    A1C 5.9 03/30/2021    A1C 6.5 06/20/2019    ALBUMIN 4.2 07/12/2021    ALBUMIN 4.5 03/30/2021     Previous A1C IS  at goal of <8  Date of last Foot exam does not have foot exam  Eye exam 4-2021  Patient is not a Tobacco User  Patient is not on a daily aspirin  Patient iS NOT on a Statin.  Blood pressure today of:     BP Readings from Last 1 Encounters:   07/14/21 (!) 162/78     iS NOT at the goal of <139/89 for diabetics.    Kayy Calix LPN on 7/14/2021 at 11:25 AM

## 2021-07-14 NOTE — NURSING NOTE
Pt presents to clinic for urinary retention follow up    Review of Systems:    Weight loss:    No     Recent fever/chills:  No   Night sweats:   No  Current skin rash:  No   Recent hair loss:  No  Heat intolerance:  No   Cold intolerance:  No  Chest pain:   No   Palpitations:   No  Shortness of breath:  No   Wheezing:   No  Constipation:    No   Diarrhea:   No   Nausea:   No   Vomiting:   No   Kidney/side pain:  No   Back pain:   No  Frequent headaches:  No   Dizziness:     No  Leg swelling:   No   Calf pain:    No      Post-Void Residual  A post-void residual was measured by ultrasonic bladder scanner.  172 mL

## 2021-07-14 NOTE — PROGRESS NOTES
Mr. Chao is a 87 year old male who presents to the clinic for evaluation of the following problems:      ICD-10-CM    1. 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, with long-term current use of insulin (H)  E11.22 insulin glargine (LANTUS SOLOSTAR) 100 UNIT/ML pen    N18.5     Z79.4    2. Steroid-induced hyperglycemia  R73.9 insulin glargine (LANTUS SOLOSTAR) 100 UNIT/ML pen    T38.0X5A    3. Urinary retention  R33.9 tamsulosin (FLOMAX) 0.4 MG capsule     Urine Culture     CANCELED: Urine Culture   4. CKD (chronic kidney disease) stage 5, GFR 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 ml/min (H)  N18.5    5. Anemia due to vitamin B12 deficiency, unspecified B12 deficiency type  D51.9    6. Primary pauci-immune necrotizing and crescentic glomerulonephritis  N05.8     N05.7    7. Immunocompromised patient (H)  D84.9      HPI  Patient presents for follow-up of multiple issues.    Type 2 diabetes, has been well controlled.  Labs just collected.  Denies hypoglycemia.  Does not check his blood sugars.  He has testing supplies were removed from his med list today.  Needs refills of his Lantus insulin.    Has stage V chronic kidney disease, not currently on dialysis.  Kidney function has been fluctuating with a GFR between 8 and 10.  He still urinates regularly.  He has no interest in dialysis.    Steroid-induced hyperglycemia, continues with Lantus.  Refill sent to pharmacy.    Urinary retention, has found some improvement with Flomax.  Needs refills.    Abnormal urinalysis findings noted today, urine culture pending.    Anemia due to stage V chronic kidney disease as well as vitamin B12 deficiency.  He does get darbepoetin injections from nephrology to help with his anemia.    History of glomerulonephritis, still immunocompromised, takes prednisone regularly.    Functional Capacity: about 4 METS.   Review of Systems   Constitutional: Positive for fatigue. Negative for chills and fever.        Golfs regularly  "in the summer   HENT: Negative for congestion and hearing loss.    Eyes: Negative for pain and visual disturbance.   Respiratory: Negative for cough, shortness of breath and wheezing.    Cardiovascular: Negative for chest pain and palpitations.   Gastrointestinal: Negative for abdominal pain, diarrhea, nausea and vomiting.   Endocrine: Negative for cold intolerance and heat intolerance.   Genitourinary: Negative for dysuria and hematuria.   Musculoskeletal: Positive for gait problem. Negative for arthralgias, back pain and myalgias.   Skin: Negative for pallor.   Allergic/Immunologic: Negative for immunocompromised state.   Neurological: Negative for dizziness and light-headedness.   Hematological: Bruises/bleeds easily.   Psychiatric/Behavioral: Negative for agitation and confusion.      Reviewed and updated as needed this visit by Provider   Tobacco  Allergies  Meds  Problems  Med Hx  Surg Hx  Fam Hx          EXAM:   Vitals:    07/14/21 1115 07/14/21 1143   BP: (!) 162/78 138/78   Pulse: 86    Resp: 20    Temp: 97.6  F (36.4  C)    TempSrc: Tympanic    SpO2: 98%    Weight: 83.1 kg (183 lb 4 oz)        Current Pain Score: No Pain (0)     BP Readings from Last 3 Encounters:   07/14/21 138/78   07/14/21 138/78   04/30/21 130/72      Wt Readings from Last 3 Encounters:   07/14/21 83 kg (183 lb)   07/14/21 83.1 kg (183 lb 4 oz)   04/30/21 82.2 kg (181 lb 3.2 oz)      Estimated body mass index is 26.99 kg/m  as calculated from the following:    Height as of 3/30/21: 1.755 m (5' 9.09\").    Weight as of this encounter: 83.1 kg (183 lb 4 oz).     Physical Exam  Constitutional:       General: He is not in acute distress.     Appearance: He is well-developed. He is not diaphoretic.   HENT:      Head: Normocephalic and atraumatic.   Eyes:      General: No scleral icterus.     Conjunctiva/sclera: Conjunctivae normal.   Cardiovascular:      Rate and Rhythm: Normal rate and regular rhythm.   Pulmonary:      Effort: " Pulmonary effort is normal.      Breath sounds: Normal breath sounds.   Abdominal:      Palpations: Abdomen is soft.      Tenderness: There is no abdominal tenderness.   Musculoskeletal:         General: No deformity.      Cervical back: Neck supple.      Right lower leg: Edema present.      Left lower leg: Edema present.   Lymphadenopathy:      Cervical: No cervical adenopathy.   Skin:     General: Skin is warm and dry.      Findings: No rash.      Comments: Some resolving bruises on the arms bilaterally   Neurological:      Mental Status: He is alert and oriented to person, place, and time. Mental status is at baseline.   Psychiatric:         Mood and Affect: Mood normal.         Behavior: Behavior normal.        Procedures   INVESTIGATIONS:  - Labs reviewed in Epic     ASSESSMENT AND PLAN:  Problem List Items Addressed This Visit        Digestive    Anemia due to vitamin B12 deficiency       Endocrine    Steroid-induced hyperglycemia    Relevant Medications    insulin glargine (LANTUS SOLOSTAR) 100 UNIT/ML pen    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, with long-term current use of insulin (H) - Primary    Relevant Medications    insulin glargine (LANTUS SOLOSTAR) 100 UNIT/ML pen       Musculoskeletal and Integumentary    Primary pauci-immune necrotizing and crescentic glomerulonephritis       Urinary    CKD (chronic kidney disease) stage 5, GFR 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 ml/min (H)       Other    Immunocompromised patient (H)      Other Visit Diagnoses     Urinary retention        Relevant Medications    tamsulosin (FLOMAX) 0.4 MG capsule    Other Relevant Orders    Urine Culture        reviewed diet, exercise and weight control, recommended sodium restriction, cardiovascular risk and specific lipid/LDL goals reviewed, specific diabetic recommendations low cholesterol diet, weight control and daily exercise discussed  -- Expected clinical course discussed    -- Medications and  their side effects discussed    Patient Instructions     Immunization History   Administered Date(s) Administered     COVID-19,PF,Pfizer 02/26/2021, 03/19/2021     Influenza (High Dose) 3 valent vaccine 04/16/2019, 10/10/2020     Pneumo Conj 13-V (2010&after) 04/16/2019     Tdap (Adacel,Boostrix) 12/27/2020      -- Consider Annual Flu / Influenza vaccination - Every Fall (Starting about October 1st)    -- Get the new shingles shot (2 in series) (Shingrix) - from one of the local pharmacies, at your convenience. -- Check cost/coverage.   -- or -- If these are very expensive, go to the Local Public Health Department      Blood pressure is well controlled.   Diabetes is well controlled.   Medications refilled.   Labs are stable.       Lab on 07/12/2021   Component Date Value Ref Range Status     Sodium 07/12/2021 139  134 - 144 mmol/L Final     Potassium 07/12/2021 4.0  3.5 - 5.1 mmol/L Final     Chloride 07/12/2021 105  98 - 107 mmol/L Final     Carbon Dioxide (CO2) 07/12/2021 21  21 - 31 mmol/L Final     Anion Gap 07/12/2021 13  3 - 14 mmol/L Final     Urea Nitrogen 07/12/2021 71* 7 - 25 mg/dL Final     Creatinine 07/12/2021 5.65* 0.70 - 1.30 mg/dL Final     Calcium 07/12/2021 8.9  8.6 - 10.3 mg/dL Final     Glucose 07/12/2021 210* 70 - 105 mg/dL Final     Alkaline Phosphatase 07/12/2021 58  34 - 104 U/L Final     AST 07/12/2021 12* 13 - 39 U/L Final     ALT 07/12/2021 9  7 - 52 U/L Final     Protein Total 07/12/2021 6.8  6.4 - 8.9 g/dL Final     Albumin 07/12/2021 4.2  3.5 - 5.7 g/dL Final     Bilirubin Total 07/12/2021 0.4  0.3 - 1.0 mg/dL Final     GFR Estimate 07/12/2021 8* >60 mL/min/1.73m2 Final    As of July 11, 2021, eGFR is calculated by the CKD-EPI creatinine equation, without race adjustment. eGFR can be influenced by muscle mass, exercise, and diet. The reported eGFR is an estimation only and is only applicable if the renal function is stable.     WBC Count 07/12/2021 13.3* 4.0 - 11.0 10e3/uL Final      RBC Count 07/12/2021 3.90* 4.40 - 5.90 10e6/uL Final     Hemoglobin 07/12/2021 12.1* 13.3 - 17.7 g/dL Final     Hematocrit 07/12/2021 37.1* 40.0 - 53.0 % Final     MCV 07/12/2021 95  78 - 100 fL Final     MCH 07/12/2021 31.0  26.5 - 33.0 pg Final     MCHC 07/12/2021 32.6  31.5 - 36.5 g/dL Final     RDW 07/12/2021 13.3  10.0 - 15.0 % Final     Platelet Count 07/12/2021 228  150 - 450 10e3/uL Final     Creatinine Urine mg/dL 07/12/2021 45  mg/dL Final     Albumin Urine mg/L 07/12/2021 118  mg/dL Final     Albumin Urine mg/g Cr 07/12/2021 262.22* 0.00 - 17.00 mg/g Cr Final     Color Urine 07/12/2021 Light Yellow  Colorless, Straw, Light Yellow, Yellow Final     Appearance Urine 07/12/2021 Slightly Cloudy* Clear Final     Glucose Urine 07/12/2021 30 * Negative mg/dL Final     Bilirubin Urine 07/12/2021 Negative  Negative Final     Ketones Urine 07/12/2021 Negative  Negative mg/dL Final     Specific Gravity Urine 07/12/2021 1.008  1.000 - 1.030 Final     Blood Urine 07/12/2021 Small* Negative Final     pH Urine 07/12/2021 6.0  5.0 - 9.0 Final     Protein Albumin Urine 07/12/2021 30 * Negative mg/dL Final     Urobilinogen Urine 07/12/2021 Normal  Normal, 2.0 mg/dL Final     Nitrite Urine 07/12/2021 Positive* Negative Final     Leukocyte Esterase Urine 07/12/2021 Large* Negative Final     Bacteria Urine 07/12/2021 Moderate* None Seen /HPF Final     RBC Urine 07/12/2021 5* <=2 /HPF Final     WBC Urine 07/12/2021 159* <=5 /HPF Final     Mucus Urine 07/12/2021 Present* None Seen /LPF Final     Cholesterol 07/12/2021 162  <200 mg/dL Final    Age 0-19 years  Desirable: <170 mg/dL  Borderline high:  170-199 mg/dl  High:            >199 mg/dl    Age 20 years and older  Desirable: <200 mg/dL     Triglycerides 07/12/2021 170* <150 mg/dL Final    0-9 years:  Normal:    Less than 75 mg/dL  Borderline high:  75-99 mg/dL  High:             Greater than or equal to 100 mg/dL    0-19 years:  Normal:    Less than 90 mg/dL  Borderline  high:   mg/dL  High:             Greater than or equal to 130 mg/dL    20 years and older:  Normal:    Less than 150 mg/dL  Borderline high:  150-199 mg/dL  High:             200-499 mg/dL  Very high:   Greater than or equal to 500 mg/dL     Direct Measure HDL 07/12/2021 32  23 - 92 mg/dL Final    0-19 years:       Greater than or equal to 45 mg/dL   Low: Less than 40 mg/dL   Borderline low: 40-44 mg/dL     20 years and older:   Female: Greater than or equal to 50 mg/dL   Male:   Greater than or equal to 40 mg/dL          LDL Cholesterol Calculated 07/12/2021 96  <=100 mg/dL Final    Age 0-19 years:  Desirable: 0-110 mg/dL   Borderline high: 110-129 mg/dL   High: >= 130 mg/dL    Age 20 years and older:  Desirable: <100mg/dL  Above desirable: 100-129 mg/dL   Borderline high: 130-159 mg/dL   High: 160-189 mg/dL   Very high: >= 190 mg/dL     Non HDL Cholesterol 07/12/2021 130* <130 mg/dL Final    0-19 years:  Desirable:          Less than 120 mg/dL  Borderline high:   120-144 mg/dL  High:                   Greater than or equal to 145 mg/dL    20 years and older:  Desirable:          130 mg/dL  Above Desirable: 130-159 mg/dL  Borderline high:   160-189 mg/dL  High:               190-219 mg/dL  Very high:     Greater than or equal to 220 mg/dL     Patient Fasting > 8hrs? 07/12/2021 Yes   Final     Hemoglobin A1C 07/12/2021 6.0  4.0 - 6.2 % Final     TSH 07/12/2021 4.34* 0.40 - 4.00 mU/L Final        Aspects of Diabetes:   Recent Labs   Lab Test 07/12/21  0926 03/30/21  1129 06/20/19  1219   A1C 6.0 5.9 6.5*   LDL 96 111* 131*   HDL 32 33 30   TRIG 170* 218* 164*   ALT 9 9 8   CR 5.65* 5.38* 4.88*   GFRESTIMATED 8* 10* 11*   GFRESTBLACK  --  12* 14*   POTASSIUM 4.0 4.8 4.1   TSH 4.34*  --   --       Hemoglobin A1c  Goal range is under 8%. Best is 6.5 to 7   Blood Pressure 162/78 Goal to keep less than 140/90   Tobacco  reports that he quit smoking about 45 years ago. His smoking use included cigarettes. He has  never used smokeless tobacco. Goal to abstain from tobacco   Aspirin or Plavix Anti-platelet therapy Aspirin or Plavix reduces risk of heart disease and stroke  -- sometimes used with other blood thinners, depending on bleeding risk and risk factors.    ACE/ARB Specific blood pressure meds These medications can reduce risk of kidney disease   Cholesterol Statins (Lipitor, Crestor, vs others) Statins reduce risk of heart disease and stroke   Eye Exam -- Do Yearly -- Annual diabetic eye exam   Healthy weight Wt Readings from Last 3 Encounters:   07/14/21 83.1 kg (183 lb 4 oz)   04/30/21 82.2 kg (181 lb 3.2 oz)   04/26/21 83.3 kg (183 lb 9.6 oz)     Body mass index is 26.99 kg/m .  Goal BMI under 30, best is under 25.      -- Trying to exercise daily (goal at least 20 min/day) with moderate aerobic activity   -- Eat healthy (resources from ADA at http://www.diabetes.org/)   -- Taking good care of my feet. Consider seeing the Podiatrist   -- Check blood sugars as directed, record in log book and bring to every appointment    Insurance companies are grading you and I on your blood sugar control -- Goal is to get your A1c down to 7.9% or lower and NO Smoking!  -- Medicare and most insurance companies, will only cover Hemoglobin A1c labs to be rechecked every 91+ days.      Return for Diabetes labs and clinic follow-up appointment every 3 to 4 months.    Schedule lab only appointment --- A few days AFTER: 10/12/21   Schedule clinic appointment with Dr. Machado -- Same day as labs, or 1-2 days later.      Electronically signed by:  Valentino Machado MD on 7/14/2021  Internal Medicine  [facility]

## 2021-07-16 DIAGNOSIS — N39.0 PSEUDOMONAS URINARY TRACT INFECTION: Primary | ICD-10-CM

## 2021-07-16 DIAGNOSIS — B96.5 PSEUDOMONAS URINARY TRACT INFECTION: Primary | ICD-10-CM

## 2021-07-16 LAB — BACTERIA UR CULT: ABNORMAL

## 2021-07-16 RX ORDER — CHOLECALCIFEROL (VITAMIN D3) 125 MCG
1 CAPSULE ORAL 2 TIMES DAILY
Qty: 180 CAPSULE | Refills: 3 | Status: SHIPPED | OUTPATIENT
Start: 2021-07-16 | End: 2022-01-19

## 2021-08-03 ENCOUNTER — ALLIED HEALTH/NURSE VISIT (OUTPATIENT)
Dept: FAMILY MEDICINE | Facility: OTHER | Age: 86
End: 2021-08-03
Attending: INTERNAL MEDICINE
Payer: MEDICARE

## 2021-08-03 DIAGNOSIS — E53.8 B12 DEFICIENCY: Primary | ICD-10-CM

## 2021-08-03 PROCEDURE — 96372 THER/PROPH/DIAG INJ SC/IM: CPT | Performed by: INTERNAL MEDICINE

## 2021-08-03 PROCEDURE — 250N000011 HC RX IP 250 OP 636: Performed by: INTERNAL MEDICINE

## 2021-08-03 RX ADMIN — CYANOCOBALAMIN 1000 MCG: 1000 INJECTION, SOLUTION INTRAMUSCULAR; SUBCUTANEOUS at 10:09

## 2021-08-03 NOTE — PROGRESS NOTES
Verified patient's first and last name, and . Patient stated reason for visit today is to receive a B12 injection. Patient denied any concerns with previous injections. B12 prepared and administered IM as ordered. Administration of medication documented in MAR (see MAR for further information regarding dose, lot #, NDC #, expiration date). Patient encouraged to wait in lobby for 15 minutes post-injection and notify staff immediately of any reaction.     Mia Montejo RN ....................  8/3/2021   10:04 AM

## 2021-08-18 DIAGNOSIS — R73.9 STEROID-INDUCED HYPERGLYCEMIA: ICD-10-CM

## 2021-08-18 DIAGNOSIS — T38.0X5A STEROID-INDUCED HYPERGLYCEMIA: ICD-10-CM

## 2021-08-19 RX ORDER — PEN NEEDLE, DIABETIC 31 GX5/16"
NEEDLE, DISPOSABLE MISCELLANEOUS
Qty: 100 EACH | Refills: 0 | Status: SHIPPED | OUTPATIENT
Start: 2021-08-19 | End: 2021-11-14

## 2021-08-19 NOTE — TELEPHONE ENCOUNTER
Prescription approved per [facility] Refill Protocol.  LVO: 7/14/2021    Mary Ann Tapia RN on 8/19/2021 at 9:18 AM

## 2021-08-25 ENCOUNTER — HOSPITAL ENCOUNTER (EMERGENCY)
Facility: OTHER | Age: 86
Discharge: HOME | End: 2021-08-25
Attending: STUDENT IN AN ORGANIZED HEALTH CARE EDUCATION/TRAINING PROGRAM | Admitting: STUDENT IN AN ORGANIZED HEALTH CARE EDUCATION/TRAINING PROGRAM
Payer: MEDICARE

## 2021-08-25 VITALS
DIASTOLIC BLOOD PRESSURE: 83 MMHG | TEMPERATURE: 98 F | OXYGEN SATURATION: 99 % | SYSTOLIC BLOOD PRESSURE: 152 MMHG | RESPIRATION RATE: 18 BRPM | HEART RATE: 90 BPM

## 2021-08-25 DIAGNOSIS — S81.812A LACERATION OF LOWER LEG, LEFT, INITIAL ENCOUNTER: ICD-10-CM

## 2021-08-25 PROCEDURE — 99282 EMERGENCY DEPT VISIT SF MDM: CPT | Mod: 25 | Performed by: STUDENT IN AN ORGANIZED HEALTH CARE EDUCATION/TRAINING PROGRAM

## 2021-08-25 PROCEDURE — 12004 RPR S/N/AX/GEN/TRK7.6-12.5CM: CPT | Performed by: STUDENT IN AN ORGANIZED HEALTH CARE EDUCATION/TRAINING PROGRAM

## 2021-08-25 PROCEDURE — 99283 EMERGENCY DEPT VISIT LOW MDM: CPT | Mod: 25 | Performed by: STUDENT IN AN ORGANIZED HEALTH CARE EDUCATION/TRAINING PROGRAM

## 2021-08-25 RX ORDER — LIDOCAINE HYDROCHLORIDE AND EPINEPHRINE 10; 10 MG/ML; UG/ML
1 INJECTION, SOLUTION INFILTRATION; PERINEURAL ONCE
Status: DISCONTINUED | OUTPATIENT
Start: 2021-08-25 | End: 2021-08-25 | Stop reason: HOSPADM

## 2021-08-25 NOTE — ED PROVIDER NOTES
History     Chief Complaint   Patient presents with     Laceration       Altaf Chao is a 87 year old male presenting with left lower leg laceration laceration. This was sustained 6-1/2 hours ago. The mechanism of injury was catching his leg on the car door.  He was able to play a full round of nine-hole golf afterwards. The patient has low suspicion of foreign body in the wound. Tetanus is up to date. There is no numbness, tingling, weakness.     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     Priority: Medium     Anemia due to vitamin B12 deficiency 10/25/2017     Priority: Medium     Lymphedema of both lower extremities 06/23/2017     Priority: Medium     Immunocompromised patient (H) 06/09/2017     Priority: Medium     Bilateral edema of lower extremity 05/11/2017     Priority: Medium     Hematoma 05/04/2017     Priority: Medium     Steroid-induced hyperglycemia 05/04/2017     Priority: Medium     Anemia of chronic disease 05/03/2017     Priority: Medium     CKD (chronic kidney disease) stage 5, GFR 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 ml/min (H) 05/01/2017     Priority: Medium     Anemia 04/25/2017     Priority: Medium     Metabolic acidosis 04/25/2017     Priority: Medium     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     Priority: Medium     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     Priority: Medium     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     Priority: Medium     Pseudomonas urinary tract infection 04/15/2017     Priority: Medium     Hyperkalemia 04/03/2017     Priority: Medium     Hyponatremia 04/03/2017     Priority: Medium     Refusal of statin medication at discharge 02/17/2017      Priority: Medium     H/O total hip arthroplasty 2014     Priority: Medium     Mixed hyperlipidemia 04/10/2014     Priority: Medium     Bilateral leg edema 2014     Priority: Medium     Hip stiffness 2014     Priority: Medium     H/O bilateral inguinal hernia repair 2013     Priority: Medium     History of tobacco abuse 08/15/2013     Priority: Medium     Benign essential hypertension 2012     Priority: Medium     Pityriasis rosea 2012     Priority: Medium       Past Medical History:   Diagnosis Date     Acute kidney failure (H)      Essential (primary) hypertension      Hyperlipidemia      Osteoarthritis of hip      Pityriasis rosea      Tachycardia      Unilateral inguinal hernia without obstruction or gangrene        Past Surgical History:   Procedure Laterality Date     CIRCUMCISION      2017,Dr. Heidi GREENBERG     OTHER SURGICAL HISTORY      60132.9,AR SUBTALAR ATHROEREISIS,bone Spurs excision on Toe     OTHER SURGICAL HISTORY      13,,HERNIA REPAIR,Right,RIH with mesh     OTHER SURGICAL HISTORY      13,15441,GENITAL SURGERY MALE,Dorsal Slit     OTHER SURGICAL HISTORY      4/15/14,,HERNIA REPAIR,Left,LIH with mesh     OTHER SURGICAL HISTORY      14,17095.0,AR TOTAL HIP ARTHROPLASTY,Left       Family History   Problem Relation Age of Onset     Other - See Comments Son 12        neuroblastoma at 13 yo  at 14.     Genetic Disorder No family hx of         Genetic,No known FHx of DM, CAD, CVA       Social History     Tobacco Use     Smoking status: Former Smoker     Types: Cigarettes     Quit date: 1976     Years since quittin.6     Smokeless tobacco: Never Used   Vaping Use     Vaping Use: Never used   Substance Use Topics     Alcohol use: Yes     Alcohol/week: 0.8 standard drinks     Comment: occ beer     Drug use: Never       Medications:    amLODIPine (NORVASC) 5 MG tablet  B-D U/F 31G X 8 MM insulin pen needle  calcitRIOL (ROCALTROL)  0.25 MCG capsule  cholecalciferol (VITAMIN D3) 1000 units (25 mcg) capsule  Cholecalciferol (VITAMIN D3) 50 MCG (2000 UT) TABS  Cranberry-Vitamin C (CRANBERRY CONCENTRATE/VITAMINC) 65418-373 MG CAPS  darbepoetin miller (ARANESP) 60 MCG/0.3ML injection  famotidine (PEPCID) 20 MG tablet  finasteride (PROSCAR) 5 MG tablet  furosemide (LASIX) 40 MG tablet  insulin glargine (LANTUS SOLOSTAR) 100 UNIT/ML pen  predniSONE (DELTASONE) 2.5 MG tablet  sodium bicarbonate 650 MG tablet  tamsulosin (FLOMAX) 0.4 MG capsule        Review of Systems: See HPI for pertinent negatives and positives. All other systems reviewed and found to be negative.    Physical Exam   BP (!) 152/83   Pulse 90   Temp 98  F (36.7  C) (Tympanic)   Resp 18   SpO2 99%      General: awake, comfortable  HEENT: atraumatic  Respiratory: normal effort  Cardiovascular: left foot appears well perfused  Abdomen: soft, nondistended  Extremities: no deformities, edema, or tenderness  MSK: flexion/extension of left foot 5/5  Skin: 8 cm v-shaped laceration to the mid lateral left lower leg, explored to depth without sign of foreign body  Neuro: alert, distal sensation intact    ED Course           No results found for this or any previous visit (from the past 24 hour(s)).    Medications   lidocaine 1% with EPINEPHrine 1:100,000 injection 1 mL (has no administration in time range)       Verbal consent - obtained  Laceration Repair Procedure Note  Wound Site -left lower leg  Length in cms - 8 cm  Sensory - Intact to light touch  Motor - Intact  Tendon Injury - No  Anesthetic - 5 ml of lidocaine without epinephrine  Irrigation - Performed with sterile saline  Debridement - None  Suture - 4-0 monofilament  Number of sutures -11  Complications - None, the patient tolerated this repair well with no complications  This was Simple laceration repair.   SR 7-10 days    Assessments & Plan (with Medical Decision Making)     I have reviewed the nursing notes.    Left lower leg  laceration. Laceration explored to its full depth and there is low suspicion of significant injury to underlying soft tissue or bony structures. CMS intact. Based on history and exam, an x-ray was not indicated to evaluate for retained foreign body.  Tetanus did not require updating. Laceration repaired per above. Wound care instructions, including use of antibiotic ointment, cleaning with soap and water, avoidance of prolonged submersion or dirty environments, ED return precautions, and suture removal provided.     I have reviewed the findings, diagnosis, plan and need for follow up with the patient.    New Prescriptions    No medications on file       Final diagnoses:   Laceration of lower leg, left, initial encounter       8/25/2021   [facility]       Arley Neely MD  08/25/21 2033

## 2021-08-25 NOTE — DISCHARGE INSTRUCTIONS
Keep wound dry for 24 hours and then wash normally with soap and water. Dry gently and avoid disrupting sutures. Avoid dirty environments. Apply bacitracin (topical antibiotic) to wound with each dressing change for first 3-5 days. Examine for signs of infection (white/green/yellow discharge, worsening redness, inflammation or pain) daily and return to emergency department if any of these are present. Get 11 sutures removed in 7-10 days. Alternate tylenol (can take up to 3000 mg in 24 hour period) and ibuprofen (can take up to 2400 mg in 24 hour period) for pain control as needed. Apply ice regularly over next 2 days to help reduce swelling. Wounds can take up to 1 year to reach their final long-term appearance.

## 2021-08-25 NOTE — ED TRIAGE NOTES
ED Nursing Triage Note (General)   ________________________________    Altaf Chao is a 87 year old Male that presents to triage private car  With history of  Laceration of the left leg happened at 0930 hit leg on the corner of a door. Laceration the size of a quarter, looks 2 cmm deep. Bleeding controlled at this time reported by patient   Significant symptoms had onset 0930     Patient appears alert , in no acute distress., and cooperative behavior.    GCS Total = 15  Airway: intact  Breathing noted as Normal  Circulation Normal  Skin:  Normal  Action taken:  Dressings applied      PRE HOSPITAL PRIOR LIVING SITUATION Spouse

## 2021-08-31 ENCOUNTER — ALLIED HEALTH/NURSE VISIT (OUTPATIENT)
Dept: FAMILY MEDICINE | Facility: OTHER | Age: 86
End: 2021-08-31
Attending: INTERNAL MEDICINE
Payer: MEDICARE

## 2021-08-31 DIAGNOSIS — E53.8 B12 DEFICIENCY: Primary | ICD-10-CM

## 2021-08-31 PROCEDURE — 250N000011 HC RX IP 250 OP 636: Performed by: INTERNAL MEDICINE

## 2021-08-31 PROCEDURE — 96372 THER/PROPH/DIAG INJ SC/IM: CPT | Performed by: INTERNAL MEDICINE

## 2021-08-31 RX ADMIN — CYANOCOBALAMIN 1000 MCG: 1000 INJECTION, SOLUTION INTRAMUSCULAR; SUBCUTANEOUS at 08:57

## 2021-08-31 NOTE — PROGRESS NOTES
Verified patient's first and last name, and . Patient stated reason for visit today is to receive a B12 injection. Patient denied any concerns with previous injections. B12 prepared and administered IM as ordered. Administration of medication documented in MAR (see MAR for further information regarding dose, lot #, NDC #, expiration date). Patient encouraged to wait in lobby for 15 minutes post-injection and notify staff immediately of any reaction.     Mia Montejo RN ....................  2021   8:52 AM

## 2021-09-03 ENCOUNTER — HOSPITAL ENCOUNTER (EMERGENCY)
Facility: OTHER | Age: 86
Discharge: HOME | End: 2021-09-03
Payer: MEDICARE

## 2021-09-03 NOTE — ED TRIAGE NOTES
ED Nursing Triage Note (General)   ________________________________    Altaf Chao is a 87 year old Male that presents to triage suture removal x11.  Site appears CDI.  Minimal bleeding noted after suture removal.  Dressing applied, patient educated on signs of infection, no questions voiced.

## 2021-09-21 ENCOUNTER — ALLIED HEALTH/NURSE VISIT (OUTPATIENT)
Dept: FAMILY MEDICINE | Facility: OTHER | Age: 86
End: 2021-09-21
Payer: MEDICARE

## 2021-09-21 DIAGNOSIS — E53.8 B12 DEFICIENCY: Primary | ICD-10-CM

## 2021-09-21 PROCEDURE — 250N000011 HC RX IP 250 OP 636: Performed by: INTERNAL MEDICINE

## 2021-09-21 PROCEDURE — 96372 THER/PROPH/DIAG INJ SC/IM: CPT | Performed by: INTERNAL MEDICINE

## 2021-09-21 RX ADMIN — CYANOCOBALAMIN 1000 MCG: 1000 INJECTION, SOLUTION INTRAMUSCULAR; SUBCUTANEOUS at 08:27

## 2021-09-21 NOTE — PROGRESS NOTES
Verified patient's first and last name, and . Patient stated reason for visit today is to receive a B12 injection. Patient denied any concerns with previous injections. B12 prepared and administered IM as ordered. Administration of medication documented in MAR (see MAR for further information regarding dose, lot #, NDC #, expiration date). Patient encouraged to wait in lobby for 15 minutes post-injection and notify staff immediately of any reaction.     Moraima Marr RN ....................  2021   8:21 AM

## 2021-10-04 LAB
CREATININE (EXTERNAL): 5.57 MG/DL (ref 0.7–1.2)
GFR ESTIMATED (EXTERNAL): 10 ML/MIN/1.73M2
GLUCOSE (EXTERNAL): 179 MG/DL (ref 70–99)
POTASSIUM (EXTERNAL): 4.1 MEQ/L (ref 3.4–5.1)

## 2021-10-05 ENCOUNTER — TRANSFERRED RECORDS (OUTPATIENT)
Dept: HEALTH INFORMATION MANAGEMENT | Facility: OTHER | Age: 86
End: 2021-10-05

## 2021-10-14 ENCOUNTER — IMMUNIZATION (OUTPATIENT)
Dept: FAMILY MEDICINE | Facility: OTHER | Age: 86
End: 2021-10-14
Attending: FAMILY MEDICINE
Payer: MEDICARE

## 2021-10-14 ENCOUNTER — LAB (OUTPATIENT)
Dept: LAB | Facility: OTHER | Age: 86
End: 2021-10-14
Attending: INTERNAL MEDICINE
Payer: MEDICARE

## 2021-10-14 DIAGNOSIS — E78.2 MIXED HYPERLIPIDEMIA: ICD-10-CM

## 2021-10-14 DIAGNOSIS — I10 BENIGN ESSENTIAL HYPERTENSION: ICD-10-CM

## 2021-10-14 DIAGNOSIS — E11.22 TYPE 2 DIABETES MELLITUS WITH STAGE 5 CHRONIC KIDNEY DISEASE NOT ON CHRONIC DIALYSIS, WITH LONG-TERM CURRENT USE OF INSULIN (H): ICD-10-CM

## 2021-10-14 DIAGNOSIS — N18.5 TYPE 2 DIABETES MELLITUS WITH STAGE 5 CHRONIC KIDNEY DISEASE NOT ON CHRONIC DIALYSIS, WITH LONG-TERM CURRENT USE OF INSULIN (H): ICD-10-CM

## 2021-10-14 DIAGNOSIS — Z79.4 TYPE 2 DIABETES MELLITUS WITH STAGE 5 CHRONIC KIDNEY DISEASE NOT ON CHRONIC DIALYSIS, WITH LONG-TERM CURRENT USE OF INSULIN (H): ICD-10-CM

## 2021-10-14 LAB
ALBUMIN SERPL-MCNC: 4.2 G/DL (ref 3.5–5.7)
ALBUMIN UR-MCNC: 30 MG/DL
ALP SERPL-CCNC: 69 U/L (ref 34–104)
ALT SERPL W P-5'-P-CCNC: 9 U/L (ref 7–52)
ANION GAP SERPL CALCULATED.3IONS-SCNC: 12 MMOL/L (ref 3–14)
APPEARANCE UR: ABNORMAL
AST SERPL W P-5'-P-CCNC: 14 U/L (ref 13–39)
BACTERIA #/AREA URNS HPF: ABNORMAL /HPF
BILIRUB SERPL-MCNC: 0.4 MG/DL (ref 0.3–1)
BILIRUB UR QL STRIP: NEGATIVE
BUN SERPL-MCNC: 73 MG/DL (ref 7–25)
CALCIUM SERPL-MCNC: 8.8 MG/DL (ref 8.6–10.3)
CHLORIDE BLD-SCNC: 106 MMOL/L (ref 98–107)
CHOLEST SERPL-MCNC: 158 MG/DL
CO2 SERPL-SCNC: 23 MMOL/L (ref 21–31)
COLOR UR AUTO: ABNORMAL
CREAT SERPL-MCNC: 6.06 MG/DL (ref 0.7–1.3)
CREAT UR-MCNC: 42 MG/DL
ERYTHROCYTE [DISTWIDTH] IN BLOOD BY AUTOMATED COUNT: 14 % (ref 10–15)
FASTING STATUS PATIENT QL REPORTED: NORMAL
GFR SERPL CREATININE-BSD FRML MDRD: 8 ML/MIN/1.73M2
GLUCOSE BLD-MCNC: 119 MG/DL (ref 70–105)
GLUCOSE UR STRIP-MCNC: NEGATIVE MG/DL
HBA1C MFR BLD: 5.9 % (ref 4–6.2)
HCT VFR BLD AUTO: 37.7 % (ref 40–53)
HDLC SERPL-MCNC: 34 MG/DL (ref 23–92)
HGB BLD-MCNC: 11.9 G/DL (ref 13.3–17.7)
HGB UR QL STRIP: ABNORMAL
KETONES UR STRIP-MCNC: NEGATIVE MG/DL
LDLC SERPL CALC-MCNC: 96 MG/DL
LEUKOCYTE ESTERASE UR QL STRIP: ABNORMAL
MCH RBC QN AUTO: 30 PG (ref 26.5–33)
MCHC RBC AUTO-ENTMCNC: 31.6 G/DL (ref 31.5–36.5)
MCV RBC AUTO: 95 FL (ref 78–100)
MICROALBUMIN UR-MCNC: 131 MG/L
MICROALBUMIN/CREAT UR: 311.9 MG/G CR (ref 0–17)
NITRATE UR QL: NEGATIVE
NONHDLC SERPL-MCNC: 124 MG/DL
PH UR STRIP: 7 [PH] (ref 5–9)
PLATELET # BLD AUTO: 231 10E3/UL (ref 150–450)
POTASSIUM BLD-SCNC: 4.3 MMOL/L (ref 3.5–5.1)
PROT SERPL-MCNC: 6.7 G/DL (ref 6.4–8.9)
RBC # BLD AUTO: 3.97 10E6/UL (ref 4.4–5.9)
RBC URINE: 5 /HPF
SODIUM SERPL-SCNC: 141 MMOL/L (ref 134–144)
SP GR UR STRIP: 1.01 (ref 1–1.03)
TRIGL SERPL-MCNC: 138 MG/DL
TSH SERPL DL<=0.005 MIU/L-ACNC: 3.29 MU/L (ref 0.4–4)
UROBILINOGEN UR STRIP-MCNC: NORMAL MG/DL
WBC # BLD AUTO: 8.3 10E3/UL (ref 4–11)
WBC CLUMPS #/AREA URNS HPF: PRESENT /HPF
WBC URINE: >182 /HPF

## 2021-10-14 PROCEDURE — 82040 ASSAY OF SERUM ALBUMIN: CPT | Mod: ZL

## 2021-10-14 PROCEDURE — 85027 COMPLETE CBC AUTOMATED: CPT | Mod: ZL

## 2021-10-14 PROCEDURE — 82465 ASSAY BLD/SERUM CHOLESTEROL: CPT | Mod: ZL

## 2021-10-14 PROCEDURE — 82043 UR ALBUMIN QUANTITATIVE: CPT | Mod: ZL

## 2021-10-14 PROCEDURE — 91300 PR COVID VAC PFIZER DIL RECON 30 MCG/0.3 ML IM: CPT

## 2021-10-14 PROCEDURE — 83036 HEMOGLOBIN GLYCOSYLATED A1C: CPT | Mod: ZL

## 2021-10-14 PROCEDURE — 84443 ASSAY THYROID STIM HORMONE: CPT | Mod: ZL

## 2021-10-14 PROCEDURE — 36415 COLL VENOUS BLD VENIPUNCTURE: CPT | Mod: ZL

## 2021-10-14 PROCEDURE — 81001 URINALYSIS AUTO W/SCOPE: CPT | Mod: ZL

## 2021-10-15 ENCOUNTER — OFFICE VISIT (OUTPATIENT)
Dept: INTERNAL MEDICINE | Facility: OTHER | Age: 86
End: 2021-10-15
Attending: INTERNAL MEDICINE
Payer: COMMERCIAL

## 2021-10-15 VITALS
WEIGHT: 180.8 LBS | SYSTOLIC BLOOD PRESSURE: 138 MMHG | HEART RATE: 85 BPM | OXYGEN SATURATION: 99 % | RESPIRATION RATE: 16 BRPM | DIASTOLIC BLOOD PRESSURE: 84 MMHG | BODY MASS INDEX: 26.63 KG/M2 | TEMPERATURE: 97.4 F

## 2021-10-15 DIAGNOSIS — I10 BENIGN ESSENTIAL HYPERTENSION: Primary | ICD-10-CM

## 2021-10-15 DIAGNOSIS — N05.8 PRIMARY PAUCI-IMMUNE NECROTIZING AND CRESCENTIC GLOMERULONEPHRITIS: ICD-10-CM

## 2021-10-15 DIAGNOSIS — E11.22 TYPE 2 DIABETES MELLITUS WITH STAGE 5 CHRONIC KIDNEY DISEASE NOT ON CHRONIC DIALYSIS, WITH LONG-TERM CURRENT USE OF INSULIN (H): ICD-10-CM

## 2021-10-15 DIAGNOSIS — T38.0X5A STEROID-INDUCED HYPERGLYCEMIA: ICD-10-CM

## 2021-10-15 DIAGNOSIS — D51.9 ANEMIA DUE TO VITAMIN B12 DEFICIENCY, UNSPECIFIED B12 DEFICIENCY TYPE: ICD-10-CM

## 2021-10-15 DIAGNOSIS — D84.9 IMMUNOCOMPROMISED PATIENT (H): ICD-10-CM

## 2021-10-15 DIAGNOSIS — N00.7 ACUTE CRESCENTIC GLOMERULONEPHRITIS: ICD-10-CM

## 2021-10-15 DIAGNOSIS — Z71.85 VACCINE COUNSELING: ICD-10-CM

## 2021-10-15 DIAGNOSIS — N05.7 PRIMARY PAUCI-IMMUNE NECROTIZING AND CRESCENTIC GLOMERULONEPHRITIS: ICD-10-CM

## 2021-10-15 DIAGNOSIS — N18.5 TYPE 2 DIABETES MELLITUS WITH STAGE 5 CHRONIC KIDNEY DISEASE NOT ON CHRONIC DIALYSIS, WITH LONG-TERM CURRENT USE OF INSULIN (H): ICD-10-CM

## 2021-10-15 DIAGNOSIS — Z79.4 TYPE 2 DIABETES MELLITUS WITH STAGE 5 CHRONIC KIDNEY DISEASE NOT ON CHRONIC DIALYSIS, WITH LONG-TERM CURRENT USE OF INSULIN (H): ICD-10-CM

## 2021-10-15 DIAGNOSIS — N18.5 CKD (CHRONIC KIDNEY DISEASE) STAGE 5, GFR LESS THAN 15 ML/MIN (H): ICD-10-CM

## 2021-10-15 DIAGNOSIS — R73.9 STEROID-INDUCED HYPERGLYCEMIA: ICD-10-CM

## 2021-10-15 PROCEDURE — G0463 HOSPITAL OUTPT CLINIC VISIT: HCPCS

## 2021-10-15 PROCEDURE — 99214 OFFICE O/P EST MOD 30 MIN: CPT | Performed by: INTERNAL MEDICINE

## 2021-10-15 RX ORDER — SODIUM BICARBONATE 650 MG/1
1300 TABLET ORAL 3 TIMES DAILY
Qty: 540 TABLET | Refills: 3 | COMMUNITY
Start: 2021-10-15 | End: 2022-07-27

## 2021-10-15 ASSESSMENT — PAIN SCALES - GENERAL: PAINLEVEL: NO PAIN (0)

## 2021-10-15 ASSESSMENT — ENCOUNTER SYMPTOMS
CHILLS: 0
DYSURIA: 0
WHEEZING: 0
BACK PAIN: 0
EYE PAIN: 0
DIZZINESS: 0
MYALGIAS: 0
CONFUSION: 0
HEMATURIA: 0
BRUISES/BLEEDS EASILY: 1
FEVER: 0
SHORTNESS OF BREATH: 0
FATIGUE: 1
LIGHT-HEADEDNESS: 0
COUGH: 0
DIARRHEA: 0
ABDOMINAL PAIN: 0
PALPITATIONS: 0
AGITATION: 0
NAUSEA: 0
VOMITING: 0
ARTHRALGIAS: 0

## 2021-10-15 NOTE — NURSING NOTE
"Chief Complaint   Patient presents with     Diabetes       FOOD SECURITY SCREENING QUESTIONS  Hunger Vital Signs:  Within the past 12 months we worried whether our food would run out before we got money to buy more. Never  Within the past 12 months the food we bought just didn't last and we didn't have money to get more. Never  Nori Freeman LPN 10/15/2021 10:11 AM      Initial /84 (BP Location: Right arm, Patient Position: Sitting, Cuff Size: Adult Regular)   Pulse 85   Temp 97.4  F (36.3  C) (Tympanic)   Resp 16   Wt 82 kg (180 lb 12.8 oz)   SpO2 99%   BMI 26.63 kg/m   Estimated body mass index is 26.63 kg/m  as calculated from the following:    Height as of 3/30/21: 1.755 m (5' 9.09\").    Weight as of this encounter: 82 kg (180 lb 12.8 oz).  Medication Reconciliation: complete    Nori Freeman LPN  "

## 2021-10-15 NOTE — PROGRESS NOTES
Lab Results   Component Value Date    A1C 5.9 10/14/2021    A1C 6.0 07/12/2021    A1C 5.9 03/30/2021    A1C 6.5 06/20/2019    ALBUMIN 4.2 10/14/2021    ALBUMIN 4.5 03/30/2021     Previous A1C is at goal of <8  Date of last Foot exam unknown  Eye exam Yes- Date of last eye exam: 07/2021   Patient is not a Tobacco User  Patient is not on a daily aspirin  Patient is not on a Statin.  Blood pressure today of:     BP Readings from Last 1 Encounters:   10/15/21 138/84      is at the goal of <139/89 for diabetics.    Nori Freeman LPN on 10/15/2021 at 10:11 AM

## 2021-10-15 NOTE — PATIENT INSTRUCTIONS
Blood pressure is well controlled.   Diabetes is well controlled.     Medications refilled.   Labs are stable.     Lab on 10/14/2021   Component Date Value Ref Range Status     Sodium 10/14/2021 141  134 - 144 mmol/L Final     Potassium 10/14/2021 4.3  3.5 - 5.1 mmol/L Final     Chloride 10/14/2021 106  98 - 107 mmol/L Final     Carbon Dioxide (CO2) 10/14/2021 23  21 - 31 mmol/L Final     Anion Gap 10/14/2021 12  3 - 14 mmol/L Final     Urea Nitrogen 10/14/2021 73* 7 - 25 mg/dL Final     Creatinine 10/14/2021 6.06* 0.70 - 1.30 mg/dL Final     Calcium 10/14/2021 8.8  8.6 - 10.3 mg/dL Final     Glucose 10/14/2021 119* 70 - 105 mg/dL Final     Alkaline Phosphatase 10/14/2021 69  34 - 104 U/L Final     AST 10/14/2021 14  13 - 39 U/L Final     ALT 10/14/2021 9  7 - 52 U/L Final     Protein Total 10/14/2021 6.7  6.4 - 8.9 g/dL Final     Albumin 10/14/2021 4.2  3.5 - 5.7 g/dL Final     Bilirubin Total 10/14/2021 0.4  0.3 - 1.0 mg/dL Final     GFR Estimate 10/14/2021 8* >60 mL/min/1.73m2 Final    As of July 11, 2021, eGFR is calculated by the CKD-EPI creatinine equation, without race adjustment. eGFR can be influenced by muscle mass, exercise, and diet. The reported eGFR is an estimation only and is only applicable if the renal function is stable.     WBC Count 10/14/2021 8.3  4.0 - 11.0 10e3/uL Final     RBC Count 10/14/2021 3.97* 4.40 - 5.90 10e6/uL Final     Hemoglobin 10/14/2021 11.9* 13.3 - 17.7 g/dL Final     Hematocrit 10/14/2021 37.7* 40.0 - 53.0 % Final     MCV 10/14/2021 95  78 - 100 fL Final     MCH 10/14/2021 30.0  26.5 - 33.0 pg Final     MCHC 10/14/2021 31.6  31.5 - 36.5 g/dL Final     RDW 10/14/2021 14.0  10.0 - 15.0 % Final     Platelet Count 10/14/2021 231  150 - 450 10e3/uL Final     Cholesterol 10/14/2021 158  <200 mg/dL Final     Triglycerides 10/14/2021 138  <150 mg/dL Final     Direct Measure HDL 10/14/2021 34  23 - 92 mg/dL Final     LDL Cholesterol Calculated 10/14/2021 96  <=100 mg/dL Final     Non  HDL Cholesterol 10/14/2021 124  <130 mg/dL Final     Patient Fasting > 8hrs? 10/14/2021 Unknown   Final     Hemoglobin A1C 10/14/2021 5.9  4.0 - 6.2 % Final     TSH 10/14/2021 3.29  0.40 - 4.00 mU/L Final     Color Urine 10/14/2021 Light Yellow  Colorless, Straw, Light Yellow, Yellow Final     Appearance Urine 10/14/2021 Slightly Cloudy* Clear Final     Glucose Urine 10/14/2021 Negative  Negative mg/dL Final     Bilirubin Urine 10/14/2021 Negative  Negative Final     Ketones Urine 10/14/2021 Negative  Negative mg/dL Final     Specific Gravity Urine 10/14/2021 1.009  1.000 - 1.030 Final     Blood Urine 10/14/2021 Small* Negative Final     pH Urine 10/14/2021 7.0  5.0 - 9.0 Final     Protein Albumin Urine 10/14/2021 30 * Negative mg/dL Final     Urobilinogen Urine 10/14/2021 Normal  Normal, 2.0 mg/dL Final     Nitrite Urine 10/14/2021 Negative  Negative Final     Leukocyte Esterase Urine 10/14/2021 Large* Negative Final     Bacteria Urine 10/14/2021 Few* None Seen /HPF Final     RBC Urine 10/14/2021 5* <=2 /HPF Final     WBC Urine 10/14/2021 >182* <=5 /HPF Final     WBC Clumps Urine 10/14/2021 Present* None Seen /HPF Final     Creatinine Urine mg/dL 10/14/2021 42  mg/dL Final     Albumin Urine mg/L 10/14/2021 131  mg/L Final     Albumin Urine mg/g Cr 10/14/2021 311.90* 0.00 - 17.00 mg/g Cr Final        Aspects of Diabetes:   Recent Labs   Lab Test 10/14/21  1012 07/12/21  0926 03/30/21  1129 06/20/19  1219 06/20/19  1219   A1C 5.9 6.0 5.9   < > 6.5*   LDL 96 96 111*   < > 131*   HDL 34 32 33   < > 30   TRIG 138 170* 218*   < > 164*   ALT 9 9 9   < > 8   CR 6.06* 5.65* 5.38*   < > 4.88*   GFRESTIMATED 8* 8* 10*   < > 11*   GFRESTBLACK  --   --  12*  --  14*   POTASSIUM 4.3 4.0 4.8   < > 4.1   TSH 3.29 4.34*  --   --   --     < > = values in this interval not displayed.      Hemoglobin A1c  Goal range is under 8%. Best is 6.5 to 7   Blood Pressure 138/84 Goal to keep less than 140/90   Tobacco  reports that he quit  smoking about 45 years ago. His smoking use included cigarettes. He has never used smokeless tobacco. Goal to abstain from tobacco   Aspirin or Plavix Anti-platelet therapy Aspirin or Plavix reduces risk of heart disease and stroke  -- sometimes used with other blood thinners, depending on bleeding risk and risk factors.    ACE/ARB Specific blood pressure meds These medications can reduce risk of kidney disease   Cholesterol Statins (Lipitor, Crestor, vs others) Statins reduce risk of heart disease and stroke   Eye Exam -- Do Yearly -- Annual diabetic eye exam   Healthy weight Wt Readings from Last 3 Encounters:   10/15/21 82 kg (180 lb 12.8 oz)   07/14/21 83 kg (183 lb)   07/14/21 83.1 kg (183 lb 4 oz)     Body mass index is 26.63 kg/m .  Goal BMI under 30, best is under 25.      -- Trying to exercise daily (goal at least 20 min/day) with moderate aerobic activity   -- Eat healthy (resources from ADA at http://www.diabetes.org/)   -- Taking good care of my feet. Consider seeing the Podiatrist   -- Check blood sugars as directed, record in log book and bring to every appointment    Insurance companies are grading you and I on your blood sugar control -- Goal is to get your A1c down to 7.9% or lower and NO Smoking!  -- Medicare and most insurance companies, will only cover Hemoglobin A1c labs to be rechecked every 91+ days.      Return for Diabetes labs and clinic follow-up appointment every 3 to 4 months.    Schedule lab only appointment --- A few days AFTER: 1/13/22   Schedule clinic appointment with Dr. Machado -- Same day as labs, or 1-2 days later.

## 2021-10-15 NOTE — PROGRESS NOTES
"Assessment & Plan       ICD-10-CM    1. Benign essential hypertension  I10    2. 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 CKD (chronic kidney disease) stage 5, GFR 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 ml/min (H)  N18.5 sodium bicarbonate 650 MG tablet   4. 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 Anemia due to vitamin B12 deficiency, unspecified B12 deficiency type  D51.9    6. Immunocompromised patient (H)  D84.9    7. Primary pauci-immune necrotizing and crescentic glomerulonephritis  N05.8     N05.7    8. Steroid-induced hyperglycemia  R73.9 insulin glargine (LANTUS SOLOSTAR) 100 UNIT/ML pen    T38.0X5A      Patient presents for follow-up of multiple issues.    Hypertension, blood pressure is currently well controlled.  Currently doing well with current medication regimen.  Denies syncope or presyncope.  No changes for now.    Type 2 diabetes with stage V chronic kidney disease.  Saw nephrology recently and had his sodium bicarbonate dosing increased up to 1300 mg 3 times daily.  Needs insulin refills today.  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  Denies hypoglycemia.  Hemoglobin A1c is well controlled.    Anemia, multifactorial.  He has glomerulonephritis, Chronically immunocompromised now chronic prednisone.    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  Recently had darbepoetin injection due to anemia.  Follows with nephrology in [place].    Vaccine counseling completed.  Encouraged flu shot this fall.  Shingles vaccination.  COVID-19 booster shot when available.     BMI:   Estimated body mass index is 26.63 kg/m  as calculated from the following:    Height as of 3/30/21: 1.755 m (5' 9.09\").    Weight as of this encounter: 82 kg (180 lb 12.8 oz).       Work on weight loss and regular exercise    Return in about 3 months (around 1/15/2022) for - Labs every 91+ " days, DM Follow-up 1-2 days later, with Dr. Machado.    Valentino Machado MD  [facility]    Subjective     Altaf Chao is a 87 year old male who presents to clinic today for the following health issues:    HPI     Diabetes Follow-up      How often are you checking your blood sugar? Not at all    What concerns do you have today about your diabetes? None     Do you have any of these symptoms? (Select all that apply)  No numbness or tingling in feet.  No redness, sores or blisters on feet.  No complaints of excessive thirst.  No reports of blurry vision.  No significant changes to weight.    Have you had a diabetic eye exam in the last 12 months? No    Hyperlipidemia Follow-Up      Are you regularly taking any medication or supplement to lower your cholesterol?   No    Are you having muscle aches or other side effects that you think could be caused by your cholesterol lowering medication?  No    Hypertension Follow-up      Do you check your blood pressure regularly outside of the clinic? No     Are you following a low salt diet? Yes    Are your blood pressures ever more than 140 on the top number (systolic) OR more   than 90 on the bottom number (diastolic), for example 140/90? No    BP Readings from Last 2 Encounters:   10/15/21 138/84   08/25/21 (!) 152/83     Hemoglobin A1C (%)   Date Value   10/14/2021 5.9   07/12/2021 6.0   03/30/2021 5.9   06/20/2019 6.5 (H)     LDL Cholesterol Calculated (mg/dL)   Date Value   10/14/2021 96   07/12/2021 96   03/30/2021 111 (H)   06/20/2019 131 (H)         How many servings of fruits and vegetables do you eat daily?  2-3    On average, how many sweetened beverages do you drink each day (Examples: soda, juice, sweet tea, etc.  Do NOT count diet or artificially sweetened beverages)?   0    How many days per week do you exercise enough to make your heart beat faster? 3 or less    How many minutes a day do you exercise enough to make your heart beat faster? 20 -  29    How many days per week do you miss taking your medication? 0    Review of Systems   Constitutional: Positive for fatigue. Negative for chills and fever.        Golfs 1x weekly in the summer and curls and bowls in the winter 2x weekly each   HENT: Negative for congestion and hearing loss.    Eyes: Negative for pain and visual disturbance.   Respiratory: Negative for cough, shortness of breath and wheezing.    Cardiovascular: Negative for chest pain and palpitations.   Gastrointestinal: Negative for abdominal pain, diarrhea, nausea and vomiting.   Endocrine: Negative for cold intolerance and heat intolerance.   Genitourinary: Negative for dysuria and hematuria.   Musculoskeletal: Positive for gait problem. Negative for arthralgias, back pain and myalgias.   Skin: Negative for pallor.   Allergic/Immunologic: Negative for immunocompromised state.   Neurological: Negative for dizziness and light-headedness.   Hematological: Bruises/bleeds easily.   Psychiatric/Behavioral: Negative for agitation and confusion.          Objective    /84 (BP Location: Right arm, Patient Position: Sitting, Cuff Size: Adult Regular)   Pulse 85   Temp 97.4  F (36.3  C) (Tympanic)   Resp 16   Wt 82 kg (180 lb 12.8 oz)   SpO2 99%   BMI 26.63 kg/m    Body mass index is 26.63 kg/m .  Physical Exam  Constitutional:       General: He is not in acute distress.     Appearance: He is well-developed. He is not diaphoretic.   HENT:      Head: Normocephalic and atraumatic.   Eyes:      General: No scleral icterus.     Conjunctiva/sclera: Conjunctivae normal.   Cardiovascular:      Rate and Rhythm: Normal rate and regular rhythm.   Pulmonary:      Effort: Pulmonary effort is normal.      Breath sounds: Normal breath sounds.   Abdominal:      Palpations: Abdomen is soft.      Tenderness: There is no abdominal tenderness.   Musculoskeletal:         General: No deformity.      Cervical back: Neck supple.      Right lower leg: Edema present.       Left lower leg: Edema present.   Lymphadenopathy:      Cervical: No cervical adenopathy.   Skin:     General: Skin is warm and dry.      Findings: No rash.      Comments: Some resolving bruises on the arms bilaterally   Neurological:      Mental Status: He is alert and oriented to person, place, and time. Mental status is at baseline.   Psychiatric:         Mood and Affect: Mood normal.         Behavior: Behavior normal.          Recent Labs   Lab Test 10/14/21  1012 07/12/21  0926 03/30/21  1129 06/20/19  1219 06/20/19  1219   A1C 5.9 6.0 5.9   < > 6.5*   LDL 96 96 111*   < > 131*   HDL 34 32 33   < > 30   TRIG 138 170* 218*   < > 164*   ALT 9 9 9   < > 8   CR 6.06* 5.65* 5.38*   < > 4.88*   GFRESTIMATED 8* 8* 10*   < > 11*   GFRESTBLACK  --   --  12*  --  14*   POTASSIUM 4.3 4.0 4.8   < > 4.1   TSH 3.29 4.34*  --   --   --     < > = values in this interval not displayed.   Hemoglobin A1c is well controlled.  LDL is at goal, less than 100.  Triglycerides are at goal.  Creatinine has worsened.  ALT is normal.  TSH is at goal.  Potassium is normal.

## 2021-10-19 ENCOUNTER — ALLIED HEALTH/NURSE VISIT (OUTPATIENT)
Dept: FAMILY MEDICINE | Facility: OTHER | Age: 86
End: 2021-10-19
Attending: INTERNAL MEDICINE
Payer: MEDICARE

## 2021-10-19 DIAGNOSIS — E53.8 B12 DEFICIENCY: Primary | ICD-10-CM

## 2021-10-19 PROCEDURE — 250N000011 HC RX IP 250 OP 636: Performed by: INTERNAL MEDICINE

## 2021-10-19 PROCEDURE — 96372 THER/PROPH/DIAG INJ SC/IM: CPT | Performed by: INTERNAL MEDICINE

## 2021-10-19 RX ADMIN — CYANOCOBALAMIN 1000 MCG: 1000 INJECTION, SOLUTION INTRAMUSCULAR; SUBCUTANEOUS at 08:54

## 2021-10-19 NOTE — PROGRESS NOTES
Verified patient's first and last name, and . Patient stated reason for visit today is to receive a B12 injection. Patient denied any concerns with previous injections. B12 prepared and administered IM as ordered. Administration of medication documented in MAR (see MAR for further information regarding dose, lot #, NDC #, expiration date). Patient encouraged to wait in lobby for 15 minutes post-injection and notify staff immediately of any reaction.     Mia Montejo RN ....................  10/19/2021   8:45 AM

## 2021-10-21 DIAGNOSIS — N05.7 PRIMARY PAUCI-IMMUNE NECROTIZING AND CRESCENTIC GLOMERULONEPHRITIS: ICD-10-CM

## 2021-10-21 DIAGNOSIS — N18.5 CKD (CHRONIC KIDNEY DISEASE) STAGE 5, GFR LESS THAN 15 ML/MIN (H): ICD-10-CM

## 2021-10-21 DIAGNOSIS — N05.8 PRIMARY PAUCI-IMMUNE NECROTIZING AND CRESCENTIC GLOMERULONEPHRITIS: ICD-10-CM

## 2021-10-22 RX ORDER — PREDNISONE 5 MG/1
2.5 TABLET ORAL DAILY
Qty: 135 TABLET | Refills: 3 | OUTPATIENT
Start: 2021-10-22

## 2021-10-22 NOTE — TELEPHONE ENCOUNTER
predniSONE (DELTASONE) 2.5 MG tablet 90 tablet 3 3/30/2021  No   Sig - Route: Take 1 tablet (2.5 mg) by mouth daily - Oral   Sent to pharmacy as: predniSONE 2.5 MG Oral Tablet (DELTASONE)   Class: E-Prescribe   Order: [id number]   E-Prescribing Status: Receipt confirmed by pharmacy (3/30/2021 11:18 AM CDT)   Prior authorization: Closed - Prior Authorization not required for patient/medication     Redundant refill request refused: Too soon:    Unable to complete prescription refill per RN Medication Refill Policy.................... Nori Fry RN ....................  10/22/2021   9:08 AM

## 2021-11-14 DIAGNOSIS — R73.9 STEROID-INDUCED HYPERGLYCEMIA: ICD-10-CM

## 2021-11-14 DIAGNOSIS — T38.0X5A STEROID-INDUCED HYPERGLYCEMIA: ICD-10-CM

## 2021-11-14 RX ORDER — PEN NEEDLE, DIABETIC 31 GX5/16"
NEEDLE, DISPOSABLE MISCELLANEOUS
Qty: 100 EACH | Refills: 0 | Status: SHIPPED | OUTPATIENT
Start: 2021-11-14 | End: 2022-02-14

## 2021-11-16 ENCOUNTER — ALLIED HEALTH/NURSE VISIT (OUTPATIENT)
Dept: FAMILY MEDICINE | Facility: OTHER | Age: 86
End: 2021-11-16
Attending: INTERNAL MEDICINE
Payer: MEDICARE

## 2021-11-16 DIAGNOSIS — E53.8 B12 DEFICIENCY: Primary | ICD-10-CM

## 2021-11-16 PROCEDURE — 250N000011 HC RX IP 250 OP 636: Performed by: INTERNAL MEDICINE

## 2021-11-16 PROCEDURE — 96372 THER/PROPH/DIAG INJ SC/IM: CPT | Performed by: INTERNAL MEDICINE

## 2021-11-16 RX ADMIN — CYANOCOBALAMIN 1000 MCG: 1000 INJECTION, SOLUTION INTRAMUSCULAR; SUBCUTANEOUS at 09:20

## 2021-11-16 NOTE — PROGRESS NOTES
Verified patient's first and last name, and . Patient stated reason for visit today is to receive a B12 injection. Patient denied any concerns with previous injections. B12 prepared and administered IM as ordered. Administration of medication documented in MAR (see MAR for further information regarding dose, lot #, NDC #, expiration date). Patient encouraged to wait in lobby for 15 minutes post-injection and notify staff immediately of any reaction.     Poonam Florez RN ....................  2021   9:25 AM

## 2021-12-14 ENCOUNTER — ALLIED HEALTH/NURSE VISIT (OUTPATIENT)
Dept: FAMILY MEDICINE | Facility: OTHER | Age: 86
End: 2021-12-14
Attending: INTERNAL MEDICINE
Payer: MEDICARE

## 2021-12-14 DIAGNOSIS — E53.8 B12 DEFICIENCY: Primary | ICD-10-CM

## 2021-12-14 PROCEDURE — 96372 THER/PROPH/DIAG INJ SC/IM: CPT | Performed by: INTERNAL MEDICINE

## 2021-12-14 PROCEDURE — 250N000011 HC RX IP 250 OP 636: Performed by: INTERNAL MEDICINE

## 2021-12-14 RX ADMIN — CYANOCOBALAMIN 1000 MCG: 1000 INJECTION, SOLUTION INTRAMUSCULAR; SUBCUTANEOUS at 09:01

## 2021-12-14 NOTE — PROGRESS NOTES
Called and spoke to patient to see what kind of form he needs filled out. It is a form for an annual exam for his health insurance. He gets his insurance through his wifes and they switched. Pt was wondering since he had an annual exam on 3/16/2021 if that could work in order to fill out the form. Kindly adivse.    Verified patient's first and last name, and . Patient stated reason for visit today is to receive a B12 injection. Patient denied any concerns with previous injections. B12 prepared and administered IM as ordered. Administration of medication documented in MAR (see MAR for further information regarding dose, lot #, NDC #, expiration date). Patient encouraged to wait in lobby for 15 minutes post-injection and notify staff immediately of any reaction.     Mia Montejo RN ....................  2021   8:58 AM

## 2022-01-17 ENCOUNTER — ALLIED HEALTH/NURSE VISIT (OUTPATIENT)
Dept: FAMILY MEDICINE | Facility: OTHER | Age: 87
End: 2022-01-17
Attending: INTERNAL MEDICINE
Payer: MEDICARE

## 2022-01-17 ENCOUNTER — LAB (OUTPATIENT)
Dept: LAB | Facility: OTHER | Age: 87
End: 2022-01-17
Attending: INTERNAL MEDICINE
Payer: MEDICARE

## 2022-01-17 DIAGNOSIS — N18.5 TYPE 2 DIABETES MELLITUS WITH STAGE 5 CHRONIC KIDNEY DISEASE NOT ON CHRONIC DIALYSIS, WITH LONG-TERM CURRENT USE OF INSULIN (H): ICD-10-CM

## 2022-01-17 DIAGNOSIS — E53.8 B12 DEFICIENCY: Primary | ICD-10-CM

## 2022-01-17 DIAGNOSIS — E11.22 TYPE 2 DIABETES MELLITUS WITH STAGE 5 CHRONIC KIDNEY DISEASE NOT ON CHRONIC DIALYSIS, WITH LONG-TERM CURRENT USE OF INSULIN (H): ICD-10-CM

## 2022-01-17 DIAGNOSIS — Z79.4 TYPE 2 DIABETES MELLITUS WITH STAGE 5 CHRONIC KIDNEY DISEASE NOT ON CHRONIC DIALYSIS, WITH LONG-TERM CURRENT USE OF INSULIN (H): ICD-10-CM

## 2022-01-17 DIAGNOSIS — I10 BENIGN ESSENTIAL HYPERTENSION: Primary | ICD-10-CM

## 2022-01-17 DIAGNOSIS — E78.2 MIXED HYPERLIPIDEMIA: ICD-10-CM

## 2022-01-17 LAB
ALBUMIN SERPL-MCNC: 4 G/DL (ref 3.5–5.7)
ALBUMIN UR-MCNC: 50 MG/DL
ALP SERPL-CCNC: 64 U/L (ref 34–104)
ALT SERPL W P-5'-P-CCNC: 9 U/L (ref 7–52)
ANION GAP SERPL CALCULATED.3IONS-SCNC: 11 MMOL/L (ref 3–14)
APPEARANCE UR: ABNORMAL
AST SERPL W P-5'-P-CCNC: 13 U/L (ref 13–39)
BACTERIA #/AREA URNS HPF: ABNORMAL /HPF
BILIRUB SERPL-MCNC: 0.4 MG/DL (ref 0.3–1)
BILIRUB UR QL STRIP: NEGATIVE
BUN SERPL-MCNC: 67 MG/DL (ref 7–25)
CALCIUM SERPL-MCNC: 8.9 MG/DL (ref 8.6–10.3)
CHLORIDE BLD-SCNC: 107 MMOL/L (ref 98–107)
CHOLEST SERPL-MCNC: 137 MG/DL
CO2 SERPL-SCNC: 23 MMOL/L (ref 21–31)
COLOR UR AUTO: YELLOW
CREAT SERPL-MCNC: 5.96 MG/DL (ref 0.7–1.3)
CREAT UR-MCNC: 47 MG/DL
ERYTHROCYTE [DISTWIDTH] IN BLOOD BY AUTOMATED COUNT: 12.7 % (ref 10–15)
FASTING STATUS PATIENT QL REPORTED: YES
GFR SERPL CREATININE-BSD FRML MDRD: 9 ML/MIN/1.73M2
GLUCOSE BLD-MCNC: 210 MG/DL (ref 70–105)
GLUCOSE UR STRIP-MCNC: NEGATIVE MG/DL
HBA1C MFR BLD: 6.1 % (ref 4–6.2)
HCT VFR BLD AUTO: 36 % (ref 40–53)
HDLC SERPL-MCNC: 32 MG/DL (ref 23–92)
HGB BLD-MCNC: 11.8 G/DL (ref 13.3–17.7)
HGB UR QL STRIP: ABNORMAL
KETONES UR STRIP-MCNC: NEGATIVE MG/DL
LDLC SERPL CALC-MCNC: 74 MG/DL
LEUKOCYTE ESTERASE UR QL STRIP: ABNORMAL
MCH RBC QN AUTO: 30.3 PG (ref 26.5–33)
MCHC RBC AUTO-ENTMCNC: 32.8 G/DL (ref 31.5–36.5)
MCV RBC AUTO: 92 FL (ref 78–100)
MICROALBUMIN UR-MCNC: 193 MG/L
MICROALBUMIN/CREAT UR: 410.64 MG/G CR (ref 0–17)
MUCOUS THREADS #/AREA URNS LPF: PRESENT /LPF
NITRATE UR QL: NEGATIVE
NONHDLC SERPL-MCNC: 105 MG/DL
PH UR STRIP: 7 [PH] (ref 5–9)
PLATELET # BLD AUTO: 199 10E3/UL (ref 150–450)
POTASSIUM BLD-SCNC: 3.9 MMOL/L (ref 3.5–5.1)
PROT SERPL-MCNC: 6.8 G/DL (ref 6.4–8.9)
RBC # BLD AUTO: 3.9 10E6/UL (ref 4.4–5.9)
RBC URINE: 14 /HPF
SODIUM SERPL-SCNC: 141 MMOL/L (ref 134–144)
SP GR UR STRIP: 1.01 (ref 1–1.03)
T4 FREE SERPL-MCNC: 0.85 NG/DL (ref 0.6–1.6)
TRIGL SERPL-MCNC: 156 MG/DL
TSH SERPL DL<=0.005 MIU/L-ACNC: 4.39 MU/L (ref 0.4–4)
UROBILINOGEN UR STRIP-MCNC: NORMAL MG/DL
WBC # BLD AUTO: 9.2 10E3/UL (ref 4–11)
WBC URINE: >182 /HPF

## 2022-01-17 PROCEDURE — 80061 LIPID PANEL: CPT | Mod: ZL

## 2022-01-17 PROCEDURE — 84439 ASSAY OF FREE THYROXINE: CPT | Mod: ZL

## 2022-01-17 PROCEDURE — 81001 URINALYSIS AUTO W/SCOPE: CPT | Mod: ZL

## 2022-01-17 PROCEDURE — 85027 COMPLETE CBC AUTOMATED: CPT | Mod: ZL

## 2022-01-17 PROCEDURE — 96372 THER/PROPH/DIAG INJ SC/IM: CPT | Performed by: INTERNAL MEDICINE

## 2022-01-17 PROCEDURE — 250N000011 HC RX IP 250 OP 636: Performed by: INTERNAL MEDICINE

## 2022-01-17 PROCEDURE — 84443 ASSAY THYROID STIM HORMONE: CPT | Mod: ZL

## 2022-01-17 PROCEDURE — 83036 HEMOGLOBIN GLYCOSYLATED A1C: CPT | Mod: ZL

## 2022-01-17 PROCEDURE — 80053 COMPREHEN METABOLIC PANEL: CPT | Mod: ZL

## 2022-01-17 PROCEDURE — 82043 UR ALBUMIN QUANTITATIVE: CPT | Mod: ZL

## 2022-01-17 PROCEDURE — 36415 COLL VENOUS BLD VENIPUNCTURE: CPT | Mod: ZL

## 2022-01-17 RX ADMIN — CYANOCOBALAMIN 1000 MCG: 1000 INJECTION, SOLUTION INTRAMUSCULAR; SUBCUTANEOUS at 08:50

## 2022-01-17 NOTE — PROGRESS NOTES
Verified patient's first and last name, and . Patient stated reason for visit today is to receive a B12 injection. Patient denied any concerns with previous injections. B12 prepared and administered IM as ordered. Administration of medication documented in MAR (see MAR for further information regarding dose, lot #, NDC #, expiration date). Patient encouraged to wait in lobby for 15 minutes post-injection and notify staff immediately of any reaction.     Lizeth Cody RN ....................  2022   8:45 AM

## 2022-01-19 ENCOUNTER — OFFICE VISIT (OUTPATIENT)
Dept: INTERNAL MEDICINE | Facility: OTHER | Age: 87
End: 2022-01-19
Attending: INTERNAL MEDICINE
Payer: COMMERCIAL

## 2022-01-19 VITALS
DIASTOLIC BLOOD PRESSURE: 74 MMHG | SYSTOLIC BLOOD PRESSURE: 138 MMHG | RESPIRATION RATE: 16 BRPM | OXYGEN SATURATION: 96 % | BODY MASS INDEX: 25.92 KG/M2 | WEIGHT: 176 LBS | TEMPERATURE: 96.5 F | HEART RATE: 86 BPM

## 2022-01-19 DIAGNOSIS — Z71.85 VACCINE COUNSELING: ICD-10-CM

## 2022-01-19 DIAGNOSIS — N25.81 HYPERPARATHYROIDISM DUE TO RENAL INSUFFICIENCY (H): ICD-10-CM

## 2022-01-19 DIAGNOSIS — Z79.4 TYPE 2 DIABETES MELLITUS WITH STAGE 5 CHRONIC KIDNEY DISEASE NOT ON CHRONIC DIALYSIS, WITH LONG-TERM CURRENT USE OF INSULIN (H): Primary | ICD-10-CM

## 2022-01-19 DIAGNOSIS — N05.8 PRIMARY PAUCI-IMMUNE NECROTIZING AND CRESCENTIC GLOMERULONEPHRITIS: ICD-10-CM

## 2022-01-19 DIAGNOSIS — N39.0 PSEUDOMONAS URINARY TRACT INFECTION: ICD-10-CM

## 2022-01-19 DIAGNOSIS — R12 HEARTBURN: ICD-10-CM

## 2022-01-19 DIAGNOSIS — I10 BENIGN ESSENTIAL HYPERTENSION: ICD-10-CM

## 2022-01-19 DIAGNOSIS — E11.22 TYPE 2 DIABETES MELLITUS WITH STAGE 5 CHRONIC KIDNEY DISEASE NOT ON CHRONIC DIALYSIS, WITH LONG-TERM CURRENT USE OF INSULIN (H): Primary | ICD-10-CM

## 2022-01-19 DIAGNOSIS — D84.9 IMMUNOCOMPROMISED PATIENT (H): ICD-10-CM

## 2022-01-19 DIAGNOSIS — N18.5 CKD (CHRONIC KIDNEY DISEASE) STAGE 5, GFR LESS THAN 15 ML/MIN (H): ICD-10-CM

## 2022-01-19 DIAGNOSIS — N05.7 PRIMARY PAUCI-IMMUNE NECROTIZING AND CRESCENTIC GLOMERULONEPHRITIS: ICD-10-CM

## 2022-01-19 DIAGNOSIS — B96.5 PSEUDOMONAS URINARY TRACT INFECTION: ICD-10-CM

## 2022-01-19 DIAGNOSIS — E78.2 MIXED HYPERLIPIDEMIA: ICD-10-CM

## 2022-01-19 DIAGNOSIS — N18.5 TYPE 2 DIABETES MELLITUS WITH STAGE 5 CHRONIC KIDNEY DISEASE NOT ON CHRONIC DIALYSIS, WITH LONG-TERM CURRENT USE OF INSULIN (H): Primary | ICD-10-CM

## 2022-01-19 DIAGNOSIS — N39.0 RECURRENT UTI (URINARY TRACT INFECTION): ICD-10-CM

## 2022-01-19 DIAGNOSIS — R33.9 INCOMPLETE BLADDER EMPTYING: ICD-10-CM

## 2022-01-19 DIAGNOSIS — I89.0 LYMPHEDEMA OF BOTH LOWER EXTREMITIES: ICD-10-CM

## 2022-01-19 PROBLEM — T14.8XXA HEMATOMA: Status: RESOLVED | Noted: 2017-05-04 | Resolved: 2022-01-19

## 2022-01-19 PROCEDURE — 99214 OFFICE O/P EST MOD 30 MIN: CPT | Performed by: INTERNAL MEDICINE

## 2022-01-19 PROCEDURE — G0463 HOSPITAL OUTPT CLINIC VISIT: HCPCS

## 2022-01-19 RX ORDER — CHOLECALCIFEROL (VITAMIN D3) 125 MCG
2 CAPSULE ORAL DAILY
COMMUNITY
Start: 2022-01-19 | End: 2023-01-01

## 2022-01-19 RX ORDER — PREDNISONE 2.5 MG/1
2.5 TABLET ORAL DAILY
Qty: 90 TABLET | Refills: 3 | Status: SHIPPED | OUTPATIENT
Start: 2022-01-19 | End: 2022-11-01

## 2022-01-19 RX ORDER — CHOLECALCIFEROL (VITAMIN D3) 125 MCG
2 CAPSULE ORAL DAILY
COMMUNITY
Start: 2022-01-19 | End: 2022-01-19

## 2022-01-19 RX ORDER — FUROSEMIDE 40 MG
40 TABLET ORAL EVERY MORNING
Qty: 90 TABLET | Refills: 3 | Status: SHIPPED | OUTPATIENT
Start: 2022-01-19 | End: 2022-11-01

## 2022-01-19 RX ORDER — FAMOTIDINE 20 MG/1
20 TABLET, FILM COATED ORAL DAILY
Qty: 90 TABLET | Refills: 3 | Status: SHIPPED | OUTPATIENT
Start: 2022-01-19 | End: 2023-02-10

## 2022-01-19 ASSESSMENT — ENCOUNTER SYMPTOMS
ABDOMINAL PAIN: 0
PALPITATIONS: 0
HEMATURIA: 0
EYE PAIN: 0
CHILLS: 0
ARTHRALGIAS: 0
LIGHT-HEADEDNESS: 0
DYSURIA: 0
DIARRHEA: 0
VOMITING: 0
WHEEZING: 0
BRUISES/BLEEDS EASILY: 1
MYALGIAS: 0
DIZZINESS: 0
FEVER: 0
CONFUSION: 0
COUGH: 0
AGITATION: 0
SHORTNESS OF BREATH: 0
FATIGUE: 1
BACK PAIN: 0
NAUSEA: 0

## 2022-01-19 ASSESSMENT — PAIN SCALES - GENERAL: PAINLEVEL: NO PAIN (0)

## 2022-01-19 NOTE — NURSING NOTE
"Chief Complaint   Patient presents with     Diabetes       Fell last Sunday outside. Landed on his right shoulder. Patient reports that he is taking cranberry- vitamin C 30,000 1 time daily.    Initial BP (!) 140/74 (BP Location: Right arm, Patient Position: Sitting, Cuff Size: Adult Regular)   Pulse 86   Temp (!) 96.5  F (35.8  C) (Temporal)   Resp 16   Wt 79.8 kg (176 lb)   SpO2 96%   BMI 25.92 kg/m   Estimated body mass index is 25.92 kg/m  as calculated from the following:    Height as of 3/30/21: 1.755 m (5' 9.09\").    Weight as of this encounter: 79.8 kg (176 lb).  Medication Reconciliation: complete    FOOD SECURITY SCREENING QUESTIONS  The next two questions are to help us understand your food security.  If you are feeling you need any assistance in this area, we have resources available to support you today.    Hunger Vital Signs:  Within the past 12 months we worried whether our food would run out before we got money to buy more. Never  Within the past 12 months the food we bought just didn't last and we didn't have money to get more. Never      Deisy Smalls RN 1/19/2022 8:42 AM  "

## 2022-01-19 NOTE — PATIENT INSTRUCTIONS
Blood pressure is controlled.   Diabetes is well controlled.     Medications refilled.   Labs are stable.     Immunization History   Administered Date(s) Administered     COVID-19,PF,Pfizer (12+ Yrs) 02/26/2021, 03/19/2021, 10/14/2021     Influenza (High Dose) 3 valent vaccine 04/16/2019, 10/10/2020     Influenza, Quad, High Dose, Pf, 65yr+ (Fluzone HD) 10/15/2021     Pneumo Conj 13-V (2010&after) 04/16/2019     Tdap (Adacel,Boostrix) 12/27/2020      -- Consider Annual Flu / Influenza vaccination - Every Fall (Starting about October 1st)    -- Pneumonia / Pneumococcal PCV 23 vaccines are done / completed.     -- Get the new shingles shot (2 in series) (Shingrix) - from one of the local pharmacies, at your convenience. -- Check cost/coverage.   -- or -- If these are very expensive, go to the Local Public Health Department     [facility] [place] [address]    ---- Shot clinic is the FIRST Thursday of Each Month -- from 2 to 6 PM, by Appointment only.     Call for an appointment -- [phone]          Lab on 01/17/2022   Component Date Value Ref Range Status     Sodium 01/17/2022 141  134 - 144 mmol/L Final     Potassium 01/17/2022 3.9  3.5 - 5.1 mmol/L Final     Chloride 01/17/2022 107  98 - 107 mmol/L Final     Carbon Dioxide (CO2) 01/17/2022 23  21 - 31 mmol/L Final     Anion Gap 01/17/2022 11  3 - 14 mmol/L Final     Urea Nitrogen 01/17/2022 67* 7 - 25 mg/dL Final     Creatinine 01/17/2022 5.96* 0.70 - 1.30 mg/dL Final     Calcium 01/17/2022 8.9  8.6 - 10.3 mg/dL Final     Glucose 01/17/2022 210* 70 - 105 mg/dL Final     Alkaline Phosphatase 01/17/2022 64  34 - 104 U/L Final     AST 01/17/2022 13  13 - 39 U/L Final     ALT 01/17/2022 9  7 - 52 U/L Final     Protein Total 01/17/2022 6.8  6.4 - 8.9 g/dL Final     Albumin 01/17/2022 4.0  3.5 - 5.7 g/dL Final     Bilirubin Total 01/17/2022 0.4  0.3 - 1.0 mg/dL Final     GFR Estimate  01/17/2022 9* >60 mL/min/1.73m2 Final    Effective December 21, 2021 eGFRcr in adults is calculated using the 2021 CKD-EPI creatinine equation which includes age and gender (Agata et al., [facility], DOI: 10.1056/STCKgg7639482)     WBC Count 01/17/2022 9.2  4.0 - 11.0 10e3/uL Final     RBC Count 01/17/2022 3.90* 4.40 - 5.90 10e6/uL Final     Hemoglobin 01/17/2022 11.8* 13.3 - 17.7 g/dL Final     Hematocrit 01/17/2022 36.0* 40.0 - 53.0 % Final     MCV 01/17/2022 92  78 - 100 fL Final     MCH 01/17/2022 30.3  26.5 - 33.0 pg Final     MCHC 01/17/2022 32.8  31.5 - 36.5 g/dL Final     RDW 01/17/2022 12.7  10.0 - 15.0 % Final     Platelet Count 01/17/2022 199  150 - 450 10e3/uL Final     Creatinine Urine mg/dL 01/17/2022 47  mg/dL Final     Albumin Urine mg/L 01/17/2022 193  mg/L Final     Albumin Urine mg/g Cr 01/17/2022 410.64* 0.00 - 17.00 mg/g Cr Final     Color Urine 01/17/2022 Yellow  Colorless, Straw, Light Yellow, Yellow Final     Appearance Urine 01/17/2022 Slightly Cloudy* Clear Final     Glucose Urine 01/17/2022 Negative  Negative mg/dL Final     Bilirubin Urine 01/17/2022 Negative  Negative Final     Ketones Urine 01/17/2022 Negative  Negative mg/dL Final     Specific Gravity Urine 01/17/2022 1.009  1.000 - 1.030 Final     Blood Urine 01/17/2022 Small* Negative Final     pH Urine 01/17/2022 7.0  5.0 - 9.0 Final     Protein Albumin Urine 01/17/2022 50 * Negative mg/dL Final     Urobilinogen Urine 01/17/2022 Normal  Normal, 2.0 mg/dL Final     Nitrite Urine 01/17/2022 Negative  Negative Final     Leukocyte Esterase Urine 01/17/2022 Large* Negative Final     Bacteria Urine 01/17/2022 Few* None Seen /HPF Final     RBC Urine 01/17/2022 14* <=2 /HPF Final     WBC Urine 01/17/2022 >182* <=5 /HPF Final     Mucus Urine 01/17/2022 Present* None Seen /LPF Final     Cholesterol 01/17/2022 137  <200 mg/dL Final     Triglycerides 01/17/2022 156* <150 mg/dL Final     Direct Measure HDL 01/17/2022 32  23 - 92 mg/dL Final     LDL  Cholesterol Calculated 01/17/2022 74  <=100 mg/dL Final     Non HDL Cholesterol 01/17/2022 105  <130 mg/dL Final     Patient Fasting > 8hrs? 01/17/2022 Yes   Final     Hemoglobin A1C 01/17/2022 6.1  4.0 - 6.2 % Final     TSH 01/17/2022 4.39* 0.40 - 4.00 mU/L Final     Free T4 01/17/2022 0.85  0.60 - 1.60 ng/dL Final     Aspects of Diabetes:   Recent Labs   Lab Test 01/17/22  0903 10/14/21  1012 07/12/21  0926 07/12/21  0926 03/30/21  1129 06/20/19  1219   A1C 6.1 5.9  --  6.0 5.9 6.5*   LDL 74 96  --  96 111* 131*   HDL 32 34  --  32 33 30   TRIG 156* 138  --  170* 218* 164*   ALT 9 9  --  9 9 8   CR 5.96* 6.06*   < > 5.65* 5.38* 4.88*   GFRESTIMATED 9* 8*   < > 8* 10* 11*   GFRESTBLACK  --   --   --   --  12* 14*   POTASSIUM 3.9 4.3   < > 4.0 4.8 4.1   TSH 4.39* 3.29   < > 4.34*  --   --     < > = values in this interval not displayed.      Hemoglobin A1c  Goal range is under 8%. Best is 6.5 to 7   Blood Pressure 138/74 Goal to keep less than 140/90   Tobacco  reports that he quit smoking about 46 years ago. His smoking use included cigarettes. He has never used smokeless tobacco. Goal to abstain from tobacco   Aspirin or Plavix Anti-platelet therapy Aspirin or Plavix reduces risk of heart disease and stroke  -- sometimes used with other blood thinners, depending on bleeding risk and risk factors.    ACE/ARB Specific blood pressure meds These medications can reduce risk of kidney disease   Cholesterol Statins (Lipitor, Crestor, vs others) Statins reduce risk of heart disease and stroke   Eye Exam -- Do Yearly -- Annual diabetic eye exam   Healthy weight Wt Readings from Last 3 Encounters:   01/19/22 79.8 kg (176 lb)   10/15/21 82 kg (180 lb 12.8 oz)   07/14/21 83 kg (183 lb)     Body mass index is 25.92 kg/m .  Goal BMI under 30, best is under 25.      -- Trying to exercise daily (goal at least 20 min/day) with moderate aerobic activity   -- Eat healthy (resources from ADA at http://www.diabetes.org/)   -- Taking  good care of my feet. Consider seeing the Podiatrist   -- Check blood sugars as directed, record in log book and bring to every appointment    Insurance companies are grading you and I on your blood sugar control -- Goal is to get your A1c down to 7.9% or lower and NO Smoking!  -- Medicare and most insurance companies, will only cover Hemoglobin A1c labs to be rechecked every 91+ days.      Return for Diabetes labs and clinic follow-up appointment every 3 to 4 months.    Schedule lab only appointment --- A few days AFTER: 4/19/22   Schedule clinic appointment with Dr. Machado -- Same day as labs, or 1-2 days later.

## 2022-01-19 NOTE — PROGRESS NOTES
Assessment & Plan       ICD-10-CM    1. 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 CKD (chronic kidney disease) stage 5, GFR 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 ml/min (H)  N18.5 predniSONE (DELTASONE) 2.5 MG tablet   3. Benign essential hypertension  I10 furosemide (LASIX) 40 MG tablet   4. Primary pauci-immune necrotizing and crescentic glomerulonephritis  N05.8 predniSONE (DELTASONE) 2.5 MG tablet    N05.7    5. Immunocompromised patient (H)  D84.9    6. Lymphedema of both lower extremities  I89.0    7. Mixed hyperlipidemia  E78.2    8. Hyperparathyroidism due to renal insufficiency (H)  N25.81    9. Pseudomonas urinary tract infection  N39.0     B96.5    10. Heartburn  R12 famotidine (PEPCID) 20 MG tablet   11. Recurrent UTI (urinary tract infection)  N39.0 Cranberry-Vitamin C (CRANBERRY CONCENTRATE/VITAMINC) 60379-182 MG CAPS   12. Incomplete bladder emptying  R33.9 Cranberry-Vitamin C (CRANBERRY CONCENTRATE/VITAMINC) 56707-060 MG CAPS   13. Vaccine counseling  Z71.85    Patient presents for diabetes follow-up, as well as follow-up multiple issues.    Type 2 diabetes, has stage V chronic kidney disease.  He really is not interested in dialysis.  GFR is anywhere from 8-10.  Follows with nephrology.  Still making urine.  Does not feel like he empties his bladder completely.  Has recurrent UTI symptoms.  High-dose cranberry tablets 30,000 units/day has been actually quite helpful for him and preventing bladder infections.  He has, probably, chronic colonization of the bladder.  Still following with urology.  Continue current diabetic medications.  Does not check his blood sugars at home.  Denies hypoglycemia.    Hypertension, initial blood pressure was elevated, recheck did improve.  Needs medication refills today.  Continues with Lasix.    Justin nephritis, chronic, ongoing.  Continues with prednisone use.   Chronically to compromise due to this.    Lymphedema of the lower extremities, chronic.  Currently stable.    Mixed hyperlipidemia, currently diet controlled.  Has declined statin therapy.    Hyperparathyroidism due to renal insufficiency.  Chronic, currently stable.    History of Pseudomonas UTI.  See above.    Heartburn, ongoing but stable, controlled with Pepcid.  Needs refills.    Vaccine counseling completed.  Encouraged consideration of shingles vaccination.     Encouraged weight loss and regular exercise    Return in about 3 months (around 4/19/2022) for - Labs every 91+ days, DM Follow-up 1-2 days later, with Dr. Machado.    Valentino Machado MD  [facility]    Michelle Tavares is a 87 year old who presents for the following health issues diabetes check up.      History of Present Illness       Diabetes:   He presents for follow up of diabetes.  He is not checking blood glucose. When his blood glucose is low, the patient is asymptomatic for confusion, blurred vision, lethargy and reports not feeling dizzy, shaky, or weak.  He has no concerns regarding his diabetes at this time.  He is not experiencing numbness or burning in feet, excessive thirst, blurry vision, weight changes or redness, sores or blisters on feet. The patient has had a diabetic eye exam in the last 12 months.          Review of Systems   Constitutional: Positive for fatigue. Negative for chills and fever.        Golfs 1x weekly in the summer and in the winter - curls 2x weekly and bowling 2x weekly    HENT: Negative for congestion and hearing loss.    Eyes: Negative for pain and visual disturbance.   Respiratory: Negative for cough, shortness of breath and wheezing.    Cardiovascular: Negative for chest pain and palpitations.   Gastrointestinal: Negative for abdominal pain, diarrhea, nausea and vomiting.   Endocrine: Negative for cold intolerance and heat intolerance.   Genitourinary: Negative for dysuria and hematuria.    Musculoskeletal: Positive for gait problem. Negative for arthralgias, back pain and myalgias.   Skin: Negative for pallor.   Allergic/Immunologic: Negative for immunocompromised state.   Neurological: Negative for dizziness and light-headedness.   Hematological: Bruises/bleeds easily.   Psychiatric/Behavioral: Negative for agitation and confusion.            Objective    /74 (BP Location: Right arm, Patient Position: Sitting, Cuff Size: Adult Regular)   Pulse 86   Temp (!) 96.5  F (35.8  C) (Temporal)   Resp 16   Wt 79.8 kg (176 lb)   SpO2 96%   BMI 25.92 kg/m    Body mass index is 25.92 kg/m .  Physical Exam  Constitutional:       General: He is not in acute distress.     Appearance: He is well-developed. He is not diaphoretic.   HENT:      Head: Normocephalic and atraumatic.   Eyes:      General: No scleral icterus.     Conjunctiva/sclera: Conjunctivae normal.   Cardiovascular:      Rate and Rhythm: Normal rate and regular rhythm.   Pulmonary:      Effort: Pulmonary effort is normal.      Breath sounds: Normal breath sounds.   Abdominal:      Palpations: Abdomen is soft.      Tenderness: There is no abdominal tenderness.   Musculoskeletal:         General: No deformity.      Cervical back: Neck supple.      Right lower leg: Edema present.      Left lower leg: Edema present.   Lymphadenopathy:      Cervical: No cervical adenopathy.   Skin:     General: Skin is warm and dry.      Findings: No rash.      Comments: Some resolving bruises on the arms bilaterally  Small skin tear on right elbow - healing   Neurological:      Mental Status: He is alert and oriented to person, place, and time. Mental status is at baseline.   Psychiatric:         Mood and Affect: Mood normal.         Behavior: Behavior normal.        Recent Labs   Lab Test 01/17/22  0903 10/14/21  1012 07/12/21  0926 07/12/21  0926 03/30/21  1129 06/20/19  1219   A1C 6.1 5.9  --  6.0 5.9 6.5*   LDL 74 96  --  96 111* 131*   HDL 32 34  --  32  33 30   TRIG 156* 138  --  170* 218* 164*   ALT 9 9  --  9 9 8   CR 5.96* 6.06*   < > 5.65* 5.38* 4.88*   GFRESTIMATED 9* 8*   < > 8* 10* 11*   GFRESTBLACK  --   --   --   --  12* 14*   POTASSIUM 3.9 4.3   < > 4.0 4.8 4.1   TSH 4.39* 3.29   < > 4.34*  --   --     < > = values in this interval not displayed.   Hemoglobin A1c is well controlled.  LDL is not at goal but he refuses statin therapy.  HDL is good.  Triglycerides are high.  ALT is normal.  Creatinine overall has been slowly declining.  GFR remains in the 9-10 range on average.  He follows with nephrology.  Potassium is normal.  TSH is elevated.

## 2022-02-12 DIAGNOSIS — T38.0X5A STEROID-INDUCED HYPERGLYCEMIA: ICD-10-CM

## 2022-02-12 DIAGNOSIS — R73.9 STEROID-INDUCED HYPERGLYCEMIA: ICD-10-CM

## 2022-02-14 RX ORDER — PEN NEEDLE, DIABETIC 31 GX5/16"
NEEDLE, DISPOSABLE MISCELLANEOUS
Qty: 100 EACH | Refills: 3 | Status: SHIPPED | OUTPATIENT
Start: 2022-02-14 | End: 2023-05-23

## 2022-02-14 NOTE — TELEPHONE ENCOUNTER
"Rx approved per Prescription Refill Protocol requirements. Caitlin Herron RN on 2/14/2022 at 10:53 AM    Last Prescription Date: 11/14/21  Last Qty/Refills: 100 / 0  Last Office Visit: 1/19/22 PCP  Future Office Visit: 4/21/22     Requested Prescriptions   Pending Prescriptions Disp Refills     B-D U/F 31G X 8 MM insulin pen needle [Pharmacy Med Name: BD UF SHORT PEN NEEDLE [id number]] 100 each 0     Sig: USE 1 PEN NEEDLES DAILY OR AS DIRECTED.       Diabetic Supplies Protocol Passed - 2/12/2022 10:02 AM        Passed - Medication is active on med list        Passed - Patient is 18 years of age or older        Passed - Recent (6 mo) or future (30 days) visit within the authorizing provider's specialty     Patient had office visit in the last 6 months or has a visit in the next 30 days with authorizing provider.  See \"Patient Info\" tab in inbasket, or \"Choose Columns\" in Meds & Orders section of the refill encounter.                 "

## 2022-02-16 ENCOUNTER — ALLIED HEALTH/NURSE VISIT (OUTPATIENT)
Dept: FAMILY MEDICINE | Facility: OTHER | Age: 87
End: 2022-02-16
Attending: INTERNAL MEDICINE
Payer: MEDICARE

## 2022-02-16 DIAGNOSIS — E53.8 B12 DEFICIENCY: Primary | ICD-10-CM

## 2022-02-16 PROCEDURE — 250N000011 HC RX IP 250 OP 636: Performed by: INTERNAL MEDICINE

## 2022-02-16 PROCEDURE — 96372 THER/PROPH/DIAG INJ SC/IM: CPT | Performed by: INTERNAL MEDICINE

## 2022-02-16 RX ADMIN — CYANOCOBALAMIN 1000 MCG: 1000 INJECTION, SOLUTION INTRAMUSCULAR; SUBCUTANEOUS at 09:05

## 2022-02-16 NOTE — PROGRESS NOTES
Verified patient's first and last name, and . Patient stated reason for visit today is to receive a B12 injection. Patient denied any concerns with previous injections. B12 prepared and administered IM as ordered. Administration of medication documented in MAR (see MAR for further information regarding dose, lot #, NDC #, expiration date). Patient encouraged to wait in lobby for 15 minutes post-injection and notify staff immediately of any reaction.     Moraima Marr RN ....................  2022   9:06 AM

## 2022-03-07 ENCOUNTER — TRANSFERRED RECORDS (OUTPATIENT)
Dept: HEALTH INFORMATION MANAGEMENT | Facility: OTHER | Age: 87
End: 2022-03-07
Payer: COMMERCIAL

## 2022-03-16 ENCOUNTER — ALLIED HEALTH/NURSE VISIT (OUTPATIENT)
Dept: FAMILY MEDICINE | Facility: OTHER | Age: 87
End: 2022-03-16
Attending: INTERNAL MEDICINE
Payer: MEDICARE

## 2022-03-16 DIAGNOSIS — E53.8 B12 DEFICIENCY: Primary | ICD-10-CM

## 2022-03-16 PROCEDURE — 96372 THER/PROPH/DIAG INJ SC/IM: CPT | Performed by: INTERNAL MEDICINE

## 2022-03-16 PROCEDURE — 250N000011 HC RX IP 250 OP 636: Performed by: INTERNAL MEDICINE

## 2022-03-16 RX ADMIN — CYANOCOBALAMIN 1000 MCG: 1000 INJECTION, SOLUTION INTRAMUSCULAR; SUBCUTANEOUS at 08:45

## 2022-03-16 NOTE — PROGRESS NOTES
Verified patient's first and last name, and . Patient stated reason for visit today is to receive a B12 injection. Patient denied any concerns with previous injections. B12 prepared and administered IM as ordered. Administration of medication documented in MAR (see MAR for further information regarding dose, lot #, NDC #, expiration date). Patient encouraged to wait in lobby for 15 minutes post-injection and notify staff immediately of any reaction.     Lizeth Cody RN ....................  3/16/2022   8:40 AM

## 2022-04-13 ENCOUNTER — ALLIED HEALTH/NURSE VISIT (OUTPATIENT)
Dept: FAMILY MEDICINE | Facility: OTHER | Age: 87
End: 2022-04-13
Attending: INTERNAL MEDICINE
Payer: MEDICARE

## 2022-04-13 DIAGNOSIS — E53.8 B12 DEFICIENCY: Primary | ICD-10-CM

## 2022-04-13 PROCEDURE — 250N000011 HC RX IP 250 OP 636: Performed by: INTERNAL MEDICINE

## 2022-04-13 PROCEDURE — 96372 THER/PROPH/DIAG INJ SC/IM: CPT | Performed by: INTERNAL MEDICINE

## 2022-04-13 RX ADMIN — CYANOCOBALAMIN 1000 MCG: 1000 INJECTION, SOLUTION INTRAMUSCULAR; SUBCUTANEOUS at 08:52

## 2022-04-13 NOTE — PROGRESS NOTES
Verified patient's first and last name, and . Patient stated reason for visit today is to receive a B12 injection. Patient denied any concerns with previous injections. B12 prepared and administered IM as ordered. Administration of medication documented in MAR (see MAR for further information regarding dose, lot #, NDC #, expiration date). Patient encouraged to wait in lobby for 15 minutes post-injection and notify staff immediately of any reaction.     Lizeth Cody RN ....................  2022   8:46 AM

## 2022-04-18 ENCOUNTER — TELEPHONE (OUTPATIENT)
Dept: LAB | Facility: OTHER | Age: 87
End: 2022-04-18
Payer: COMMERCIAL

## 2022-04-18 DIAGNOSIS — N18.5 TYPE 2 DIABETES MELLITUS WITH STAGE 5 CHRONIC KIDNEY DISEASE NOT ON CHRONIC DIALYSIS, WITH LONG-TERM CURRENT USE OF INSULIN (H): Primary | ICD-10-CM

## 2022-04-18 DIAGNOSIS — E78.2 MIXED HYPERLIPIDEMIA: ICD-10-CM

## 2022-04-18 DIAGNOSIS — E11.22 TYPE 2 DIABETES MELLITUS WITH STAGE 5 CHRONIC KIDNEY DISEASE NOT ON CHRONIC DIALYSIS, WITH LONG-TERM CURRENT USE OF INSULIN (H): Primary | ICD-10-CM

## 2022-04-18 DIAGNOSIS — Z79.4 TYPE 2 DIABETES MELLITUS WITH STAGE 5 CHRONIC KIDNEY DISEASE NOT ON CHRONIC DIALYSIS, WITH LONG-TERM CURRENT USE OF INSULIN (H): Primary | ICD-10-CM

## 2022-04-20 ENCOUNTER — LAB (OUTPATIENT)
Dept: LAB | Facility: OTHER | Age: 87
End: 2022-04-20
Attending: INTERNAL MEDICINE
Payer: MEDICARE

## 2022-04-20 DIAGNOSIS — N18.5 TYPE 2 DIABETES MELLITUS WITH STAGE 5 CHRONIC KIDNEY DISEASE NOT ON CHRONIC DIALYSIS, WITH LONG-TERM CURRENT USE OF INSULIN (H): Primary | ICD-10-CM

## 2022-04-20 DIAGNOSIS — E11.22 TYPE 2 DIABETES MELLITUS WITH STAGE 5 CHRONIC KIDNEY DISEASE NOT ON CHRONIC DIALYSIS, WITH LONG-TERM CURRENT USE OF INSULIN (H): Primary | ICD-10-CM

## 2022-04-20 DIAGNOSIS — E78.2 MIXED HYPERLIPIDEMIA: ICD-10-CM

## 2022-04-20 DIAGNOSIS — Z79.4 TYPE 2 DIABETES MELLITUS WITH STAGE 5 CHRONIC KIDNEY DISEASE NOT ON CHRONIC DIALYSIS, WITH LONG-TERM CURRENT USE OF INSULIN (H): Primary | ICD-10-CM

## 2022-04-20 LAB
ALBUMIN SERPL-MCNC: 4 G/DL (ref 3.5–5.7)
ALP SERPL-CCNC: 57 U/L (ref 34–104)
ALT SERPL W P-5'-P-CCNC: 8 U/L (ref 7–52)
ANION GAP SERPL CALCULATED.3IONS-SCNC: 14 MMOL/L (ref 3–14)
AST SERPL W P-5'-P-CCNC: 12 U/L (ref 13–39)
BILIRUB SERPL-MCNC: 0.4 MG/DL (ref 0.3–1)
BUN SERPL-MCNC: 75 MG/DL (ref 7–25)
CALCIUM SERPL-MCNC: 10.5 MG/DL (ref 8.6–10.3)
CHLORIDE BLD-SCNC: 101 MMOL/L (ref 98–107)
CHOLEST SERPL-MCNC: 131 MG/DL
CO2 SERPL-SCNC: 26 MMOL/L (ref 21–31)
CREAT SERPL-MCNC: 7.26 MG/DL (ref 0.7–1.3)
ERYTHROCYTE [DISTWIDTH] IN BLOOD BY AUTOMATED COUNT: 13.6 % (ref 10–15)
FASTING STATUS PATIENT QL REPORTED: NO
GFR SERPL CREATININE-BSD FRML MDRD: 7 ML/MIN/1.73M2
GLUCOSE BLD-MCNC: 198 MG/DL (ref 70–105)
HBA1C MFR BLD: 6.4 % (ref 4–6.2)
HCT VFR BLD AUTO: 35.1 % (ref 40–53)
HDLC SERPL-MCNC: 33 MG/DL (ref 23–92)
HGB BLD-MCNC: 11.4 G/DL (ref 13.3–17.7)
LDLC SERPL CALC-MCNC: 74 MG/DL
MCH RBC QN AUTO: 29.8 PG (ref 26.5–33)
MCHC RBC AUTO-ENTMCNC: 32.5 G/DL (ref 31.5–36.5)
MCV RBC AUTO: 92 FL (ref 78–100)
NONHDLC SERPL-MCNC: 98 MG/DL
PLATELET # BLD AUTO: 233 10E3/UL (ref 150–450)
POTASSIUM BLD-SCNC: 4.2 MMOL/L (ref 3.5–5.1)
PROT SERPL-MCNC: 6.4 G/DL (ref 6.4–8.9)
RBC # BLD AUTO: 3.82 10E6/UL (ref 4.4–5.9)
SODIUM SERPL-SCNC: 141 MMOL/L (ref 134–144)
T4 FREE SERPL-MCNC: 0.75 NG/DL (ref 0.6–1.6)
TRIGL SERPL-MCNC: 121 MG/DL
TSH SERPL DL<=0.005 MIU/L-ACNC: 5.63 MU/L (ref 0.4–4)
WBC # BLD AUTO: 9.6 10E3/UL (ref 4–11)

## 2022-04-20 PROCEDURE — 84439 ASSAY OF FREE THYROXINE: CPT | Mod: ZL

## 2022-04-20 PROCEDURE — 80053 COMPREHEN METABOLIC PANEL: CPT | Mod: ZL

## 2022-04-20 PROCEDURE — 85027 COMPLETE CBC AUTOMATED: CPT | Mod: ZL

## 2022-04-20 PROCEDURE — 36415 COLL VENOUS BLD VENIPUNCTURE: CPT | Mod: ZL

## 2022-04-20 PROCEDURE — 83036 HEMOGLOBIN GLYCOSYLATED A1C: CPT | Mod: ZL

## 2022-04-20 PROCEDURE — 80061 LIPID PANEL: CPT | Mod: ZL

## 2022-04-20 PROCEDURE — 84443 ASSAY THYROID STIM HORMONE: CPT | Mod: ZL

## 2022-04-21 ENCOUNTER — OFFICE VISIT (OUTPATIENT)
Dept: INTERNAL MEDICINE | Facility: OTHER | Age: 87
End: 2022-04-21
Attending: INTERNAL MEDICINE
Payer: MEDICARE

## 2022-04-21 ENCOUNTER — LAB (OUTPATIENT)
Dept: LAB | Facility: OTHER | Age: 87
End: 2022-04-21
Attending: INTERNAL MEDICINE
Payer: COMMERCIAL

## 2022-04-21 VITALS
WEIGHT: 171 LBS | BODY MASS INDEX: 25.33 KG/M2 | DIASTOLIC BLOOD PRESSURE: 62 MMHG | TEMPERATURE: 97 F | SYSTOLIC BLOOD PRESSURE: 120 MMHG | HEIGHT: 69 IN | RESPIRATION RATE: 18 BRPM | HEART RATE: 94 BPM | OXYGEN SATURATION: 99 %

## 2022-04-21 DIAGNOSIS — N18.5 TYPE 2 DIABETES MELLITUS WITH STAGE 5 CHRONIC KIDNEY DISEASE NOT ON CHRONIC DIALYSIS, WITH LONG-TERM CURRENT USE OF INSULIN (H): ICD-10-CM

## 2022-04-21 DIAGNOSIS — E11.22 TYPE 2 DIABETES MELLITUS WITH STAGE 5 CHRONIC KIDNEY DISEASE NOT ON CHRONIC DIALYSIS, WITH LONG-TERM CURRENT USE OF INSULIN (H): Primary | ICD-10-CM

## 2022-04-21 DIAGNOSIS — N25.81 HYPERPARATHYROIDISM DUE TO RENAL INSUFFICIENCY (H): ICD-10-CM

## 2022-04-21 DIAGNOSIS — E11.22 TYPE 2 DIABETES MELLITUS WITH STAGE 5 CHRONIC KIDNEY DISEASE NOT ON CHRONIC DIALYSIS, WITH LONG-TERM CURRENT USE OF INSULIN (H): ICD-10-CM

## 2022-04-21 DIAGNOSIS — Z78.9 DNI (DO NOT INTUBATE): ICD-10-CM

## 2022-04-21 DIAGNOSIS — D63.8 ANEMIA OF CHRONIC DISEASE: ICD-10-CM

## 2022-04-21 DIAGNOSIS — N05.7 PRIMARY PAUCI-IMMUNE NECROTIZING AND CRESCENTIC GLOMERULONEPHRITIS: ICD-10-CM

## 2022-04-21 DIAGNOSIS — N18.5 TYPE 2 DIABETES MELLITUS WITH STAGE 5 CHRONIC KIDNEY DISEASE NOT ON CHRONIC DIALYSIS, WITH LONG-TERM CURRENT USE OF INSULIN (H): Primary | ICD-10-CM

## 2022-04-21 DIAGNOSIS — N00.7 ACUTE CRESCENTIC GLOMERULONEPHRITIS: ICD-10-CM

## 2022-04-21 DIAGNOSIS — Z71.85 VACCINE COUNSELING: ICD-10-CM

## 2022-04-21 DIAGNOSIS — R76.8 ANTINEUTROPHIL CYTOPLASMIC ANTIBODY (ANCA) POSITIVE: ICD-10-CM

## 2022-04-21 DIAGNOSIS — Z00.00 ENCOUNTER FOR MEDICARE ANNUAL WELLNESS EXAM: ICD-10-CM

## 2022-04-21 DIAGNOSIS — Z66 DNR (DO NOT RESUSCITATE): ICD-10-CM

## 2022-04-21 DIAGNOSIS — Z79.4 TYPE 2 DIABETES MELLITUS WITH STAGE 5 CHRONIC KIDNEY DISEASE NOT ON CHRONIC DIALYSIS, WITH LONG-TERM CURRENT USE OF INSULIN (H): ICD-10-CM

## 2022-04-21 DIAGNOSIS — D84.9 IMMUNOCOMPROMISED PATIENT (H): ICD-10-CM

## 2022-04-21 DIAGNOSIS — E78.2 MIXED HYPERLIPIDEMIA: ICD-10-CM

## 2022-04-21 DIAGNOSIS — Z79.4 TYPE 2 DIABETES MELLITUS WITH STAGE 5 CHRONIC KIDNEY DISEASE NOT ON CHRONIC DIALYSIS, WITH LONG-TERM CURRENT USE OF INSULIN (H): Primary | ICD-10-CM

## 2022-04-21 DIAGNOSIS — N18.5 CKD (CHRONIC KIDNEY DISEASE) STAGE 5, GFR LESS THAN 15 ML/MIN (H): ICD-10-CM

## 2022-04-21 DIAGNOSIS — I10 BENIGN ESSENTIAL HYPERTENSION: ICD-10-CM

## 2022-04-21 DIAGNOSIS — N05.8 PRIMARY PAUCI-IMMUNE NECROTIZING AND CRESCENTIC GLOMERULONEPHRITIS: ICD-10-CM

## 2022-04-21 PROBLEM — N39.0 PSEUDOMONAS URINARY TRACT INFECTION: Status: RESOLVED | Noted: 2017-04-15 | Resolved: 2022-04-21

## 2022-04-21 PROBLEM — D64.9 ANEMIA: Status: RESOLVED | Noted: 2017-04-25 | Resolved: 2022-04-21

## 2022-04-21 PROBLEM — B96.5 PSEUDOMONAS URINARY TRACT INFECTION: Status: RESOLVED | Noted: 2017-04-15 | Resolved: 2022-04-21

## 2022-04-21 LAB
ALBUMIN UR-MCNC: 50 MG/DL
APPEARANCE UR: ABNORMAL
BILIRUB UR QL STRIP: NEGATIVE
COLOR UR AUTO: ABNORMAL
CREAT UR-MCNC: 34 MG/DL
GLUCOSE UR STRIP-MCNC: NEGATIVE MG/DL
HGB UR QL STRIP: ABNORMAL
KETONES UR STRIP-MCNC: NEGATIVE MG/DL
LEUKOCYTE ESTERASE UR QL STRIP: ABNORMAL
MICROALBUMIN UR-MCNC: 142 MG/L
MICROALBUMIN/CREAT UR: 417.65 MG/G CR (ref 0–17)
MUCOUS THREADS #/AREA URNS LPF: PRESENT /LPF
NITRATE UR QL: NEGATIVE
PH UR STRIP: 7.5 [PH] (ref 5–9)
RBC URINE: 32 /HPF
SP GR UR STRIP: 1.01 (ref 1–1.03)
UROBILINOGEN UR STRIP-MCNC: NORMAL MG/DL
WBC CLUMPS #/AREA URNS HPF: PRESENT /HPF
WBC URINE: >182 /HPF
YEAST #/AREA URNS HPF: ABNORMAL /HPF

## 2022-04-21 PROCEDURE — 99215 OFFICE O/P EST HI 40 MIN: CPT | Mod: 25 | Performed by: INTERNAL MEDICINE

## 2022-04-21 PROCEDURE — G0439 PPPS, SUBSEQ VISIT: HCPCS | Performed by: INTERNAL MEDICINE

## 2022-04-21 PROCEDURE — 81003 URINALYSIS AUTO W/O SCOPE: CPT | Mod: ZL

## 2022-04-21 PROCEDURE — 82043 UR ALBUMIN QUANTITATIVE: CPT | Mod: ZL

## 2022-04-21 PROCEDURE — G0463 HOSPITAL OUTPT CLINIC VISIT: HCPCS

## 2022-04-21 RX ORDER — CALCITRIOL 0.5 UG/1
CAPSULE, LIQUID FILLED ORAL
COMMUNITY
Start: 2022-03-08 | End: 2022-07-27

## 2022-04-21 RX ORDER — CALCITRIOL 0.5 UG/1
0.5 CAPSULE, LIQUID FILLED ORAL
COMMUNITY
Start: 2022-03-07 | End: 2022-07-27

## 2022-04-21 RX ORDER — SODIUM BICARBONATE 650 MG/1
650 TABLET ORAL
Status: ON HOLD | COMMUNITY
Start: 2022-03-07 | End: 2023-01-01

## 2022-04-21 ASSESSMENT — ENCOUNTER SYMPTOMS
PALPITATIONS: 0
AGITATION: 0
DIARRHEA: 0
WHEEZING: 0
EYE PAIN: 0
MYALGIAS: 0
SHORTNESS OF BREATH: 0
CONFUSION: 0
BACK PAIN: 0
NAUSEA: 0
BRUISES/BLEEDS EASILY: 1
FEVER: 0
CHILLS: 0
LIGHT-HEADEDNESS: 0
VOMITING: 0
HEMATURIA: 0
DYSURIA: 0
COUGH: 0
DIZZINESS: 0
FATIGUE: 1
ARTHRALGIAS: 0
ABDOMINAL PAIN: 0

## 2022-04-21 ASSESSMENT — ACTIVITIES OF DAILY LIVING (ADL): CURRENT_FUNCTION: NO ASSISTANCE NEEDED

## 2022-04-21 ASSESSMENT — PAIN SCALES - GENERAL: PAINLEVEL: NO PAIN (0)

## 2022-04-21 NOTE — PROGRESS NOTES
Assessment & Plan       ICD-10-CM    1. 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 CKD (chronic kidney disease) stage 5, GFR 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 ml/min (H)  N18.5    3. Mixed hyperlipidemia  E78.2    4. Hyperparathyroidism due to renal insufficiency (H)  N25.81    5. Benign essential hypertension  I10    6. Anemia of chronic disease  D63.8    7. Antineutrophil cytoplasmic antibody (ANCA) positive  R76.8    8. Acute crescentic glomerulonephritis  N00.7    9. Immunocompromised patient (H)  D84.9    10. Primary pauci-immune necrotizing and crescentic glomerulonephritis  N05.8     N05.7    11. Encounter for Medicare annual wellness exam  Z00.00    12. DNR (do not resuscitate)  Z66    13. DNI (do not intubate)  Z78.9    14. Vaccine counseling  Z71.85    Patient presents for Medicare annual wellness visit as well as follow-up multiple issues.    Type 2 diabetes, blood sugars have gone up but hemoglobin A1c is still at goal.  Currently managing with diet and insulin.  Encouraged regular exercise, low carbohydrate diet.    Stage V chronic kidney disease.  Still managing without starting dialysis.  Still making adequate urine.  He continues with bicarbonate tablets.  Has had declining renal function once again.  Not currently undergoing dialysis and at this point is not really interested.    Mixed hyperlipidemia.  LDL is not currently at goal, declines statin therapy.  Triglycerides and HDL are at goal.  May need to consider statin therapy in the future if lifestyle changes are not adequate to bring cholesterol down.    Hyperparathyroidism due to renal insufficiency.  Relatively stable at this time.  Continue to monitor.  Still following with nephrology regularly.    Hypertension.  Blood pressure is currently well controlled.  Denies syncope or presyncope.  Doing well with current medication regimen.  No changes  for now.    Anemia of chronic disease, due to stage V chronic knee disease.  Following that with nephrology.    Patient has autoimmune disorder.  He is chronically immunocompromised.  He has glomerulonephritis, ANCA positive.  Taking prednisone daily.      CODE STATUS was full code.  We discussed goals of care, at this point he wishes to change this to DNR/DNI.  Chart updated.  He is not interested in intubation or CPR.  CPAP/BiPAP okay if needed for acute illness.    Action counseling completed.  Encouraged consideration of shingles vaccination, COVID-19 booster shot.    Encouraged regular exercise.     Return in about 3 months (around 7/21/2022) for - Labs every 91+ days, DM Follow-up 1-2 days later, with Dr. Machado.    Valentino Machado MD  [facility] AND \[facility] [place] [facility]\""     Michelle Tavares is a 88 year old who presents for the following health issues diabeitic recheck     History of Present Illness       CKD: He is not using over the counter pain medicine.     Diabetes:   He presents for follow up of diabetes.  He is not checking blood glucose. He has no concerns regarding his diabetes at this time.  He is not experiencing numbness or burning in feet, excessive thirst, blurry vision, weight changes or redness, sores or blisters on feet. The patient has had a diabetic eye exam in the last 12 months. Eye exam performed on 6/1/2022 - Approximately. Location of last eye exam [facility].        He eats 2-3 servings of fruits and vegetables daily.He consumes 1 sweetened beverage(s) daily.He exercises with enough effort to increase his heart rate 9 or less minutes per day.  He exercises with enough effort to increase his heart rate 3 or less days per week.   He is not taking prescribed medications regularly due to None.  Healthy Habits:     In general, how would you rate your overall health?  Good    Frequency of exercise:  4-5 days/week    Duration of exercise:  15-30 minutes    Do you usually eat  "at least 4 servings of fruit and vegetables a day, include whole grains    & fiber and avoid regularly eating high fat or \"junk\" foods?  No    Taking medications regularly:  Yes    Barriers to taking medications:  None    Medication side effects:  None    Ability to successfully perform activities of daily living:  No assistance needed    Home Safety:  No safety concerns identified    Hearing Impairment:  No hearing concerns    In the past 6 months, have you been bothered by leaking of urine?  No    In general, how would you rate your overall mental or emotional health?  Good      PHQ-2 Total Score: 0    Additional concerns today:  Yes       Annual Wellness Visit  Patient has been advised of split billing requirements and indicates understanding: Yes     Are you in the first 12 months of your Medicare Part B coverage?  No    Do you feel safe in your environment? Yes    Have you ever done Advance Care Planning? (For example, a Health Directive, POLST, or a discussion with a medical provider or your loved ones about your wishes)? No, advance care planning information given to patient to review.  Patient plans to discuss their wishes with loved ones or provider.       CODE STATUS: DNR / DNI -- Chart updated 2022     Fall risk:  Fallen 2 or more times in the past year?: No  Any fall with injury in the past year?: No    Cognitive Screenin) Repeat 3 items (Leader, Season, Table)    2) Clock draw: NORMAL  3) 3 item recall: Recalls 2 objects   Results: NORMAL clock, 1-2 items recalled: COGNITIVE IMPAIRMENT LESS LIKELY    Mini-CogTM Copyright GREGORIO Ames. Licensed by the author for use in [facility]; reprinted with permission (somal@.[facility]). All rights reserved.      Do you have sleep apnea, excessive snoring or daytime drowsiness?: no    Current providers sharing in care for this patient include:   Patient Care Team:  Valentino Machado MD as PCP - General (Internal Medicine)  Bethel Gordon MD as Assigned " "Surgical Provider  Brenda Horne NP as Assigned PCP    Patient has been advised of split billing requirements and indicates understanding: Yes    Review of Systems   Constitutional: Positive for fatigue. Negative for chills and fever.        Golfs 1x weekly in the summer and in the winter - curls 2x weekly and bowling 2x weekly    HENT: Negative for congestion and hearing loss.    Eyes: Negative for pain and visual disturbance.   Respiratory: Negative for cough, shortness of breath and wheezing.    Cardiovascular: Negative for chest pain and palpitations.   Gastrointestinal: Negative for abdominal pain, diarrhea, nausea and vomiting.   Endocrine: Negative for cold intolerance and heat intolerance.   Genitourinary: Negative for dysuria and hematuria.   Musculoskeletal: Positive for gait problem. Negative for arthralgias, back pain and myalgias.   Skin: Negative for pallor.   Allergic/Immunologic: Negative for immunocompromised state.   Neurological: Negative for dizziness and light-headedness.   Hematological: Bruises/bleeds easily.   Psychiatric/Behavioral: Negative for agitation and confusion.            Objective    /62 (BP Location: Right arm, Patient Position: Sitting, Cuff Size: Adult Regular)   Pulse 94   Temp 97  F (36.1  C) (Temporal)   Resp 18   Ht 1.74 m (5' 8.5\")   Wt 77.6 kg (171 lb)   SpO2 99%   BMI 25.62 kg/m    Body mass index is 25.62 kg/m .  Physical Exam  Constitutional:       General: He is not in acute distress.     Appearance: He is well-developed. He is not diaphoretic.   HENT:      Head: Normocephalic and atraumatic.   Eyes:      General: No scleral icterus.     Conjunctiva/sclera: Conjunctivae normal.   Cardiovascular:      Rate and Rhythm: Normal rate and regular rhythm.   Pulmonary:      Effort: Pulmonary effort is normal.      Breath sounds: Normal breath sounds.   Abdominal:      Palpations: Abdomen is soft.      Tenderness: There is no abdominal tenderness. "   Musculoskeletal:         General: No deformity.      Cervical back: Neck supple.      Right lower leg: Edema present.      Left lower leg: Edema present.   Lymphadenopathy:      Cervical: No cervical adenopathy.   Skin:     General: Skin is warm and dry.      Findings: No rash.      Comments: Some resolving bruises on the arms bilaterally   Neurological:      Mental Status: He is alert and oriented to person, place, and time. Mental status is at baseline.   Psychiatric:         Mood and Affect: Mood normal.         Behavior: Behavior normal.          Recent Labs   Lab Test 04/20/22  0907 01/17/22  0903 10/14/21  1012 07/12/21  0926 03/30/21  1129 06/20/19  1219   A1C 6.4* 6.1 5.9   < > 5.9 6.5*   LDL 74 74 96   < > 111* 131*   HDL 33 32 34   < > 33 30   TRIG 121 156* 138   < > 218* 164*   ALT 8 9 9   < > 9 8   CR 7.26* 5.96* 6.06*   < > 5.38* 4.88*   GFRESTIMATED 7* 9* 8*   < > 10* 11*   GFRESTBLACK  --   --   --   --  12* 14*   POTASSIUM 4.2 3.9 4.3   < > 4.8 4.1   TSH 5.63* 4.39* 3.29   < >  --   --     < > = values in this interval not displayed.   Hemoglobin A1c is well controlled.  LDL is high and not at goal, less than 70.  HDL and triglycerides are at goal.  ALT is normal.  Creatinine has worsened.  GFR now 7.  No dialysis currently.  Potassium is normal.  TSH is high.

## 2022-04-21 NOTE — PATIENT INSTRUCTIONS
Patient Education   Personalized Prevention Plan  You are due for the preventive services outlined below.  Your care team is available to assist you in scheduling these services.  If you have already completed any of these items, please share that information with your care team to update in your medical record.  Health Maintenance Due   Topic Date Due    Eye Exam  04/30/2021    Diabetic Foot Exam  03/30/2022    Kidney Microalbumin Urine Test  04/17/2022    Zoster (Shingles) Vaccine (2 of 2) 05/25/2021    Annual Wellness Visit  03/30/2022     Preventive Health Recommendations  See your health care provider every year to  Review health changes.   Discuss preventive care.    Review your medicines if your doctor has prescribed any.  Talk with your health care provider about whether you should have a test to screen for prostate cancer (PSA).  Every 3 years, have a diabetes test (fasting glucose). If you are at risk for diabetes, you should have this test more often.  Every 5 years, have a cholesterol test. Have this test more often if you are at risk for high cholesterol or heart disease.   Every 10 years, have a colonoscopy. Or, have a yearly FIT test (stool test). These exams will check for colon cancer.  Talk to with your health care provider about screening for Abdominal Aortic Aneurysm if you have a family history of AAA or have a history of smoking.    Shots:   Get a flu shot each year.   Get a tetanus shot every 10 years.   Talk to your doctor about your pneumonia vaccines. There are now two you should receive - Pneumovax (PPSV 23) and Prevnar (PCV 13).  Talk to your pharmacist about a shingles vaccine.   Talk to your doctor about the hepatitis B vaccine.    Nutrition:   Eat at least 5 servings of fruits and vegetables each day.   Eat whole-grain bread, whole-wheat pasta and brown rice instead of white grains and rice.   Get adequate Calcium and Vitamin D.     Lifestyle  Exercise for at least 150 minutes a week  (30 minutes a day, 5 days a week). This will help you control your weight and prevent disease.   Limit alcohol to one drink per day.   No smoking.   Wear sunscreen to prevent skin cancer.   See your dentist every six months for an exam and cleaning.   See your eye doctor every 1 to 2 years to screen for conditions such as glaucoma, macular degeneration and cataracts.    Personalized Prevention Plan  You are due for the preventive services outlined below.  Your care team is available to assist you in scheduling these services.  If you have already completed any of these items, please share that information with your care team to update in your medical record.  Health Maintenance   Topic Date Due    EYE EXAM  04/30/2021    DIABETIC FOOT EXAM  03/30/2022    MICROALBUMIN  04/17/2022    ZOSTER IMMUNIZATION (2 of 2) 05/25/2021    MEDICARE ANNUAL WELLNESS VISIT  03/30/2022    A1C  07/20/2022    BMP  07/20/2022    HEMOGLOBIN  10/20/2022    FALL RISK ASSESSMENT  01/19/2023    LIPID  04/20/2023    ADVANCE CARE PLANNING  03/30/2026    DTAP/TDAP/TD IMMUNIZATION (2 - Td or Tdap) 12/27/2030    PARATHYROID  Completed    PHOSPHORUS  Completed    PHQ-2 (once per calendar year)  Completed    INFLUENZA VACCINE  Completed    URINALYSIS  Completed    ALK PHOS  Completed    COVID-19 Vaccine  Completed    Pneumococcal Vaccine: 65+ Years  Addressed    IPV IMMUNIZATION  Aged Out    MENINGITIS IMMUNIZATION  Aged Out        Blood pressure is well controlled.   Diabetes is well controlled.     Medications refilled.   Labs are relatively stable.     Results for orders placed or performed in visit on 04/21/22   UA reflex to Microscopic     Status: Abnormal   Result Value Ref Range    Color Urine Light Yellow Colorless, Straw, Light Yellow, Yellow    Appearance Urine Slightly Cloudy (A) Clear    Glucose Urine Negative Negative mg/dL    Bilirubin Urine Negative Negative    Ketones Urine Negative Negative mg/dL    Specific Gravity Urine 1.008 1.000  - 1.030    Blood Urine Small (A) Negative    pH Urine 7.5 5.0 - 9.0    Protein Albumin Urine 50  (A) Negative mg/dL    Urobilinogen Urine Normal Normal, 2.0 mg/dL    Nitrite Urine Negative Negative    Leukocyte Esterase Urine Large (A) Negative    RBC Urine 32 (H) <=2 /HPF    WBC Urine >182 (H) <=5 /HPF    WBC Clumps Urine Present (A) None Seen /HPF    Budding Yeast Urine Few (A) None Seen /HPF    Mucus Urine Present (A) None Seen /LPF      Aspects of Diabetes:   Recent Labs   Lab Test 04/20/22  0907 01/17/22  0903 10/14/21  1012 07/12/21  0926 03/30/21  1129 06/20/19  1219   A1C 6.4* 6.1 5.9   < > 5.9 6.5*   LDL 74 74 96   < > 111* 131*   HDL 33 32 34   < > 33 30   TRIG 121 156* 138   < > 218* 164*   ALT 8 9 9   < > 9 8   CR 7.26* 5.96* 6.06*   < > 5.38* 4.88*   GFRESTIMATED 7* 9* 8*   < > 10* 11*   GFRESTBLACK  --   --   --   --  12* 14*   POTASSIUM 4.2 3.9 4.3   < > 4.8 4.1   TSH 5.63* 4.39* 3.29   < >  --   --     < > = values in this interval not displayed.      Hemoglobin A1c  Goal range is under 8%. Best is 6.5 to 7   Blood Pressure 120/62 Goal to keep less than 140/90   Tobacco  reports that he quit smoking about 46 years ago. His smoking use included cigarettes. He has never used smokeless tobacco. Goal to abstain from tobacco   Aspirin or Plavix Anti-platelet therapy Aspirin or Plavix reduces risk of heart disease and stroke  -- sometimes used with other blood thinners, depending on bleeding risk and risk factors.    ACE/ARB Specific blood pressure meds These medications can reduce risk of kidney disease   Cholesterol Statins (Lipitor, Crestor, vs others) Statins reduce risk of heart disease and stroke   Eye Exam -- Do Yearly -- Annual diabetic eye exam   Healthy weight Wt Readings from Last 3 Encounters:   04/21/22 77.6 kg (171 lb)   01/19/22 79.8 kg (176 lb)   10/15/21 82 kg (180 lb 12.8 oz)     Body mass index is 25.62 kg/m .  Goal BMI under 30, best is under 25.      -- Trying to exercise daily (goal  at least 20 min/day) with moderate aerobic activity   -- Eat healthy (resources from ADA at http://www.diabetes.org/)   -- Taking good care of my feet. Consider seeing the Podiatrist   -- Check blood sugars as directed, record in log book and bring to every appointment    Insurance companies are grading you and I on your blood sugar control -- Goal is to get your A1c down to 7.9% or lower and NO Smoking!  -- Medicare and most insurance companies, will only cover Hemoglobin A1c labs to be rechecked every 91+ days.      Return for Diabetes labs and clinic follow-up appointment every 3 to 4 months.    Schedule lab only appointment --- A few days AFTER: 7/20/22   Schedule clinic appointment with Dr. Machado -- Same day as labs, or 1-2 days later.

## 2022-04-21 NOTE — NURSING NOTE
"Chief Complaint   Patient presents with     Diabetes     Recheck           Initial /62 (BP Location: Right arm, Patient Position: Sitting, Cuff Size: Adult Regular)   Pulse 94   Temp 97  F (36.1  C) (Temporal)   Resp 18   Ht 1.74 m (5' 8.5\")   Wt 77.6 kg (171 lb)   SpO2 99%   BMI 25.62 kg/m   Estimated body mass index is 25.62 kg/m  as calculated from the following:    Height as of this encounter: 1.74 m (5' 8.5\").    Weight as of this encounter: 77.6 kg (171 lb).       FOOD SECURITY SCREENING QUESTIONS:    The next two questions are to help us understand your food security.  If you are feeling you need any assistance in this area, we have resources available to support you today.    Hunger Vital Signs:  Within the past 12 months we worried whether our food would run out before we got money to buy more. Never  Within the past 12 months the food we bought just didn't last and we didn't have money to get more. Never    Advance Care Directive on file?  no      Medication reconciliation complete.      Richi Odonnell,on 4/21/2022 at 9:34 AM        "

## 2022-05-10 ENCOUNTER — ALLIED HEALTH/NURSE VISIT (OUTPATIENT)
Dept: FAMILY MEDICINE | Facility: OTHER | Age: 87
End: 2022-05-10
Attending: INTERNAL MEDICINE
Payer: MEDICARE

## 2022-05-10 DIAGNOSIS — E53.8 B12 DEFICIENCY: Primary | ICD-10-CM

## 2022-05-10 PROCEDURE — 250N000011 HC RX IP 250 OP 636: Performed by: INTERNAL MEDICINE

## 2022-05-10 PROCEDURE — 96372 THER/PROPH/DIAG INJ SC/IM: CPT | Performed by: INTERNAL MEDICINE

## 2022-05-10 RX ADMIN — CYANOCOBALAMIN 1000 MCG: 1000 INJECTION, SOLUTION INTRAMUSCULAR; SUBCUTANEOUS at 08:51

## 2022-05-10 NOTE — PROGRESS NOTES
Verified patient's first and last name, and . Patient stated reason for visit today is to receive a B12 injection. Patient denied any concerns with previous injections. B12 prepared and administered IM as ordered. Administration of medication documented in MAR (see MAR for further information regarding dose, lot #, NDC #, expiration date). Patient encouraged to wait in lobby for 15 minutes post-injection and notify staff immediately of any reaction.     Lizeth Cody RN ....................  5/10/2022   8:47 AM

## 2022-06-01 ENCOUNTER — TRANSFERRED RECORDS (OUTPATIENT)
Dept: MULTI SPECIALTY CLINIC | Facility: CLINIC | Age: 87
End: 2022-06-01

## 2022-06-01 LAB — RETINOPATHY: NORMAL

## 2022-06-07 ENCOUNTER — ALLIED HEALTH/NURSE VISIT (OUTPATIENT)
Dept: FAMILY MEDICINE | Facility: OTHER | Age: 87
End: 2022-06-07
Attending: INTERNAL MEDICINE
Payer: MEDICARE

## 2022-06-07 DIAGNOSIS — E53.8 B12 DEFICIENCY: Primary | ICD-10-CM

## 2022-06-07 PROCEDURE — 96372 THER/PROPH/DIAG INJ SC/IM: CPT | Performed by: INTERNAL MEDICINE

## 2022-06-07 PROCEDURE — 250N000011 HC RX IP 250 OP 636: Performed by: INTERNAL MEDICINE

## 2022-06-07 RX ADMIN — CYANOCOBALAMIN 1000 MCG: 1000 INJECTION, SOLUTION INTRAMUSCULAR; SUBCUTANEOUS at 09:16

## 2022-06-07 NOTE — PROGRESS NOTES
Verified patient's first and last name, and . Patient stated reason for visit today is to receive a B12 injection. Patient denied any concerns with previous injections. B12 prepared and administered IM as ordered. Administration of medication documented in MAR (see MAR for further information regarding dose, lot #, NDC #, expiration date). Patient encouraged to wait in lobby for 15 minutes post-injection and notify staff immediately of any reaction.     Kelly Palacios RN ....................  2022   9:19 AM

## 2022-06-26 DIAGNOSIS — R33.9 URINARY RETENTION: ICD-10-CM

## 2022-06-27 NOTE — TELEPHONE ENCOUNTER
"  Last Prescription Date: 7/14/21  Last Qty/Refills: 90 / 3  Last Office Visit: 4/21/22  Future Office Visit: 7/27/22     Requested Prescriptions   Pending Prescriptions Disp Refills     tamsulosin (FLOMAX) 0.4 MG capsule [Pharmacy Med Name: TAMSULOSIN HCL 0.4 MG CAPSULE] 90 capsule 3     Sig: TAKE 1 CAPSULE (0.4 MG) BY MOUTH EVERY EVENING       Alpha Blockers Passed - 6/26/2022  7:35 AM        Passed - Blood pressure under 140/90 in past 12 months     BP Readings from Last 3 Encounters:   04/21/22 120/62   01/19/22 138/74   10/15/21 138/84                 Passed - Recent (12 mo) or future (30 days) visit within the authorizing provider's specialty     Patient has had an office visit with the authorizing provider or a provider within the authorizing providers department within the previous 12 mos or has a future within next 30 days. See \"Patient Info\" tab in inbasket, or \"Choose Columns\" in Meds & Orders section of the refill encounter.              Passed - Patient does not have Tadalafil, Vardenafil, or Sildenafil on their medication list        Passed - Medication is active on med list        Passed - Patient is 18 years of age or older             "

## 2022-06-28 RX ORDER — TAMSULOSIN HYDROCHLORIDE 0.4 MG/1
0.4 CAPSULE ORAL EVERY EVENING
Qty: 90 CAPSULE | Refills: 3 | Status: SHIPPED | OUTPATIENT
Start: 2022-06-28 | End: 2023-05-12

## 2022-07-07 ENCOUNTER — ALLIED HEALTH/NURSE VISIT (OUTPATIENT)
Dept: FAMILY MEDICINE | Facility: OTHER | Age: 87
End: 2022-07-07
Attending: INTERNAL MEDICINE
Payer: MEDICARE

## 2022-07-07 DIAGNOSIS — E53.8 B12 DEFICIENCY: Primary | ICD-10-CM

## 2022-07-07 PROCEDURE — 250N000011 HC RX IP 250 OP 636: Performed by: INTERNAL MEDICINE

## 2022-07-07 PROCEDURE — 96372 THER/PROPH/DIAG INJ SC/IM: CPT | Performed by: INTERNAL MEDICINE

## 2022-07-07 RX ADMIN — CYANOCOBALAMIN 1000 MCG: 1000 INJECTION, SOLUTION INTRAMUSCULAR; SUBCUTANEOUS at 09:10

## 2022-07-07 NOTE — PROGRESS NOTES
Verified patient's first and last name, and . Patient stated reason for visit today is to receive a B12 injection. Patient denied any concerns with previous injections. B12 prepared and administered IM as ordered. Administration of medication documented in MAR (see MAR for further information regarding dose, lot #, NDC #, expiration date). Patient encouraged to wait in lobby for 15 minutes post-injection and notify staff immediately of any reaction.     Lizeth Cody RN ....................  2022   9:06 AM

## 2022-07-08 ENCOUNTER — TELEPHONE (OUTPATIENT)
Dept: INTERNAL MEDICINE | Facility: OTHER | Age: 87
End: 2022-07-08

## 2022-07-08 NOTE — TELEPHONE ENCOUNTER
Just have him get A1c while at [facility].     Electronically signed by:  Valentino Machado MD  on July 8, 2022 12:19 PM

## 2022-07-08 NOTE — TELEPHONE ENCOUNTER
Called and verified patient full name and  with Monet at [facility]. Notified of Jeannette coultery. She needs order faxed over.     Standing Hemoglobin A1C order from  printed and faxed to [phone].     Yessica Caraballo LPN on 2022 at 1:19 PM

## 2022-07-08 NOTE — TELEPHONE ENCOUNTER
Lab Orders are  for diabetic labs, please submit orders again, pt would like to do at [facility] since he will be there on the , please send new orders to [facility] at Fax: [phone].    Thank you!      Audrey Mendez on 2022 at 9:59 AM

## 2022-07-25 ENCOUNTER — TRANSFERRED RECORDS (OUTPATIENT)
Dept: HEALTH INFORMATION MANAGEMENT | Facility: OTHER | Age: 87
End: 2022-07-25

## 2022-07-25 LAB — HBA1C MFR BLD: 6 % (ref 4–5.6)

## 2022-07-27 ENCOUNTER — OFFICE VISIT (OUTPATIENT)
Dept: INTERNAL MEDICINE | Facility: OTHER | Age: 87
End: 2022-07-27
Attending: INTERNAL MEDICINE
Payer: COMMERCIAL

## 2022-07-27 VITALS
BODY MASS INDEX: 24.49 KG/M2 | HEIGHT: 68 IN | HEART RATE: 78 BPM | DIASTOLIC BLOOD PRESSURE: 78 MMHG | SYSTOLIC BLOOD PRESSURE: 120 MMHG | RESPIRATION RATE: 18 BRPM | OXYGEN SATURATION: 97 % | WEIGHT: 161.6 LBS | TEMPERATURE: 97.8 F

## 2022-07-27 DIAGNOSIS — E87.5 HYPERKALEMIA: ICD-10-CM

## 2022-07-27 DIAGNOSIS — N05.8 PRIMARY PAUCI-IMMUNE NECROTIZING AND CRESCENTIC GLOMERULONEPHRITIS: ICD-10-CM

## 2022-07-27 DIAGNOSIS — D63.8 ANEMIA OF CHRONIC DISEASE: ICD-10-CM

## 2022-07-27 DIAGNOSIS — N18.5 TYPE 2 DIABETES MELLITUS WITH STAGE 5 CHRONIC KIDNEY DISEASE NOT ON CHRONIC DIALYSIS, WITH LONG-TERM CURRENT USE OF INSULIN (H): Primary | ICD-10-CM

## 2022-07-27 DIAGNOSIS — Z79.4 TYPE 2 DIABETES MELLITUS WITH STAGE 5 CHRONIC KIDNEY DISEASE NOT ON CHRONIC DIALYSIS, WITH LONG-TERM CURRENT USE OF INSULIN (H): Primary | ICD-10-CM

## 2022-07-27 DIAGNOSIS — I10 BENIGN ESSENTIAL HYPERTENSION: ICD-10-CM

## 2022-07-27 DIAGNOSIS — T38.0X5A STEROID-INDUCED HYPERGLYCEMIA: ICD-10-CM

## 2022-07-27 DIAGNOSIS — E11.22 TYPE 2 DIABETES MELLITUS WITH STAGE 5 CHRONIC KIDNEY DISEASE NOT ON CHRONIC DIALYSIS, WITH LONG-TERM CURRENT USE OF INSULIN (H): Primary | ICD-10-CM

## 2022-07-27 DIAGNOSIS — R73.9 STEROID-INDUCED HYPERGLYCEMIA: ICD-10-CM

## 2022-07-27 DIAGNOSIS — R76.8 ANTINEUTROPHIL CYTOPLASMIC ANTIBODY (ANCA) POSITIVE: ICD-10-CM

## 2022-07-27 DIAGNOSIS — N05.7 PRIMARY PAUCI-IMMUNE NECROTIZING AND CRESCENTIC GLOMERULONEPHRITIS: ICD-10-CM

## 2022-07-27 DIAGNOSIS — E83.52 HYPERCALCEMIA: ICD-10-CM

## 2022-07-27 DIAGNOSIS — E83.39 SERUM PHOSPHATE ELEVATED: ICD-10-CM

## 2022-07-27 PROCEDURE — 99214 OFFICE O/P EST MOD 30 MIN: CPT | Performed by: INTERNAL MEDICINE

## 2022-07-27 PROCEDURE — G0463 HOSPITAL OUTPT CLINIC VISIT: HCPCS

## 2022-07-27 RX ORDER — AMLODIPINE BESYLATE 5 MG/1
5 TABLET ORAL DAILY
Qty: 90 TABLET | Refills: 0 | Status: SHIPPED | OUTPATIENT
Start: 2022-07-27 | End: 2022-11-01

## 2022-07-27 RX ORDER — INSULIN GLARGINE 100 [IU]/ML
2 INJECTION, SOLUTION SUBCUTANEOUS AT BEDTIME
Qty: 15 ML | Refills: 3 | Status: SHIPPED | OUTPATIENT
Start: 2022-07-27 | End: 2023-02-10

## 2022-07-27 ASSESSMENT — ENCOUNTER SYMPTOMS
CHILLS: 0
NAUSEA: 0
DIZZINESS: 0
PALPITATIONS: 0
COUGH: 0
ARTHRALGIAS: 0
FEVER: 0
WHEEZING: 0
LIGHT-HEADEDNESS: 0
VOMITING: 0
ABDOMINAL PAIN: 0
MYALGIAS: 0
BACK PAIN: 1
SHORTNESS OF BREATH: 0
EYE PAIN: 0
FATIGUE: 1
DIARRHEA: 0
DYSURIA: 0
BRUISES/BLEEDS EASILY: 1
HEMATURIA: 0
AGITATION: 0
CONFUSION: 0

## 2022-07-27 ASSESSMENT — PAIN SCALES - GENERAL: PAINLEVEL: NO PAIN (1)

## 2022-07-27 NOTE — PROGRESS NOTES
Assessment & Plan       ICD-10-CM    1. 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 Benign essential hypertension  I10 amLODIPine (NORVASC) 5 MG tablet   3. Primary pauci-immune necrotizing and crescentic glomerulonephritis  N05.8     N05.7    4. Antineutrophil cytoplasmic antibody (ANCA) positive  R76.8    5. Anemia of chronic disease  D63.8    6. Hyperkalemia  E87.5    7. Hypercalcemia  E83.52    8. Serum phosphate elevated  E83.39    9. Steroid-induced hyperglycemia  R73.9 insulin glargine (LANTUS SOLOSTAR) 100 UNIT/ML pen    T38.0X5A    Patient presents for type 2 diabetes follow-up as well as follow-up multiple issues.    Type 2 diabetes, currently well controlled.  Currently managing with diet and Lantus insulin at bedtime.  Needs updated prescriptions.  Foot exam completed today.    Stage V chronic kidney disease.  He has autoimmune disorder causing his renal failure in conjunction with type 2 diabetes.  He has glomerulonephritis and ANCA positive.  Continues with steroids for immunosuppressive therapy.  Still following with nephrology.  Recent lab work collected.  His renal labs are worsening.    Hypertension, blood pressure is currently well controlled.  Denies syncope or presyncope.  Doing well with current medication regimen.  Needs refills.    Anemia of chronic disease, ongoing.  Noted to have high calcium and phosphorus levels with recent lab work from [facility].    Steroid-induced hyperglycemia.  Needs refills of his Lantus insulin.    Encouraged regular exercise.     Return in about 3 months (around 10/27/2022) for - Labs every 91+ days, DM Follow-up 1-2 days later, with Dr. Machado.    Valentino Machado MD  [facility] AND [facility]     Michelle Tavares is a 88 year old accompanied by his spouse, presenting for the  following health issues:  Diabetes      History of Present Illness       Back Pain:  He presents for follow up of back pain. Patient's back pain is a recurring problem.  Location of back pain:  Right lower back and left lower back  Description of back pain: dull ache  Back pain spreads: right buttocks    Since patient first noticed back pain, pain is: always present, but gets better and worse  Does back pain interfere with his job:  Not applicable      CKD: He is not using over the counter pain medicine.     Diabetes:   He presents for follow up of diabetes.  He is not checking blood glucose. He has no concerns regarding his diabetes at this time.  He is not experiencing numbness or burning in feet, excessive thirst, blurry vision, weight changes or redness, sores or blisters on feet. The patient has had a diabetic eye exam in the last 12 months.         He eats 2-3 servings of fruits and vegetables daily.He consumes 0 sweetened beverage(s) daily.He exercises with enough effort to increase his heart rate 9 or less minutes per day.  He exercises with enough effort to increase his heart rate 3 or less days per week.   He is taking medications regularly.    Review of Systems   Constitutional: Positive for fatigue. Negative for chills and fever.        Golfs 1x weekly in the summer and in the winter - curls 2x weekly and bowling 2x weekly    HENT: Negative for congestion and hearing loss.    Eyes: Negative for pain and visual disturbance.   Respiratory: Negative for cough, shortness of breath and wheezing.    Cardiovascular: Negative for chest pain and palpitations.   Gastrointestinal: Negative for abdominal pain, diarrhea, nausea and vomiting.   Endocrine: Negative for cold intolerance and heat intolerance.   Genitourinary: Negative for dysuria and hematuria.   Musculoskeletal: Positive for back pain (Marco his back after falling at [facility] about 7/20/2022) and gait problem. Negative for arthralgias and myalgias.  "       Fall in [place], slipped on floor tiles on about 7/20/2022. Bruising of left arm   Skin: Negative for pallor.   Allergic/Immunologic: Negative for immunocompromised state.   Neurological: Negative for dizziness and light-headedness.   Hematological: Bruises/bleeds easily.   Psychiatric/Behavioral: Negative for agitation and confusion.          Objective    /78 (BP Location: Right arm, Patient Position: Sitting, Cuff Size: Adult Regular)   Pulse 78   Temp 97.8  F (36.6  C)   Resp 18   Ht 1.727 m (5' 8\")   Wt 73.3 kg (161 lb 9.6 oz)   SpO2 97%   BMI 24.57 kg/m    Body mass index is 24.57 kg/m .  Physical Exam  Constitutional:       General: He is not in acute distress.     Appearance: He is well-developed. He is not diaphoretic.   HENT:      Head: Normocephalic and atraumatic.   Eyes:      General: No scleral icterus.     Conjunctiva/sclera: Conjunctivae normal.   Cardiovascular:      Rate and Rhythm: Normal rate and regular rhythm.   Pulmonary:      Effort: Pulmonary effort is normal.      Breath sounds: Normal breath sounds.   Abdominal:      Palpations: Abdomen is soft.      Tenderness: There is no abdominal tenderness.   Musculoskeletal:         General: No deformity.      Cervical back: Neck supple.      Right lower leg: Edema present.      Left lower leg: Edema present.   Lymphadenopathy:      Cervical: No cervical adenopathy.   Skin:     General: Skin is warm and dry.      Findings: Bruising (left arm) present. No rash.      Comments: bruises on the arms bilaterally  Skin tears, Healing on bilateral arms    Diabetic Foot Exam:  Findings:   LEFT: normal DP and PT pulses, no trophic changes or ulcerative lesions and Abnormal monofilament sensory exam - Moderate numbness  RIGHT: normal DP and PT pulses, no trophic changes or ulcerative lesions and Abnormal monofilament sensory exam - Moderate numbness    Toenail onycomycosis bilateral   Neurological:      Mental Status: He is alert and " oriented to person, place, and time. Mental status is at baseline.   Psychiatric:         Mood and Affect: Mood normal.         Behavior: Behavior normal.          Recent Labs   Lab Test 04/20/22  0907 01/17/22  0903 10/14/21  1012 07/12/21  0926 03/30/21  1129 06/20/19  1219   A1C 6.4* 6.1 5.9   < > 5.9 6.5*   LDL 74 74 96   < > 111* 131*   HDL 33 32 34   < > 33 30   TRIG 121 156* 138   < > 218* 164*   ALT 8 9 9   < > 9 8   CR 7.26* 5.96* 6.06*   < > 5.38* 4.88*   GFRESTIMATED 7* 9* 8*   < > 10* 11*   GFRESTBLACK  --   --   --   --  12* 14*   POTASSIUM 4.2 3.9 4.3   < > 4.8 4.1   TSH 5.63* 4.39* 3.29   < >  --   --     < > = values in this interval not displayed.     A1c 6.0% at [facility] on 7/25/2022  Additionally, creatinine is much higher than previous with GFR of 4.  Elevated calcium and phosphorus levels noted.  Anemia noted.              .  ..

## 2022-07-27 NOTE — NURSING NOTE
"Chief Complaint   Patient presents with     Diabetes         Initial /78 (BP Location: Right arm, Patient Position: Sitting, Cuff Size: Adult Regular)   Temp 97.8  F (36.6  C)   Resp 18   Ht 1.727 m (5' 8\")   Wt 73.3 kg (161 lb 9.6 oz)   BMI 24.57 kg/m   Estimated body mass index is 24.57 kg/m  as calculated from the following:    Height as of this encounter: 1.727 m (5' 8\").    Weight as of this encounter: 73.3 kg (161 lb 9.6 oz).       FOOD SECURITY SCREENING QUESTIONS:    The next two questions are to help us understand your food security.  If you are feeling you need any assistance in this area, we have resources available to support you today.    Hunger Vital Signs:  Within the past 12 months we worried whether our food would run out before we got money to buy more. Never  Within the past 12 months the food we bought just didn't last and we didn't have money to get more. Never    Advance Care Directive on file? no      Medication reconciliation complete.      Richi Odonnell,on 7/27/2022 at 8:24 AM        "

## 2022-07-27 NOTE — PATIENT INSTRUCTIONS
Blood pressure is well controlled.   Diabetes is well controlled.     Medications refilled.   Labs are worsening.     Transferred Records on 07/25/2022   Component Date Value Ref Range Status    Hemoglobin A1C (External) 07/25/2022 6.0 (A) 4.0 - 5.6 % Final     Aspects of Diabetes:   Recent Labs   Lab Test 04/20/22  0907 01/17/22  0903 10/14/21  1012 07/12/21  0926 03/30/21  1129 06/20/19  1219   A1C 6.4* 6.1 5.9   < > 5.9 6.5*   LDL 74 74 96   < > 111* 131*   HDL 33 32 34   < > 33 30   TRIG 121 156* 138   < > 218* 164*   ALT 8 9 9   < > 9 8   CR 7.26* 5.96* 6.06*   < > 5.38* 4.88*   GFRESTIMATED 7* 9* 8*   < > 10* 11*   GFRESTBLACK  --   --   --   --  12* 14*   POTASSIUM 4.2 3.9 4.3   < > 4.8 4.1   TSH 5.63* 4.39* 3.29   < >  --   --     < > = values in this interval not displayed.      Hemoglobin A1c  Goal range is under 8%. Best is 6.5 to 7   Blood Pressure 120/78 Goal to keep less than 140/90   Tobacco  reports that he quit smoking about 46 years ago. His smoking use included cigarettes. He has never used smokeless tobacco. Goal to abstain from tobacco   Aspirin or Plavix Anti-platelet therapy Aspirin or Plavix reduces risk of heart disease and stroke  -- sometimes used with other blood thinners, depending on bleeding risk and risk factors.    ACE/ARB Specific blood pressure meds These medications can reduce risk of kidney disease   Cholesterol Statins (Lipitor, Crestor, vs others) Statins reduce risk of heart disease and stroke   Eye Exam -- Do Yearly -- Annual diabetic eye exam   Healthy weight Wt Readings from Last 3 Encounters:   07/27/22 73.3 kg (161 lb 9.6 oz)   04/21/22 77.6 kg (171 lb)   01/19/22 79.8 kg (176 lb)     Body mass index is 24.57 kg/m .  Goal BMI under 30, best is under 25.      -- Trying to exercise daily (goal at least 20 min/day) with moderate aerobic activity   -- Eat healthy (resources from ADA at http://www.diabetes.org/)   -- Taking good care of my feet. Consider seeing the  Podiatrist   -- Check blood sugars as directed, record in log book and bring to every appointment    Insurance companies are grading you and I on your blood sugar control -- Goal is to get your A1c down to 7.9% or lower and NO Smoking!  -- Medicare and most insurance companies, will only cover Hemoglobin A1c labs to be rechecked every 91+ days.      Return for Diabetes labs and clinic follow-up appointment every 3 to 4 months.    Schedule lab only appointment --- A few days AFTER: 10/25/22   Schedule clinic appointment with Dr. Machado -- Same day as labs, or 1-2 days later.

## 2022-07-28 ENCOUNTER — ALLIED HEALTH/NURSE VISIT (OUTPATIENT)
Dept: FAMILY MEDICINE | Facility: OTHER | Age: 87
End: 2022-07-28
Payer: MEDICARE

## 2022-07-28 DIAGNOSIS — E53.8 B12 DEFICIENCY: Primary | ICD-10-CM

## 2022-07-28 PROCEDURE — 250N000011 HC RX IP 250 OP 636: Performed by: INTERNAL MEDICINE

## 2022-07-28 PROCEDURE — 96372 THER/PROPH/DIAG INJ SC/IM: CPT | Performed by: INTERNAL MEDICINE

## 2022-07-28 RX ADMIN — CYANOCOBALAMIN 1000 MCG: 1000 INJECTION, SOLUTION INTRAMUSCULAR; SUBCUTANEOUS at 10:55

## 2022-07-28 NOTE — PROGRESS NOTES
Verified patient's first and last name, and . Patient stated reason for visit today is to receive a B12 injection. Patient denied any concerns with previous injections. B12 prepared and administered IM as ordered. Administration of medication documented in MAR (see MAR for further information regarding dose, lot #, NDC #, expiration date). Patient encouraged to wait in lobby for 15 minutes post-injection and notify staff immediately of any reaction.     Lizeth Cody RN ....................  2022   10:49 AM

## 2022-08-04 ENCOUNTER — OFFICE VISIT (OUTPATIENT)
Dept: UROLOGY | Facility: OTHER | Age: 87
End: 2022-08-04
Attending: UROLOGY
Payer: COMMERCIAL

## 2022-08-04 VITALS
BODY MASS INDEX: 24.18 KG/M2 | WEIGHT: 159 LBS | HEART RATE: 105 BPM | OXYGEN SATURATION: 98 % | SYSTOLIC BLOOD PRESSURE: 130 MMHG | DIASTOLIC BLOOD PRESSURE: 70 MMHG | RESPIRATION RATE: 16 BRPM

## 2022-08-04 DIAGNOSIS — N40.1 URINARY RETENTION DUE TO BENIGN PROSTATIC HYPERPLASIA: Primary | ICD-10-CM

## 2022-08-04 DIAGNOSIS — R33.8 URINARY RETENTION DUE TO BENIGN PROSTATIC HYPERPLASIA: Primary | ICD-10-CM

## 2022-08-04 DIAGNOSIS — R33.9 URINARY RETENTION: ICD-10-CM

## 2022-08-04 DIAGNOSIS — I10 BENIGN ESSENTIAL HYPERTENSION: Primary | ICD-10-CM

## 2022-08-04 PROCEDURE — 51798 US URINE CAPACITY MEASURE: CPT | Performed by: UROLOGY

## 2022-08-04 PROCEDURE — G0463 HOSPITAL OUTPT CLINIC VISIT: HCPCS | Mod: 25

## 2022-08-04 PROCEDURE — 99214 OFFICE O/P EST MOD 30 MIN: CPT | Performed by: UROLOGY

## 2022-08-04 RX ORDER — FUROSEMIDE 20 MG
10 TABLET ORAL 2 TIMES DAILY
Qty: 90 TABLET | Refills: 1 | Status: SHIPPED | OUTPATIENT
Start: 2022-08-04 | End: 2022-11-01

## 2022-08-04 ASSESSMENT — PAIN SCALES - GENERAL: PAINLEVEL: MILD PAIN (2)

## 2022-08-04 NOTE — PROGRESS NOTES
Type of Visit  Established    Chief Complaint  BPH with elevated residual  CKD    HPI  Mr. Chao is a 88 y.o. male with a history of CKD who follows up with BPH with known mild to moderately elevated residual.  Patient's mild to moderate residual has been stable.  He has been on maximal medical therapy with finasteride and Flomax for the past 4 years ago.  He continues to report stable urinary symptoms.  Urine production has been stable.  He does have advanced CKD and recently was referred to nephrology.  He denies dysuria or gross hematuria.  No concerns regarding UTIs in the past year.  No new changes such as dribbling or incontinence.    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      Review of Systems  I reviewed the ROS the patient today.    Nursing Notes:   Josee Eden LPN  8/4/2022  8:58 AM  Addendum  Pt presents to clinic for a one year urinary retention follow up  Review of Systems:    Weight loss:    No     Recent fever/chills:  No   Night sweats:   No  Current skin rash:  Yes   Recent hair loss:  No  Heat intolerance:  No   Cold intolerance:  No  Chest pain:   No   Palpitations:   No  Shortness of breath:  No   Wheezing:   No  Constipation:    No   Diarrhea:   No   Nausea:   No   Vomiting:   No   Kidney/side pain:  Yes   Back pain:   Yes  Frequent headaches:  No   Dizziness:     No  Leg swelling:   Yes   Calf pain:    No    Post-Void Residual  A post-void residual was measured by ultrasonic bladder scanner.  354 mL  Luba Kearney LPN  8/4/2022 9:08 AM        Physical Exam  /70 (BP Location: Right arm, Patient Position: Sitting, Cuff Size: Adult Regular)   Pulse 105   Resp 16   Wt 72.1 kg (159 lb)   SpO2 98%   BMI 24.18 kg/m    Constitutional: NAD, WDWN.  Cardiovascular: Regular rate.  Pulmonary/Chest: Respirations are even and non-labored bilaterally.  Abdominal: Soft. No distension, tenderness, masses or guarding. No CVA  tenderness.  Extremities: SUSI x 4, Warm. No clubbing.  No cyanosis.    Skin: Pink, warm and dry.  No rashes noted.  Genitourinary: nonpalpable bladder    Labs   10/14/21 10:12 01/17/22 09:03 04/20/22 09:07   Creatinine 6.06 (H) 5.96 (H) 7.26 (H)     Radiographic Studies  4/2/2017  CT Stone  IMPRESSION:       1.  Gates catheter in the bladder. Asymmetric bowel thickening in the bladder.   Recommend further evaluation for a bladder infection or neoplasm if clinically   indicated.    2.  Diverticulosis of the rectosigmoid. Mild adjacent inflammatory changes may   represent mild diverticulitis in the appropriate clinical setting. No signs of   perforation, abscess, or bleeding     Post-Void Residual  A post-void residual was measured by ultrasonic bladder scanner.  354 mL today  172 mL (previously recorded)  243 mL (previously recorded)  290 mL (previously recorded)  412 mL (previously recorded)  199 mL (previously recorded)    Assessment  Mr. Chao is a 88 y.o. male with a history of CKD who follows up with BPH with elevated residual.  Reviewed past notes, labs and PVRs.  I recommended a screening NARCISO to assure no hydronephrosis given his residual is a bit higher than previously.    Plan  NARCISO to rule out hydronephrosis as indirect evidence of post-obstructive CKD issues  Continue Flomax and finasteride  Follow-up annually with PVR

## 2022-08-04 NOTE — NURSING NOTE
Pt presents to clinic for a one year urinary retention follow up  Review of Systems:    Weight loss:    No     Recent fever/chills:  No   Night sweats:   No  Current skin rash:  Yes   Recent hair loss:  No  Heat intolerance:  No   Cold intolerance:  No  Chest pain:   No   Palpitations:   No  Shortness of breath:  No   Wheezing:   No  Constipation:    No   Diarrhea:   No   Nausea:   No   Vomiting:   No   Kidney/side pain:  Yes   Back pain:   Yes  Frequent headaches:  No   Dizziness:     No  Leg swelling:   Yes   Calf pain:    No    Post-Void Residual  A post-void residual was measured by ultrasonic bladder scanner.  354 mL  Luba Kearney LPN  8/4/2022 9:08 AM

## 2022-08-04 NOTE — TELEPHONE ENCOUNTER
Routing refill request to provider for review/approval because:    LOV: 1/19/22  Current med list is furosemide 40 mg tablet take one tablet daily.  OV with Dr. Meng on 3/7/22 med list states Furosemide 20 mg tablet take 10 mg by mouth two times a day as requested    Called and spoke with patient and patients wife and they state they have they 40 mg tablet but cut in 4's to take 10 mg two times daily.  States they spoke with pharmacy and that is why the 20 mg tablet request was sent.    Mary Ann Tapia RN on 8/4/2022 at 11:42 AM

## 2022-08-10 DIAGNOSIS — R33.9 URINARY RETENTION: ICD-10-CM

## 2022-08-15 RX ORDER — FINASTERIDE 5 MG/1
TABLET, FILM COATED ORAL
Qty: 90 TABLET | Refills: 3 | Status: SHIPPED | OUTPATIENT
Start: 2022-08-15 | End: 2023-05-16

## 2022-08-15 NOTE — TELEPHONE ENCOUNTER
"LOV 8/4/22Plan  NARCISO to rule out hydronephrosis as indirect evidence of post-obstructive CKD issues  Continue Flomax and finasteride  Follow-up annually with PVR\"  "

## 2022-08-22 ENCOUNTER — OFFICE VISIT (OUTPATIENT)
Dept: UROLOGY | Facility: OTHER | Age: 87
End: 2022-08-22
Attending: UROLOGY
Payer: MEDICARE

## 2022-08-22 ENCOUNTER — HOSPITAL ENCOUNTER (OUTPATIENT)
Dept: ULTRASOUND IMAGING | Facility: OTHER | Age: 87
Discharge: HOME | End: 2022-08-22
Attending: UROLOGY
Payer: MEDICARE

## 2022-08-22 VITALS
HEART RATE: 72 BPM | OXYGEN SATURATION: 98 % | SYSTOLIC BLOOD PRESSURE: 130 MMHG | RESPIRATION RATE: 16 BRPM | DIASTOLIC BLOOD PRESSURE: 80 MMHG | BODY MASS INDEX: 24.63 KG/M2 | WEIGHT: 162 LBS

## 2022-08-22 DIAGNOSIS — R33.8 URINARY RETENTION DUE TO BENIGN PROSTATIC HYPERPLASIA: Primary | ICD-10-CM

## 2022-08-22 DIAGNOSIS — N18.5 CKD (CHRONIC KIDNEY DISEASE) STAGE 5, GFR LESS THAN 15 ML/MIN (H): ICD-10-CM

## 2022-08-22 DIAGNOSIS — N40.1 URINARY RETENTION DUE TO BENIGN PROSTATIC HYPERPLASIA: ICD-10-CM

## 2022-08-22 DIAGNOSIS — R33.8 URINARY RETENTION DUE TO BENIGN PROSTATIC HYPERPLASIA: ICD-10-CM

## 2022-08-22 DIAGNOSIS — N40.1 URINARY RETENTION DUE TO BENIGN PROSTATIC HYPERPLASIA: Primary | ICD-10-CM

## 2022-08-22 PROCEDURE — G0463 HOSPITAL OUTPT CLINIC VISIT: HCPCS

## 2022-08-22 PROCEDURE — 99213 OFFICE O/P EST LOW 20 MIN: CPT | Performed by: UROLOGY

## 2022-08-22 PROCEDURE — 76770 US EXAM ABDO BACK WALL COMP: CPT

## 2022-08-22 PROCEDURE — G0463 HOSPITAL OUTPT CLINIC VISIT: HCPCS | Mod: 25

## 2022-08-22 ASSESSMENT — PAIN SCALES - GENERAL: PAINLEVEL: NO PAIN (0)

## 2022-08-22 NOTE — NURSING NOTE
Pt presents to clinic for follow up on NARCISO    Review of Systems:    Weight loss:    No     Recent fever/chills:  No   Night sweats:   No  Current skin rash:  No   Recent hair loss:  No  Heat intolerance:  No   Cold intolerance:  No  Chest pain:   No   Palpitations:   No  Shortness of breath:  No   Wheezing:   No  Constipation:    No   Diarrhea:   No   Nausea:   No   Vomiting:   No   Kidney/side pain:  No   Back pain:   No  Frequent headaches:  No   Dizziness:     No  Leg swelling:   No   Calf pain:    No

## 2022-08-22 NOTE — PROGRESS NOTES
Type of Visit  Established    Chief Complaint  BPH with elevated residual  CKD    HPI  Mr. Chao is a 88 y.o. male with a history of CKD who follows up with BPH with known mild to moderately elevated residual.  Patient's mild to moderate residual has been stable.  He has been on maximal medical therapy with finasteride and Flomax for the past 4 years ago.  He continues to report stable urinary symptoms.  Urine production has been stable.  He does have advanced CKD and recently was referred to nephrology.  He denies dysuria or gross hematuria.  No concerns regarding UTIs in the past year.  No new changes such as dribbling or incontinence.    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      Review of Systems  I reviewed the ROS the patient today.    Nursing Notes:   Josee Eden LPN  8/22/2022  2:19 PM  Signed  Pt presents to clinic for follow up on NARCISO    Review of Systems:    Weight loss:    No     Recent fever/chills:  No   Night sweats:   No  Current skin rash:  No   Recent hair loss:  No  Heat intolerance:  No   Cold intolerance:  No  Chest pain:   No   Palpitations:   No  Shortness of breath:  No   Wheezing:   No  Constipation:    No   Diarrhea:   No   Nausea:   No   Vomiting:   No   Kidney/side pain:  No   Back pain:   No  Frequent headaches:  No   Dizziness:     No  Leg swelling:   No   Calf pain:    No             Physical Exam  /80 (BP Location: Right arm, Patient Position: Sitting, Cuff Size: Adult Regular)   Pulse 72   Resp 16   Wt 73.5 kg (162 lb)   SpO2 98%   BMI 24.63 kg/m    Constitutional: NAD, WDWN.  Cardiovascular: Regular rate.  Pulmonary/Chest: Respirations are even and non-labored bilaterally.  Abdominal: Soft. No distension, tenderness, masses or guarding. No CVA tenderness.  Extremities: SUSI x 4, Warm. No clubbing.  No cyanosis.    Skin: Pink, warm and dry.  No rashes noted.  Genitourinary: nonpalpable bladder    Labs    10/14/21 10:12 01/17/22 09:03 04/20/22 09:07   Creatinine 6.06 (H) 5.96 (H) 7.26 (H)     Radiographic Studies  NARCISO  8/22/2022  No hydronephrosis bilaterally  Report currently pending    ^^^^^^^^^^^^^^^^^^^^^^^^^^^^^^^^    4/2/2017  CT Stone  IMPRESSION:       1.  Gates catheter in the bladder. Asymmetric bowel thickening in the bladder.   Recommend further evaluation for a bladder infection or neoplasm if clinically   indicated.    2.  Diverticulosis of the rectosigmoid. Mild adjacent inflammatory changes may   represent mild diverticulitis in the appropriate clinical setting. No signs of   perforation, abscess, or bleeding     Post-Void Residual  A post-void residual was measured by ultrasonic bladder scanner.  354 mL (previously recorded)  172 mL (previously recorded)  243 mL (previously recorded)  290 mL (previously recorded)  412 mL (previously recorded)  199 mL (previously recorded)    Assessment  Mr. Chao is a 88 y.o. male with a history of CKD who follows up with BPH with elevated residual.  Reviewed the renal ultrasound that was acquired earlier today.  No hydronephrosis bilaterally.  I explained that this rules out an elevated residual as a contributing factor to renal failure.  The patient has no post renal issues contributing to CKD at this time.    Plan  Continue Flomax and finasteride  Follow-up annually with PVR

## 2022-08-25 ENCOUNTER — ALLIED HEALTH/NURSE VISIT (OUTPATIENT)
Dept: FAMILY MEDICINE | Facility: OTHER | Age: 87
End: 2022-08-25
Payer: MEDICARE

## 2022-08-25 DIAGNOSIS — E53.8 B12 DEFICIENCY: Primary | ICD-10-CM

## 2022-08-25 PROCEDURE — 96372 THER/PROPH/DIAG INJ SC/IM: CPT | Performed by: INTERNAL MEDICINE

## 2022-08-25 PROCEDURE — 250N000011 HC RX IP 250 OP 636: Performed by: INTERNAL MEDICINE

## 2022-08-25 RX ADMIN — CYANOCOBALAMIN 1000 MCG: 1000 INJECTION, SOLUTION INTRAMUSCULAR; SUBCUTANEOUS at 10:22

## 2022-08-25 NOTE — PROGRESS NOTES
Verified patient's first and last name, and . Patient stated reason for visit today is to receive a B12 injection. Patient denied any concerns with previous injections. B12 prepared and administered IM as ordered. Administration of medication documented in MAR (see MAR for further information regarding dose, lot #, NDC #, expiration date). Patient encouraged to wait in lobby for 15 minutes post-injection and notify staff immediately of any reaction.     Lizeth Cody RN ....................  2022   10:18 AM

## 2022-09-29 ENCOUNTER — ALLIED HEALTH/NURSE VISIT (OUTPATIENT)
Dept: FAMILY MEDICINE | Facility: OTHER | Age: 87
End: 2022-09-29
Attending: INTERNAL MEDICINE
Payer: MEDICARE

## 2022-09-29 DIAGNOSIS — E53.8 B12 DEFICIENCY: Primary | ICD-10-CM

## 2022-09-29 PROCEDURE — 250N000011 HC RX IP 250 OP 636: Performed by: INTERNAL MEDICINE

## 2022-09-29 PROCEDURE — 96372 THER/PROPH/DIAG INJ SC/IM: CPT | Performed by: INTERNAL MEDICINE

## 2022-09-29 RX ADMIN — CYANOCOBALAMIN 1000 MCG: 1000 INJECTION, SOLUTION INTRAMUSCULAR; SUBCUTANEOUS at 10:37

## 2022-09-29 NOTE — PROGRESS NOTES
Verified patient's first and last name, and . Patient stated reason for visit today is to receive a B12 injection. Patient denied any concerns with previous injections. B12 prepared and administered IM as ordered. Administration of medication documented in MAR (see MAR for further information regarding dose, lot #, NDC #, expiration date). Patient encouraged to wait in lobby for 15 minutes post-injection and notify staff immediately of any reaction.     Mireya Mon RN  ....................  2022   10:36 AM

## 2022-10-25 DIAGNOSIS — E53.8 VITAMIN B12 DEFICIENCY: Primary | ICD-10-CM

## 2022-10-25 RX ORDER — CYANOCOBALAMIN 1000 UG/ML
1000 INJECTION, SOLUTION INTRAMUSCULAR; SUBCUTANEOUS CONTINUOUS PRN
Status: DISCONTINUED | OUTPATIENT
Start: 2022-10-25 | End: 2023-01-01

## 2022-10-25 NOTE — PROGRESS NOTES
Patient has Nurse Procedure appointment on 10/27 for B12 injection. Order is >1 year old and needs to be renewed. New order Teed up in this encounter. Please advise.    ANALIA WELLS RN on 10/25/2022 at 12:56 PM]

## 2022-10-26 ENCOUNTER — IMMUNIZATION (OUTPATIENT)
Dept: FAMILY MEDICINE | Facility: OTHER | Age: 87
End: 2022-10-26
Attending: INTERNAL MEDICINE
Payer: MEDICARE

## 2022-10-26 DIAGNOSIS — Z23 HIGH PRIORITY FOR 2019-NCOV VACCINE: Primary | ICD-10-CM

## 2022-10-26 PROCEDURE — 91312 COVID-19,PF,PFIZER BOOSTER BIVALENT: CPT

## 2022-10-26 PROCEDURE — 0124A COVID-19,PF,PFIZER BOOSTER BIVALENT: CPT

## 2022-10-27 ENCOUNTER — ALLIED HEALTH/NURSE VISIT (OUTPATIENT)
Dept: FAMILY MEDICINE | Facility: OTHER | Age: 87
End: 2022-10-27
Attending: INTERNAL MEDICINE
Payer: MEDICARE

## 2022-10-27 DIAGNOSIS — E53.8 B12 DEFICIENCY: Primary | ICD-10-CM

## 2022-10-27 PROCEDURE — 250N000011 HC RX IP 250 OP 636: Performed by: INTERNAL MEDICINE

## 2022-10-27 PROCEDURE — 96372 THER/PROPH/DIAG INJ SC/IM: CPT | Performed by: INTERNAL MEDICINE

## 2022-10-27 RX ADMIN — CYANOCOBALAMIN 1000 MCG: 1000 INJECTION, SOLUTION INTRAMUSCULAR; SUBCUTANEOUS at 09:07

## 2022-10-27 NOTE — PROGRESS NOTES

## 2022-10-31 ENCOUNTER — LAB (OUTPATIENT)
Dept: LAB | Facility: OTHER | Age: 87
End: 2022-10-31
Attending: INTERNAL MEDICINE
Payer: MEDICARE

## 2022-10-31 DIAGNOSIS — Z79.4 TYPE 2 DIABETES MELLITUS WITH STAGE 5 CHRONIC KIDNEY DISEASE NOT ON CHRONIC DIALYSIS, WITH LONG-TERM CURRENT USE OF INSULIN (H): ICD-10-CM

## 2022-10-31 DIAGNOSIS — N18.5 TYPE 2 DIABETES MELLITUS WITH STAGE 5 CHRONIC KIDNEY DISEASE NOT ON CHRONIC DIALYSIS, WITH LONG-TERM CURRENT USE OF INSULIN (H): ICD-10-CM

## 2022-10-31 DIAGNOSIS — E11.22 TYPE 2 DIABETES MELLITUS WITH STAGE 5 CHRONIC KIDNEY DISEASE NOT ON CHRONIC DIALYSIS, WITH LONG-TERM CURRENT USE OF INSULIN (H): ICD-10-CM

## 2022-10-31 DIAGNOSIS — E78.2 MIXED HYPERLIPIDEMIA: ICD-10-CM

## 2022-10-31 LAB
ALBUMIN SERPL-MCNC: 3.9 G/DL (ref 3.5–5.7)
ALP SERPL-CCNC: 78 U/L (ref 34–104)
ALT SERPL W P-5'-P-CCNC: 12 U/L (ref 7–52)
ANION GAP SERPL CALCULATED.3IONS-SCNC: 13 MMOL/L (ref 3–14)
AST SERPL W P-5'-P-CCNC: 13 U/L (ref 13–39)
BILIRUB SERPL-MCNC: 0.3 MG/DL (ref 0.3–1)
BUN SERPL-MCNC: 98 MG/DL (ref 7–25)
CALCIUM SERPL-MCNC: 9.2 MG/DL (ref 8.6–10.3)
CHLORIDE BLD-SCNC: 105 MMOL/L (ref 98–107)
CHOLEST SERPL-MCNC: 128 MG/DL
CO2 SERPL-SCNC: 21 MMOL/L (ref 21–31)
CREAT SERPL-MCNC: 8.56 MG/DL (ref 0.7–1.3)
ERYTHROCYTE [DISTWIDTH] IN BLOOD BY AUTOMATED COUNT: 13.1 % (ref 10–15)
FASTING STATUS PATIENT QL REPORTED: NO
GFR SERPL CREATININE-BSD FRML MDRD: 6 ML/MIN/1.73M2
GLUCOSE BLD-MCNC: 172 MG/DL (ref 70–105)
HBA1C MFR BLD: 5.9 % (ref 4–6.2)
HCT VFR BLD AUTO: 31.7 % (ref 40–53)
HDLC SERPL-MCNC: 34 MG/DL (ref 23–92)
HGB BLD-MCNC: 10.4 G/DL (ref 13.3–17.7)
LDLC SERPL CALC-MCNC: 71 MG/DL
MCH RBC QN AUTO: 30.6 PG (ref 26.5–33)
MCHC RBC AUTO-ENTMCNC: 32.8 G/DL (ref 31.5–36.5)
MCV RBC AUTO: 93 FL (ref 78–100)
NONHDLC SERPL-MCNC: 94 MG/DL
PLATELET # BLD AUTO: 242 10E3/UL (ref 150–450)
POTASSIUM BLD-SCNC: 4.6 MMOL/L (ref 3.5–5.1)
PROT SERPL-MCNC: 6.7 G/DL (ref 6.4–8.9)
RBC # BLD AUTO: 3.4 10E6/UL (ref 4.4–5.9)
SODIUM SERPL-SCNC: 139 MMOL/L (ref 134–144)
T4 FREE SERPL-MCNC: 0.73 NG/DL (ref 0.6–1.6)
TRIGL SERPL-MCNC: 115 MG/DL
TSH SERPL DL<=0.005 MIU/L-ACNC: 6.91 MU/L (ref 0.4–4)
WBC # BLD AUTO: 9.6 10E3/UL (ref 4–11)

## 2022-10-31 PROCEDURE — 84443 ASSAY THYROID STIM HORMONE: CPT | Mod: ZL

## 2022-10-31 PROCEDURE — 84439 ASSAY OF FREE THYROXINE: CPT | Mod: ZL

## 2022-10-31 PROCEDURE — 80061 LIPID PANEL: CPT | Mod: ZL

## 2022-10-31 PROCEDURE — 80053 COMPREHEN METABOLIC PANEL: CPT | Mod: ZL

## 2022-10-31 PROCEDURE — 36415 COLL VENOUS BLD VENIPUNCTURE: CPT | Mod: ZL

## 2022-10-31 PROCEDURE — 83036 HEMOGLOBIN GLYCOSYLATED A1C: CPT | Mod: ZL

## 2022-10-31 PROCEDURE — 85027 COMPLETE CBC AUTOMATED: CPT | Mod: ZL

## 2022-11-01 ENCOUNTER — LAB (OUTPATIENT)
Dept: LAB | Facility: OTHER | Age: 87
End: 2022-11-01
Attending: INTERNAL MEDICINE
Payer: COMMERCIAL

## 2022-11-01 ENCOUNTER — OFFICE VISIT (OUTPATIENT)
Dept: INTERNAL MEDICINE | Facility: OTHER | Age: 87
End: 2022-11-01
Attending: INTERNAL MEDICINE
Payer: MEDICARE

## 2022-11-01 VITALS
HEIGHT: 69 IN | SYSTOLIC BLOOD PRESSURE: 128 MMHG | BODY MASS INDEX: 23.82 KG/M2 | TEMPERATURE: 97.8 F | DIASTOLIC BLOOD PRESSURE: 72 MMHG | OXYGEN SATURATION: 98 % | WEIGHT: 160.8 LBS | HEART RATE: 94 BPM | RESPIRATION RATE: 20 BRPM

## 2022-11-01 DIAGNOSIS — E78.2 MIXED HYPERLIPIDEMIA: ICD-10-CM

## 2022-11-01 DIAGNOSIS — N18.5 TYPE 2 DIABETES MELLITUS WITH STAGE 5 CHRONIC KIDNEY DISEASE NOT ON CHRONIC DIALYSIS, WITH LONG-TERM CURRENT USE OF INSULIN (H): ICD-10-CM

## 2022-11-01 DIAGNOSIS — I89.0 LYMPHEDEMA OF BOTH LOWER EXTREMITIES: ICD-10-CM

## 2022-11-01 DIAGNOSIS — Z79.4 TYPE 2 DIABETES MELLITUS WITH STAGE 5 CHRONIC KIDNEY DISEASE NOT ON CHRONIC DIALYSIS, WITH LONG-TERM CURRENT USE OF INSULIN (H): ICD-10-CM

## 2022-11-01 DIAGNOSIS — D63.8 ANEMIA OF CHRONIC DISEASE: ICD-10-CM

## 2022-11-01 DIAGNOSIS — N18.5 CKD (CHRONIC KIDNEY DISEASE) STAGE 5, GFR LESS THAN 15 ML/MIN (H): ICD-10-CM

## 2022-11-01 DIAGNOSIS — N25.81 HYPERPARATHYROIDISM DUE TO RENAL INSUFFICIENCY (H): ICD-10-CM

## 2022-11-01 DIAGNOSIS — E11.22 TYPE 2 DIABETES MELLITUS WITH STAGE 5 CHRONIC KIDNEY DISEASE NOT ON CHRONIC DIALYSIS, WITH LONG-TERM CURRENT USE OF INSULIN (H): ICD-10-CM

## 2022-11-01 DIAGNOSIS — I10 BENIGN ESSENTIAL HYPERTENSION: Primary | ICD-10-CM

## 2022-11-01 DIAGNOSIS — N05.8 PRIMARY PAUCI-IMMUNE NECROTIZING AND CRESCENTIC GLOMERULONEPHRITIS: ICD-10-CM

## 2022-11-01 DIAGNOSIS — E83.39 SERUM PHOSPHATE ELEVATED: ICD-10-CM

## 2022-11-01 DIAGNOSIS — D84.9 IMMUNOCOMPROMISED PATIENT (H): ICD-10-CM

## 2022-11-01 DIAGNOSIS — N05.7 PRIMARY PAUCI-IMMUNE NECROTIZING AND CRESCENTIC GLOMERULONEPHRITIS: ICD-10-CM

## 2022-11-01 PROBLEM — E87.5 HYPERKALEMIA: Status: RESOLVED | Noted: 2017-04-03 | Resolved: 2022-11-01

## 2022-11-01 PROBLEM — E83.52 HYPERCALCEMIA: Status: RESOLVED | Noted: 2022-07-27 | Resolved: 2022-11-01

## 2022-11-01 PROBLEM — E87.1 HYPONATREMIA: Status: RESOLVED | Noted: 2017-04-03 | Resolved: 2022-11-01

## 2022-11-01 LAB
ALBUMIN UR-MCNC: 70 MG/DL
APPEARANCE UR: ABNORMAL
BACTERIA #/AREA URNS HPF: ABNORMAL /HPF
BILIRUB UR QL STRIP: NEGATIVE
COLOR UR AUTO: YELLOW
CREAT UR-MCNC: 39.8 MG/DL
GLUCOSE UR STRIP-MCNC: 50 MG/DL
HGB UR QL STRIP: ABNORMAL
KETONES UR STRIP-MCNC: NEGATIVE MG/DL
LEUKOCYTE ESTERASE UR QL STRIP: ABNORMAL
MICROALBUMIN UR-MCNC: 214 MG/L
MICROALBUMIN/CREAT UR: 537.69 MG/G CR (ref 0–17)
NITRATE UR QL: POSITIVE
PH UR STRIP: 6.5 [PH] (ref 5–9)
RBC URINE: 13 /HPF
SP GR UR STRIP: 1.01 (ref 1–1.03)
UROBILINOGEN UR STRIP-MCNC: NORMAL MG/DL
WBC CLUMPS #/AREA URNS HPF: PRESENT /HPF
WBC URINE: >182 /HPF

## 2022-11-01 PROCEDURE — 82043 UR ALBUMIN QUANTITATIVE: CPT | Mod: ZL

## 2022-11-01 PROCEDURE — 99214 OFFICE O/P EST MOD 30 MIN: CPT | Performed by: INTERNAL MEDICINE

## 2022-11-01 PROCEDURE — 81001 URINALYSIS AUTO W/SCOPE: CPT | Mod: ZL

## 2022-11-01 PROCEDURE — G0463 HOSPITAL OUTPT CLINIC VISIT: HCPCS

## 2022-11-01 RX ORDER — AMLODIPINE BESYLATE 5 MG/1
5 TABLET ORAL DAILY
Qty: 90 TABLET | Refills: 1 | Status: SHIPPED | OUTPATIENT
Start: 2022-11-01 | End: 2023-02-10

## 2022-11-01 RX ORDER — FUROSEMIDE 40 MG
40 TABLET ORAL EVERY MORNING
Qty: 90 TABLET | Refills: 1 | Status: SHIPPED | OUTPATIENT
Start: 2022-11-01 | End: 2023-02-10

## 2022-11-01 RX ORDER — PREDNISONE 2.5 MG/1
2.5 TABLET ORAL DAILY
Qty: 90 TABLET | Refills: 1 | Status: SHIPPED | OUTPATIENT
Start: 2022-11-01 | End: 2023-02-10

## 2022-11-01 ASSESSMENT — ENCOUNTER SYMPTOMS
BACK PAIN: 0
DIZZINESS: 0
FEVER: 0
LIGHT-HEADEDNESS: 0
CHILLS: 0
SHORTNESS OF BREATH: 0
ARTHRALGIAS: 0
VOMITING: 0
EYE PAIN: 0
CONFUSION: 0
BRUISES/BLEEDS EASILY: 1
HEMATURIA: 0
WHEEZING: 0
MYALGIAS: 0
AGITATION: 0
DYSURIA: 0
ABDOMINAL PAIN: 0
PALPITATIONS: 0
DIARRHEA: 0
COUGH: 0
FATIGUE: 1
NAUSEA: 0

## 2022-11-01 ASSESSMENT — PAIN SCALES - GENERAL: PAINLEVEL: MILD PAIN (3)

## 2022-11-01 NOTE — NURSING NOTE
"Chief Complaint   Patient presents with     Diabetes         Initial /72 (BP Location: Right arm, Patient Position: Sitting, Cuff Size: Adult Regular)   Pulse 94   Temp 97.8  F (36.6  C) (Temporal)   Resp 20   Ht 1.74 m (5' 8.5\")   Wt 72.9 kg (160 lb 12.8 oz)   SpO2 98%   BMI 24.09 kg/m   Estimated body mass index is 24.09 kg/m  as calculated from the following:    Height as of this encounter: 1.74 m (5' 8.5\").    Weight as of this encounter: 72.9 kg (160 lb 12.8 oz).       FOOD SECURITY SCREENING QUESTIONS:    The next two questions are to help us understand your food security.  If you are feeling you need any assistance in this area, we have resources available to support you today.    Hunger Vital Signs:  Within the past 12 months we worried whether our food would run out before we got money to buy more. Never  Within the past 12 months the food we bought just didn't last and we didn't have money to get more. Never    Advance Care Directive on file yes      Medication reconciliation complete.      Richi Odonnell,on 11/1/2022 at 1:24 PM        "

## 2022-11-01 NOTE — PATIENT INSTRUCTIONS
Kidney function is slowly declining.     Code status reviewed: DNR / DNI.       Blood pressure is well controlled.   Diabetes is well controlled.     Medications refilled.     Lab on 11/01/2022   Component Date Value Ref Range Status    Color Urine 11/01/2022 Yellow  Colorless, Straw, Light Yellow, Yellow Final    Appearance Urine 11/01/2022 Cloudy (A)  Clear Final    Glucose Urine 11/01/2022 50 (A)  Negative mg/dL Final    Bilirubin Urine 11/01/2022 Negative  Negative Final    Ketones Urine 11/01/2022 Negative  Negative mg/dL Final    Specific Gravity Urine 11/01/2022 1.010  1.000 - 1.030 Final    Blood Urine 11/01/2022 Moderate (A)  Negative Final    pH Urine 11/01/2022 6.5  5.0 - 9.0 Final    Protein Albumin Urine 11/01/2022 70 (A)  Negative mg/dL Final    Urobilinogen Urine 11/01/2022 Normal  Normal, 2.0 mg/dL Final    Nitrite Urine 11/01/2022 Positive (A)  Negative Final    Leukocyte Esterase Urine 11/01/2022 Large (A)  Negative Final    Bacteria Urine 11/01/2022 Moderate (A)  None Seen /HPF Final    RBC Urine 11/01/2022 13 (H)  <=2 /HPF Final    WBC Urine 11/01/2022 >182 (H)  <=5 /HPF Final    WBC Clumps Urine 11/01/2022 Present (A)  None Seen /HPF Final      Aspects of Diabetes:   Recent Labs   Lab Test 10/31/22  0856 04/20/22  0907 01/17/22  0903   A1C 5.9 6.4* 6.1   LDL 71 74 74   HDL 34 33 32   TRIG 115 121 156*   ALT 12 8 9   CR 8.56* 7.26* 5.96*   GFRESTIMATED 6* 7* 9*   POTASSIUM 4.6 4.2 3.9   TSH 6.91* 5.63* 4.39*   T4 0.73 0.75 0.85   WBC 9.6 9.6 9.2   HGB 10.4* 11.4* 11.8*    233 199   ALBUMIN 3.9 4.0 4.0      Hemoglobin A1c  Goal range is under 8%. Best is 6.5 to 7   Blood Pressure 128/72 Goal to keep less than 140/90   Tobacco  reports that he quit smoking about 46 years ago. His smoking use included cigarettes. He has never used smokeless tobacco. Goal to abstain from tobacco   Aspirin or Plavix Anti-platelet therapy Aspirin or Plavix reduces risk of heart disease and stroke  -- sometimes  used with other blood thinners, depending on bleeding risk and risk factors.    ACE/ARB Specific blood pressure meds These medications can reduce risk of kidney disease   Cholesterol Statins (Lipitor, Crestor, vs others) Statins reduce risk of heart disease and stroke   Eye Exam -- Do Yearly -- Annual diabetic eye exam   Healthy weight Wt Readings from Last 4 Encounters:   11/01/22 72.9 kg (160 lb 12.8 oz)   08/22/22 73.5 kg (162 lb)   08/04/22 72.1 kg (159 lb)   07/27/22 73.3 kg (161 lb 9.6 oz)      Body mass index is 24.09 kg/m .  Goal BMI under 30, best is under 25.      -- Trying to exercise daily (goal at least 20 min/day) with moderate aerobic activity   -- Eat healthy (resources from ADA at http://www.diabetes.org/)   -- Taking good care of my feet. Consider seeing the Podiatrist   -- Check blood sugars as directed, record in log book and bring to every appointment    Insurance companies are grading you and I on your blood sugar control -- Goal is to get your A1c down to 7.9% or lower and NO Smoking!  -- Medicare and most insurance companies, will only cover Hemoglobin A1c labs to be rechecked every 91+ days.      Return for Diabetes labs and clinic follow-up appointment every 3 to 4 months.    Schedule lab only appointment --- A few days AFTER: 1/30/23   Schedule clinic appointment with Dr. Machado -- Same day as labs, or 1-2 days later.

## 2022-11-01 NOTE — PROGRESS NOTES
Assessment & Plan   Altaf Chao is a 88 year old, presenting for the following health issues:      ICD-10-CM    1. Benign essential hypertension  I10 amLODIPine (NORVASC) 5 MG tablet     furosemide (LASIX) 40 MG tablet      2. Anemia of chronic disease  D63.8       3. CKD (chronic kidney disease) stage 5, GFR 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 ml/min (H)  N18.5 predniSONE (DELTASONE) 2.5 MG tablet      4. Mixed hyperlipidemia  E78.2       5. 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, with long-term current use of insulin (H)  E11.22     N18.5     Z79.4       6. Lymphedema of both lower extremities  I89.0       7. Serum phosphate elevated  E83.39       8. Immunocompromised patient (H)  D84.9 predniSONE (DELTASONE) 2.5 MG tablet      9. Hyperparathyroidism due to renal insufficiency (H)  N25.81       10. Primary pauci-immune necrotizing and crescentic glomerulonephritis  N05.8 predniSONE (DELTASONE) 2.5 MG tablet    N05.7       Patient presents for follow-up multiple issues.    Hypertension, blood pressure is currently well controlled.  Doing well with amlodipine and Lasix.  Needs refills.  Still making plenty of urine.  Appetite is good.    Anemia of chronic kidney disease, has stage V CKD.  Continues with daily prednisone due to his glomerulonephritis.  He still follows with nephrology.  There was some talk about possibly doing dialysis but he is not interested.  Hemoglobin is slowly declining.    Mixed hyperlipidemia.  Cholesterol levels are high and not at goal.  This is a statin allergy/declining statin therapy.  If lifestyle modifications are not adequate we will need to consider starting cholesterol medications.  Goal LDL is less than 70.    Type 2 diabetes, hemoglobin A1c is well controlled.  Continues with Lantus.  No changes for now.  Denies hypoglycemia.    Lower extremity lymphedema, chronic.  Uses leg compression stockings.  Encouraged continued use.    Elevated phosphate levels,  this is due to his stage V CKD.  Continues to follow with nephrology.    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  Needs refills.    Hyperparathyroidism due to renal insufficiency.  Continue to closely monitor.    Encouraged weight loss and regular exercise.     Return in about 3 months (around 2/1/2023) for - Labs every 91+ days, with DM Follow-up, Same Day or 1-2 days later with Dr. Machado.    Valentino Machado MD  [facility]     Subjective   Diabetes      History of Present Illness       Diabetes:   He presents for follow up of diabetes.  He is not checking blood glucose. When his blood glucose is low, the patient is asymptomatic for confusion, blurred vision, lethargy and reports not feeling dizzy, shaky, or weak.  He has no concerns regarding his diabetes at this time.  He is not experiencing numbness or burning in feet, excessive thirst, blurry vision, weight changes or redness, sores or blisters on feet. The patient has not had a diabetic eye exam in the last 12 months. Eye exam performed on 6/2021. Location of last eye exam Dr. Weiner.        He eats 2-3 servings of fruits and vegetables daily.He consumes 1 sweetened beverage(s) daily.He exercises with enough effort to increase his heart rate 9 or less minutes per day.  He exercises with enough effort to increase his heart rate 4 days per week.   He is taking medications regularly.     Review of Systems   Constitutional: Positive for fatigue. Negative for chills and fever.        Golfs 1x weekly in the summer and in the winter - curls 2x weekly and bowling 2x weekly    HENT: Negative for congestion and hearing loss.    Eyes: Negative for pain and visual disturbance.   Respiratory: Negative for cough, shortness of breath and wheezing.    Cardiovascular: Negative for chest pain and palpitations.   Gastrointestinal: Negative for abdominal pain, diarrhea, nausea and vomiting.   Endocrine: Negative for  "cold intolerance and heat intolerance.   Genitourinary: Negative for dysuria and hematuria.   Musculoskeletal: Positive for gait problem. Negative for arthralgias, back pain and myalgias.   Skin: Negative for pallor.   Allergic/Immunologic: Negative for immunocompromised state.   Neurological: Negative for dizziness and light-headedness.   Hematological: Bruises/bleeds easily.   Psychiatric/Behavioral: Negative for agitation and confusion.            Objective    /72 (BP Location: Right arm, Patient Position: Sitting, Cuff Size: Adult Regular)   Pulse 94   Temp 97.8  F (36.6  C) (Temporal)   Resp 20   Ht 1.74 m (5' 8.5\")   Wt 72.9 kg (160 lb 12.8 oz)   SpO2 98%   BMI 24.09 kg/m    Body mass index is 24.09 kg/m .  Physical Exam  Constitutional:       General: He is not in acute distress.     Appearance: He is well-developed. He is not diaphoretic.   HENT:      Head: Normocephalic and atraumatic.   Eyes:      General: No scleral icterus.     Conjunctiva/sclera: Conjunctivae normal.   Cardiovascular:      Rate and Rhythm: Normal rate and regular rhythm.   Pulmonary:      Effort: Pulmonary effort is normal.      Breath sounds: Normal breath sounds.   Abdominal:      Palpations: Abdomen is soft.      Tenderness: There is no abdominal tenderness.   Musculoskeletal:         General: No deformity.      Cervical back: Neck supple.      Right lower leg: Edema present.      Left lower leg: Edema present.   Lymphadenopathy:      Cervical: No cervical adenopathy.   Skin:     General: Skin is warm and dry.      Findings: Bruising (bilateral arms) present. No rash.      Comments: + Dry skin   Neurological:      Mental Status: He is alert and oriented to person, place, and time. Mental status is at baseline.   Psychiatric:         Mood and Affect: Mood normal.         Behavior: Behavior normal.        Recent Labs   Lab Test 10/31/22  0856 04/20/22  0907 01/17/22  0903   A1C 5.9 6.4* 6.1   LDL 71 74 74   HDL 34 33 32 "   TRIG 115 121 156*   ALT 12 8 9   CR 8.56* 7.26* 5.96*   GFRESTIMATED 6* 7* 9*   POTASSIUM 4.6 4.2 3.9   TSH 6.91* 5.63* 4.39*   T4 0.73 0.75 0.85   WBC 9.6 9.6 9.2   HGB 10.4* 11.4* 11.8*    233 199   ALBUMIN 3.9 4.0 4.0     Hemoglobin A1c is elevated at 5.9%.  LDL is high and not at goal, less than 70.  HDL and triglycerides are at goal.  ALT is normal.  Creatinine is worsening.  ALT is normal.  Potassium is normal.  TSH is high.  Free T4 is within normal range but slowly dropping.  Hemoglobin is low.  Platelets are normal.  Albumin is normal.

## 2022-11-25 NOTE — TELEPHONE ENCOUNTER
Prescription approved per [facility] Refill Protocol.  LOV: 10/25/17  Patient was to follow up in 6 months patient noted to have appt on 8/14/18  Mary Ann Tapia RN on 7/12/2018 at 10:36 AM     Triage: Patient reports severe nausea, poor appetite and generalized fatigue X 10 days. Patient also has wound healing on right leg that needs a wound check.

## 2022-11-30 ENCOUNTER — ALLIED HEALTH/NURSE VISIT (OUTPATIENT)
Dept: FAMILY MEDICINE | Facility: OTHER | Age: 87
End: 2022-11-30
Attending: INTERNAL MEDICINE
Payer: MEDICARE

## 2022-11-30 DIAGNOSIS — E53.8 B12 DEFICIENCY: Primary | ICD-10-CM

## 2022-11-30 PROCEDURE — 96372 THER/PROPH/DIAG INJ SC/IM: CPT | Performed by: INTERNAL MEDICINE

## 2022-11-30 PROCEDURE — 250N000011 HC RX IP 250 OP 636: Performed by: INTERNAL MEDICINE

## 2022-11-30 RX ADMIN — CYANOCOBALAMIN 1000 MCG: 1000 INJECTION, SOLUTION INTRAMUSCULAR; SUBCUTANEOUS at 10:33

## 2022-11-30 NOTE — PROGRESS NOTES
Verified patient's first and last name, and . Patient stated reason for visit today is to receive a B12 injection. Patient denied any concerns with previous injections. B12 prepared and administered IM as ordered. Administration of medication documented in MAR (see MAR for further information regarding dose, lot #, NDC #, expiration date). Patient encouraged to wait in lobby for 15 minutes post-injection and notify staff immediately of any reaction.     Lizeth Vinson RN ....................  2022   10:28 AM

## 2022-12-28 ENCOUNTER — ALLIED HEALTH/NURSE VISIT (OUTPATIENT)
Dept: FAMILY MEDICINE | Facility: OTHER | Age: 87
End: 2022-12-28
Attending: INTERNAL MEDICINE
Payer: MEDICARE

## 2022-12-28 DIAGNOSIS — E53.8 B12 DEFICIENCY: Primary | ICD-10-CM

## 2022-12-28 PROCEDURE — 96372 THER/PROPH/DIAG INJ SC/IM: CPT | Performed by: INTERNAL MEDICINE

## 2022-12-28 PROCEDURE — 250N000011 HC RX IP 250 OP 636: Performed by: INTERNAL MEDICINE

## 2022-12-28 RX ADMIN — CYANOCOBALAMIN 1000 MCG: 1000 INJECTION, SOLUTION INTRAMUSCULAR; SUBCUTANEOUS at 09:31

## 2022-12-28 NOTE — PROGRESS NOTES

## 2023-01-01 ENCOUNTER — THERAPY VISIT (OUTPATIENT)
Dept: PHYSICAL THERAPY | Facility: OTHER | Age: 88
End: 2023-01-01
Attending: FAMILY MEDICINE
Payer: MEDICARE

## 2023-01-01 ENCOUNTER — PATIENT OUTREACH (OUTPATIENT)
Dept: INTERNAL MEDICINE | Facility: OTHER | Age: 88
End: 2023-01-01
Payer: COMMERCIAL

## 2023-01-01 ENCOUNTER — APPOINTMENT (OUTPATIENT)
Dept: GENERAL RADIOLOGY | Facility: OTHER | Age: 88
DRG: 872 | End: 2023-01-01
Attending: INTERNAL MEDICINE
Payer: MEDICARE

## 2023-01-01 ENCOUNTER — ALLIED HEALTH/NURSE VISIT (OUTPATIENT)
Dept: FAMILY MEDICINE | Facility: OTHER | Age: 88
End: 2023-01-01
Attending: INTERNAL MEDICINE
Payer: MEDICARE

## 2023-01-01 ENCOUNTER — LAB (OUTPATIENT)
Dept: LAB | Facility: OTHER | Age: 88
End: 2023-01-01
Attending: INTERNAL MEDICINE
Payer: MEDICARE

## 2023-01-01 ENCOUNTER — HOSPITAL ENCOUNTER (INPATIENT)
Facility: OTHER | Age: 88
LOS: 3 days | Discharge: HOME | DRG: 872 | End: 2023-11-02
Attending: INTERNAL MEDICINE | Admitting: INTERNAL MEDICINE
Payer: MEDICARE

## 2023-01-01 ENCOUNTER — HOSPITAL ENCOUNTER (EMERGENCY)
Facility: OTHER | Age: 88
Discharge: HOME | End: 2023-12-27
Attending: STUDENT IN AN ORGANIZED HEALTH CARE EDUCATION/TRAINING PROGRAM | Admitting: STUDENT IN AN ORGANIZED HEALTH CARE EDUCATION/TRAINING PROGRAM
Payer: MEDICARE

## 2023-01-01 ENCOUNTER — HOSPITAL ENCOUNTER (OUTPATIENT)
Dept: INFUSION THERAPY | Facility: OTHER | Age: 88
Discharge: HOME | End: 2023-10-25
Attending: INTERNAL MEDICINE | Admitting: INTERNAL MEDICINE
Payer: MEDICARE

## 2023-01-01 ENCOUNTER — APPOINTMENT (OUTPATIENT)
Dept: GENERAL RADIOLOGY | Facility: OTHER | Age: 88
End: 2023-01-01
Attending: FAMILY MEDICINE
Payer: MEDICARE

## 2023-01-01 ENCOUNTER — MEDICAL CORRESPONDENCE (OUTPATIENT)
Dept: HEALTH INFORMATION MANAGEMENT | Facility: OTHER | Age: 88
End: 2023-01-01
Payer: COMMERCIAL

## 2023-01-01 ENCOUNTER — OFFICE VISIT (OUTPATIENT)
Dept: INTERNAL MEDICINE | Facility: OTHER | Age: 88
End: 2023-01-01
Payer: COMMERCIAL

## 2023-01-01 ENCOUNTER — HOSPITAL ENCOUNTER (OUTPATIENT)
Dept: INFUSION THERAPY | Facility: OTHER | Age: 88
Discharge: HOME | End: 2023-10-18
Attending: INTERNAL MEDICINE | Admitting: INTERNAL MEDICINE
Payer: MEDICARE

## 2023-01-01 ENCOUNTER — OFFICE VISIT (OUTPATIENT)
Dept: INTERNAL MEDICINE | Facility: OTHER | Age: 88
End: 2023-01-01
Attending: INTERNAL MEDICINE
Payer: COMMERCIAL

## 2023-01-01 ENCOUNTER — HOSPITAL ENCOUNTER (EMERGENCY)
Facility: OTHER | Age: 88
Discharge: HOME | End: 2023-12-18
Attending: FAMILY MEDICINE | Admitting: FAMILY MEDICINE
Payer: MEDICARE

## 2023-01-01 VITALS
SYSTOLIC BLOOD PRESSURE: 137 MMHG | HEART RATE: 84 BPM | DIASTOLIC BLOOD PRESSURE: 69 MMHG | TEMPERATURE: 97.5 F | RESPIRATION RATE: 16 BRPM

## 2023-01-01 VITALS
SYSTOLIC BLOOD PRESSURE: 134 MMHG | TEMPERATURE: 97.8 F | OXYGEN SATURATION: 99 % | HEART RATE: 100 BPM | DIASTOLIC BLOOD PRESSURE: 60 MMHG | BODY MASS INDEX: 25.67 KG/M2 | RESPIRATION RATE: 14 BRPM | WEIGHT: 168.8 LBS

## 2023-01-01 VITALS
BODY MASS INDEX: 25.7 KG/M2 | OXYGEN SATURATION: 99 % | HEART RATE: 100 BPM | TEMPERATURE: 97.4 F | SYSTOLIC BLOOD PRESSURE: 159 MMHG | RESPIRATION RATE: 18 BRPM | DIASTOLIC BLOOD PRESSURE: 78 MMHG | WEIGHT: 169 LBS

## 2023-01-01 VITALS
HEART RATE: 100 BPM | DIASTOLIC BLOOD PRESSURE: 74 MMHG | HEIGHT: 68 IN | BODY MASS INDEX: 25.4 KG/M2 | SYSTOLIC BLOOD PRESSURE: 138 MMHG | OXYGEN SATURATION: 99 % | RESPIRATION RATE: 20 BRPM | TEMPERATURE: 97.7 F | WEIGHT: 167.6 LBS

## 2023-01-01 VITALS
TEMPERATURE: 97 F | RESPIRATION RATE: 20 BRPM | HEIGHT: 68 IN | BODY MASS INDEX: 25.81 KG/M2 | SYSTOLIC BLOOD PRESSURE: 135 MMHG | HEART RATE: 91 BPM | DIASTOLIC BLOOD PRESSURE: 69 MMHG | OXYGEN SATURATION: 97 % | WEIGHT: 170.3 LBS

## 2023-01-01 VITALS
BODY MASS INDEX: 25.31 KG/M2 | SYSTOLIC BLOOD PRESSURE: 153 MMHG | HEIGHT: 68 IN | DIASTOLIC BLOOD PRESSURE: 81 MMHG | RESPIRATION RATE: 14 BRPM | WEIGHT: 167 LBS | OXYGEN SATURATION: 94 % | HEART RATE: 110 BPM | TEMPERATURE: 96.6 F

## 2023-01-01 VITALS
TEMPERATURE: 97.1 F | DIASTOLIC BLOOD PRESSURE: 72 MMHG | SYSTOLIC BLOOD PRESSURE: 147 MMHG | RESPIRATION RATE: 18 BRPM | HEART RATE: 77 BPM

## 2023-01-01 DIAGNOSIS — N18.5 CKD (CHRONIC KIDNEY DISEASE) STAGE 5, GFR LESS THAN 15 ML/MIN (H): ICD-10-CM

## 2023-01-01 DIAGNOSIS — N18.5 TYPE 2 DIABETES MELLITUS WITH STAGE 5 CHRONIC KIDNEY DISEASE NOT ON CHRONIC DIALYSIS, WITH LONG-TERM CURRENT USE OF INSULIN (H): ICD-10-CM

## 2023-01-01 DIAGNOSIS — E11.22 TYPE 2 DIABETES MELLITUS WITH STAGE 5 CHRONIC KIDNEY DISEASE NOT ON CHRONIC DIALYSIS, WITH LONG-TERM CURRENT USE OF INSULIN (H): ICD-10-CM

## 2023-01-01 DIAGNOSIS — D84.9 IMMUNOCOMPROMISED PATIENT (H): ICD-10-CM

## 2023-01-01 DIAGNOSIS — D84.9 IMMUNOCOMPROMISED PATIENT (H): Primary | ICD-10-CM

## 2023-01-01 DIAGNOSIS — E87.5 HYPERKALEMIA: ICD-10-CM

## 2023-01-01 DIAGNOSIS — N05.8 PRIMARY PAUCI-IMMUNE NECROTIZING AND CRESCENTIC GLOMERULONEPHRITIS: ICD-10-CM

## 2023-01-01 DIAGNOSIS — Z79.4 TYPE 2 DIABETES MELLITUS WITH STAGE 5 CHRONIC KIDNEY DISEASE NOT ON CHRONIC DIALYSIS, WITH LONG-TERM CURRENT USE OF INSULIN (H): ICD-10-CM

## 2023-01-01 DIAGNOSIS — E53.8 VITAMIN B12 DEFICIENCY (NON ANEMIC): Primary | ICD-10-CM

## 2023-01-01 DIAGNOSIS — D50.9 IRON DEFICIENCY ANEMIA, UNSPECIFIED IRON DEFICIENCY ANEMIA TYPE: Primary | ICD-10-CM

## 2023-01-01 DIAGNOSIS — N05.7 PRIMARY PAUCI-IMMUNE NECROTIZING AND CRESCENTIC GLOMERULONEPHRITIS: ICD-10-CM

## 2023-01-01 DIAGNOSIS — R73.9 STEROID-INDUCED HYPERGLYCEMIA: ICD-10-CM

## 2023-01-01 DIAGNOSIS — N39.0 SEPSIS DUE TO URINARY TRACT INFECTION (H): ICD-10-CM

## 2023-01-01 DIAGNOSIS — E53.8 B12 DEFICIENCY: Primary | ICD-10-CM

## 2023-01-01 DIAGNOSIS — D50.9 IRON DEFICIENCY ANEMIA, UNSPECIFIED: ICD-10-CM

## 2023-01-01 DIAGNOSIS — Z78.9 DNI (DO NOT INTUBATE): ICD-10-CM

## 2023-01-01 DIAGNOSIS — N39.0 RECURRENT UTI (URINARY TRACT INFECTION): ICD-10-CM

## 2023-01-01 DIAGNOSIS — H61.23 BILATERAL IMPACTED CERUMEN: ICD-10-CM

## 2023-01-01 DIAGNOSIS — N39.0 SEPSIS DUE TO URINARY TRACT INFECTION (H): Primary | ICD-10-CM

## 2023-01-01 DIAGNOSIS — A41.9 SEPSIS DUE TO URINARY TRACT INFECTION (H): ICD-10-CM

## 2023-01-01 DIAGNOSIS — R33.9 URINARY RETENTION: ICD-10-CM

## 2023-01-01 DIAGNOSIS — Z66 DNR (DO NOT RESUSCITATE): ICD-10-CM

## 2023-01-01 DIAGNOSIS — I10 BENIGN ESSENTIAL HYPERTENSION: ICD-10-CM

## 2023-01-01 DIAGNOSIS — N05.8 NECROTIZING GLOMERULONEPHRITIS: ICD-10-CM

## 2023-01-01 DIAGNOSIS — N18.5 CHRONIC KIDNEY DISEASE, STAGE V (H): ICD-10-CM

## 2023-01-01 DIAGNOSIS — S81.012A KNEE LACERATION, LEFT, INITIAL ENCOUNTER: ICD-10-CM

## 2023-01-01 DIAGNOSIS — Z79.4 ENCOUNTER FOR LONG-TERM (CURRENT) USE OF INSULIN (H): ICD-10-CM

## 2023-01-01 DIAGNOSIS — A41.9 UNSPECIFIED SEPTICEMIA(038.9) (H): ICD-10-CM

## 2023-01-01 DIAGNOSIS — Z20.822 LAB TEST NEGATIVE FOR COVID-19 VIRUS: ICD-10-CM

## 2023-01-01 DIAGNOSIS — R12 HEARTBURN: ICD-10-CM

## 2023-01-01 DIAGNOSIS — Z79.4 TYPE 2 DIABETES MELLITUS WITH STAGE 5 CHRONIC KIDNEY DISEASE NOT ON CHRONIC DIALYSIS, WITH LONG-TERM CURRENT USE OF INSULIN (H): Primary | ICD-10-CM

## 2023-01-01 DIAGNOSIS — N18.5 TYPE 2 DIABETES MELLITUS WITH STAGE 5 CHRONIC KIDNEY DISEASE NOT ON CHRONIC DIALYSIS, WITH LONG-TERM CURRENT USE OF INSULIN (H): Primary | ICD-10-CM

## 2023-01-01 DIAGNOSIS — R76.8 ANTINEUTROPHIL CYTOPLASMIC ANTIBODY (ANCA) POSITIVE: ICD-10-CM

## 2023-01-01 DIAGNOSIS — R33.9 INCOMPLETE BLADDER EMPTYING: ICD-10-CM

## 2023-01-01 DIAGNOSIS — E11.22 TYPE 2 DIABETES MELLITUS WITH STAGE 5 CHRONIC KIDNEY DISEASE NOT ON CHRONIC DIALYSIS, WITH LONG-TERM CURRENT USE OF INSULIN (H): Primary | ICD-10-CM

## 2023-01-01 DIAGNOSIS — N39.0 URINARY TRACT INFECTION WITHOUT HEMATURIA, SITE UNSPECIFIED: ICD-10-CM

## 2023-01-01 DIAGNOSIS — N05.7 IDIOPATHIC CRESCENTIC GLOMERULONEPHRITIS: ICD-10-CM

## 2023-01-01 DIAGNOSIS — E11.22 TYPE 2 DIABETES MELLITUS WITH DIABETIC CHRONIC KIDNEY DISEASE, UNSPECIFIED CKD STAGE, UNSPECIFIED WHETHER LONG TERM INSULIN USE (H): ICD-10-CM

## 2023-01-01 DIAGNOSIS — Z48.02 ENCOUNTER FOR REMOVAL OF SUTURES: ICD-10-CM

## 2023-01-01 DIAGNOSIS — E78.2 MIXED HYPERLIPIDEMIA: ICD-10-CM

## 2023-01-01 DIAGNOSIS — A41.9 SEPSIS DUE TO URINARY TRACT INFECTION (H): Primary | ICD-10-CM

## 2023-01-01 DIAGNOSIS — T38.0X5A STEROID-INDUCED HYPERGLYCEMIA: ICD-10-CM

## 2023-01-01 DIAGNOSIS — E53.8 VITAMIN B12 DEFICIENCY: ICD-10-CM

## 2023-01-01 DIAGNOSIS — Z09 HOSPITAL DISCHARGE FOLLOW-UP: Primary | ICD-10-CM

## 2023-01-01 LAB
ALBUMIN SERPL BCG-MCNC: 3.1 G/DL (ref 3.5–5.2)
ALBUMIN SERPL BCG-MCNC: 3.4 G/DL (ref 3.5–5.2)
ALBUMIN SERPL BCG-MCNC: 3.9 G/DL (ref 3.5–5.2)
ALBUMIN UR-MCNC: 100 MG/DL
ALBUMIN UR-MCNC: 200 MG/DL
ALP SERPL-CCNC: 55 U/L (ref 40–129)
ALP SERPL-CCNC: 60 U/L (ref 40–129)
ALP SERPL-CCNC: 98 U/L (ref 40–150)
ALT SERPL W P-5'-P-CCNC: 10 U/L (ref 0–70)
ALT SERPL W P-5'-P-CCNC: 11 U/L (ref 0–70)
ALT SERPL W P-5'-P-CCNC: 13 U/L (ref 0–70)
ANION GAP SERPL CALCULATED.3IONS-SCNC: 16 MMOL/L (ref 7–15)
ANION GAP SERPL CALCULATED.3IONS-SCNC: 19 MMOL/L (ref 7–15)
ANION GAP SERPL CALCULATED.3IONS-SCNC: 21 MMOL/L (ref 7–15)
APPEARANCE UR: ABNORMAL
APPEARANCE UR: ABNORMAL
AST SERPL W P-5'-P-CCNC: 20 U/L (ref 0–45)
AST SERPL W P-5'-P-CCNC: 23 U/L (ref 0–45)
AST SERPL W P-5'-P-CCNC: 26 U/L (ref 0–45)
BACTERIA BLD CULT: NO GROWTH
BACTERIA BLD CULT: NO GROWTH
BACTERIA UR CULT: ABNORMAL
BASOPHILS # BLD AUTO: 0 10E3/UL (ref 0–0.2)
BASOPHILS # BLD AUTO: 0 10E3/UL (ref 0–0.2)
BASOPHILS NFR BLD AUTO: 0 %
BASOPHILS NFR BLD AUTO: 0 %
BILIRUB SERPL-MCNC: 0.3 MG/DL
BILIRUB SERPL-MCNC: 0.3 MG/DL
BILIRUB SERPL-MCNC: 0.4 MG/DL
BILIRUB UR QL STRIP: NEGATIVE
BILIRUB UR QL STRIP: NEGATIVE
BUN SERPL-MCNC: 78.7 MG/DL (ref 8–23)
BUN SERPL-MCNC: 86.2 MG/DL (ref 8–23)
BUN SERPL-MCNC: 92.8 MG/DL (ref 8–23)
BUN SERPL-MCNC: 93.1 MG/DL (ref 8–23)
BUN SERPL-MCNC: 96.6 MG/DL (ref 8–23)
CALCIUM SERPL-MCNC: 8.2 MG/DL (ref 8.8–10.2)
CALCIUM SERPL-MCNC: 8.4 MG/DL (ref 8.8–10.2)
CALCIUM SERPL-MCNC: 8.6 MG/DL (ref 8.8–10.2)
CALCIUM SERPL-MCNC: 8.7 MG/DL (ref 8.8–10.2)
CALCIUM SERPL-MCNC: 8.8 MG/DL (ref 8.8–10.2)
CHLORIDE SERPL-SCNC: 101 MMOL/L (ref 98–107)
CHLORIDE SERPL-SCNC: 102 MMOL/L (ref 98–107)
CHLORIDE SERPL-SCNC: 105 MMOL/L (ref 98–107)
CHLORIDE SERPL-SCNC: 91 MMOL/L (ref 98–107)
CHLORIDE SERPL-SCNC: 98 MMOL/L (ref 98–107)
CHOLEST SERPL-MCNC: 128 MG/DL
COLOR UR AUTO: YELLOW
COLOR UR AUTO: YELLOW
CREAT SERPL-MCNC: 8.15 MG/DL (ref 0.67–1.17)
CREAT SERPL-MCNC: 9.1 MG/DL (ref 0.67–1.17)
CREAT SERPL-MCNC: 9.24 MG/DL (ref 0.67–1.17)
CREAT SERPL-MCNC: 9.51 MG/DL (ref 0.67–1.17)
CREAT SERPL-MCNC: 9.77 MG/DL (ref 0.67–1.17)
CREAT UR-MCNC: 28.6 MG/DL
CRP SERPL-MCNC: 177.14 MG/L
DEPRECATED HCO3 PLAS-SCNC: 17 MMOL/L (ref 22–29)
DEPRECATED HCO3 PLAS-SCNC: 17 MMOL/L (ref 22–29)
DEPRECATED HCO3 PLAS-SCNC: 18 MMOL/L (ref 22–29)
DEPRECATED HCO3 PLAS-SCNC: 19 MMOL/L (ref 22–29)
DEPRECATED HCO3 PLAS-SCNC: 19 MMOL/L (ref 22–29)
EGFRCR SERPLBLD CKD-EPI 2021: 5 ML/MIN/1.73M2
EGFRCR SERPLBLD CKD-EPI 2021: 6 ML/MIN/1.73M2
EOSINOPHIL # BLD AUTO: 0 10E3/UL (ref 0–0.7)
EOSINOPHIL # BLD AUTO: 0 10E3/UL (ref 0–0.7)
EOSINOPHIL NFR BLD AUTO: 0 %
EOSINOPHIL NFR BLD AUTO: 0 %
ERYTHROCYTE [DISTWIDTH] IN BLOOD BY AUTOMATED COUNT: 13.5 % (ref 10–15)
ERYTHROCYTE [DISTWIDTH] IN BLOOD BY AUTOMATED COUNT: 13.8 % (ref 10–15)
ERYTHROCYTE [DISTWIDTH] IN BLOOD BY AUTOMATED COUNT: 13.8 % (ref 10–15)
ERYTHROCYTE [DISTWIDTH] IN BLOOD BY AUTOMATED COUNT: 14.1 % (ref 10–15)
ERYTHROCYTE [DISTWIDTH] IN BLOOD BY AUTOMATED COUNT: 15.9 % (ref 10–15)
FLUAV RNA SPEC QL NAA+PROBE: NEGATIVE
FLUBV RNA RESP QL NAA+PROBE: NEGATIVE
GLUCOSE BLDC GLUCOMTR-MCNC: 102 MG/DL (ref 70–99)
GLUCOSE BLDC GLUCOMTR-MCNC: 105 MG/DL (ref 70–99)
GLUCOSE BLDC GLUCOMTR-MCNC: 131 MG/DL (ref 70–99)
GLUCOSE BLDC GLUCOMTR-MCNC: 134 MG/DL (ref 70–99)
GLUCOSE BLDC GLUCOMTR-MCNC: 142 MG/DL (ref 70–99)
GLUCOSE BLDC GLUCOMTR-MCNC: 158 MG/DL (ref 70–99)
GLUCOSE BLDC GLUCOMTR-MCNC: 161 MG/DL (ref 70–99)
GLUCOSE BLDC GLUCOMTR-MCNC: 84 MG/DL (ref 70–99)
GLUCOSE BLDC GLUCOMTR-MCNC: 88 MG/DL (ref 70–99)
GLUCOSE BLDC GLUCOMTR-MCNC: 88 MG/DL (ref 70–99)
GLUCOSE BLDC GLUCOMTR-MCNC: 98 MG/DL (ref 70–99)
GLUCOSE SERPL-MCNC: 103 MG/DL (ref 70–99)
GLUCOSE SERPL-MCNC: 105 MG/DL (ref 70–99)
GLUCOSE SERPL-MCNC: 134 MG/DL (ref 70–99)
GLUCOSE SERPL-MCNC: 219 MG/DL (ref 70–99)
GLUCOSE SERPL-MCNC: 97 MG/DL (ref 70–99)
GLUCOSE UR STRIP-MCNC: NEGATIVE MG/DL
GLUCOSE UR STRIP-MCNC: NEGATIVE MG/DL
HBA1C MFR BLD: 5.3 % (ref 4–6.2)
HCT VFR BLD AUTO: 25.8 % (ref 40–53)
HCT VFR BLD AUTO: 25.9 % (ref 40–53)
HCT VFR BLD AUTO: 27.5 % (ref 40–53)
HCT VFR BLD AUTO: 28 % (ref 40–53)
HCT VFR BLD AUTO: 33.1 % (ref 40–53)
HDLC SERPL-MCNC: 40 MG/DL
HGB BLD-MCNC: 10.6 G/DL (ref 13.3–17.7)
HGB BLD-MCNC: 8.5 G/DL (ref 13.3–17.7)
HGB BLD-MCNC: 8.5 G/DL (ref 13.3–17.7)
HGB BLD-MCNC: 9 G/DL (ref 13.3–17.7)
HGB BLD-MCNC: 9.2 G/DL (ref 13.3–17.7)
HGB UR QL STRIP: ABNORMAL
HGB UR QL STRIP: ABNORMAL
HOLD SPECIMEN: NORMAL
IMM GRANULOCYTES # BLD: 0.1 10E3/UL
IMM GRANULOCYTES # BLD: 0.1 10E3/UL
IMM GRANULOCYTES NFR BLD: 0 %
IMM GRANULOCYTES NFR BLD: 1 %
KETONES UR STRIP-MCNC: NEGATIVE MG/DL
KETONES UR STRIP-MCNC: NEGATIVE MG/DL
LACTATE SERPL-SCNC: 1.1 MMOL/L (ref 0.7–2)
LDLC SERPL CALC-MCNC: 71 MG/DL
LEUKOCYTE ESTERASE UR QL STRIP: ABNORMAL
LEUKOCYTE ESTERASE UR QL STRIP: ABNORMAL
LYMPHOCYTES # BLD AUTO: 0.8 10E3/UL (ref 0.8–5.3)
LYMPHOCYTES # BLD AUTO: 3 10E3/UL (ref 0.8–5.3)
LYMPHOCYTES NFR BLD AUTO: 28 %
LYMPHOCYTES NFR BLD AUTO: 7 %
MCH RBC QN AUTO: 30.2 PG (ref 26.5–33)
MCH RBC QN AUTO: 30.4 PG (ref 26.5–33)
MCH RBC QN AUTO: 30.7 PG (ref 26.5–33)
MCH RBC QN AUTO: 31 PG (ref 26.5–33)
MCH RBC QN AUTO: 31.2 PG (ref 26.5–33)
MCHC RBC AUTO-ENTMCNC: 32 G/DL (ref 31.5–36.5)
MCHC RBC AUTO-ENTMCNC: 32.1 G/DL (ref 31.5–36.5)
MCHC RBC AUTO-ENTMCNC: 32.8 G/DL (ref 31.5–36.5)
MCHC RBC AUTO-ENTMCNC: 32.9 G/DL (ref 31.5–36.5)
MCHC RBC AUTO-ENTMCNC: 33.5 G/DL (ref 31.5–36.5)
MCV RBC AUTO: 92 FL (ref 78–100)
MCV RBC AUTO: 93 FL (ref 78–100)
MCV RBC AUTO: 94 FL (ref 78–100)
MCV RBC AUTO: 94 FL (ref 78–100)
MCV RBC AUTO: 97 FL (ref 78–100)
MICROALBUMIN UR-MCNC: 261.6 MG/L
MICROALBUMIN/CREAT UR: 914.69 MG/G CR (ref 0–17)
MONOCYTES # BLD AUTO: 0.8 10E3/UL (ref 0–1.3)
MONOCYTES # BLD AUTO: 1.1 10E3/UL (ref 0–1.3)
MONOCYTES NFR BLD AUTO: 10 %
MONOCYTES NFR BLD AUTO: 7 %
NEUTROPHILS # BLD AUTO: 6.8 10E3/UL (ref 1.6–8.3)
NEUTROPHILS # BLD AUTO: 9.8 10E3/UL (ref 1.6–8.3)
NEUTROPHILS NFR BLD AUTO: 61 %
NEUTROPHILS NFR BLD AUTO: 86 %
NITRATE UR QL: NEGATIVE
NITRATE UR QL: NEGATIVE
NONHDLC SERPL-MCNC: 88 MG/DL
NRBC # BLD AUTO: 0 10E3/UL
NRBC # BLD AUTO: 0 10E3/UL
NRBC BLD AUTO-RTO: 0 /100
NRBC BLD AUTO-RTO: 0 /100
PH UR STRIP: 7.5 [PH] (ref 5–9)
PH UR STRIP: 8 [PH] (ref 5–9)
PLATELET # BLD AUTO: 193 10E3/UL (ref 150–450)
PLATELET # BLD AUTO: 198 10E3/UL (ref 150–450)
PLATELET # BLD AUTO: 201 10E3/UL (ref 150–450)
PLATELET # BLD AUTO: 212 10E3/UL (ref 150–450)
PLATELET # BLD AUTO: 254 10E3/UL (ref 150–450)
POTASSIUM SERPL-SCNC: 3.7 MMOL/L (ref 3.4–5.3)
POTASSIUM SERPL-SCNC: 4.2 MMOL/L (ref 3.4–5.3)
POTASSIUM SERPL-SCNC: 4.4 MMOL/L (ref 3.4–5.3)
POTASSIUM SERPL-SCNC: 4.4 MMOL/L (ref 3.4–5.3)
POTASSIUM SERPL-SCNC: 5.7 MMOL/L (ref 3.4–5.3)
PROCALCITONIN SERPL IA-MCNC: 2.93 NG/ML
PROT SERPL-MCNC: 6.5 G/DL (ref 6.4–8.3)
PROT SERPL-MCNC: 6.6 G/DL (ref 6.4–8.3)
PROT SERPL-MCNC: 6.8 G/DL (ref 6.4–8.3)
RBC # BLD AUTO: 2.77 10E6/UL (ref 4.4–5.9)
RBC # BLD AUTO: 2.8 10E6/UL (ref 4.4–5.9)
RBC # BLD AUTO: 2.95 10E6/UL (ref 4.4–5.9)
RBC # BLD AUTO: 2.98 10E6/UL (ref 4.4–5.9)
RBC # BLD AUTO: 3.42 10E6/UL (ref 4.4–5.9)
RBC URINE: 47 /HPF
RBC URINE: 48 /HPF
RSV RNA SPEC NAA+PROBE: NEGATIVE
SARS-COV-2 RNA RESP QL NAA+PROBE: NEGATIVE
SODIUM SERPL-SCNC: 129 MMOL/L (ref 135–145)
SODIUM SERPL-SCNC: 134 MMOL/L (ref 135–145)
SODIUM SERPL-SCNC: 135 MMOL/L (ref 135–145)
SODIUM SERPL-SCNC: 137 MMOL/L (ref 135–145)
SODIUM SERPL-SCNC: 140 MMOL/L (ref 135–145)
SP GR UR STRIP: 1.01 (ref 1–1.03)
SP GR UR STRIP: 1.01 (ref 1–1.03)
T4 FREE SERPL-MCNC: 0.91 NG/DL (ref 0.9–1.7)
TRIGL SERPL-MCNC: 83 MG/DL
TSH SERPL DL<=0.005 MIU/L-ACNC: 6.92 UIU/ML (ref 0.3–4.2)
UROBILINOGEN UR STRIP-MCNC: NORMAL MG/DL
UROBILINOGEN UR STRIP-MCNC: NORMAL MG/DL
WBC # BLD AUTO: 11 10E3/UL (ref 4–11)
WBC # BLD AUTO: 11.4 10E3/UL (ref 4–11)
WBC # BLD AUTO: 12.5 10E3/UL (ref 4–11)
WBC # BLD AUTO: 7.2 10E3/UL (ref 4–11)
WBC # BLD AUTO: 8.5 10E3/UL (ref 4–11)
WBC CLUMPS #/AREA URNS HPF: PRESENT /HPF
WBC CLUMPS #/AREA URNS HPF: PRESENT /HPF
WBC URINE: >182 /HPF
WBC URINE: >182 /HPF
YEAST #/AREA URNS HPF: ABNORMAL /HPF

## 2023-01-01 PROCEDURE — 97530 THERAPEUTIC ACTIVITIES: CPT | Mod: GP

## 2023-01-01 PROCEDURE — 36415 COLL VENOUS BLD VENIPUNCTURE: CPT | Performed by: INTERNAL MEDICINE

## 2023-01-01 PROCEDURE — 99214 OFFICE O/P EST MOD 30 MIN: CPT | Performed by: INTERNAL MEDICINE

## 2023-01-01 PROCEDURE — 12034 INTMD RPR S/TR/EXT 7.6-12.5: CPT | Performed by: FAMILY MEDICINE

## 2023-01-01 PROCEDURE — 250N000013 HC RX MED GY IP 250 OP 250 PS 637: Performed by: INTERNAL MEDICINE

## 2023-01-01 PROCEDURE — 99283 EMERGENCY DEPT VISIT LOW MDM: CPT | Mod: 25 | Performed by: FAMILY MEDICINE

## 2023-01-01 PROCEDURE — 87637 SARSCOV2&INF A&B&RSV AMP PRB: CPT | Performed by: INTERNAL MEDICINE

## 2023-01-01 PROCEDURE — 87086 URINE CULTURE/COLONY COUNT: CPT | Performed by: INTERNAL MEDICINE

## 2023-01-01 PROCEDURE — 96365 THER/PROPH/DIAG IV INF INIT: CPT | Performed by: INTERNAL MEDICINE

## 2023-01-01 PROCEDURE — 80048 BASIC METABOLIC PNL TOTAL CA: CPT | Performed by: FAMILY MEDICINE

## 2023-01-01 PROCEDURE — G0463 HOSPITAL OUTPT CLINIC VISIT: HCPCS

## 2023-01-01 PROCEDURE — 73562 X-RAY EXAM OF KNEE 3: CPT | Mod: LT

## 2023-01-01 PROCEDURE — 96365 THER/PROPH/DIAG IV INF INIT: CPT

## 2023-01-01 PROCEDURE — 97110 THERAPEUTIC EXERCISES: CPT | Mod: GP

## 2023-01-01 PROCEDURE — 250N000011 HC RX IP 250 OP 636: Performed by: FAMILY MEDICINE

## 2023-01-01 PROCEDURE — 99496 TRANSJ CARE MGMT HIGH F2F 7D: CPT

## 2023-01-01 PROCEDURE — 86140 C-REACTIVE PROTEIN: CPT | Performed by: INTERNAL MEDICINE

## 2023-01-01 PROCEDURE — 96375 TX/PRO/DX INJ NEW DRUG ADDON: CPT | Performed by: INTERNAL MEDICINE

## 2023-01-01 PROCEDURE — 96366 THER/PROPH/DIAG IV INF ADDON: CPT | Performed by: INTERNAL MEDICINE

## 2023-01-01 PROCEDURE — 250N000011 HC RX IP 250 OP 636: Mod: JZ | Performed by: INTERNAL MEDICINE

## 2023-01-01 PROCEDURE — 250N000011 HC RX IP 250 OP 636

## 2023-01-01 PROCEDURE — 250N000011 HC RX IP 250 OP 636: Performed by: INTERNAL MEDICINE

## 2023-01-01 PROCEDURE — 84443 ASSAY THYROID STIM HORMONE: CPT | Mod: ZL

## 2023-01-01 PROCEDURE — G0008 ADMIN INFLUENZA VIRUS VAC: HCPCS | Performed by: FAMILY MEDICINE

## 2023-01-01 PROCEDURE — 80061 LIPID PANEL: CPT | Mod: ZL

## 2023-01-01 PROCEDURE — 99282 EMERGENCY DEPT VISIT SF MDM: CPT | Performed by: STUDENT IN AN ORGANIZED HEALTH CARE EDUCATION/TRAINING PROGRAM

## 2023-01-01 PROCEDURE — 85027 COMPLETE CBC AUTOMATED: CPT | Performed by: FAMILY MEDICINE

## 2023-01-01 PROCEDURE — 81001 URINALYSIS AUTO W/SCOPE: CPT | Performed by: INTERNAL MEDICINE

## 2023-01-01 PROCEDURE — 99239 HOSP IP/OBS DSCHRG MGMT >30: CPT | Performed by: FAMILY MEDICINE

## 2023-01-01 PROCEDURE — 84439 ASSAY OF FREE THYROXINE: CPT | Mod: ZL

## 2023-01-01 PROCEDURE — 80053 COMPREHEN METABOLIC PANEL: CPT | Performed by: INTERNAL MEDICINE

## 2023-01-01 PROCEDURE — 250N000012 HC RX MED GY IP 250 OP 636 PS 637: Performed by: INTERNAL MEDICINE

## 2023-01-01 PROCEDURE — 258N000003 HC RX IP 258 OP 636: Performed by: INTERNAL MEDICINE

## 2023-01-01 PROCEDURE — 83036 HEMOGLOBIN GLYCOSYLATED A1C: CPT | Mod: ZL

## 2023-01-01 PROCEDURE — 120N000001 HC R&B MED SURG/OB

## 2023-01-01 PROCEDURE — 36415 COLL VENOUS BLD VENIPUNCTURE: CPT | Mod: ZL

## 2023-01-01 PROCEDURE — 250N000013 HC RX MED GY IP 250 OP 250 PS 637: Performed by: FAMILY MEDICINE

## 2023-01-01 PROCEDURE — 85025 COMPLETE CBC W/AUTO DIFF WBC: CPT | Performed by: INTERNAL MEDICINE

## 2023-01-01 PROCEDURE — 81001 URINALYSIS AUTO W/SCOPE: CPT | Mod: ZL

## 2023-01-01 PROCEDURE — 96372 THER/PROPH/DIAG INJ SC/IM: CPT | Performed by: INTERNAL MEDICINE

## 2023-01-01 PROCEDURE — 90662 IIV NO PRSV INCREASED AG IM: CPT | Performed by: FAMILY MEDICINE

## 2023-01-01 PROCEDURE — 84145 PROCALCITONIN (PCT): CPT | Performed by: INTERNAL MEDICINE

## 2023-01-01 PROCEDURE — 80053 COMPREHEN METABOLIC PANEL: CPT | Mod: ZL

## 2023-01-01 PROCEDURE — 96372 THER/PROPH/DIAG INJ SC/IM: CPT

## 2023-01-01 PROCEDURE — 83605 ASSAY OF LACTIC ACID: CPT | Performed by: INTERNAL MEDICINE

## 2023-01-01 PROCEDURE — 71045 X-RAY EXAM CHEST 1 VIEW: CPT | Mod: TC

## 2023-01-01 PROCEDURE — C9803 HOPD COVID-19 SPEC COLLECT: HCPCS | Performed by: INTERNAL MEDICINE

## 2023-01-01 PROCEDURE — 36415 COLL VENOUS BLD VENIPUNCTURE: CPT | Performed by: FAMILY MEDICINE

## 2023-01-01 PROCEDURE — 99285 EMERGENCY DEPT VISIT HI MDM: CPT | Performed by: INTERNAL MEDICINE

## 2023-01-01 PROCEDURE — 82570 ASSAY OF URINE CREATININE: CPT | Mod: ZL

## 2023-01-01 PROCEDURE — 99207 PR NO CHARGE LOS: CPT | Performed by: FAMILY MEDICINE

## 2023-01-01 PROCEDURE — 99231 SBSQ HOSP IP/OBS SF/LOW 25: CPT | Performed by: FAMILY MEDICINE

## 2023-01-01 PROCEDURE — 87040 BLOOD CULTURE FOR BACTERIA: CPT | Performed by: INTERNAL MEDICINE

## 2023-01-01 PROCEDURE — 99213 OFFICE O/P EST LOW 20 MIN: CPT

## 2023-01-01 PROCEDURE — 96361 HYDRATE IV INFUSION ADD-ON: CPT | Performed by: INTERNAL MEDICINE

## 2023-01-01 PROCEDURE — 97161 PT EVAL LOW COMPLEX 20 MIN: CPT | Mod: GP

## 2023-01-01 PROCEDURE — 99285 EMERGENCY DEPT VISIT HI MDM: CPT | Mod: 25 | Performed by: INTERNAL MEDICINE

## 2023-01-01 PROCEDURE — 85027 COMPLETE CBC AUTOMATED: CPT | Mod: ZL

## 2023-01-01 RX ORDER — MEPERIDINE HYDROCHLORIDE 50 MG/ML
25 INJECTION INTRAMUSCULAR; INTRAVENOUS; SUBCUTANEOUS EVERY 30 MIN PRN
Status: CANCELLED | OUTPATIENT
Start: 2023-01-01

## 2023-01-01 RX ORDER — LIDOCAINE HYDROCHLORIDE AND EPINEPHRINE 10; 10 MG/ML; UG/ML
1 INJECTION, SOLUTION INFILTRATION; PERINEURAL ONCE
Status: DISCONTINUED | OUTPATIENT
Start: 2023-01-01 | End: 2023-01-01 | Stop reason: HOSPADM

## 2023-01-01 RX ORDER — FINASTERIDE 5 MG/1
5 TABLET, FILM COATED ORAL DAILY
Status: DISCONTINUED | OUTPATIENT
Start: 2023-01-01 | End: 2023-01-01 | Stop reason: HOSPADM

## 2023-01-01 RX ORDER — CEFDINIR 300 MG/1
300 CAPSULE ORAL DAILY
Qty: 4 CAPSULE | Refills: 0 | Status: SHIPPED | OUTPATIENT
Start: 2023-01-01 | End: 2023-01-01

## 2023-01-01 RX ORDER — INSULIN GLARGINE 100 [IU]/ML
2 INJECTION, SOLUTION SUBCUTANEOUS AT BEDTIME
Qty: 15 ML | Refills: 1 | Status: SHIPPED | OUTPATIENT
Start: 2023-01-01 | End: 2024-01-01

## 2023-01-01 RX ORDER — EPINEPHRINE 1 MG/ML
0.3 INJECTION, SOLUTION, CONCENTRATE INTRAVENOUS EVERY 5 MIN PRN
Status: CANCELLED | OUTPATIENT
Start: 2023-01-01

## 2023-01-01 RX ORDER — CEFEPIME HYDROCHLORIDE 2 G/1
2 INJECTION, POWDER, FOR SOLUTION INTRAVENOUS EVERY 12 HOURS
Status: DISCONTINUED | OUTPATIENT
Start: 2023-01-01 | End: 2023-01-01

## 2023-01-01 RX ORDER — FAMOTIDINE 20 MG/1
20 TABLET, FILM COATED ORAL DAILY
Qty: 90 TABLET | Refills: 4 | Status: SHIPPED | OUTPATIENT
Start: 2023-01-01 | End: 2024-01-01

## 2023-01-01 RX ORDER — CYANOCOBALAMIN 1000 UG/ML
1000 INJECTION, SOLUTION INTRAMUSCULAR; SUBCUTANEOUS CONTINUOUS PRN
Status: DISCONTINUED | OUTPATIENT
Start: 2023-01-01 | End: 2024-01-01

## 2023-01-01 RX ORDER — ACETAMINOPHEN 650 MG/1
650 SUPPOSITORY RECTAL EVERY 6 HOURS PRN
Status: DISCONTINUED | OUTPATIENT
Start: 2023-01-01 | End: 2023-01-01 | Stop reason: HOSPADM

## 2023-01-01 RX ORDER — SODIUM BICARBONATE 650 MG/1
1950 TABLET ORAL 3 TIMES DAILY
Status: DISCONTINUED | OUTPATIENT
Start: 2023-01-01 | End: 2023-01-01 | Stop reason: HOSPADM

## 2023-01-01 RX ORDER — METHYLPREDNISOLONE SODIUM SUCCINATE 125 MG/2ML
125 INJECTION, POWDER, LYOPHILIZED, FOR SOLUTION INTRAMUSCULAR; INTRAVENOUS
Status: CANCELLED
Start: 2023-01-01

## 2023-01-01 RX ORDER — PEN NEEDLE, DIABETIC 31 GX5/16"
NEEDLE, DISPOSABLE MISCELLANEOUS DAILY
Qty: 100 EACH | Refills: 3 | Status: SHIPPED | OUTPATIENT
Start: 2023-01-01 | End: 2024-01-01

## 2023-01-01 RX ORDER — ALBUTEROL SULFATE 0.83 MG/ML
2.5 SOLUTION RESPIRATORY (INHALATION)
Status: CANCELLED | OUTPATIENT
Start: 2023-01-01

## 2023-01-01 RX ORDER — AMLODIPINE BESYLATE 5 MG/1
5 TABLET ORAL 2 TIMES DAILY
Status: DISCONTINUED | OUTPATIENT
Start: 2023-01-01 | End: 2023-01-01 | Stop reason: HOSPADM

## 2023-01-01 RX ORDER — FAMOTIDINE 20 MG/1
20 TABLET, FILM COATED ORAL
Status: DISCONTINUED | OUTPATIENT
Start: 2023-01-01 | End: 2023-01-01 | Stop reason: HOSPADM

## 2023-01-01 RX ORDER — SODIUM BICARBONATE 650 MG/1
2600 TABLET ORAL 3 TIMES DAILY
COMMUNITY

## 2023-01-01 RX ORDER — AMLODIPINE BESYLATE 5 MG/1
5 TABLET ORAL 2 TIMES DAILY
Qty: 180 TABLET | Refills: 1 | Status: SHIPPED | OUTPATIENT
Start: 2023-01-01 | End: 2024-01-01

## 2023-01-01 RX ORDER — CEFTRIAXONE 2 G/1
2 INJECTION, POWDER, FOR SOLUTION INTRAMUSCULAR; INTRAVENOUS ONCE
Qty: 20 ML | Refills: 0 | Status: COMPLETED | OUTPATIENT
Start: 2023-01-01 | End: 2023-01-01

## 2023-01-01 RX ORDER — HEPARIN SODIUM,PORCINE 10 UNIT/ML
5-20 VIAL (ML) INTRAVENOUS DAILY PRN
Status: CANCELLED | OUTPATIENT
Start: 2023-01-01

## 2023-01-01 RX ORDER — ACETAMINOPHEN 160 MG
1 TABLET,DISINTEGRATING ORAL SEE ADMIN INSTRUCTIONS
Status: DISCONTINUED | OUTPATIENT
Start: 2023-01-01 | End: 2023-01-01

## 2023-01-01 RX ORDER — ACETAMINOPHEN 325 MG/1
650 TABLET ORAL EVERY 6 HOURS PRN
Status: DISCONTINUED | OUTPATIENT
Start: 2023-01-01 | End: 2023-01-01 | Stop reason: HOSPADM

## 2023-01-01 RX ORDER — CALCITRIOL 0.25 UG/1
0.25 CAPSULE, LIQUID FILLED ORAL
Status: DISCONTINUED | OUTPATIENT
Start: 2023-01-01 | End: 2023-01-01 | Stop reason: HOSPADM

## 2023-01-01 RX ORDER — CIPROFLOXACIN 2 MG/ML
400 INJECTION, SOLUTION INTRAVENOUS ONCE
Qty: 200 ML | Refills: 0 | Status: COMPLETED | OUTPATIENT
Start: 2023-01-01 | End: 2023-01-01

## 2023-01-01 RX ORDER — DIPHENHYDRAMINE HYDROCHLORIDE 50 MG/ML
50 INJECTION INTRAMUSCULAR; INTRAVENOUS
Status: CANCELLED
Start: 2023-01-01

## 2023-01-01 RX ORDER — DEXTROSE MONOHYDRATE 25 G/50ML
25-50 INJECTION, SOLUTION INTRAVENOUS
Status: DISCONTINUED | OUTPATIENT
Start: 2023-01-01 | End: 2023-01-01 | Stop reason: HOSPADM

## 2023-01-01 RX ORDER — FAMOTIDINE 20 MG/1
20 TABLET, FILM COATED ORAL DAILY
Status: DISCONTINUED | OUTPATIENT
Start: 2023-01-01 | End: 2023-01-01

## 2023-01-01 RX ORDER — HEPARIN SODIUM (PORCINE) LOCK FLUSH IV SOLN 100 UNIT/ML 100 UNIT/ML
5 SOLUTION INTRAVENOUS
Status: CANCELLED | OUTPATIENT
Start: 2023-01-01

## 2023-01-01 RX ORDER — NICOTINE POLACRILEX 4 MG
15-30 LOZENGE BUCCAL
Status: DISCONTINUED | OUTPATIENT
Start: 2023-01-01 | End: 2023-01-01 | Stop reason: HOSPADM

## 2023-01-01 RX ORDER — PREDNISONE 2.5 MG/1
2.5 TABLET ORAL DAILY
Qty: 90 TABLET | Refills: 1 | Status: SHIPPED | OUTPATIENT
Start: 2023-01-01 | End: 2024-01-01

## 2023-01-01 RX ORDER — TAMSULOSIN HYDROCHLORIDE 0.4 MG/1
0.4 CAPSULE ORAL EVERY EVENING
Status: DISCONTINUED | OUTPATIENT
Start: 2023-01-01 | End: 2023-01-01 | Stop reason: HOSPADM

## 2023-01-01 RX ORDER — ALBUTEROL SULFATE 90 UG/1
1-2 AEROSOL, METERED RESPIRATORY (INHALATION)
Status: CANCELLED
Start: 2023-01-01

## 2023-01-01 RX ORDER — FUROSEMIDE 40 MG
40 TABLET ORAL EVERY MORNING
Status: DISCONTINUED | OUTPATIENT
Start: 2023-01-01 | End: 2023-01-01 | Stop reason: HOSPADM

## 2023-01-01 RX ORDER — CHOLECALCIFEROL (VITAMIN D3) 125 MCG
2 CAPSULE ORAL DAILY
Qty: 200 CAPSULE | Refills: 4 | Status: SHIPPED | OUTPATIENT
Start: 2023-01-01

## 2023-01-01 RX ORDER — ONDANSETRON 2 MG/ML
4 INJECTION INTRAMUSCULAR; INTRAVENOUS EVERY 6 HOURS PRN
Status: DISCONTINUED | OUTPATIENT
Start: 2023-01-01 | End: 2023-01-01 | Stop reason: HOSPADM

## 2023-01-01 RX ORDER — FINASTERIDE 5 MG/1
1 TABLET, FILM COATED ORAL DAILY
Qty: 90 TABLET | Refills: 4 | Status: SHIPPED | OUTPATIENT
Start: 2023-01-01 | End: 2024-01-01

## 2023-01-01 RX ORDER — TAMSULOSIN HYDROCHLORIDE 0.4 MG/1
0.4 CAPSULE ORAL EVERY EVENING
Qty: 90 CAPSULE | Refills: 1 | Status: SHIPPED | OUTPATIENT
Start: 2023-01-01 | End: 2024-01-01

## 2023-01-01 RX ORDER — CEFEPIME HYDROCHLORIDE 1 G/1
1 INJECTION, POWDER, FOR SOLUTION INTRAMUSCULAR; INTRAVENOUS EVERY 24 HOURS
Status: DISCONTINUED | OUTPATIENT
Start: 2023-01-01 | End: 2023-01-01 | Stop reason: HOSPADM

## 2023-01-01 RX ORDER — ONDANSETRON 4 MG/1
4 TABLET, ORALLY DISINTEGRATING ORAL EVERY 6 HOURS PRN
Status: DISCONTINUED | OUTPATIENT
Start: 2023-01-01 | End: 2023-01-01 | Stop reason: HOSPADM

## 2023-01-01 RX ORDER — SODIUM CHLORIDE 9 MG/ML
INJECTION, SOLUTION INTRAVENOUS CONTINUOUS
Status: DISCONTINUED | OUTPATIENT
Start: 2023-01-01 | End: 2023-01-01 | Stop reason: ALTCHOICE

## 2023-01-01 RX ORDER — FUROSEMIDE 40 MG
40 TABLET ORAL EVERY MORNING
Qty: 90 TABLET | Refills: 1 | Status: SHIPPED | OUTPATIENT
Start: 2023-01-01 | End: 2024-01-01

## 2023-01-01 RX ORDER — CALCITRIOL 0.25 UG/1
0.25 CAPSULE, LIQUID FILLED ORAL
COMMUNITY
End: 2024-01-01

## 2023-01-01 RX ORDER — SODIUM BICARBONATE 650 MG/1
1950 TABLET ORAL 3 TIMES DAILY
Status: DISCONTINUED | OUTPATIENT
Start: 2023-01-01 | End: 2023-01-01

## 2023-01-01 RX ADMIN — SODIUM CHLORIDE 250 ML: 9 INJECTION, SOLUTION INTRAVENOUS at 14:03

## 2023-01-01 RX ADMIN — CEFEPIME 2 G: 2 INJECTION, POWDER, FOR SOLUTION INTRAVENOUS at 00:53

## 2023-01-01 RX ADMIN — TAMSULOSIN HYDROCHLORIDE 0.4 MG: 0.4 CAPSULE ORAL at 21:03

## 2023-01-01 RX ADMIN — FAMOTIDINE 20 MG: 20 TABLET ORAL at 07:43

## 2023-01-01 RX ADMIN — INSULIN ASPART 1 UNITS: 100 INJECTION, SOLUTION INTRAVENOUS; SUBCUTANEOUS at 12:05

## 2023-01-01 RX ADMIN — CEFEPIME HYDROCHLORIDE 1 G: 1 INJECTION, POWDER, FOR SOLUTION INTRAMUSCULAR; INTRAVENOUS at 21:59

## 2023-01-01 RX ADMIN — SODIUM BICARBONATE 1950 MG: 650 TABLET ORAL at 12:30

## 2023-01-01 RX ADMIN — FERUMOXYTOL 510 MG: 510 INJECTION INTRAVENOUS at 14:08

## 2023-01-01 RX ADMIN — INFLUENZA A VIRUS A/VICTORIA/4897/2022 IVR-238 (H1N1) ANTIGEN (FORMALDEHYDE INACTIVATED), INFLUENZA A VIRUS A/DARWIN/9/2021 SAN-010 (H3N2) ANTIGEN (FORMALDEHYDE INACTIVATED), INFLUENZA B VIRUS B/PHUKET/3073/2013 ANTIGEN (FORMALDEHYDE INACTIVATED), AND INFLUENZA B VIRUS B/MICHIGAN/01/2021 ANTIGEN (FORMALDEHYDE INACTIVATED) 0.7 ML: 60; 60; 60; 60 INJECTION, SUSPENSION INTRAMUSCULAR at 13:56

## 2023-01-01 RX ADMIN — AMLODIPINE BESYLATE 5 MG: 5 TABLET ORAL at 17:19

## 2023-01-01 RX ADMIN — CYANOCOBALAMIN 1000 MCG: 1000 INJECTION, SOLUTION INTRAMUSCULAR; SUBCUTANEOUS at 09:25

## 2023-01-01 RX ADMIN — AMLODIPINE BESYLATE 5 MG: 5 TABLET ORAL at 08:55

## 2023-01-01 RX ADMIN — CALCITRIOL 0.25 MCG: 0.25 CAPSULE ORAL at 10:37

## 2023-01-01 RX ADMIN — CYANOCOBALAMIN 1000 MCG: 1000 INJECTION, SOLUTION INTRAMUSCULAR; SUBCUTANEOUS at 09:20

## 2023-01-01 RX ADMIN — SODIUM BICARBONATE 1950 MG: 650 TABLET ORAL at 17:19

## 2023-01-01 RX ADMIN — CEFTRIAXONE 2 G: 2 INJECTION, POWDER, FOR SOLUTION INTRAMUSCULAR; INTRAVENOUS at 21:33

## 2023-01-01 RX ADMIN — FUROSEMIDE 40 MG: 40 TABLET ORAL at 07:43

## 2023-01-01 RX ADMIN — FINASTERIDE 5 MG: 5 TABLET, FILM COATED ORAL at 07:42

## 2023-01-01 RX ADMIN — AMLODIPINE BESYLATE 5 MG: 5 TABLET ORAL at 16:41

## 2023-01-01 RX ADMIN — SODIUM BICARBONATE 1950 MG: 650 TABLET ORAL at 09:12

## 2023-01-01 RX ADMIN — SODIUM BICARBONATE 1950 MG: 650 TABLET ORAL at 13:56

## 2023-01-01 RX ADMIN — SODIUM CHLORIDE 250 ML: 9 INJECTION, SOLUTION INTRAVENOUS at 20:58

## 2023-01-01 RX ADMIN — FERUMOXYTOL 510 MG: 510 INJECTION INTRAVENOUS at 14:22

## 2023-01-01 RX ADMIN — SODIUM CHLORIDE: 9 INJECTION, SOLUTION INTRAVENOUS at 05:55

## 2023-01-01 RX ADMIN — AMLODIPINE BESYLATE 5 MG: 5 TABLET ORAL at 07:45

## 2023-01-01 RX ADMIN — PREDNISONE 2.5 MG: 5 TABLET ORAL at 07:41

## 2023-01-01 RX ADMIN — TAMSULOSIN HYDROCHLORIDE 0.4 MG: 0.4 CAPSULE ORAL at 19:18

## 2023-01-01 RX ADMIN — FINASTERIDE 5 MG: 5 TABLET, FILM COATED ORAL at 08:55

## 2023-01-01 RX ADMIN — CIPROFLOXACIN 400 MG: 400 INJECTION, SOLUTION INTRAVENOUS at 22:43

## 2023-01-01 RX ADMIN — FINASTERIDE 5 MG: 5 TABLET, FILM COATED ORAL at 07:45

## 2023-01-01 RX ADMIN — SODIUM BICARBONATE 1950 MG: 650 TABLET ORAL at 16:41

## 2023-01-01 RX ADMIN — SODIUM BICARBONATE 1950 MG: 650 TABLET ORAL at 08:49

## 2023-01-01 RX ADMIN — SODIUM CHLORIDE: 9 INJECTION, SOLUTION INTRAVENOUS at 00:53

## 2023-01-01 RX ADMIN — FUROSEMIDE 40 MG: 40 TABLET ORAL at 07:45

## 2023-01-01 RX ADMIN — SODIUM CHLORIDE 250 ML: 9 INJECTION, SOLUTION INTRAVENOUS at 14:19

## 2023-01-01 RX ADMIN — SODIUM BICARBONATE 1950 MG: 650 TABLET ORAL at 07:45

## 2023-01-01 RX ADMIN — SODIUM BICARBONATE 1950 MG: 650 TABLET ORAL at 12:29

## 2023-01-01 RX ADMIN — ACETAMINOPHEN 650 MG: 325 TABLET, FILM COATED ORAL at 21:03

## 2023-01-01 RX ADMIN — FUROSEMIDE 40 MG: 40 TABLET ORAL at 08:55

## 2023-01-01 RX ADMIN — FAMOTIDINE 20 MG: 20 TABLET ORAL at 08:55

## 2023-01-01 RX ADMIN — AMLODIPINE BESYLATE 5 MG: 5 TABLET ORAL at 07:42

## 2023-01-01 RX ADMIN — PREDNISONE 2.5 MG: 5 TABLET ORAL at 09:12

## 2023-01-01 RX ADMIN — ONDANSETRON 4 MG: 2 INJECTION INTRAMUSCULAR; INTRAVENOUS at 08:09

## 2023-01-01 RX ADMIN — PREDNISONE 2.5 MG: 5 TABLET ORAL at 08:48

## 2023-01-01 RX ADMIN — SODIUM CHLORIDE: 9 INJECTION, SOLUTION INTRAVENOUS at 10:36

## 2023-01-01 RX ADMIN — CYANOCOBALAMIN 1000 MCG: 1000 INJECTION, SOLUTION INTRAMUSCULAR; SUBCUTANEOUS at 09:14

## 2023-01-01 RX ADMIN — INSULIN ASPART 1 UNITS: 100 INJECTION, SOLUTION INTRAVENOUS; SUBCUTANEOUS at 17:27

## 2023-01-01 RX ADMIN — CEFEPIME HYDROCHLORIDE 1 G: 1 INJECTION, POWDER, FOR SOLUTION INTRAMUSCULAR; INTRAVENOUS at 22:20

## 2023-01-01 RX ADMIN — SODIUM CHLORIDE: 9 INJECTION, SOLUTION INTRAVENOUS at 21:02

## 2023-01-01 ASSESSMENT — ACTIVITIES OF DAILY LIVING (ADL)
DOING_ERRANDS_INDEPENDENTLY_DIFFICULTY: NO
HEARING_DIFFICULTY_OR_DEAF: NO
ADLS_ACUITY_SCORE: 35
CONCENTRATING,_REMEMBERING_OR_MAKING_DECISIONS_DIFFICULTY: NO
ADLS_ACUITY_SCORE: 29
ADLS_ACUITY_SCORE: 24
ADLS_ACUITY_SCORE: 29
ADLS_ACUITY_SCORE: 29
ADLS_ACUITY_SCORE: 24
ADLS_ACUITY_SCORE: 29
DIFFICULTY_EATING/SWALLOWING: NO
ADLS_ACUITY_SCORE: 24
DIFFICULTY_COMMUNICATING: NO
TOILETING_ISSUES: NO
FALL_HISTORY_WITHIN_LAST_SIX_MONTHS: NO
ADLS_ACUITY_SCORE: 29
ADLS_ACUITY_SCORE: 29
WALKING_OR_CLIMBING_STAIRS_DIFFICULTY: NO
ADLS_ACUITY_SCORE: 35
ADLS_ACUITY_SCORE: 35
ADLS_ACUITY_SCORE: 24
VISION_MANAGEMENT: GLASSES
ADLS_ACUITY_SCORE: 24
ADLS_ACUITY_SCORE: 29
ADLS_ACUITY_SCORE: 31
ADLS_ACUITY_SCORE: 24
ADLS_ACUITY_SCORE: 35
ADLS_ACUITY_SCORE: 24
ADLS_ACUITY_SCORE: 22
ADLS_ACUITY_SCORE: 29
ADLS_ACUITY_SCORE: 29
ADLS_ACUITY_SCORE: 33
ADLS_ACUITY_SCORE: 22
ADLS_ACUITY_SCORE: 24
ADLS_ACUITY_SCORE: 31
ADLS_ACUITY_SCORE: 24
DRESSING/BATHING_DIFFICULTY: NO
ADLS_ACUITY_SCORE: 31
ADLS_ACUITY_SCORE: 29
ADLS_ACUITY_SCORE: 31
WEAR_GLASSES_OR_BLIND: YES
CHANGE_IN_FUNCTIONAL_STATUS_SINCE_ONSET_OF_CURRENT_ILLNESS/INJURY: NO

## 2023-01-01 ASSESSMENT — ENCOUNTER SYMPTOMS
APPETITE CHANGE: 0
ACTIVITY CHANGE: 0
ABDOMINAL PAIN: 0
CONFUSION: 0
AGITATION: 0
WHEEZING: 0
SHORTNESS OF BREATH: 0
CHILLS: 0
ABDOMINAL PAIN: 0
BACK PAIN: 0
PALPITATIONS: 0
COUGH: 0
FEVER: 0
FATIGUE: 1
ARTHRALGIAS: 0
EYE PAIN: 0
PALPITATIONS: 0
NAUSEA: 0
DIZZINESS: 0
HEMATURIA: 0
BRUISES/BLEEDS EASILY: 1
DYSURIA: 0
LIGHT-HEADEDNESS: 0
VOMITING: 0
HEMATURIA: 0
FEVER: 0
DYSURIA: 0
MYALGIAS: 0
DIARRHEA: 0
CHILLS: 0

## 2023-01-01 ASSESSMENT — PAIN SCALES - GENERAL: PAINLEVEL: NO PAIN (0)

## 2023-01-25 ENCOUNTER — ALLIED HEALTH/NURSE VISIT (OUTPATIENT)
Dept: FAMILY MEDICINE | Facility: OTHER | Age: 88
End: 2023-01-25
Attending: INTERNAL MEDICINE
Payer: MEDICARE

## 2023-01-25 DIAGNOSIS — E53.8 B12 DEFICIENCY: Primary | ICD-10-CM

## 2023-01-25 PROCEDURE — 250N000011 HC RX IP 250 OP 636: Performed by: INTERNAL MEDICINE

## 2023-01-25 PROCEDURE — 96372 THER/PROPH/DIAG INJ SC/IM: CPT | Performed by: INTERNAL MEDICINE

## 2023-01-25 RX ADMIN — CYANOCOBALAMIN 1000 MCG: 1000 INJECTION, SOLUTION INTRAMUSCULAR; SUBCUTANEOUS at 09:22

## 2023-01-25 NOTE — PROGRESS NOTES

## 2023-02-04 DIAGNOSIS — I10 BENIGN ESSENTIAL HYPERTENSION: ICD-10-CM

## 2023-02-08 RX ORDER — FUROSEMIDE 40 MG
TABLET ORAL
Qty: 90 TABLET | Refills: 1 | OUTPATIENT
Start: 2023-02-08

## 2023-02-08 NOTE — TELEPHONE ENCOUNTER
"  Future Office Visit: 2/10/23    Refill request denied as patient has upcoming appointment with PCP    Corinne R Thayer, RN on 2/8/2023 at 10:22 AM     Requested Prescriptions   Pending Prescriptions Disp Refills     furosemide (LASIX) 40 MG tablet [Pharmacy Med Name: FUROSEMIDE 40 MG TABLET] 90 tablet 1     Sig: TAKE 1 TABLET BY MOUTH EVERY MORNING.       Diuretics (Including Combos) Protocol Failed - 2/4/2023  2:20 PM        Failed - Normal serum creatinine on file in past 12 months     Recent Labs   Lab Test 10/31/22  0856   CR 8.56*              Passed - Blood pressure under 140/90 in past 12 months     BP Readings from Last 3 Encounters:   11/01/22 128/72   08/22/22 130/80   08/04/22 130/70                 Passed - Recent (12 mo) or future (30 days) visit within the authorizing provider's specialty     Patient has had an office visit with the authorizing provider or a provider within the authorizing providers department within the previous 12 mos or has a future within next 30 days. See \"Patient Info\" tab in inbasket, or \"Choose Columns\" in Meds & Orders section of the refill encounter.              Passed - Medication is active on med list        Passed - Patient is age 18 or older        Passed - Normal serum potassium on file in past 12 months     Recent Labs   Lab Test 10/31/22  0856   POTASSIUM 4.6                    Passed - Normal serum sodium on file in past 12 months     Recent Labs   Lab Test 10/31/22  0856                      "
Parent

## 2023-02-09 ENCOUNTER — LAB (OUTPATIENT)
Dept: LAB | Facility: OTHER | Age: 88
End: 2023-02-09
Attending: INTERNAL MEDICINE
Payer: MEDICARE

## 2023-02-09 DIAGNOSIS — Z79.4 TYPE 2 DIABETES MELLITUS WITH STAGE 5 CHRONIC KIDNEY DISEASE NOT ON CHRONIC DIALYSIS, WITH LONG-TERM CURRENT USE OF INSULIN (H): ICD-10-CM

## 2023-02-09 DIAGNOSIS — E78.2 MIXED HYPERLIPIDEMIA: ICD-10-CM

## 2023-02-09 DIAGNOSIS — N18.5 TYPE 2 DIABETES MELLITUS WITH STAGE 5 CHRONIC KIDNEY DISEASE NOT ON CHRONIC DIALYSIS, WITH LONG-TERM CURRENT USE OF INSULIN (H): ICD-10-CM

## 2023-02-09 DIAGNOSIS — E11.22 TYPE 2 DIABETES MELLITUS WITH STAGE 5 CHRONIC KIDNEY DISEASE NOT ON CHRONIC DIALYSIS, WITH LONG-TERM CURRENT USE OF INSULIN (H): ICD-10-CM

## 2023-02-09 LAB
ALBUMIN SERPL BCG-MCNC: 4.3 G/DL (ref 3.5–5.2)
ALP SERPL-CCNC: 107 U/L (ref 40–129)
ALT SERPL W P-5'-P-CCNC: 18 U/L (ref 10–50)
ANION GAP SERPL CALCULATED.3IONS-SCNC: 15 MMOL/L (ref 7–15)
AST SERPL W P-5'-P-CCNC: 18 U/L (ref 10–50)
BILIRUB SERPL-MCNC: 0.4 MG/DL
BUN SERPL-MCNC: 80.6 MG/DL (ref 8–23)
CALCIUM SERPL-MCNC: 8.9 MG/DL (ref 8.8–10.2)
CHLORIDE SERPL-SCNC: 104 MMOL/L (ref 98–107)
CHOLEST SERPL-MCNC: 122 MG/DL
CREAT SERPL-MCNC: 8.46 MG/DL (ref 0.67–1.17)
DEPRECATED HCO3 PLAS-SCNC: 22 MMOL/L (ref 22–29)
ERYTHROCYTE [DISTWIDTH] IN BLOOD BY AUTOMATED COUNT: 14.2 % (ref 10–15)
GFR SERPL CREATININE-BSD FRML MDRD: 6 ML/MIN/1.73M2
GLUCOSE SERPL-MCNC: 133 MG/DL (ref 70–99)
HBA1C MFR BLD: 5.9 % (ref 4–6.2)
HCT VFR BLD AUTO: 35.1 % (ref 40–53)
HDLC SERPL-MCNC: 40 MG/DL
HGB BLD-MCNC: 11.2 G/DL (ref 13.3–17.7)
LDLC SERPL CALC-MCNC: 66 MG/DL
MCH RBC QN AUTO: 30.4 PG (ref 26.5–33)
MCHC RBC AUTO-ENTMCNC: 31.9 G/DL (ref 31.5–36.5)
MCV RBC AUTO: 95 FL (ref 78–100)
NONHDLC SERPL-MCNC: 82 MG/DL
PLATELET # BLD AUTO: 290 10E3/UL (ref 150–450)
POTASSIUM SERPL-SCNC: 5.1 MMOL/L (ref 3.4–5.3)
PROT SERPL-MCNC: 7 G/DL (ref 6.4–8.3)
RBC # BLD AUTO: 3.68 10E6/UL (ref 4.4–5.9)
SODIUM SERPL-SCNC: 141 MMOL/L (ref 136–145)
T4 FREE SERPL-MCNC: 0.81 NG/DL (ref 0.9–1.7)
TRIGL SERPL-MCNC: 79 MG/DL
TSH SERPL DL<=0.005 MIU/L-ACNC: 6.97 UIU/ML (ref 0.3–4.2)
WBC # BLD AUTO: 10.6 10E3/UL (ref 4–11)

## 2023-02-09 PROCEDURE — 83036 HEMOGLOBIN GLYCOSYLATED A1C: CPT | Mod: ZL

## 2023-02-09 PROCEDURE — 80053 COMPREHEN METABOLIC PANEL: CPT | Mod: ZL

## 2023-02-09 PROCEDURE — 84439 ASSAY OF FREE THYROXINE: CPT | Mod: ZL

## 2023-02-09 PROCEDURE — 80061 LIPID PANEL: CPT | Mod: ZL

## 2023-02-09 PROCEDURE — 36415 COLL VENOUS BLD VENIPUNCTURE: CPT | Mod: ZL

## 2023-02-09 PROCEDURE — 85018 HEMOGLOBIN: CPT | Mod: ZL

## 2023-02-09 PROCEDURE — 84443 ASSAY THYROID STIM HORMONE: CPT | Mod: ZL

## 2023-02-10 ENCOUNTER — OFFICE VISIT (OUTPATIENT)
Dept: INTERNAL MEDICINE | Facility: OTHER | Age: 88
End: 2023-02-10
Attending: INTERNAL MEDICINE
Payer: COMMERCIAL

## 2023-02-10 VITALS
SYSTOLIC BLOOD PRESSURE: 128 MMHG | OXYGEN SATURATION: 98 % | RESPIRATION RATE: 18 BRPM | TEMPERATURE: 97.4 F | BODY MASS INDEX: 24.26 KG/M2 | HEIGHT: 69 IN | WEIGHT: 163.8 LBS | DIASTOLIC BLOOD PRESSURE: 70 MMHG | HEART RATE: 91 BPM

## 2023-02-10 DIAGNOSIS — I10 BENIGN ESSENTIAL HYPERTENSION: ICD-10-CM

## 2023-02-10 DIAGNOSIS — N18.5 TYPE 2 DIABETES MELLITUS WITH STAGE 5 CHRONIC KIDNEY DISEASE NOT ON CHRONIC DIALYSIS, WITH LONG-TERM CURRENT USE OF INSULIN (H): ICD-10-CM

## 2023-02-10 DIAGNOSIS — N05.7 PRIMARY PAUCI-IMMUNE NECROTIZING AND CRESCENTIC GLOMERULONEPHRITIS: ICD-10-CM

## 2023-02-10 DIAGNOSIS — E11.22 TYPE 2 DIABETES MELLITUS WITH STAGE 5 CHRONIC KIDNEY DISEASE NOT ON CHRONIC DIALYSIS, WITH LONG-TERM CURRENT USE OF INSULIN (H): ICD-10-CM

## 2023-02-10 DIAGNOSIS — D84.9 IMMUNOCOMPROMISED PATIENT (H): ICD-10-CM

## 2023-02-10 DIAGNOSIS — N05.8 PRIMARY PAUCI-IMMUNE NECROTIZING AND CRESCENTIC GLOMERULONEPHRITIS: ICD-10-CM

## 2023-02-10 DIAGNOSIS — R73.9 STEROID-INDUCED HYPERGLYCEMIA: ICD-10-CM

## 2023-02-10 DIAGNOSIS — Z79.4 TYPE 2 DIABETES MELLITUS WITH STAGE 5 CHRONIC KIDNEY DISEASE NOT ON CHRONIC DIALYSIS, WITH LONG-TERM CURRENT USE OF INSULIN (H): ICD-10-CM

## 2023-02-10 DIAGNOSIS — R12 HEARTBURN: ICD-10-CM

## 2023-02-10 DIAGNOSIS — T38.0X5A STEROID-INDUCED HYPERGLYCEMIA: ICD-10-CM

## 2023-02-10 DIAGNOSIS — N18.5 CKD (CHRONIC KIDNEY DISEASE) STAGE 5, GFR LESS THAN 15 ML/MIN (H): ICD-10-CM

## 2023-02-10 PROCEDURE — G0463 HOSPITAL OUTPT CLINIC VISIT: HCPCS

## 2023-02-10 PROCEDURE — 96401 CHEMO ANTI-NEOPL SQ/IM: CPT

## 2023-02-10 PROCEDURE — 99214 OFFICE O/P EST MOD 30 MIN: CPT | Performed by: INTERNAL MEDICINE

## 2023-02-10 RX ORDER — FAMOTIDINE 20 MG/1
20 TABLET, FILM COATED ORAL DAILY
Qty: 90 TABLET | Refills: 4 | Status: SHIPPED | OUTPATIENT
Start: 2023-02-10 | End: 2023-01-01

## 2023-02-10 RX ORDER — FUROSEMIDE 40 MG
40 TABLET ORAL EVERY MORNING
Qty: 90 TABLET | Refills: 1 | Status: SHIPPED | OUTPATIENT
Start: 2023-02-10 | End: 2023-05-16

## 2023-02-10 RX ORDER — INSULIN GLARGINE 100 [IU]/ML
2 INJECTION, SOLUTION SUBCUTANEOUS AT BEDTIME
Qty: 15 ML | Refills: 0 | Status: SHIPPED | OUTPATIENT
Start: 2023-02-10 | End: 2023-05-16

## 2023-02-10 RX ORDER — PREDNISONE 2.5 MG/1
2.5 TABLET ORAL DAILY
Qty: 90 TABLET | Refills: 1 | Status: SHIPPED | OUTPATIENT
Start: 2023-02-10 | End: 2023-05-16

## 2023-02-10 RX ORDER — AMLODIPINE BESYLATE 5 MG/1
5 TABLET ORAL DAILY
Qty: 90 TABLET | Refills: 1 | Status: SHIPPED | OUTPATIENT
Start: 2023-02-10 | End: 2023-05-16

## 2023-02-10 ASSESSMENT — ENCOUNTER SYMPTOMS
SHORTNESS OF BREATH: 0
DIZZINESS: 0
MYALGIAS: 0
HEMATURIA: 0
VOMITING: 0
PALPITATIONS: 0
BRUISES/BLEEDS EASILY: 1
NAUSEA: 0
ABDOMINAL PAIN: 0
FEVER: 0
COUGH: 0
CHILLS: 0
FATIGUE: 1
DIARRHEA: 0
WHEEZING: 0
EYE PAIN: 0
ARTHRALGIAS: 0
DYSURIA: 0
CONFUSION: 0
LIGHT-HEADEDNESS: 0
AGITATION: 0
BACK PAIN: 0

## 2023-02-10 ASSESSMENT — PAIN SCALES - GENERAL: PAINLEVEL: NO PAIN (0)

## 2023-02-10 NOTE — NURSING NOTE
"Chief Complaint   Patient presents with     Diabetes         Initial /70 (BP Location: Right arm, Patient Position: Sitting, Cuff Size: Adult Regular)   Pulse 91   Temp 97.4  F (36.3  C) (Temporal)   Resp 18   Ht 1.74 m (5' 8.5\")   Wt 74.3 kg (163 lb 12.8 oz)   SpO2 98%   BMI 24.54 kg/m   Estimated body mass index is 24.54 kg/m  as calculated from the following:    Height as of this encounter: 1.74 m (5' 8.5\").    Weight as of this encounter: 74.3 kg (163 lb 12.8 oz).       FOOD SECURITY SCREENING QUESTIONS:    The next two questions are to help us understand your food security.  If you are feeling you need any assistance in this area, we have resources available to support you today.    Hunger Vital Signs:  Within the past 12 months we worried whether our food would run out before we got money to buy more. Never  Within the past 12 months the food we bought just didn't last and we didn't have money to get more. Never    Advance Care Directive on file? no      Medication reconciliation complete.      Richi Odonnell,on 2/10/2023 at 9:48 AM        "

## 2023-02-10 NOTE — PATIENT INSTRUCTIONS
Blood pressure is well controlled.   Diabetes is well controlled.     Medications refilled.   Labs are stable.     Lab on 02/09/2023   Component Date Value Ref Range Status    Sodium 02/09/2023 141  136 - 145 mmol/L Final    Potassium 02/09/2023 5.1  3.4 - 5.3 mmol/L Final    Chloride 02/09/2023 104  98 - 107 mmol/L Final    Carbon Dioxide (CO2) 02/09/2023 22  22 - 29 mmol/L Final    Anion Gap 02/09/2023 15  7 - 15 mmol/L Final    Urea Nitrogen 02/09/2023 80.6 (H)  8.0 - 23.0 mg/dL Final    Creatinine 02/09/2023 8.46 (H)  0.67 - 1.17 mg/dL Final    Calcium 02/09/2023 8.9  8.8 - 10.2 mg/dL Final    Glucose 02/09/2023 133 (H)  70 - 99 mg/dL Final    Alkaline Phosphatase 02/09/2023 107  40 - 129 U/L Final    AST 02/09/2023 18  10 - 50 U/L Final    ALT 02/09/2023 18  10 - 50 U/L Final    Protein Total 02/09/2023 7.0  6.4 - 8.3 g/dL Final    Albumin 02/09/2023 4.3  3.5 - 5.2 g/dL Final    Bilirubin Total 02/09/2023 0.4  <=1.2 mg/dL Final    GFR Estimate 02/09/2023 6 (L)  >60 mL/min/1.73m2 Final    eGFR calculated using 2021 CKD-EPI equation.    Cholesterol 02/09/2023 122  <200 mg/dL Final    Triglycerides 02/09/2023 79  <150 mg/dL Final    Direct Measure HDL 02/09/2023 40  >=40 mg/dL Final    LDL Cholesterol Calculated 02/09/2023 66  <=100 mg/dL Final    Non HDL Cholesterol 02/09/2023 82  <130 mg/dL Final    WBC Count 02/09/2023 10.6  4.0 - 11.0 10e3/uL Final    RBC Count 02/09/2023 3.68 (L)  4.40 - 5.90 10e6/uL Final    Hemoglobin 02/09/2023 11.2 (L)  13.3 - 17.7 g/dL Final    Hematocrit 02/09/2023 35.1 (L)  40.0 - 53.0 % Final    MCV 02/09/2023 95  78 - 100 fL Final    MCH 02/09/2023 30.4  26.5 - 33.0 pg Final    MCHC 02/09/2023 31.9  31.5 - 36.5 g/dL Final    RDW 02/09/2023 14.2  10.0 - 15.0 % Final    Platelet Count 02/09/2023 290  150 - 450 10e3/uL Final    Hemoglobin A1C 02/09/2023 5.9  4.0 - 6.2 % Final    TSH 02/09/2023 6.97 (H)  0.30 - 4.20 uIU/mL Final    Free T4 02/09/2023 0.81 (L)  0.90 - 1.70 ng/dL Final       Aspects of Diabetes:   Recent Labs   Lab Test 02/09/23  1345 10/31/22  0856 04/20/22  0907   A1C 5.9 5.9 6.4*   LDL 66 71 74   HDL 40 34 33   TRIG 79 115 121   ALT 18 12 8   CR 8.46* 8.56* 7.26*   GFRESTIMATED 6* 6* 7*   POTASSIUM 5.1 4.6 4.2   TSH 6.97* 6.91* 5.63*   T4 0.81* 0.73 0.75   WBC 10.6 9.6 9.6   HGB 11.2* 10.4* 11.4*    242 233   ALBUMIN 4.3 3.9 4.0      Hemoglobin A1c  Goal range is under 8%. Best is 6.5 to 7   Blood Pressure 128/70 Goal to keep less than 140/90   Tobacco  reports that he quit smoking about 47 years ago. His smoking use included cigarettes. He has never used smokeless tobacco. Goal to abstain from tobacco   Aspirin or Plavix Anti-platelet therapy Aspirin or Plavix reduces risk of heart disease and stroke  -- sometimes used with other blood thinners, depending on bleeding risk and risk factors.    ACE/ARB Specific blood pressure meds These medications can reduce risk of kidney disease   Cholesterol Statins (Lipitor, Crestor, vs others) Statins reduce risk of heart disease and stroke   Eye Exam -- Do Yearly -- Annual diabetic eye exam   Healthy weight Wt Readings from Last 4 Encounters:   02/10/23 74.3 kg (163 lb 12.8 oz)   11/01/22 72.9 kg (160 lb 12.8 oz)   08/22/22 73.5 kg (162 lb)   08/04/22 72.1 kg (159 lb)      Body mass index is 24.54 kg/m .  Goal BMI under 30, best is under 25.      -- Trying to exercise daily (goal at least 20 min/day) with moderate aerobic activity   -- Eat healthy (resources from ADA at http://www.diabetes.org/)   -- Taking good care of my feet. Consider seeing the Podiatrist   -- Check blood sugars as directed, record in log book and bring to every appointment    Insurance companies are grading you and I on your blood sugar control -- Goal is to get your A1c down to 7.9% or lower and NO Smoking!  -- Medicare and most insurance companies, will only cover Hemoglobin A1c labs to be rechecked every 91+ days.      Return for Diabetes labs and clinic  follow-up appointment every 3 to 4 months.    Schedule lab only appointment --- A few days AFTER: 5/11/23   Schedule clinic appointment with Dr. Machado -- Same day as labs, or 1-2 days later.

## 2023-02-10 NOTE — PROGRESS NOTES
Assessment & Plan   Altaf Chao is a 88 year old, presenting for the following health issues:      ICD-10-CM    1. 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 Benign essential hypertension  I10 amLODIPine (NORVASC) 5 MG tablet     furosemide (LASIX) 40 MG tablet      3. CKD (chronic kidney disease) stage 5, GFR 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 ml/min (H)  N18.5 predniSONE (DELTASONE) 2.5 MG tablet      4. Immunocompromised patient (H)  D84.9 predniSONE (DELTASONE) 2.5 MG tablet      5. Primary pauci-immune necrotizing and crescentic glomerulonephritis  N05.8 predniSONE (DELTASONE) 2.5 MG tablet    N05.7       6. Heartburn  R12 famotidine (PEPCID) 20 MG tablet      7. Steroid-induced hyperglycemia  R73.9 insulin glargine (LANTUS SOLOSTAR) 100 UNIT/ML pen    T38.0X5A         Patient presents for diabetes follow-up as well as follow-up multiple issues.    Type 2 diabetes, currently well controlled.  Continues with Lantus insulin 2 units in the evening.  Tolerating well.  Needs refills.    Stage V chronic kidney disease, not currently on dialysis.  States that he still makes large amount of urine.  GFR is less than 10.  He is chronically immunosuppressed with prednisone due to his necrotizing and crescentic glomerulonephritis.  Continues to follow with nephrology.  No current dialysis.    Hypertension, blood pressure is currently well controlled.  Doing well with Lasix, amlodipine.  Tolerating well.  Needs refills.    Heartburn, ongoing, well controlled with famotidine.  Needs refills.    Encouraged weight loss and regular exercise.     Return in about 3 months (around 5/10/2023) for - Labs every 91+ days, with DM Follow-up, Same Day or 1-2 days later with Dr. Machado.    Valentino Machado MD  [facility]     Subjective   Diabetes      History of  "Present Illness       Diabetes:   He presents for follow up of diabetes.  He is not checking blood glucose. He has no concerns regarding his diabetes at this time.  He is not experiencing numbness or burning in feet, excessive thirst, blurry vision, weight changes or redness, sores or blisters on feet. The patient has not had a diabetic eye exam in the last 12 months.         He eats 4 or more servings of fruits and vegetables daily.He consumes 0 sweetened beverage(s) daily.He exercises with enough effort to increase his heart rate 9 or less minutes per day.  He exercises with enough effort to increase his heart rate 3 or less days per week.      Review of Systems   Constitutional: Positive for fatigue. Negative for chills and fever.        Golfs 1x weekly in the summer and in the winter - curls 2x weekly and bowling 2x weekly    HENT: Negative for congestion and hearing loss.    Eyes: Negative for pain and visual disturbance.   Respiratory: Negative for cough, shortness of breath and wheezing.    Cardiovascular: Negative for chest pain and palpitations.   Gastrointestinal: Negative for abdominal pain, diarrhea, nausea and vomiting.   Endocrine: Negative for cold intolerance and heat intolerance.   Genitourinary: Negative for dysuria and hematuria.   Musculoskeletal: Positive for gait problem. Negative for arthralgias, back pain and myalgias.   Skin: Negative for pallor.   Allergic/Immunologic: Negative for immunocompromised state.   Neurological: Negative for dizziness and light-headedness.   Hematological: Bruises/bleeds easily.   Psychiatric/Behavioral: Negative for agitation and confusion.          Objective    /70 (BP Location: Right arm, Patient Position: Sitting, Cuff Size: Adult Regular)   Pulse 91   Temp 97.4  F (36.3  C) (Temporal)   Resp 18   Ht 1.74 m (5' 8.5\")   Wt 74.3 kg (163 lb 12.8 oz)   SpO2 98%   BMI 24.54 kg/m    Body mass index is 24.54 kg/m .  Physical Exam  Constitutional:       " General: He is not in acute distress.     Appearance: He is well-developed. He is not diaphoretic.   HENT:      Head: Normocephalic and atraumatic.   Eyes:      General: No scleral icterus.     Conjunctiva/sclera: Conjunctivae normal.   Cardiovascular:      Rate and Rhythm: Normal rate and regular rhythm.   Pulmonary:      Effort: Pulmonary effort is normal.      Breath sounds: Normal breath sounds.   Abdominal:      Palpations: Abdomen is soft.      Tenderness: There is no abdominal tenderness.   Musculoskeletal:         General: No deformity.      Cervical back: Neck supple.      Right lower leg: Edema present.      Left lower leg: Edema present.   Lymphadenopathy:      Cervical: No cervical adenopathy.   Skin:     General: Skin is warm and dry.      Findings: Bruising (bilateral arms) present. No rash.      Comments: + Dry skin   Neurological:      Mental Status: He is alert and oriented to person, place, and time. Mental status is at baseline.   Psychiatric:         Mood and Affect: Mood normal.         Behavior: Behavior normal.          Recent Labs   Lab Test 02/09/23  1345 10/31/22  0856 04/20/22  0907   A1C 5.9 5.9 6.4*   LDL 66 71 74   HDL 40 34 33   TRIG 79 115 121   ALT 18 12 8   CR 8.46* 8.56* 7.26*   GFRESTIMATED 6* 6* 7*   POTASSIUM 5.1 4.6 4.2   TSH 6.97* 6.91* 5.63*   T4 0.81* 0.73 0.75   WBC 10.6 9.6 9.6   HGB 11.2* 10.4* 11.4*    242 233   ALBUMIN 4.3 3.9 4.0     Hemoglobin A1c is elevated at 5.9%.  LDL HDL and triglycerides are at goal.  ALT is normal.  Creatinine 8.46, this is currently stable.  Potassium 5.1.  TSH 6.9 with free T40.81.  Hemoglobin 11.2 with white count of 10.6, and platelet count 290, albumin 4.3

## 2023-03-01 ENCOUNTER — ALLIED HEALTH/NURSE VISIT (OUTPATIENT)
Dept: FAMILY MEDICINE | Facility: OTHER | Age: 88
End: 2023-03-01
Attending: INTERNAL MEDICINE
Payer: MEDICARE

## 2023-03-01 DIAGNOSIS — E53.8 B12 DEFICIENCY: Primary | ICD-10-CM

## 2023-03-01 PROCEDURE — 250N000011 HC RX IP 250 OP 636: Performed by: INTERNAL MEDICINE

## 2023-03-01 PROCEDURE — 96372 THER/PROPH/DIAG INJ SC/IM: CPT | Performed by: INTERNAL MEDICINE

## 2023-03-01 RX ADMIN — CYANOCOBALAMIN 1000 MCG: 1000 INJECTION, SOLUTION INTRAMUSCULAR; SUBCUTANEOUS at 10:10

## 2023-03-01 NOTE — PROGRESS NOTES
Verified patient's first and last name, and . Patient stated reason for visit today is to receive a B12 injection. Patient denied any concerns with previous injections. B12 prepared and administered IM as ordered. Administration of medication documented in MAR (see MAR for further information regarding dose, lot #, NDC #, expiration date). Patient encouraged to wait in lobby for 15 minutes post-injection and notify staff immediately of any reaction.    YAMILA LOMAS RN on 3/1/2023 at 10:11 AM

## 2023-03-23 ENCOUNTER — APPOINTMENT (OUTPATIENT)
Dept: GENERAL RADIOLOGY | Facility: OTHER | Age: 88
End: 2023-03-23
Attending: PHYSICIAN ASSISTANT
Payer: MEDICARE

## 2023-03-23 ENCOUNTER — HOSPITAL ENCOUNTER (EMERGENCY)
Facility: OTHER | Age: 88
Discharge: HOME | End: 2023-03-23
Attending: PHYSICIAN ASSISTANT | Admitting: PHYSICIAN ASSISTANT
Payer: MEDICARE

## 2023-03-23 VITALS
WEIGHT: 163 LBS | DIASTOLIC BLOOD PRESSURE: 84 MMHG | RESPIRATION RATE: 16 BRPM | OXYGEN SATURATION: 94 % | SYSTOLIC BLOOD PRESSURE: 166 MMHG | BODY MASS INDEX: 24.42 KG/M2 | HEART RATE: 108 BPM

## 2023-03-23 DIAGNOSIS — S42.401A OCCULT CLOSED FRACTURE OF RIGHT ELBOW, INITIAL ENCOUNTER: ICD-10-CM
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
## 2023-03-23 PROCEDURE — 29105 APPLICATION LONG ARM SPLINT: CPT | Mod: RT | Performed by: PHYSICIAN ASSISTANT

## 2023-03-23 PROCEDURE — 99207 PR NO CHARGE LOS: CPT | Performed by: PHYSICIAN ASSISTANT

## 2023-03-23 PROCEDURE — 99284 EMERGENCY DEPT VISIT MOD MDM: CPT | Performed by: PHYSICIAN ASSISTANT

## 2023-03-23 PROCEDURE — 99284 EMERGENCY DEPT VISIT MOD MDM: CPT | Mod: 25 | Performed by: PHYSICIAN ASSISTANT

## 2023-03-23 PROCEDURE — 73130 X-RAY EXAM OF HAND: CPT | Mod: RT

## 2023-03-23 PROCEDURE — 73080 X-RAY EXAM OF ELBOW: CPT | Mod: RT

## 2023-03-23 ASSESSMENT — ACTIVITIES OF DAILY LIVING (ADL): ADLS_ACUITY_SCORE: 33

## 2023-03-23 NOTE — ED TRIAGE NOTES
Pt fell last week shoveling and his right hand and arm has been swelling since. Pt rates pain 3/10. Hand is quite edematous.Pt describes pain as burning   Triage Assessment     Row Name 03/23/23 4116       Triage Assessment (Adult)    Airway WDL WDL       Respiratory WDL    Respiratory WDL WDL       Skin Circulation/Temperature WDL    Skin Circulation/Temperature WDL X       Cardiac WDL    Cardiac WDL WDL       Peripheral/Neurovascular WDL    Peripheral Neurovascular WDL WDL       Cognitive/Neuro/Behavioral WDL    Cognitive/Neuro/Behavioral WDL WDL

## 2023-03-24 NOTE — ED PROVIDER NOTES
Chief Complaint:  Hand Injury       HPI   Altaf Chao is a 88 year old male who presents with presents emergency department for evaluation of an occult elbow injury.  Slipped and fell on the ice injuring his right elbow he did not fall and hit his head there was no loss of conscious he is not on anticoagulation.  He presents today for further evaluation of increasing pain and some mild swelling.    Independent Historian: Patient provides the history    Review of External Notes: Reviewed    ROS:  Please see HPI for pertinent positives and negatives.  All other systems reviewed and found to be negative.     Allergies:  Statins [Hmg-Coa-R Inhibitors]     Medications:    amLODIPine (NORVASC) 5 MG tablet  B-D U/F 31G X 8 MM insulin pen needle  Cranberry-Vitamin C (CRANBERRY CONCENTRATE/VITAMINC) 06978-106 MG CAPS  darbepoetin miller (ARANESP) 60 MCG/0.3ML injection  famotidine (PEPCID) 20 MG tablet  finasteride (PROSCAR) 5 MG tablet  furosemide (LASIX) 40 MG tablet  insulin glargine (LANTUS SOLOSTAR) 100 UNIT/ML pen  predniSONE (DELTASONE) 2.5 MG tablet  sodium bicarbonate 650 MG tablet  tamsulosin (FLOMAX) 0.4 MG capsule        Past Medical History:    Past Medical History:   Diagnosis Date     Acute kidney failure (H)      Essential (primary) hypertension      Hyperlipidemia      Osteoarthritis of hip      Pityriasis rosea      Tachycardia      Unilateral inguinal hernia without obstruction or gangrene        Past Surgical History:    Past Surgical History:   Procedure Laterality Date     CIRCUMCISION      03/14/2017,Dr. Heidi GREENBERG     OTHER SURGICAL HISTORY      00127.9,MO SUBTALAR ATHROEREISIS,bone Spurs excision on Toe     OTHER SURGICAL HISTORY      8/20/13,,HERNIA REPAIR,Right,RIH with mesh     OTHER SURGICAL HISTORY      8/20/13,39094,GENITAL SURGERY MALE,Dorsal Slit     OTHER SURGICAL HISTORY      4/15/14,,HERNIA REPAIR,Left,LIH with mesh     OTHER SURGICAL HISTORY      6/30/14,18194.0,MO TOTAL HIP  ARTHROPLASTY,Left        Family History:    family history includes Other - See Comments (age of onset: 12) in his son.    Social History:   reports that he quit smoking about 47 years ago. His smoking use included cigarettes. He has never used smokeless tobacco. He reports current alcohol use of about 0.8 standard drinks per week. He reports that he does not use drugs.  PCP: Valentino Machado     Physical Exam     Patient Vitals for the past 24 hrs:   BP Temp src Pulse Resp SpO2 Weight   03/23/23 1847 (!) 166/84 Tympanic 108 16 94 % 73.9 kg (163 lb)        Vitals:    03/23/23 1847   BP: (!) 166/84   Pulse: 108   Resp: 16   TempSrc: Tympanic   SpO2: 94%   Weight: 73.9 kg (163 lb)       Physical Exam  Exam:  Constitutional: healthy, alert and no distress  Head: Normocephalic   Neck: Neck supple   ENT: ENT exam normal, no neck nodes or sinus tenderness  Cardiovascular:   RRR. No murmurs, clicks gallops or rub  Respiratory: Lungs clear  Gastrointestinal: Abdomen soft, non-tender. BS normal. No masses, organomegaly  : Deferred  Musculoskeletal: Right upper extremity flexion extension supination and pronation does not cause any significant discomfort pulses are present distally does have some mild erythema to the distal right upper extremity  Skin: no suspicious lesions or rashes  Neurologic: Gait normal. Reflexes normal and symmetric. Sensation grossly WNL.  Psychiatric: mentation appears normal and affect normal/bright         Emergency Department Course          Imaging:  XR Hand Right G/E 3 Views   Final Result   Impression:   No evidence of acute or subacute bony abnormality.       Scattered degenerative changes      Widening of the scapholunate joint.      RONALD GHOTRA MD            SYSTEM ID:  [id number]      XR Elbow Right G/E 3 Views   Final Result   Impression:   Large anterior joint effusion concerning for the presence of an occult   fracture.      Moderate degenerative changes of the right elbow.       Generalized edema within the soft tissues.      RONALD GHOTRA MD            SYSTEM ID:  [id number]         Report per radiology    Laboratory:  Labs Ordered and Resulted from Time of ED Arrival to Time of ED Departure - No data to display     [facility]    Splint Application    Date/Time: 3/23/2023 7:50 PM  Performed by: Roberto Lewis PA-C  Authorized by: Roberto Lewis PA-C     Risks, benefits and alternatives discussed.      PRE-PROCEDURE DETAILS     Sensation:  Normal    PROCEDURE DETAILS     Laterality:  Right    Location:  Elbow    Elbow:  R elbow    Cast type:  Long arm    Splint type: Posterior splint.    Supplies:  Elastic bandage, sling, cotton padding and Ortho-Glass    POST PROCEDURE DETAILS     Pain:  Improved    Sensation:  Normal      PROCEDURE    Patient Tolerance:  Patient tolerated the procedure well with no immediate complications         Emergency Department Course & Assessments:             Interventions:  Medications - No data to display     Orders Place This Encounter:  Orders Placed This Encounter   Procedures     Splint application     XR Hand Right G/E 3 Views     XR Elbow Right G/E 3 Views     Orthopedic  Referral     Sling       Independent Interpretation (X-rays, CTs, rhythm strip):  Independently reviewed the radiographs    Consultations/Discussion of Management or Tests:  None     Social Determinants of Health affecting care:    Social Determinants of Health     Tobacco Use: Medium Risk     Smoking Tobacco Use: Former     Smokeless Tobacco Use: Never     Passive Exposure: Not on file   Alcohol Use: Not on file   Financial Resource Strain: Not on file   Food Insecurity: Not on file   Transportation Needs: Not on file   Physical Activity: Not on file   Stress: Not on file   Social Connections: Not on file   Intimate Partner Violence: Not on file   Depression: Not at risk     PHQ-2 Score: 0   Housing Stability: Not on file       At this time  the patient does not have any concerns for food security, transportation, healthcare access and the patient currently lives at home.  .    Disposition:  The patient was discharged to home.     Impression & Plan    CMS Diagnoses:       Medical Decision Making:    RN & Ancillary Notes:    I have reviewed nursing & ancillary notes, and agree with protocol as initiated.      Differential diagnosis considerations included strain, sprain, fracture, contusion, dislocation, internal ligament derangement, disk herniation-radiculopathy, arthritis, bursitis, tendonitis, DVT, vascular occlusion, claudication, compartment syndrome, cellulitis/  Pleasant gentleman who presents emergency department for evaluation slipped and fell on the ice hitting his elbow.  Has some mild swelling distally.  This is a right upper extremity injury that he sustained 2 days ago it seems to gotten worse over the last couple days with pain.  He has no distal anesthesia.  He is right-hand dominant.  Patient notes that he is not on anticoagulation  He received a splint as he does have an occult fracture as noted above by radiology he received a sling and encouraged ice elevate rest close follow-up with orthopedics.  Tylenol ibuprofen for pain  I explained my diagnostic considerations and recommendations and the patient voiced an understanding and was in agreement with the treatment plan. All questions were answered. We discussed potential side effects of any prescribed or recommended therapies, as well as expectations for response to treatments.      Diagnosis:    ICD-10-CM    1. Occult closed fracture of right elbow, initial encounter  S42.401A Orthopedic  Referral      2. Fall, initial encounter  W19.XXXA            Discharge Medications:  New Prescriptions    No medications on file        3/23/2023   Roberto Lewis PA-C  [facility], [facility]       Roberto Lewis PA-C  03/23/23 1952

## 2023-03-27 ENCOUNTER — HOSPITAL ENCOUNTER (OUTPATIENT)
Dept: GENERAL RADIOLOGY | Facility: OTHER | Age: 88
Discharge: HOME | End: 2023-03-27
Attending: FAMILY MEDICINE
Payer: MEDICARE

## 2023-03-27 ENCOUNTER — OFFICE VISIT (OUTPATIENT)
Dept: FAMILY MEDICINE | Facility: OTHER | Age: 88
End: 2023-03-27
Attending: FAMILY MEDICINE
Payer: MEDICARE

## 2023-03-27 VITALS
BODY MASS INDEX: 24.69 KG/M2 | DIASTOLIC BLOOD PRESSURE: 102 MMHG | WEIGHT: 164.8 LBS | RESPIRATION RATE: 18 BRPM | HEART RATE: 63 BPM | TEMPERATURE: 96.7 F | SYSTOLIC BLOOD PRESSURE: 188 MMHG | OXYGEN SATURATION: 100 %

## 2023-03-27 DIAGNOSIS — S42.401A OCCULT CLOSED FRACTURE OF RIGHT ELBOW, INITIAL ENCOUNTER: Primary | ICD-10-CM

## 2023-03-27 PROCEDURE — G0463 HOSPITAL OUTPT CLINIC VISIT: HCPCS | Mod: 25

## 2023-03-27 PROCEDURE — 24650 CLTX RDL HEAD/NCK FX WO MNPJ: CPT | Performed by: FAMILY MEDICINE

## 2023-03-27 PROCEDURE — 73080 X-RAY EXAM OF ELBOW: CPT | Mod: RT

## 2023-03-27 RX ORDER — CHOLECALCIFEROL (VITAMIN D3) 50 MCG
1 TABLET ORAL
COMMUNITY
Start: 2022-08-10 | End: 2023-05-16

## 2023-03-27 RX ORDER — SODIUM BICARBONATE 650 MG/1
2 TABLET ORAL 3 TIMES DAILY
COMMUNITY
Start: 2023-02-08 | End: 2023-05-16

## 2023-03-27 ASSESSMENT — PAIN SCALES - GENERAL: PAINLEVEL: MILD PAIN (3)

## 2023-03-27 NOTE — PROGRESS NOTES
Sports Medicine Office Note    HPI:  88-year-old male coming in for evaluation of a right elbow injury that occurred on 3/17/2023.  He was shoveling snow and slipped.  He fell onto his right arm.  He was seen in the ER on 3/23 and found to have an elbow effusion.  He was diagnosed with an occult fracture though no definitive fracture was seen on imaging.  He was provided a sling.  He has been wearing this intermittently.  He continues to endorse 3/10 pain.  He characterizes the pain as dull.  No previous injuries to this extremity.    EXAM:  BP (!) 188/102   Pulse 63   Temp (!) 96.7  F (35.9  C) (Temporal)   Resp 18   Wt 74.8 kg (164 lb 12.8 oz)   SpO2 100%   BMI 24.69 kg/m    MUSCULOSKELETAL EXAM:  RIGHT ELBOW  Inspection:  -No gross deformity  -No bruising  -Diffuse pitting edema throughout the distal aspect of the RUE starting at the elbow  -Scars:  None    Tenderness to palpation of the:  -Shoulder:  Negative  -Biceps musculature:  Negative  -Triceps musculature:  Negative  -Antecubital fossa:  Negative  -Distal biceps tendon:  Negative  -Lateral epicondyle:  Negative  -Medial epicondyle:  Negative  -Radial head: Mild pain  -Olecranon:  Negative    Range of Motion:  -Passive flexion:  135  -Passive extension:  2-3  -Pronation:  90  -Supination:  90    Sensation:  -Intact in the C5-T1 dermatomes    Motor:  -Intact AIN, PIN, and IO    Special Tests:  -Resisted wrist extension:  Nonpainful  -Middle finger resistance test:  Negative  -Hook test:  Negative  -Valgus laxity:  Negative  -Milking maneuver:  Negative  -Push up from seated position (PLRI test):  Negative  -Tinel's test at cubital tunnel:  Negative    Other:  -No signs of cyanosis. Normal skin temperature of the upper extremity.  -Hand/wrist:  No gross deformity. Full range of motion.  -Left upper extremity:  No gross deformity. No palpable tenderness. Normal strength and ROM.      IMAGING:  3/23/2023: 3 view right elbow x-ray  - Moderate degenerative  changes throughout the elbow.  Joint effusion present.  No definitive fracture is identified.    3/27/2023: 3 view right elbow x-ray  - No definitive fracture.  Elbow effusion remains present.      ASSESSMENT/PLAN:  Diagnoses and all orders for this visit:  Occult closed fracture of right elbow, initial encounter  -     Orthopedic  Referral  -     XR Elbow Right G/E 3 Views  -     CLOSED TX RADIAL HEAD/NECK FX W/O MANIP    88-year-old male with an occult fracture to the right elbow, likely radial head.  X-rays from 3/23 and 3/27 were both personally reviewed in the office with the findings as demonstrated above by my interpretation.  He is now 10 days out from his injury.  We will treat nonoperatively.  - Continue in the sling  - Come out of the sling multiple times per day to preserve ROM  - OTC analgesics and ice as needed  - Follow-up in 4 weeks for repeat x-rays out of the sling      Tristin Rodriguez MD  3/27/2023  8:22 AM    Total time spent with this patient was 32 minutes which included chart review, visualization and independent interpretation of images, time spent with the patient, and documentation.    Procedure time:  0 minute(s)

## 2023-03-27 NOTE — NURSING NOTE
"Chief Complaint   Patient presents with     Fracture     Occult right elbow fracture     Patient presents for fracture right elbow. DOI: 3/17/2023. Injured from a fall while shoveling snow. Pain 3/10. Patient is in a sling and a long arm splint.     Initial BP (!) 200/108 (BP Location: Left arm, Patient Position: Sitting, Cuff Size: Adult Regular)   Pulse 62   Temp (!) 96.7  F (35.9  C) (Temporal)   Resp 18   Wt 74.8 kg (164 lb 12.8 oz)   SpO2 100%   BMI 24.69 kg/m   Estimated body mass index is 24.69 kg/m  as calculated from the following:    Height as of 2/10/23: 1.74 m (5' 8.5\").    Weight as of this encounter: 74.8 kg (164 lb 12.8 oz).       Medication Reconciliation: Complete    Marybeth Casillas LPN .......  3/27/2023  8:24 AM   "

## 2023-03-29 ENCOUNTER — ALLIED HEALTH/NURSE VISIT (OUTPATIENT)
Dept: FAMILY MEDICINE | Facility: OTHER | Age: 88
End: 2023-03-29
Attending: INTERNAL MEDICINE
Payer: MEDICARE

## 2023-03-29 DIAGNOSIS — E53.8 B12 DEFICIENCY: Primary | ICD-10-CM

## 2023-03-29 PROCEDURE — 96372 THER/PROPH/DIAG INJ SC/IM: CPT | Performed by: INTERNAL MEDICINE

## 2023-03-29 PROCEDURE — 250N000011 HC RX IP 250 OP 636: Performed by: INTERNAL MEDICINE

## 2023-03-29 RX ADMIN — CYANOCOBALAMIN 1000 MCG: 1000 INJECTION, SOLUTION INTRAMUSCULAR; SUBCUTANEOUS at 09:25

## 2023-03-29 NOTE — PROGRESS NOTES
Verified patient's first and last name, and . Patient stated reason for visit today is to receive a B12 injection. Patient denied any concerns with previous injections. B12 prepared and administered IM as ordered. Administration of medication documented in MAR (see MAR for further information regarding dose, lot #, NDC #, expiration date). Patient encouraged to wait in lobby for 15 minutes post-injection and notify staff immediately of any reaction.     YAMILA LOMAS RN ....................  3/29/2023   9:23 AM

## 2023-04-24 ENCOUNTER — OFFICE VISIT (OUTPATIENT)
Dept: FAMILY MEDICINE | Facility: OTHER | Age: 88
End: 2023-04-24
Attending: FAMILY MEDICINE
Payer: MEDICARE

## 2023-04-24 ENCOUNTER — HOSPITAL ENCOUNTER (OUTPATIENT)
Dept: GENERAL RADIOLOGY | Facility: OTHER | Age: 88
Discharge: HOME | End: 2023-04-24
Attending: FAMILY MEDICINE
Payer: MEDICARE

## 2023-04-24 VITALS
DIASTOLIC BLOOD PRESSURE: 78 MMHG | SYSTOLIC BLOOD PRESSURE: 136 MMHG | HEART RATE: 96 BPM | TEMPERATURE: 97.5 F | RESPIRATION RATE: 16 BRPM | OXYGEN SATURATION: 97 % | BODY MASS INDEX: 25.14 KG/M2 | WEIGHT: 167.8 LBS

## 2023-04-24 DIAGNOSIS — S42.401D OCCULT CLOSED FRACTURE OF RIGHT ELBOW WITH ROUTINE HEALING, SUBSEQUENT ENCOUNTER: Primary | ICD-10-CM

## 2023-04-24 PROCEDURE — 73080 X-RAY EXAM OF ELBOW: CPT | Mod: RT

## 2023-04-24 PROCEDURE — G0463 HOSPITAL OUTPT CLINIC VISIT: HCPCS

## 2023-04-24 PROCEDURE — 99207 PR FRACTURE CARE IN GLOBAL PERIOD: CPT | Performed by: FAMILY MEDICINE

## 2023-04-24 ASSESSMENT — PAIN SCALES - GENERAL: PAINLEVEL: NO PAIN (1)

## 2023-04-24 NOTE — PROGRESS NOTES
Sports Medicine Office Note    HPI:  88-year-old male coming in for evaluation of a right elbow injury that occurred on 3/17.  He was shoveling snow and slipped.  He fell onto his right arm.  He was seen in the ER on 3/23 and again in this office on 3/27.  He is currently being treated with a sling and ROM exercises for an occult elbow fracture, presumably radial head.  He continues to endorse 1/10 pain.  He has not been wearing his sling.  With this injury he developed significant swelling of the distal aspect of the right upper extremity.  The swelling is still quite present though improved.  He is hoping to get back to bowling, curling, and golf.      EXAM:  /78 (BP Location: Left arm, Patient Position: Sitting, Cuff Size: Adult Regular)   Pulse 96   Temp 97.5  F (36.4  C) (Tympanic)   Resp 16   Wt 76.1 kg (167 lb 12.8 oz)   SpO2 97%   BMI 25.14 kg/m    MUSCULOSKELETAL EXAM:  RIGHT ELBOW  Inspection:  -No gross deformity  -No bruising or swelling  -Scars:  None    Tenderness to palpation of the:  -Shoulder:  Negative  -Biceps musculature:  Negative  -Triceps musculature:  Negative  -Antecubital fossa:  Negative  -Distal biceps tendon:  Negative  -Lateral epicondyle:  Negative  -Medial epicondyle:  Negative  -Radial head: Mild pain  -Olecranon:  Negative    Range of Motion:  -Passive flexion:  130 right, 135 left  -Passive extension:  5 right, 0 left    Sensation:  -Intact in the C5-T1 dermatomes    Motor:  -Intact AIN, PIN, and IO    Other:  -No signs of cyanosis. Normal skin temperature of the upper extremity.  -Hand/wrist:  No gross deformity. Full range of motion.  -Left upper extremity:  No gross deformity. No palpable tenderness. Normal strength and ROM.      IMAGING:  3/23/2023: 3 view right elbow x-ray  - Moderate degenerative changes throughout the elbow.  Joint effusion present.  No definitive fracture is identified.     3/27/2023: 3 view right elbow x-ray  - No definitive fracture.  Elbow  effusion remains present.      ASSESSMENT/PLAN:  Diagnoses and all orders for this visit:  Occult closed fracture of right elbow with routine healing, subsequent encounter  -     XR Elbow Right G/E 3 Views  -     Occupational Therapy Referral; Future    89-year-old male with a radial head fracture to the right elbow.  X-rays from 3/23, 3/27, and 4/24 were all personally reviewed in the office with the findings as demonstrated above by my interpretation.  He is now 5 weeks and 3 days out from his injury.  - Continue ROM exercises  - Referral placed to hand therapy to work on restoring function/ROM of the RUE  - Follow-up as needed      Tristin Rodriguez MD  4/24/2023  8:40 AM    Total time spent with this patient was 31 minutes which included chart review, visualization and independent interpretation of images, time spent with the patient, and documentation.    Procedure time:  0 minute(s)

## 2023-04-24 NOTE — NURSING NOTE
"Chief Complaint   Patient presents with     Follow Up     Right elbow fracture      Patient presents for right elbow fracture. Pain 1/10. He is not wearing his sling.     Initial /78 (BP Location: Left arm, Patient Position: Sitting, Cuff Size: Adult Regular)   Pulse 96   Temp 97.5  F (36.4  C) (Tympanic)   Resp 16   Wt 76.1 kg (167 lb 12.8 oz)   SpO2 97%   BMI 25.14 kg/m   Estimated body mass index is 25.14 kg/m  as calculated from the following:    Height as of 2/10/23: 1.74 m (5' 8.5\").    Weight as of this encounter: 76.1 kg (167 lb 12.8 oz).       Medication Reconciliation: Complete    Marybeth Casillas LPN .......  4/24/2023  8:48 AM   " Chief Complaint:   Encounter Diagnoses   Name Primary?    Flank hernia Yes    Renal cell carcinoma, unspecified laterality     Benign prostatic hyperplasia with urinary frequency     Urgency of urination        HPI:  79-year-old gentleman who comes in to discuss left flank pain, centered around a previous partial nephrectomy scar.  Patient states that he sees his primary urologist on a yearly basis, over 10 years ago had a left partial nephrectomy due to renal cell carcinoma.  In addition has had a TURP and is currently on oxybutynin 5 mL extended release.  Unfortunately this does not assist, no split stream, but he does have slow stream.  No gross hematuria, history of smoking, but persistent and aggravating urgency.  Patient also has significant back pain is requesting consultation to our pain management service for injections.  Patient has had gastric surgery by Dr. Frances in 2021, past history of hernia repair the abdominal wall.  And now his pain is centered around the medial aspect of his nephrectomy scar.    Allergies:  Patient has no known allergies.    Medications:  has a current medication list which includes the following prescription(s): acetaminophen, aspirin, atorvastatin, azelastine, cholecalciferol (vitamin d3), fish oil-omega-3 fatty acids, losartan-hydrochlorothiazide 100-12.5 mg, magnesium oxide, metformin, metoprolol tartrate, onetouch ultra blue test strip, oxybutynin, pantoprazole, and solifenacin.    Review of Systems:  General: No fever, chills, fatigability, or weight loss.  Skin: No rashes, itching, or changes in color or texture of skin.  Chest: Denies BRAR, cyanosis, wheezing, cough, and sputum production.  Abdomen: Appetite fine. No weight loss. Denies diarrhea, abdominal pain, hematemesis, or blood in stool.  Musculoskeletal: No joint stiffness or swelling. Denies back pain.  : As above.  All other review of systems negative.    PMH:   has a past medical history of CKD (chronic kidney  disease), stage III, DM (diabetes mellitus) with complications, GERD (gastroesophageal reflux disease), History of DVT (deep vein thrombosis), History of renal cell carcinoma (2009), Hyperlipidemia associated with type 2 diabetes mellitus (02/26/2021), Hypertension associated with diabetes, Thrombocytopenia, and Type II diabetes mellitus with renal manifestations.    PSH:   has a past surgical history that includes left partial nephrectomy (Left, 2006); Esophagogastroduodenoscopy (N/A, 2/3/2021); Endoscopic ultrasound of upper gastrointestinal tract (N/A, 2/5/2021); Robot-assisted surgical removal of stomach using da Gumaro Xi (N/A, 3/9/2021); Esophagogastroduodenoscopy (N/A, 3/9/2021); Closure of perforated ulcer of duodenum using omental patch (N/A, 3/9/2021); and Colonoscopy (N/A, 1/5/2022).    FamHx: family history includes Hodgkin's lymphoma in his father; Stroke in his mother.    SocHx:  reports that he quit smoking about 17 years ago. His smoking use included cigarettes. He has a 60.00 pack-year smoking history. He has never used smokeless tobacco. He reports that he does not currently use alcohol. He reports that he does not use drugs.      Physical Exam:  Vitals:    03/03/23 0911   BP: 135/79   Pulse: (!) 53   Resp: 18   Temp: 99.3 °F (37.4 °C)     General: A&Ox3, no apparent distress, no deformities  Neck: No masses, normal ROM  Lungs: normal inspiration, no use of accessory muscles  Heart: normal pulse, no arrhythmias  Abdomen: Soft, NT, ND, no masses, just medial to his subcostal incision he does have significant bulge with possible herniation.  This appears to be the site of his discomfort.  Skin: The skin is warm and dry. No jaundice.  Ext: No c/c/e.    Labs/Studies:   CT with contrast history of left partial nephrectomy, bilateral renal cysts 1/23   Creatinine 1.2 1/23    Impression/Plan:     1. Flank hernia- this is a possible hernia from previous subcostal incision years ago after a partial  nephrectomy.  Will refer to General surgery, Dr. Frances since he has operated on his left upper quadrant before, see if there is any options for hernia repair at this site.  Past history of umbilical hernia repair as well.      2. History of renal cell carcinoma- no evidence of recurrence, currently being followed by both Dr. Kolb and his primary urologist.      3. BPH- remote history of TURP, currently on oxybutynin 5 mg extended release.  This does not appear to be working as well, therefore will attempt a VESIcare 10 mg trial.  I have informed him this can cause dry mouth, dry eyes, urinary retention or constipation.  If he has any issues he is to stop the medication and contact our office.  Otherwise re-evaluate in 2 months.  He is currently still being followed by his primary urologist.      4. Urgency- please see above.

## 2023-04-26 ENCOUNTER — ALLIED HEALTH/NURSE VISIT (OUTPATIENT)
Dept: FAMILY MEDICINE | Facility: OTHER | Age: 88
End: 2023-04-26
Attending: INTERNAL MEDICINE
Payer: MEDICARE

## 2023-04-26 DIAGNOSIS — E53.8 B12 DEFICIENCY: Primary | ICD-10-CM

## 2023-04-26 PROCEDURE — 250N000011 HC RX IP 250 OP 636: Performed by: INTERNAL MEDICINE

## 2023-04-26 PROCEDURE — 96372 THER/PROPH/DIAG INJ SC/IM: CPT | Performed by: INTERNAL MEDICINE

## 2023-04-26 RX ADMIN — CYANOCOBALAMIN 1000 MCG: 1000 INJECTION, SOLUTION INTRAMUSCULAR; SUBCUTANEOUS at 09:31

## 2023-04-26 NOTE — PROGRESS NOTES
Verified patient's first and last name, and . Patient stated reason for visit today is to receive a B12 injection. Patient denied any concerns with previous injections. B12 prepared and administered IM as ordered. Administration of medication documented in MAR (see MAR for further information regarding dose, lot #, NDC #, expiration date).     YAMILA LOMAS RN ....................  2023   9:30 AM

## 2023-04-27 ENCOUNTER — HOSPITAL ENCOUNTER (OUTPATIENT)
Dept: OCCUPATIONAL THERAPY | Facility: OTHER | Age: 88
Setting detail: THERAPIES SERIES
Discharge: HOME | End: 2023-04-27
Attending: FAMILY MEDICINE
Payer: MEDICARE

## 2023-04-27 DIAGNOSIS — S42.401D OCCULT CLOSED FRACTURE OF RIGHT ELBOW WITH ROUTINE HEALING, SUBSEQUENT ENCOUNTER: ICD-10-CM

## 2023-04-27 PROCEDURE — 97140 MANUAL THERAPY 1/> REGIONS: CPT | Mod: GO | Performed by: OCCUPATIONAL THERAPIST

## 2023-04-27 PROCEDURE — 97165 OT EVAL LOW COMPLEX 30 MIN: CPT | Mod: GO | Performed by: OCCUPATIONAL THERAPIST

## 2023-04-27 PROCEDURE — 97110 THERAPEUTIC EXERCISES: CPT | Mod: GO | Performed by: OCCUPATIONAL THERAPIST

## 2023-04-27 NOTE — PROGRESS NOTES
[facility]          OUTPATIENT OCCUPATIONAL THERAPY  EVALUATION  PLAN OF TREATMENT FOR OUTPATIENT REHABILITATION  (COMPLETE FOR INITIAL CLAIMS ONLY)  Patient's Last Name, First Name, M.I.  YOB: 1934  Altaf Chao                        Provider's Name  [facility] Medical Record No.  [id number]                               Onset Date:     03/23/23   Start of Care Date:     04/27/23   Type:     ___PT   _X_OT   ___SLP Medical Diagnosis:     S42.401D (ICD-10-CM) - Occult closed fracture of right elbow with routine healing, subsequent encounter                          OT Diagnosis:     Impaired leisure and hoe mangement activities Visits from SOC:  1   _________________________________________________________________________________  Plan of Treatment/Functional Goals:  (P) Manual therapy, ROM, Strengthening, Stretching, Therapeutic activities  (P) Hot/cold packs, Paraffin bath, Ultrasound                 Goals  Goal Identifier: sleep  Goal Description: Pt will sleep 5/7 nights with out waking from pain to improve sleep hygeine  Target Date: 05/18/23     Goal Identifier: pain  Goal Description: pt will have pain levels < 4/10 to increase comfort and safety with golfing  Target Date: 05/18/23     Goal Identifier: hand strength  Goal Description: Pt will have Rt hand  stregth of 50 # or better to improve ability to hold onto bowling ball  Target Date: 06/08/23                             Therapy Frequency: 2x/week     Predicted Duration of Therapy Intervention (days/wks): 8 weeks  Josee Brown OT          I CERTIFY THE NEED FOR THESE SERVICES FURNISHED UNDER        THIS PLAN OF TREATMENT AND WHILE UNDER MY CARE     (Physician co-signature of this document indicates review and certification of the therapy plan).               ,    Certification date from:  04/27/23, Certification date to: 06/22/23               Referring Physician: Tristin Lima     Initial Assessment        See Epic Evaluation      Start Of Care Date: 04/27/23

## 2023-04-27 NOTE — PROGRESS NOTES
04/27/23 1000   Quick Adds   Quick Adds Certification   Type of Visit Initial Outpatient Occupational Therapy Evaluation   General Information   Start Of Care Date 04/27/23   Referring Physician Tristin Lima   Orders Evaluate and treat as indicated   Orders Date 04/24/23   Medical Diagnosis S42.401D (ICD-10-CM) - Occult closed fracture of right elbow with routine healing, subsequent encounter   Onset of Illness/Injury or Date of Surgery 03/23/23   Surgical/Medical History Reviewed Yes   Additional Occupational Profile Info/Pertinent History of Current Problem Pt slipped and fell on the ice sustaining an occult elbow fracture.   Role/Living Environment   Current Community Support Family/friend caregiver   Patient role/Employment history Retired   Community/Avocational Activities Bowling, golfing, curling   Prior Level - ADLS Independent   Prior Responsibilities - IADL Yardwork;Medication management;Driving;Finances   Role/Living Environment Comments Pt is active at home and in the community.   Patient/family Goals Statement decrease swelling and pain, increase strength   Pain   Patient currently in pain Yes   Pain location over distal bicep tendon   Pain rating 1-4   Pain description Sharp  (tender to palpation)   Pain comments wakes him up in the middle of the night   Fall Risk Screen   Fall screen completed by OT   Have you fallen 2 or more times in the past year? Yes   Have you fallen and had an injury in the past year? No   Is patient a fall risk? Department fall risk interventions implemented   Range of Motion (ROM)   ROM Comments circumfrence: wrist Rt   LT. 21 7/8cm Lt: 17 3/10  Palm: Rt:    Lt; 23 2/10 20 8/1/    Elbow Rt; 28 4/10  Left: 25 1/2 cm   Hand Strength   Hand Dominance Right   Left Hand  (pounds) 50 pounds   Right Hand  (pounds) 42 pounds    OT Goal 1   Goal Identifier sleep   Goal Description Pt will sleep 5/7 nights with out waking from pain to improve sleep hygeine   Target Date  05/18/23    OT Goal 2   Goal Identifier pain   Goal Description pt will have pain levels < 4/10 to increase comfort and safety with golfing   Target Date 05/18/23    OT Goal 3   Goal Identifier hand strength   Goal Description Pt will have Rt hand  stregth of 50 # or better to improve ability to hold onto bowling ball   Target Date 06/08/23   Clinical Impression   Criteria for Skilled Therapeutic Interventions Met Yes, treatment indicated   OT Diagnosis Impaired leisure and hoe mangement activities   Influenced by the following impairments weakness, edema, pain   Assessment of Occupational Performance 1-3 Performance Deficits   Clinical Decision Making (Complexity) Low complexity   Therapy Frequency 2x/week   Predicted Duration of Therapy Intervention (days/wks) 8 weeks   Risks and Benefits of Treatment have been explained. Yes   Patient, Family & other staff in agreement with plan of care Yes   Clinical Impression Comments Pt has significant edema from arm immobilization. He would benefit from skilled OT to decrease edema.   Education Assessment   Barriers To Learning No Barriers   Preferred Learning Style Listening;Demonstration;Pictures/video   Therapy Certification   Certification date from 04/27/23   Certification date to 06/22/23   Certification I certify the need for these services furnished under this plan of treatment and while under my care.  (Physician co-signature of this document indicates review and certification of the therapy plan)   Total Evaluation Time   OT Eval, Low Complexity Minutes ([id number]) 20

## 2023-05-03 ENCOUNTER — HOSPITAL ENCOUNTER (OUTPATIENT)
Dept: OCCUPATIONAL THERAPY | Facility: OTHER | Age: 88
Setting detail: THERAPIES SERIES
Discharge: HOME | End: 2023-05-03
Attending: FAMILY MEDICINE
Payer: MEDICARE

## 2023-05-03 PROCEDURE — 97140 MANUAL THERAPY 1/> REGIONS: CPT | Mod: GO

## 2023-05-03 PROCEDURE — 97110 THERAPEUTIC EXERCISES: CPT | Mod: GO

## 2023-05-10 ENCOUNTER — HOSPITAL ENCOUNTER (OUTPATIENT)
Dept: OCCUPATIONAL THERAPY | Facility: OTHER | Age: 88
Setting detail: THERAPIES SERIES
Discharge: HOME | End: 2023-05-10
Attending: FAMILY MEDICINE
Payer: MEDICARE

## 2023-05-10 DIAGNOSIS — R33.9 URINARY RETENTION: ICD-10-CM

## 2023-05-10 PROCEDURE — 97140 MANUAL THERAPY 1/> REGIONS: CPT | Mod: GO | Performed by: OCCUPATIONAL THERAPIST

## 2023-05-10 PROCEDURE — 97110 THERAPEUTIC EXERCISES: CPT | Mod: GO | Performed by: OCCUPATIONAL THERAPIST

## 2023-05-12 RX ORDER — TAMSULOSIN HYDROCHLORIDE 0.4 MG/1
0.4 CAPSULE ORAL EVERY EVENING
Qty: 90 CAPSULE | Refills: 3 | Status: SHIPPED | OUTPATIENT
Start: 2023-05-12 | End: 2023-05-16

## 2023-05-12 NOTE — TELEPHONE ENCOUNTER
[facility] sent Rx request for the following:    TAMSULOSIN HCL 0.4 MG CAPSULE  Last Prescription Date:   6/28/22  Last Fill Qty/Refills:         90, R-3    Last Office Visit:              2/10/23   Future Office visit:           5/15/23    Susan Jeff RN on 5/12/2023 at 11:28 AM

## 2023-05-15 ENCOUNTER — TELEPHONE (OUTPATIENT)
Dept: LAB | Facility: OTHER | Age: 88
End: 2023-05-15

## 2023-05-15 ENCOUNTER — LAB (OUTPATIENT)
Dept: LAB | Facility: OTHER | Age: 88
End: 2023-05-15
Attending: INTERNAL MEDICINE
Payer: MEDICARE

## 2023-05-15 DIAGNOSIS — N18.5 TYPE 2 DIABETES MELLITUS WITH STAGE 5 CHRONIC KIDNEY DISEASE NOT ON CHRONIC DIALYSIS, WITH LONG-TERM CURRENT USE OF INSULIN (H): Primary | ICD-10-CM

## 2023-05-15 DIAGNOSIS — E11.22 TYPE 2 DIABETES MELLITUS WITH STAGE 5 CHRONIC KIDNEY DISEASE NOT ON CHRONIC DIALYSIS, WITH LONG-TERM CURRENT USE OF INSULIN (H): Primary | ICD-10-CM

## 2023-05-15 DIAGNOSIS — Z79.4 TYPE 2 DIABETES MELLITUS WITH STAGE 5 CHRONIC KIDNEY DISEASE NOT ON CHRONIC DIALYSIS, WITH LONG-TERM CURRENT USE OF INSULIN (H): Primary | ICD-10-CM

## 2023-05-15 DIAGNOSIS — E11.22 TYPE 2 DIABETES MELLITUS WITH STAGE 5 CHRONIC KIDNEY DISEASE NOT ON CHRONIC DIALYSIS, WITH LONG-TERM CURRENT USE OF INSULIN (H): ICD-10-CM

## 2023-05-15 DIAGNOSIS — E78.2 MIXED HYPERLIPIDEMIA: ICD-10-CM

## 2023-05-15 DIAGNOSIS — Z79.4 TYPE 2 DIABETES MELLITUS WITH STAGE 5 CHRONIC KIDNEY DISEASE NOT ON CHRONIC DIALYSIS, WITH LONG-TERM CURRENT USE OF INSULIN (H): ICD-10-CM

## 2023-05-15 DIAGNOSIS — N18.5 TYPE 2 DIABETES MELLITUS WITH STAGE 5 CHRONIC KIDNEY DISEASE NOT ON CHRONIC DIALYSIS, WITH LONG-TERM CURRENT USE OF INSULIN (H): ICD-10-CM

## 2023-05-15 LAB
ALBUMIN SERPL BCG-MCNC: 4.1 G/DL (ref 3.5–5.2)
ALP SERPL-CCNC: 115 U/L (ref 40–129)
ALT SERPL W P-5'-P-CCNC: 11 U/L (ref 10–50)
ANION GAP SERPL CALCULATED.3IONS-SCNC: 16 MMOL/L (ref 7–15)
AST SERPL W P-5'-P-CCNC: 16 U/L (ref 10–50)
BILIRUB SERPL-MCNC: 0.3 MG/DL
BUN SERPL-MCNC: 78 MG/DL (ref 8–23)
CALCIUM SERPL-MCNC: 8.6 MG/DL (ref 8.8–10.2)
CHLORIDE SERPL-SCNC: 106 MMOL/L (ref 98–107)
CHOLEST SERPL-MCNC: 127 MG/DL
CREAT SERPL-MCNC: 7.51 MG/DL (ref 0.67–1.17)
DEPRECATED HCO3 PLAS-SCNC: 20 MMOL/L (ref 22–29)
ERYTHROCYTE [DISTWIDTH] IN BLOOD BY AUTOMATED COUNT: 14 % (ref 10–15)
GFR SERPL CREATININE-BSD FRML MDRD: 6 ML/MIN/1.73M2
GLUCOSE SERPL-MCNC: 145 MG/DL (ref 70–99)
HBA1C MFR BLD: 5.4 % (ref 4–6.2)
HCT VFR BLD AUTO: 33.7 % (ref 40–53)
HDLC SERPL-MCNC: 38 MG/DL
HGB BLD-MCNC: 10.8 G/DL (ref 13.3–17.7)
LDLC SERPL CALC-MCNC: 74 MG/DL
MCH RBC QN AUTO: 30.6 PG (ref 26.5–33)
MCHC RBC AUTO-ENTMCNC: 32 G/DL (ref 31.5–36.5)
MCV RBC AUTO: 96 FL (ref 78–100)
NONHDLC SERPL-MCNC: 89 MG/DL
PLATELET # BLD AUTO: 251 10E3/UL (ref 150–450)
POTASSIUM SERPL-SCNC: 5 MMOL/L (ref 3.4–5.3)
PROT SERPL-MCNC: 6.3 G/DL (ref 6.4–8.3)
RBC # BLD AUTO: 3.53 10E6/UL (ref 4.4–5.9)
SODIUM SERPL-SCNC: 142 MMOL/L (ref 136–145)
T4 FREE SERPL-MCNC: 0.9 NG/DL (ref 0.9–1.7)
TRIGL SERPL-MCNC: 77 MG/DL
TSH SERPL DL<=0.005 MIU/L-ACNC: 8.09 UIU/ML (ref 0.3–4.2)
WBC # BLD AUTO: 10.6 10E3/UL (ref 4–11)

## 2023-05-15 PROCEDURE — 80061 LIPID PANEL: CPT | Mod: ZL

## 2023-05-15 PROCEDURE — 85018 HEMOGLOBIN: CPT | Mod: ZL

## 2023-05-15 PROCEDURE — 84439 ASSAY OF FREE THYROXINE: CPT | Mod: ZL

## 2023-05-15 PROCEDURE — 84443 ASSAY THYROID STIM HORMONE: CPT | Mod: ZL

## 2023-05-15 PROCEDURE — 36415 COLL VENOUS BLD VENIPUNCTURE: CPT | Mod: ZL

## 2023-05-15 PROCEDURE — 82435 ASSAY OF BLOOD CHLORIDE: CPT | Mod: ZL

## 2023-05-15 PROCEDURE — 83036 HEMOGLOBIN GLYCOSYLATED A1C: CPT | Mod: ZL

## 2023-05-15 PROCEDURE — 80053 COMPREHEN METABOLIC PANEL: CPT | Mod: ZL

## 2023-05-16 ENCOUNTER — OFFICE VISIT (OUTPATIENT)
Dept: INTERNAL MEDICINE | Facility: OTHER | Age: 88
End: 2023-05-16
Attending: INTERNAL MEDICINE
Payer: COMMERCIAL

## 2023-05-16 VITALS
RESPIRATION RATE: 20 BRPM | WEIGHT: 166.2 LBS | BODY MASS INDEX: 24.9 KG/M2 | DIASTOLIC BLOOD PRESSURE: 74 MMHG | SYSTOLIC BLOOD PRESSURE: 138 MMHG | OXYGEN SATURATION: 98 % | TEMPERATURE: 97.2 F | HEART RATE: 89 BPM

## 2023-05-16 DIAGNOSIS — N00.7 ACUTE CRESCENTIC GLOMERULONEPHRITIS: ICD-10-CM

## 2023-05-16 DIAGNOSIS — D51.9 ANEMIA DUE TO VITAMIN B12 DEFICIENCY, UNSPECIFIED B12 DEFICIENCY TYPE: ICD-10-CM

## 2023-05-16 DIAGNOSIS — Z71.85 VACCINE COUNSELING: ICD-10-CM

## 2023-05-16 DIAGNOSIS — Z00.00 ENCOUNTER FOR MEDICARE ANNUAL WELLNESS EXAM: ICD-10-CM

## 2023-05-16 DIAGNOSIS — T38.0X5A STEROID-INDUCED HYPERGLYCEMIA: ICD-10-CM

## 2023-05-16 DIAGNOSIS — N05.7 PRIMARY PAUCI-IMMUNE NECROTIZING AND CRESCENTIC GLOMERULONEPHRITIS: ICD-10-CM

## 2023-05-16 DIAGNOSIS — I10 BENIGN ESSENTIAL HYPERTENSION: ICD-10-CM

## 2023-05-16 DIAGNOSIS — N25.81 HYPERPARATHYROIDISM DUE TO RENAL INSUFFICIENCY (H): ICD-10-CM

## 2023-05-16 DIAGNOSIS — R60.0 BILATERAL EDEMA OF LOWER EXTREMITY: ICD-10-CM

## 2023-05-16 DIAGNOSIS — R33.9 URINARY RETENTION: ICD-10-CM

## 2023-05-16 DIAGNOSIS — N05.8 PRIMARY PAUCI-IMMUNE NECROTIZING AND CRESCENTIC GLOMERULONEPHRITIS: ICD-10-CM

## 2023-05-16 DIAGNOSIS — E78.2 MIXED HYPERLIPIDEMIA: ICD-10-CM

## 2023-05-16 DIAGNOSIS — R76.8 ANTINEUTROPHIL CYTOPLASMIC ANTIBODY (ANCA) POSITIVE: ICD-10-CM

## 2023-05-16 DIAGNOSIS — D84.9 IMMUNOCOMPROMISED PATIENT (H): ICD-10-CM

## 2023-05-16 DIAGNOSIS — E11.22 TYPE 2 DIABETES MELLITUS WITH STAGE 5 CHRONIC KIDNEY DISEASE NOT ON CHRONIC DIALYSIS, WITH LONG-TERM CURRENT USE OF INSULIN (H): Primary | ICD-10-CM

## 2023-05-16 DIAGNOSIS — Z79.4 TYPE 2 DIABETES MELLITUS WITH STAGE 5 CHRONIC KIDNEY DISEASE NOT ON CHRONIC DIALYSIS, WITH LONG-TERM CURRENT USE OF INSULIN (H): Primary | ICD-10-CM

## 2023-05-16 DIAGNOSIS — R73.9 STEROID-INDUCED HYPERGLYCEMIA: ICD-10-CM

## 2023-05-16 DIAGNOSIS — N18.5 TYPE 2 DIABETES MELLITUS WITH STAGE 5 CHRONIC KIDNEY DISEASE NOT ON CHRONIC DIALYSIS, WITH LONG-TERM CURRENT USE OF INSULIN (H): Primary | ICD-10-CM

## 2023-05-16 DIAGNOSIS — N18.5 CKD (CHRONIC KIDNEY DISEASE) STAGE 5, GFR LESS THAN 15 ML/MIN (H): ICD-10-CM

## 2023-05-16 LAB
ALBUMIN UR-MCNC: 70 MG/DL
APPEARANCE UR: ABNORMAL
BACTERIA #/AREA URNS HPF: ABNORMAL /HPF
BILIRUB UR QL STRIP: NEGATIVE
COLOR UR AUTO: YELLOW
CREAT UR-MCNC: 31 MG/DL
GLUCOSE UR STRIP-MCNC: 50 MG/DL
HGB UR QL STRIP: ABNORMAL
KETONES UR STRIP-MCNC: NEGATIVE MG/DL
LEUKOCYTE ESTERASE UR QL STRIP: ABNORMAL
MICROALBUMIN UR-MCNC: 165 MG/L
MICROALBUMIN/CREAT UR: 532.26 MG/G CR (ref 0–17)
MUCOUS THREADS #/AREA URNS LPF: PRESENT /LPF
NITRATE UR QL: POSITIVE
PH UR STRIP: 7.5 [PH] (ref 5–9)
RBC URINE: 48 /HPF
SP GR UR STRIP: 1.01 (ref 1–1.03)
UROBILINOGEN UR STRIP-MCNC: NORMAL MG/DL
WBC CLUMPS #/AREA URNS HPF: PRESENT /HPF
WBC URINE: >182 /HPF

## 2023-05-16 PROCEDURE — G0463 HOSPITAL OUTPT CLINIC VISIT: HCPCS

## 2023-05-16 PROCEDURE — 82570 ASSAY OF URINE CREATININE: CPT | Mod: ZL

## 2023-05-16 PROCEDURE — 99214 OFFICE O/P EST MOD 30 MIN: CPT | Mod: 25 | Performed by: INTERNAL MEDICINE

## 2023-05-16 PROCEDURE — G0439 PPPS, SUBSEQ VISIT: HCPCS | Performed by: INTERNAL MEDICINE

## 2023-05-16 PROCEDURE — 81001 URINALYSIS AUTO W/SCOPE: CPT | Mod: ZL

## 2023-05-16 RX ORDER — AMLODIPINE BESYLATE 5 MG/1
5 TABLET ORAL DAILY
Qty: 90 TABLET | Refills: 1 | Status: SHIPPED | OUTPATIENT
Start: 2023-05-16 | End: 2023-08-24

## 2023-05-16 RX ORDER — TAMSULOSIN HYDROCHLORIDE 0.4 MG/1
0.4 CAPSULE ORAL EVERY EVENING
Qty: 90 CAPSULE | Refills: 1 | Status: SHIPPED | OUTPATIENT
Start: 2023-05-16 | End: 2023-08-24

## 2023-05-16 RX ORDER — FINASTERIDE 5 MG/1
1 TABLET, FILM COATED ORAL DAILY
Qty: 90 TABLET | Refills: 1 | Status: SHIPPED | OUTPATIENT
Start: 2023-05-16 | End: 2023-08-24

## 2023-05-16 RX ORDER — INSULIN GLARGINE 100 [IU]/ML
2 INJECTION, SOLUTION SUBCUTANEOUS AT BEDTIME
Qty: 15 ML | Refills: 1 | Status: SHIPPED | OUTPATIENT
Start: 2023-05-16 | End: 2023-08-24

## 2023-05-16 RX ORDER — PREDNISONE 2.5 MG/1
2.5 TABLET ORAL DAILY
Qty: 90 TABLET | Refills: 1 | Status: SHIPPED | OUTPATIENT
Start: 2023-05-16 | End: 2023-08-24

## 2023-05-16 RX ORDER — FUROSEMIDE 40 MG
40 TABLET ORAL EVERY MORNING
Qty: 90 TABLET | Refills: 1 | Status: SHIPPED | OUTPATIENT
Start: 2023-05-16 | End: 2023-08-24

## 2023-05-16 ASSESSMENT — ENCOUNTER SYMPTOMS
FATIGUE: 1
PALPITATIONS: 0
EYE PAIN: 0
SHORTNESS OF BREATH: 0
BRUISES/BLEEDS EASILY: 1
MYALGIAS: 0
DYSURIA: 0
WHEEZING: 0
ABDOMINAL PAIN: 0
NAUSEA: 0
CONFUSION: 0
AGITATION: 0
DIZZINESS: 0
ARTHRALGIAS: 0
DIARRHEA: 0
COUGH: 0
BACK PAIN: 0
HEMATURIA: 0
VOMITING: 0
FEVER: 0
LIGHT-HEADEDNESS: 0
CHILLS: 0

## 2023-05-16 ASSESSMENT — ACTIVITIES OF DAILY LIVING (ADL): CURRENT_FUNCTION: NO ASSISTANCE NEEDED

## 2023-05-16 ASSESSMENT — PAIN SCALES - GENERAL: PAINLEVEL: NO PAIN (0)

## 2023-05-16 NOTE — PATIENT INSTRUCTIONS
Blood pressure is well controlled.   Diabetes is well controlled.     Medications refilled.   Labs are stable.     Lab on 05/15/2023   Component Date Value Ref Range Status    Sodium 05/15/2023 142  136 - 145 mmol/L Final    Potassium 05/15/2023 5.0  3.4 - 5.3 mmol/L Final    Chloride 05/15/2023 106  98 - 107 mmol/L Final    Carbon Dioxide (CO2) 05/15/2023 20 (L)  22 - 29 mmol/L Final    Anion Gap 05/15/2023 16 (H)  7 - 15 mmol/L Final    Urea Nitrogen 05/15/2023 78.0 (H)  8.0 - 23.0 mg/dL Final    Creatinine 05/15/2023 7.51 (H)  0.67 - 1.17 mg/dL Final    Calcium 05/15/2023 8.6 (L)  8.8 - 10.2 mg/dL Final    Glucose 05/15/2023 145 (H)  70 - 99 mg/dL Final    Alkaline Phosphatase 05/15/2023 115  40 - 129 U/L Final    AST 05/15/2023 16  10 - 50 U/L Final    ALT 05/15/2023 11  10 - 50 U/L Final    Protein Total 05/15/2023 6.3 (L)  6.4 - 8.3 g/dL Final    Albumin 05/15/2023 4.1  3.5 - 5.2 g/dL Final    Bilirubin Total 05/15/2023 0.3  <=1.2 mg/dL Final    GFR Estimate 05/15/2023 6 (L)  >60 mL/min/1.73m2 Final    eGFR calculated using 2021 CKD-EPI equation.    Cholesterol 05/15/2023 127  <200 mg/dL Final    Triglycerides 05/15/2023 77  <150 mg/dL Final    Direct Measure HDL 05/15/2023 38 (L)  >=40 mg/dL Final    LDL Cholesterol Calculated 05/15/2023 74  <=100 mg/dL Final    Non HDL Cholesterol 05/15/2023 89  <130 mg/dL Final    WBC Count 05/15/2023 10.6  4.0 - 11.0 10e3/uL Final    RBC Count 05/15/2023 3.53 (L)  4.40 - 5.90 10e6/uL Final    Hemoglobin 05/15/2023 10.8 (L)  13.3 - 17.7 g/dL Final    Hematocrit 05/15/2023 33.7 (L)  40.0 - 53.0 % Final    MCV 05/15/2023 96  78 - 100 fL Final    MCH 05/15/2023 30.6  26.5 - 33.0 pg Final    MCHC 05/15/2023 32.0  31.5 - 36.5 g/dL Final    RDW 05/15/2023 14.0  10.0 - 15.0 % Final    Platelet Count 05/15/2023 251  150 - 450 10e3/uL Final    Hemoglobin A1C 05/15/2023 5.4  4.0 - 6.2 % Final    TSH 05/15/2023 8.09 (H)  0.30 - 4.20 uIU/mL Final    Free T4 05/15/2023 0.90  0.90 - 1.70  ng/dL Final    Creatinine Urine mg/dL 05/16/2023 31.0  mg/dL Final    The reference ranges have not been established in urine creatinine. The results should be integrated into the clinical context for interpretation.    Albumin Urine mg/L 05/16/2023 165.0  mg/L Final    The reference ranges have not been established in urine albumin. The results should be integrated into the clinical context for interpretation.    Albumin Urine mg/g Cr 05/16/2023 532.26 (H)  0.00 - 17.00 mg/g Cr Final    Microalbuminuria is defined as an albumin:creatinine ratio of 17 to 299 for males and 25 to 299 for females. A ratio of albumin:creatinine of 300 or higher is indicative of overt proteinuria.  Due to biologic variability, positive results should be confirmed by a second, first-morning random or 24-hour timed urine specimen. If there is discrepancy, a third specimen is recommended. When 2 out of 3 results are in the microalbuminuria range, this is evidence for incipient nephropathy and warrants increased efforts at glucose control, blood pressure control, and institution of therapy with an angiotensin-converting-enzyme (ACE) inhibitor (if the patient can tolerate it).      Color Urine 05/16/2023 Yellow  Colorless, Straw, Light Yellow, Yellow Final    Appearance Urine 05/16/2023 Cloudy (A)  Clear Final    Glucose Urine 05/16/2023 50 (A)  Negative mg/dL Final    Bilirubin Urine 05/16/2023 Negative  Negative Final    Ketones Urine 05/16/2023 Negative  Negative mg/dL Final    Specific Gravity Urine 05/16/2023 1.008  1.000 - 1.030 Final    Blood Urine 05/16/2023 Small (A)  Negative Final    pH Urine 05/16/2023 7.5  5.0 - 9.0 Final    Protein Albumin Urine 05/16/2023 70 (A)  Negative mg/dL Final    Urobilinogen Urine 05/16/2023 Normal  Normal, 2.0 mg/dL Final    Nitrite Urine 05/16/2023 Positive (A)  Negative Final    Leukocyte Esterase Urine 05/16/2023 Large (A)  Negative Final    Bacteria Urine 05/16/2023 Few (A)  None Seen /HPF Final     RBC Urine 05/16/2023 48 (H)  <=2 /HPF Final    WBC Urine 05/16/2023 >182 (H)  <=5 /HPF Final    WBC Clumps Urine 05/16/2023 Present (A)  None Seen /HPF Final    Mucus Urine 05/16/2023 Present (A)  None Seen /LPF Final        Aspects of Diabetes:   Recent Labs   Lab Test 05/15/23  0929 02/09/23  1345 10/31/22  0856   A1C 5.4 5.9 5.9   LDL 74 66 71   HDL 38* 40 34   TRIG 77 79 115   ALT 11 18 12   CR 7.51* 8.46* 8.56*   GFRESTIMATED 6* 6* 6*   POTASSIUM 5.0 5.1 4.6   TSH 8.09* 6.97* 6.91*   T4 0.90 0.81* 0.73   WBC 10.6 10.6 9.6   HGB 10.8* 11.2* 10.4*    290 242   ALBUMIN 4.1 4.3 3.9      Hemoglobin A1c  Goal range is under 8%. Best is 6.5 to 7   Blood Pressure 138/74 Goal to keep less than 140/90   Tobacco  reports that he quit smoking about 47 years ago. His smoking use included cigarettes. He has never used smokeless tobacco. Goal to abstain from tobacco   Aspirin or Plavix Anti-platelet therapy Aspirin or Plavix reduces risk of heart disease and stroke  -- sometimes used with other blood thinners, depending on bleeding risk and risk factors.    ACE/ARB Specific blood pressure meds These medications can reduce risk of kidney disease   Cholesterol Statins (Lipitor, Crestor, vs others) Statins reduce risk of heart disease and stroke   Eye Exam -- Do Yearly -- Annual diabetic eye exam   Healthy weight Wt Readings from Last 4 Encounters:   05/16/23 75.4 kg (166 lb 3.2 oz)   04/24/23 76.1 kg (167 lb 12.8 oz)   03/27/23 74.8 kg (164 lb 12.8 oz)   03/23/23 73.9 kg (163 lb)      Body mass index is 24.9 kg/m .  Goal BMI under 30, best is under 25.      -- Trying to exercise daily (goal at least 20 min/day) with moderate aerobic activity   -- Eat healthy (resources from ADA at http://www.diabetes.org/)   -- Taking good care of my feet. Consider seeing the Podiatrist   -- Check blood sugars as directed, record in log book and bring to every appointment    Insurance companies are grading you and I on your blood sugar  control -- Goal is to get your A1c down to 7.9% or lower and NO Smoking!  -- Medicare and most insurance companies, will only cover Hemoglobin A1c labs to be rechecked every 91+ days.      Return for Diabetes labs and clinic follow-up appointment every 3 to 4 months.    Schedule lab only appointment --- A few days AFTER: 8/14/23   Schedule clinic appointment with Dr. Machado -- Same day as labs, or 1-2 days later.          Patient Education   Personalized Prevention Plan  You are due for the preventive services outlined below.  Your care team is available to assist you in scheduling these services.  If you have already completed any of these items, please share that information with your care team to update in your medical record.  Health Maintenance Due   Topic Date Due    Zoster (Shingles) Vaccine (2 of 2) 05/25/2021    Eye Exam  06/01/2022    COVID-19 Vaccine (5 - Pfizer series) 02/26/2023    Annual Wellness Visit  04/21/2023     Preventive Health Recommendations  See your health care provider every year to  Review health changes.   Discuss preventive care.    Review your medicines if your doctor has prescribed any.  Talk with your health care provider about whether you should have a test to screen for prostate cancer (PSA).  Every 3 years, have a diabetes test (fasting glucose). If you are at risk for diabetes, you should have this test more often.  Every 5 years, have a cholesterol test. Have this test more often if you are at risk for high cholesterol or heart disease.   Every 10 years, have a colonoscopy. Or, have a yearly FIT test (stool test). These exams will check for colon cancer.  Talk to with your health care provider about screening for Abdominal Aortic Aneurysm if you have a family history of AAA or have a history of smoking.    Shots:   Get a flu shot each year.   Get a tetanus shot every 10 years.   Talk to your doctor about your pneumonia vaccines. There are now two you should receive - Pneumovax  (PPSV 23) and Prevnar (PCV 13).  Talk to your pharmacist about a shingles vaccine.   Talk to your doctor about the hepatitis B vaccine.    Nutrition:   Eat at least 5 servings of fruits and vegetables each day.   Eat whole-grain bread, whole-wheat pasta and brown rice instead of white grains and rice.   Get adequate Calcium and Vitamin D.     Lifestyle  Exercise for at least 150 minutes a week (30 minutes a day, 5 days a week). This will help you control your weight and prevent disease.   Limit alcohol to one drink per day.   No smoking.   Wear sunscreen to prevent skin cancer.   See your dentist every six months for an exam and cleaning.   See your eye doctor every 1 to 2 years to screen for conditions such as glaucoma, macular degeneration and cataracts.    Personalized Prevention Plan  You are due for the preventive services outlined below.  Your care team is available to assist you in scheduling these services.  If you have already completed any of these items, please share that information with your care team to update in your medical record.  Health Maintenance   Topic Date Due    ZOSTER IMMUNIZATION (2 of 2) 05/25/2021    EYE EXAM  06/01/2022    COVID-19 Vaccine (5 - Pfizer series) 02/26/2023    MEDICARE ANNUAL WELLNESS VISIT  04/21/2023    DIABETIC FOOT EXAM  07/27/2023    A1C  08/15/2023    BMP  08/15/2023    MICROALBUMIN  08/16/2023    HEMOGLOBIN  11/15/2023    LIPID  05/15/2024    FALL RISK ASSESSMENT  05/16/2024    ADVANCE CARE PLANNING  05/16/2028    DTAP/TDAP/TD IMMUNIZATION (2 - Td or Tdap) 12/27/2030    PARATHYROID  Completed    PHOSPHORUS  Completed    PHQ-2 (once per calendar year)  Completed    INFLUENZA VACCINE  Completed    URINALYSIS  Completed    ALK PHOS  Completed    Pneumococcal Vaccine: 65+ Years  Addressed    IPV IMMUNIZATION  Aged Out    MENINGITIS IMMUNIZATION  Aged Out

## 2023-05-16 NOTE — PROGRESS NOTES
Assessment & Plan     ICD-10-CM    1. 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 Benign essential hypertension  I10 amLODIPine (NORVASC) 5 MG tablet     furosemide (LASIX) 40 MG tablet      3. Bilateral edema of lower extremity  R60.0       4. Mixed hyperlipidemia  E78.2       5. Immunocompromised patient (H)  D84.9 predniSONE (DELTASONE) 2.5 MG tablet      6. Acute crescentic glomerulonephritis  N00.7       7. Antineutrophil cytoplasmic antibody (ANCA) positive  R76.8       8. Primary pauci-immune necrotizing and crescentic glomerulonephritis  N05.8 predniSONE (DELTASONE) 2.5 MG tablet    N05.7       9. Hyperparathyroidism due to renal insufficiency (H)  N25.81       10. Anemia due to vitamin B12 deficiency, unspecified B12 deficiency type  D51.9       11. Vaccine counseling  Z71.85       12. Encounter for Medicare annual wellness exam  Z00.00       13. Urinary retention  R33.9 finasteride (PROSCAR) 5 MG tablet     tamsulosin (FLOMAX) 0.4 MG capsule      14. Steroid-induced hyperglycemia  R73.9 insulin glargine (LANTUS SOLOSTAR) 100 UNIT/ML pen    T38.0X5A       15. CKD (chronic kidney disease) stage 5, GFR 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 ml/min (H)  N18.5 predniSONE (DELTASONE) 2.5 MG tablet      Patient presents for Medicare annual wellness visit as well as follow-up multiple issues.    Type 2 diabetes, currently well controlled.  Continues with low-dose Lantus due to steroid-induced hyperglycemia.  Takes steroids daily due to his glomerulonephritis and autoimmune disorder.  Blood sugars are otherwise well controlled.  Denies hypoglycemia.  Continues on Lantus for needs refills.    Stage V chronic kidney disease.  Currently managing with diet.  He is very active and exercises.  No dialysis.    Bilateral lower extremity edema.  Ongoing.  Recommend consideration of  compression stockings.  Does take Lasix daily.    Immunocompromised status.  Patient has glomerulonephritis, ANCA positive antibodies.  This caused renal failure and stage V.  Now with anemia due to chronic kidney disease, vitamin B12 deficiency also associated with anemia, also associated with hyperparathyroidism due to renal failure.  Overall labs are relatively stable.  Following regularly with nephrology.  Prednisone refilled.    Urinary retention.  Ongoing issues.  Using combination of finasteride and Flomax.  This has been reasonably effective and well-tolerated.  Needs refills.    Steroid-induced hyperglycemia.  See above.  Continues with Lantus but needs refills.    HYPERTENSION - Ongoing. Blood pressure is currently well controlled.  Medication side effects: None. Denies syncope or presyncope.  No changes for now. Continue - Amlodipine, Furosemide.   Medication list reviewed/updated. Refills completed as needed.      MIXED HYPERLIPIDEMIA.  Ongoing. LDL is at goal: No. Triglycerides are at goal: Yes.  Hopefully lifestyle modifications will improve cholesterol levels, otherwise we will need to consider additional medication dose adjustments or medication changes.  Medication side effects reported: Yes - Didn't want to take Statins.   Recent Labs   Lab Test 05/15/23  0929 02/09/23  1345   CHOL 127 122   HDL 38* 40   LDL 74 66   TRIG 77 79        Vaccine counseling completed.  Encouraged annual vaccinations.    Encouraged regular exercise.     Return in about 3 months (around 8/16/2023) for - Labs every 91+ days, with DM Follow-up, Same Day or 1-2 days later with Dr. Machado.    Valentino Machado MD  [facility]     SUBJECTIVE:   Chaitanya is a 89 year old who presents for Preventive Visit.      5/16/2023    10:57 AM   Additional Questions   Roomed by Richi SANDOVAL / CORINA   Accompanied by n/a     Patient has been advised of split billing requirements and indicates understanding: Yes  Are you in the  "first 12 months of your Medicare coverage?  No    History of Present Illness       Diabetes:   He presents for follow up of diabetes.  He is not checking blood glucose. When his blood glucose is low, the patient is asymptomatic for confusion, blurred vision, lethargy and reports not feeling dizzy, shaky, or weak.   He is having excessive thirst. The patient has had a diabetic eye exam in the last 12 months. Eye exam performed on 06/01/2022. Location of last eye exam Dr. Weiner.        He eats 2-3 servings of fruits and vegetables daily.He consumes 0 sweetened beverage(s) daily.He exercises with enough effort to increase his heart rate 9 or less minutes per day.  He exercises with enough effort to increase his heart rate 3 or less days per week.   He is not taking prescribed medications regularly due to None.  Healthy Habits:     In general, how would you rate your overall health?  Excellent    Frequency of exercise:  6-7 days/week    Duration of exercise:  Greater than 60 minutes    Do you usually eat at least 4 servings of fruit and vegetables a day, include whole grains    & fiber and avoid regularly eating high fat or \"junk\" foods?  Yes    Taking medications regularly:  Yes    Barriers to taking medications:  None    Medication side effects:  None    Ability to successfully perform activities of daily living:  No assistance needed    Home Safety:  Lack of grab bars in the bathroom    Hearing Impairment:  No hearing concerns    In the past 6 months, have you been bothered by leaking of urine?  No    In general, how would you rate your overall mental or emotional health?  Good      PHQ-2 Total Score: 0    Additional concerns today:  Yes      Have you ever done Advance Care Planning? (For example, a Health Directive, POLST, or a discussion with a medical provider or your loved ones about your wishes): Yes, advance care planning is on file.          Fall risk  Fallen 2 or more times in the past year?: No  Any fall " with injury in the past year?: No       Cognitive Screening   1) Repeat 3 items (Leader, Season, Table)    2) Clock draw: NORMAL  3) 3 item recall: Recalls 2 objects   Results: NORMAL clock, 1-2 items recalled: COGNITIVE IMPAIRMENT LESS LIKELY    Mini-CogTM Copyright GREGORIO Ames. Licensed by the author for use in [facility]; reprinted with permission (marie@[email or web address]). All rights reserved.      Do you have sleep apnea, excessive snoring or daytime drowsiness?: yes    Reviewed and updated as needed this visit by clinical staff   Tobacco  Allergies  Meds  Problems  Med Hx  Surg Hx  Fam Hx  Soc   Hx        Reviewed and updated as needed this visit by Provider   Tobacco  Allergies  Meds  Problems  Med Hx  Surg Hx  Fam Hx         Social History     Tobacco Use     Smoking status: Former     Types: Cigarettes     Quit date: 1976     Years since quittin.4     Smokeless tobacco: Never   Vaping Use     Vaping status: Never Used   Substance Use Topics     Alcohol use: Yes     Alcohol/week: 0.8 standard drinks of alcohol     Comment: maybe 1 or 2 month          View : No data to display.              Do you have a current opioid prescription? No  Do you use any other controlled substances or medications that are not prescribed by a provider? None    Current providers sharing in care for this patient include:   Patient Care Team:  Valentino Machado MD as PCP - General (Internal Medicine)  Bethel Gordon MD as Assigned Surgical Provider  Valentino Mahcado MD as Assigned PCP    The following health maintenance items are reviewed in Epic and correct as of today:  Health Maintenance   Topic Date Due     ZOSTER IMMUNIZATION (2 of 2) 2021     COVID-19 Vaccine (5 - Pfizer series) 2023     EYE EXAM  2023     DIABETIC FOOT EXAM  2023     A1C  08/15/2023     BMP  08/15/2023     MICROALBUMIN  2023     HEMOGLOBIN  11/15/2023     LIPID  05/15/2024     MEDICARE ANNUAL WELLNESS VISIT   "05/16/2024     FALL RISK ASSESSMENT  05/16/2024     ADVANCE CARE PLANNING  05/16/2028     DTAP/TDAP/TD IMMUNIZATION (2 - Td or Tdap) 12/27/2030     PARATHYROID  Completed     PHOSPHORUS  Completed     PHQ-2 (once per calendar year)  Completed     INFLUENZA VACCINE  Completed     URINALYSIS  Completed     ALK PHOS  Completed     Pneumococcal Vaccine: 65+ Years  Addressed     IPV IMMUNIZATION  Aged Out     MENINGITIS IMMUNIZATION  Aged Out     Review of Systems   Constitutional: Positive for fatigue. Negative for chills and fever.        Golfs 1x weekly in the summer and in the winter - curls 2x weekly and bowling 2x weekly    HENT: Negative for congestion and hearing loss.    Eyes: Negative for pain and visual disturbance.   Respiratory: Negative for cough, shortness of breath and wheezing.    Cardiovascular: Negative for chest pain and palpitations.   Gastrointestinal: Negative for abdominal pain, diarrhea, nausea and vomiting.   Endocrine: Negative for cold intolerance and heat intolerance.   Genitourinary: Negative for dysuria and hematuria.        Reports urinating well. No bladder symptoms at all.    Musculoskeletal: Positive for gait problem. Negative for arthralgias, back pain and myalgias.   Skin: Negative for pallor.   Allergic/Immunologic: Negative for immunocompromised state.   Neurological: Negative for dizziness and light-headedness.   Hematological: Bruises/bleeds easily.   Psychiatric/Behavioral: Negative for agitation and confusion.       OBJECTIVE:   /74 (BP Location: Left arm, Patient Position: Sitting, Cuff Size: Adult Regular)   Pulse 89   Temp 97.2  F (36.2  C) (Temporal)   Resp 20   Wt 75.4 kg (166 lb 3.2 oz)   SpO2 98%   BMI 24.90 kg/m   Estimated body mass index is 24.9 kg/m  as calculated from the following:    Height as of 2/10/23: 1.74 m (5' 8.5\").    Weight as of this encounter: 75.4 kg (166 lb 3.2 oz).  Physical Exam  Constitutional:       General: He is not in acute " distress.     Appearance: He is well-developed. He is not diaphoretic.   HENT:      Head: Normocephalic and atraumatic.   Eyes:      General: No scleral icterus.     Conjunctiva/sclera: Conjunctivae normal.   Cardiovascular:      Rate and Rhythm: Normal rate and regular rhythm.   Pulmonary:      Effort: Pulmonary effort is normal.      Breath sounds: Normal breath sounds.   Abdominal:      Palpations: Abdomen is soft.      Tenderness: There is no abdominal tenderness.   Musculoskeletal:         General: No deformity.      Cervical back: Neck supple.      Right lower leg: Edema present.      Left lower leg: Edema present.   Lymphadenopathy:      Cervical: No cervical adenopathy.   Skin:     General: Skin is warm and dry.      Findings: Bruising (bilateral arms) present. No rash.      Comments: + Dry skin   Neurological:      General: No focal deficit present.      Mental Status: He is alert. Mental status is at baseline.   Psychiatric:         Mood and Affect: Mood normal.         Behavior: Behavior normal.         Recent Labs   Lab Test 05/15/23  0929 02/09/23  1345 10/31/22  0856   A1C 5.4 5.9 5.9   LDL 74 66 71   HDL 38* 40 34   TRIG 77 79 115   ALT 11 18 12   CR 7.51* 8.46* 8.56*   GFRESTIMATED 6* 6* 6*   POTASSIUM 5.0 5.1 4.6   TSH 8.09* 6.97* 6.91*   T4 0.90 0.81* 0.73   WBC 10.6 10.6 9.6   HGB 10.8* 11.2* 10.4*    290 242   ALBUMIN 4.1 4.3 3.9     Hemoglobin A1c is well controlled.  LDL is high and not at goal.  HDL and triglycerides are at goal.  ALT normal.  Creatinine is at baseline.  Potassium normal.  TSH elevated.  Free T4 low normal.  Hemoglobin is low.  White blood cell count, platelet count, albumin levels are normal.    Patient has been advised of split billing requirements and indicates understanding: Yes      COUNSELING:  Reviewed preventive health counseling, as reflected in patient instructions  Special attention given to:       Regular exercise       Healthy diet/nutrition       Vision  screening       Hearing screening       Dental care       Bladder control       Fall risk prevention       Immunizations    He reports that he quit smoking about 47 years ago. His smoking use included cigarettes. He has never used smokeless tobacco.      Appropriate preventive services were discussed with this patient, including applicable screening as appropriate for cardiovascular disease, diabetes, osteopenia/osteoporosis, and glaucoma.  As appropriate for age/gender, discussed screening for colorectal cancer, prostate cancer, breast cancer, and cervical cancer. Checklist reviewing preventive services available has been given to the patient.    Reviewed patients plan of care and provided an AVS. The Complex Care Plan (for patients with higher acuity and needing more deliberate coordination of services) for Altaf meets the Care Plan requirement. This Care Plan has been established and reviewed with the Patient.          Valentino Machado MD  [facility]    Identified Health Risks:    I have reviewed Opioid Use Disorder and Substance Use Disorder risk factors and made any needed referrals.

## 2023-05-16 NOTE — NURSING NOTE
"Chief Complaint   Patient presents with     Diabetes     Medicare Wellness Visit         Initial /74 (BP Location: Left arm, Patient Position: Sitting, Cuff Size: Adult Regular)   Pulse 89   Temp 97.2  F (36.2  C) (Temporal)   Resp 20   Wt 75.4 kg (166 lb 3.2 oz)   SpO2 98%   BMI 24.90 kg/m   Estimated body mass index is 24.9 kg/m  as calculated from the following:    Height as of 2/10/23: 1.74 m (5' 8.5\").    Weight as of this encounter: 75.4 kg (166 lb 3.2 oz).       FOOD SECURITY SCREENING QUESTIONS:    The next two questions are to help us understand your food security.  If you are feeling you need any assistance in this area, we have resources available to support you today.    Hunger Vital Signs:  Within the past 12 months we worried whether our food would run out before we got money to buy more. Never  Within the past 12 months the food we bought just didn't last and we didn't have money to get more. Never    Advance Care Directive on file? yes      Medication reconciliation complete.      Richi Pepper,on 5/16/2023 at 11:02 AM        "

## 2023-05-18 DIAGNOSIS — T38.0X5A STEROID-INDUCED HYPERGLYCEMIA: ICD-10-CM

## 2023-05-18 DIAGNOSIS — R73.9 STEROID-INDUCED HYPERGLYCEMIA: ICD-10-CM

## 2023-05-19 ENCOUNTER — THERAPY VISIT (OUTPATIENT)
Dept: OCCUPATIONAL THERAPY | Facility: OTHER | Age: 88
End: 2023-05-19
Attending: FAMILY MEDICINE
Payer: MEDICARE

## 2023-05-19 DIAGNOSIS — S42.401A FRACTURE OF RIGHT ELBOW: Primary | ICD-10-CM

## 2023-05-19 PROCEDURE — 97110 THERAPEUTIC EXERCISES: CPT | Mod: GO

## 2023-05-19 PROCEDURE — 97140 MANUAL THERAPY 1/> REGIONS: CPT | Mod: GO

## 2023-05-19 PROCEDURE — 97035 APP MDLTY 1+ULTRASOUND EA 15: CPT | Mod: GO

## 2023-05-22 NOTE — TELEPHONE ENCOUNTER
B-D U/F 31G X 8 MM insulin pen needle     Sig: USE 1 PEN NEEDLES DAILY OR AS DIRECTED.           Last Written Prescription Date:  2/14/22  Last Fill Quantity: 100,   # refills: 3  Last Office Visit: 5/16/23  Future Office visit:    Next 5 appointments (look out 90 days)    May 23, 2023 10:45 AM  Nurse Only with  INJECTION NURSE  [facility] ([facility] ) [address]  [place] [address]  [phone]           Routing refill request to provider for review/approval because:  Drug not on the [facility], [facility] or [facility] refill protocol or controlled substance Kayy Ventura RN on 5/22/2023 at 3:09 PM

## 2023-05-23 ENCOUNTER — THERAPY VISIT (OUTPATIENT)
Dept: OCCUPATIONAL THERAPY | Facility: OTHER | Age: 88
End: 2023-05-23
Attending: FAMILY MEDICINE
Payer: MEDICARE

## 2023-05-23 ENCOUNTER — ALLIED HEALTH/NURSE VISIT (OUTPATIENT)
Dept: FAMILY MEDICINE | Facility: OTHER | Age: 88
End: 2023-05-23
Attending: INTERNAL MEDICINE
Payer: MEDICARE

## 2023-05-23 DIAGNOSIS — E53.8 B12 DEFICIENCY: Primary | ICD-10-CM

## 2023-05-23 DIAGNOSIS — S42.401A FRACTURE OF RIGHT ELBOW: ICD-10-CM

## 2023-05-23 DIAGNOSIS — S42.401D OCCULT CLOSED FRACTURE OF RIGHT ELBOW WITH ROUTINE HEALING, SUBSEQUENT ENCOUNTER: Primary | ICD-10-CM

## 2023-05-23 PROCEDURE — 250N000011 HC RX IP 250 OP 636: Performed by: INTERNAL MEDICINE

## 2023-05-23 PROCEDURE — 97140 MANUAL THERAPY 1/> REGIONS: CPT | Mod: GO | Performed by: OCCUPATIONAL THERAPIST

## 2023-05-23 PROCEDURE — 97035 APP MDLTY 1+ULTRASOUND EA 15: CPT | Mod: GO | Performed by: OCCUPATIONAL THERAPIST

## 2023-05-23 PROCEDURE — 96372 THER/PROPH/DIAG INJ SC/IM: CPT | Performed by: INTERNAL MEDICINE

## 2023-05-23 PROCEDURE — 97110 THERAPEUTIC EXERCISES: CPT | Mod: GO | Performed by: OCCUPATIONAL THERAPIST

## 2023-05-23 RX ORDER — PEN NEEDLE, DIABETIC 31 GX5/16"
NEEDLE, DISPOSABLE MISCELLANEOUS
Qty: 100 EACH | Refills: 3 | Status: SHIPPED | OUTPATIENT
Start: 2023-05-23 | End: 2023-01-01

## 2023-05-23 RX ADMIN — CYANOCOBALAMIN 1000 MCG: 1000 INJECTION, SOLUTION INTRAMUSCULAR; SUBCUTANEOUS at 10:47

## 2023-05-23 NOTE — PROGRESS NOTES
Verified patient's first and last name, and . Patient stated reason for visit today is to receive a B12 injection. Patient denied any concerns with previous injections. B12 prepared and administered IM as ordered. Administration of medication documented in MAR (see MAR for further information regarding dose, lot #, NDC #, expiration date).     YAMILA LOMAS RN on 2023 at 10:52 AM

## 2023-06-01 ENCOUNTER — THERAPY VISIT (OUTPATIENT)
Dept: OCCUPATIONAL THERAPY | Facility: OTHER | Age: 88
End: 2023-06-01
Attending: FAMILY MEDICINE
Payer: MEDICARE

## 2023-06-01 ENCOUNTER — TRANSFERRED RECORDS (OUTPATIENT)
Dept: MULTI SPECIALTY CLINIC | Facility: CLINIC | Age: 88
End: 2023-06-01

## 2023-06-01 DIAGNOSIS — S42.401D OCCULT CLOSED FRACTURE OF RIGHT ELBOW WITH ROUTINE HEALING, SUBSEQUENT ENCOUNTER: Primary | ICD-10-CM

## 2023-06-01 LAB — RETINOPATHY: NORMAL

## 2023-06-01 PROCEDURE — 97110 THERAPEUTIC EXERCISES: CPT | Mod: GO | Performed by: OCCUPATIONAL THERAPIST

## 2023-06-01 PROCEDURE — 97140 MANUAL THERAPY 1/> REGIONS: CPT | Mod: GO | Performed by: OCCUPATIONAL THERAPIST

## 2023-06-04 ENCOUNTER — APPOINTMENT (OUTPATIENT)
Dept: CT IMAGING | Facility: OTHER | Age: 88
End: 2023-06-04
Attending: PHYSICIAN ASSISTANT
Payer: MEDICARE

## 2023-06-04 ENCOUNTER — APPOINTMENT (OUTPATIENT)
Dept: GENERAL RADIOLOGY | Facility: OTHER | Age: 88
End: 2023-06-04
Attending: PHYSICIAN ASSISTANT
Payer: MEDICARE

## 2023-06-04 ENCOUNTER — HOSPITAL ENCOUNTER (EMERGENCY)
Facility: OTHER | Age: 88
Discharge: HOME | End: 2023-06-04
Attending: PHYSICIAN ASSISTANT | Admitting: PHYSICIAN ASSISTANT
Payer: MEDICARE

## 2023-06-04 VITALS
HEART RATE: 82 BPM | SYSTOLIC BLOOD PRESSURE: 168 MMHG | OXYGEN SATURATION: 96 % | DIASTOLIC BLOOD PRESSURE: 91 MMHG | HEIGHT: 69 IN | WEIGHT: 166 LBS | BODY MASS INDEX: 24.59 KG/M2 | RESPIRATION RATE: 17 BRPM | TEMPERATURE: 97.1 F

## 2023-06-04 DIAGNOSIS — S41.112A SKIN TEAR OF LEFT UPPER ARM WITHOUT COMPLICATION, INITIAL ENCOUNTER: ICD-10-CM

## 2023-06-04 DIAGNOSIS — S41.111A SKIN TEAR OF RIGHT UPPER ARM WITHOUT COMPLICATION, INITIAL ENCOUNTER: ICD-10-CM

## 2023-06-04 DIAGNOSIS — M25.561 ACUTE PAIN OF RIGHT KNEE: ICD-10-CM

## 2023-06-04 DIAGNOSIS — S02.2XXA NASAL FRACTURE: ICD-10-CM
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
## 2023-06-04 DIAGNOSIS — S01.81XA LACERATION OF FOREHEAD, INITIAL ENCOUNTER: ICD-10-CM

## 2023-06-04 DIAGNOSIS — S00.81XA FACIAL ABRASION, INITIAL ENCOUNTER: ICD-10-CM

## 2023-06-04 LAB
HOLD SPECIMEN: NORMAL
LACTATE SERPL-SCNC: 1.7 MMOL/L (ref 0.7–2)

## 2023-06-04 PROCEDURE — 250N000013 HC RX MED GY IP 250 OP 250 PS 637: Performed by: PHYSICIAN ASSISTANT

## 2023-06-04 PROCEDURE — G1010 CDSM STANSON: HCPCS | Mod: TC

## 2023-06-04 PROCEDURE — 99285 EMERGENCY DEPT VISIT HI MDM: CPT | Mod: 25 | Performed by: PHYSICIAN ASSISTANT

## 2023-06-04 PROCEDURE — 12006 RPR S/N/A/GEN/TRK20.1-30.0CM: CPT | Performed by: PHYSICIAN ASSISTANT

## 2023-06-04 PROCEDURE — 12013 RPR F/E/E/N/L/M 2.6-5.0 CM: CPT | Performed by: PHYSICIAN ASSISTANT

## 2023-06-04 PROCEDURE — 36415 COLL VENOUS BLD VENIPUNCTURE: CPT | Performed by: PHYSICIAN ASSISTANT

## 2023-06-04 PROCEDURE — 83605 ASSAY OF LACTIC ACID: CPT | Performed by: PHYSICIAN ASSISTANT

## 2023-06-04 PROCEDURE — 73562 X-RAY EXAM OF KNEE 3: CPT | Mod: TC,RT

## 2023-06-04 PROCEDURE — 99283 EMERGENCY DEPT VISIT LOW MDM: CPT | Mod: 25 | Performed by: PHYSICIAN ASSISTANT

## 2023-06-04 PROCEDURE — 250N000009 HC RX 250: Performed by: PHYSICIAN ASSISTANT

## 2023-06-04 RX ORDER — LIDOCAINE HYDROCHLORIDE AND EPINEPHRINE 10; 10 MG/ML; UG/ML
30 INJECTION, SOLUTION INFILTRATION; PERINEURAL ONCE
Status: COMPLETED | OUTPATIENT
Start: 2023-06-04 | End: 2023-06-04

## 2023-06-04 RX ORDER — GINSENG 100 MG
500 CAPSULE ORAL ONCE
Status: COMPLETED | OUTPATIENT
Start: 2023-06-04 | End: 2023-06-04

## 2023-06-04 RX ORDER — CEPHALEXIN 500 MG/1
500 CAPSULE ORAL 2 TIMES DAILY
Qty: 6 CAPSULE | Refills: 0 | Status: SHIPPED | OUTPATIENT
Start: 2023-06-04 | End: 2023-06-07

## 2023-06-04 RX ADMIN — LIDOCAINE HYDROCHLORIDE,EPINEPHRINE BITARTRATE 30 ML: 10; .01 INJECTION, SOLUTION INFILTRATION; PERINEURAL at 13:17

## 2023-06-04 RX ADMIN — CEPHALEXIN 500 MG: 250 CAPSULE ORAL at 13:16

## 2023-06-04 RX ADMIN — BACITRACIN 1 G: 500 OINTMENT TOPICAL at 13:17

## 2023-06-04 ASSESSMENT — ENCOUNTER SYMPTOMS
ARTHRALGIAS: 1
WOUND: 1

## 2023-06-04 ASSESSMENT — ACTIVITIES OF DAILY LIVING (ADL)
ADLS_ACUITY_SCORE: 35
ADLS_ACUITY_SCORE: 35

## 2023-06-04 NOTE — DISCHARGE INSTRUCTIONS
- Keep wound clean and dry.    - Wash with soap and water and and then apply new dressing twice a day.  You may apply bacitracin over your forehead.  Do not place any bacitracin over the wounds on your arms.  -Follow-up with your primary care doctor in 2 to 3 days for wound recheck.  - Return to the ED for any signs of infection to include increasing redness, increasing swelling, increasing pain, discharge, fever, or any other new or worsening symptoms.  I will place you on Keflex 500 mg every 12 hours for 3 days to prevent infection.  - Follow up with your provider in 7 days for suture removal on your forehead.  Follow-up with your provider in 10 to 14 days for suture removal on your arms.  Return to the ED if new or worsening symptoms.  -You were given 28 sutures today, 10 on your forehead, 10 on your left arm, 2 on your nose, and 6 on your right arm

## 2023-06-04 NOTE — ED TRIAGE NOTES
Pt comes into the ER today ambulatory. Pt reports he tripped on a rubber mat at [employer] this morning and fell and hit his head. Comes in with a laceration to the right brow area, skin tear to lower left arm. C/o head pain near the cut. No LOC. No neck pain with or without palpation. No blood thinners.      Triage Assessment     Row Name 06/04/23 0938       Triage Assessment (Adult)    Airway WDL WDL       Respiratory WDL    Respiratory WDL WDL       Skin Circulation/Temperature WDL    Skin Circulation/Temperature WDL X  laceration to right upper brow, skin tear to left lower arm       Cardiac WDL    Cardiac WDL WDL       Peripheral/Neurovascular WDL    Peripheral Neurovascular WDL WDL       Cognitive/Neuro/Behavioral WDL    Cognitive/Neuro/Behavioral WDL WDL

## 2023-06-04 NOTE — ED PROVIDER NOTES
"      EMERGENCY DEPARTMENT ENCOUNTER      NAME: Altaf Chao  AGE: 89 year old male  YOB: 1934  MRN: [id number]  EVALUATION DATE & TIME: 6/4/2023  9:36 AM    PCP: Valentino Machado    ED PROVIDER: Kam Devine PA-C       CHIEF COMPLAINT:  Chief Complaint   Patient presents with     Fall       FINAL IMPRESSION:  1. Laceration of forehead, initial encounter    2. Nasal fracture    3. Facial abrasion, initial encounter    4. Skin tear of right upper arm without complication, initial encounter    5. Skin tear of left upper arm without complication, initial encounter    6. Acute pain of right knee    7. Fall on same level from slipping, tripping or stumbling, initial encounter        ED COURSE, MEDICAL DECISION MAKING, ASSESSMENT, AND PLAN:      The patient was interviewed and examined.  HPI and physical exam as below.  Differential diagnosis and MDM Key Documentation Elements as below.  Vitals and triage note were reviewed.  BP (!) 168/91   Pulse 82   Temp 97.1  F (36.2  C) (Tympanic)   Resp 17   Ht 1.753 m (5' 9\")   Wt 75.3 kg (166 lb)   SpO2 96%   BMI 24.51 kg/m      Overall Impression/Treatment Plan/Discharge Info/Follow-up/Medical Necessity for Admission:  Altaf Chao is a pleasant 89 year old male who presents to the ER today with his wife for evaluation of mechanical fall.  Patient states that he was at [employer] this morning when he tripped over a rubber mat landing on his face and arms.  Patient sustained a laceration over his forehead and is having nasal pain and swelling with patient thinks he broke his nose.  Patient denies any syncope or loss of consciousness.  Patient also states that he has some skin tears over his right and left arms.  Also hit his right knee but pain is only minimal and patient was able to walk following with tripping.  Patient is not on any blood thinners.  No significant headache or neck pain.  No chest pain, back pain, abdominal pain.  No hip or pelvic pain. "  Patient is up-to-date on his tetanus shot.    Differential includes but not limited to intercranial hemorrhage, skull contusion, orbital fracture, nasal bone fracture, cervical spine fracture, right knee fracture, retained foreign body, laceration, skin tear, abrasion    Evaluation today revealed lacerations to patient's forehead, nose.  Patient also had large skin tears over his right and left arms.  No signs retained foreign body.  Patient was GCS 15.  Alert and orientated.  Patient did have tenderness over the nasal bridge.  No signs of septal hematoma.  No midline C-spine tenderness.  Pelvis was stable and nontender.  No long bone tenderness or deformity.  Patient complains of mild pain to the right knee but otherwise had full active range of motion, significant swelling or effusion.  Exam today was otherwise benign.    Imaging included CT of the head and CT and CT of the cervical spine all of which were unremarkable for acute findings.  CT of the facial bones revealed an acute fracture of the anterior nasal bones.  No orbital fractures.  X-rays of the right knee were unremarkable for acute fracture.    Lacerations here were repaired as described in lacerations note.  Patient tolerated procedures well without complication    Assessment/plan:  1. Lacerations of the forehead, nose, and skin tears of the right and left upper arms secondary to mechanical fall after tripping and falling  -Repaired here in the ER.  Lacerations of the forehead and nose easily repaired using 5-0 nylon sutures without complication.  Lacerations of the nose and articulate with the bone with no signs of open fracture.  Patient also with multiple abrasions to the forehead with no signs retained foreign body.  CTs of the head and cervical spine unremarkable for acute finding  -Sutures the forehead nose will be removed in around 7 days.  Wound care discussed.  Patient already up-to-date on tetanus.  Return to the ER if any worsening of  symptoms Patient will be on Keflex 500 mg twice daily for 3 days as a preventative measure for infection.    2.  Nasal fracture  -No signs of open fracture.  No signs of septal hematoma.  Anterior nasal bone fracture on CT facial bones.  Will refer patient to ENT for further evaluation.  Return to the ER for any worsening of symptoms.  Patient will be on Keflex 500 mg twice daily for 3 days as a preventative measure for infection.    3.  Skin tears of the left and right arms  -Steri-Strips were placed along the border and easily closed using 5-0 nylon sutures.  Wounds dressed here in the ER.  No signs retained foreign body.  Wound care discussed.  Follow-up with primary care doctor in 2 to 3 days for wound recheck.  Wound care discussed.  Patient not having significant pain.  Patient declined x-ray imaging.    Reassessments, Medications, Interventions, & Response to Treatments:  Discussed imaging results.  Pain improved with use of local anesthesia.  Discussed treatment plan and follow-up.  Discussed wound care.    Consultations:  None    Decision Rules, Medical Calculators, and Risk Stratification Tools:  None    MDM Key Documentation Elements for Patient's Evaluation:  1. Differential diagnosis to include high risk considerations: As above  2. Escalation to admission/observation considered: Admission/observation considered, but patient does not meet admission criteria  3. Discussions and management with other clinicians:    3a. Independent interpretation of testing performed by another health professional:  -No  3b. Discussion of management or test interpretation with another health professional: -No  4. Independent interpretation of tests:  Ordering and/or review of 3+ test(s); review of 3+ test result(s) ordered prior to this encounter  5. Discussion of test interpretations with radiology:  No  6. History obtained from source other than patient or assessment requiring an independent historian:  No  7. Review of  non-ED/external records:  review of 3+ records  8. Diagnostic tests considered but not ultimately performed/deferred:  -Consider pelvic x-rays but patient not having any hip pain  9. Prescription medications considered but not prescribed:  -Consider pain medication but patient not having significant pain  10. Chronic conditions affecting care:  -None  11. Care affected by social determinants of health:  -None    The patient's management involved:   - Imaging studies  - Prescription drug management    Pertinent Labs & Imaging studies reviewed. (See chart for details)  Results for orders placed or performed during the hospital encounter of 06/04/23   CT Head w/o Contrast    Impression    IMPRESSION:   1.   No acute intracranial findings.   2.   Soft tissue swelling over the right forehead.     THIS DOCUMENT HAS BEEN ELECTRONICALLY SIGNED BY ISAAC BAEZ MD   CT Cervical Spine w/o Contrast    Impression    IMPRESSION:   No cervical spine fracture.    THIS DOCUMENT HAS BEEN ELECTRONICALLY SIGNED BY ISAAC BAEZ MD   CT Facial Bones without Contrast    Impression    IMPRESSION:   1.   Fracture of the anterior nasal bones.   2.   Mild sinus disease.     THIS DOCUMENT HAS BEEN ELECTRONICALLY SIGNED BY ISAAC BAEZ MD   XR Knee Right 3 Views    Impression    IMPRESSION:   No evidence of fracture.    THIS DOCUMENT HAS BEEN ELECTRONICALLY SIGNED BY ISAAC BAEZ MD   Extra Blue Top Tube   Result Value Ref Range    Hold Specimen JIC    Extra Red Top Tube   Result Value Ref Range    Hold Specimen JIC    Extra Green Top (Lithium Heparin) Tube   Result Value Ref Range    Hold Specimen JIC    Extra Green Top (Lithium Heparin) Tube   Result Value Ref Range    Hold Specimen JIC    Extra Purple Top Tube   Result Value Ref Range    Hold Specimen JIC    Lactic acid whole blood   Result Value Ref Range    Lactic Acid 1.7 0.7 - 2.0 mmol/L     No results found for: ABORH      A shared decision making model was used. Time was taken  to answer all questions.  Patient and/or associated parties understood and were agreeable to treatment plan.  Plan and all results were discussed. Warning signs and close return precautions to return to the ED given. Copy of results given. Discharged in stable condition. Discharged with discharge instructions outlining plan for further care and follow up.        PPE worn during patient evaluation:  Mask: Yes, surgical  Eye Protection: No  Gown: No  Hair cover: No  Face Shield: No  Patient wearing a mask: yes      MEDICATIONS GIVEN IN THE EMERGENCY:  Medications   lidocaine 1% with EPINEPHrine 1:100,000 injection 30 mL (30 mLs Intradermal $Given by Other 6/4/23 1317)   bacitracin ointment 1 g (1 g Topical $Given 6/4/23 1317)   cephALEXin (KEFLEX) capsule 500 mg (500 mg Oral $Given 6/4/23 1316)       NEW PRESCRIPTIONS STARTED AT TODAY'S ER VISIT:  Discharge Medication List as of 6/4/2023  1:14 PM      START taking these medications    Details   cephALEXin (KEFLEX) 500 MG capsule Take 1 capsule (500 mg) by mouth 2 times daily for 3 days, Disp-6 capsule, R-0, E-Prescribe                =================================================================    HPI  Altaf Chao is a pleasant 89 year old male who presents to the ER today with his wife for evaluation of mechanical fall.  Patient states that he was at [employer] this morning when he tripped over a rubber mat landing on his face and arms.  Patient sustained a laceration over his forehead and is having nasal pain and swelling with patient thinks he broke his nose.  Patient denies any syncope or loss of consciousness.  Patient also states that he has some skin tears over his right and left arms.  Also hit his right knee but pain is only minimal and patient was able to walk following with tripping.  Patient is not on any blood thinners.  No significant headache or neck pain.  No chest pain, back pain, abdominal pain.  No hip or pelvic pain.  Patient is up-to-date on  his tetanus shot.      REVIEW OF SYSTEMS   Review of Systems   Musculoskeletal: Positive for arthralgias.   Skin: Positive for wound.       Remainder of systems reviewed, unless noted in HPI all others negative.      PAST MEDICAL HISTORY:  Past Medical History:   Diagnosis Date     Acute kidney failure (H)     04/2017     Essential (primary) hypertension     8/7/2012     Hyperlipidemia     No Comments Provided     Osteoarthritis of hip     No Comments Provided     Pityriasis rosea     No Comments Provided     Tachycardia     No Comments Provided     Unilateral inguinal hernia without obstruction or gangrene     8/12/2013,Right Inguinal hernia with incarceration, massive [[id number]][       PAST SURGICAL HISTORY:  Past Surgical History:   Procedure Laterality Date     CIRCUMCISION      03/14/2017,Dr. Heidi GREENBERG     OTHER SURGICAL HISTORY      65714.9,LA SUBTALAR ATHROEREISIS,bone Spurs excision on Toe     OTHER SURGICAL HISTORY      8/20/13,,HERNIA REPAIR,Right,RIH with mesh     OTHER SURGICAL HISTORY      8/20/13,68171,GENITAL SURGERY MALE,Dorsal Slit     OTHER SURGICAL HISTORY      4/15/14,,HERNIA REPAIR,Left,LIH with mesh     OTHER SURGICAL HISTORY      6/30/14,91313.0,LA TOTAL HIP ARTHROPLASTY,Left           CURRENT MEDICATIONS:    Current Outpatient Medications   Medication Instructions     amLODIPine (NORVASC) 5 mg, Oral, DAILY     B-D U/F 31G X 8 MM insulin pen needle USE 1 PEN NEEDLES DAILY OR AS DIRECTED.     Cranberry-Vitamin C (CRANBERRY CONCENTRATE/VITAMINC) 27252-295 MG CAPS 2 capsules, Oral, DAILY, - for UTI prevention     darbepoetin miller (ARANESP) 60 mcg, EVERY 28 DAYS     famotidine (PEPCID) 20 mg, Oral, DAILY, -- for heartburn (Use as needed)     finasteride (PROSCAR) 5 mg, Oral, DAILY     furosemide (LASIX) 40 mg, Oral, EVERY MORNING     Lantus SoloStar 2 Units, Subcutaneous, AT BEDTIME     predniSONE (DELTASONE) 2.5 mg, Oral, DAILY     sodium bicarbonate 650 mg, Oral, Take 2 tablets 3  "times a day     tamsulosin (FLOMAX) 0.4 mg, Oral, EVERY EVENING       ALLERGIES:  Allergies   Allergen Reactions     Statins [Statins] Other (See Comments)     -- Patient declined --       FAMILY HISTORY:  Family History   Problem Relation Age of Onset     Other - See Comments Son 12        neuroblastoma at 13 yo  at 14.     Genetic Disorder No family hx of         Genetic,No known FHx of DM, CAD, CVA       SOCIAL HISTORY:   Social History     Socioeconomic History     Marital status:      Spouse name: Lucita   Tobacco Use     Smoking status: Former     Types: Cigarettes     Quit date: 1976     Years since quittin.4     Smokeless tobacco: Never   Vaping Use     Vaping status: Never Used   Substance and Sexual Activity     Alcohol use: Yes     Alcohol/week: 0.8 standard drinks of alcohol     Comment: maybe 1 or 2 month     Drug use: Never     Sexual activity: Yes     Partners: Female   Social History Narrative     - Wife Lucita.   3 kids - oldest son - neuroblastoma at 13 yo  at 14.   [employer] - Worked in [employer] -- [employer] and .       PHYSICAL EXAM    VITAL SIGNS: BP (!) 168/91   Pulse 82   Temp 97.1  F (36.2  C) (Tympanic)   Resp 17   Ht 1.753 m (5' 9\")   Wt 75.3 kg (166 lb)   SpO2 96%   BMI 24.51 kg/m      No data found.    Physical Exam  Vitals and nursing note reviewed.   Constitutional:       Appearance: Normal appearance. He is obese.   HENT:      Head: Normocephalic.      Comments: 6 cm forehead laceration     Right Ear: Tympanic membrane, ear canal and external ear normal.      Left Ear: Tympanic membrane, ear canal and external ear normal.      Nose:      Comments: Patient with two 0.5 cm lacerations over the nasal bridge.  Nasal bridge was tender to palpation, bruised, swollen, concerning for fracture.  No open wound diverticulitis with the bones with no signs of open fracture.  No signs of septal hematoma.     " Mouth/Throat:      Mouth: Mucous membranes are moist.      Pharynx: Oropharynx is clear.      Comments: No signs of dental or tongue injury  Eyes:      Extraocular Movements: Extraocular movements intact.      Conjunctiva/sclera: Conjunctivae normal.      Pupils: Pupils are equal, round, and reactive to light.   Neck:      Comments: No midline C-spine tenderness  Cardiovascular:      Rate and Rhythm: Normal rate and regular rhythm.      Pulses: Normal pulses.      Heart sounds: Normal heart sounds.   Pulmonary:      Effort: Pulmonary effort is normal.      Breath sounds: Normal breath sounds. No wheezing, rhonchi or rales.   Chest:      Chest wall: No tenderness.   Abdominal:      General: Abdomen is flat. Bowel sounds are normal.      Palpations: Abdomen is soft.      Tenderness: There is no abdominal tenderness.   Musculoskeletal:         General: Normal range of motion.      Cervical back: Normal range of motion and neck supple. No rigidity or tenderness.      Comments: Pelvis was stable and nontender.  No limb length discrepancy.  No long bone tenderness or deformity.  Patient able to ambulate without difficulty.  Patient with only minimal tenderness of the right knee without swelling or effusion.  No erythema or warmth to suggest septic arthritis.  Patient had a full active range of motion of the right knee.  No signs of patellar tendon disruption.  Patient is neurovascular intact in the lower extremities.   Skin:     General: Skin is warm and dry.      Comments: Patient with a transverse 6 cm laceration over patient's forehead.  Patient with a skin tear measuring 11 cm on the left arm, two 0.5 cm lacerations over the nasal bridge, and 10 cm on the right arm.  No signs of retained foreign body in any of the wounds.  There is no bony, tendon, nerve, vascular involvement.  No signs of erythema, warmth, or swelling to suggest cellulitis.   Neurological:      General: No focal deficit present.      Mental Status:  He is alert and oriented to person, place, and time.      Cranial Nerves: No cranial nerve deficit.      Sensory: No sensory deficit.      Motor: No weakness.      Comments: Patient is alert and oriented x4.  GCS 15.  Cranial nerves II through XII exam unremarkable.  Normal gait.   Psychiatric:         Mood and Affect: Mood normal.         Behavior: Behavior normal.         Thought Content: Thought content normal.          LABS & RADIOLOGY:  All pertinent labs reviewed and interpreted. Reviewed all pertinent imaging. Please see official radiology report.  Results for orders placed or performed during the hospital encounter of 06/04/23   CT Head w/o Contrast    Impression    IMPRESSION:   1.   No acute intracranial findings.   2.   Soft tissue swelling over the right forehead.     THIS DOCUMENT HAS BEEN ELECTRONICALLY SIGNED BY ISAAC BAEZ MD   CT Cervical Spine w/o Contrast    Impression    IMPRESSION:   No cervical spine fracture.    THIS DOCUMENT HAS BEEN ELECTRONICALLY SIGNED BY ISAAC BAEZ MD   CT Facial Bones without Contrast    Impression    IMPRESSION:   1.   Fracture of the anterior nasal bones.   2.   Mild sinus disease.     THIS DOCUMENT HAS BEEN ELECTRONICALLY SIGNED BY ISAAC BAEZ MD   XR Knee Right 3 Views    Impression    IMPRESSION:   No evidence of fracture.    THIS DOCUMENT HAS BEEN ELECTRONICALLY SIGNED BY ISAAC BAEZ MD   Extra Blue Top Tube   Result Value Ref Range    Hold Specimen JIC    Extra Red Top Tube   Result Value Ref Range    Hold Specimen JIC    Extra Green Top (Lithium Heparin) Tube   Result Value Ref Range    Hold Specimen JIC    Extra Green Top (Lithium Heparin) Tube   Result Value Ref Range    Hold Specimen JIC    Extra Purple Top Tube   Result Value Ref Range    Hold Specimen JIC    Lactic acid whole blood   Result Value Ref Range    Lactic Acid 1.7 0.7 - 2.0 mmol/L     XR Knee Right 3 Views   Final Result   IMPRESSION:    No evidence of fracture.      THIS DOCUMENT HAS  BEEN ELECTRONICALLY SIGNED BY ISAAC BAEZ MD      CT Cervical Spine w/o Contrast   Final Result   IMPRESSION:    No cervical spine fracture.      THIS DOCUMENT HAS BEEN ELECTRONICALLY SIGNED BY ISAAC BAEZ MD      CT Facial Bones without Contrast   Final Result   IMPRESSION:    1.   Fracture of the anterior nasal bones.    2.   Mild sinus disease.       THIS DOCUMENT HAS BEEN ELECTRONICALLY SIGNED BY ISAAC BAEZ MD      CT Head w/o Contrast   Final Result   IMPRESSION:    1.   No acute intracranial findings.    2.   Soft tissue swelling over the right forehead.       THIS DOCUMENT HAS BEEN ELECTRONICALLY SIGNED BY ISAAC BAEZ MD            PROCEDURES:   INFORMED CONSENT  Discussed the risks, benefits, alternatives, and the necessity of other members of the [facility] team participating in the procedure. All questions answered and informed consent obtained.    UNIVERSAL PROTOCOL  Procedural pause conducted to verify: Correct patient identity, procedure to be performed, and as applicable, correct side and site, correct patient position, and availability of implants, special equipment, or special requirements.    [facility]    -Laceration Repair    Date/Time: 6/4/2023 1:16 PM    Performed by: Kam Devine PA-C  Authorized by: Kam Devine PA-C    Risks, benefits and alternatives discussed.      ANESTHESIA (see MAR for exact dosages):     Anesthesia method:  Local infiltration    Local anesthetic:  Lidocaine 1% WITH epi (6 cc given with good local anesthesia obtained)  LACERATION DETAILS     Location: Forehead.    Length (cm):  6    REPAIR TYPE:     Repair type:  Simple      EXPLORATION:     Hemostasis achieved with:  Direct pressure    Wound exploration: entire depth of wound probed and visualized      Wound extent: no foreign body, no signs of injury and no underlying fracture      Contaminated: no      TREATMENT:     Area cleansed with:  Hibiclens    Amount  of cleaning:  Standard    Irrigation solution:  Sterile saline    Irrigation method:  Pressure wash    Visualized foreign bodies/material removed: no      SKIN REPAIR     Repair method:  Sutures    Suture size:  5-0    Suture material:  Nylon    Suture technique:  Simple interrupted    Number of sutures:  10    APPROXIMATION     Approximation:  Close    POST-PROCEDURE DETAILS     Dressing:  Antibiotic ointment        PROCEDURE    Patient Tolerance:  Patient tolerated the procedure well with [facility]    -Laceration Repair    Date/Time: 6/4/2023 1:18 PM    Performed by: Kam eDvine PA-C  Authorized by: Kam Devine PA-C    Risks, benefits and alternatives discussed.      ANESTHESIA (see MAR for exact dosages):     Anesthesia method:  None  LACERATION DETAILS     Location: Nose.    Wound length (cm): 2 lacerations measuring 0.5 cm each.    REPAIR TYPE:     Repair type:  Simple      EXPLORATION:     Hemostasis achieved with:  Direct pressure    Wound exploration: entire depth of wound probed and visualized      Wound extent comment:  Superficial wounds.  Did not articulate with bony structures.  No signs retained foreign body.    Contaminated: no      TREATMENT:     Area cleansed with:  Betadine    Amount of cleaning:  Standard    Irrigation solution:  Sterile saline    Irrigation method:  Pressure wash    Visualized foreign bodies/material removed: no      SKIN REPAIR     Repair method:  Sutures    Suture size:  5-0    Suture material:  Nylon    Suture technique:  Simple interrupted    Number of sutures: Each wound closed with 1 suture for total of 2 sutures.    APPROXIMATION     Approximation:  Close    POST-PROCEDURE DETAILS     Dressing:  Antibiotic ointment        PROCEDURE    Patient Tolerance:  Patient tolerated the procedure well with no [facility]    -Laceration Repair    Date/Time: 6/4/2023  1:19 PM    Performed by: Kam Devine PA-C  Authorized by: Kam Devine PA-C    Risks, benefits and alternatives discussed.      ANESTHESIA (see MAR for exact dosages):     Anesthesia method:  Local infiltration    Local anesthetic:  Lidocaine 1% WITH epi (15 cc given with good local anesthesia obtained)  LACERATION DETAILS     Location: Left arm.    Length (cm):  11    REPAIR TYPE:     Repair type:  Simple      EXPLORATION:     Hemostasis achieved with:  Epinephrine and direct pressure    Wound exploration: wound explored through full range of motion and entire depth of wound probed and visualized      Wound extent: fascia not violated, no foreign body, no signs of injury, no nerve damage, no tendon damage, no underlying fracture and no vascular damage      Contaminated: no      TREATMENT:     Area cleansed with:  Betadine    Amount of cleaning:  Standard    Irrigation solution:  Sterile saline    Irrigation method:  Pressure wash    Visualized foreign bodies/material removed: no      SKIN REPAIR     Repair method:  Steri-Strips and sutures    Suture size:  5-0    Suture material:  Nylon    Suture technique: Simple interrupted and horizontal mattress.    Number of sutures: 10.    Number of Steri-Strips: Borders were aligned with 1/2 inch Steri-Strips and Dermabond was applied for additional strength.    APPROXIMATION     Approximation:  Close    POST-PROCEDURE DETAILS     Dressing:  Non-adherent dressing and sterile dressing        PROCEDURE    Patient Tolerance:  Patient tolerated the procedure well with no immediate complications[facility]    -Laceration Repair    Date/Time: 6/4/2023 1:22 PM    Performed by: Kam Devine PA-C  Authorized by: Kam Devine PA-C    Risks, benefits and alternatives discussed.      ANESTHESIA (see MAR for exact dosages):     Anesthesia method:  None  LACERATION DETAILS     Location: Right arm.    Length (cm):   10    REPAIR TYPE:     Repair type:  Simple      EXPLORATION:     Hemostasis achieved with:  Epinephrine and direct pressure    Wound exploration: wound explored through full range of motion and entire depth of wound probed and visualized      Wound extent: no foreign body, no signs of injury, no nerve damage, no tendon damage, no underlying fracture and no vascular damage      Contaminated: no      TREATMENT:     Area cleansed with:  Betadine    Amount of cleaning:  Standard    Irrigation solution:  Sterile saline    Irrigation method:  Pressure wash    Visualized foreign bodies/material removed: no      SKIN REPAIR     Repair method:  Sutures and Steri-Strips    Suture size:  5-0    Suture material:  Nylon    Suture technique:  Horizontal mattress    Number of sutures:  6    Number of Steri-Strips: Borders of wound lined with 1/2 inch Steri-Strips.    APPROXIMATION     Approximation:  Close    POST-PROCEDURE DETAILS     Dressing:  Non-adherent dressing and sterile dressing        PROCEDURE    Patient Tolerance:  Patient tolerated the procedure well with no immediate complications        Dante CUTLER PA-C, personally performed the services described in this documentation, and it is both accurate and complete.     Kam Devine PA-C  06/05/23 [id number]

## 2023-06-04 NOTE — ED NOTES
Non adherent gauze applied to upper extremities over stitches and steri-strips. Roll gauze applied on top of that. Pt denies pain or dizziness at this time.

## 2023-06-05 ENCOUNTER — TELEPHONE (OUTPATIENT)
Dept: INTERNAL MEDICINE | Facility: OTHER | Age: 88
End: 2023-06-05
Payer: COMMERCIAL

## 2023-06-05 NOTE — TELEPHONE ENCOUNTER
After proper verification, patient was relayed information from below and transferred to scheduling to make an appointment,   Luba Kearney LPN on 6/5/2023 at 12:27 PM

## 2023-06-05 NOTE — TELEPHONE ENCOUNTER
Reason for call: Patient wanting a work in appointment.    Is the appointment for a Hospital Follow up?  Yes ER Visit 6/4/2023 Wound Check    (If yes - Unable to find an appointment with any provider during the time frame needed. Nurse/Provider - Can this patient be worked into a schedule with PCP or team member?)      Was an appointment offered for this call? No      Preferred method for responding to this message: Telephone Call    Phone number patient can be reached at? Cell number on file:    Telephone Information:   Mobile [phone]       If we can't reach you directly, may we leave a detailed response at the number you provided? Yes    Can this message wait until your PCP/provider returns if unavailable today? Yes     Mell Valdez on 6/5/2023 at 9:27 AM

## 2023-06-12 ENCOUNTER — OFFICE VISIT (OUTPATIENT)
Dept: FAMILY MEDICINE | Facility: OTHER | Age: 88
End: 2023-06-12
Attending: PHYSICIAN ASSISTANT
Payer: COMMERCIAL

## 2023-06-12 VITALS
HEIGHT: 69 IN | OXYGEN SATURATION: 99 % | RESPIRATION RATE: 18 BRPM | DIASTOLIC BLOOD PRESSURE: 84 MMHG | HEART RATE: 93 BPM | TEMPERATURE: 97.1 F | WEIGHT: 163.4 LBS | SYSTOLIC BLOOD PRESSURE: 140 MMHG | BODY MASS INDEX: 24.2 KG/M2

## 2023-06-12 DIAGNOSIS — S41.112A SKIN TEAR OF LEFT UPPER ARM WITHOUT COMPLICATION, INITIAL ENCOUNTER: ICD-10-CM

## 2023-06-12 DIAGNOSIS — Z48.02 VISIT FOR SUTURE REMOVAL: Primary | ICD-10-CM

## 2023-06-12 DIAGNOSIS — S41.111A SKIN TEAR OF RIGHT UPPER ARM WITHOUT COMPLICATION, INITIAL ENCOUNTER: ICD-10-CM

## 2023-06-12 PROCEDURE — G0463 HOSPITAL OUTPT CLINIC VISIT: HCPCS

## 2023-06-12 PROCEDURE — 99213 OFFICE O/P EST LOW 20 MIN: CPT | Performed by: PHYSICIAN ASSISTANT

## 2023-06-12 ASSESSMENT — PAIN SCALES - GENERAL: PAINLEVEL: NO PAIN (0)

## 2023-06-12 NOTE — NURSING NOTE
"Chief Complaint   Patient presents with     Suture Removal     Patient presents to the clinic for suture removal.    FOOD SECURITY SCREENING QUESTIONS:    The next two questions are to help us understand your food security.  If you are feeling you need any assistance in this area, we have resources available to support you today.    Hunger Vital Signs:  Within the past 12 months we worried whether our food would run out before we got money to buy more. Never  Within the past 12 months the food we bought just didn't last and we didn't have money to get more. Never    Advance Care Directive on file? No  Advance Care Directive provided to patient? Declined      Initial BP (!) 160/100 (BP Location: Left arm, Patient Position: Sitting, Cuff Size: Adult Regular)   Pulse 93   Temp 97.1  F (36.2  C) (Tympanic)   Resp 18   Ht 1.74 m (5' 8.5\")   Wt 74.1 kg (163 lb 6.4 oz)   SpO2 99%   BMI 24.48 kg/m   Estimated body mass index is 24.48 kg/m  as calculated from the following:    Height as of this encounter: 1.74 m (5' 8.5\").    Weight as of this encounter: 74.1 kg (163 lb 6.4 oz).  Medication Reconciliation: complete        Jennifer Wang  "

## 2023-06-12 NOTE — PROGRESS NOTES
Assessment & Plan     1. Visit for suture removal  - Sutures removed x 12 from face. No signs of infection. Strict return precautions reviewed. May shower/bathe as normal. Avoid swimming in pools/lakes and hottubs until wounds are closer. Return in 5-7 days from removal of sutures from forearms. May use lotion and vitamin E oil to help with scarring if desired. Patient is in agreement and understanding of the above treatment plan. All questions and concerns were addressed and answered to patient's satisfaction. AVS reviewed with patient.     2. Skin tear of right upper arm without complication, initial encounter  - Family Practice Referral  - Return in 5-7 days from removal of sutures from forearms.     3. Skin tear of left upper arm without complication, initial encounter  - Family Practice Referral  - Return in 5-7 days from removal of sutures from forearms.     No follow-ups on file.    Susan Waddell PA-C  [facility]    Subjective   Chaitanya is a 89 year old, presenting for the following health issues:  Suture Removal        5/16/2023    10:57 AM   Additional Questions   Roomed by Richi Arriaga LPN   Accompanied by n/a     Suture Removal    History of Present Illness       Reason for visit:  Suture removal    He eats 2-3 servings of fruits and vegetables daily.He consumes 1 sweetened beverage(s) daily.He exercises with enough effort to increase his heart rate 9 or less minutes per day.  He exercises with enough effort to increase his heart rate 3 or less days per week.   He is taking medications regularly.     Chaitanya presents today for suture removal from his right eye brow/forehead and nasal bridge. He [place] a fall [place] [employer] on 6/4/23 and sustained lacerations to his forearms/arms in addition to the above regions. He had a normal CT/imaging work up in the ER. Wounds were closed with sutures. 10 to the right forehead and 2 to the nasal bridge. He completed his course of antibiotics. Denies any  "fevers, chills or signs of infection. Declines any post concussive symptoms. He is not on blood thinners.     Review of Systems   Constitutional, HEENT, cardiovascular, pulmonary, GI, , musculoskeletal, neuro, skin, endocrine and psych systems are negative, except as otherwise noted.      Objective    BP (!) 160/100 (BP Location: Left arm, Patient Position: Sitting, Cuff Size: Adult Regular)   Pulse 93   Temp 97.1  F (36.2  C) (Tympanic)   Resp 18   Ht 1.74 m (5' 8.5\")   Wt 74.1 kg (163 lb 6.4 oz)   SpO2 99%   BMI 24.48 kg/m    Body mass index is 24.48 kg/m .  Physical Exam   GENERAL: healthy, alert and no distress  HENT: ear canals and TM's normal, nose and mouth without ulcers or lesions  RESP: lungs clear to auscultation - no rales, rhonchi or wheezes  CV: regular rate and rhythm, normal S1 S2, no S3 or S4, no murmur, click or rub, no peripheral edema and peripheral pulses strong  MS: no gross musculoskeletal defects noted, no edema  SKIN: ecchymosis to forehead, nasal bridge and infraorbital bilaterally as well as forearms. 10 sutures removed from right forehead and 2 from nasal bridge. No erythema, calor or signs of infection.   PSYCH: mentation appears normal, affect normal/bright          "

## 2023-06-14 ENCOUNTER — THERAPY VISIT (OUTPATIENT)
Dept: OCCUPATIONAL THERAPY | Facility: OTHER | Age: 88
End: 2023-06-14
Attending: FAMILY MEDICINE
Payer: MEDICARE

## 2023-06-14 DIAGNOSIS — S42.401D OCCULT CLOSED FRACTURE OF RIGHT ELBOW WITH ROUTINE HEALING, SUBSEQUENT ENCOUNTER: Primary | ICD-10-CM

## 2023-06-14 PROCEDURE — 97140 MANUAL THERAPY 1/> REGIONS: CPT | Mod: GO | Performed by: OCCUPATIONAL THERAPIST

## 2023-06-14 PROCEDURE — 97110 THERAPEUTIC EXERCISES: CPT | Mod: GO | Performed by: OCCUPATIONAL THERAPIST

## 2023-06-14 NOTE — PROGRESS NOTES
06/14/23 0500   Appointment Info   Treating Provider Josee Brown OTR/L   Visits Used 7   Medical Diagnosis S42.401D (ICD-10-CM) - Occult closed fracture of right elbow with routine healing, subsequent encounter   Progress Note/Certification   Start Of Care Date 04/27/23   Onset of Illness/Injury or Date of Surgery 03/23/23   Certification date from 04/27/23   OT Goal 1   Goal Identifier sleep   Goal Description Pt will sleep 5/7 nights with out waking from pain to improve sleep hygeine   Target Date 05/18/23   OT Goal 2   Goal Identifier pain   Goal Description pt will have pain levels < 4/10 to increase comfort and safety with golfing   Target Date 05/18/23   OT Goal 3   Goal Identifier hand strength   Goal Description Pt will have Rt hand  stregth of 50 # or better to improve ability to hold onto bowling ball   Target Date 06/08/23   Subjective Report   Subjective Report Pt reports he tripped over a [employer] at [employer] and fell. he has several stitches on his arms, legs, and face. He reports heis arm is doing well. No pain at all while bowling or playing golf.   Objective Measure 1   Objective Measure Hand strength   Details : 42 lateral 15  three point: 10   Objective Measure 2   Objective Measure Swelling   Details Palacios crease  21  Wrist: 17.5  Elbow: 24   Ultrasound   Ultrasound -Type (does not include 3-5 min prep/cleanup time) Pulsed 20%   Intensity 0.6w/cm2   Duration (does not include the 3-5 min set up/clean up time) 8 min   Frequency 3 MHz   Location Anterior and posterior hand   Patient Response/Progress Patient liked the ultrasound   Therapeutic Procedure/Exercise   Therapeutic Procedure: strength, endurance, ROM, flexibillity minutes (65253) 15   Skilled Intervention educate in anatomy of injury, educate in jose alejandro exercises with good technique to increae strength and ROM   Patient Response/Progress Patient tolerated well with no pain.   Treatment Detail measurements taken. ROM, strength  and swelling are stable. stretch and PROM to wrist and elbow.   Manual Therapy   Manual Therapy Minutes (78936) 15   Skilled Intervention provide proper methods and techniques to decreae swellling in arm   Patient Response/Progress Patient tolerated well.   Treatment Detail Completed soft tissue  massage from finger tips to elbow. Educated patient in techniques to complete at home.   Education   Learner/Method Patient   Readiness Acceptance   Plan   Home program wear glove and compression sleeve as mush as possible especially at night   Plan for next session Manual massage, strengthening   Homework Red rubber band gripper   Total Session Time   Timed Code Treatment Minutes 30   Total Treatment Time (sum of timed and untimed services) 30   Clinical Impression   OT Diagnosis Impaired leisure and hoe mangement activities           DISCHARGE  Reason for Discharge: Patient has met all goals.      Referring Provider:  Tristin Lima

## 2023-06-19 ENCOUNTER — HOSPITAL ENCOUNTER (EMERGENCY)
Facility: OTHER | Age: 88
Discharge: HOME | End: 2023-06-19
Attending: PHYSICIAN ASSISTANT
Payer: MEDICARE

## 2023-06-19 DIAGNOSIS — Z48.02 VISIT FOR SUTURE REMOVAL: ICD-10-CM

## 2023-06-19 NOTE — ED NOTES
Pt presents to ED for suture removal on right and left upper extremities.   10 Sutures removed from L arm, 6 from right.   No s/s of infection present.   Pt verbalized understanding of s/s of infection.     VS T 96.9 HR 86 /91 RR 16 O2 100% on room air.   Pt dismissed after sutures removed.

## 2023-06-20 ENCOUNTER — ALLIED HEALTH/NURSE VISIT (OUTPATIENT)
Dept: FAMILY MEDICINE | Facility: OTHER | Age: 88
End: 2023-06-20
Attending: INTERNAL MEDICINE
Payer: MEDICARE

## 2023-06-20 DIAGNOSIS — E53.8 B12 DEFICIENCY: Primary | ICD-10-CM

## 2023-06-20 PROCEDURE — 96372 THER/PROPH/DIAG INJ SC/IM: CPT | Performed by: INTERNAL MEDICINE

## 2023-06-20 PROCEDURE — 250N000011 HC RX IP 250 OP 636: Performed by: INTERNAL MEDICINE

## 2023-06-20 RX ADMIN — CYANOCOBALAMIN 1000 MCG: 1000 INJECTION, SOLUTION INTRAMUSCULAR; SUBCUTANEOUS at 09:41

## 2023-06-20 NOTE — PROGRESS NOTES

## 2023-06-20 NOTE — ED PROVIDER NOTES
Suture removal    -Performed by nurse.  16 sutures removed. No complications     Kam Devine PA-C  06/19/23 3902

## 2023-07-18 ENCOUNTER — ALLIED HEALTH/NURSE VISIT (OUTPATIENT)
Dept: FAMILY MEDICINE | Facility: OTHER | Age: 88
End: 2023-07-18
Attending: INTERNAL MEDICINE
Payer: MEDICARE

## 2023-07-18 DIAGNOSIS — E53.8 B12 DEFICIENCY: Primary | ICD-10-CM

## 2023-07-18 PROCEDURE — 96372 THER/PROPH/DIAG INJ SC/IM: CPT | Performed by: INTERNAL MEDICINE

## 2023-07-18 PROCEDURE — 250N000011 HC RX IP 250 OP 636: Performed by: INTERNAL MEDICINE

## 2023-07-18 RX ADMIN — CYANOCOBALAMIN 1000 MCG: 1000 INJECTION, SOLUTION INTRAMUSCULAR; SUBCUTANEOUS at 09:20

## 2023-07-18 NOTE — PROGRESS NOTES

## 2023-08-15 ENCOUNTER — ALLIED HEALTH/NURSE VISIT (OUTPATIENT)
Dept: FAMILY MEDICINE | Facility: OTHER | Age: 88
End: 2023-08-15
Attending: INTERNAL MEDICINE
Payer: MEDICARE

## 2023-08-15 DIAGNOSIS — E53.8 VITAMIN B12 DEFICIENCY (NON ANEMIC): Primary | ICD-10-CM

## 2023-08-15 PROCEDURE — 96372 THER/PROPH/DIAG INJ SC/IM: CPT | Performed by: INTERNAL MEDICINE

## 2023-08-15 PROCEDURE — 250N000011 HC RX IP 250 OP 636: Performed by: INTERNAL MEDICINE

## 2023-08-15 RX ADMIN — CYANOCOBALAMIN 1000 MCG: 1000 INJECTION, SOLUTION INTRAMUSCULAR; SUBCUTANEOUS at 09:39

## 2023-08-15 NOTE — PROGRESS NOTES
Clinic Administered Medication Documentation      Injectable Medication Documentation    Is there an active order (written within the past 365 days, with administrations remaining, not ) in the chart? Yes.     Patient was given Cyanocobalamin (B-12). Prior to medication administration, verified patient's identity using patient s name and date of birth. Please see MAR and medication order for additional information. Patient instructed to report any adverse reaction to staff immediately.    Vial/Syringe: Single dose vial. Was entire vial of medication used? Yes  Was this medication supplied by the patient? No  Is this a medication the patient will need to receive again? Yes. Verified that the patient has refills remaining in their prescription.    Arianne Pulido RN on 8/15/2023 at 9:44 AM

## 2023-08-22 ENCOUNTER — LAB (OUTPATIENT)
Dept: LAB | Facility: OTHER | Age: 88
End: 2023-08-22
Attending: INTERNAL MEDICINE
Payer: MEDICARE

## 2023-08-22 DIAGNOSIS — E78.2 MIXED HYPERLIPIDEMIA: ICD-10-CM

## 2023-08-22 DIAGNOSIS — Z79.4 TYPE 2 DIABETES MELLITUS WITH STAGE 5 CHRONIC KIDNEY DISEASE NOT ON CHRONIC DIALYSIS, WITH LONG-TERM CURRENT USE OF INSULIN (H): ICD-10-CM

## 2023-08-22 DIAGNOSIS — N18.5 TYPE 2 DIABETES MELLITUS WITH STAGE 5 CHRONIC KIDNEY DISEASE NOT ON CHRONIC DIALYSIS, WITH LONG-TERM CURRENT USE OF INSULIN (H): ICD-10-CM

## 2023-08-22 DIAGNOSIS — E11.22 TYPE 2 DIABETES MELLITUS WITH STAGE 5 CHRONIC KIDNEY DISEASE NOT ON CHRONIC DIALYSIS, WITH LONG-TERM CURRENT USE OF INSULIN (H): ICD-10-CM

## 2023-08-22 LAB
ALBUMIN SERPL BCG-MCNC: 4.1 G/DL (ref 3.5–5.2)
ALP SERPL-CCNC: 85 U/L (ref 40–129)
ALT SERPL W P-5'-P-CCNC: 9 U/L (ref 0–70)
ANION GAP SERPL CALCULATED.3IONS-SCNC: 15 MMOL/L (ref 7–15)
AST SERPL W P-5'-P-CCNC: 14 U/L (ref 0–45)
BILIRUB SERPL-MCNC: 0.2 MG/DL
BUN SERPL-MCNC: 83.4 MG/DL (ref 8–23)
CALCIUM SERPL-MCNC: 9 MG/DL (ref 8.8–10.2)
CHLORIDE SERPL-SCNC: 106 MMOL/L (ref 98–107)
CHOLEST SERPL-MCNC: 152 MG/DL
CREAT SERPL-MCNC: 8.23 MG/DL (ref 0.67–1.17)
DEPRECATED HCO3 PLAS-SCNC: 20 MMOL/L (ref 22–29)
ERYTHROCYTE [DISTWIDTH] IN BLOOD BY AUTOMATED COUNT: 13.2 % (ref 10–15)
GFR SERPL CREATININE-BSD FRML MDRD: 6 ML/MIN/1.73M2
GLUCOSE SERPL-MCNC: 208 MG/DL (ref 70–99)
HBA1C MFR BLD: 6.3 % (ref 4–6.2)
HCT VFR BLD AUTO: 31.2 % (ref 40–53)
HDLC SERPL-MCNC: 36 MG/DL
HGB BLD-MCNC: 10.1 G/DL (ref 13.3–17.7)
LDLC SERPL CALC-MCNC: 95 MG/DL
MCH RBC QN AUTO: 30.6 PG (ref 26.5–33)
MCHC RBC AUTO-ENTMCNC: 32.4 G/DL (ref 31.5–36.5)
MCV RBC AUTO: 95 FL (ref 78–100)
NONHDLC SERPL-MCNC: 116 MG/DL
PLATELET # BLD AUTO: 235 10E3/UL (ref 150–450)
POTASSIUM SERPL-SCNC: 5.5 MMOL/L (ref 3.4–5.3)
PROT SERPL-MCNC: 6.9 G/DL (ref 6.4–8.3)
RBC # BLD AUTO: 3.3 10E6/UL (ref 4.4–5.9)
SODIUM SERPL-SCNC: 141 MMOL/L (ref 136–145)
T4 FREE SERPL-MCNC: 0.88 NG/DL (ref 0.9–1.7)
TRIGL SERPL-MCNC: 105 MG/DL
TSH SERPL DL<=0.005 MIU/L-ACNC: 6.61 UIU/ML (ref 0.3–4.2)
WBC # BLD AUTO: 10.1 10E3/UL (ref 4–11)

## 2023-08-22 PROCEDURE — 80053 COMPREHEN METABOLIC PANEL: CPT | Mod: ZL

## 2023-08-22 PROCEDURE — 36415 COLL VENOUS BLD VENIPUNCTURE: CPT | Mod: ZL

## 2023-08-22 PROCEDURE — 84443 ASSAY THYROID STIM HORMONE: CPT | Mod: ZL

## 2023-08-22 PROCEDURE — 84439 ASSAY OF FREE THYROXINE: CPT | Mod: ZL

## 2023-08-22 PROCEDURE — 80061 LIPID PANEL: CPT | Mod: ZL

## 2023-08-22 PROCEDURE — 83036 HEMOGLOBIN GLYCOSYLATED A1C: CPT | Mod: ZL

## 2023-08-22 PROCEDURE — 85027 COMPLETE CBC AUTOMATED: CPT | Mod: ZL

## 2023-08-24 ENCOUNTER — OFFICE VISIT (OUTPATIENT)
Dept: INTERNAL MEDICINE | Facility: OTHER | Age: 88
End: 2023-08-24
Attending: INTERNAL MEDICINE
Payer: COMMERCIAL

## 2023-08-24 VITALS
RESPIRATION RATE: 14 BRPM | HEART RATE: 88 BPM | DIASTOLIC BLOOD PRESSURE: 88 MMHG | OXYGEN SATURATION: 99 % | BODY MASS INDEX: 25.06 KG/M2 | SYSTOLIC BLOOD PRESSURE: 138 MMHG | WEIGHT: 169.2 LBS | HEIGHT: 69 IN | TEMPERATURE: 97.1 F

## 2023-08-24 DIAGNOSIS — N18.5 TYPE 2 DIABETES MELLITUS WITH STAGE 5 CHRONIC KIDNEY DISEASE NOT ON CHRONIC DIALYSIS, WITH LONG-TERM CURRENT USE OF INSULIN (H): Primary | ICD-10-CM

## 2023-08-24 DIAGNOSIS — N05.8 PRIMARY PAUCI-IMMUNE NECROTIZING AND CRESCENTIC GLOMERULONEPHRITIS: ICD-10-CM

## 2023-08-24 DIAGNOSIS — E78.2 MIXED HYPERLIPIDEMIA: ICD-10-CM

## 2023-08-24 DIAGNOSIS — I10 BENIGN ESSENTIAL HYPERTENSION: ICD-10-CM

## 2023-08-24 DIAGNOSIS — D84.9 IMMUNOCOMPROMISED PATIENT (H): ICD-10-CM

## 2023-08-24 DIAGNOSIS — N05.7 PRIMARY PAUCI-IMMUNE NECROTIZING AND CRESCENTIC GLOMERULONEPHRITIS: ICD-10-CM

## 2023-08-24 DIAGNOSIS — R33.9 URINARY RETENTION: ICD-10-CM

## 2023-08-24 DIAGNOSIS — Z79.4 TYPE 2 DIABETES MELLITUS WITH STAGE 5 CHRONIC KIDNEY DISEASE NOT ON CHRONIC DIALYSIS, WITH LONG-TERM CURRENT USE OF INSULIN (H): Primary | ICD-10-CM

## 2023-08-24 DIAGNOSIS — N18.5 CKD (CHRONIC KIDNEY DISEASE) STAGE 5, GFR LESS THAN 15 ML/MIN (H): ICD-10-CM

## 2023-08-24 DIAGNOSIS — T38.0X5A STEROID-INDUCED HYPERGLYCEMIA: ICD-10-CM

## 2023-08-24 DIAGNOSIS — D63.8 ANEMIA OF CHRONIC DISEASE: ICD-10-CM

## 2023-08-24 DIAGNOSIS — E11.22 TYPE 2 DIABETES MELLITUS WITH STAGE 5 CHRONIC KIDNEY DISEASE NOT ON CHRONIC DIALYSIS, WITH LONG-TERM CURRENT USE OF INSULIN (H): Primary | ICD-10-CM

## 2023-08-24 DIAGNOSIS — R73.9 STEROID-INDUCED HYPERGLYCEMIA: ICD-10-CM

## 2023-08-24 PROCEDURE — G0463 HOSPITAL OUTPT CLINIC VISIT: HCPCS

## 2023-08-24 PROCEDURE — 99214 OFFICE O/P EST MOD 30 MIN: CPT | Performed by: INTERNAL MEDICINE

## 2023-08-24 RX ORDER — FINASTERIDE 5 MG/1
1 TABLET, FILM COATED ORAL DAILY
Qty: 90 TABLET | Refills: 1 | Status: SHIPPED | OUTPATIENT
Start: 2023-08-24 | End: 2023-01-01

## 2023-08-24 RX ORDER — TAMSULOSIN HYDROCHLORIDE 0.4 MG/1
0.4 CAPSULE ORAL EVERY EVENING
Qty: 90 CAPSULE | Refills: 1 | Status: SHIPPED | OUTPATIENT
Start: 2023-08-24 | End: 2023-01-01

## 2023-08-24 RX ORDER — PREDNISONE 2.5 MG/1
2.5 TABLET ORAL DAILY
Qty: 90 TABLET | Refills: 1 | Status: SHIPPED | OUTPATIENT
Start: 2023-08-24 | End: 2023-01-01

## 2023-08-24 RX ORDER — AMLODIPINE BESYLATE 5 MG/1
5 TABLET ORAL 2 TIMES DAILY
Qty: 180 TABLET | Refills: 1 | Status: SHIPPED | OUTPATIENT
Start: 2023-08-24 | End: 2023-01-01

## 2023-08-24 RX ORDER — INSULIN GLARGINE 100 [IU]/ML
2 INJECTION, SOLUTION SUBCUTANEOUS AT BEDTIME
Qty: 15 ML | Refills: 1 | Status: SHIPPED | OUTPATIENT
Start: 2023-08-24 | End: 2023-01-01

## 2023-08-24 RX ORDER — FUROSEMIDE 40 MG
40 TABLET ORAL EVERY MORNING
Qty: 90 TABLET | Refills: 1 | Status: SHIPPED | OUTPATIENT
Start: 2023-08-24 | End: 2023-01-01

## 2023-08-24 ASSESSMENT — ENCOUNTER SYMPTOMS
ARTHRALGIAS: 0
AGITATION: 0
FATIGUE: 1
BACK PAIN: 0
VOMITING: 0
DIARRHEA: 0
BRUISES/BLEEDS EASILY: 1
HEMATURIA: 0
NAUSEA: 0
DYSURIA: 0
WHEEZING: 0
CHILLS: 0
MYALGIAS: 0
DIZZINESS: 0
COUGH: 0
SHORTNESS OF BREATH: 0
PALPITATIONS: 0
EYE PAIN: 0
ABDOMINAL PAIN: 0
LIGHT-HEADEDNESS: 0
CONFUSION: 0
FEVER: 0

## 2023-08-24 ASSESSMENT — PAIN SCALES - GENERAL: PAINLEVEL: NO PAIN (0)

## 2023-08-24 NOTE — PATIENT INSTRUCTIONS
Blood pressure has been HIGH at home.   -- Increase Norvasc to 10 mg daily.     Diabetes is well controlled.     Medications refilled.   Labs are stable.     Lab on 08/22/2023   Component Date Value Ref Range Status    TSH 08/22/2023 6.61 (H)  0.30 - 4.20 uIU/mL Final    Hemoglobin A1C 08/22/2023 6.3 (H)  4.0 - 6.2 % Final    WBC Count 08/22/2023 10.1  4.0 - 11.0 10e3/uL Final    RBC Count 08/22/2023 3.30 (L)  4.40 - 5.90 10e6/uL Final    Hemoglobin 08/22/2023 10.1 (L)  13.3 - 17.7 g/dL Final    Hematocrit 08/22/2023 31.2 (L)  40.0 - 53.0 % Final    MCV 08/22/2023 95  78 - 100 fL Final    MCH 08/22/2023 30.6  26.5 - 33.0 pg Final    MCHC 08/22/2023 32.4  31.5 - 36.5 g/dL Final    RDW 08/22/2023 13.2  10.0 - 15.0 % Final    Platelet Count 08/22/2023 235  150 - 450 10e3/uL Final    Cholesterol 08/22/2023 152  <200 mg/dL Final    Triglycerides 08/22/2023 105  <150 mg/dL Final    Direct Measure HDL 08/22/2023 36 (L)  >=40 mg/dL Final    LDL Cholesterol Calculated 08/22/2023 95  <=100 mg/dL Final    Non HDL Cholesterol 08/22/2023 116  <130 mg/dL Final    Sodium 08/22/2023 141  136 - 145 mmol/L Final    Potassium 08/22/2023 5.5 (H)  3.4 - 5.3 mmol/L Final    Chloride 08/22/2023 106  98 - 107 mmol/L Final    Carbon Dioxide (CO2) 08/22/2023 20 (L)  22 - 29 mmol/L Final    Anion Gap 08/22/2023 15  7 - 15 mmol/L Final    Urea Nitrogen 08/22/2023 83.4 (H)  8.0 - 23.0 mg/dL Final    Creatinine 08/22/2023 8.23 (H)  0.67 - 1.17 mg/dL Final    Calcium 08/22/2023 9.0  8.8 - 10.2 mg/dL Final    Glucose 08/22/2023 208 (H)  70 - 99 mg/dL Final    Alkaline Phosphatase 08/22/2023 85  40 - 129 U/L Final    AST 08/22/2023 14  0 - 45 U/L Final    Reference intervals for this test were updated on 6/12/2023 to more accurately reflect our healthy population. There may be differences in the flagging of prior results with similar values performed with this method. Interpretation of those prior results can be made in the context of the updated  reference intervals.    ALT 08/22/2023 9  0 - 70 U/L Final    Reference intervals for this test were updated on 6/12/2023 to more accurately reflect our healthy population. There may be differences in the flagging of prior results with similar values performed with this method. Interpretation of those prior results can be made in the context of the updated reference intervals.      Protein Total 08/22/2023 6.9  6.4 - 8.3 g/dL Final    Albumin 08/22/2023 4.1  3.5 - 5.2 g/dL Final    Bilirubin Total 08/22/2023 0.2  <=1.2 mg/dL Final    GFR Estimate 08/22/2023 6 (L)  >60 mL/min/1.73m2 Final    Free T4 08/22/2023 0.88 (L)  0.90 - 1.70 ng/dL Final      Aspects of Diabetes:   Recent Labs   Lab Test 08/22/23  0918 05/15/23  0929 02/09/23  1345   A1C 6.3* 5.4 5.9   LDL 95 74 66   HDL 36* 38* 40   TRIG 105 77 79   ALT 9 11 18   CR 8.23* 7.51* 8.46*   GFRESTIMATED 6* 6* 6*   POTASSIUM 5.5* 5.0 5.1   TSH 6.61* 8.09* 6.97*   T4 0.88* 0.90 0.81*   WBC 10.1 10.6 10.6   HGB 10.1* 10.8* 11.2*    251 290   ALBUMIN 4.1 4.1 4.3      Hemoglobin A1c  Goal range is under 8%. Best is 6.5 to 7   Blood Pressure 138/88 Goal to keep less than 140/90   Tobacco  reports that he quit smoking about 47 years ago. His smoking use included cigarettes. He has been exposed to tobacco smoke. He has never used smokeless tobacco. Goal to abstain from tobacco   Aspirin or Plavix Anti-platelet therapy Aspirin or Plavix reduces risk of heart disease and stroke  -- sometimes used with other blood thinners, depending on bleeding risk and risk factors.    ACE/ARB Specific blood pressure meds These medications can reduce risk of kidney disease   Cholesterol Statins (Lipitor, Crestor, vs others) Statins reduce risk of heart disease and stroke   Eye Exam -- Do Yearly -- Annual diabetic eye exam   Healthy weight Wt Readings from Last 4 Encounters:   08/24/23 76.7 kg (169 lb 3.2 oz)   06/12/23 74.1 kg (163 lb 6.4 oz)   06/04/23 75.3 kg (166 lb)   05/16/23  75.4 kg (166 lb 3.2 oz)      Body mass index is 25.35 kg/m .  Goal BMI under 30, best is under 25.      -- Trying to exercise daily (goal at least 20 min/day) with moderate aerobic activity   -- Eat healthy (resources from ADA at http://www.diabetes.org/)   -- Taking good care of my feet. Consider seeing the Podiatrist   -- Check blood sugars as directed, record in log book and bring to every appointment    Insurance companies are grading you and I on your blood sugar control -- Goal is to get your A1c down to 7.9% or lower and NO Smoking!  -- Medicare and most insurance companies, will only cover Hemoglobin A1c labs to be rechecked every 91+ days.      Return for Diabetes labs and clinic follow-up appointment every 3 to 4 months.    Schedule lab only appointment --- A few days AFTER: 11/22/23   Schedule clinic appointment with Dr. Machado -- Same day as labs, or 1-2 days later.

## 2023-08-24 NOTE — PROGRESS NOTES
Assessment & Plan     ICD-10-CM    1. 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 CKD (chronic kidney disease) stage 5, GFR 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 ml/min (H)  N18.5 predniSONE (DELTASONE) 2.5 MG tablet      3. Benign essential hypertension  I10 amLODIPine (NORVASC) 5 MG tablet     furosemide (LASIX) 40 MG tablet      4. Immunocompromised patient (H)  D84.9 predniSONE (DELTASONE) 2.5 MG tablet      5. Primary pauci-immune necrotizing and crescentic glomerulonephritis  N05.8 predniSONE (DELTASONE) 2.5 MG tablet    N05.7       6. Mixed hyperlipidemia  E78.2       7. Anemia of chronic disease  D63.8       8. Urinary retention  R33.9 finasteride (PROSCAR) 5 MG tablet     tamsulosin (FLOMAX) 0.4 MG capsule      9. Steroid-induced hyperglycemia  R73.9 insulin glargine (LANTUS SOLOSTAR) 100 UNIT/ML pen    T38.0X5A         Patient presents for follow-up multiple issues.    Urinary retention, ongoing, but has noted improvement in symptoms with combination dosing of finasteride and Flomax.  Needs refills.    Patient is chronically immunosuppressed, takes prednisone daily due to primary immune necrotizing and crescentic glomerulonephritis.  Needs refills.    Anemia of chronic disease, has stage V kidney disease and is currently being treated for glomerulonephritis.  Ongoing.    Steroid-induced hyperglycemia, Ongoing.  Continues with daily Lantus dose.    HYPERTENSION - Ongoing. Blood pressure has been High at home - to 170-180s at times. Good today. Medication side effects: None. Denies syncope or presyncope.  Increase Norvasc to 10 mg daily.    Medication list reviewed/updated. Refills completed as needed.      MIXED HYPERLIPIDEMIA.  Ongoing. LDL is at goal: Yes. Triglycerides are at goal: Yes.    Medication side effects reported: None - Diet controlled.      Recent  Labs   Lab Test 08/22/23  0918 05/15/23  0929   CHOL 152 127   HDL 36* 38*   LDL 95 74   TRIG 105 77        Chronic Kidney Disease, Stage 5 (GFR < 15), chronic, ongoing.   Kidney function has been slowly declining.  Encouraged NSAID avoidance.       Type 2 Diabetes Mellitus, with nephropathy.  Blood sugar control has been good with minimal hyperglycemia. Doing well with insulin injections - Lantus 2 units daily.  Medication list reviewed/updated. Refills completed as needed.    Complicating factors include but are not limited to: hypertension and chronic kidney disease.        Encouraged regular exercise.     Return in about 3 months (around 11/24/2023) for - Labs every 91+ days, with DM Follow-up, Same Day or 1-2 days later with Dr. Machado.    Valentino Machado MD  [facility]   Chaitanya is a 89 year old, presenting for the following health issues:  Diabetes (3 month check)        8/24/2023     9:43 AM   Additional Questions   Roomed by Debbie BERNAL LPN       History of Present Illness       Diabetes:   He presents for follow up of diabetes.    He is not checking blood glucose.   Blood glucose is never over 200 and never under 70. He is aware of hypoglycemia symptoms including none.    He has no concerns regarding his diabetes at this time.   He is not experiencing numbness or burning in feet, excessive thirst, blurry vision, weight changes or redness, sores or blisters on feet. The patient has not had a diabetic eye exam in the last 12 months.          He eats 4 or more servings of fruits and vegetables daily.He consumes 0 sweetened beverage(s) daily.He exercises with enough effort to increase his heart rate 60 or more minutes per day.  He exercises with enough effort to increase his heart rate 3 or less days per week.   He is taking medications regularly.     Review of Systems   Constitutional:  Positive for fatigue. Negative for chills and fever.        Golfs 1x weekly in the summer  "and in the winter - curls 2x weekly and bowling 2x weekly    HENT:  Negative for congestion and hearing loss.    Eyes:  Negative for pain and visual disturbance.   Respiratory:  Negative for cough, shortness of breath and wheezing.    Cardiovascular:  Negative for chest pain and palpitations.   Gastrointestinal:  Negative for abdominal pain, diarrhea, nausea and vomiting.   Endocrine: Negative for cold intolerance and heat intolerance.   Genitourinary:  Negative for dysuria and hematuria.        Reports urinating well. No bladder symptoms at all.    Musculoskeletal:  Positive for gait problem. Negative for arthralgias, back pain and myalgias.   Skin:  Negative for pallor.   Allergic/Immunologic: Negative for immunocompromised state.   Neurological:  Negative for dizziness and light-headedness.   Hematological:  Bruises/bleeds easily.   Psychiatric/Behavioral:  Negative for agitation and confusion.             Objective    /88 (BP Location: Right arm, Patient Position: Sitting, Cuff Size: Adult Regular)   Pulse 88   Temp 97.1  F (36.2  C) (Temporal)   Resp 14   Ht 1.74 m (5' 8.5\")   Wt 76.7 kg (169 lb 3.2 oz)   SpO2 99%   BMI 25.35 kg/m    Body mass index is 25.35 kg/m .  Physical Exam  Constitutional:       General: He is not in acute distress.     Appearance: He is well-developed. He is obese. He is not diaphoretic.   HENT:      Head: Normocephalic and atraumatic.   Eyes:      General: No scleral icterus.     Conjunctiva/sclera: Conjunctivae normal.   Cardiovascular:      Rate and Rhythm: Normal rate and regular rhythm.   Pulmonary:      Effort: Pulmonary effort is normal.      Breath sounds: Normal breath sounds.   Abdominal:      Palpations: Abdomen is soft.      Tenderness: There is no abdominal tenderness.   Musculoskeletal:         General: No deformity.      Cervical back: Neck supple.      Right lower leg: Edema present.      Left lower leg: Edema present.   Lymphadenopathy:      Cervical: No " cervical adenopathy.   Skin:     General: Skin is warm and dry.      Findings: Bruising (bilateral arms) present. No rash.      Comments: + Dry skin   Neurological:      Mental Status: He is alert. Mental status is at baseline.   Psychiatric:         Mood and Affect: Mood normal.         Behavior: Behavior normal.            Recent Labs   Lab Test 08/22/23  0918 05/15/23  0929 02/09/23  1345   A1C 6.3* 5.4 5.9   LDL 95 74 66   HDL 36* 38* 40   TRIG 105 77 79   ALT 9 11 18   CR 8.23* 7.51* 8.46*   GFRESTIMATED 6* 6* 6*   POTASSIUM 5.5* 5.0 5.1   TSH 6.61* 8.09* 6.97*   T4 0.88* 0.90 0.81*   WBC 10.1 10.6 10.6   HGB 10.1* 10.8* 11.2*    251 290   ALBUMIN 4.1 4.1 4.3     Hemoglobin A1c is well controlled.  LDL is high not at goal patient on triglycerides are at goal.  ALT normal.  Creatinine is at baseline with GFR of 6.  Potassium slightly elevated.  TSH slightly elevated.  Free T4 is low.  He declines oral thyroid replacement at this time.  Mild anemia.  More blood cell count is normal.  Cell count normal.  Albumin normal.

## 2023-08-24 NOTE — NURSING NOTE
"Chief Complaint   Patient presents with    Diabetes     3 month check       Initial /88 (BP Location: Right arm, Patient Position: Sitting, Cuff Size: Adult Regular)   Pulse 88   Temp 97.1  F (36.2  C) (Temporal)   Resp 14   Ht 1.74 m (5' 8.5\")   Wt 76.7 kg (169 lb 3.2 oz)   SpO2 99%   BMI 25.35 kg/m   Estimated body mass index is 25.35 kg/m  as calculated from the following:    Height as of this encounter: 1.74 m (5' 8.5\").    Weight as of this encounter: 76.7 kg (169 lb 3.2 oz).  Medication Reconciliation: complete    Previous A1C is at goal of <8  Lab Results   Component Value Date    A1C 6.3 08/22/2023    A1C 5.4 05/15/2023    A1C 5.9 02/09/2023    A1C 5.9 10/31/2022    A1C 6.4 04/20/2022    A1C 5.9 03/30/2021    A1C 6.5 06/20/2019     Urine microalbumin:creatine: 8/23/23  Foot exam :8/23/23  Eye exam :  Making appt with Dr. Nelson    Tobacco User : no  Patient is not on a daily aspirin  Patient is not on a Statin.  Blood pressure today of:     BP Readings from Last 1 Encounters:   08/24/23 138/88      is at the goal of <139/89 for diabetics.    Debbie Castillo LPN on 8/24/2023 at 9:47 AM        FOOD SECURITY SCREENING QUESTIONS:    The next two questions are to help us understand your food security.  If you are feeling you need any assistance in this area, we have resources available to support you today.    Hunger Vital Signs:  Within the past 12 months we worried whether our food would run out before we got money to buy more. Never  Within the past 12 months the food we bought just didn't last and we didn't have money to get more. Never        Advance care plan reviewed      Debbie Castillo LPN on 8/24/2023 at 9:47 AM      "

## 2023-09-12 ENCOUNTER — ALLIED HEALTH/NURSE VISIT (OUTPATIENT)
Dept: FAMILY MEDICINE | Facility: OTHER | Age: 88
End: 2023-09-12
Attending: INTERNAL MEDICINE
Payer: MEDICARE

## 2023-09-12 DIAGNOSIS — E53.8 B12 DEFICIENCY: Primary | ICD-10-CM

## 2023-09-12 PROCEDURE — 250N000011 HC RX IP 250 OP 636: Performed by: INTERNAL MEDICINE

## 2023-09-12 PROCEDURE — 96372 THER/PROPH/DIAG INJ SC/IM: CPT | Performed by: INTERNAL MEDICINE

## 2023-09-12 RX ADMIN — CYANOCOBALAMIN 1000 MCG: 1000 INJECTION, SOLUTION INTRAMUSCULAR; SUBCUTANEOUS at 09:30

## 2023-09-12 NOTE — PROGRESS NOTES

## 2023-09-22 ENCOUNTER — HOSPITAL ENCOUNTER (EMERGENCY)
Facility: OTHER | Age: 88
Discharge: HOME | End: 2023-09-22
Attending: STUDENT IN AN ORGANIZED HEALTH CARE EDUCATION/TRAINING PROGRAM | Admitting: STUDENT IN AN ORGANIZED HEALTH CARE EDUCATION/TRAINING PROGRAM
Payer: MEDICARE

## 2023-09-22 ENCOUNTER — APPOINTMENT (OUTPATIENT)
Dept: CT IMAGING | Facility: OTHER | Age: 88
End: 2023-09-22
Attending: STUDENT IN AN ORGANIZED HEALTH CARE EDUCATION/TRAINING PROGRAM
Payer: MEDICARE

## 2023-09-22 VITALS
BODY MASS INDEX: 25.01 KG/M2 | RESPIRATION RATE: 20 BRPM | DIASTOLIC BLOOD PRESSURE: 89 MMHG | HEART RATE: 96 BPM | OXYGEN SATURATION: 95 % | TEMPERATURE: 97.6 F | WEIGHT: 165 LBS | SYSTOLIC BLOOD PRESSURE: 158 MMHG | HEIGHT: 68 IN

## 2023-09-22 DIAGNOSIS — N39.0 ACUTE UTI: ICD-10-CM

## 2023-09-22 DIAGNOSIS — R10.32 LLQ ABDOMINAL PAIN: ICD-10-CM

## 2023-09-22 DIAGNOSIS — D50.9 IRON DEFICIENCY ANEMIA, UNSPECIFIED: Primary | ICD-10-CM

## 2023-09-22 DIAGNOSIS — N18.5 CKD (CHRONIC KIDNEY DISEASE) STAGE 5, GFR LESS THAN 15 ML/MIN (H): ICD-10-CM

## 2023-09-22 LAB
ALBUMIN UR-MCNC: 100 MG/DL
ANION GAP SERPL CALCULATED.3IONS-SCNC: 17 MMOL/L (ref 7–15)
APPEARANCE UR: ABNORMAL
BASOPHILS # BLD AUTO: 0 10E3/UL (ref 0–0.2)
BASOPHILS NFR BLD AUTO: 0 %
BILIRUB UR QL STRIP: NEGATIVE
BUN SERPL-MCNC: 87.4 MG/DL (ref 8–23)
CALCIUM SERPL-MCNC: 8.9 MG/DL (ref 8.8–10.2)
CHLORIDE SERPL-SCNC: 100 MMOL/L (ref 98–107)
COLOR UR AUTO: YELLOW
CREAT SERPL-MCNC: 8.56 MG/DL (ref 0.67–1.17)
DEPRECATED HCO3 PLAS-SCNC: 20 MMOL/L (ref 22–29)
EGFRCR SERPLBLD CKD-EPI 2021: 5 ML/MIN/1.73M2
EOSINOPHIL # BLD AUTO: 0.1 10E3/UL (ref 0–0.7)
EOSINOPHIL NFR BLD AUTO: 1 %
ERYTHROCYTE [DISTWIDTH] IN BLOOD BY AUTOMATED COUNT: 13.2 % (ref 10–15)
GLUCOSE SERPL-MCNC: 162 MG/DL (ref 70–99)
GLUCOSE UR STRIP-MCNC: 150 MG/DL
HCT VFR BLD AUTO: 27.1 % (ref 40–53)
HGB BLD-MCNC: 9.1 G/DL (ref 13.3–17.7)
HGB UR QL STRIP: ABNORMAL
HOLD SPECIMEN: NORMAL
IMM GRANULOCYTES # BLD: 0.1 10E3/UL
IMM GRANULOCYTES NFR BLD: 0 %
KETONES UR STRIP-MCNC: NEGATIVE MG/DL
LACTATE SERPL-SCNC: 1.3 MMOL/L (ref 0.7–2)
LEUKOCYTE ESTERASE UR QL STRIP: ABNORMAL
LYMPHOCYTES # BLD AUTO: 2.9 10E3/UL (ref 0.8–5.3)
LYMPHOCYTES NFR BLD AUTO: 17 %
MCH RBC QN AUTO: 31.6 PG (ref 26.5–33)
MCHC RBC AUTO-ENTMCNC: 33.6 G/DL (ref 31.5–36.5)
MCV RBC AUTO: 94 FL (ref 78–100)
MONOCYTES # BLD AUTO: 0.8 10E3/UL (ref 0–1.3)
MONOCYTES NFR BLD AUTO: 5 %
NEUTROPHILS # BLD AUTO: 13.7 10E3/UL (ref 1.6–8.3)
NEUTROPHILS NFR BLD AUTO: 77 %
NITRATE UR QL: POSITIVE
NRBC # BLD AUTO: 0 10E3/UL
NRBC BLD AUTO-RTO: 0 /100
PH UR STRIP: 7.5 [PH] (ref 5–9)
PLATELET # BLD AUTO: 292 10E3/UL (ref 150–450)
POTASSIUM SERPL-SCNC: 4.5 MMOL/L (ref 3.4–5.3)
RBC # BLD AUTO: 2.88 10E6/UL (ref 4.4–5.9)
RBC URINE: 28 /HPF
SODIUM SERPL-SCNC: 137 MMOL/L (ref 136–145)
SP GR UR STRIP: 1.01 (ref 1–1.03)
UROBILINOGEN UR STRIP-MCNC: NORMAL MG/DL
WBC # BLD AUTO: 17.6 10E3/UL (ref 4–11)
WBC CLUMPS #/AREA URNS HPF: PRESENT /HPF
WBC URINE: >182 /HPF

## 2023-09-22 PROCEDURE — 80048 BASIC METABOLIC PNL TOTAL CA: CPT | Performed by: STUDENT IN AN ORGANIZED HEALTH CARE EDUCATION/TRAINING PROGRAM

## 2023-09-22 PROCEDURE — 85025 COMPLETE CBC W/AUTO DIFF WBC: CPT | Performed by: STUDENT IN AN ORGANIZED HEALTH CARE EDUCATION/TRAINING PROGRAM

## 2023-09-22 PROCEDURE — 96365 THER/PROPH/DIAG IV INF INIT: CPT | Mod: XU | Performed by: STUDENT IN AN ORGANIZED HEALTH CARE EDUCATION/TRAINING PROGRAM

## 2023-09-22 PROCEDURE — 83605 ASSAY OF LACTIC ACID: CPT | Performed by: STUDENT IN AN ORGANIZED HEALTH CARE EDUCATION/TRAINING PROGRAM

## 2023-09-22 PROCEDURE — 36415 COLL VENOUS BLD VENIPUNCTURE: CPT | Performed by: STUDENT IN AN ORGANIZED HEALTH CARE EDUCATION/TRAINING PROGRAM

## 2023-09-22 PROCEDURE — 99284 EMERGENCY DEPT VISIT MOD MDM: CPT | Performed by: STUDENT IN AN ORGANIZED HEALTH CARE EDUCATION/TRAINING PROGRAM

## 2023-09-22 PROCEDURE — 87088 URINE BACTERIA CULTURE: CPT | Performed by: STUDENT IN AN ORGANIZED HEALTH CARE EDUCATION/TRAINING PROGRAM

## 2023-09-22 PROCEDURE — 250N000011 HC RX IP 250 OP 636: Mod: JZ | Performed by: STUDENT IN AN ORGANIZED HEALTH CARE EDUCATION/TRAINING PROGRAM

## 2023-09-22 PROCEDURE — 99285 EMERGENCY DEPT VISIT HI MDM: CPT | Mod: 25 | Performed by: STUDENT IN AN ORGANIZED HEALTH CARE EDUCATION/TRAINING PROGRAM

## 2023-09-22 PROCEDURE — 81001 URINALYSIS AUTO W/SCOPE: CPT | Performed by: STUDENT IN AN ORGANIZED HEALTH CARE EDUCATION/TRAINING PROGRAM

## 2023-09-22 PROCEDURE — 258N000003 HC RX IP 258 OP 636: Performed by: STUDENT IN AN ORGANIZED HEALTH CARE EDUCATION/TRAINING PROGRAM

## 2023-09-22 PROCEDURE — 87040 BLOOD CULTURE FOR BACTERIA: CPT | Performed by: STUDENT IN AN ORGANIZED HEALTH CARE EDUCATION/TRAINING PROGRAM

## 2023-09-22 PROCEDURE — 74176 CT ABD & PELVIS W/O CONTRAST: CPT | Mod: MA

## 2023-09-22 RX ORDER — CEFDINIR 300 MG/1
300 CAPSULE ORAL DAILY
Qty: 5 CAPSULE | Refills: 0 | Status: SHIPPED | OUTPATIENT
Start: 2023-09-22 | End: 2023-09-22

## 2023-09-22 RX ORDER — CEFTRIAXONE 2 G/1
2 INJECTION, POWDER, FOR SOLUTION INTRAMUSCULAR; INTRAVENOUS ONCE
Status: COMPLETED | OUTPATIENT
Start: 2023-09-22 | End: 2023-09-22

## 2023-09-22 RX ORDER — CEFDINIR 300 MG/1
300 CAPSULE ORAL DAILY
Qty: 10 CAPSULE | Refills: 0 | Status: SHIPPED | OUTPATIENT
Start: 2023-09-23 | End: 2023-09-26

## 2023-09-22 RX ADMIN — SODIUM CHLORIDE 500 ML: 9 INJECTION, SOLUTION INTRAVENOUS at 18:50

## 2023-09-22 RX ADMIN — CEFTRIAXONE 2 G: 2 INJECTION, POWDER, FOR SOLUTION INTRAMUSCULAR; INTRAVENOUS at 20:52

## 2023-09-22 ASSESSMENT — ACTIVITIES OF DAILY LIVING (ADL)
ADLS_ACUITY_SCORE: 35
ADLS_ACUITY_SCORE: 35

## 2023-09-22 NOTE — ED TRIAGE NOTES
"Patient presents with lower left abd pain that started 30 minutes ago.  Patient denies N/V/D.  Patient states his last BM was this morning.  Patient has never had anything like this in the past.  [Patient has never had abd surgery in the past.BP (!) 143/73   Pulse 89   Temp 97.6  F (36.4  C) (Tympanic)   Resp 20   Ht 1.727 m (5' 8\")   Wt 74.8 kg (165 lb)   SpO2 99%   BMI 25.09 kg/m         Triage Assessment       Row Name 09/22/23 1601       Triage Assessment (Adult)    Airway WDL WDL       Respiratory WDL    Respiratory WDL WDL       Skin Circulation/Temperature WDL    Skin Circulation/Temperature WDL WDL       Cardiac WDL    Cardiac WDL WDL       Peripheral/Neurovascular WDL    Peripheral Neurovascular WDL WDL       Cognitive/Neuro/Behavioral WDL    Cognitive/Neuro/Behavioral WDL WDL                    "

## 2023-09-22 NOTE — ED PROVIDER NOTES
History     Chief Complaint   Patient presents with    Abdominal Pain       Altaf Chao is a 89 year old male presenting with abdominal pain.  Cute onset severe left lower quadrant pain started 2 hours prior to presentation at 4 PM.  No radiation.  Initially 10/10 severity but has improved with use of heat.  Associate symptoms include chills.  Denies fever, nausea, vomiting, diarrhea, constipation, dysuria, hematuria, blood in stool.  No abdominal surgical history.     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) 01/19/2022     Priority: Medium    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, with long-term current use of insulin (H) 01/18/2021     Priority: Medium    Heartburn 07/12/2018     Priority: Medium    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     Priority: Medium    Anemia due to vitamin B12 deficiency 10/25/2017     Priority: Medium    Lymphedema of both lower extremities 06/23/2017     Priority: Medium    Immunocompromised patient (H) 06/09/2017     Priority: Medium    Bilateral edema of lower extremity 05/11/2017     Priority: Medium    Steroid-induced hyperglycemia 05/04/2017     Priority: Medium    Anemia of chronic disease 05/03/2017     Priority: Medium    CKD (chronic kidney disease) stage 5, GFR 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 ml/min (H) 05/01/2017     Priority: Medium    Metabolic acidosis 04/25/2017     Priority: Medium    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     Priority: Medium    Antineutrophil cytoplasmic  antibody (ANCA) positive 2017     Priority: Medium    Primary pauci-immune necrotizing and crescentic glomerulonephritis 2017     Priority: Medium    Refusal of statin medication at discharge 2017     Priority: Medium    H/O total hip arthroplasty 2014     Priority: Medium    Mixed hyperlipidemia 04/10/2014     Priority: Medium    H/O bilateral inguinal hernia repair 2013     Priority: Medium    History of tobacco abuse 08/15/2013     Priority: Medium    Benign essential hypertension 2012     Priority: Medium    Pityriasis rosea 2012     Priority: Medium       Past Medical History:   Diagnosis Date    Acute kidney failure (H)     Essential (primary) hypertension     Hyperlipidemia     Osteoarthritis of hip     Pityriasis rosea     Tachycardia     Unilateral inguinal hernia without obstruction or gangrene        Past Surgical History:   Procedure Laterality Date    CIRCUMCISION      2017,Dr. Heidi GREENBERG    OTHER SURGICAL HISTORY      70836.9,CA SUBTALAR ATHROEREISIS,bone Spurs excision on Toe    OTHER SURGICAL HISTORY      13,,HERNIA REPAIR,Right,RIH with mesh    OTHER SURGICAL HISTORY      13,69062,GENITAL SURGERY MALE,Dorsal Slit    OTHER SURGICAL HISTORY      4/15/14,,HERNIA REPAIR,Left,LIH with mesh    OTHER SURGICAL HISTORY      14,19376.0,CA TOTAL HIP ARTHROPLASTY,Left       Family History   Problem Relation Age of Onset    Other - See Comments Son 12        neuroblastoma at 11 yo  at 14.    Genetic Disorder No family hx of         Genetic,No known FHx of DM, CAD, CVA       Social History     Tobacco Use    Smoking status: Former     Types: Cigarettes     Quit date: 1976     Years since quittin.7     Passive exposure: Past    Smokeless tobacco: Never   Vaping Use    Vaping Use: Never used   Substance Use Topics    Alcohol use: Yes     Alcohol/week: 0.8 standard drinks of alcohol     Comment: maybe 1 or 2 month    Drug use:  "Never       Medications:    amLODIPine (NORVASC) 5 MG tablet  B-D U/F 31G X 8 MM insulin pen needle  Cranberry-Vitamin C (CRANBERRY CONCENTRATE/VITAMINC) 25919-955 MG CAPS  darbepoetin miller (ARANESP) 100 MCG/0.5ML injection  famotidine (PEPCID) 20 MG tablet  finasteride (PROSCAR) 5 MG tablet  furosemide (LASIX) 40 MG tablet  insulin glargine (LANTUS SOLOSTAR) 100 UNIT/ML pen  predniSONE (DELTASONE) 2.5 MG tablet  sodium bicarbonate 650 MG tablet  tamsulosin (FLOMAX) 0.4 MG capsule        Review of Systems: See HPI for pertinent negatives and positives. All other systems reviewed and found to be negative.    Physical Exam   BP (!) 158/89   Pulse 96   Temp 97.6  F (36.4  C) (Tympanic)   Resp 20   Ht 1.727 m (5' 8\")   Wt 74.8 kg (165 lb)   SpO2 95%   BMI 25.09 kg/m       General: awake, comfortable  HEENT: atraumatic  Respiratory: normal effort, clear to auscultation bilaterally  Cardiovascular: regular rate and rhythm, systolic murmur  Abdomen: BS+, soft, nondistended, tender in suprapubic/left lower quadrant, no guarding  Extremities: no deformities, edema, or tenderness  Skin: warm, dry, no rashes  Neuro: alert, no focal deficits    ED Course           Results for orders placed or performed during the hospital encounter of 09/22/23 (from the past 24 hour(s))   [place] Draw    Narrative    The following orders were created for panel order [place] Draw.  Procedure                               Abnormality         Status                     ---------                               -----------         ------                     Extra Blue Top Tube[[id number]]                              Final result               Extra Red Top Tube[[id number]]                                                          Extra Green Top (Lithium...[[id number]]                      Final result               Extra Purple Top Tube[[id number]]                            Final result                 Please view results for these tests on the " individual orders.   Extra Blue Top Tube   Result Value Ref Range    Hold Specimen JIC    Extra Green Top (Lithium Heparin) Tube   Result Value Ref Range    Hold Specimen JIC    Extra Purple Top Tube   Result Value Ref Range    Hold Specimen JIC    CBC with platelets differential    Narrative    The following orders were created for panel order CBC with platelets differential.  Procedure                               Abnormality         Status                     ---------                               -----------         ------                     CBC with platelets and d...[[id number]]  Abnormal            Final result                 Please view results for these tests on the individual orders.   Basic metabolic panel   Result Value Ref Range    Sodium 137 136 - 145 mmol/L    Potassium 4.5 3.4 - 5.3 mmol/L    Chloride 100 98 - 107 mmol/L    Carbon Dioxide (CO2) 20 (L) 22 - 29 mmol/L    Anion Gap 17 (H) 7 - 15 mmol/L    Urea Nitrogen 87.4 (H) 8.0 - 23.0 mg/dL    Creatinine 8.56 (H) 0.67 - 1.17 mg/dL    Calcium 8.9 8.8 - 10.2 mg/dL    Glucose 162 (H) 70 - 99 mg/dL    GFR Estimate 5 (L) >60 mL/min/1.73m2   CBC with platelets and differential   Result Value Ref Range    WBC Count 17.6 (H) 4.0 - 11.0 10e3/uL    RBC Count 2.88 (L) 4.40 - 5.90 10e6/uL    Hemoglobin 9.1 (L) 13.3 - 17.7 g/dL    Hematocrit 27.1 (L) 40.0 - 53.0 %    MCV 94 78 - 100 fL    MCH 31.6 26.5 - 33.0 pg    MCHC 33.6 31.5 - 36.5 g/dL    RDW 13.2 10.0 - 15.0 %    Platelet Count 292 150 - 450 10e3/uL    % Neutrophils 77 %    % Lymphocytes 17 %    % Monocytes 5 %    % Eosinophils 1 %    % Basophils 0 %    % Immature Granulocytes 0 %    NRBCs per 100 WBC 0 <1 /100    Absolute Neutrophils 13.7 (H) 1.6 - 8.3 10e3/uL    Absolute Lymphocytes 2.9 0.8 - 5.3 10e3/uL    Absolute Monocytes 0.8 0.0 - 1.3 10e3/uL    Absolute Eosinophils 0.1 0.0 - 0.7 10e3/uL    Absolute Basophils 0.0 0.0 - 0.2 10e3/uL    Absolute Immature Granulocytes 0.1 <=0.4 10e3/uL    Absolute  NRBCs 0.0 10e3/uL   CT Abdomen Pelvis w/o Contrast    Narrative    EXAM: CT ABDOMEN PELVIS W/O CONTRAST 9/22/2023 6:21 PM    HISTORY: Left lower quadrant pain    COMPARISON: CT abdomen and pelvis 5/5/2017    TECHNIQUE:   Imaging protocol: Computed tomography of abdomen and pelvis without  contrast.  Acquisition: This CT exam was performed using one or more the  following dose reduction techniques: automated exposure control,  adjustment of the mA and/or kV according to patient size, and/or  iterative reconstruction technique.    FINDINGS:    LOWER CHEST:  Bibasilar atelectasis. No suspicious pulmonary nodules or masses.  Moderate to large hiatal hernia. Severe coronary calcification.    ABDOMEN/PELVIS:  LIVER: Normal contour. No suspicious liver lesions.  GALLBLADDER: Cholelithiasis without cholecystitis.  BILE DUCTS: No biliary tract dilatation.  PANCREAS: Within normal limits.  SPLEEN: Within normal limits.  ADRENALS: Within normal limits.    KIDNEYS/URETERS: No suspicious mass, nephrolithiasis, or  hydronephrosis. Bilateral renal cortical thinning. Bilateral renal  cortical cysts.  URINARY BLADDER: Within normal limits.  REPRODUCTIVE ORGANS: No pelvic masses.    BOWEL: No bowel dilatation or wall thickening. Colonic diverticulosis  without diverticulitis. Normal appendix. 3.5 x 3.1 cm longitudinal  diverticulum near the ampulla of Vater.  PERITONEUM/RETROPERITONEUM: No free air. Small fat containing  umbilical hernia. Stranding and edema surrounding the bladder.  Prominent fat in the right inguinal canal.  VESSELS: Atherosclerotic calcification of the aorta without aneurysmal  dilation.  LYMPH NODES: There are no pathologically enlarged lymph nodes.    BONES AND SOFT TISSUES:  No suspicious osseous lesions. Degenerative periarticular changes and  spinal spondylosis. Postsurgical changes of bilateral hip prosthesis  with streak artifact limiting evaluation of surrounding tissues.  Moderate compression deformity of  L2. Mild compression deformity of  T11 and L1. Osteopenia.      Impression    IMPRESSION:  Stranding and edema surrounding the urinary bladder, concerning for  cystitis. Recommend clinical correlation.    TAMMY CARDOZA MD         SYSTEM ID:  [id number]   Lactic acid whole blood   Result Value Ref Range    Lactic Acid 1.3 0.7 - 2.0 mmol/L       Medications   sodium chloride 0.9% BOLUS 500 mL (500 mLs Intravenous $New Bag 9/22/23 2417)       Assessments & Plan (with Medical Decision Making)     I have reviewed the nursing notes.    89 year old male evaluated for left lower quadrant abdominal pain starting 2 hours prior to presentation.  Exam with left lower quadrant and suprapubic tenderness.  Afebrile.  Significant leukocytosis 17.6 with left shift.  Renal function consistent with past CKD 5.  CT scan demonstrates likely cystitis.  UA pending at signout to Dr. Flores at shift change.  Concerning for UTI.  Lactate and blood cultures pending.      New Prescriptions    No medications on file       Final diagnoses:   LLQ abdominal pain       9/22/2023   [facility] AND [facility]       Arley Neely MD  09/22/23 0035

## 2023-09-22 NOTE — ED PROVIDER NOTES
ED PROVIDER NOTE  Patient Name: Altaf Chao  MRN: [id number]    CC:    Chief Complaint   Patient presents with    Abdominal Pain     The patient was signed out to me by my partner Dr. Neely.    Scan revealed cystitis.  Follow-up on urinalysis results was signed out to me for urinalysis reveals consistent with infection.  Urine culture pending.  Patient was given a dose of ceftriaxone here, I reviewed his biology results, sensitive to cephalosporins sometimes.  I prefer to start this rather than ciprofloxacin at this time.  Follow-up on cultures, may need to be changed on antibiotics if resistance show.  Patient does not currently go to dialysis, cefdinir 300 mg daily given CKD.    Diagnosis  UTI    All questions answered.  Directed to follow-up primary care physician, strict return precautions given.    ED Events, Labs and Imaging:  ED Course as of 09/22/23 2057   Fri Sep 22, 2023   1907 WBC(!): 17.6   1907 Hemoglobin(!): 9.1   1907 Sodium: 137   1907 Potassium: 4.5   1907 Carbon Dioxide (CO2)(!): 20   1907 Anion Gap(!): 17   1907 Urea Nitrogen(!): 87.4   2018 Nitrite Urine(!): Positive   2018 Leukocyte Esterase Urine(!): Large   2018 WBC Clumps(!): Present   2018 RBC Urine(!): 28       Garret Flores MD  Emergency Medicine    Dictation Disclaimer: Some notes are completed with voice-recognition dictation software. Errors are generally corrected in real time. Please contact me via Epic staff message if you note any errors requiring clarification.     Fabiano Flores MD  09/22/23 2058       Fabiano Flores MD  10/30/23 7369

## 2023-09-23 NOTE — DISCHARGE INSTRUCTIONS
Follow-up with your primary care doctor the next 2 to 3 days.    For mild to moderate pain or fever you can takeTylenol if you do not have allergies to these.    Take cefdinir 300 mg daily for 10 days.    You have any worsening or concerning symptoms please call 911 or return to the emergency department immediately.

## 2023-09-26 ENCOUNTER — TELEPHONE (OUTPATIENT)
Dept: EMERGENCY MEDICINE | Facility: OTHER | Age: 88
End: 2023-09-26
Payer: COMMERCIAL

## 2023-09-26 DIAGNOSIS — N39.0 UTI (URINARY TRACT INFECTION): ICD-10-CM

## 2023-09-26 LAB — BACTERIA UR CULT: ABNORMAL

## 2023-09-26 RX ORDER — CIPROFLOXACIN 250 MG/1
250 TABLET, FILM COATED ORAL
Qty: 3 TABLET | Refills: 0 | Status: SHIPPED | OUTPATIENT
Start: 2023-09-26 | End: 2023-09-29

## 2023-09-26 NOTE — TELEPHONE ENCOUNTER
[facility]) Emergency Department/Urgent Care Lab result notification  [Note:  ED Lab Results RN will reference the [facility] Emergency Dept visit note prior to contacting patient AND/OR prior to consulting Emergency Dept Provider.  Highlights of Emergency Dept visit in information summary at the bottom of this telephone note]    1. Reason for call  Notify of lab results  Assess patient symptoms [if necessary]  Review ED Providers recommendations/discharge instructions (if necessary)  Advise per [facility] ED lab result protocol    2. Lab Result (including Rx patient on, if applicable).  If culture, copy of lab report at bottom.  CORRECTION: D/T RX HAVING MULTIPLE DIRECTIONS    Final Urine Culture Report on 9/26/23  [facility] Emergency Dept discharge antibiotic prescribed: Cefdinir (Omnicef) 300 mg capsule, 1 capsule (300 mg) by mouth 2 times daily for 10 days & Take 1 dose of cefdinir after hemodialysis for a total of 5 doses.  #1. Bacteria, 10,000-50,000 CFU/mL Pseudomonas alcaligenes,  is [NOT TESTED] to antibiotic.  Recommendations in treatment per [facility] ED lab result Urine Culture protocol.      Per ED results Microbiology - Fluoroquinolones are the only oral agents susceptible to pseudomonas, Ceftazidime is not a susceptible choice    3. RN Assessment (Patient's current Symptoms):  Time of call: 9/26/2023 4:33 PM  Assessment: NKA to antibiotics, testing limitations with  lab on fluoroquinolones, plan to assess and consult  Left voicemail message requesting a call back to [facility] ED Lab Result RN at [phone].  RN is available every day between 9 a.m. and 5:30 p.m.      Information summary from Emergency Dept/Urgent Care visit on 9/22/23  Symptoms reported at ED/UC visit (Chief complaint, HPI)     Chief Complaint   Patient presents with    Abdominal Pain      The patient was signed out to me by my partner Dr. Neely.     Scan revealed  cystitis.  Follow-up on urinalysis results was signed out to me for urinalysis reveals consistent with infection.  Urine culture pending.  Patient was given a dose of ceftriaxone here, I reviewed his biology results, sensitive to cephalosporins sometimes.  I prefer to start this rather than ciprofloxacin at this time.  Follow-up on cultures, may need to be changed on antibiotics if resistance show.  Patient does not currently go to dialysis, cefdinir 300 mg daily given CKD.     All questions answered.  Directed to follow-up primary care physician, strict return precautions given.   Significant Medical hx, if applicable (i.e. CKD, diabetes) DMII, CKD stage 5   Allergies Allergies   Allergen Reactions    Statins [Statins] Other (See Comments)     -- Patient declined --      Weight, if applicable Wt Readings from Last 2 Encounters:   09/22/23 74.8 kg (165 lb)   08/24/23 76.7 kg (169 lb 3.2 oz)      Coumadin/Warfarin [Yes /No] NO   Creatinine Level (mg/dl) Creatinine   Date Value Ref Range Status   09/22/2023 8.56 (H) 0.67 - 1.17 mg/dL Final   03/30/2021 5.38 (H) 0.70 - 1.30 mg/dL Final      Creatinine clearance (ml/min), if applicable Serum creatinine: 8.56 mg/dL (H) 09/22/23 1749  Estimated creatinine clearance: 6.2 mL/min (A)   ED/UC Provider Impression and Plan (applicable information)    I have reviewed the nursing notes.     89 year old male evaluated for left lower quadrant abdominal pain starting 2 hours prior to presentation.  Exam with left lower quadrant and suprapubic tenderness.  Afebrile.  Significant leukocytosis 17.6 with left shift.  Renal function consistent with past CKD 5.  CT scan demonstrates likely cystitis.  UA pending at signout to Dr. Flores at shift change.  Concerning for UTI.  Lactate and blood cultures pending.           New Prescriptions     No medications on file         Final diagnoses:   LLQ abdominal pain         9/22/2023   [facility]        Alrey Neely,  MD  09/22/23 1907   ED/UC diagnosis LLQ abdominal pain  Acute UTI   ED/UC Provider   Fabiano Flores MD          Copy of Lab report (if applicable)  Urine Culture  Order: [id number]  Collected 9/22/2023  7:37 PM       Status: Final result       Visible to patient: No (inaccessible in MyChart)    Specimen Information: Urine, Clean Catch   2 Result Notes  Culture 10,000-50,000 CFU/mL Pseudomonas alcaligenes Abnormal            Resulting Agency: [facility]     Susceptibility     Pseudomonas alcaligenes     JUAN     Aztreonam <=4 Susceptible     Cefepime <=2 Susceptible     Ceftazidime <=1 Susceptible     Ciprofloxacin  See below 1     Gentamicin <=4 Susceptible     Imipenem <=1 Susceptible     Levofloxacin  See below 2     Piperacillin/Tazobactam  See below 3     Tobramycin <=4 Susceptible     Trimethoprim/Sulfamethoxazole <=2/38 Susceptible              1 Ciprofloxacin not resistant.  Due to test limitations, lab cannot provide JUAN to determine susceptibility.   2 Levofloxacin not resistant.  Due to test limitations, lab cannot provide JUAN to determine susceptibility.   3 Piperacillin/Tazobactam not resistant.  Due to test limitations, lab cannot provide JUAN to determine susceptibility.            Specimen Collected: 09/22/23  7:37 PM Last Resulted: 09/26/23  9:13 AM               Oriana Marino RN  [facility]  Emergency Dept Lab Result RN  Ph# [phone]

## 2023-09-26 NOTE — TELEPHONE ENCOUNTER
"[facility] [place] () Emergency Department/Urgent Care Lab result notification  [Note:  ED Lab Results RN will reference the [facility] Emergency Dept visit note prior to contacting patient AND/OR prior to consulting Emergency Dept Provider.  Highlights of Emergency Dept visit in information summary at the bottom of this telephone note]    1. Reason for call  Notify of lab results  Assess patient symptoms [if necessary]  Review ED Providers recommendations/discharge instructions (if necessary)  Advise per [facility] ED lab result protocol    2. Lab Result (including Rx patient on, if applicable).  If culture, copy of lab report at bottom.  CORRECTION: D/T RX HAVING MULTIPLE DIRECTIONS     Final Urine Culture Report on 9/26/23  [facility] Emergency Dept discharge antibiotic prescribed: Cefdinir (Omnicef) 300 mg capsule, 1 capsule (300 mg) by mouth 2 times daily for 10 days & Take 1 dose of cefdinir after hemodialysis for a total of 5 doses.  #1. Bacteria, 10,000-50,000 CFU/mL Pseudomonas alcaligenes,  is [NOT TESTED] to antibiotic.  Recommendations in treatment per [facility] ED lab result Urine Culture protocol.      Per ED results Microbiology - Fluoroquinolones are the only oral agents susceptible to pseudomonas, Ceftazidime is not a susceptible choice    3. RN Assessment (Patient's current Symptoms):  Time of call: 9/26/2023 4:51 PM  Assessment: patient has taken about 3-4 Cefdinir, taking 1 daily, patient is not on dialysis  Genitourinary symptoms:  \"seems to make a different I don't have that stomach problem pain in the left side\" - no pain, burning, frequency, urgency  Flank pain: Yes, intermittent, not worse than baseline says he is out boweling/golfing, stage 5 CKD  Fever:  None  Nausea/vomiting:  None  Abdominal pain:  NONE, improved    4. RN Recommendations/Instructions per [facility] ED lab result protocol  [facility] ED lab result protocol used: Urine " culture  patient was notified of lab result and treatment recommendations per ED consult   Advised to discontinue current antibiotic Cefdinir  Switch to Rx for Ciprofloxacin (Cipro) 250 mg tablet, Take 1 tablet (250 mg) by mouth Q18H for 3 days - Oral  sent to [Pharmacy - [place], [place]].    RN reviewed information about recommendations per ED consult, stop cefdinir, start Cipro, probiotics, UTI prevention, follow up in 2-3 days (transferred to scheduling), return for any worsening fevers, flank pain, nausea/vomiting, abdominal pain - patient verbalized understanding and agrees with plan.  Information on preventing future UTI's:  Drink plenty of fluids such as water, juice, or other caffeine-free drinks. This helps flush bacteria out of your system.  Empty your bladder when you feel the urge to urinate and before going to sleep. Urine that stays in your bladder promotes infection.  Keep follow-up appointments with your health care provider. He or she can may do tests to make sure the infection has cleared. If necessary, additional treatment can be started.      RN will consult with [facility] Emergency Dept Provider and then call patient back with recommendations (Yes/No/NA): YES    RN consultation note with [facility] Emergency Dept Provider   Why consultation necessary, See SBAR below:  S (situation, reason for consult): 89 yr M, NKA to antibiotic, final clean catch urine culture 10-50K CFU Pseudomonas alcaligenes on Cefdinir seeking treatment recommendations  B (background): see ED note  A (assessment): See RN assessment  R (RN's recommendations): change to fluoroquinolone      [facility] Emergency Department Provider: Nataliya Cardoso MD  Consultation Time: 9/26/2023 5:00 PM  Provider Recommendation:  Discontinue Cefdinir, Ciprofloxacin 250 mg take 1 tablet every 18 hours for 3 days,  return for any worsening fevers, flank pain, nausea/vomiting, abdominal pain  Patient/parent notified of  Providers recommendations (YES/NO): YES       5. Please Contact your PCP clinic or return to the Emergency department if your:  Symptoms return.  Symptoms do not improve after 3 days on antibiotic.  Symptoms do not resolve after completing antibiotic.  Symptoms worsen or other concerning symptoms.    Information summary from Emergency Dept/Urgent Care visit on 9/22/23  Allergies Allergies   Allergen Reactions    Statins [Statins] Other (See Comments)     -- Patient declined --      Weight, if applicable Wt Readings from Last 2 Encounters:   09/22/23 74.8 kg (165 lb)   08/24/23 76.7 kg (169 lb 3.2 oz)      Coumadin/Warfarin [Yes /No] NO   Creatinine Level (mg/dl) Creatinine   Date Value Ref Range Status   09/22/2023 8.56 (H) 0.67 - 1.17 mg/dL Final   03/30/2021 5.38 (H) 0.70 - 1.30 mg/dL Final      Creatinine clearance (ml/min), if applicable Serum creatinine: 8.56 mg/dL (H) 09/22/23 1749  Estimated creatinine clearance: 6.2 mL/min (A)       Copy of Lab report (if applicable)  Urine Culture  Order: [id number]  Status: Final result       Visible to patient: No (inaccessible in MyChart)    Specimen Information: Urine, Clean Catch   3 Result Notes  Culture 10,000-50,000 CFU/mL Pseudomonas alcaligenes Abnormal            Resulting Agency: [facility]     Susceptibility    Pseudomonas alcaligenes (1)    Antibiotic Interpretation Sensitivity Method Status    Aztreonam Susceptible <=4 JUAN Final    Cefepime Susceptible <=2 JUAN Final    Ceftazidime Susceptible <=1 JUAN Final    Ciprofloxacin   JUAN Final     Ciprofloxacin not resistant.  Due to test limitations, lab cannot provide JUAN to determine susceptibility.       Gentamicin Susceptible <=4 JUAN Final    Imipenem Susceptible <=1 JUAN Final    Levofloxacin   JUAN Final     Levofloxacin not resistant.  Due to test limitations, lab cannot provide JUAN to determine susceptibility.       Piperacillin/Tazobactam   JUAN Final     Piperacillin/Tazobactam not resistant.  Due to test  limitations, lab cannot provide JUAN to determine susceptibility.       Tobramycin Susceptible <=4 JUAN Final    Trimethoprim/Sulfamethoxazole Susceptible <=2/38 JUAN Final          Specimen Collected: 09/22/23  7:37 PM Last Resulted: 09/26/23  9:13 AM               Oriana Marino RN  [facility]  Emergency Dept Lab Result RN  Ph# [phone]

## 2023-09-27 LAB
BACTERIA BLD CULT: NO GROWTH
BACTERIA BLD CULT: NO GROWTH

## 2023-10-10 NOTE — PROGRESS NOTES

## 2023-10-11 PROBLEM — D50.9 IRON DEFICIENCY ANEMIA, UNSPECIFIED: Status: ACTIVE | Noted: 2023-01-01

## 2023-10-18 NOTE — NURSING NOTE
"Infusion Nursing Note:  Altaf Chao presents today for Feraheme 1 of 2.    Patient seen by provider today: No   present during visit today: Not Applicable.    Note: Patient's blood pressure noted to be elevated at 159/75 upon arrival to infusion visit and 147/72 at time of discharge. Patient states that he has \"white coat syndrome\" and declines further evaluation at this time.       Intravenous Access:  Peripheral IV placed.    Treatment Conditions:  Not Applicable.      Post Infusion Assessment:  Patient tolerated infusion without incident.  Patient observed for 30 minutes post infusion per protocol.  Blood return noted pre and post infusion.  Site patent and intact, free from redness, edema or discomfort.  No evidence of extravasations.  Access discontinued per protocol.       Discharge Plan:   Discharge instructions reviewed with: Patient.  Patient and/or family verbalized understanding of discharge instructions and all questions answered.  Copy of AVS reviewed with patient and/or family.  Patient will return 10/25/2023 for next appointment.  Patient discharged in stable condition accompanied by: self.  Departure Mode: Ambulatory.      Chen Echevarria RN    "

## 2023-10-25 NOTE — NURSING NOTE
Infusion Nursing Note:  Altaf Chao presents today for Feraheme 2 of 2.    Patient seen by provider today: No   present during visit today: Not Applicable.    Note: Patient's blood pressure noted to be elevated upon arrival at 158/74. Blood pressure 150/68 upon recheck and 137/69 at time of discharge. Patient is asymptomatic and declines further evaluation at this time. Patient educated to continue to monitor at home and to follow-up with PCP or present to ED for further evaluation if BP remains elevated or should signs or symptoms of hypertension arise. Patient verbalizes understanding and is in agreement with the plan.       Intravenous Access:  Peripheral IV placed.    Treatment Conditions:  Not Applicable.      Post Infusion Assessment:  Patient tolerated infusion without incident.  Patient observed for 30 minutes post infusion per protocol.  Blood return noted pre and post infusion.  Site patent and intact, free from redness, edema or discomfort.  No evidence of extravasations.  Access discontinued per protocol.       Discharge Plan:   Discharge instructions reviewed with: Patient.  Patient and/or family verbalized understanding of discharge instructions and all questions answered.  Copy of AVS reviewed with patient and/or family.   Patient discharged in stable condition accompanied by: self.  Departure Mode: Ambulatory.      Chen Echevarria RN

## 2023-10-30 PROBLEM — A41.9 SEPSIS DUE TO URINARY TRACT INFECTION (H): Status: ACTIVE | Noted: 2023-01-01

## 2023-10-30 PROBLEM — N39.0 SEPSIS DUE TO URINARY TRACT INFECTION (H): Status: ACTIVE | Noted: 2023-01-01

## 2023-10-31 NOTE — PROGRESS NOTES
SAFETY CHECKLIST  ID Bands and Risk clasps correct and in place (DNR, Fall risk, Allergy, Latex, Limb):  Yes  All Lines Reconciled and labeled correctly: Yes  Whiteboard updated:Yes  Environmental interventions: Yes  Verify Tele #: [phone]    Thea Pierre RN on 10/31/2023 at 7:35 AM

## 2023-10-31 NOTE — H&P
"CC: Fever and chills     HPI:  Altaf Chao is an 89 year old male who presents with above complaints.  Patient's past medical history significant for chronic kidney disease stage V.  Not on dialysis.  Diabetes.  History of renal nephritis.  Patient has had urinary tract infection before.  He stated in the last 3 days he did not been feeling well with gradual rise in fevers and chills.  Difficulty urination.  Evaluation emergency room revealed elevated lactic acid, elevated WBCs, pyuria.    Past Medical History:   Diagnosis Date    Acute kidney failure (H24)     04/2017    Essential (primary) hypertension     8/7/2012    Hyperlipidemia     No Comments Provided    Osteoarthritis of hip     No Comments Provided    Pityriasis rosea     No Comments Provided    Tachycardia     No Comments Provided    Unilateral inguinal hernia without obstruction or gangrene     8/12/2013,Right Inguinal hernia with incarceration, massive [[id number]][       Allergies:   Allergies   Allergen Reactions    Statins [Statins] Other (See Comments)     -- Patient declined --       Principal Problem:    DNI (do not intubate)  Active Problems:    Immunocompromised patient (H24)    Primary pauci-immune necrotizing and crescentic glomerulonephritis    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, with long-term current use of insulin (H)    DNR (do not resuscitate)    Sepsis due to urinary tract infection (H)    Blood pressure 132/71, pulse 89, temperature (!) 101.1  F (38.4  C), temperature source Tympanic, resp. rate 26, height 1.727 m (5' 8\"), weight 76.2 kg (168 lb), SpO2 96%.    Review of Systems  ROS:    1.General: See above   2.HEENT: no HA, no blurry vision, no swallow problems, no nasal congestion, no sore throat  3.Pulmonary: no cough, SOB, wheezes  4.CVS: no CP, no palpitations, no CARBAJAL, no SOB, no intermittent claudication  5.GI: no nausea, vomiting or diarrhea, no abdominal pain, no constipation, no " hematemesis or hematochezia  6.: no renal colic, no hematuria, urinary frequency or urgency  7.Extremities: no edema  8.Neurological: no dizziness, vertigo, double or blurry vision, no no focal weakness, no paresthesia, no swallow or speech problems  9.Musculosceletal: no joint pains, no joint swelling, no back pain  10.Dermatological: no skin rashes, no lesions, no pruritus  11.Hematological: no bleeding, no hx/o clots  12.Endocrinological: no heat/cold intolerance, no hx/o diabetes  13.Psychiatric: no suicidal or homicidal thoughts  Physical Exam  Physical Exam:    Not in distress, pleasant, lucid, cooperative,  Head - atraumatic, eyes - pupils equal, round, reactive to light, extra ocular movement intact, MMM  Neck - supple, thyroid not enlarged, LN not palpated  Lungs - clear to auscultation, no dullness on percussion  CVS - heart sounds S1, S2, no additional murmurs gallop, regular rate and rhythm  Gastrointestinal-abdomen is soft, non-tender, non-distended, no organomegaly, positive bowel sounds  Extremities no clubbing, cyanosis or edema  Neurological-cranial nerve II-XII grossly intact, no meningeal signs, no cerebellar signs, no sensory deficit  Musculoskeletal - DJD related changes in multiple joints, no effusions, ROM preserved  Dermatological - the skin dry, warm, no rashes  Psychiatric-patient is AAO X3, patient has normal affect  Assessment:  Sepsis  Acute urinary tract infection  Diabetes  Chronic kidney disease stage V  History of Pseudomonas urinary tract infection  Immunocompromised    Plan:  Admit to medical floor  Fluid resuscitation  Plan lactate  Started on empiric, broad-spectrum antibiotics with a goal to cover Pseudomonas  Follow cultures, adjust as needed  Continue with ADA diet  Started on insulin sliding scale  Verified home medications  Patient is DNR/DNI    END:    As the provider for the telehealth service, I attest that I introduced myself to the patient, provided my credentials,  disclosed by location and determined that based on a review of the patient's chart and discussion with members of the patient's treatment team, telemedicine via real-time, 2 way, and interactive audio and video platform is an appropriate and effective means of providing the service.  The patient and I mutually agree this visit is appropriate for telemedicine.  The virtual encounter was taken place from  [place], [place].  The encounter took approximately 35 minutes.  The nurse was present during the entire time and I was able to move the stethoscope in appropriate directions.  The patient was evaluated at the Hospital         Portions of this note may be dictated using Dragon voice recognition software. Variances in spelling and vocabulary are possible and unintentional. Not all errors may be caught and/or corrected. Please notify the author if any discrepancies are noted and/or if the meaning of any statement is unclear.          Patient verbally consented for treatment via video visit with patient currently located at [facility] and provider located in [place].         Time Spent: 40 minutes     Cj Trivedi MD  10/31/2023

## 2023-10-31 NOTE — PHARMACY
Pharmacy- Renal Dose Adjustment    Patient Active Problem List   Diagnosis    Acquired buried penis    Acute crescentic glomerulonephritis    Anemia due to vitamin B12 deficiency    Anemia of chronic disease    Antineutrophil cytoplasmic antibody (ANCA) positive    Bilateral edema of lower extremity    CKD (chronic kidney disease) stage 5, GFR 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 ml/min (H)    Mixed hyperlipidemia    Benign essential hypertension    H/O bilateral inguinal hernia repair    H/O total hip arthroplasty    History of tobacco abuse    Immunocompromised patient (H24)    Lymphedema of both lower extremities    Metabolic acidosis    Pityriasis rosea    Primary pauci-immune necrotizing and crescentic glomerulonephritis    Refusal of statin medication at discharge    Steroid-induced hyperglycemia    Heartburn    Hyponatremia    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, with long-term current use of insulin (H)    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)    DNR (do not resuscitate)    DNI (do not intubate)    Serum phosphate elevated    Occult closed fracture of right elbow with routine healing, subsequent encounter    Iron deficiency anemia, unspecified    Sepsis due to urinary tract infection (H)        Relevant Labs:  Recent Labs   Lab Test 10/31/23  0555 10/30/23  2044   WBC 11.0 11.4*   HGB 9.0* 9.2*    212        CrCl: ~5.6 mL/min      Intake/Output Summary (Last 24 hours) at 10/31/2023 1648  Last data filed at 10/31/2023 1233  Gross per 24 hour   Intake 370 ml   Output 1400 ml   Net -1030 ml          Per Renal Dose Adjustment Protocol, will adjust:  -Famotidine to 20 mg Q48H (from 20 mg Q24H) due to CrCl < 29 mL/min.      Will continue to follow and make adjustments accordingly. Thank You.    Braden Montero [facility] ....................  10/31/2023   4:48 PM

## 2023-10-31 NOTE — PROGRESS NOTES
Interdisciplinary Discharge Planning Note    Anticipated Discharge Date:11/1-11/2    Anticipated Discharge Location: Home     Clinical Needs Before Discharge:   Medical Stability     Treatment Needs After Discharge:  None identified    Potential Barriers to Discharge: None Identified    ANNE Max  10/31/2023,  12:53 PM

## 2023-10-31 NOTE — PHARMACY-ADMISSION MEDICATION HISTORY
"Pharmacy Intern Admission Medication History    Admission medication history is complete. The information provided in this note is only as accurate as the sources available at the time of the update.    Information Source(s): Patient and CareEverywhere/SureScripts via in-person and 10/9 nephrology note.     Pertinent Information: Patient attested to not taking yesterdays evening doses \"feeling cruddy\" Wife helps with medications and always remembers to take. Patient takes 30,000mg cranberry supplement once daily. Patient states he gets B12 Injection every 4 weeks while HX suggests still every 3 weeks (left unchanged). Darbepoetin recently increased to 100mcg every 30 days. Sodium Bicarb recently changed to 3 tabs TID.  Patient recently started on calcitriol. Received Feraheme injections x2 10/18 + 10/25.      Uses 2 units of insulin daily.  patient on proper storage and BUD of insulin pens on discharge.       Changes made to PTA medication list:  Added: Calcitriol .25mcg 1 tab MWF   Deleted: B12 inj - duplicate   Changed: Sodium Bicarb 1950mg TID, Cranberry 30,000mg daily    Medication Affordability:  Not including over the counter (OTC) medications, was there a time in the past 3 months when you did not take your medications as prescribed because of cost?: No    Allergies reviewed with patient: yes    Medication History Completed By: Thai Knott RP 10/31/2023 10:55 AM      Current Facility-Administered Medications for the 10/30/23 encounter (Hospital Encounter)   Medication    cyanocobalamin injection 1,000 mcg     PTA Med List   Medication Sig Last Dose    amLODIPine (NORVASC) 5 MG tablet Take 1 tablet (5 mg) by mouth 2 times daily -- Dose Increase 8/24/2023   --- okay to take 10 mg once daily if BID is not covered --- 10/30/2023 at AM    calcitRIOL (ROCALTROL) 0.25 MCG capsule Take 0.25 mcg by mouth One Capsule by mouth on Mon, Wed, Fri     Cranberry-Vitamin C (CRANBERRY CONCENTRATE/VITAMINC) 67560-642 " MG CAPS Take 2 capsules by mouth daily - for UTI prevention (Patient taking differently: Take 2 capsules by mouth daily - for UTI prevention) 10/30/2023 at AM    darbepoetin miller (ARANESP) 100 MCG/0.5ML injection 100 mcg every 28 days 10/16/2023 at 1037    famotidine (PEPCID) 20 MG tablet Take 1 tablet (20 mg) by mouth daily -- for heartburn (Use as needed) More than a month    finasteride (PROSCAR) 5 MG tablet Take 1 tablet (5 mg) by mouth daily 10/30/2023    furosemide (LASIX) 40 MG tablet Take 1 tablet (40 mg) by mouth every morning 10/30/2023 at AM    insulin glargine (LANTUS SOLOSTAR) 100 UNIT/ML pen Inject 2 Units Subcutaneous At Bedtime 10/29/2023 at PM    predniSONE (DELTASONE) 2.5 MG tablet Take 1 tablet (2.5 mg) by mouth daily 10/30/2023 at AM    sodium bicarbonate 650 MG tablet Take 1,950 mg by mouth 3 times daily 10/30/2023 at NOON    tamsulosin (FLOMAX) 0.4 MG capsule Take 1 capsule (0.4 mg) by mouth every evening 10/30/2023 at PM

## 2023-10-31 NOTE — PROGRESS NOTES
"[facility] ADMISSION NOTE    Patient admitted to room 313 at approximately 0245 via bed from emergency room. Patient was accompanied by other ER tech.     Verbal SBAR report received from Ria prior to patient arrival.     Patient trasferred to bed via air mat Patient alert and oriented X 3. The patient is not having any pain.  . Admission vital signs: Blood pressure (!) 143/73, pulse 92, temperature 99.5  F (37.5  C), temperature source Tympanic, resp. rate 18, height 1.727 m (5' 8\"), weight 77.7 kg (171 lb 3.2 oz), SpO2 96%. Patient was oriented to plan of care, call light, bed controls, tv, telephone, bathroom, and visiting hours.     Risk Assessment    The following safety risks were identified during admission: fall. Yellow risk band applied: YES.     Skin Initial Assessment    This writer admitted this patient and completed a full skin assessment and Mayank score in the Adult PCS flowsheet. Appropriate interventions initiated as needed.         Mayank Risk Assessment  Sensory Perception: 4-->no impairment  Moisture: 3-->occasionally moist  Activity: 3-->walks occasionally  Mobility: 3-->slightly limited  Nutrition: 3-->adequate  Friction and Shear: 3-->no apparent problem  Mayank Score: 19  Mattress: Standard gel/foam mattress (IsoFlex, Atmos Air, etc.)  Bed Frame: Standard width and length    Education    Patient has a [place] to Observation order: No  Observation education completed and documented: N/A      Poonam Burciaga RN        "

## 2023-10-31 NOTE — ED TRIAGE NOTES
Pt arrives via EMS with c/o weakness, unable to get out of chair since this morning. EMS attempted to ambulate pt and he only made it three steps.      Triage Assessment (Adult)       Row Name 10/30/23 1928          Triage Assessment    Airway WDL WDL        Respiratory WDL    Respiratory WDL WDL        Skin Circulation/Temperature WDL    Skin Circulation/Temperature WDL WDL        Cardiac WDL    Cardiac WDL WDL        Peripheral/Neurovascular WDL    Peripheral Neurovascular WDL WDL        Cognitive/Neuro/Behavioral WDL    Cognitive/Neuro/Behavioral WDL WDL

## 2023-10-31 NOTE — PROGRESS NOTES
[facility]    Medicine Progress Note - Hospitalist Service    Date of Admission:  10/30/2023    Assessment & Plan   This 88 yo male with a PMH significant for HTN, primary pauci-immune necrotizing and crescentic glomerulonephritis and DM-2 with CKD 5 not on dialysis presented to the ER with complaints of weakness.  Symptoms began 4 days PTA with chills followed by rigors the next day.  Had a little diarrhea and cough.  The day of admission he was unable to get out of his chair so presented for evaluation.  UA with large LE, >182 WBC's with clumps.  CXR with no acute findings.  LA normal but procal elevated to 2.93.  Started on cefepime and admitted for continued cares.    Principal Problem:    Sepsis due to urinary tract infection (H)    Assessment: Improving    Plan: Continue cefepime pending UC results.  Blood cultures negative so far.  Patient reports he is feeling better than when he came in.    Active Problems:    Anemia of chronic disease    Assessment: Stable    Plan: Usually Hgb 9-10.  No specific treatment needed at this time.  Takes Aranesp monthly.      Benign essential hypertension    Assessment: Stable    Plan: Good control with amlodipine 5 mg daily.  Continue home medication.      Immunocompromised patient (H24)    Assessment: Stable    Plan: Treat infections aggressively with IV abx until culture results are available.      Primary pauci-immune necrotizing and crescentic glomerulonephritis    Assessment: Stable    Plan: Treat infections aggressively as above.  Continue baseline prednisone dose of 2.5 mg daily.  At his dose he is unlikely to require stress dose steroids but if he develops hypotension that may be a consideration.      Hyponatremia    Assessment: Improving    Plan: Has NS running.  Continue and recheck labs in AM.      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  Hold that and cover with sliding scale if needed.  Baseline Cr around 8.5, GFR 5.  Not on dialysis but doing well physically.  Renally dose medications and avoid nephrotoxic agents.          Diet: Combination Diet Moderate Consistent Carb (60 g CHO per Meal) Diet; Low Saturated Fat Na <2400mg Diet, No Caffeine Diet    DVT Prophylaxis: Pneumatic Compression Devices  Gates Catheter: Not present  Lines: None     Cardiac Monitoring: ACTIVE order. Indication: Tachyarrhythmias, acute (48 hours)  Code Status: No CPR- Do NOT Intubate      Clinically Significant Risk Factors Present on Admission         # Hyponatremia: Lowest Na = 129 mmol/L in last 2 days, will monitor as appropriate     # Anion Gap Metabolic Acidosis: Highest Anion Gap = 21 mmol/L in last 2 days, will monitor and treat as appropriate  # Hypoalbuminemia: Lowest albumin = 3.1 g/dL at 10/31/2023  5:55 AM, will monitor as appropriate    # Acute Kidney Injury, unspecified: based on a >150% or 0.3 mg/dL increase in last creatinine compared to past 90 day average, will monitor renal function  # Hypertension: Noted on problem list      # Overweight: Estimated body mass index is 26.03 kg/m  as calculated from the following:    Height as of this encounter: 1.727 m (5' 8\").    Weight as of this encounter: 77.7 kg (171 lb 3.2 oz).              Disposition Plan      Expected Discharge Date: 11/01/2023                    Melissa Nettles MD  Hospitalist Service  [facility]  Securely message with [employer] App (more info)  Text page via [facility] Paging/Directory   ______________________________________________________________________    Interval History   Feeling much better than when he came in.  Denies pain but with the UTI he had in September he had left sided pain.  Still gets a little cold but feeling better.  Admits he was too weak to go to curling last evening and will miss bowling today.  " Also went home after [employer] on  rather than working at the  as he usually would.    Physical Exam   Vital Signs: Temp: 98.6  F (37  C) Temp src: Oral BP: 137/68 Pulse: 91   Resp: 20 SpO2: 91 % O2 Device: None (Room air)    Weight: 171 lbs 3.2 oz    Constitutional: awake, alert, cooperative, no apparent distress, and appears younger than stated age  Eyes: pupils equal, round and reactive to light, extra-ocular muscles intact, and conjunctiva normal  ENT: normocepalic, without obvious abnormality, atramatic  Respiratory: no increased work of breathing, mildly decreased breath sounds anterior and lateral but clear to auscultation  Cardiovascular: regular rate and rhythm, normal S1 and S2, possible faint murmur LUSB, and no edema  GI: normal bowel sounds, soft, non-distended, and non-tender  Skin: normal skin color, texture, turgor and no redness, warmth, or swelling  Musculoskeletal: there is no redness, warmth, or swelling of the joints  full range of motion noted  Neurologic: Mental Status Exam:  Level of Alertness:   awake  Orientation:   person, place, time  Memory:   normal  Fund of Knowledge:  normal  Attention/Concentration:  normal  Language:  normal  Cranial Nerves:  cranial nerves II-XII are grossly intact  Motor Exam:  moves all extremities well and symmetrically  Very interactive, pleasant and conversant    Medical Decision Making             Data     I have personally reviewed the following data over the past 24 hrs:    11.0  \   9.0 (L)   / 201     134 (L) 98 96.6 (H) /  105 (H)   4.4 17 (L) 9.77 (H) \     ALT: 13 AST: 26 AP: 55 TBILI: 0.3   ALB: 3.1 (L) TOT PROTEIN: 6.5 LIPASE: N/A     Procal: 2.93 (H) CRP: 177.14 (H) Lactic Acid: 1.1         Imaging results reviewed over the past 24 hrs:   Recent Results (from the past 24 hour(s))   XR Chest Port 1 View    Narrative    PROCEDURE INFORMATION:   Exam: XR Chest   Exam date and time: 10/30/2023 9:24 PM   Age: 89 years old   Clinical indication:  Other: Fever, weakness, tachycardia     TECHNIQUE:   Imaging protocol: Radiologic exam of the chest.   Views: 1 view.     COMPARISON:   CR XR CHEST 1 VIEW PORTABLE 6/10/2017 7:56 AM     FINDINGS:   Lungs: Unremarkable. No consolidation.   Pleural spaces: Unremarkable. No pleural effusion. No pneumothorax.   Heart/Mediastinum: Unremarkable. No cardiomegaly.   Bones/joints: Unremarkable.       Impression    IMPRESSION:   No acute findings.     THIS DOCUMENT HAS BEEN ELECTRONICALLY SIGNED BY ZULEIMA ROSALES MD

## 2023-10-31 NOTE — PHARMACY-CONSULT NOTE
Pharmacy- Renal Dose Adjustment    Patient Active Problem List   Diagnosis    Acquired buried penis    Acute crescentic glomerulonephritis    Anemia due to vitamin B12 deficiency    Anemia of chronic disease    Antineutrophil cytoplasmic antibody (ANCA) positive    Bilateral edema of lower extremity    CKD (chronic kidney disease) stage 5, GFR 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 ml/min (H)    Mixed hyperlipidemia    Benign essential hypertension    H/O bilateral inguinal hernia repair    H/O total hip arthroplasty    History of tobacco abuse    Immunocompromised patient (H24)    Lymphedema of both lower extremities    Metabolic acidosis    Pityriasis rosea    Primary pauci-immune necrotizing and crescentic glomerulonephritis    Refusal of statin medication at discharge    Steroid-induced hyperglycemia    Heartburn    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, with long-term current use of insulin (H)    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)    DNR (do not resuscitate)    DNI (do not intubate)    Serum phosphate elevated    Occult closed fracture of right elbow with routine healing, subsequent encounter    Iron deficiency anemia, unspecified    Sepsis due to urinary tract infection (H)        Relevant Labs:  Recent Labs   Lab Test 10/31/23  0555 10/30/23  2044   WBC 11.0 11.4*   HGB 9.0* 9.2*    212        CrCl: 5.6      Intake/Output Summary (Last 24 hours) at 10/31/2023 0731  Last data filed at 10/31/2023 0723  Gross per 24 hour   Intake --   Output 800 ml   Net -800 ml          Per Renal Dose Adjustment Protocol, will adjust:  Cefepime to 1000 mg Q24h for indication of Sepsis and CrCl <10      Will continue to follow and make adjustments accordingly. Thank You.    [facility] ....................  10/31/2023   7:31 AM

## 2023-10-31 NOTE — PLAN OF CARE
Pt vitally stable, alert and oriented x3. Denies pain/nausea. Trace edema in lower extremities, SCD's in place and on. All lung fields assessed, clear and deminished in the lower lobes equal bilat. Tele was discontinued and removed. All questions and concerns addressed.     Goal Outcome Evaluation:      Plan of Care Reviewed With: patient    Overall Patient Progress: improving

## 2023-10-31 NOTE — PROVIDER NOTIFICATION
10/31/23 0304   Valuables   Patient Belongings remains with patient   Patient Belongings Remaining with Patient clothing;vision aids   Did you bring any home meds/supplements to the hospital?  No     A               Admission:  I am responsible for any personal items that are not sent to the safe or pharmacy.  [facility] is not responsible for loss, theft or damage of any property in my possession.    Signature:  _________________________________ Date: _______  Time: _____                                              Staff Signature:  ____________________________ Date: ________  Time: _____      2nd Staff person, if patient is unable/unwilling to sign:    Signature: ________________________________ Date: ________  Time: _____     Discharge:  [facility] has returned all of my personal belongings:    Signature: _________________________________ Date: ________  Time: _____                                          Staff Signature:  ____________________________ Date: ________  Time: _____

## 2023-10-31 NOTE — ED PROVIDER NOTES
Emergency Department Provider Note  : 1934 Age: 89 year old Sex: male MRN: [id number]    Chief Complaint   Patient presents with    Generalized Weakness       Medical Decision Making / Assessment / Plan   89 year old male presenting with UTI associated with sepsis, CKD stage V not on dialysis, type 2 diabetes with insulin use, autoimmune disorder with history of glomerulonephritis, immunocompromised patient.    ED Course as of 10/30/23 2243   Mon Oct 30, 2023   2050 Patient evaluated.  Having progressive generalized weakness, fever of 101 degrees.  New cough in the last few days.  Denies any changes in urinary symptoms.  No sick contacts.  Check labs, 250 mL saline bolus ordered.  Chest x-ray and urinalysis ordered.  - COVID swab, influenza, RSV negative.    Blood cell count 11,400.  Hemoglobin 9.2.  Platelet count 212.  Lactic acid normal at 1.1.  Urinalysis is abnormal showing large leukocyte esterase, WBC clumps.  Increased RBC, greater than 182 WBC.    - Urine culture pending.  - Start IV antibiotics. 2 gram IV Rocephin ordered.     Last urine culture showed Pseudomonas, sensitive to ciprofloxacin.  -IV ciprofloxacin also ordered.    Patient is very weak.  Needs help to sit up in bed.  Can barely ambulate.    Spoke with hospitalist, patient accepted for admission.        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  Symptoms started up Thursday evening.  Had some chills.  Shaking  "rigors on Friday.  Had some diarrhea over the weekend as well.  Progressive weakness.  Ended up slumping into his recliner due to weakness was not able to get up very well or sit down controlling himself.  No ill contacts at home.    Reports that he has coughed a bit the last few days.  Has been more tired.  Reports a bit of a sore throat on the right.  Very weak and short shunt Sunday.  Hardly able to ambulate at home yesterday and today.    Really not having any notable urinary symptoms per his report.  Still makes urine.  Has stage V CKD and has had this for a number of years now.    About 2 weeks ago started some calcitriol Monday Wednesday Friday and possibly developed a rash on the back that is itchy and treated with topical cortisone cream.  Also got IV iron infusions on October 18 and 25, 2023.    I have reviewed the Medications, Allergies, Past Medical and Surgical History, and Social History in the [facility] and with family.    Review of Systems:  Please see Subjective / HPI for pertinent positives and negatives. All other systems reviewed and found to be negative.      Objective   Patient Vitals for the past 24 hrs:   BP Temp Temp src Pulse Resp SpO2 Height Weight   10/30/23 2210 -- (!) 101.1  F (38.4  C) Tympanic -- -- -- -- --   10/30/23 1929 (!) 146/76 -- -- -- -- -- -- --   10/30/23 1928 -- (!) 101  F (38.3  C) Tympanic 114 16 94 % 1.727 m (5' 8\") 76.2 kg (168 lb)     Physical Exam:     General: Awake, alert, in no acute respiratory distress.  Ill-appearing and generalized weakness noted.  Head: Normocephalic, atraumatic.  Eyes: Conjugate gaze.  ENT: Moist membranes, external ear appears normal.   Chest/Respiratory: Equal chest rise, clear bilaterally.  No wheezes or rhonchi.  Cardiovascular: Peripheral pulses present, tachycardic rate, regular rhythm.  Trace pedal edema.  Abdominal: Soft, non-distended, non-tender.  Extremities: No obvious long bone deformity.  Neurological: GCS 15, extremities " without gross deficit.  Skin: Febrile, very warm to touch.  Thin skin, numerous superficial bruises.  Psychiatric: Appropriate affect.     Procedures / Critical Care   Procedures    Aggregate Critical Care Time: None.     Orders Placed This Encounter   Procedures    XR Chest Port 1 View    Symptomatic Influenza A/B, RSV, & SARS-CoV2 PCR (COVID-19) Nose    Comprehensive metabolic panel    CRP inflammation    Procalcitonin    UA with Microscopic reflex to Culture    Lactic acid whole blood    CBC with platelets and differential    Comprehensive metabolic panel    Peripheral IV catheter    Pulse oximetry nursing    Assess for hypoglycemia symptoms    Glucose monitor nursing POCT    Glucose monitor nursing POCT    Notify Provider    Glucose monitor nursing POCT- At 0200 IF on insulin. If PO (eating meals or on bolus enteral feedings):    Glucose monitor nursing POCT- IF PO (eating meals or on bolus enteral feedings), within 30 minutes prior to each meal and at bedtime.    Patient is ALREADY being treated for sepsis    Assign Attending Provider    Vital signs    Activity Up with assist    Orthostatic blood pressure    Cardiac Monitoring Adult    NO COVID-19 Virus (Coronavirus) Screening Test Needed OR Already Ordered/Completed    Apply pneumatic compression device (PCD) - Bilateral Calf    No CPR- Do NOT Intubate    Oxygen: Nasal cannula    Admit to Inpatient    Pneumatic Compression Device (Equipment) - Bilateral Calf    CBC with platelets differential    CBC with platelets differential       RESULTS: As noted above.            Medical/Surgical History:  Past Medical History:   Diagnosis Date    Acute kidney failure (H24)     04/2017    Essential (primary) hypertension     8/7/2012    Hyperlipidemia     No Comments Provided    Osteoarthritis of hip     No Comments Provided    Pityriasis rosea     No Comments Provided    Tachycardia     No Comments Provided    Unilateral inguinal hernia without obstruction or gangrene      8/12/2013,Right Inguinal hernia with incarceration, massive [472020][     Past Surgical History:   Procedure Laterality Date    CIRCUMCISION      03/14/2017,Dr. Heidi GREENBERG    OTHER SURGICAL HISTORY      73066.9,AK SUBTALAR ATHROEREISIS,bone Spurs excision on Toe    OTHER SURGICAL HISTORY      8/20/13,,HERNIA REPAIR,Right,RIH with mesh    OTHER SURGICAL HISTORY      8/20/13,55893,GENITAL SURGERY MALE,Dorsal Slit    OTHER SURGICAL HISTORY      4/15/14,,HERNIA REPAIR,Left,LIH with mesh    OTHER SURGICAL HISTORY      6/30/14,11371.0,AK TOTAL HIP ARTHROPLASTY,Left       Medications:  Current Facility-Administered Medications   Medication    acetaminophen (TYLENOL) tablet 650 mg    Or    acetaminophen (TYLENOL) Suppository 650 mg    [START ON 10/31/2023] amLODIPine (NORVASC) tablet 5 mg    ceFEPIme (MAXIPIME) 2 g vial to attach to  mL bag for ADULTS or 50 mL bag for PEDS    ciprofloxacin (CIPRO) infusion 400 mg    cyanocobalamin injection 1,000 mcg    cyanocobalamin injection 1,000 mcg    glucose gel 15-30 g    Or    dextrose 50 % injection 25-50 mL    Or    glucagon injection 1 mg    [START ON 10/31/2023] famotidine (PEPCID) tablet 20 mg    [START ON 10/31/2023] finasteride (PROSCAR) tablet 5 mg    [START ON 10/31/2023] furosemide (LASIX) tablet 40 mg    [START ON 10/31/2023] insulin aspart (NovoLOG) injection (RAPID ACTING)    insulin aspart (NovoLOG) injection (RAPID ACTING)    ondansetron (ZOFRAN ODT) ODT tab 4 mg    Or    ondansetron (ZOFRAN) injection 4 mg    [START ON 10/31/2023] predniSONE (DELTASONE) half-tab TABS 2.5 mg    [START ON 10/31/2023] sodium bicarbonate tablet 650 mg    sodium chloride 0.9 % infusion    tamsulosin (FLOMAX) capsule 0.4 mg     Current Outpatient Medications   Medication    amLODIPine (NORVASC) 5 MG tablet    B-D U/F 31G X 8 MM insulin pen needle    Cranberry-Vitamin C (CRANBERRY CONCENTRATE/VITAMINC) 15596-674 MG CAPS    darbepoetin miller (ARANESP) 100 MCG/0.5ML  injection    famotidine (PEPCID) 20 MG tablet    finasteride (PROSCAR) 5 MG tablet    furosemide (LASIX) 40 MG tablet    insulin glargine (LANTUS SOLOSTAR) 100 UNIT/ML pen    predniSONE (DELTASONE) 2.5 MG tablet    sodium bicarbonate 650 MG tablet    tamsulosin (FLOMAX) 0.4 MG capsule       Allergies:  Statins [statins]    Relevant labs, images, EKGs, Epic and outside hospital (if applicable) charts were reviewed. The findings, diagnosis, plan, and need for follow up were discussed with the patient/family. Nursing notes were reviewed.      Valentino Machado MD  10/30/23 0350

## 2023-11-01 NOTE — PLAN OF CARE
"Goal Outcome Evaluation:  Patients vitals have been stable /62 (BP Location: Left arm, Patient Position: Semi-Carver's, Cuff Size: Adult Regular)   Pulse 84   Temp 98.4  F (36.9  C) (Tympanic)   Resp 16   Ht 1.727 m (5' 8\")   Wt 77 kg (169 lb 12.8 oz)   SpO2 91%   BMI 25.82 kg/m     SCD's have been on. Patients appetite has been adequate. Patient got up and ambulated in the hallway earlier this shift. Tegaderm applied to skin tear on right arm as other dressings would not stay on.                       "

## 2023-11-01 NOTE — PROGRESS NOTES
Interdisciplinary Discharge Planning Note    Anticipated Discharge Date: 11/2    Anticipated Discharge Location: Home    Clinical Needs Before Discharge:   Medical stability    Treatment Needs After Discharge:   Outpatient Physical Therapy    Potential Barriers to Discharge: None identified at this time    ANNE Ramirez  11/1/2023,  1:43 PM

## 2023-11-01 NOTE — PLAN OF CARE
"Patient admitted for Sepsis. Also is DM, HTN. Low grade temp. Denies pain. Weak non productive cough. LS dim to lower lobes and clear to upper lobes. Skin is thin, dry and flaky. Eucerin cream applied to extremities. Patient obtained skin tear at the end of day shift. New dressing was applied. Area was approximated with steri strips. Xeroform applied with non adherant then wrapped with kerlix and secured with tape off skin.   Urine cloudy yellow, uses urinal.     Goal Outcome Evaluation: progressing      Problem: Adult Inpatient Plan of Care  Goal: Plan of Care Review  Description: The Plan of Care Review/Shift note should be completed every shift.  The Outcome Evaluation is a brief statement about your assessment that the patient is improving, declining, or no change.  This information will be displayed automatically on your shift  note.  Outcome: Progressing  Goal: Patient-Specific Goal (Individualized)  Description: You can add care plan individualizations to a care plan. Examples of Individualization might be:  \"Parent requests to be called daily at 9am for status\", \"I have a hard time hearing out of my right ear\", or \"Do not touch me to wake me up as it startles  me\".  Outcome: Progressing  Goal: Absence of Hospital-Acquired Illness or Injury  Outcome: Progressing  Intervention: Identify and Manage Fall Risk  Recent Flowsheet Documentation  Taken 10/31/2023 2030 by Thania Green RN  Safety Promotion/Fall Prevention:   activity supervised   assistive device/personal items within reach   clutter free environment maintained   nonskid shoes/slippers when out of bed   patient and family education   safety round/check completed   supervised activity   treat reversible contributory factors   treat underlying cause  Intervention: Prevent Skin Injury  Recent Flowsheet Documentation  Taken 10/31/2023 2030 by Thania Green RN  Skin Protection:   adhesive use limited   skin sealant/moisture barrier " applied  Device Skin Pressure Protection:   adhesive use limited   tubing/devices free from skin contact  Intervention: Prevent and Manage VTE (Venous Thromboembolism) Risk  Recent Flowsheet Documentation  Taken 10/31/2023 2030 by Thania Green RN  VTE Prevention/Management: SCDs (sequential compression devices) on  Intervention: Prevent Infection  Recent Flowsheet Documentation  Taken 10/31/2023 2030 by Thania Green RN  Infection Prevention:   hand hygiene promoted   rest/sleep promoted   single patient room provided  Goal: Optimal Comfort and Wellbeing  Outcome: Progressing  Intervention: Provide Person-Centered Care  Recent Flowsheet Documentation  Taken 10/31/2023 2030 by Thania Green RN  Trust Relationship/Rapport:   care explained   choices provided   questions answered   reassurance provided  Goal: Readiness for Transition of Care  Outcome: Progressing

## 2023-11-01 NOTE — PROGRESS NOTES
:    Met with patient and his family to discuss discharge planning needs. Patient and family were educated about homecare services. Patient and family stated they would like outpatient physical therapy. Family stated they would help patient get to these appointments. I updated Dr. Nettles about this.     No further needs from  at this time.     Jaime Fontanez, ANNE on 11/1/2023 at 1:35 PM

## 2023-11-01 NOTE — PROGRESS NOTES
[facility]    Medicine Progress Note - Hospitalist Service    Date of Admission:  10/30/2023    Assessment & Plan   This 89 yo male with a PMH significant for HTN, primary pauci-immune necrotizing and crescentic glomerulonephritis and DM-2 with CKD 5 not on dialysis presented to the ER with complaints of weakness.  Symptoms began 4 days PTA with chills followed by rigors the next day.  Had a little diarrhea and cough.  The day of admission he was unable to get out of his chair so presented for evaluation.  UA with large LE, >182 WBC's with clumps.  Growing gram negative bacilli but ID and sensitivities pending.  In Sept had Pseudomonas alcaligenes that was sensitive to cefepime.  CXR with no acute findings.  LA normal but procal elevated to 2.93.  Started on cefepime and admitted for continued cares.    Principal Problem:    Sepsis due to urinary tract infection (H)    Assessment: Improving    Plan: Continue cefepime pending UC ID and sensitivities.  Blood cultures negative so far.  Patient reports he is feeling better than when he came in.  Was able to stand up yesterday and plans to move more today.  Plans to go home and not to rehab.    Active Problems:    Anemia of chronic disease    Assessment: Stable    Plan: Usually Hgb 9-10.  Decreased to 8.5 today.  No specific treatment needed at this time.  Takes Aranesp monthly.      Benign essential hypertension    Assessment: Stable    Plan: Good control with amlodipine 5 mg daily.  Continue home medication.      Immunocompromised patient (H24)    Assessment: Stable    Plan: Treat infections aggressively with IV abx until culture results are available.      Primary pauci-immune necrotizing and crescentic glomerulonephritis    Assessment: Stable    Plan: Treat infections aggressively as above.  Continue baseline prednisone dose of 2.5 mg daily.  At his dose he is unlikely to require stress dose steroids but if he develops hypotension that may be a  "consideration.      Hyponatremia    Assessment: Resolved    Plan: Had NS running.  Recheck of AM labs shows back to normal.  Stop IV fluids, encourage good oral intake.      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  Hold that and cover with sliding scale if needed.  Baseline Cr around 8.5, GFR 5.  Not on dialysis but doing well physically.  Renally dose medications and avoid nephrotoxic agents.          Diet: Combination Diet Moderate Consistent Carb (60 g CHO per Meal) Diet; Low Saturated Fat Na <2400mg Diet, No Caffeine Diet    DVT Prophylaxis: Pneumatic Compression Devices  Gates Catheter: Not present  Lines: None     Cardiac Monitoring: None  Code Status: No CPR- Do NOT Intubate      Clinically Significant Risk Factors         # Hyponatremia: Lowest Na = 129 mmol/L in last 2 days, will monitor as appropriate     # Anion Gap Metabolic Acidosis: Highest Anion Gap = 21 mmol/L in last 2 days, will monitor and treat as appropriate  # Hypoalbuminemia: Lowest albumin = 3.1 g/dL at 10/31/2023  5:55 AM, will monitor as appropriate     # Hypertension: Noted on problem list        # Overweight: Estimated body mass index is 25.82 kg/m  as calculated from the following:    Height as of this encounter: 1.727 m (5' 8\").    Weight as of this encounter: 77 kg (169 lb 12.8 oz)., PRESENT ON ADMISSION            Disposition Plan    11/2/2023         Melissa Nettles MD  Hospitalist Service  [facility]  Securely message with [employer] (more info)  Text page via [facility] Paging/Directory   ______________________________________________________________________    Interval History   Feeling much better.  Feels stronger.  Not sure he is quite ready to go home yet but does not want to go to rehab and thinks he will do fine at home.  Agrees to OP PT.    Physical " Exam   Vital Signs: Temp: 98.6  F (37  C) Temp src: Tympanic BP: 123/69 Pulse: 89   Resp: 20 SpO2: 95 % O2 Device: None (Room air)    Weight: 169 lbs 12.8 oz    Constitutional: awake, alert, cooperative, no apparent distress, and appears younger than stated age, sitting up in recliner  Eyes: pupils equal, round and reactive to light, extra-ocular muscles intact, and conjunctiva normal  ENT: normocepalic, without obvious abnormality, atramatic  Respiratory: no increased work of breathing, mildly decreased breath sounds anterior and lateral but clear to auscultation  Cardiovascular: regular rate and rhythm, normal S1 and S2, 1-2/6 systolic murmur heard best at the apex, and no edema  GI: normal bowel sounds, soft, non-distended, and non-tender  Skin: normal skin color, texture, turgor and no redness, warmth, or swelling  Musculoskeletal: there is no redness, warmth, or swelling of the joints  full range of motion noted  Neurologic: Mental Status Exam:  Level of Alertness:   awake  Orientation:   person, place, time  Memory:   normal  Fund of Knowledge:  normal  Attention/Concentration:  normal  Language:  normal  Cranial Nerves:  cranial nerves II-XII are grossly intact  Motor Exam:  moves all extremities well and symmetrically  Very interactive, pleasant and conversant    Medical Decision Making             Data     I have personally reviewed the following data over the past 24 hrs:    8.5  \   8.5 (L)   / 193     135 101 92.8 (H) /  158 (H)   3.7 18 (L) 9.24 (H) \

## 2023-11-02 NOTE — DISCHARGE SUMMARY
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    Weight as of this encounter: 77.2 kg (170 lb 4.8 oz).       Follow-ups Needed After Discharge   Follow-up Appointments     Follow-up and recommended labs and tests       Follow up with primary care provider, Valentino Machado, within 7 days for   hospital follow- up.  No follow up labs or test are needed.        Final sensitivities pending - be sure cefdinir covers.    Unresulted Labs Ordered in the Past 30 Days of this Admission       Date and Time Order Name Status Description    10/30/2023  9:14 PM Urine Culture Preliminary     10/30/2023  8:18 PM Blood Culture Peripheral Blood Preliminary     10/30/2023  8:18 PM Blood Culture Peripheral Blood Preliminary         These results will be followed up by PCP.    Discharge Disposition   Discharged to home  Condition at discharge: Good    Hospital Course   This 89 yo male with a PMH significant for HTN, primary pauci-immune necrotizing and crescentic glomerulonephritis and DM-2 with CKD 5 not on dialysis presented to the ER with complaints of weakness.  Symptoms began 4 days PTA with chills " followed by rigors the next day.  Had a little diarrhea and cough.  The day of admission he was unable to get out of his chair so presented for evaluation.  UA with large LE, >182 WBC's with clumps.  Growing gram negative bacilli but ID and sensitivities still pending at the time of discharge.  In Sept had Pseudomonas alcaligenes that was sensitive to cefepime.  CXR with no acute findings.  LA normal but procal elevated to 2.93.  Started on cefepime and admitted for continued cares.  Feeling good and ready for discharge.    Principal Problem:    Sepsis due to urinary tract infection (H)    Assessment: Improving    Plan: He sustained skin tears to his upper extremities which were steri stripped and covered with nonadherent dressings.  These were not significant and did not extend his hospital stay.  Discharge on cefdinir 300 mg daily renal dosing pending UC ID and sensitivities.  Blood cultures negative so far.  Plans to go home and do OP rehab as he has had good results previously.    Active Problems:    Anemia of chronic disease    Assessment: Stable    Plan: Usually Hgb 9-10.  Stable at 8.5 today.  No specific treatment needed at this time.  Takes Aranesp monthly.      Benign essential hypertension    Assessment: Stable    Plan: Good control with amlodipine 5 mg BID.  Continue home medication.      Immunocompromised patient (H24)    Assessment: Stable    Plan: Treat infections aggressively with IV abx until culture results are available.      Primary pauci-immune necrotizing and crescentic glomerulonephritis    Assessment: Stable    Plan: Treat infections aggressively as above.  Continue baseline prednisone dose of 2.5 mg daily.  He did not require stress dose steroids.      Hyponatremia    Assessment: Resolved    Plan: Had NS running.  Recheck of AM labs shows back to normal.  Stop IV fluids, encourage good oral intake.      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,  with long-term current use of insulin (H)    Assessment: Stable    Plan: Only takes 2 units of Lantus at HS for his DM, may resume.  Baseline Cr around 8.5, GFR 5.  Not on dialysis but doing well physically.  Renally dose medications and avoid nephrotoxic agents.    Consultations This Hospital Stay   SOCIAL WORK IP CONSULT    Code Status   No CPR- Do NOT Intubate    Time Spent on this Encounter   I, Melissa Nettles MD, personally saw the patient today and spent greater than 30 minutes discharging this patient.       Melissa Nettles MD  [facility]  [address]  [place] [address]  Phone: [phone]  Fax: [phone]  ______________________________________________________________________    Physical Exam   Vital Signs: Temp: 97.2  F (36.2  C) Temp src: Tympanic BP: 133/73 Pulse: 88   Resp: 19 SpO2: 96 % O2 Device: None (Room air)    Weight: 170 lbs 4.8 oz  Constitutional: awake, alert, cooperative, no apparent distress, and appears younger than stated age, sitting up in recliner  Eyes: pupils equal, round and reactive to light, extra-ocular muscles intact, and conjunctiva normal  ENT: normocephalic, without obvious abnormality, atraumatic  Respiratory: no increased work of breathing, decreased breath sounds but clear to auscultation  Cardiovascular: regular rate and rhythm, normal S1 and S2, 1-2/6 systolic murmur heard best at the apex, and no edema  GI: normal bowel sounds, soft, non-distended, and non-tender  Skin: normal skin color, texture, turgor and no redness, warmth, or swelling  Musculoskeletal: there is no redness, warmth, or swelling of the joints  full range of motion noted  Neurologic: Mental Status Exam:  Level of Alertness:   awake  Orientation:   person, place, time  Memory:   normal  Fund of Knowledge:  normal  Attention/Concentration:  normal  Language:  normal  Cranial Nerves:  cranial nerves II-XII are grossly intact  Motor Exam:  moves all extremities well  and symmetrically  Very interactive, pleasant and conversant       Primary Care Physician   Valentino Machado    Discharge Orders      [place] REMOVAL IMPACTED CERUMEN IRRIGATION/LVG CEDRICAT (RN/MA)    Irrigate with warm water or diluted medications as ordered     Physical Therapy Referral      Reason for your hospital stay    Sepsis from a UTI.     Follow-up and recommended labs and tests     Follow up with primary care provider, Valentino Machado, within 7 days for hospital follow- up.  No follow up labs or test are needed.     Activity    Your activity upon discharge: activity as tolerated     No CPR- Do NOT Intubate     Diet    Follow this diet upon discharge: Orders Placed This Encounter      Combination Diet Moderate Consistent Carb (60 g CHO per Meal) Diet; Low Saturated Fat Na <2400mg Diet, No Caffeine Diet       Significant Results and Procedures   Most Recent 3 CBC's:  Recent Labs   Lab Test 11/02/23  0746 11/01/23  0540 10/31/23  0555   WBC 7.2 8.5 11.0   HGB 8.5* 8.5* 9.0*   MCV 92 94 94    193 201     Most Recent 3 BMP's:  Recent Labs   Lab Test 11/02/23  1142 11/02/23  0823 11/02/23  0746 11/01/23  0719 11/01/23  0540 10/31/23  0719 10/31/23  0555   NA  --   --  137  --  135  --  134*   POTASSIUM  --   --  4.2  --  3.7  --  4.4   CHLORIDE  --   --  102  --  101  --  98   CO2  --   --  19*  --  18*  --  17*   BUN  --   --  86.2*  --  92.8*  --  96.6*   CR  --   --  9.10*  --  9.24*  --  9.77*   ANIONGAP  --   --  16*  --  16*  --  19*   MARVA  --   --  8.7*  --  8.2*  --  8.4*   * 84 97   < > 105*   < > 103*    < > = values in this interval not displayed.     7-Day Micro Results       Collected Updated Procedure Result Status      10/30/2023 2107 11/01/2023 1013 Urine Culture [[id number]]   (Abnormal)   Urine, Catheter    Preliminary result Component Value   Culture 50,000-100,000 CFU/mL Gram negative bacilli  [P]                10/30/2023 2044 11/01/2023 2101 Blood Culture Peripheral Blood  [[id number]]   Peripheral Blood    Preliminary result Component Value   Culture No growth after 2 days  [P]                10/30/2023 2044 11/01/2023 2101 Blood Culture Peripheral Blood [[id number]]   Peripheral Blood    Preliminary result Component Value   Culture No growth after 2 days  [P]                10/30/2023 1930 10/30/2023 2011 Symptomatic Influenza A/B, RSV, & SARS-CoV2 PCR (COVID-19) Nose [[id number]]    Swab from Nose    Final result Component Value   Influenza A PCR Negative   Influenza B PCR Negative   RSV PCR Negative   SARS CoV2 PCR Negative   NEGATIVE: SARS-CoV-2 (COVID-19) RNA not detected, presumed negative.                ,   Results for orders placed or performed during the hospital encounter of 10/30/23   XR Chest Port 1 View    Narrative    PROCEDURE INFORMATION:   Exam: XR Chest   Exam date and time: 10/30/2023 9:24 PM   Age: 89 years old   Clinical indication: Other: Fever, weakness, tachycardia     TECHNIQUE:   Imaging protocol: Radiologic exam of the chest.   Views: 1 view.     COMPARISON:   CR XR CHEST 1 VIEW PORTABLE 6/10/2017 7:56 AM     FINDINGS:   Lungs: Unremarkable. No consolidation.   Pleural spaces: Unremarkable. No pleural effusion. No pneumothorax.   Heart/Mediastinum: Unremarkable. No cardiomegaly.   Bones/joints: Unremarkable.       Impression    IMPRESSION:   No acute findings.     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, with long-term current use of insulin (H); Primary pauci-immune necrotizing and crescentic glomerulonephritis; Immunocompromised patient (H24)            CONTINUE these medications which have NOT CHANGED    Details   amLODIPine (NORVASC) 5 MG tablet Take 1 tablet (5 mg) by mouth 2 times daily -- Dose Increase 8/24/2023   --- okay to take 10 mg once daily if BID is not covered ---  Qty: 180 tablet, Refills: 1    Associated Diagnoses: Benign essential hypertension      calcitRIOL (ROCALTROL) 0.25 MCG capsule Take 0.25 mcg by mouth three times a week On Monday, Wednesday, and Friday      Cranberry-Vitamin C (CRANBERRY CONCENTRATE/VITAMINC) 49059-320 MG CAPS Take 2 capsules by mouth daily - for UTI prevention    Associated Diagnoses: Recurrent UTI (urinary tract infection); Incomplete bladder emptying      darbepoetin miller (ARANESP) 100 MCG/0.5ML injection 100 mcg every 28 days      famotidine (PEPCID) 20 MG tablet Take 1 tablet (20 mg) by mouth daily -- for heartburn (Use as needed)  Qty: 90 tablet, Refills: 4    Associated Diagnoses: Heartburn      finasteride (PROSCAR) 5 MG tablet Take 1 tablet (5 mg) by mouth daily  Qty: 90 tablet, Refills: 1    Associated Diagnoses: Urinary retention      furosemide (LASIX) 40 MG tablet Take 1 tablet (40 mg) by mouth every morning  Qty: 90 tablet, Refills: 1    Associated Diagnoses: Benign essential hypertension      insulin glargine (LANTUS SOLOSTAR) 100 UNIT/ML pen Inject 2 Units Subcutaneous At Bedtime  Qty: 15 mL, Refills: 1    Comments: If Lantus is not covered by insurance, may substitute Basaglar or Semglee or other insulin glargine product per insurance preference at same dose and frequency.    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, with long-term current use of insulin (H); Steroid-induced hyperglycemia      predniSONE (DELTASONE) 2.5 MG tablet Take 1 tablet (2.5 mg) by mouth daily  Qty: 90 tablet, Refills: 1    Associated Diagnoses: CKD (chronic kidney disease) stage 5, GFR 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 ml/min (H); Immunocompromised patient (H24); Primary pauci-immune  necrotizing and crescentic glomerulonephritis      sodium bicarbonate 650 MG tablet Take 1,950 mg by mouth 3 times daily      tamsulosin (FLOMAX) 0.4 MG capsule Take 1 capsule (0.4 mg) by mouth every evening  Qty: 90 capsule, Refills: 1    Associated Diagnoses: Urinary retention      B-D U/F 31G X 8 MM insulin pen needle USE 1 PEN NEEDLES DAILY OR AS DIRECTED.  Qty: 100 each, Refills: 3    Comments: DX Code Needed  .  Associated Diagnoses: Steroid-induced hyperglycemia           Allergies   Allergies   Allergen Reactions    Statins [Statins] Itching     -- Patient declined --

## 2023-11-02 NOTE — PHARMACY - DISCHARGE MEDICATION RECONCILIATION AND EDUCATION
"Pharmacy:  Discharge Counseling and Medication Reconciliation    Altaf Chao  [address]  [place] [address]  [phone] (home)   89 year old male  PCP: Valentino Machado    Allergies: Statins [statins]    Discharge Counseling:    Pharmacist met with patient (and/or family) today to review the medication portion of the After Visit Summary (with an emphasis on NEW medications) and to address patient's questions/concerns.    Summary of Education: Talk to Pt, Pt's wife, and Pt's daughter: talk to them about cefdinir 300mg every day SE, ADR, and \"how to take\". Additionally, talk the group about Lantus and BUD: Explained that Lantus is BUD 28 Days.     Materials Provided:  MedCounselor sheets printed from Clinical Pharmacology on: Cefdinir PT education    Discharge Medication Reconciliation:    It has been determined that the patient has an adequate supply of medications available or which can be obtained from the patient's preferred pharmacy, which HE/SHE has confirmed as: [facility] [id number] [facility] [employer]     Thank you for the consult.    Isiah Segal [facility]........November 2, 2023 1:00 PM     "

## 2023-11-02 NOTE — PROGRESS NOTES
"DISCHARGE NOTE  /69 (BP Location: Left arm)   Pulse 91   Temp 97  F (36.1  C) (Tympanic)   Resp 20   Ht 1.727 m (5' 8\")   Wt 77.2 kg (170 lb 4.8 oz)   SpO2 97%   BMI 25.89 kg/m       Patient discharged to home at 2:32 PM via wheel chair. Accompanied by significant other and staff. Discharge instructions reviewed with patient, spouse, and daughter, opportunity offered to ask questions. Prescriptions sent to patients preferred pharmacy. All belongings sent with patient.    Iris Arroyo RN  "

## 2023-11-02 NOTE — PLAN OF CARE
"Patient is alert and oriented x4. VSS. Denies pain, nausea, and SOB. Pt requires assistance x1 with transfers and ambulation. Dressing remains intact to skin tear on R elbow.     Goal Outcome Evaluation:      Plan of Care Reviewed With: patient    Overall Patient Progress: improvingOverall Patient Progress: improving       Problem: Adult Inpatient Plan of Care  Goal: Plan of Care Review  Description: The Plan of Care Review/Shift note should be completed every shift.  The Outcome Evaluation is a brief statement about your assessment that the patient is improving, declining, or no change.  This information will be displayed automatically on your shift  note.  Outcome: Progressing  Flowsheets (Taken 11/2/2023 0017)  Plan of Care Reviewed With: patient  Overall Patient Progress: improving  Goal: Patient-Specific Goal (Individualized)  Description: You can add care plan individualizations to a care plan. Examples of Individualization might be:  \"Parent requests to be called daily at 9am for status\", \"I have a hard time hearing out of my right ear\", or \"Do not touch me to wake me up as it startles  me\".  Outcome: Progressing  Flowsheets (Taken 11/2/2023 0017)  Individualized Care Needs: IV abx  Goal: Absence of Hospital-Acquired Illness or Injury  Intervention: Identify and Manage Fall Risk  Recent Flowsheet Documentation  Taken 11/1/2023 1918 by Efren Anderson RN  Safety Promotion/Fall Prevention:   activity supervised   clutter free environment maintained   nonskid shoes/slippers when out of bed   safety round/check completed   supervised activity  Intervention: Prevent Skin Injury  Recent Flowsheet Documentation  Taken 11/1/2023 1918 by Efren Anderson, RN  Device Skin Pressure Protection:   absorbent pad utilized/changed   adhesive use limited   tubing/devices free from skin contact  Intervention: Prevent and Manage VTE (Venous Thromboembolism) Risk  Recent Flowsheet Documentation  Taken 11/1/2023 1918 by " Efren Anderson, RN  VTE Prevention/Management: SCDs (sequential compression devices) on  Goal: Readiness for Transition of Care  Outcome: Progressing  Flowsheets (Taken 11/2/2023 0017)  Anticipated Changes Related to Illness: none  Transportation Anticipated: family or friend will provide  Concerns to be Addressed: discharge planning  Intervention: Mutually Develop Transition Plan  Recent Flowsheet Documentation  Taken 11/2/2023 0017 by Efren Anderson, RN  Anticipated Changes Related to Illness: none  Transportation Anticipated: family or friend will provide  Concerns to be Addressed: discharge planning

## 2023-11-02 NOTE — PLAN OF CARE
Took over care for pt at 0300. Pretty uneventful 4 hours. Put a new mepilex on pts R. Elbow skin tear and he got up a few times to pee. Will continue to monitor.        Problem: Adult Inpatient Plan of Care  Goal: Absence of Hospital-Acquired Illness or Injury  Intervention: Prevent Skin Injury  Recent Flowsheet Documentation  Taken 11/2/2023 0317 by Cristian Anderson, RN  Body Position:   position changed independently   weight shifting   supine, head elevated

## 2023-11-02 NOTE — PROGRESS NOTES
Interdisciplinary Discharge Planning Note    Anticipated Discharge Date: 11/2    Anticipated Discharge Location: Home    Clinical Needs Before Discharge:   Patient is medically cleared for discharge    Treatment Needs After Discharge:   Outpatient physical therapy    Potential Barriers to Discharge: None identified at this time    ANNE Ramirez  11/2/2023,  1:51 PM

## 2023-11-03 NOTE — TELEPHONE ENCOUNTER
Transitional Care Management Phone Call    First Transitional Care Management phone call attempted at 10:08 AM 11/3/2023. Unable to leave message. Sherlyn Tapia RN on 11/3/2023 at 10:09 AM

## 2023-11-03 NOTE — PROGRESS NOTES
Patient needs new order for Vitamin b 12 injections. Will route to provider for consideration and review.    Catherine Abreu RN on 11/3/2023 at 3:14 PM

## 2023-11-03 NOTE — TELEPHONE ENCOUNTER
Second Transitional Care Management phone call attempted at 12:45 PM 11/3/2023. Unable to leave message. Sherlyn Tapia RN on 11/3/2023 at 12:46 PM

## 2023-11-08 NOTE — PROGRESS NOTES
Assessment & Plan   Altaf Chao is a 89 year old presenting for the following health issues:      ICD-10-CM    1. Hospital discharge follow-up  Z09       2. Sepsis due to urinary tract infection (H)  A41.9     N39.0       3. CKD (chronic kidney disease) stage 5, GFR 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 ml/min (H)  N18.5         Redressed skin tears, they are healing without difficulty. Vitals are stable, patient feels well. Instructed him that if he develops any new or worsening symptoms to return back ASAP. Patient and wife verbalized understanding.       No follow-ups on file.    [facility]        Michelle Tavares is a 89 year old, presenting for the following health issues:      Patient presents to clinic for hospital follow up from 10-30-23 and discharged on 11-2-23 for sepsis due to UTI. He reports that he is feeling much better, feeling back to his baseline health. Denies any fevers, chills, dysuria, hematuria, or worsening weakness. He has a few skin tears from his IV placement that he would like redressed. Otherwise, no new concerns.     Hospital F/U (From 10/30/2023 to 11/2/2023 for UTI Sepsis [facility] )    History of Present Illness       Reason for visit:  Hospital Follow up  Symptom onset:  1-2 weeks ago  Symptoms include:  Sepsis  Symptom intensity:  Severe  Symptom progression:  Improving  Had these symptoms before:  No    He eats 2-3 servings of fruits and vegetables daily.He consumes 0 sweetened beverage(s) daily.He exercises with enough effort to increase his heart rate 10 to 19 minutes per day.  He exercises with enough effort to increase his heart rate 4 days per week.   He is taking medications regularly.         Review of Systems   Constitutional: Negative for activity change, appetite change, chills and fever.   Cardiovascular:  Negative for chest pain and palpitations.   Gastrointestinal:  Negative for abdominal pain.   Genitourinary:  Negative for dysuria and  hematuria.   All other systems reviewed and are negative.           Objective    /60 (BP Location: Right arm, Patient Position: Sitting, Cuff Size: Adult Regular)   Pulse 100   Temp 97.8  F (36.6  C) (Temporal)   Resp 14   Wt 76.6 kg (168 lb 12.8 oz)   SpO2 99%   BMI 25.67 kg/m    Body mass index is 25.67 kg/m .  Physical Exam  Vitals reviewed.   Constitutional:       General: He is not in acute distress.     Appearance: He is not ill-appearing or toxic-appearing.   HENT:      Head: Normocephalic and atraumatic.   Cardiovascular:      Rate and Rhythm: Normal rate and regular rhythm.      Pulses: Normal pulses.      Heart sounds: Murmur heard.   Pulmonary:      Effort: Pulmonary effort is normal. No respiratory distress.      Breath sounds: Normal breath sounds. No wheezing or rhonchi.   Abdominal:      Tenderness: There is no right CVA tenderness or left CVA tenderness.   Skin:     Findings: Bruising present.      Comments: Very thin skin, healing skin tear on right lower forearm, left and right elbow   Neurological:      Mental Status: He is alert.

## 2023-11-08 NOTE — NURSING NOTE
Patient presents to clinic today for Hospital discharge follow up    Medication Review: complete    /60 (BP Location: Right arm, Patient Position: Sitting, Cuff Size: Adult Regular)   Pulse 100   Temp 97.8  F (36.6  C) (Temporal)   Resp 14   Wt 76.6 kg (168 lb 12.8 oz)   SpO2 99%   BMI 25.67 kg/m       FOOD SECURITY SCREENING QUESTIONS:  The next two questions are to help us understand your food security.  If you are feeling you need any assistance in this area, we have resources available to support you today.    Hunger Vital Signs:  Within the past 12 months we worried whether our food would run out before we got money to buy more. Never  Within the past 12 months the food we bought just didn't last and we didn't have money to get more. Never    Shanda Love LPN, on 11/8/2023 at 8:35 AM

## 2023-12-01 NOTE — PATIENT INSTRUCTIONS
Blood pressure is well controlled.   Diabetes is well controlled.     Medications refilled.   Labs are stable.       Lab on 11/30/2023   Component Date Value Ref Range Status    Sodium 11/30/2023 140  135 - 145 mmol/L Final    Reference intervals for this test were updated on 09/26/2023 to more accurately reflect our healthy population. There may be differences in the flagging of prior results with similar values performed with this method. Interpretation of those prior results can be made in the context of the updated reference intervals.     Potassium 11/30/2023 5.7 (H)  3.4 - 5.3 mmol/L Final    Carbon Dioxide (CO2) 11/30/2023 19 (L)  22 - 29 mmol/L Final    Anion Gap 11/30/2023 16 (H)  7 - 15 mmol/L Final    Urea Nitrogen 11/30/2023 78.7 (H)  8.0 - 23.0 mg/dL Final    Creatinine 11/30/2023 8.15 (H)  0.67 - 1.17 mg/dL Final    GFR Estimate 11/30/2023 6 (L)  >60 mL/min/1.73m2 Final    Calcium 11/30/2023 8.8  8.8 - 10.2 mg/dL Final    Chloride 11/30/2023 105  98 - 107 mmol/L Final    Glucose 11/30/2023 219 (H)  70 - 99 mg/dL Final    Alkaline Phosphatase 11/30/2023 98  40 - 150 U/L Final    Reference intervals for this test were updated on 11/14/2023 to more accurately reflect our healthy population. There may be differences in the flagging of prior results with similar values performed with this method. Interpretation of those prior results can be made in the context of the updated reference intervals.    AST 11/30/2023 20  0 - 45 U/L Final    Reference intervals for this test were updated on 6/12/2023 to more accurately reflect our healthy population. There may be differences in the flagging of prior results with similar values performed with this method. Interpretation of those prior results can be made in the context of the updated reference intervals.    ALT 11/30/2023 11  0 - 70 U/L Final    Reference intervals for this test were updated on 6/12/2023 to more accurately reflect our healthy population. There  may be differences in the flagging of prior results with similar values performed with this method. Interpretation of those prior results can be made in the context of the updated reference intervals.      Protein Total 11/30/2023 6.6  6.4 - 8.3 g/dL Final    Albumin 11/30/2023 3.9  3.5 - 5.2 g/dL Final    Bilirubin Total 11/30/2023 0.3  <=1.2 mg/dL Final    Cholesterol 11/30/2023 128  <200 mg/dL Final    Triglycerides 11/30/2023 83  <150 mg/dL Final    Direct Measure HDL 11/30/2023 40  >=40 mg/dL Final    LDL Cholesterol Calculated 11/30/2023 71  <=100 mg/dL Final    Non HDL Cholesterol 11/30/2023 88  <130 mg/dL Final    WBC Count 11/30/2023 12.5 (H)  4.0 - 11.0 10e3/uL Final    RBC Count 11/30/2023 3.42 (L)  4.40 - 5.90 10e6/uL Final    Hemoglobin 11/30/2023 10.6 (L)  13.3 - 17.7 g/dL Final    Hematocrit 11/30/2023 33.1 (L)  40.0 - 53.0 % Final    MCV 11/30/2023 97  78 - 100 fL Final    MCH 11/30/2023 31.0  26.5 - 33.0 pg Final    MCHC 11/30/2023 32.0  31.5 - 36.5 g/dL Final    RDW 11/30/2023 15.9 (H)  10.0 - 15.0 % Final    Platelet Count 11/30/2023 254  150 - 450 10e3/uL Final    Hemoglobin A1C 11/30/2023 5.3  4.0 - 6.2 % Final    TSH 11/30/2023 6.92 (H)  0.30 - 4.20 uIU/mL Final    Free T4 11/30/2023 0.91  0.90 - 1.70 ng/dL Final      Aspects of Diabetes:   Recent Labs   Lab Test 11/30/23  0927 11/02/23  0746 11/01/23  0540 10/31/23  0555 10/30/23  2044 09/22/23  1749 08/22/23  0918 05/15/23  0929   A1C 5.3  --   --   --   --   --  6.3* 5.4   LDL 71  --   --   --   --   --  95 74   HDL 40  --   --   --   --   --  36* 38*   TRIG 83  --   --   --   --   --  105 77   ALT 11  --   --  13 10  --  9 11   CR 8.15* 9.10*   < > 9.77* 9.51*   < > 8.23* 7.51*   GFRESTIMATED 6* 5*   < > 5* 5*   < > 6* 6*   POTASSIUM 5.7* 4.2   < > 4.4 4.4   < > 5.5* 5.0   TSH 6.92*  --   --   --   --   --  6.61* 8.09*   T4 0.91  --   --   --   --   --  0.88* 0.90   WBC 12.5* 7.2   < > 11.0 11.4*   < > 10.1 10.6   HGB 10.6* 8.5*   < > 9.0*  9.2*   < > 10.1* 10.8*    198   < > 201 212   < > 235 251   ALBUMIN 3.9  --   --  3.1* 3.4*  --  4.1 4.1    < > = values in this interval not displayed.      Hemoglobin A1c  Goal range is under 8%. Best is 6.5 to 7   Blood Pressure 138/74 Goal to keep less than 140/90   Tobacco  reports that he quit smoking about 47 years ago. His smoking use included cigarettes. He has been exposed to tobacco smoke. He has never used smokeless tobacco. Goal to abstain from tobacco   Aspirin or Plavix Anti-platelet therapy Aspirin or Plavix reduces risk of heart disease and stroke  -- sometimes used with other blood thinners, depending on bleeding risk and risk factors.    ACE/ARB Specific blood pressure meds These medications can reduce risk of kidney disease   Cholesterol Statins (Lipitor, Crestor, vs others) Statins reduce risk of heart disease and stroke   Eye Exam -- Do Yearly -- Annual diabetic eye exam   Healthy weight Wt Readings from Last 4 Encounters:   12/01/23 76 kg (167 lb 9.6 oz)   11/08/23 76.6 kg (168 lb 12.8 oz)   11/02/23 77.2 kg (170 lb 4.8 oz)   09/22/23 74.8 kg (165 lb)      Body mass index is 25.3 kg/m .  Goal BMI under 30, best is under 25.      -- Trying to exercise daily (goal at least 20 min/day) with moderate aerobic activity   -- Eat healthy (resources from ADA at http://www.diabetes.org/)   -- Taking good care of my feet. Consider seeing the Podiatrist   -- Check blood sugars as directed, record in log book and bring to every appointment    Insurance companies are grading you and I on your blood sugar control -- Goal is to get your A1c down to 7.9% or lower and NO Smoking!  -- Medicare and most insurance companies, will only cover Hemoglobin A1c labs to be rechecked every 91+ days.      Return for Diabetes labs and clinic follow-up appointment every 3 to 4 months.    Schedule lab only appointment --- A few days AFTER: 2/29/24   Schedule clinic appointment with Dr. Machado -- Same day as labs, or  1-2 days later.

## 2023-12-01 NOTE — PROGRESS NOTES
Assessment & Plan     ICD-10-CM    1. 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 Primary pauci-immune necrotizing and crescentic glomerulonephritis  N05.8 predniSONE (DELTASONE) 2.5 MG tablet    N05.7       3. Antineutrophil cytoplasmic antibody (ANCA) positive  R76.8       4. Mixed hyperlipidemia  E78.2       5. Benign essential hypertension  I10 amLODIPine (NORVASC) 5 MG tablet     furosemide (LASIX) 40 MG tablet      6. CKD (chronic kidney disease) stage 5, GFR 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 ml/min (H)  N18.5 predniSONE (DELTASONE) 2.5 MG tablet      7. Immunocompromised patient (H24)  D84.9 predniSONE (DELTASONE) 2.5 MG tablet      8. Hyperkalemia  E87.5       9. Steroid-induced hyperglycemia  R73.9 insulin pen needle (B-D U/F) 31G X 8 MM miscellaneous    T38.0X5A insulin glargine (LANTUS SOLOSTAR) 100 UNIT/ML pen      10. Recurrent UTI (urinary tract infection)  N39.0 Cranberry-Vitamin C (CRANBERRY CONCENTRATE/VITAMINC) 01977-000 MG CAPS      11. Incomplete bladder emptying  R33.9 Cranberry-Vitamin C (CRANBERRY CONCENTRATE/VITAMINC) 71202-056 MG CAPS      12. Heartburn  R12 famotidine (PEPCID) 20 MG tablet      13. Urinary retention  R33.9 finasteride (PROSCAR) 5 MG tablet     tamsulosin (FLOMAX) 0.4 MG capsule        Patient presents for follow-up multiple issues.    Patient has glomerulonephritis, CKD stage V due to this.  ANCA positive.  Continues with daily prednisone, he is chronically immunosuppressed.  Needs medication refills of prednisone today.    Hyperkalemia, states that he has been eating bananas regularly, lately.  Eat some with peanut butter.  Takes no potassium supplements.  Continue close monitoring of potassium levels.    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   Has been doing quite well with regular dosing of twice daily cranberry tablets.  Has not been having bladder infections recently because of this.  Needs refills.  Continue current dosing.    Heartburn, ongoing but has noted significant improvement with regular dosing of famotidine.  Tolerating well.  Needs refills.    Urinary retention, see above.  Ongoing.  Finasteride and Flomax have been helpful and beneficial.  States that he feels like he is emptying his bladder better.  Still making urine regularly.  Needs refills.    HYPERTENSION - Ongoing. Blood pressure is currently well controlled.  Medication side effects: None. Denies syncope or presyncope.  No changes for now. Continue current medications.   Medication list reviewed/updated. Refills completed as needed.      MIXED HYPERLIPIDEMIA.  Ongoing. LDL is at goal: Yes. Triglycerides are at goal: Yes.    Medication side effects reported: None.   Continue current medications for now. Medication list reviewed/updated. Refills completed as needed.  Recent Labs   Lab Test 11/30/23 0927 08/22/23  0918   CHOL 128 152   HDL 40 36*   LDL 71 95   TRIG 83 105        Chronic Kidney Disease, Stage 5 (GFR < 15), chronic, ongoing.  Kidney function has been slowly declining.  Encouraged NSAID avoidance.       Vaccine counseling completed.  Encouraged routine / annual vaccinations.    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  Blood sugar control has been good with minimal hyperglycemia. Doing well with diet, exercise, and insulin injections.  Medication list reviewed/updated. Refills completed as needed.    Complicating factors include but are not limited to: hypertension, hyperlipidemia, and chronic kidney disease.     Recent Labs   Lab Test 11/30/23  0927 11/02/23  0746 11/01/23  0540 10/31/23  0555 10/30/23  2044 09/22/23  1749 08/22/23  0918 05/15/23  0929   A1C 5.3  --   --   --   --   --  6.3* 5.4   LDL 71  --   --   --   --   --  95  74   HDL 40  --   --   --   --   --  36* 38*   TRIG 83  --   --   --   --   --  105 77   ALT 11  --   --  13 10  --  9 11   CR 8.15* 9.10*   < > 9.77* 9.51*   < > 8.23* 7.51*   GFRESTIMATED 6* 5*   < > 5* 5*   < > 6* 6*   POTASSIUM 5.7* 4.2   < > 4.4 4.4   < > 5.5* 5.0   TSH 6.92*  --   --   --   --   --  6.61* 8.09*   T4 0.91  --   --   --   --   --  0.88* 0.90   WBC 12.5* 7.2   < > 11.0 11.4*   < > 10.1 10.6   HGB 10.6* 8.5*   < > 9.0* 9.2*   < > 10.1* 10.8*    198   < > 201 212   < > 235 251   ALBUMIN 3.9  --   --  3.1* 3.4*  --  4.1 4.1    < > = values in this interval not displayed.     Hemoglobin A1c is well controlled.  LDL is at goal.  HDL triglycerides are at goal.  ALT normal.  Creatinine is at baseline.  GFR 6.  Calcium is elevated.  TSH elevated.  Free T4 within low normal range.  CBC is high, white blood cell count is low.  Platelet count and albumin levels are normal.      Return in about 3 months (around 3/1/2024) for - Labs every 91+ days, with DM Follow-up, Same Day or 1-2 days later with Dr. Machado.    Valentino Machado MD  [facility]   Chaitanya is a 89 year old, presenting for the following health issues:  Diabetes        12/1/2023    10:26 AM   Additional Questions   Roomed by Richi SANDOVAL / CORINA   Accompanied by n/a       History of Present Illness       CKD: He uses over the counter pain medication, including none, one time daily.    Diabetes:   He presents for follow up of diabetes.    He is not checking blood glucose.     He is aware of hypoglycemia symptoms including none.    He has no concerns regarding his diabetes at this time.   He is not experiencing numbness or burning in feet, excessive thirst, blurry vision, weight changes or redness, sores or blisters on feet. The patient has had a diabetic eye exam in the last 12 months. Eye exam performed on 06/01/2023. Location of last eye exam Dr. Weiner.        He eats 4 or more servings of fruits and  "vegetables daily.He consumes 1 sweetened beverage(s) daily.He exercises with enough effort to increase his heart rate 9 or less minutes per day.  He exercises with enough effort to increase his heart rate 3 or less days per week.   He is taking medications regularly.     Review of Systems   Constitutional:  Positive for fatigue. Negative for chills and fever.        Golfs 1x weekly in the summer and in the winter - curls 2x weekly and bowling 2x weekly    HENT:  Negative for congestion and hearing loss.    Eyes:  Negative for pain and visual disturbance.   Respiratory:  Negative for cough, shortness of breath and wheezing.    Cardiovascular:  Negative for chest pain and palpitations.   Gastrointestinal:  Negative for abdominal pain, diarrhea, nausea and vomiting.   Endocrine: Negative for cold intolerance and heat intolerance.   Genitourinary:  Negative for dysuria and hematuria.        Reports urinating well. Denies bladder symptoms.    Musculoskeletal:  Positive for gait problem. Negative for arthralgias, back pain and myalgias.   Skin:  Negative for pallor.   Allergic/Immunologic: Negative for immunocompromised state.   Neurological:  Negative for dizziness and light-headedness.   Hematological:  Bruises/bleeds easily.   Psychiatric/Behavioral:  Negative for agitation and confusion.             Objective    /74 (BP Location: Right arm, Patient Position: Sitting, Cuff Size: Adult Regular)   Pulse 100   Temp 97.7  F (36.5  C) (Temporal)   Resp 20   Ht 1.734 m (5' 8.25\")   Wt 76 kg (167 lb 9.6 oz)   SpO2 99%   BMI 25.30 kg/m    Body mass index is 25.3 kg/m .  Physical Exam  Constitutional:       General: He is not in acute distress.     Appearance: He is well-developed. He is obese. He is not diaphoretic.   HENT:      Head: Normocephalic and atraumatic.   Eyes:      General: No scleral icterus.     Conjunctiva/sclera: Conjunctivae normal.   Cardiovascular:      Rate and Rhythm: Normal rate and regular " rhythm.   Pulmonary:      Effort: Pulmonary effort is normal.      Breath sounds: Normal breath sounds.   Abdominal:      Palpations: Abdomen is soft.      Tenderness: There is no abdominal tenderness.   Musculoskeletal:         General: No deformity.      Cervical back: Neck supple.      Right lower leg: Edema present.      Left lower leg: Edema present.   Lymphadenopathy:      Cervical: No cervical adenopathy.   Skin:     General: Skin is warm and dry.      Findings: Bruising (bilateral arms) present. No rash.      Comments: + Dry skin   Neurological:      Mental Status: He is alert. Mental status is at baseline.   Psychiatric:         Mood and Affect: Mood normal.         Behavior: Behavior normal.

## 2023-12-01 NOTE — NURSING NOTE
"Chief Complaint   Patient presents with    Diabetes       Initial /74 (BP Location: Right arm, Patient Position: Sitting, Cuff Size: Adult Regular)   Pulse 100   Temp 97.7  F (36.5  C) (Temporal)   Resp 20   Ht 1.734 m (5' 8.25\")   Wt 76 kg (167 lb 9.6 oz)   SpO2 99%   BMI 25.30 kg/m   Estimated body mass index is 25.3 kg/m  as calculated from the following:    Height as of this encounter: 1.734 m (5' 8.25\").    Weight as of this encounter: 76 kg (167 lb 9.6 oz).  Medication Review: complete    The next two questions are to help us understand your food security.  If you are feeling you need any assistance in this area, we have resources available to support you today.          12/1/2023   SDOH- Food Insecurity   Within the past 12 months, did you worry that your food would run out before you got money to buy more? N   Within the past 12 months, did the food you bought just not last and you didn t have money to get more? N         Health Care Directive:  Patient does not have a Health Care Directive or Living Will: Discussed advance care planning with patient; however, patient declined at this time.    Richi Pepper      "

## 2023-12-05 NOTE — PROGRESS NOTES

## 2023-12-06 NOTE — PROGRESS NOTES
PHYSICAL THERAPY EVALUATION  Type of Visit: Evaluation    See electronic medical record for Abuse and Falls Screening details.    Subjective       Presenting condition or subjective complaint: leg weakness, post-sepsis  Date of onset: 11/15/23    Relevant medical history: Kidney disease   Dates & types of surgery: hernia repair 10-12 years ago; hip replacements (bilateral) 2016 or 2017    Prior Level of Function  Transfers: Independent  Ambulation: Independent  ADL: Independent    Living Environment  Social support: With a significant other or spouse (daughter lives with him and his wife)   Type of home: House; 1 level; Basement   Stairs to enter the home: Yes 3     Stairs inside the home: Yes 9 Is there a railing: No     Employment:   retired,   Hobbies/Interests: bowling, curling, golf    Pain assessment: Pain denied     Objective      Cognitive Status Examination  Orientation: Oriented to person, place and time   Level of Consciousness: Alert  Follows Commands and Answers Questions: 100% of the time, Follows multi step instructions    INTEGUMENTARY: Impaired, bilateral lower leg edema; patient reports he regularly receives foot care  POSTURE: Sitting Posture: Rounded shoulders, Forward head, Thoracic kyphosis increased  RANGE OF MOTION: LE ROM WFL  UE ROM WFL; cervical ROM impaired R rotation and L sidebend  STRENGTH: B LE grossly 4-/5    TRANSFERS: Independent, SBA    GAIT:   Level of [place]: Independent, SBA  Assistive Device(s): None  Gait Deviations: Stance time decreased  Stride length decreased  Susan decreased  Unsteady, frequently reaches for wall for support  Gait Distance: 140    BALANCE:  SPECIAL TESTS  Romberg  (sec)  20 EO, 5 EC   Sharpened Romberg (sec) 8   Single Leg Stance Right (sec) 1   Single Leg Stance Left (sec) 4   30 Second Sit to Stand (reps/height) 5, with UE support       Assessment & Plan   CLINICAL IMPRESSIONS  Medical Diagnosis: Sepsis due to urinary tract  infection; 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, with long-term current use of insulin; Primary pauci-immune necrotizing and crescentic glomerulonephritis; Immunocompromised patient    Treatment Diagnosis: impaired functional mobility, balance, gait   Impression/Assessment: Patient is a 89 year old male with mobility, balance, gait complaints.  The following significant findings have been identified: Decreased ROM/flexibility, Decreased strength, Impaired gait, Decreased activity tolerance, Impaired posture, and Instability. These impairments interfere with their ability to perform recreational activities, household mobility, and community mobility as compared to previous level of function.     Clinical Decision Making (Complexity):  Clinical Presentation: Stable/Uncomplicated  Clinical Presentation Rationale: based on medical and personal factors listed in PT evaluation  Clinical Decision Making (Complexity): Low complexity    PLAN OF CARE  Treatment Interventions:  Modalities: Cryotherapy, E-stim, Hot Pack, Ultrasound  Interventions: Gait Training, Manual Therapy, Neuromuscular Re-education, Therapeutic Activity, Therapeutic Exercise    Long Term Goals     PT Goal 1  Goal Identifier: ambulation  Goal Description: patient will ambulate 1000ft, without reaching out for the wall for support, in order to demonstrate safe community ambulation distances  Rationale: to maximize safety and independence within the home;to maximize safety and independence within the community;to maximize safety and independence with performance of ADLs and functional tasks  Target Date: 02/28/24  PT Goal 2  Goal Identifier: functional strength  Goal Description: patient will improve 30 sec sit<>stand to >8 reps to demonstrate increased functional strength  Rationale: to maximize safety and independence with performance of ADLs and functional tasks;to maximize safety and independence within the  home;to maximize safety and independence within the community  Target Date: 01/31/24  PT Goal 3  Goal Identifier: balance  Goal Description: patient will demonstrate improved balance as evidenced by SLS time of >10 sec  Rationale: to maximize safety and independence with performance of ADLs and functional tasks;to maximize safety and independence within the home;to maximize safety and independence within the community  Target Date: 02/28/24      Frequency of Treatment: 2x/week  Duration of Treatment: 12 weeks    Education Assessment:        Risks and benefits of evaluation/treatment have been explained.   Patient/Family/caregiver agrees with Plan of Care.     Evaluation Time:        Signing Clinician: Ayah Reyes, PT      [facility]                                                                                   OUTPATIENT PHYSICAL THERAPY      PLAN OF TREATMENT FOR OUTPATIENT REHABILITATION   Patient's Last Name, First Name, Altaf Alcantar YOB: 1934   Provider's Name   [facility]   Medical Record No.  [id number]     Onset Date: 11/15/23  Start of Care Date: 12/06/23     Medical Diagnosis:  Sepsis due to urinary tract infection; 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, with long-term current use of insulin; Primary pauci-immune necrotizing and crescentic glomerulonephritis; Immunocompromised patient    PT Treatment Diagnosis:  impaired functional mobility, balance, gait Plan of Treatment  Frequency/Duration: 2x/week/ 12 weeks    Certification date from 12/06/23 to 02/28/24         See note for plan of treatment details and functional goals     Ayah Reyes, PT                         I CERTIFY THE NEED FOR THESE SERVICES FURNISHED UNDER        THIS PLAN OF TREATMENT AND WHILE UNDER MY CARE     (Physician attestation of this document indicates review and certification of the  therapy plan).              Referring Provider:  Melissa Nettles    Initial Assessment  See Epic Evaluation- Start of Care Date: 12/06/23

## 2023-12-18 NOTE — ED TRIAGE NOTES
"Pt here with family, pt reports that he \"missed a step\" outside on a curb today and fell, pt reports left knee pain and there is obvious blood stain on his pants, no active bleeding noted at this time, VSS, pt out into waiting room     Triage Assessment (Adult)       Row Name 12/18/23 5282          Triage Assessment    Airway WDL WDL        Respiratory WDL    Respiratory WDL WDL        Peripheral/Neurovascular WDL    Peripheral Neurovascular WDL WDL                     "

## 2023-12-19 NOTE — ED PROVIDER NOTES
History     Chief Complaint   Patient presents with    Fall    Leg Pain     HPI  Altaf Chao is a 89 year old male who presents for fall and laceration of L knee. Fell on the ice at home today. Reviewed xrays, no fracture. Large laceration above L knee cap. No significant bleeding. Did not hit head. Not on anticoagulation. Having some knee pain but not asking for pain control. Mechanical fall. No other injuries.    Allergies:  Allergies   Allergen Reactions    Statins [Statins] Itching     -- Patient declined --       Problem List:    Patient Active Problem List    Diagnosis Date Noted    Sepsis due to urinary tract infection (H) 10/30/2023     Priority: Medium    Iron deficiency anemia, unspecified 10/11/2023     Priority: Medium    Occult closed fracture of right elbow with routine healing, subsequent encounter 06/01/2023     Priority: Medium    Serum phosphate elevated 07/27/2022     Priority: Medium    DNR (do not resuscitate) 04/21/2022     Priority: Medium    DNI (do not intubate) 04/21/2022     Priority: Medium    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) 01/19/2022     Priority: Medium    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, with long-term current use of insulin (H) 01/18/2021     Priority: Medium    Heartburn 07/12/2018     Priority: Medium    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     Priority: Medium    Anemia due to vitamin B12 deficiency 10/25/2017     Priority: Medium    Lymphedema of both lower extremities 06/23/2017     Priority: Medium    Immunocompromised patient (H24) 06/09/2017     Priority: Medium    Bilateral edema of lower extremity 05/11/2017     Priority: Medium    Steroid-induced hyperglycemia 05/04/2017     Priority: Medium    Anemia of chronic disease 05/03/2017     Priority: Medium    CKD (chronic kidney disease) stage 5, GFR 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 ml/min (H) 05/01/2017     Priority: Medium    Metabolic acidosis 04/25/2017     Priority:  Medium    Acute crescentic glomerulonephritis 2017     Priority: Medium    Antineutrophil cytoplasmic antibody (ANCA) positive 2017     Priority: Medium    Primary pauci-immune necrotizing and crescentic glomerulonephritis 2017     Priority: Medium    Hyperkalemia 2017     Priority: Medium    Hyponatremia 2017     Priority: Medium    Refusal of statin medication at discharge 2017     Priority: Medium    H/O total hip arthroplasty 2014     Priority: Medium    Mixed hyperlipidemia 04/10/2014     Priority: Medium    H/O bilateral inguinal hernia repair 2013     Priority: Medium    History of tobacco abuse 08/15/2013     Priority: Medium    Benign essential hypertension 2012     Priority: Medium    Pityriasis rosea 2012     Priority: Medium        Past Medical History:    Past Medical History:   Diagnosis Date    Acute kidney failure (H24)     Essential (primary) hypertension     Hyperlipidemia     Osteoarthritis of hip     Pityriasis rosea     Tachycardia     Unilateral inguinal hernia without obstruction or gangrene        Past Surgical History:    Past Surgical History:   Procedure Laterality Date    CIRCUMCISION      2017,Dr. Heidi GREENBERG    OTHER SURGICAL HISTORY      70767.9,NC SUBTALAR ATHROEREISIS,bone Spurs excision on Toe    OTHER SURGICAL HISTORY      13,,HERNIA REPAIR,Right,RIH with mesh    OTHER SURGICAL HISTORY      13,11864,GENITAL SURGERY MALE,Dorsal Slit    OTHER SURGICAL HISTORY      4/15/14,,HERNIA REPAIR,Left,LIH with mesh    OTHER SURGICAL HISTORY      14,92729.0,NC TOTAL HIP ARTHROPLASTY,Left       Family History:    Family History   Problem Relation Age of Onset    Other - See Comments Son 12        neuroblastoma at 13 yo  at 14.    Genetic Disorder No family hx of         Genetic,No known FHx of DM, CAD, CVA       Social History:  Marital Status:   [2]  Social History     Tobacco Use    Smoking  "status: Former     Types: Cigarettes     Quit date: 1976     Years since quittin.9     Passive exposure: Past    Smokeless tobacco: Never   Vaping Use    Vaping Use: Never used   Substance Use Topics    Alcohol use: Yes     Alcohol/week: 0.8 standard drinks of alcohol     Comment: maybe 1 or 2 month    Drug use: Never        Medications:    amLODIPine (NORVASC) 5 MG tablet  calcitRIOL (ROCALTROL) 0.25 MCG capsule  Cranberry-Vitamin C (CRANBERRY CONCENTRATE/VITAMINC) 67119-117 MG CAPS  darbepoetin miller (ARANESP) 100 MCG/0.5ML injection  famotidine (PEPCID) 20 MG tablet  finasteride (PROSCAR) 5 MG tablet  furosemide (LASIX) 40 MG tablet  insulin glargine (LANTUS SOLOSTAR) 100 UNIT/ML pen  insulin pen needle (B-D U/F) 31G X 8 MM miscellaneous  predniSONE (DELTASONE) 2.5 MG tablet  sodium bicarbonate 650 MG tablet  tamsulosin (FLOMAX) 0.4 MG capsule          Review of Systems    Physical Exam   BP: (!) 153/81  Pulse: 110  Temp: (!) 96.6  F (35.9  C)  Resp: 14  Height: 172.7 cm (5' 8\")  Weight: 75.8 kg (167 lb)  SpO2: 94 %      Physical Exam  Constitutional:       Appearance: Normal appearance.   Skin:     General: Skin is warm.      Comments: Large laceration 12cm of L knee   Neurological:      General: No focal deficit present.      Mental Status: He is alert.   Psychiatric:         Mood and Affect: Mood normal.         ED Course              ED Course as of 12/18/23 2022   Mon Dec 18, 2023   1917 Xray neg for fracture     [facility]    -Laceration Repair    Date/Time: 2023 8:20 PM    Performed by: Galina Cordoba DO  Authorized by: Galina Cordoba DO    Risks, benefits and alternatives discussed.      ANESTHESIA (see MAR for exact dosages):     Anesthesia method:  Local infiltration    Local anesthetic:  Lidocaine 1% WITH epi  LACERATION DETAILS     Location:  Leg    Leg location:  L knee    Length (cm):  12    Depth (mm):  3    REPAIR TYPE:     Repair type:  " Intermediate    EXPLORATION:     Hemostasis achieved with:  Epinephrine and direct pressure    Wound exploration: wound explored through full range of motion      Wound extent: areolar tissue not violated, fascia not violated, no foreign body, no signs of injury, no nerve damage, no tendon damage, no underlying fracture and no vascular damage      Contaminated: no      TREATMENT:     Area cleansed with:  Soap and water    Amount of cleaning:  Standard    Irrigation solution:  Sterile saline    SUBCUTANEOUS REPAIR     Suture size:  4-0    Suture material:  Vicryl    Suture technique:  Simple interrupted    Number of sutures:  4    SKIN REPAIR     Repair method:  Sutures    Suture size:  4-0    Suture material:  Nylon    Suture technique:  Simple interrupted    Number of sutures:  17    APPROXIMATION     Approximation:  Close    POST-PROCEDURE DETAILS     Dressing:  Non-adherent dressing      PROCEDURE    Patient Tolerance:  Patient tolerated the procedure well with no immediate complications                Critical Care time:  none               Results for orders placed or performed during the hospital encounter of 12/18/23 (from the past 24 hour(s))   XR Knee Left 3 Views    Narrative    Exam: XR KNEE LEFT 3 VIEWS     History:Male, age 89 years, fall with knee pain    Comparison:  6/4/2023    Technique: Three views are submitted.    Findings: Bones are osteopenic. No evidence of acute or subacute  fracture.  No evidence of dislocation.  Medial compartment joint space  narrowing. Heterogeneous density of the soft tissues suggesting  hematoma and possible laceration.           Impression    Impression:  No evidence of acute or subacute bony abnormality.    RONALD GHOTRA MD         SYSTEM ID:  [id number]       Medications   lidocaine 1% with EPINEPHrine 1:100,000 injection 1 mL (has no administration in time range)       Assessments & Plan (with Medical Decision Making)     I have reviewed the nursing notes.    I  have reviewed the findings, diagnosis, plan and need for follow up with the patient.           Medical Decision Making  The patient's presentation was of straightforward complexity (a clearly self-limited or minor problem).    The patient's evaluation involved:  history and exam without other MDM data elements    The patient's management necessitated only low risk treatment.        New Prescriptions    No medications on file       Final diagnoses:   Knee laceration, left, initial encounter   X-ray negative for fracture.  Pain controlled with just Tylenol over-the-counter medication at home.  Did not his head.  Not on anticoagulation.  Laceration repaired with 4 subcutaneous sutures and 17 simple interrupted running skin sutures. Sutures out in 7-10 days    12/18/2023   [facility] AND \[facility] [place] [facility]\""       Galina Cordoba DO  12/18/23 2023

## 2023-12-19 NOTE — DISCHARGE INSTRUCTIONS
Stitches out in 7-10 days. No fractures. Keep area clean/dry. Ok to shower. Avoid hot tubs and pools.

## 2023-12-27 NOTE — ED PROVIDER NOTES
Patient arrives for suture removal after left knee injury repaired on 12/18/2023 with 17 sutures.  There was concern for possibly some redness around the repair site.  Patient denies any fever, chills, left knee pain or issues using left knee, suture repair site drainage, swelling.    Vitals:    12/27/23 1206   BP: (!) 159/78   Pulse: 100   Resp: 18   Temp: 97.4  F (36.3  C)   TempSrc: Temporal   SpO2: 99%   Weight: 76.7 kg (169 lb)     Exam:  General: awake, comfortable  HEENT: atraumatic  Respiratory: normal effort  Cardiovascular: Left lower extremity appears well-perfused  Extremities: no deformities, edema, or tenderness  Skin: warm, dry, left knee with 7 simple interrupted sutures in place with trace surrounding redness and no significant areas of erythema, no crepitus, no drainage  Neuro: alert, no focal deficits    Final diagnoses:   Encounter for removal of sutures        A/P: Suture repair site with trace redness around the repair site most consistent with localized skin reaction not appearing infected at this time.  Sutures removed by RN and patient discharged home.       Arley Neely MD  12/27/23 1238       Arley Neely MD  12/28/23 1931

## 2023-12-27 NOTE — ED TRIAGE NOTES
Pt presents for suture removal. Sutures placed in the left leg 9 days ago, was advised to have removed in 7-10 days. No drainage from the wound, some localized redness noted around the sutures.     BP (!) 159/78   Pulse 100   Temp 97.4  F (36.3  C) (Temporal)   Resp 18   Wt 76.7 kg (169 lb)   SpO2 99%   BMI 25.70 kg/m         Triage Assessment (Adult)       Row Name 12/27/23 120          Triage Assessment    Airway WDL WDL        Respiratory WDL    Respiratory WDL WDL        Skin Circulation/Temperature WDL    Skin Circulation/Temperature WDL WDL        Cardiac WDL    Cardiac WDL WDL        Peripheral/Neurovascular WDL    Peripheral Neurovascular WDL WDL        Cognitive/Neuro/Behavioral WDL    Cognitive/Neuro/Behavioral WDL WDL                     
Abdomen soft, non-tender, no guarding.

## 2024-01-01 ENCOUNTER — THERAPY VISIT (OUTPATIENT)
Dept: PHYSICAL THERAPY | Facility: OTHER | Age: 89
End: 2024-01-01
Attending: FAMILY MEDICINE
Payer: MEDICARE

## 2024-01-01 ENCOUNTER — LAB (OUTPATIENT)
Dept: LAB | Facility: OTHER | Age: 89
End: 2024-01-01
Attending: INTERNAL MEDICINE
Payer: MEDICARE

## 2024-01-01 ENCOUNTER — APPOINTMENT (OUTPATIENT)
Dept: GENERAL RADIOLOGY | Facility: OTHER | Age: 89
DRG: 689 | End: 2024-01-01
Payer: MEDICARE

## 2024-01-01 ENCOUNTER — APPOINTMENT (OUTPATIENT)
Dept: OCCUPATIONAL THERAPY | Facility: OTHER | Age: 89
DRG: 689 | End: 2024-01-01
Payer: MEDICARE

## 2024-01-01 ENCOUNTER — APPOINTMENT (OUTPATIENT)
Dept: PHYSICAL THERAPY | Facility: OTHER | Age: 89
DRG: 689 | End: 2024-01-01
Payer: MEDICARE

## 2024-01-01 ENCOUNTER — OFFICE VISIT (OUTPATIENT)
Dept: INTERNAL MEDICINE | Facility: OTHER | Age: 89
End: 2024-01-01
Attending: NURSE PRACTITIONER
Payer: COMMERCIAL

## 2024-01-01 ENCOUNTER — ALLIED HEALTH/NURSE VISIT (OUTPATIENT)
Dept: FAMILY MEDICINE | Facility: OTHER | Age: 89
End: 2024-01-01
Attending: INTERNAL MEDICINE
Payer: MEDICARE

## 2024-01-01 ENCOUNTER — APPOINTMENT (OUTPATIENT)
Dept: CT IMAGING | Facility: OTHER | Age: 89
DRG: 689 | End: 2024-01-01
Payer: MEDICARE

## 2024-01-01 ENCOUNTER — HOSPITAL ENCOUNTER (EMERGENCY)
Facility: OTHER | Age: 89
Discharge: HOME | End: 2024-04-17
Attending: FAMILY MEDICINE | Admitting: FAMILY MEDICINE
Payer: MEDICARE

## 2024-01-01 ENCOUNTER — DOCUMENTATION ONLY (OUTPATIENT)
Dept: INTERNAL MEDICINE | Facility: OTHER | Age: 89
End: 2024-01-01
Payer: COMMERCIAL

## 2024-01-01 ENCOUNTER — HOSPITAL ENCOUNTER (EMERGENCY)
Facility: OTHER | Age: 89
Discharge: HOME | End: 2024-07-05
Attending: PHYSICIAN ASSISTANT | Admitting: PHYSICIAN ASSISTANT
Payer: MEDICARE

## 2024-01-01 ENCOUNTER — TELEPHONE (OUTPATIENT)
Dept: NURSING | Facility: CLINIC | Age: 89
End: 2024-01-01
Payer: COMMERCIAL

## 2024-01-01 ENCOUNTER — OFFICE VISIT (OUTPATIENT)
Dept: INTERNAL MEDICINE | Facility: OTHER | Age: 89
End: 2024-01-01
Attending: NURSE PRACTITIONER
Payer: MEDICARE

## 2024-01-01 ENCOUNTER — APPOINTMENT (OUTPATIENT)
Dept: PHYSICAL THERAPY | Facility: OTHER | Age: 89
DRG: 689 | End: 2024-01-01
Attending: FAMILY MEDICINE
Payer: MEDICARE

## 2024-01-01 ENCOUNTER — OFFICE VISIT (OUTPATIENT)
Dept: INTERNAL MEDICINE | Facility: OTHER | Age: 89
End: 2024-01-01
Attending: INTERNAL MEDICINE
Payer: COMMERCIAL

## 2024-01-01 ENCOUNTER — PATIENT OUTREACH (OUTPATIENT)
Dept: INTERNAL MEDICINE | Facility: OTHER | Age: 89
End: 2024-01-01
Payer: COMMERCIAL

## 2024-01-01 ENCOUNTER — TELEPHONE (OUTPATIENT)
Dept: INTERNAL MEDICINE | Facility: OTHER | Age: 89
End: 2024-01-01
Payer: COMMERCIAL

## 2024-01-01 ENCOUNTER — HOSPITAL ENCOUNTER (INPATIENT)
Facility: OTHER | Age: 89
LOS: 2 days | Discharge: HOME | DRG: 391 | End: 2024-08-18
Attending: EMERGENCY MEDICINE | Admitting: INTERNAL MEDICINE
Payer: MEDICARE

## 2024-01-01 ENCOUNTER — HOSPITAL ENCOUNTER (EMERGENCY)
Facility: OTHER | Age: 89
Discharge: HOME | End: 2024-01-25
Attending: FAMILY MEDICINE | Admitting: FAMILY MEDICINE
Payer: MEDICARE

## 2024-01-01 ENCOUNTER — TELEPHONE (OUTPATIENT)
Dept: EMERGENCY MEDICINE | Facility: OTHER | Age: 89
End: 2024-01-01
Payer: COMMERCIAL

## 2024-01-01 ENCOUNTER — HOSPITAL ENCOUNTER (INPATIENT)
Facility: OTHER | Age: 89
LOS: 1 days | Discharge: NURSING, REHAB OR LONG-TERM CARE | DRG: 689 | End: 2024-08-27
Admitting: FAMILY MEDICINE
Payer: MEDICARE

## 2024-01-01 ENCOUNTER — OFFICE VISIT (OUTPATIENT)
Dept: INTERNAL MEDICINE | Facility: OTHER | Age: 89
End: 2024-01-01
Attending: INTERNAL MEDICINE
Payer: MEDICARE

## 2024-01-01 ENCOUNTER — PATIENT OUTREACH (OUTPATIENT)
Dept: INTERNAL MEDICINE | Facility: OTHER | Age: 89
End: 2024-01-01

## 2024-01-01 VITALS
TEMPERATURE: 98.2 F | BODY MASS INDEX: 25.92 KG/M2 | OXYGEN SATURATION: 98 % | RESPIRATION RATE: 20 BRPM | HEART RATE: 74 BPM | HEIGHT: 69 IN | SYSTOLIC BLOOD PRESSURE: 129 MMHG | DIASTOLIC BLOOD PRESSURE: 72 MMHG | WEIGHT: 175 LBS

## 2024-01-01 VITALS
HEIGHT: 68 IN | HEART RATE: 86 BPM | TEMPERATURE: 97.9 F | SYSTOLIC BLOOD PRESSURE: 117 MMHG | OXYGEN SATURATION: 96 % | RESPIRATION RATE: 18 BRPM | WEIGHT: 168 LBS | DIASTOLIC BLOOD PRESSURE: 70 MMHG | BODY MASS INDEX: 25.46 KG/M2

## 2024-01-01 VITALS
WEIGHT: 175.2 LBS | BODY MASS INDEX: 25.95 KG/M2 | SYSTOLIC BLOOD PRESSURE: 138 MMHG | DIASTOLIC BLOOD PRESSURE: 88 MMHG | RESPIRATION RATE: 18 BRPM | HEART RATE: 101 BPM | OXYGEN SATURATION: 96 % | HEIGHT: 69 IN | TEMPERATURE: 97 F

## 2024-01-01 VITALS
SYSTOLIC BLOOD PRESSURE: 145 MMHG | RESPIRATION RATE: 18 BRPM | TEMPERATURE: 97.5 F | HEART RATE: 109 BPM | HEIGHT: 68 IN | WEIGHT: 169 LBS | BODY MASS INDEX: 25.61 KG/M2 | DIASTOLIC BLOOD PRESSURE: 79 MMHG | OXYGEN SATURATION: 98 %

## 2024-01-01 VITALS
RESPIRATION RATE: 16 BRPM | HEIGHT: 68 IN | DIASTOLIC BLOOD PRESSURE: 58 MMHG | HEART RATE: 72 BPM | BODY MASS INDEX: 26.51 KG/M2 | SYSTOLIC BLOOD PRESSURE: 121 MMHG | TEMPERATURE: 98.6 F | OXYGEN SATURATION: 96 % | WEIGHT: 174.9 LBS

## 2024-01-01 VITALS
RESPIRATION RATE: 14 BRPM | WEIGHT: 168 LBS | OXYGEN SATURATION: 99 % | HEIGHT: 68 IN | HEART RATE: 99 BPM | BODY MASS INDEX: 25.46 KG/M2 | SYSTOLIC BLOOD PRESSURE: 128 MMHG | TEMPERATURE: 96.8 F | DIASTOLIC BLOOD PRESSURE: 62 MMHG

## 2024-01-01 VITALS
DIASTOLIC BLOOD PRESSURE: 86 MMHG | OXYGEN SATURATION: 100 % | HEIGHT: 68 IN | WEIGHT: 173 LBS | RESPIRATION RATE: 18 BRPM | HEART RATE: 95 BPM | BODY MASS INDEX: 26.22 KG/M2 | TEMPERATURE: 97.9 F | SYSTOLIC BLOOD PRESSURE: 148 MMHG

## 2024-01-01 VITALS
WEIGHT: 175 LBS | TEMPERATURE: 97.3 F | SYSTOLIC BLOOD PRESSURE: 160 MMHG | HEART RATE: 85 BPM | OXYGEN SATURATION: 99 % | RESPIRATION RATE: 15 BRPM | DIASTOLIC BLOOD PRESSURE: 87 MMHG | BODY MASS INDEX: 25.92 KG/M2 | HEIGHT: 69 IN

## 2024-01-01 VITALS
RESPIRATION RATE: 20 BRPM | DIASTOLIC BLOOD PRESSURE: 81 MMHG | OXYGEN SATURATION: 100 % | HEART RATE: 99 BPM | HEIGHT: 68 IN | WEIGHT: 173.2 LBS | BODY MASS INDEX: 26.25 KG/M2 | TEMPERATURE: 97.5 F | SYSTOLIC BLOOD PRESSURE: 162 MMHG

## 2024-01-01 VITALS
RESPIRATION RATE: 18 BRPM | TEMPERATURE: 97.6 F | DIASTOLIC BLOOD PRESSURE: 87 MMHG | BODY MASS INDEX: 26.01 KG/M2 | WEIGHT: 171.6 LBS | OXYGEN SATURATION: 100 % | SYSTOLIC BLOOD PRESSURE: 153 MMHG | HEIGHT: 68 IN | HEART RATE: 96 BPM

## 2024-01-01 VITALS
BODY MASS INDEX: 25.55 KG/M2 | HEIGHT: 68 IN | HEART RATE: 95 BPM | RESPIRATION RATE: 17 BRPM | OXYGEN SATURATION: 100 % | WEIGHT: 168.6 LBS | DIASTOLIC BLOOD PRESSURE: 79 MMHG | SYSTOLIC BLOOD PRESSURE: 160 MMHG | TEMPERATURE: 97.4 F

## 2024-01-01 DIAGNOSIS — Z79.4 TYPE 2 DIABETES MELLITUS WITH STAGE 5 CHRONIC KIDNEY DISEASE NOT ON CHRONIC DIALYSIS, WITH LONG-TERM CURRENT USE OF INSULIN (H): Primary | ICD-10-CM

## 2024-01-01 DIAGNOSIS — N18.5 TYPE 2 DIABETES MELLITUS WITH STAGE 5 CHRONIC KIDNEY DISEASE NOT ON CHRONIC DIALYSIS, WITH LONG-TERM CURRENT USE OF INSULIN (H): ICD-10-CM

## 2024-01-01 DIAGNOSIS — N05.8 PRIMARY PAUCI-IMMUNE NECROTIZING AND CRESCENTIC GLOMERULONEPHRITIS: ICD-10-CM

## 2024-01-01 DIAGNOSIS — A41.9 SEPSIS DUE TO URINARY TRACT INFECTION (H): ICD-10-CM

## 2024-01-01 DIAGNOSIS — N18.5 TYPE 2 DIABETES MELLITUS WITH STAGE 5 CHRONIC KIDNEY DISEASE NOT ON CHRONIC DIALYSIS, WITH LONG-TERM CURRENT USE OF INSULIN (H): Primary | ICD-10-CM

## 2024-01-01 DIAGNOSIS — E11.22 TYPE 2 DIABETES MELLITUS WITH STAGE 5 CHRONIC KIDNEY DISEASE NOT ON CHRONIC DIALYSIS, WITH LONG-TERM CURRENT USE OF INSULIN (H): ICD-10-CM

## 2024-01-01 DIAGNOSIS — Z79.4 TYPE 2 DIABETES MELLITUS WITH STAGE 5 CHRONIC KIDNEY DISEASE NOT ON CHRONIC DIALYSIS, WITH LONG-TERM CURRENT USE OF INSULIN (H): ICD-10-CM

## 2024-01-01 DIAGNOSIS — E53.8 VITAMIN B12 DEFICIENCY (NON ANEMIC): Primary | ICD-10-CM

## 2024-01-01 DIAGNOSIS — N39.0 SEPSIS DUE TO URINARY TRACT INFECTION (H): ICD-10-CM

## 2024-01-01 DIAGNOSIS — N39.0 RECURRENT UTI: ICD-10-CM

## 2024-01-01 DIAGNOSIS — I10 BENIGN ESSENTIAL HYPERTENSION: ICD-10-CM

## 2024-01-01 DIAGNOSIS — D84.9 IMMUNOCOMPROMISED PATIENT (H): Primary | ICD-10-CM

## 2024-01-01 DIAGNOSIS — N00.7 ACUTE CRESCENTIC GLOMERULONEPHRITIS: ICD-10-CM

## 2024-01-01 DIAGNOSIS — E78.2 MIXED HYPERLIPIDEMIA: ICD-10-CM

## 2024-01-01 DIAGNOSIS — N18.5 CKD (CHRONIC KIDNEY DISEASE) STAGE 5, GFR LESS THAN 15 ML/MIN (H): ICD-10-CM

## 2024-01-01 DIAGNOSIS — N05.7 PRIMARY PAUCI-IMMUNE NECROTIZING AND CRESCENTIC GLOMERULONEPHRITIS: ICD-10-CM

## 2024-01-01 DIAGNOSIS — S51.812A LACERATION OF LEFT FOREARM, INITIAL ENCOUNTER: ICD-10-CM

## 2024-01-01 DIAGNOSIS — Z29.11 NEED FOR VACCINATION AGAINST RESPIRATORY SYNCYTIAL VIRUS: ICD-10-CM

## 2024-01-01 DIAGNOSIS — E11.22 TYPE 2 DIABETES MELLITUS WITH STAGE 5 CHRONIC KIDNEY DISEASE NOT ON CHRONIC DIALYSIS, WITH LONG-TERM CURRENT USE OF INSULIN (H): Primary | ICD-10-CM

## 2024-01-01 DIAGNOSIS — N39.0 URINARY TRACT INFECTION DUE TO PSEUDOMONAS AERUGINOSA: Primary | ICD-10-CM

## 2024-01-01 DIAGNOSIS — Z99.2 CHRONIC KIDNEY DISEASE REQUIRING CHRONIC DIALYSIS (H): ICD-10-CM

## 2024-01-01 DIAGNOSIS — Z91.81 PERSONAL HISTORY OF FALL: Primary | ICD-10-CM

## 2024-01-01 DIAGNOSIS — S51.811A SKIN TEAR OF FOREARM WITHOUT COMPLICATION, RIGHT, INITIAL ENCOUNTER: ICD-10-CM

## 2024-01-01 DIAGNOSIS — S51.819A SKIN TEAR OF FOREARM WITHOUT COMPLICATION, INITIAL ENCOUNTER: ICD-10-CM

## 2024-01-01 DIAGNOSIS — R82.71 BACTERIA IN URINE: ICD-10-CM

## 2024-01-01 DIAGNOSIS — Z00.00 ENCOUNTER FOR MEDICARE ANNUAL WELLNESS EXAM: ICD-10-CM

## 2024-01-01 DIAGNOSIS — D84.9 IMMUNOCOMPROMISED PATIENT (H): ICD-10-CM

## 2024-01-01 DIAGNOSIS — N25.81 HYPERPARATHYROIDISM DUE TO RENAL INSUFFICIENCY (H): ICD-10-CM

## 2024-01-01 DIAGNOSIS — S81.811A LACERATION OF RIGHT LOWER EXTREMITY: ICD-10-CM

## 2024-01-01 DIAGNOSIS — A09 DIARRHEA OF INFECTIOUS ORIGIN: Primary | ICD-10-CM

## 2024-01-01 DIAGNOSIS — S41.111A SKIN TEAR OF RIGHT UPPER EXTREMITY: ICD-10-CM

## 2024-01-01 DIAGNOSIS — E53.8 B12 DEFICIENCY: Primary | ICD-10-CM
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
## 2024-01-01 DIAGNOSIS — R44.3 HALLUCINATIONS: ICD-10-CM

## 2024-01-01 DIAGNOSIS — B35.3 TINEA PEDIS OF RIGHT FOOT: ICD-10-CM

## 2024-01-01 DIAGNOSIS — D63.8 ANEMIA OF CHRONIC DISEASE: ICD-10-CM

## 2024-01-01 DIAGNOSIS — R12 HEARTBURN: ICD-10-CM

## 2024-01-01 DIAGNOSIS — R33.9 URINARY RETENTION: ICD-10-CM

## 2024-01-01 DIAGNOSIS — S41.112A SKIN TEAR OF LEFT UPPER EXTREMITY: ICD-10-CM

## 2024-01-01 DIAGNOSIS — Z79.52 CURRENT CHRONIC USE OF SYSTEMIC STEROIDS: ICD-10-CM

## 2024-01-01 DIAGNOSIS — E53.8 VITAMIN B12 DEFICIENCY: ICD-10-CM

## 2024-01-01 DIAGNOSIS — S41.111A SKIN TEAR OF RIGHT UPPER EXTREMITY: Primary | ICD-10-CM

## 2024-01-01 DIAGNOSIS — B96.5 URINARY TRACT INFECTION DUE TO PSEUDOMONAS AERUGINOSA: Primary | ICD-10-CM

## 2024-01-01 DIAGNOSIS — R73.9 STEROID-INDUCED HYPERGLYCEMIA: ICD-10-CM

## 2024-01-01 DIAGNOSIS — T38.0X5A STEROID-INDUCED HYPERGLYCEMIA: ICD-10-CM

## 2024-01-01 DIAGNOSIS — R76.8 ANTINEUTROPHIL CYTOPLASMIC ANTIBODY (ANCA) POSITIVE: ICD-10-CM

## 2024-01-01 DIAGNOSIS — S41.112A SKIN TEAR OF LEFT UPPER EXTREMITY: Primary | ICD-10-CM

## 2024-01-01 DIAGNOSIS — R53.1 GENERALIZED WEAKNESS: ICD-10-CM
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
## 2024-01-01 DIAGNOSIS — I10 BENIGN ESSENTIAL HYPERTENSION: Primary | ICD-10-CM

## 2024-01-01 DIAGNOSIS — N39.0 PSEUDOMONAS URINARY TRACT INFECTION: ICD-10-CM

## 2024-01-01 DIAGNOSIS — R41.0 CONFUSION: ICD-10-CM

## 2024-01-01 DIAGNOSIS — E03.4 HYPOTHYROIDISM DUE TO ACQUIRED ATROPHY OF THYROID: ICD-10-CM

## 2024-01-01 DIAGNOSIS — N18.6 CHRONIC KIDNEY DISEASE REQUIRING CHRONIC DIALYSIS (H): ICD-10-CM

## 2024-01-01 DIAGNOSIS — B96.5 PSEUDOMONAS URINARY TRACT INFECTION: ICD-10-CM

## 2024-01-01 DIAGNOSIS — Z71.85 VACCINE COUNSELING: ICD-10-CM
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
## 2024-01-01 DIAGNOSIS — R82.90 NONSPECIFIC FINDING ON EXAMINATION OF URINE: ICD-10-CM

## 2024-01-01 DIAGNOSIS — R60.0 BILATERAL EDEMA OF LOWER EXTREMITY: ICD-10-CM

## 2024-01-01 DIAGNOSIS — R19.7 DIARRHEA, UNSPECIFIED TYPE: ICD-10-CM

## 2024-01-01 DIAGNOSIS — Z23 NEED FOR SHINGLES VACCINE: ICD-10-CM

## 2024-01-01 DIAGNOSIS — E87.5 HYPERKALEMIA: ICD-10-CM

## 2024-01-01 LAB
ADV 40+41 DNA STL QL NAA+NON-PROBE: NEGATIVE
ALBUMIN SERPL BCG-MCNC: 2.5 G/DL (ref 3.5–5.2)
ALBUMIN SERPL BCG-MCNC: 3.1 G/DL (ref 3.5–5.2)
ALBUMIN SERPL BCG-MCNC: 3.1 G/DL (ref 3.5–5.2)
ALBUMIN SERPL BCG-MCNC: 3.6 G/DL (ref 3.5–5.2)
ALBUMIN SERPL BCG-MCNC: 3.7 G/DL (ref 3.5–5.2)
ALBUMIN SERPL BCG-MCNC: 4 G/DL (ref 3.5–5.2)
ALBUMIN UR-MCNC: 100 MG/DL
ALBUMIN UR-MCNC: 200 MG/DL
ALLEN'S TEST: NO
ALP SERPL-CCNC: 65 U/L (ref 40–150)
ALP SERPL-CCNC: 68 U/L (ref 40–150)
ALP SERPL-CCNC: 75 U/L (ref 40–150)
ALP SERPL-CCNC: 75 U/L (ref 40–150)
ALP SERPL-CCNC: 76 U/L (ref 40–150)
ALP SERPL-CCNC: 91 U/L (ref 40–150)
ALT SERPL W P-5'-P-CCNC: 10 U/L (ref 0–70)
ALT SERPL W P-5'-P-CCNC: 10 U/L (ref 0–70)
ALT SERPL W P-5'-P-CCNC: 11 U/L (ref 0–70)
ALT SERPL W P-5'-P-CCNC: 16 U/L (ref 0–70)
ALT SERPL W P-5'-P-CCNC: 16 U/L (ref 0–70)
ALT SERPL W P-5'-P-CCNC: 9 U/L (ref 0–70)
AMMONIA PLAS-SCNC: 13 UMOL/L (ref 16–60)
ANION GAP SERPL CALCULATED.3IONS-SCNC: 15 MMOL/L (ref 7–15)
ANION GAP SERPL CALCULATED.3IONS-SCNC: 17 MMOL/L (ref 7–15)
ANION GAP SERPL CALCULATED.3IONS-SCNC: 17 MMOL/L (ref 7–15)
ANION GAP SERPL CALCULATED.3IONS-SCNC: 18 MMOL/L (ref 7–15)
ANION GAP SERPL CALCULATED.3IONS-SCNC: 19 MMOL/L (ref 7–15)
APPEARANCE UR: ABNORMAL
APTT PPP: 32 SECONDS (ref 22–38)
AST SERPL W P-5'-P-CCNC: 17 U/L (ref 0–45)
AST SERPL W P-5'-P-CCNC: 17 U/L (ref 0–45)
AST SERPL W P-5'-P-CCNC: 22 U/L (ref 0–45)
AST SERPL W P-5'-P-CCNC: 22 U/L (ref 0–45)
AST SERPL W P-5'-P-CCNC: 24 U/L (ref 0–45)
AST SERPL W P-5'-P-CCNC: 32 U/L (ref 0–45)
ASTRO TYP 1-8 RNA STL QL NAA+NON-PROBE: NEGATIVE
ATRIAL RATE - MUSE: 95 BPM
BACTERIA BLD CULT: NO GROWTH
BACTERIA UR CULT: ABNORMAL
BASE EXCESS BLDA CALC-SCNC: 5.6 MMOL/L (ref -3–3)
BASOPHILS # BLD AUTO: 0 10E3/UL (ref 0–0.2)
BASOPHILS NFR BLD AUTO: 0 %
BASOPHILS NFR BLD AUTO: 0 %
BASOPHILS NFR BLD AUTO: 1 %
BILIRUB SERPL-MCNC: 0.3 MG/DL
BILIRUB SERPL-MCNC: 0.4 MG/DL
BILIRUB SERPL-MCNC: 0.5 MG/DL
BILIRUB SERPL-MCNC: 0.5 MG/DL
BILIRUB UR QL STRIP: NEGATIVE
BUN SERPL-MCNC: 100 MG/DL (ref 8–23)
BUN SERPL-MCNC: 119.9 MG/DL (ref 8–23)
BUN SERPL-MCNC: 127.2 MG/DL (ref 8–23)
BUN SERPL-MCNC: 128 MG/DL (ref 8–23)
BUN SERPL-MCNC: 130.3 MG/DL (ref 8–23)
BUN SERPL-MCNC: 138.7 MG/DL (ref 8–23)
BUN SERPL-MCNC: 144.3 MG/DL (ref 8–23)
BUN SERPL-MCNC: 89.6 MG/DL (ref 8–23)
BUN SERPL-MCNC: 94.9 MG/DL (ref 8–23)
C CAYETANENSIS DNA STL QL NAA+NON-PROBE: NEGATIVE
C DIFF TOX B STL QL: NEGATIVE
CALCIUM SERPL-MCNC: 7.9 MG/DL (ref 8.8–10.4)
CALCIUM SERPL-MCNC: 7.9 MG/DL (ref 8.8–10.4)
CALCIUM SERPL-MCNC: 8.2 MG/DL (ref 8.8–10.4)
CALCIUM SERPL-MCNC: 8.4 MG/DL (ref 8.8–10.4)
CALCIUM SERPL-MCNC: 8.5 MG/DL (ref 8.8–10.4)
CALCIUM SERPL-MCNC: 8.5 MG/DL (ref 8.8–10.4)
CALCIUM SERPL-MCNC: 8.6 MG/DL (ref 8.8–10.4)
CALCIUM SERPL-MCNC: 8.8 MG/DL (ref 8.2–9.6)
CALCIUM SERPL-MCNC: 8.9 MG/DL (ref 8.8–10.2)
CAMPYLOBACTER DNA SPEC NAA+PROBE: NEGATIVE
CHLORIDE SERPL-SCNC: 102 MMOL/L (ref 98–107)
CHLORIDE SERPL-SCNC: 102 MMOL/L (ref 98–107)
CHLORIDE SERPL-SCNC: 88 MMOL/L (ref 98–107)
CHLORIDE SERPL-SCNC: 90 MMOL/L (ref 98–107)
CHLORIDE SERPL-SCNC: 93 MMOL/L (ref 98–107)
CHLORIDE SERPL-SCNC: 94 MMOL/L (ref 98–107)
CHLORIDE SERPL-SCNC: 94 MMOL/L (ref 98–107)
CHOLEST SERPL-MCNC: 157 MG/DL
CHOLEST SERPL-MCNC: 161 MG/DL
COHGB MFR BLD: 88.9 % (ref 95–96)
COLOR UR AUTO: ABNORMAL
COLOR UR AUTO: YELLOW
CREAT SERPL-MCNC: 11.84 MG/DL (ref 0.67–1.17)
CREAT SERPL-MCNC: 12.1 MG/DL (ref 0.67–1.17)
CREAT SERPL-MCNC: 12.17 MG/DL (ref 0.67–1.17)
CREAT SERPL-MCNC: 12.2 MG/DL (ref 0.67–1.17)
CREAT SERPL-MCNC: 12.22 MG/DL (ref 0.67–1.17)
CREAT SERPL-MCNC: 12.89 MG/DL (ref 0.67–1.17)
CREAT SERPL-MCNC: 12.92 MG/DL (ref 0.67–1.17)
CREAT SERPL-MCNC: 8.99 MG/DL (ref 0.67–1.17)
CREAT SERPL-MCNC: 9.39 MG/DL (ref 0.67–1.17)
CREAT UR-MCNC: 30.9 MG/DL
CREAT UR-MCNC: 32.8 MG/DL
CRYPTOSP DNA STL QL NAA+NON-PROBE: NEGATIVE
DEPRECATED HCO3 PLAS-SCNC: 20 MMOL/L (ref 22–29)
DEPRECATED HCO3 PLAS-SCNC: 20 MMOL/L (ref 22–29)
DIASTOLIC BLOOD PRESSURE - MUSE: NORMAL MMHG
E COLI O157 DNA STL QL NAA+NON-PROBE: NORMAL
E HISTOLYT DNA STL QL NAA+NON-PROBE: NEGATIVE
EAEC ASTA GENE ISLT QL NAA+PROBE: NEGATIVE
EC STX1+STX2 GENES STL QL NAA+NON-PROBE: NEGATIVE
EGFRCR SERPLBLD CKD-EPI 2021: 3 ML/MIN/1.73M2
EGFRCR SERPLBLD CKD-EPI 2021: 3 ML/MIN/1.73M2
EGFRCR SERPLBLD CKD-EPI 2021: 4 ML/MIN/1.73M2
EGFRCR SERPLBLD CKD-EPI 2021: 5 ML/MIN/1.73M2
EGFRCR SERPLBLD CKD-EPI 2021: 5 ML/MIN/1.73M2
EOSINOPHIL # BLD AUTO: 0 10E3/UL (ref 0–0.7)
EOSINOPHIL # BLD AUTO: 0 10E3/UL (ref 0–0.7)
EOSINOPHIL # BLD AUTO: 0.1 10E3/UL (ref 0–0.7)
EOSINOPHIL NFR BLD AUTO: 0 %
EOSINOPHIL NFR BLD AUTO: 0 %
EOSINOPHIL NFR BLD AUTO: 1 %
EPEC EAE GENE STL QL NAA+NON-PROBE: NEGATIVE
ERYTHROCYTE [DISTWIDTH] IN BLOOD BY AUTOMATED COUNT: 13.5 % (ref 10–15)
ERYTHROCYTE [DISTWIDTH] IN BLOOD BY AUTOMATED COUNT: 13.6 % (ref 10–15)
ERYTHROCYTE [DISTWIDTH] IN BLOOD BY AUTOMATED COUNT: 13.7 % (ref 10–15)
ERYTHROCYTE [DISTWIDTH] IN BLOOD BY AUTOMATED COUNT: 13.8 % (ref 10–15)
ERYTHROCYTE [DISTWIDTH] IN BLOOD BY AUTOMATED COUNT: 14.2 % (ref 10–15)
ERYTHROCYTE [DISTWIDTH] IN BLOOD BY AUTOMATED COUNT: 14.5 % (ref 10–15)
ERYTHROCYTE [DISTWIDTH] IN BLOOD BY AUTOMATED COUNT: 15.9 % (ref 10–15)
ETEC LTA+ST1A+ST1B TOX ST NAA+NON-PROBE: NEGATIVE
ETHANOL SERPL-MCNC: <0.01 G/DL
FASTING STATUS PATIENT QL REPORTED: ABNORMAL
FLUAV RNA SPEC QL NAA+PROBE: NEGATIVE
FLUBV RNA RESP QL NAA+PROBE: NEGATIVE
G LAMBLIA DNA STL QL NAA+NON-PROBE: NEGATIVE
GLUCOSE SERPL-MCNC: 106 MG/DL (ref 70–99)
GLUCOSE SERPL-MCNC: 108 MG/DL (ref 70–99)
GLUCOSE SERPL-MCNC: 110 MG/DL (ref 70–99)
GLUCOSE SERPL-MCNC: 118 MG/DL (ref 70–99)
GLUCOSE SERPL-MCNC: 122 MG/DL (ref 70–99)
GLUCOSE SERPL-MCNC: 148 MG/DL (ref 70–99)
GLUCOSE SERPL-MCNC: 155 MG/DL (ref 70–99)
GLUCOSE SERPL-MCNC: 156 MG/DL (ref 70–99)
GLUCOSE SERPL-MCNC: 91 MG/DL (ref 70–99)
GLUCOSE UR STRIP-MCNC: 100 MG/DL
GLUCOSE UR STRIP-MCNC: 100 MG/DL
GLUCOSE UR STRIP-MCNC: 70 MG/DL
GLUCOSE UR STRIP-MCNC: NEGATIVE MG/DL
GLUCOSE UR STRIP-MCNC: NEGATIVE MG/DL
HBA1C MFR BLD: 5.2 % (ref 4–6.2)
HBA1C MFR BLD: 5.6 % (ref 4–6.2)
HCO3 BLD-SCNC: 29 MMOL/L (ref 21–28)
HCO3 SERPL-SCNC: 23 MMOL/L (ref 22–29)
HCO3 SERPL-SCNC: 26 MMOL/L (ref 22–29)
HCO3 SERPL-SCNC: 26 MMOL/L (ref 22–29)
HCO3 SERPL-SCNC: 27 MMOL/L (ref 22–29)
HCO3 SERPL-SCNC: 28 MMOL/L (ref 22–29)
HCT VFR BLD AUTO: 23.8 % (ref 40–53)
HCT VFR BLD AUTO: 24.6 % (ref 40–53)
HCT VFR BLD AUTO: 25.2 % (ref 40–53)
HCT VFR BLD AUTO: 25.2 % (ref 40–53)
HCT VFR BLD AUTO: 31.3 % (ref 40–53)
HCT VFR BLD AUTO: 32.2 % (ref 40–53)
HCT VFR BLD AUTO: 36.7 % (ref 40–53)
HDLC SERPL-MCNC: 42 MG/DL
HDLC SERPL-MCNC: 47 MG/DL
HGB BLD-MCNC: 10 G/DL (ref 13.3–17.7)
HGB BLD-MCNC: 10.5 G/DL (ref 13.3–17.7)
HGB BLD-MCNC: 11.8 G/DL (ref 13.3–17.7)
HGB BLD-MCNC: 7.6 G/DL (ref 13.3–17.7)
HGB BLD-MCNC: 8 G/DL (ref 13.3–17.7)
HGB BLD-MCNC: 8.1 G/DL (ref 13.3–17.7)
HGB BLD-MCNC: 8.1 G/DL (ref 13.3–17.7)
HGB UR QL STRIP: ABNORMAL
HOLD SPECIMEN: NORMAL
IMM GRANULOCYTES # BLD: 0 10E3/UL
IMM GRANULOCYTES # BLD: 0 10E3/UL
IMM GRANULOCYTES # BLD: 0.1 10E3/UL
IMM GRANULOCYTES NFR BLD: 0 %
IMM GRANULOCYTES NFR BLD: 0 %
IMM GRANULOCYTES NFR BLD: 1 %
INR PPP: 1.05 (ref 0.85–1.15)
INTERPRETATION ECG - MUSE: NORMAL
KETONES UR STRIP-MCNC: ABNORMAL MG/DL
KETONES UR STRIP-MCNC: NEGATIVE MG/DL
LACTATE SERPL-SCNC: 1.3 MMOL/L (ref 0.7–2)
LACTATE SERPL-SCNC: 1.7 MMOL/L (ref 0.7–2)
LACTATE SERPL-SCNC: 1.8 MMOL/L (ref 0.7–2)
LACTATE SERPL-SCNC: 2 MMOL/L (ref 0.7–2)
LDLC SERPL CALC-MCNC: 101 MG/DL
LDLC SERPL CALC-MCNC: 101 MG/DL
LEUKOCYTE ESTERASE UR QL STRIP: ABNORMAL
LYMPHOCYTES # BLD AUTO: 1.2 10E3/UL (ref 0.8–5.3)
LYMPHOCYTES # BLD AUTO: 1.3 10E3/UL (ref 0.8–5.3)
LYMPHOCYTES # BLD AUTO: 3.5 10E3/UL (ref 0.8–5.3)
LYMPHOCYTES NFR BLD AUTO: 11 %
LYMPHOCYTES NFR BLD AUTO: 12 %
LYMPHOCYTES NFR BLD AUTO: 43 %
MCH RBC QN AUTO: 28.5 PG (ref 26.5–33)
MCH RBC QN AUTO: 28.9 PG (ref 26.5–33)
MCH RBC QN AUTO: 28.9 PG (ref 26.5–33)
MCH RBC QN AUTO: 29 PG (ref 26.5–33)
MCH RBC QN AUTO: 29.5 PG (ref 26.5–33)
MCH RBC QN AUTO: 30.6 PG (ref 26.5–33)
MCH RBC QN AUTO: 31 PG (ref 26.5–33)
MCHC RBC AUTO-ENTMCNC: 31.9 G/DL (ref 31.5–36.5)
MCHC RBC AUTO-ENTMCNC: 31.9 G/DL (ref 31.5–36.5)
MCHC RBC AUTO-ENTMCNC: 32.1 G/DL (ref 31.5–36.5)
MCHC RBC AUTO-ENTMCNC: 32.1 G/DL (ref 31.5–36.5)
MCHC RBC AUTO-ENTMCNC: 32.2 G/DL (ref 31.5–36.5)
MCHC RBC AUTO-ENTMCNC: 32.5 G/DL (ref 31.5–36.5)
MCHC RBC AUTO-ENTMCNC: 32.6 G/DL (ref 31.5–36.5)
MCV RBC AUTO: 89 FL (ref 78–100)
MCV RBC AUTO: 89 FL (ref 78–100)
MCV RBC AUTO: 90 FL (ref 78–100)
MCV RBC AUTO: 90 FL (ref 78–100)
MCV RBC AUTO: 92 FL (ref 78–100)
MCV RBC AUTO: 95 FL (ref 78–100)
MCV RBC AUTO: 95 FL (ref 78–100)
MICROALBUMIN UR-MCNC: 252.3 MG/L
MICROALBUMIN UR-MCNC: 359 MG/L
MICROALBUMIN/CREAT UR: 1161.81 MG/G CR (ref 0–17)
MICROALBUMIN/CREAT UR: 769.21 MG/G CR (ref 0–17)
MONOCYTES # BLD AUTO: 0.6 10E3/UL (ref 0–1.3)
MONOCYTES # BLD AUTO: 0.8 10E3/UL (ref 0–1.3)
MONOCYTES # BLD AUTO: 1.1 10E3/UL (ref 0–1.3)
MONOCYTES NFR BLD AUTO: 13 %
MONOCYTES NFR BLD AUTO: 6 %
MONOCYTES NFR BLD AUTO: 6 %
MUCOUS THREADS #/AREA URNS LPF: PRESENT /LPF
MUCOUS THREADS #/AREA URNS LPF: PRESENT /LPF
NEUTROPHILS # BLD AUTO: 3.4 10E3/UL (ref 1.6–8.3)
NEUTROPHILS # BLD AUTO: 8 10E3/UL (ref 1.6–8.3)
NEUTROPHILS # BLD AUTO: 9.7 10E3/UL (ref 1.6–8.3)
NEUTROPHILS NFR BLD AUTO: 42 %
NEUTROPHILS NFR BLD AUTO: 81 %
NEUTROPHILS NFR BLD AUTO: 82 %
NITRATE UR QL: NEGATIVE
NITRATE UR QL: NEGATIVE
NITRATE UR QL: POSITIVE
NONHDLC SERPL-MCNC: 114 MG/DL
NONHDLC SERPL-MCNC: 115 MG/DL
NOROVIRUS GI+II RNA STL QL NAA+NON-PROBE: NEGATIVE
NRBC # BLD AUTO: 0 10E3/UL
NRBC BLD AUTO-RTO: 0 /100
O2/TOTAL GAS SETTING VFR VENT: 32 %
P AXIS - MUSE: NORMAL DEGREES
P SHIGELLOIDES DNA STL QL NAA+NON-PROBE: NEGATIVE
PCO2 BLD: 35 MM HG (ref 35–45)
PH BLD: 7.52 [PH] (ref 7.35–7.45)
PH UR STRIP: 7.5 [PH] (ref 5–9)
PH UR STRIP: 7.5 [PH] (ref 5–9)
PH UR STRIP: 8 [PH] (ref 5–9)
PLATELET # BLD AUTO: 270 10E3/UL (ref 150–450)
PLATELET # BLD AUTO: 276 10E3/UL (ref 150–450)
PLATELET # BLD AUTO: 276 10E3/UL (ref 150–450)
PLATELET # BLD AUTO: 285 10E3/UL (ref 150–450)
PLATELET # BLD AUTO: 296 10E3/UL (ref 150–450)
PLATELET # BLD AUTO: 303 10E3/UL (ref 150–450)
PLATELET # BLD AUTO: 307 10E3/UL (ref 150–450)
PLATELET # BLD AUTO: 318 10E3/UL (ref 150–450)
PLATELET # BLD AUTO: 338 10E3/UL (ref 150–450)
PO2 BLD: 62 MM HG (ref 80–105)
POTASSIUM SERPL-SCNC: 4 MMOL/L (ref 3.4–5.3)
POTASSIUM SERPL-SCNC: 4.1 MMOL/L (ref 3.4–5.3)
POTASSIUM SERPL-SCNC: 4.5 MMOL/L (ref 3.4–5.3)
POTASSIUM SERPL-SCNC: 4.6 MMOL/L (ref 3.4–5.3)
POTASSIUM SERPL-SCNC: 4.9 MMOL/L (ref 3.4–5.3)
POTASSIUM SERPL-SCNC: 5.1 MMOL/L (ref 3.4–5.3)
POTASSIUM SERPL-SCNC: 5.1 MMOL/L (ref 3.4–5.3)
POTASSIUM SERPL-SCNC: 5.3 MMOL/L (ref 3.4–5.3)
POTASSIUM SERPL-SCNC: 6 MMOL/L (ref 3.4–5.3)
PR INTERVAL - MUSE: 204 MS
PROCALCITONIN SERPL IA-MCNC: 0.28 NG/ML
PROT SERPL-MCNC: 5.6 G/DL (ref 6.4–8.3)
PROT SERPL-MCNC: 6 G/DL (ref 6.4–8.3)
PROT SERPL-MCNC: 6.5 G/DL (ref 6.4–8.3)
PROT SERPL-MCNC: 6.5 G/DL (ref 6.4–8.3)
PROT SERPL-MCNC: 6.7 G/DL (ref 6.4–8.3)
PROT SERPL-MCNC: 7.1 G/DL (ref 6.4–8.3)
QRS DURATION - MUSE: 116 MS
QT - MUSE: 378 MS
QTC - MUSE: 475 MS
R AXIS - MUSE: 188 DEGREES
RBC # BLD AUTO: 2.67 10E6/UL (ref 4.4–5.9)
RBC # BLD AUTO: 2.77 10E6/UL (ref 4.4–5.9)
RBC # BLD AUTO: 2.79 10E6/UL (ref 4.4–5.9)
RBC # BLD AUTO: 2.8 10E6/UL (ref 4.4–5.9)
RBC # BLD AUTO: 3.39 10E6/UL (ref 4.4–5.9)
RBC # BLD AUTO: 3.39 10E6/UL (ref 4.4–5.9)
RBC # BLD AUTO: 3.86 10E6/UL (ref 4.4–5.9)
RBC URINE: 17 /HPF
RBC URINE: 20 /HPF
RBC URINE: 28 /HPF
RBC URINE: 42 /HPF
RBC URINE: 7 /HPF
RSV RNA SPEC NAA+PROBE: NEGATIVE
RVA RNA STL QL NAA+NON-PROBE: NEGATIVE
SALMONELLA SP RPOD STL QL NAA+PROBE: NEGATIVE
SAO2 % BLDA: 89 % (ref 92–100)
SAPO I+II+IV+V RNA STL QL NAA+NON-PROBE: NEGATIVE
SARS-COV-2 RNA RESP QL NAA+PROBE: NEGATIVE
SHIGELLA SP+EIEC IPAH ST NAA+NON-PROBE: NEGATIVE
SODIUM SERPL-SCNC: 135 MMOL/L (ref 135–145)
SODIUM SERPL-SCNC: 136 MMOL/L (ref 135–145)
SODIUM SERPL-SCNC: 136 MMOL/L (ref 135–145)
SODIUM SERPL-SCNC: 137 MMOL/L (ref 135–145)
SODIUM SERPL-SCNC: 139 MMOL/L (ref 135–145)
SODIUM SERPL-SCNC: 141 MMOL/L (ref 135–145)
SODIUM SERPL-SCNC: 141 MMOL/L (ref 135–145)
SP GR UR STRIP: 1 (ref 1–1.03)
SP GR UR STRIP: 1.01 (ref 1–1.03)
SYSTOLIC BLOOD PRESSURE - MUSE: NORMAL MMHG
T AXIS - MUSE: 172 DEGREES
T4 FREE SERPL-MCNC: 0.72 NG/DL (ref 0.9–1.7)
T4 FREE SERPL-MCNC: 0.89 NG/DL (ref 0.9–1.7)
TRIGL SERPL-MCNC: 63 MG/DL
TRIGL SERPL-MCNC: 70 MG/DL
TSH SERPL DL<=0.005 MIU/L-ACNC: 10.24 UIU/ML (ref 0.3–4.2)
TSH SERPL DL<=0.005 MIU/L-ACNC: 23.22 UIU/ML (ref 0.3–4.2)
TSH SERPL DL<=0.005 MIU/L-ACNC: 9.53 UIU/ML (ref 0.3–4.2)
UROBILINOGEN UR STRIP-MCNC: NORMAL MG/DL
V CHOLERAE DNA SPEC QL NAA+PROBE: NEGATIVE
VENTRICULAR RATE- MUSE: 95 BPM
VIBRIO DNA SPEC NAA+PROBE: NEGATIVE
WBC # BLD AUTO: 10 10E3/UL (ref 4–11)
WBC # BLD AUTO: 10.2 10E3/UL (ref 4–11)
WBC # BLD AUTO: 10.6 10E3/UL (ref 4–11)
WBC # BLD AUTO: 11.9 10E3/UL (ref 4–11)
WBC # BLD AUTO: 7.5 10E3/UL (ref 4–11)
WBC # BLD AUTO: 8.2 10E3/UL (ref 4–11)
WBC # BLD AUTO: 9.6 10E3/UL (ref 4–11)
WBC CLUMPS #/AREA URNS HPF: PRESENT /HPF
WBC URINE: >182 /HPF
Y ENTEROCOL DNA STL QL NAA+PROBE: NEGATIVE

## 2024-01-01 PROCEDURE — 99232 SBSQ HOSP IP/OBS MODERATE 35: CPT | Performed by: FAMILY MEDICINE

## 2024-01-01 PROCEDURE — 97110 THERAPEUTIC EXERCISES: CPT | Mod: GP,CQ

## 2024-01-01 PROCEDURE — 84443 ASSAY THYROID STIM HORMONE: CPT | Mod: ZL

## 2024-01-01 PROCEDURE — G0463 HOSPITAL OUTPT CLINIC VISIT: HCPCS

## 2024-01-01 PROCEDURE — 84439 ASSAY OF FREE THYROXINE: CPT | Mod: ZL

## 2024-01-01 PROCEDURE — 99282 EMERGENCY DEPT VISIT SF MDM: CPT | Performed by: FAMILY MEDICINE

## 2024-01-01 PROCEDURE — 73600 X-RAY EXAM OF ANKLE: CPT | Mod: LT

## 2024-01-01 PROCEDURE — 87507 IADNA-DNA/RNA PROBE TQ 12-25: CPT | Performed by: EMERGENCY MEDICINE

## 2024-01-01 PROCEDURE — 99207 PR FOOT EXAM NO CHARGE: CPT | Performed by: INTERNAL MEDICINE

## 2024-01-01 PROCEDURE — 250N000012 HC RX MED GY IP 250 OP 636 PS 637: Performed by: FAMILY MEDICINE

## 2024-01-01 PROCEDURE — 82247 BILIRUBIN TOTAL: CPT | Performed by: FAMILY MEDICINE

## 2024-01-01 PROCEDURE — G0463 HOSPITAL OUTPT CLINIC VISIT: HCPCS | Mod: 25

## 2024-01-01 PROCEDURE — G0378 HOSPITAL OBSERVATION PER HR: HCPCS

## 2024-01-01 PROCEDURE — 85025 COMPLETE CBC W/AUTO DIFF WBC: CPT

## 2024-01-01 PROCEDURE — 97530 THERAPEUTIC ACTIVITIES: CPT | Mod: GP

## 2024-01-01 PROCEDURE — 84155 ASSAY OF PROTEIN SERUM: CPT

## 2024-01-01 PROCEDURE — 250N000012 HC RX MED GY IP 250 OP 636 PS 637: Performed by: INTERNAL MEDICINE

## 2024-01-01 PROCEDURE — 96372 THER/PROPH/DIAG INJ SC/IM: CPT | Performed by: PHYSICIAN ASSISTANT

## 2024-01-01 PROCEDURE — 250N000013 HC RX MED GY IP 250 OP 250 PS 637: Performed by: FAMILY MEDICINE

## 2024-01-01 PROCEDURE — 97112 NEUROMUSCULAR REEDUCATION: CPT | Mod: GP,CQ

## 2024-01-01 PROCEDURE — 250N000013 HC RX MED GY IP 250 OP 250 PS 637: Performed by: HOSPITALIST

## 2024-01-01 PROCEDURE — 87493 C DIFF AMPLIFIED PROBE: CPT | Performed by: INTERNAL MEDICINE

## 2024-01-01 PROCEDURE — 97530 THERAPEUTIC ACTIVITIES: CPT | Mod: GP,CQ,KX

## 2024-01-01 PROCEDURE — 250N000011 HC RX IP 250 OP 636: Performed by: FAMILY MEDICINE

## 2024-01-01 PROCEDURE — 97530 THERAPEUTIC ACTIVITIES: CPT | Mod: GP,CQ

## 2024-01-01 PROCEDURE — 82040 ASSAY OF SERUM ALBUMIN: CPT

## 2024-01-01 PROCEDURE — 250N000013 HC RX MED GY IP 250 OP 250 PS 637: Performed by: INTERNAL MEDICINE

## 2024-01-01 PROCEDURE — 83605 ASSAY OF LACTIC ACID: CPT | Performed by: EMERGENCY MEDICINE

## 2024-01-01 PROCEDURE — 81001 URINALYSIS AUTO W/SCOPE: CPT | Performed by: FAMILY MEDICINE

## 2024-01-01 PROCEDURE — 99239 HOSP IP/OBS DSCHRG MGMT >30: CPT | Performed by: INTERNAL MEDICINE

## 2024-01-01 PROCEDURE — 258N000003 HC RX IP 258 OP 636: Performed by: INTERNAL MEDICINE

## 2024-01-01 PROCEDURE — 120N000001 HC R&B MED SURG/OB

## 2024-01-01 PROCEDURE — 82374 ASSAY BLOOD CARBON DIOXIDE: CPT | Performed by: INTERNAL MEDICINE

## 2024-01-01 PROCEDURE — 36416 COLLJ CAPILLARY BLOOD SPEC: CPT | Performed by: INTERNAL MEDICINE

## 2024-01-01 PROCEDURE — 97530 THERAPEUTIC ACTIVITIES: CPT | Mod: GP,KX

## 2024-01-01 PROCEDURE — 97110 THERAPEUTIC EXERCISES: CPT | Mod: GP

## 2024-01-01 PROCEDURE — 84460 ALANINE AMINO (ALT) (SGPT): CPT | Mod: ZL

## 2024-01-01 PROCEDURE — G0463 HOSPITAL OUTPT CLINIC VISIT: HCPCS | Performed by: INTERNAL MEDICINE

## 2024-01-01 PROCEDURE — 250N000011 HC RX IP 250 OP 636

## 2024-01-01 PROCEDURE — 250N000011 HC RX IP 250 OP 636: Performed by: INTERNAL MEDICINE

## 2024-01-01 PROCEDURE — 36415 COLL VENOUS BLD VENIPUNCTURE: CPT | Performed by: INTERNAL MEDICINE

## 2024-01-01 PROCEDURE — 85049 AUTOMATED PLATELET COUNT: CPT | Performed by: FAMILY MEDICINE

## 2024-01-01 PROCEDURE — 99285 EMERGENCY DEPT VISIT HI MDM: CPT

## 2024-01-01 PROCEDURE — 99283 EMERGENCY DEPT VISIT LOW MDM: CPT | Performed by: FAMILY MEDICINE

## 2024-01-01 PROCEDURE — 87186 SC STD MICRODIL/AGAR DIL: CPT | Performed by: EMERGENCY MEDICINE

## 2024-01-01 PROCEDURE — 80061 LIPID PANEL: CPT | Mod: ZL

## 2024-01-01 PROCEDURE — 80053 COMPREHEN METABOLIC PANEL: CPT | Mod: ZL

## 2024-01-01 PROCEDURE — 250N000013 HC RX MED GY IP 250 OP 250 PS 637

## 2024-01-01 PROCEDURE — 93005 ELECTROCARDIOGRAM TRACING: CPT

## 2024-01-01 PROCEDURE — 85027 COMPLETE CBC AUTOMATED: CPT | Mod: ZL

## 2024-01-01 PROCEDURE — 97112 NEUROMUSCULAR REEDUCATION: CPT | Mod: GP

## 2024-01-01 PROCEDURE — 96372 THER/PROPH/DIAG INJ SC/IM: CPT

## 2024-01-01 PROCEDURE — 71045 X-RAY EXAM CHEST 1 VIEW: CPT

## 2024-01-01 PROCEDURE — 250N000011 HC RX IP 250 OP 636: Performed by: PHYSICIAN ASSISTANT

## 2024-01-01 PROCEDURE — 85610 PROTHROMBIN TIME: CPT

## 2024-01-01 PROCEDURE — 85027 COMPLETE CBC AUTOMATED: CPT | Performed by: INTERNAL MEDICINE

## 2024-01-01 PROCEDURE — 99285 EMERGENCY DEPT VISIT HI MDM: CPT | Mod: 25 | Performed by: EMERGENCY MEDICINE

## 2024-01-01 PROCEDURE — 87637 SARSCOV2&INF A&B&RSV AMP PRB: CPT | Performed by: PHYSICIAN ASSISTANT

## 2024-01-01 PROCEDURE — 250N000009 HC RX 250: Performed by: FAMILY MEDICINE

## 2024-01-01 PROCEDURE — 99212 OFFICE O/P EST SF 10 MIN: CPT | Performed by: NURSE PRACTITIONER

## 2024-01-01 PROCEDURE — 97112 NEUROMUSCULAR REEDUCATION: CPT | Mod: GP,KX

## 2024-01-01 PROCEDURE — 72170 X-RAY EXAM OF PELVIS: CPT

## 2024-01-01 PROCEDURE — 87040 BLOOD CULTURE FOR BACTERIA: CPT | Performed by: EMERGENCY MEDICINE

## 2024-01-01 PROCEDURE — 36415 COLL VENOUS BLD VENIPUNCTURE: CPT | Performed by: EMERGENCY MEDICINE

## 2024-01-01 PROCEDURE — 36415 COLL VENOUS BLD VENIPUNCTURE: CPT | Mod: ZL

## 2024-01-01 PROCEDURE — 82043 UR ALBUMIN QUANTITATIVE: CPT | Mod: ZL

## 2024-01-01 PROCEDURE — 87086 URINE CULTURE/COLONY COUNT: CPT | Performed by: FAMILY MEDICINE

## 2024-01-01 PROCEDURE — 99223 1ST HOSP IP/OBS HIGH 75: CPT | Mod: AI | Performed by: INTERNAL MEDICINE

## 2024-01-01 PROCEDURE — G2211 COMPLEX E/M VISIT ADD ON: HCPCS | Performed by: INTERNAL MEDICINE

## 2024-01-01 PROCEDURE — 85049 AUTOMATED PLATELET COUNT: CPT | Performed by: EMERGENCY MEDICINE

## 2024-01-01 PROCEDURE — 87086 URINE CULTURE/COLONY COUNT: CPT | Performed by: EMERGENCY MEDICINE

## 2024-01-01 PROCEDURE — 85027 COMPLETE CBC AUTOMATED: CPT | Performed by: FAMILY MEDICINE

## 2024-01-01 PROCEDURE — 99223 1ST HOSP IP/OBS HIGH 75: CPT | Mod: AI | Performed by: FAMILY MEDICINE

## 2024-01-01 PROCEDURE — 82140 ASSAY OF AMMONIA: CPT | Performed by: FAMILY MEDICINE

## 2024-01-01 PROCEDURE — 81001 URINALYSIS AUTO W/SCOPE: CPT | Mod: ZL

## 2024-01-01 PROCEDURE — 97165 OT EVAL LOW COMPLEX 30 MIN: CPT | Mod: GO | Performed by: OCCUPATIONAL THERAPIST

## 2024-01-01 PROCEDURE — 99285 EMERGENCY DEPT VISIT HI MDM: CPT | Mod: 25

## 2024-01-01 PROCEDURE — 99232 SBSQ HOSP IP/OBS MODERATE 35: CPT | Performed by: INTERNAL MEDICINE

## 2024-01-01 PROCEDURE — 83036 HEMOGLOBIN GLYCOSYLATED A1C: CPT | Mod: ZL

## 2024-01-01 PROCEDURE — 87040 BLOOD CULTURE FOR BACTERIA: CPT | Performed by: FAMILY MEDICINE

## 2024-01-01 PROCEDURE — 99283 EMERGENCY DEPT VISIT LOW MDM: CPT | Performed by: PHYSICIAN ASSISTANT

## 2024-01-01 PROCEDURE — 99214 OFFICE O/P EST MOD 30 MIN: CPT | Performed by: NURSE PRACTITIONER

## 2024-01-01 PROCEDURE — 83605 ASSAY OF LACTIC ACID: CPT | Performed by: INTERNAL MEDICINE

## 2024-01-01 PROCEDURE — 36600 WITHDRAWAL OF ARTERIAL BLOOD: CPT | Performed by: FAMILY MEDICINE

## 2024-01-01 PROCEDURE — 999N000157 HC STATISTIC RCP TIME EA 10 MIN

## 2024-01-01 PROCEDURE — 99214 OFFICE O/P EST MOD 30 MIN: CPT | Performed by: INTERNAL MEDICINE

## 2024-01-01 PROCEDURE — G0439 PPPS, SUBSEQ VISIT: HCPCS | Performed by: INTERNAL MEDICINE

## 2024-01-01 PROCEDURE — 84145 PROCALCITONIN (PCT): CPT | Performed by: FAMILY MEDICINE

## 2024-01-01 PROCEDURE — 87086 URINE CULTURE/COLONY COUNT: CPT

## 2024-01-01 PROCEDURE — 96372 THER/PROPH/DIAG INJ SC/IM: CPT | Performed by: FAMILY MEDICINE

## 2024-01-01 PROCEDURE — 99284 EMERGENCY DEPT VISIT MOD MDM: CPT | Performed by: EMERGENCY MEDICINE

## 2024-01-01 PROCEDURE — 97597 DBRDMT OPN WND 1ST 20 CM/<: CPT | Performed by: NURSE PRACTITIONER

## 2024-01-01 PROCEDURE — 258N000003 HC RX IP 258 OP 636

## 2024-01-01 PROCEDURE — 80048 BASIC METABOLIC PNL TOTAL CA: CPT | Performed by: FAMILY MEDICINE

## 2024-01-01 PROCEDURE — 80048 BASIC METABOLIC PNL TOTAL CA: CPT | Performed by: INTERNAL MEDICINE

## 2024-01-01 PROCEDURE — 96360 HYDRATION IV INFUSION INIT: CPT | Performed by: EMERGENCY MEDICINE

## 2024-01-01 PROCEDURE — 36415 COLL VENOUS BLD VENIPUNCTURE: CPT | Performed by: FAMILY MEDICINE

## 2024-01-01 PROCEDURE — 97535 SELF CARE MNGMENT TRAINING: CPT | Mod: GO | Performed by: OCCUPATIONAL THERAPIST

## 2024-01-01 PROCEDURE — 70450 CT HEAD/BRAIN W/O DYE: CPT | Mod: MA

## 2024-01-01 PROCEDURE — 84450 TRANSFERASE (AST) (SGOT): CPT | Performed by: FAMILY MEDICINE

## 2024-01-01 PROCEDURE — 96372 THER/PROPH/DIAG INJ SC/IM: CPT | Mod: XU | Performed by: FAMILY MEDICINE

## 2024-01-01 PROCEDURE — 87186 SC STD MICRODIL/AGAR DIL: CPT

## 2024-01-01 PROCEDURE — 84443 ASSAY THYROID STIM HORMONE: CPT | Performed by: INTERNAL MEDICINE

## 2024-01-01 PROCEDURE — 99214 OFFICE O/P EST MOD 30 MIN: CPT | Mod: 25 | Performed by: INTERNAL MEDICINE

## 2024-01-01 PROCEDURE — 82077 ASSAY SPEC XCP UR&BREATH IA: CPT

## 2024-01-01 PROCEDURE — 72125 CT NECK SPINE W/O DYE: CPT | Mod: MA

## 2024-01-01 PROCEDURE — 97530 THERAPEUTIC ACTIVITIES: CPT | Mod: GO | Performed by: OCCUPATIONAL THERAPIST

## 2024-01-01 PROCEDURE — 36415 COLL VENOUS BLD VENIPUNCTURE: CPT

## 2024-01-01 PROCEDURE — 87086 URINE CULTURE/COLONY COUNT: CPT | Mod: ZL | Performed by: INTERNAL MEDICINE

## 2024-01-01 PROCEDURE — 85025 COMPLETE CBC W/AUTO DIFF WBC: CPT | Performed by: FAMILY MEDICINE

## 2024-01-01 PROCEDURE — 96374 THER/PROPH/DIAG INJ IV PUSH: CPT

## 2024-01-01 PROCEDURE — 83605 ASSAY OF LACTIC ACID: CPT

## 2024-01-01 PROCEDURE — 83605 ASSAY OF LACTIC ACID: CPT | Performed by: FAMILY MEDICINE

## 2024-01-01 PROCEDURE — 82040 ASSAY OF SERUM ALBUMIN: CPT | Performed by: EMERGENCY MEDICINE

## 2024-01-01 PROCEDURE — 81001 URINALYSIS AUTO W/SCOPE: CPT | Performed by: EMERGENCY MEDICINE

## 2024-01-01 PROCEDURE — 81001 URINALYSIS AUTO W/SCOPE: CPT

## 2024-01-01 PROCEDURE — 258N000003 HC RX IP 258 OP 636: Performed by: EMERGENCY MEDICINE

## 2024-01-01 PROCEDURE — 82805 BLOOD GASES W/O2 SATURATION: CPT | Performed by: FAMILY MEDICINE

## 2024-01-01 PROCEDURE — 87186 SC STD MICRODIL/AGAR DIL: CPT | Performed by: FAMILY MEDICINE

## 2024-01-01 PROCEDURE — 93010 ELECTROCARDIOGRAM REPORT: CPT | Performed by: INTERNAL MEDICINE

## 2024-01-01 PROCEDURE — 85730 THROMBOPLASTIN TIME PARTIAL: CPT

## 2024-01-01 PROCEDURE — 97161 PT EVAL LOW COMPLEX 20 MIN: CPT | Mod: GP,KX

## 2024-01-01 PROCEDURE — 99213 OFFICE O/P EST LOW 20 MIN: CPT | Performed by: NURSE PRACTITIONER

## 2024-01-01 PROCEDURE — 97116 GAIT TRAINING THERAPY: CPT | Mod: GP,KX

## 2024-01-01 RX ORDER — SODIUM BICARBONATE 650 MG/1
2600 TABLET ORAL 3 TIMES DAILY
Status: DISCONTINUED | OUTPATIENT
Start: 2024-01-01 | End: 2024-01-01 | Stop reason: HOSPADM

## 2024-01-01 RX ORDER — CEFTRIAXONE 1 G/1
1 INJECTION, POWDER, FOR SOLUTION INTRAMUSCULAR; INTRAVENOUS ONCE
Status: COMPLETED | OUTPATIENT
Start: 2024-01-01 | End: 2024-01-01

## 2024-01-01 RX ORDER — AMOXICILLIN 250 MG
2 CAPSULE ORAL 2 TIMES DAILY PRN
Status: DISCONTINUED | OUTPATIENT
Start: 2024-01-01 | End: 2024-01-01 | Stop reason: HOSPADM

## 2024-01-01 RX ORDER — TAMSULOSIN HYDROCHLORIDE 0.4 MG/1
0.4 CAPSULE ORAL EVERY EVENING
Qty: 90 CAPSULE | Refills: 4 | Status: SHIPPED | OUTPATIENT
Start: 2024-01-01

## 2024-01-01 RX ORDER — FINASTERIDE 5 MG/1
5 TABLET, FILM COATED ORAL DAILY
Status: DISCONTINUED | OUTPATIENT
Start: 2024-01-01 | End: 2024-01-01 | Stop reason: HOSPADM

## 2024-01-01 RX ORDER — CEFTRIAXONE 1 G/1
1 INJECTION, POWDER, FOR SOLUTION INTRAMUSCULAR; INTRAVENOUS EVERY 24 HOURS
Status: DISCONTINUED | OUTPATIENT
Start: 2024-01-01 | End: 2024-01-01 | Stop reason: HOSPADM

## 2024-01-01 RX ORDER — DEXTROSE MONOHYDRATE 25 G/50ML
25-50 INJECTION, SOLUTION INTRAVENOUS
Status: DISCONTINUED | OUTPATIENT
Start: 2024-01-01 | End: 2024-01-01 | Stop reason: HOSPADM

## 2024-01-01 RX ORDER — FUROSEMIDE 40 MG
40 TABLET ORAL EVERY MORNING
Qty: 90 TABLET | Refills: 4 | Status: SHIPPED | OUTPATIENT
Start: 2024-01-01 | End: 2024-01-01

## 2024-01-01 RX ORDER — TAMSULOSIN HYDROCHLORIDE 0.4 MG/1
0.4 CAPSULE ORAL EVERY EVENING
Qty: 90 CAPSULE | Refills: 4 | Status: SHIPPED | OUTPATIENT
Start: 2024-01-01 | End: 2024-01-01

## 2024-01-01 RX ORDER — FUROSEMIDE 40 MG
40 TABLET ORAL EVERY MORNING
Qty: 90 TABLET | Refills: 4 | Status: ON HOLD | OUTPATIENT
Start: 2024-01-01 | End: 2024-01-01

## 2024-01-01 RX ORDER — LEVOTHYROXINE SODIUM 50 UG/1
50 TABLET ORAL DAILY
Qty: 90 TABLET | Refills: 1 | Status: SHIPPED | OUTPATIENT
Start: 2024-01-01

## 2024-01-01 RX ORDER — TAMSULOSIN HYDROCHLORIDE 0.4 MG/1
0.4 CAPSULE ORAL EVERY EVENING
Status: DISCONTINUED | OUTPATIENT
Start: 2024-01-01 | End: 2024-01-01 | Stop reason: HOSPADM

## 2024-01-01 RX ORDER — ONDANSETRON 2 MG/ML
4 INJECTION INTRAMUSCULAR; INTRAVENOUS EVERY 6 HOURS PRN
Status: DISCONTINUED | OUTPATIENT
Start: 2024-01-01 | End: 2024-01-01 | Stop reason: HOSPADM

## 2024-01-01 RX ORDER — ACETAMINOPHEN 325 MG/1
650 TABLET ORAL EVERY 4 HOURS PRN
Status: DISCONTINUED | OUTPATIENT
Start: 2024-01-01 | End: 2024-01-01 | Stop reason: HOSPADM

## 2024-01-01 RX ORDER — FUROSEMIDE 40 MG
40 TABLET ORAL DAILY
Status: DISCONTINUED | OUTPATIENT
Start: 2024-01-01 | End: 2024-01-01 | Stop reason: HOSPADM

## 2024-01-01 RX ORDER — CALCIUM CARBONATE 500 MG/1
1000 TABLET, CHEWABLE ORAL 4 TIMES DAILY PRN
Status: DISCONTINUED | OUTPATIENT
Start: 2024-01-01 | End: 2024-01-01 | Stop reason: HOSPADM

## 2024-01-01 RX ORDER — CEPHALEXIN 500 MG/1
500 CAPSULE ORAL 4 TIMES DAILY
Qty: 20 CAPSULE | Refills: 0 | Status: SHIPPED | OUTPATIENT
Start: 2024-01-01 | End: 2024-01-01

## 2024-01-01 RX ORDER — ACETAMINOPHEN 650 MG/1
650 SUPPOSITORY RECTAL EVERY 4 HOURS PRN
Status: DISCONTINUED | OUTPATIENT
Start: 2024-01-01 | End: 2024-01-01 | Stop reason: HOSPADM

## 2024-01-01 RX ORDER — LOPERAMIDE HCL 2 MG
2 CAPSULE ORAL 4 TIMES DAILY PRN
Status: DISCONTINUED | OUTPATIENT
Start: 2024-01-01 | End: 2024-01-01 | Stop reason: HOSPADM

## 2024-01-01 RX ORDER — FUROSEMIDE 40 MG
40 TABLET ORAL EVERY MORNING
Status: DISCONTINUED | OUTPATIENT
Start: 2024-01-01 | End: 2024-01-01 | Stop reason: HOSPADM

## 2024-01-01 RX ORDER — PREDNISONE 2.5 MG/1
2.5 TABLET ORAL DAILY
Qty: 90 TABLET | Refills: 4 | Status: SHIPPED | OUTPATIENT
Start: 2024-01-01 | End: 2024-01-01

## 2024-01-01 RX ORDER — LIDOCAINE 40 MG/G
CREAM TOPICAL
Status: DISCONTINUED | OUTPATIENT
Start: 2024-01-01 | End: 2024-01-01

## 2024-01-01 RX ORDER — HEPARIN SODIUM 5000 [USP'U]/.5ML
5000 INJECTION, SOLUTION INTRAVENOUS; SUBCUTANEOUS EVERY 12 HOURS
Status: DISCONTINUED | OUTPATIENT
Start: 2024-01-01 | End: 2024-01-01 | Stop reason: HOSPADM

## 2024-01-01 RX ORDER — ONDANSETRON 4 MG/1
4 TABLET, ORALLY DISINTEGRATING ORAL EVERY 6 HOURS PRN
Status: DISCONTINUED | OUTPATIENT
Start: 2024-01-01 | End: 2024-01-01 | Stop reason: HOSPADM

## 2024-01-01 RX ORDER — CALCITRIOL 0.25 UG/1
0.25 CAPSULE, LIQUID FILLED ORAL DAILY
Status: DISCONTINUED | OUTPATIENT
Start: 2024-01-01 | End: 2024-01-01 | Stop reason: HOSPADM

## 2024-01-01 RX ORDER — TAMSULOSIN HYDROCHLORIDE 0.4 MG/1
0.4 CAPSULE ORAL AT BEDTIME
Status: DISCONTINUED | OUTPATIENT
Start: 2024-01-01 | End: 2024-01-01 | Stop reason: HOSPADM

## 2024-01-01 RX ORDER — FUROSEMIDE 20 MG
20 TABLET ORAL DAILY
Status: DISCONTINUED | OUTPATIENT
Start: 2024-01-01 | End: 2024-01-01 | Stop reason: HOSPADM

## 2024-01-01 RX ORDER — SODIUM BICARBONATE 650 MG/1
650 TABLET ORAL 3 TIMES DAILY
Status: DISCONTINUED | OUTPATIENT
Start: 2024-01-01 | End: 2024-01-01

## 2024-01-01 RX ORDER — INSULIN GLARGINE 100 [IU]/ML
2 INJECTION, SOLUTION SUBCUTANEOUS AT BEDTIME
Qty: 15 ML | Refills: 1 | Status: ON HOLD | OUTPATIENT
Start: 2024-01-01 | End: 2024-01-01

## 2024-01-01 RX ORDER — CEFEPIME HYDROCHLORIDE 1 G/1
1 INJECTION, POWDER, FOR SOLUTION INTRAMUSCULAR; INTRAVENOUS EVERY 24 HOURS
Status: DISCONTINUED | OUTPATIENT
Start: 2024-01-01 | End: 2024-01-01

## 2024-01-01 RX ORDER — FUROSEMIDE 40 MG
40 TABLET ORAL EVERY MORNING
Status: DISCONTINUED | OUTPATIENT
Start: 2024-01-01 | End: 2024-01-01

## 2024-01-01 RX ORDER — FINASTERIDE 5 MG/1
1 TABLET, FILM COATED ORAL DAILY
Qty: 90 TABLET | Refills: 4 | Status: SHIPPED | OUTPATIENT
Start: 2024-01-01

## 2024-01-01 RX ORDER — CALCITRIOL 0.25 UG/1
0.25 CAPSULE, LIQUID FILLED ORAL DAILY
COMMUNITY
Start: 2024-01-01

## 2024-01-01 RX ORDER — SODIUM CHLORIDE 9 MG/ML
INJECTION, SOLUTION INTRAVENOUS CONTINUOUS
Status: DISCONTINUED | OUTPATIENT
Start: 2024-01-01 | End: 2024-01-01

## 2024-01-01 RX ORDER — CEFEPIME HYDROCHLORIDE 1 G/1
500 INJECTION, POWDER, FOR SOLUTION INTRAMUSCULAR; INTRAVENOUS EVERY 24 HOURS
COMMUNITY

## 2024-01-01 RX ORDER — LEVOTHYROXINE SODIUM 50 UG/1
50 TABLET ORAL DAILY
Status: DISCONTINUED | OUTPATIENT
Start: 2024-01-01 | End: 2024-01-01 | Stop reason: HOSPADM

## 2024-01-01 RX ORDER — AMLODIPINE BESYLATE 5 MG/1
5 TABLET ORAL 2 TIMES DAILY
Qty: 180 TABLET | Refills: 4 | Status: ON HOLD | OUTPATIENT
Start: 2024-01-01 | End: 2024-01-01

## 2024-01-01 RX ORDER — FUROSEMIDE 80 MG
80 TABLET ORAL ONCE
Status: DISCONTINUED | OUTPATIENT
Start: 2024-01-01 | End: 2024-01-01 | Stop reason: DRUGHIGH

## 2024-01-01 RX ORDER — PROCHLORPERAZINE MALEATE 5 MG
5 TABLET ORAL EVERY 6 HOURS PRN
Status: DISCONTINUED | OUTPATIENT
Start: 2024-01-01 | End: 2024-01-01 | Stop reason: HOSPADM

## 2024-01-01 RX ORDER — AMLODIPINE BESYLATE 5 MG/1
5 TABLET ORAL 2 TIMES DAILY
Status: DISCONTINUED | OUTPATIENT
Start: 2024-01-01 | End: 2024-01-01

## 2024-01-01 RX ORDER — PEN NEEDLE, DIABETIC 31 GX5/16"
NEEDLE, DISPOSABLE MISCELLANEOUS DAILY
Qty: 100 EACH | Refills: 3 | Status: SHIPPED | OUTPATIENT
Start: 2024-01-01

## 2024-01-01 RX ORDER — ACETAMINOPHEN 325 MG/1
975 TABLET ORAL ONCE
Status: COMPLETED | OUTPATIENT
Start: 2024-01-01 | End: 2024-01-01

## 2024-01-01 RX ORDER — CYANOCOBALAMIN 1000 UG/ML
1000 INJECTION, SOLUTION INTRAMUSCULAR; SUBCUTANEOUS CONTINUOUS PRN
Status: DISCONTINUED | OUTPATIENT
Start: 2024-01-01 | End: 2024-01-01

## 2024-01-01 RX ORDER — RESPIRATORY SYNCYTIAL VIRUS VACCINE 120MCG/0.5
0.5 KIT INTRAMUSCULAR ONCE
Qty: 1 EACH | Refills: 0 | Status: SHIPPED | OUTPATIENT
Start: 2024-01-01 | End: 2024-01-01

## 2024-01-01 RX ORDER — PROCHLORPERAZINE 25 MG
12.5 SUPPOSITORY, RECTAL RECTAL EVERY 12 HOURS PRN
Status: DISCONTINUED | OUTPATIENT
Start: 2024-01-01 | End: 2024-01-01 | Stop reason: HOSPADM

## 2024-01-01 RX ORDER — CYANOCOBALAMIN 1000 UG/ML
1000 INJECTION, SOLUTION INTRAMUSCULAR; SUBCUTANEOUS
Status: ACTIVE | OUTPATIENT
Start: 2024-01-01

## 2024-01-01 RX ORDER — SODIUM BICARBONATE 650 MG/1
2600 TABLET ORAL 3 TIMES DAILY
Status: DISCONTINUED | OUTPATIENT
Start: 2024-01-01 | End: 2024-01-01

## 2024-01-01 RX ORDER — CALCIUM CARB/VITAMIN D3/VIT K1 500-100-40
TABLET,CHEWABLE ORAL 3 TIMES DAILY
Qty: 130 G | Refills: 4 | Status: SHIPPED | OUTPATIENT
Start: 2024-01-01

## 2024-01-01 RX ORDER — FAMOTIDINE 20 MG/1
20 TABLET, FILM COATED ORAL DAILY
Status: DISCONTINUED | OUTPATIENT
Start: 2024-01-01 | End: 2024-01-01

## 2024-01-01 RX ORDER — PRENATAL VIT 91/IRON/FOLIC/DHA 28-975-200
COMBINATION PACKAGE (EA) ORAL 2 TIMES DAILY
COMMUNITY

## 2024-01-01 RX ORDER — CEFTRIAXONE 1 G/1
1 INJECTION, POWDER, FOR SOLUTION INTRAMUSCULAR; INTRAVENOUS EVERY 24 HOURS
Status: DISCONTINUED | OUTPATIENT
Start: 2024-01-01 | End: 2024-01-01

## 2024-01-01 RX ORDER — AMLODIPINE BESYLATE 5 MG/1
5 TABLET ORAL 2 TIMES DAILY
Status: DISCONTINUED | OUTPATIENT
Start: 2024-01-01 | End: 2024-01-01 | Stop reason: HOSPADM

## 2024-01-01 RX ORDER — FAMOTIDINE 20 MG/1
20 TABLET, FILM COATED ORAL DAILY
Qty: 90 TABLET | Refills: 4 | Status: SHIPPED | OUTPATIENT
Start: 2024-01-01 | End: 2024-01-01

## 2024-01-01 RX ORDER — LIDOCAINE 40 MG/G
CREAM TOPICAL
Status: DISCONTINUED | OUTPATIENT
Start: 2024-01-01 | End: 2024-01-01 | Stop reason: HOSPADM

## 2024-01-01 RX ORDER — CALCIUM CARB/VITAMIN D3/VIT K1 500-100-40
TABLET,CHEWABLE ORAL 3 TIMES DAILY
Status: DISCONTINUED | OUTPATIENT
Start: 2024-01-01 | End: 2024-01-01

## 2024-01-01 RX ORDER — FUROSEMIDE 10 MG/ML
80 INJECTION INTRAMUSCULAR; INTRAVENOUS ONCE
Status: COMPLETED | OUTPATIENT
Start: 2024-01-01 | End: 2024-01-01

## 2024-01-01 RX ORDER — AMLODIPINE BESYLATE 5 MG/1
5 TABLET ORAL 2 TIMES DAILY
Qty: 180 TABLET | Refills: 4 | Status: SHIPPED | OUTPATIENT
Start: 2024-01-01 | End: 2024-01-01

## 2024-01-01 RX ORDER — HEPARIN SODIUM 5000 [USP'U]/.5ML
5000 INJECTION, SOLUTION INTRAVENOUS; SUBCUTANEOUS EVERY 8 HOURS
Status: DISCONTINUED | OUTPATIENT
Start: 2024-01-01 | End: 2024-01-01 | Stop reason: HOSPADM

## 2024-01-01 RX ORDER — FUROSEMIDE 40 MG
40 TABLET ORAL EVERY MORNING
COMMUNITY

## 2024-01-01 RX ORDER — AMOXICILLIN 250 MG
1 CAPSULE ORAL 2 TIMES DAILY PRN
Status: DISCONTINUED | OUTPATIENT
Start: 2024-01-01 | End: 2024-01-01 | Stop reason: HOSPADM

## 2024-01-01 RX ORDER — NICOTINE POLACRILEX 4 MG
15-30 LOZENGE BUCCAL
Status: DISCONTINUED | OUTPATIENT
Start: 2024-01-01 | End: 2024-01-01 | Stop reason: HOSPADM

## 2024-01-01 RX ORDER — PREDNISONE 2.5 MG/1
2.5 TABLET ORAL DAILY
Qty: 90 TABLET | Refills: 4 | Status: SHIPPED | OUTPATIENT
Start: 2024-01-01

## 2024-01-01 RX ORDER — LANOLIN ALCOHOL/MO/W.PET/CERES
3 CREAM (GRAM) TOPICAL
Status: DISCONTINUED | OUTPATIENT
Start: 2024-01-01 | End: 2024-01-01 | Stop reason: HOSPADM

## 2024-01-01 RX ORDER — GUAIFENESIN/DEXTROMETHORPHAN 100-10MG/5
10 SYRUP ORAL EVERY 4 HOURS PRN
Status: DISCONTINUED | OUTPATIENT
Start: 2024-01-01 | End: 2024-01-01 | Stop reason: HOSPADM

## 2024-01-01 RX ORDER — LOPERAMIDE HYDROCHLORIDE 2 MG/1
2 TABLET ORAL 4 TIMES DAILY PRN
Status: SHIPPED
Start: 2024-01-01

## 2024-01-01 RX ORDER — SODIUM CHLORIDE 9 MG/ML
INJECTION, SOLUTION INTRAVENOUS CONTINUOUS
Status: DISCONTINUED | OUTPATIENT
Start: 2024-01-01 | End: 2024-01-01 | Stop reason: HOSPADM

## 2024-01-01 RX ADMIN — SODIUM BICARBONATE 650 MG: 650 TABLET ORAL at 06:19

## 2024-01-01 RX ADMIN — CALCITRIOL 0.25 MCG: 0.25 CAPSULE ORAL at 11:20

## 2024-01-01 RX ADMIN — HEPARIN SODIUM 5000 UNITS: 10000 INJECTION, SOLUTION INTRAVENOUS; SUBCUTANEOUS at 21:40

## 2024-01-01 RX ADMIN — SODIUM BICARBONATE 2600 MG: 650 TABLET ORAL at 14:11

## 2024-01-01 RX ADMIN — FINASTERIDE 5 MG: 5 TABLET, FILM COATED ORAL at 10:31

## 2024-01-01 RX ADMIN — SODIUM CHLORIDE 1000 ML: 9 INJECTION, SOLUTION INTRAVENOUS at 12:31

## 2024-01-01 RX ADMIN — FUROSEMIDE 20 MG: 20 TABLET ORAL at 17:36

## 2024-01-01 RX ADMIN — SODIUM CHLORIDE: 9 INJECTION, SOLUTION INTRAVENOUS at 02:36

## 2024-01-01 RX ADMIN — SODIUM BICARBONATE 650 MG: 650 TABLET ORAL at 06:13

## 2024-01-01 RX ADMIN — FUROSEMIDE 40 MG: 40 TABLET ORAL at 07:48

## 2024-01-01 RX ADMIN — GUAIFENESIN AND DEXTROMETHORPHAN HYDROBROMIDE 10 ML: 10; 100 SYRUP ORAL at 05:25

## 2024-01-01 RX ADMIN — CEFTRIAXONE SODIUM 1 G: 1 INJECTION, POWDER, FOR SOLUTION INTRAMUSCULAR; INTRAVENOUS at 15:33

## 2024-01-01 RX ADMIN — CYANOCOBALAMIN 1000 MCG: 1000 INJECTION, SOLUTION INTRAMUSCULAR; SUBCUTANEOUS at 09:20

## 2024-01-01 RX ADMIN — FUROSEMIDE 20 MG: 20 TABLET ORAL at 15:53

## 2024-01-01 RX ADMIN — ONDANSETRON 4 MG: 4 TABLET, ORALLY DISINTEGRATING ORAL at 04:46

## 2024-01-01 RX ADMIN — HEPARIN SODIUM 5000 UNITS: 10000 INJECTION, SOLUTION INTRAVENOUS; SUBCUTANEOUS at 21:02

## 2024-01-01 RX ADMIN — CYANOCOBALAMIN 1000 MCG: 1000 INJECTION, SOLUTION INTRAMUSCULAR; SUBCUTANEOUS at 10:02

## 2024-01-01 RX ADMIN — SODIUM CHLORIDE: 9 INJECTION, SOLUTION INTRAVENOUS at 16:24

## 2024-01-01 RX ADMIN — SODIUM BICARBONATE 2600 MG: 650 TABLET ORAL at 13:41

## 2024-01-01 RX ADMIN — SODIUM CHLORIDE: 9 INJECTION, SOLUTION INTRAVENOUS at 13:22

## 2024-01-01 RX ADMIN — CALCITRIOL 0.25 MCG: 0.25 CAPSULE ORAL at 10:06

## 2024-01-01 RX ADMIN — TAMSULOSIN HYDROCHLORIDE 0.4 MG: 0.4 CAPSULE ORAL at 21:18

## 2024-01-01 RX ADMIN — FUROSEMIDE 40 MG: 40 TABLET ORAL at 08:40

## 2024-01-01 RX ADMIN — SODIUM BICARBONATE 650 MG: 650 TABLET ORAL at 15:47

## 2024-01-01 RX ADMIN — FINASTERIDE 5 MG: 5 TABLET, FILM COATED ORAL at 17:08

## 2024-01-01 RX ADMIN — SODIUM BICARBONATE 2600 MG: 650 TABLET ORAL at 13:14

## 2024-01-01 RX ADMIN — ACETAMINOPHEN 650 MG: 325 TABLET ORAL at 20:25

## 2024-01-01 RX ADMIN — CEFTRIAXONE SODIUM 1 G: 1 INJECTION, POWDER, FOR SOLUTION INTRAMUSCULAR; INTRAVENOUS at 10:02

## 2024-01-01 RX ADMIN — HEPARIN SODIUM 5000 UNITS: 10000 INJECTION, SOLUTION INTRAVENOUS; SUBCUTANEOUS at 10:43

## 2024-01-01 RX ADMIN — SODIUM BICARBONATE 650 MG: 650 TABLET ORAL at 06:06

## 2024-01-01 RX ADMIN — FUROSEMIDE 40 MG: 40 TABLET ORAL at 08:42

## 2024-01-01 RX ADMIN — LEVOTHYROXINE SODIUM 50 MCG: 0.05 TABLET ORAL at 09:24

## 2024-01-01 RX ADMIN — PREDNISONE 2.5 MG: 5 TABLET ORAL at 10:43

## 2024-01-01 RX ADMIN — CALCITRIOL 0.25 MCG: 0.25 CAPSULE ORAL at 11:07

## 2024-01-01 RX ADMIN — FUROSEMIDE 40 MG: 40 TABLET ORAL at 07:34

## 2024-01-01 RX ADMIN — HEPARIN SODIUM 5000 UNITS: 10000 INJECTION, SOLUTION INTRAVENOUS; SUBCUTANEOUS at 21:52

## 2024-01-01 RX ADMIN — SODIUM BICARBONATE 2600 MG: 650 TABLET ORAL at 05:23

## 2024-01-01 RX ADMIN — SODIUM BICARBONATE 650 MG: 650 TABLET ORAL at 14:53

## 2024-01-01 RX ADMIN — SODIUM BICARBONATE 2600 MG: 650 TABLET ORAL at 05:53

## 2024-01-01 RX ADMIN — TAMSULOSIN HYDROCHLORIDE 0.4 MG: 0.4 CAPSULE ORAL at 19:53

## 2024-01-01 RX ADMIN — FINASTERIDE 5 MG: 5 TABLET, FILM COATED ORAL at 10:01

## 2024-01-01 RX ADMIN — SODIUM BICARBONATE 2600 MG: 650 TABLET ORAL at 21:59

## 2024-01-01 RX ADMIN — TAMSULOSIN HYDROCHLORIDE 0.4 MG: 0.4 CAPSULE ORAL at 21:40

## 2024-01-01 RX ADMIN — HEPARIN SODIUM 5000 UNITS: 10000 INJECTION, SOLUTION INTRAVENOUS; SUBCUTANEOUS at 21:13

## 2024-01-01 RX ADMIN — TAMSULOSIN HYDROCHLORIDE 0.4 MG: 0.4 CAPSULE ORAL at 22:04

## 2024-01-01 RX ADMIN — FINASTERIDE 5 MG: 5 TABLET, FILM COATED ORAL at 11:04

## 2024-01-01 RX ADMIN — FINASTERIDE 5 MG: 5 TABLET, FILM COATED ORAL at 09:10

## 2024-01-01 RX ADMIN — HEPARIN SODIUM 5000 UNITS: 10000 INJECTION, SOLUTION INTRAVENOUS; SUBCUTANEOUS at 14:49

## 2024-01-01 RX ADMIN — FINASTERIDE 5 MG: 5 TABLET, FILM COATED ORAL at 11:20

## 2024-01-01 RX ADMIN — HEPARIN SODIUM 5000 UNITS: 10000 INJECTION, SOLUTION INTRAVENOUS; SUBCUTANEOUS at 10:31

## 2024-01-01 RX ADMIN — FUROSEMIDE 20 MG: 20 TABLET ORAL at 15:46

## 2024-01-01 RX ADMIN — CALCITRIOL 0.25 MCG: 0.25 CAPSULE ORAL at 10:43

## 2024-01-01 RX ADMIN — PREDNISONE 2.5 MG: 5 TABLET ORAL at 09:12

## 2024-01-01 RX ADMIN — CYANOCOBALAMIN 1000 MCG: 1000 INJECTION, SOLUTION INTRAMUSCULAR; SUBCUTANEOUS at 09:33

## 2024-01-01 RX ADMIN — FUROSEMIDE 40 MG: 40 TABLET ORAL at 08:46

## 2024-01-01 RX ADMIN — LEVOTHYROXINE SODIUM 50 MCG: 0.05 TABLET ORAL at 11:02

## 2024-01-01 RX ADMIN — SODIUM BICARBONATE 2600 MG: 650 TABLET ORAL at 13:25

## 2024-01-01 RX ADMIN — LEVOTHYROXINE SODIUM 50 MCG: 0.05 TABLET ORAL at 09:10

## 2024-01-01 RX ADMIN — AMLODIPINE BESYLATE 5 MG: 5 TABLET ORAL at 21:22

## 2024-01-01 RX ADMIN — SODIUM BICARBONATE 2600 MG: 650 TABLET ORAL at 19:55

## 2024-01-01 RX ADMIN — HEPARIN SODIUM 5000 UNITS: 10000 INJECTION, SOLUTION INTRAVENOUS; SUBCUTANEOUS at 06:01

## 2024-01-01 RX ADMIN — CEFEPIME 500 MG: 2 INJECTION, POWDER, FOR SOLUTION INTRAVENOUS at 10:30

## 2024-01-01 RX ADMIN — PREDNISONE 2.5 MG: 5 TABLET ORAL at 11:07

## 2024-01-01 RX ADMIN — CALCITRIOL 0.25 MCG: 0.25 CAPSULE ORAL at 11:02

## 2024-01-01 RX ADMIN — CYANOCOBALAMIN 1000 MCG: 1000 INJECTION, SOLUTION INTRAMUSCULAR; SUBCUTANEOUS at 09:14

## 2024-01-01 RX ADMIN — PREDNISONE 2.5 MG: 5 TABLET ORAL at 18:06

## 2024-01-01 RX ADMIN — CEFTRIAXONE SODIUM 1 G: 1 INJECTION, POWDER, FOR SOLUTION INTRAMUSCULAR; INTRAVENOUS at 16:19

## 2024-01-01 RX ADMIN — LEVOTHYROXINE SODIUM 50 MCG: 0.05 TABLET ORAL at 10:43

## 2024-01-01 RX ADMIN — AMLODIPINE BESYLATE 5 MG: 5 TABLET ORAL at 21:02

## 2024-01-01 RX ADMIN — SODIUM CHLORIDE: 9 INJECTION, SOLUTION INTRAVENOUS at 23:55

## 2024-01-01 RX ADMIN — SODIUM BICARBONATE 650 MG: 650 TABLET ORAL at 20:24

## 2024-01-01 RX ADMIN — LOPERAMIDE HYDROCHLORIDE 2 MG: 2 CAPSULE ORAL at 22:04

## 2024-01-01 RX ADMIN — SODIUM BICARBONATE 2600 MG: 650 TABLET ORAL at 07:54

## 2024-01-01 RX ADMIN — SODIUM BICARBONATE 2600 MG: 650 TABLET ORAL at 06:12

## 2024-01-01 RX ADMIN — AMLODIPINE BESYLATE 5 MG: 5 TABLET ORAL at 11:04

## 2024-01-01 RX ADMIN — CYANOCOBALAMIN 1000 MCG: 1000 INJECTION, SOLUTION INTRAMUSCULAR; SUBCUTANEOUS at 10:55

## 2024-01-01 RX ADMIN — HEPARIN SODIUM 5000 UNITS: 10000 INJECTION, SOLUTION INTRAVENOUS; SUBCUTANEOUS at 11:02

## 2024-01-01 RX ADMIN — SODIUM BICARBONATE 650 MG: 650 TABLET ORAL at 22:04

## 2024-01-01 RX ADMIN — HEPARIN SODIUM 5000 UNITS: 10000 INJECTION, SOLUTION INTRAVENOUS; SUBCUTANEOUS at 11:08

## 2024-01-01 RX ADMIN — SODIUM BICARBONATE 2600 MG: 650 TABLET ORAL at 21:02

## 2024-01-01 RX ADMIN — CEFTRIAXONE SODIUM 1 G: 1 INJECTION, POWDER, FOR SOLUTION INTRAMUSCULAR; INTRAVENOUS at 22:08

## 2024-01-01 RX ADMIN — AMLODIPINE BESYLATE 5 MG: 5 TABLET ORAL at 21:17

## 2024-01-01 RX ADMIN — PREDNISONE 2.5 MG: 5 TABLET ORAL at 10:06

## 2024-01-01 RX ADMIN — FUROSEMIDE 20 MG: 20 TABLET ORAL at 17:08

## 2024-01-01 RX ADMIN — LEVOTHYROXINE SODIUM 50 MCG: 0.05 TABLET ORAL at 16:23

## 2024-01-01 RX ADMIN — CALCITRIOL 0.25 MCG: 0.25 CAPSULE ORAL at 10:31

## 2024-01-01 RX ADMIN — CYANOCOBALAMIN 1000 MCG: 1000 INJECTION, SOLUTION INTRAMUSCULAR; SUBCUTANEOUS at 11:02

## 2024-01-01 RX ADMIN — HEPARIN SODIUM 5000 UNITS: 10000 INJECTION, SOLUTION INTRAVENOUS; SUBCUTANEOUS at 09:08

## 2024-01-01 RX ADMIN — SODIUM BICARBONATE 2600 MG: 650 TABLET ORAL at 21:18

## 2024-01-01 RX ADMIN — LIDOCAINE HYDROCHLORIDE 1 G: 10 INJECTION, SOLUTION EPIDURAL; INFILTRATION; INTRACAUDAL; PERINEURAL at 19:53

## 2024-01-01 RX ADMIN — LEVOTHYROXINE SODIUM 50 MCG: 0.05 TABLET ORAL at 11:04

## 2024-01-01 RX ADMIN — TAMSULOSIN HYDROCHLORIDE 0.4 MG: 0.4 CAPSULE ORAL at 20:25

## 2024-01-01 RX ADMIN — HEPARIN SODIUM 5000 UNITS: 10000 INJECTION, SOLUTION INTRAVENOUS; SUBCUTANEOUS at 21:20

## 2024-01-01 RX ADMIN — FUROSEMIDE 20 MG: 20 TABLET ORAL at 15:47

## 2024-01-01 RX ADMIN — FINASTERIDE 5 MG: 5 TABLET, FILM COATED ORAL at 11:02

## 2024-01-01 RX ADMIN — CEFTRIAXONE SODIUM 1 G: 1 INJECTION, POWDER, FOR SOLUTION INTRAMUSCULAR; INTRAVENOUS at 18:44

## 2024-01-01 RX ADMIN — PREDNISONE 2.5 MG: 5 TABLET ORAL at 11:02

## 2024-01-01 RX ADMIN — FUROSEMIDE 80 MG: 10 INJECTION, SOLUTION INTRAMUSCULAR; INTRAVENOUS at 09:31

## 2024-01-01 RX ADMIN — ACETAMINOPHEN 975 MG: 500 TABLET, FILM COATED ORAL at 12:36

## 2024-01-01 RX ADMIN — FINASTERIDE 5 MG: 5 TABLET, FILM COATED ORAL at 09:24

## 2024-01-01 RX ADMIN — LEVOTHYROXINE SODIUM 50 MCG: 0.05 TABLET ORAL at 10:01

## 2024-01-01 RX ADMIN — PREDNISONE 2.5 MG: 5 TABLET ORAL at 11:19

## 2024-01-01 RX ADMIN — CEFEPIME 1 G: 1 INJECTION, POWDER, FOR SOLUTION INTRAMUSCULAR; INTRAVENOUS at 14:10

## 2024-01-01 RX ADMIN — PREDNISONE 2.5 MG: 5 TABLET ORAL at 10:31

## 2024-01-01 RX ADMIN — LEVOTHYROXINE SODIUM 50 MCG: 0.05 TABLET ORAL at 11:20

## 2024-01-01 RX ADMIN — LEVOTHYROXINE SODIUM 50 MCG: 0.05 TABLET ORAL at 17:08

## 2024-01-01 RX ADMIN — SODIUM BICARBONATE 2600 MG: 650 TABLET ORAL at 21:40

## 2024-01-01 RX ADMIN — TAMSULOSIN HYDROCHLORIDE 0.4 MG: 0.4 CAPSULE ORAL at 21:22

## 2024-01-01 RX ADMIN — HEPARIN SODIUM 5000 UNITS: 10000 INJECTION, SOLUTION INTRAVENOUS; SUBCUTANEOUS at 22:00

## 2024-01-01 RX ADMIN — CEFTRIAXONE SODIUM 1 G: 1 INJECTION, POWDER, FOR SOLUTION INTRAMUSCULAR; INTRAVENOUS at 14:47

## 2024-01-01 RX ADMIN — SODIUM BICARBONATE 2600 MG: 650 TABLET ORAL at 05:59

## 2024-01-01 RX ADMIN — CEFEPIME 500 MG: 2 INJECTION, POWDER, FOR SOLUTION INTRAVENOUS at 14:32

## 2024-01-01 RX ADMIN — AMLODIPINE BESYLATE 5 MG: 5 TABLET ORAL at 09:24

## 2024-01-01 RX ADMIN — LEVOTHYROXINE SODIUM 50 MCG: 0.05 TABLET ORAL at 10:31

## 2024-01-01 RX ADMIN — SODIUM BICARBONATE 650 MG: 650 TABLET ORAL at 21:22

## 2024-01-01 RX ADMIN — HEPARIN SODIUM 5000 UNITS: 10000 INJECTION, SOLUTION INTRAVENOUS; SUBCUTANEOUS at 22:04

## 2024-01-01 RX ADMIN — CYANOCOBALAMIN 1000 MCG: 1000 INJECTION, SOLUTION INTRAMUSCULAR; SUBCUTANEOUS at 10:14

## 2024-01-01 RX ADMIN — PREDNISONE 2.5 MG: 5 TABLET ORAL at 09:24

## 2024-01-01 RX ADMIN — CYANOCOBALAMIN 1000 MCG: 1000 INJECTION, SOLUTION INTRAMUSCULAR; SUBCUTANEOUS at 09:19

## 2024-01-01 RX ADMIN — HEPARIN SODIUM 5000 UNITS: 10000 INJECTION, SOLUTION INTRAVENOUS; SUBCUTANEOUS at 22:27

## 2024-01-01 RX ADMIN — TAMSULOSIN HYDROCHLORIDE 0.4 MG: 0.4 CAPSULE ORAL at 21:02

## 2024-01-01 RX ADMIN — HEPARIN SODIUM 5000 UNITS: 10000 INJECTION, SOLUTION INTRAVENOUS; SUBCUTANEOUS at 09:31

## 2024-01-01 RX ADMIN — GUAIFENESIN AND DEXTROMETHORPHAN HYDROBROMIDE 10 ML: 10; 100 SYRUP ORAL at 04:42

## 2024-01-01 RX ADMIN — HEPARIN SODIUM 5000 UNITS: 10000 INJECTION, SOLUTION INTRAVENOUS; SUBCUTANEOUS at 05:25

## 2024-01-01 RX ADMIN — FUROSEMIDE 20 MG: 20 TABLET ORAL at 15:20

## 2024-01-01 RX ADMIN — CALCITRIOL 0.25 MCG: 0.25 CAPSULE ORAL at 09:12

## 2024-01-01 RX ADMIN — FINASTERIDE 5 MG: 5 TABLET, FILM COATED ORAL at 10:43

## 2024-01-01 RX ADMIN — CALCITRIOL 0.25 MCG: 0.25 CAPSULE ORAL at 09:24

## 2024-01-01 RX ADMIN — SODIUM CHLORIDE: 9 INJECTION, SOLUTION INTRAVENOUS at 11:36

## 2024-01-01 RX ADMIN — CYANOCOBALAMIN 1000 MCG: 1000 INJECTION, SOLUTION INTRAMUSCULAR; SUBCUTANEOUS at 10:23

## 2024-01-01 RX ADMIN — TAMSULOSIN HYDROCHLORIDE 0.4 MG: 0.4 CAPSULE ORAL at 21:59

## 2024-01-01 SDOH — HEALTH STABILITY: PHYSICAL HEALTH: ON AVERAGE, HOW MANY DAYS PER WEEK DO YOU ENGAGE IN MODERATE TO STRENUOUS EXERCISE (LIKE A BRISK WALK)?: 4 DAYS

## 2024-01-01 ASSESSMENT — ACTIVITIES OF DAILY LIVING (ADL)
ADLS_ACUITY_SCORE: 40
ADLS_ACUITY_SCORE: 40
ADLS_ACUITY_SCORE: 36
FALL_HISTORY_WITHIN_LAST_SIX_MONTHS: YES
ADLS_ACUITY_SCORE: 40
ADLS_ACUITY_SCORE: 39
ADLS_ACUITY_SCORE: 40
ADLS_ACUITY_SCORE: 36
ADLS_ACUITY_SCORE: 40
ADLS_ACUITY_SCORE: 42
ADLS_ACUITY_SCORE: 36
ADLS_ACUITY_SCORE: 40
ADLS_ACUITY_SCORE: 40
ADLS_ACUITY_SCORE: 34
ADLS_ACUITY_SCORE: 40
ADLS_ACUITY_SCORE: 38
ADLS_ACUITY_SCORE: 40
ADLS_ACUITY_SCORE: 42
ADLS_ACUITY_SCORE: 36
ADLS_ACUITY_SCORE: 34
ADLS_ACUITY_SCORE: 34
ADLS_ACUITY_SCORE: 40
CHANGE_IN_FUNCTIONAL_STATUS_SINCE_ONSET_OF_CURRENT_ILLNESS/INJURY: YES
ADLS_ACUITY_SCORE: 36
VISION_MANAGEMENT: GLASSES
ADLS_ACUITY_SCORE: 39
ADLS_ACUITY_SCORE: 35
ADLS_ACUITY_SCORE: 34
ADLS_ACUITY_SCORE: 40
DIFFICULTY_EATING/SWALLOWING: NO
ADLS_ACUITY_SCORE: 34
ADLS_ACUITY_SCORE: 35
ADLS_ACUITY_SCORE: 40
ADLS_ACUITY_SCORE: 38
DOING_ERRANDS_INDEPENDENTLY_DIFFICULTY: NO
ADLS_ACUITY_SCORE: 40
ADLS_ACUITY_SCORE: 31
ADLS_ACUITY_SCORE: 27
DIFFICULTY_COMMUNICATING: NO
ADLS_ACUITY_SCORE: 38
ADLS_ACUITY_SCORE: 40
ADLS_ACUITY_SCORE: 39
ADLS_ACUITY_SCORE: 32
ADLS_ACUITY_SCORE: 36
ADLS_ACUITY_SCORE: 36
ADLS_ACUITY_SCORE: 40
ADLS_ACUITY_SCORE: 35
ADLS_ACUITY_SCORE: 38
ADLS_ACUITY_SCORE: 38
ADLS_ACUITY_SCORE: 40
ADLS_ACUITY_SCORE: 42
TOILETING_ISSUES: NO
ADLS_ACUITY_SCORE: 40
ADLS_ACUITY_SCORE: 40
ADLS_ACUITY_SCORE: 37
ADLS_ACUITY_SCORE: 38
ADLS_ACUITY_SCORE: 40
ADLS_ACUITY_SCORE: 34
ADLS_ACUITY_SCORE: 36
ADLS_ACUITY_SCORE: 40
ADLS_ACUITY_SCORE: 40
ADLS_ACUITY_SCORE: 38
ADLS_ACUITY_SCORE: 35
ADLS_ACUITY_SCORE: 34
ADLS_ACUITY_SCORE: 40
ADLS_ACUITY_SCORE: 40
ADLS_ACUITY_SCORE: 38
ADLS_ACUITY_SCORE: 35
ADLS_ACUITY_SCORE: 40
ADLS_ACUITY_SCORE: 40
ADLS_ACUITY_SCORE: 36
ADLS_ACUITY_SCORE: 37
ADLS_ACUITY_SCORE: 39
ADLS_ACUITY_SCORE: 40
WALKING_OR_CLIMBING_STAIRS_DIFFICULTY: YES
ADLS_ACUITY_SCORE: 40
ADLS_ACUITY_SCORE: 27
ADLS_ACUITY_SCORE: 36
ADLS_ACUITY_SCORE: 40
ADLS_ACUITY_SCORE: 38
ADLS_ACUITY_SCORE: 38
ADLS_ACUITY_SCORE: 36
ADLS_ACUITY_SCORE: 35
ADLS_ACUITY_SCORE: 36
NUMBER_OF_TIMES_PATIENT_HAS_FALLEN_WITHIN_LAST_SIX_MONTHS: 2
ADLS_ACUITY_SCORE: 34
ADLS_ACUITY_SCORE: 40
ADLS_ACUITY_SCORE: 37
ADLS_ACUITY_SCORE: 35
ADLS_ACUITY_SCORE: 39
ADLS_ACUITY_SCORE: 32
ADLS_ACUITY_SCORE: 34
ADLS_ACUITY_SCORE: 34
ADLS_ACUITY_SCORE: 40
ADLS_ACUITY_SCORE: 40
ADLS_ACUITY_SCORE: 34
ADLS_ACUITY_SCORE: 36
ADLS_ACUITY_SCORE: 40
ADLS_ACUITY_SCORE: 36
ADLS_ACUITY_SCORE: 34
ADLS_ACUITY_SCORE: 37
ADLS_ACUITY_SCORE: 35
ADLS_ACUITY_SCORE: 36
ADLS_ACUITY_SCORE: 40
ADLS_ACUITY_SCORE: 34
EQUIPMENT_CURRENTLY_USED_AT_HOME: WALKER, ROLLING
ADLS_ACUITY_SCORE: 31
ADLS_ACUITY_SCORE: 35
ADLS_ACUITY_SCORE: 36
ADLS_ACUITY_SCORE: 36
ADLS_ACUITY_SCORE: 40
ADLS_ACUITY_SCORE: 36
ADLS_ACUITY_SCORE: 38
ADLS_ACUITY_SCORE: 40
ADLS_ACUITY_SCORE: 38
ADLS_ACUITY_SCORE: 34
ADLS_ACUITY_SCORE: 40
ADLS_ACUITY_SCORE: 36
ADLS_ACUITY_SCORE: 40
ADLS_ACUITY_SCORE: 40
CONCENTRATING,_REMEMBERING_OR_MAKING_DECISIONS_DIFFICULTY: NO
ADLS_ACUITY_SCORE: 31
ADLS_ACUITY_SCORE: 40
ADLS_ACUITY_SCORE: 38
ADLS_ACUITY_SCORE: 35
ADLS_ACUITY_SCORE: 35
ADLS_ACUITY_SCORE: 37
ADLS_ACUITY_SCORE: 40
ADLS_ACUITY_SCORE: 37
ADLS_ACUITY_SCORE: 40
ADLS_ACUITY_SCORE: 38
ADLS_ACUITY_SCORE: 39
ADLS_ACUITY_SCORE: 40
ADLS_ACUITY_SCORE: 35
ADLS_ACUITY_SCORE: 40
ADLS_ACUITY_SCORE: 35
ADLS_ACUITY_SCORE: 38
ADLS_ACUITY_SCORE: 40
ADLS_ACUITY_SCORE: 39
ADLS_ACUITY_SCORE: 34
ADLS_ACUITY_SCORE: 40
ADLS_ACUITY_SCORE: 39
ADLS_ACUITY_SCORE: 38
ADLS_ACUITY_SCORE: 35
ADLS_ACUITY_SCORE: 40
ADLS_ACUITY_SCORE: 32
ADLS_ACUITY_SCORE: 40
ADLS_ACUITY_SCORE: 36
ADLS_ACUITY_SCORE: 40
ADLS_ACUITY_SCORE: 34
ADLS_ACUITY_SCORE: 40
ADLS_ACUITY_SCORE: 40
ADLS_ACUITY_SCORE: 35
ADLS_ACUITY_SCORE: 39
ADLS_ACUITY_SCORE: 40
ADLS_ACUITY_SCORE: 38
ADLS_ACUITY_SCORE: 40
ADLS_ACUITY_SCORE: 34
ADLS_ACUITY_SCORE: 34
ADLS_ACUITY_SCORE: 40
ADLS_ACUITY_SCORE: 40
ADLS_ACUITY_SCORE: 34
ADLS_ACUITY_SCORE: 42
ADLS_ACUITY_SCORE: 36
ADLS_ACUITY_SCORE: 34
ADLS_ACUITY_SCORE: 40
ADLS_ACUITY_SCORE: 34
ADLS_ACUITY_SCORE: 40
ADLS_ACUITY_SCORE: 38
ADLS_ACUITY_SCORE: 40
ADLS_ACUITY_SCORE: 39
ADLS_ACUITY_SCORE: 40
ADLS_ACUITY_SCORE: 40
WALKING_OR_CLIMBING_STAIRS: AMBULATION DIFFICULTY, REQUIRES EQUIPMENT
ADLS_ACUITY_SCORE: 40
HEARING_DIFFICULTY_OR_DEAF: NO
WEAR_GLASSES_OR_BLIND: YES
ADLS_ACUITY_SCORE: 40
ADLS_ACUITY_SCORE: 36
DRESSING/BATHING_DIFFICULTY: NO
ADLS_ACUITY_SCORE: 35
ADLS_ACUITY_SCORE: 37
ADLS_ACUITY_SCORE: 34
ADLS_ACUITY_SCORE: 35
ADLS_ACUITY_SCORE: 35
ADLS_ACUITY_SCORE: 40
ADLS_ACUITY_SCORE: 37
ADLS_ACUITY_SCORE: 34
ADLS_ACUITY_SCORE: 34

## 2024-01-01 ASSESSMENT — SOCIAL DETERMINANTS OF HEALTH (SDOH): HOW OFTEN DO YOU GET TOGETHER WITH FRIENDS OR RELATIVES?: THREE TIMES A WEEK

## 2024-01-01 ASSESSMENT — ENCOUNTER SYMPTOMS
PALPITATIONS: 0
WEAKNESS: 0
VOMITING: 0
FEVER: 0
DIZZINESS: 0
COUGH: 0
NAUSEA: 0
HEMATURIA: 0
WHEEZING: 0
HEMATURIA: 0
EYE PAIN: 0
TROUBLE SWALLOWING: 0
HALLUCINATIONS: 1
CHILLS: 0
FLANK PAIN: 0
AGITATION: 0
NAUSEA: 0
ARTHRALGIAS: 1
APPETITE CHANGE: 0
LIGHT-HEADEDNESS: 0
MYALGIAS: 1
ABDOMINAL PAIN: 0
SHORTNESS OF BREATH: 0
ACTIVITY CHANGE: 0
DIARRHEA: 0
SHORTNESS OF BREATH: 0
CHILLS: 0
BACK PAIN: 0
VOMITING: 0
NAUSEA: 0
DIARRHEA: 0
ABDOMINAL PAIN: 0
NECK STIFFNESS: 1
CONFUSION: 1
COUGH: 0
FATIGUE: 0
DYSURIA: 1
ARTHRALGIAS: 0
DIZZINESS: 0
FATIGUE: 0
SHORTNESS OF BREATH: 0
FEVER: 0
NECK PAIN: 1
VOMITING: 0
WOUND: 1
EYE PAIN: 0
BRUISES/BLEEDS EASILY: 1
LIGHT-HEADEDNESS: 0
CHILLS: 0
DECREASED CONCENTRATION: 0
DIZZINESS: 0
DYSURIA: 1
AGITATION: 0
WEAKNESS: 1
CONFUSION: 0
CONFUSION: 0
FEVER: 0
ARTHRALGIAS: 0
DIFFICULTY URINATING: 1
MYALGIAS: 0
WOUND: 1
BACK PAIN: 0
COUGH: 0
HEMATURIA: 0
DIFFICULTY URINATING: 1
WHEEZING: 0
CONFUSION: 0
ABDOMINAL PAIN: 0
MYALGIAS: 0
PALPITATIONS: 0
DYSURIA: 1
BRUISES/BLEEDS EASILY: 1
WOUND: 1
LIGHT-HEADEDNESS: 0

## 2024-01-01 ASSESSMENT — PAIN SCALES - GENERAL
PAINLEVEL: NO PAIN (0)

## 2024-01-01 ASSESSMENT — COLUMBIA-SUICIDE SEVERITY RATING SCALE - C-SSRS
1. IN THE PAST MONTH, HAVE YOU WISHED YOU WERE DEAD OR WISHED YOU COULD GO TO SLEEP AND NOT WAKE UP?: NO
1. IN THE PAST MONTH, HAVE YOU WISHED YOU WERE DEAD OR WISHED YOU COULD GO TO SLEEP AND NOT WAKE UP?: NO
2. HAVE YOU ACTUALLY HAD ANY THOUGHTS OF KILLING YOURSELF IN THE PAST MONTH?: NO
6. HAVE YOU EVER DONE ANYTHING, STARTED TO DO ANYTHING, OR PREPARED TO DO ANYTHING TO END YOUR LIFE?: NO
2. HAVE YOU ACTUALLY HAD ANY THOUGHTS OF KILLING YOURSELF IN THE PAST MONTH?: NO
1. IN THE PAST MONTH, HAVE YOU WISHED YOU WERE DEAD OR WISHED YOU COULD GO TO SLEEP AND NOT WAKE UP?: NO
2. HAVE YOU ACTUALLY HAD ANY THOUGHTS OF KILLING YOURSELF IN THE PAST MONTH?: NO
6. HAVE YOU EVER DONE ANYTHING, STARTED TO DO ANYTHING, OR PREPARED TO DO ANYTHING TO END YOUR LIFE?: NO
6. HAVE YOU EVER DONE ANYTHING, STARTED TO DO ANYTHING, OR PREPARED TO DO ANYTHING TO END YOUR LIFE?: NO
1. IN THE PAST MONTH, HAVE YOU WISHED YOU WERE DEAD OR WISHED YOU COULD GO TO SLEEP AND NOT WAKE UP?: NO
6. HAVE YOU EVER DONE ANYTHING, STARTED TO DO ANYTHING, OR PREPARED TO DO ANYTHING TO END YOUR LIFE?: NO
2. HAVE YOU ACTUALLY HAD ANY THOUGHTS OF KILLING YOURSELF IN THE PAST MONTH?: NO

## 2024-01-02 NOTE — PROGRESS NOTES

## 2024-01-12 NOTE — PROGRESS NOTES
01/12/24 0500   Appointment Info   Signing clinician's name / credentials Ayah Reyes DPT   Visits Used 10   Medical Diagnosis Sepsis due to urinary tract infection; 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, with long-term current use of insulin; Primary pauci-immune necrotizing and crescentic glomerulonephritis; Immunocompromised patient   PT Tx Diagnosis impaired functional mobility, balance, gait   Progress Note/Certification   Start of Care Date 12/06/23   Onset of illness/injury or Date of Surgery 11/15/23   Therapy Frequency 2x/week   Predicted Duration 12 weeks   Certification date from 12/06/23   Certification date to 02/28/24   Progress Note Due Date 01/12/24   Supervision   PT Assistant Visit Number 1   PT Goal 1   Goal Identifier ambulation   Goal Description patient will ambulate 1000ft, without reaching out for the wall for support, in order to demonstrate safe community ambulation distances   Rationale to maximize safety and independence within the home;to maximize safety and independence within the community;to maximize safety and independence with performance of ADLs and functional tasks   Goal Progress ambulated 380ft, CGA with gait belt, reached for wall 2x   Target Date 02/28/24   PT Goal 2   Goal Identifier functional strength   Goal Description patient will improve 30 sec sit<>stand to >8 reps to demonstrate increased functional strength   Rationale to maximize safety and independence with performance of ADLs and functional tasks;to maximize safety and independence within the home;to maximize safety and independence within the community   Target Date 01/31/24   PT Goal 3   Goal Identifier balance   Goal Description patient will demonstrate improved balance as evidenced by SLS time of >10 sec   Rationale to maximize safety and independence with performance of ADLs and functional tasks;to maximize safety and independence within the home;to  "maximize safety and independence within the community   Target Date 24   Subjective Report   Subjective Report Patient reports he's been feeling pretty good.  Walking feels good, does get a little wobbly at times; bowling feels good, states he might try curling after next week. 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   Objective Measure 1   Objective Measure strength   Details B LE grossly 4-/5   Objective Measure 2   Objective Measure gait   Details unsteady, decreased stride, step length, milvia   Objective Measure 3   Objective Measure 30 sec<>stand   Details x5 (able to complete 10 reps to fatigue)   Therapeutic Procedure/Exercise   Therapeutic Procedures: strength, endurance, ROM, flexibillity minutes (35247) 10   Ther Proc 1 LE strength   Ther Proc 1 - Details seated march, LAQ, leg open/close, heel raise, toe raise  (deferred due to time: standing march, heel raise, toe raise, hip abd, hip ext)   Skilled Intervention LE strength   Patient Response/Progress well tolerated, rest breaks less frequent   Therapeutic Activity   Therapeutic Activities: dynamic activities to improve functional performance minutes (88287) 35   Ther Act 1 activity tolerance, ambulation   Ther Act 1 - Details scifit 7min, level 5; ambulation trials x360ft x2 with seated rest break between trials - \"wall surfing\" x1 only when going around corner; navigated 4 hurdles x4, lateral x4, CGA with gait belt  (increased milvia, decreased \"wall surfing\"; required light-touch hand hold in addition to CGA with gait belt during hurdles, this visit able to navigate hurdles x2 forward & lateral without hand hold, gait belt only)   Ther Act 2 transfers, standing tolerance   Ther Act 2 - Details sit<>stand x10 from chair with 2in foam pad, L UE only for support  (deferred: standing balance rhomberg EO & EC, tandem, SLS; on airex foam " "rhomberg EO & EC, tandem; staggered stance weight shift forward/backward)   Skilled Intervention ambulation, standing tolerance   Patient Response/Progress well tolerated; increased milvia, decreased \"wall surfing\" and fatigue with ambulation; increased endurance  (navigating hurdles L foot first stronger, R foot lead required hand hold in addition to CGA with gait belt)   Education   Learner/Method Patient;Listening;Demonstration;Pictures/Video;No Barriers to Learning   Plan   Home program HEP, print out given  ([employer] access code [email or web address])   Plan for next session activity tolerance, WB exercises, stairs - consider step ups, B LE strength - consider med x, B UE strength   Total Session Time   Timed Code Treatment Minutes 45   Total Treatment Time (sum of timed and untimed services) 45         PLAN  Continue therapy per current plan of care.    Beginning/End Dates of Progress Note Reporting Period:    12/6/23 to 01/12/2024    Referring Provider:  Melissa Nettles    "

## 2024-01-25 NOTE — ED TRIAGE NOTES
"Pt presents to ED via private car. Pt fell a week ago in [place] and had dressing placed to his Rt arm. His physical therapist attempted to take the dressing off today and wasn't able to get it all. Pt was told to go to the ER for wound check. BP (!) 146/80   Pulse 109   Temp 97.5  F (36.4  C) (Tympanic)   Resp 18   Ht 1.715 m (5' 7.5\")   Wt 76.7 kg (169 lb)   SpO2 98%   BMI 26.08 kg/m         Triage Assessment (Adult)       Row Name 01/25/24 1731          Triage Assessment    Airway WDL WDL        Respiratory WDL    Respiratory WDL WDL        Skin Circulation/Temperature WDL    Skin Circulation/Temperature WDL X  dressing to Rt arm        Cardiac WDL    Cardiac WDL WDL        Peripheral/Neurovascular WDL    Peripheral Neurovascular WDL WDL        Cognitive/Neuro/Behavioral WDL    Cognitive/Neuro/Behavioral WDL WDL                     "

## 2024-01-26 NOTE — ED PROVIDER NOTES
History     Chief Complaint   Patient presents with    Wound Check     Here with his wife and daughter    The history is provided by the patient, the spouse and a relative.     Altaf Chao is a 89 year old male here with a skin tear on the right forearm.  He was at a hotel in [place] and fell in the bathroom. He hit his right forearm on the wall and has skin tears in two placed on the forearm. No other injury. The EMS wrapped him up and he did not want to go to the ED there.     He is not on blood thinners. His last tetanus was December 2020.    Allergies:  Allergies   Allergen Reactions    Statins [Statins] Itching     -- Patient declined --       Problem List:    Patient Active Problem List    Diagnosis Date Noted    Sepsis due to urinary tract infection (H) 10/30/2023     Priority: Medium    Iron deficiency anemia, unspecified 10/11/2023     Priority: Medium    Occult closed fracture of right elbow with routine healing, subsequent encounter 06/01/2023     Priority: Medium    Serum phosphate elevated 07/27/2022     Priority: Medium    DNR (do not resuscitate) 04/21/2022     Priority: Medium    DNI (do not intubate) 04/21/2022     Priority: Medium    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) 01/19/2022     Priority: Medium    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, with long-term current use of insulin (H) 01/18/2021     Priority: Medium    Heartburn 07/12/2018     Priority: Medium    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     Priority: Medium    Anemia due to vitamin B12 deficiency 10/25/2017     Priority: Medium    Lymphedema of both lower extremities 06/23/2017     Priority: Medium    Immunocompromised patient (H24) 06/09/2017     Priority: Medium    Bilateral edema of lower extremity 05/11/2017     Priority: Medium    Steroid-induced hyperglycemia 05/04/2017     Priority: Medium    Anemia of chronic disease 05/03/2017     Priority: Medium    CKD (chronic  kidney disease) stage 5, GFR 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 ml/min (H) 2017     Priority: Medium    Metabolic acidosis 2017     Priority: Medium    Acute crescentic glomerulonephritis 2017     Priority: Medium    Antineutrophil cytoplasmic antibody (ANCA) positive 2017     Priority: Medium    Primary pauci-immune necrotizing and crescentic glomerulonephritis 2017     Priority: Medium    Hyperkalemia 2017     Priority: Medium    Hyponatremia 2017     Priority: Medium    Refusal of statin medication at discharge 2017     Priority: Medium    H/O total hip arthroplasty 2014     Priority: Medium    Mixed hyperlipidemia 04/10/2014     Priority: Medium    H/O bilateral inguinal hernia repair 2013     Priority: Medium    History of tobacco abuse 08/15/2013     Priority: Medium    Benign essential hypertension 2012     Priority: Medium    Pityriasis rosea 2012     Priority: Medium        Past Medical History:    Past Medical History:   Diagnosis Date    Acute kidney failure (H24)     Essential (primary) hypertension     Hyperlipidemia     Osteoarthritis of hip     Pityriasis rosea     Tachycardia     Unilateral inguinal hernia without obstruction or gangrene        Past Surgical History:    Past Surgical History:   Procedure Laterality Date    CIRCUMCISION      2017,Dr. Heidi GREENBERG    OTHER SURGICAL HISTORY      47610.9,[place] SUBTALAR ATHROEREISIS,bone Spurs excision on Toe    OTHER SURGICAL HISTORY      13,,HERNIA REPAIR,Right,RIH with mesh    OTHER SURGICAL HISTORY      13,64581,GENITAL SURGERY MALE,Dorsal Slit    OTHER SURGICAL HISTORY      4/15/14,,HERNIA REPAIR,Left,LIH with mesh    OTHER SURGICAL HISTORY      14,40656.0,[place] TOTAL HIP ARTHROPLASTY,Left       Family History:    Family History   Problem Relation Age of Onset    Other - See Comments Son 12        neuroblastoma at 13 yo  at 14.    Genetic Disorder No family hx of          "Genetic,No known FHx of DM, CAD, CVA       Social History:  Marital Status:   [2]  Social History     Tobacco Use    Smoking status: Former     Types: Cigarettes     Quit date: 1976     Years since quittin.0     Passive exposure: Past    Smokeless tobacco: Never   Vaping Use    Vaping Use: Never used   Substance Use Topics    Alcohol use: Yes     Alcohol/week: 0.8 standard drinks of alcohol     Comment: maybe 1 or 2 month    Drug use: Never        Medications:    amLODIPine (NORVASC) 5 MG tablet  calcitRIOL (ROCALTROL) 0.25 MCG capsule  Cranberry-Vitamin C (CRANBERRY CONCENTRATE/VITAMINC) 99306-389 MG CAPS  darbepoetin miller (ARANESP) 100 MCG/0.5ML injection  famotidine (PEPCID) 20 MG tablet  finasteride (PROSCAR) 5 MG tablet  furosemide (LASIX) 40 MG tablet  insulin glargine (LANTUS SOLOSTAR) 100 UNIT/ML pen  insulin pen needle (B-D U/F) 31G X 8 MM miscellaneous  predniSONE (DELTASONE) 2.5 MG tablet  sodium bicarbonate 650 MG tablet  tamsulosin (FLOMAX) 0.4 MG capsule          Review of Systems   Skin:  Positive for wound.   All other systems reviewed and are negative.      Physical Exam   BP: (!) 146/80  Pulse: 109  Temp: 97.5  F (36.4  C)  Resp: 18  Height: 171.5 cm (5' 7.5\")  Weight: 76.7 kg (169 lb)  SpO2: 98 %      Physical Exam  Vitals and nursing note reviewed.   Constitutional:       General: He is not in acute distress.     Appearance: Normal appearance. He is not ill-appearing.   Skin:     Comments: He has two skin tears on the right forearm, one is still bleeding.   Neurological:      Mental Status: He is alert.         Medications - No data to display    Assessments & Plan (with Medical Decision Making)  Altaf Chao is a 89 year old male here with a skin tear on the right forearm.  He was at a hotel in [place] and fell in the bathroom. He hit his right forearm on the wall and has skin tears in two placed on the forearm. No other injury. The EMS wrapped him up and he did not want to " "go to the ED there.   He is not on blood thinners. His last tetanus was December 2020.  VS in the ED BP (!) 146/80   Pulse 109   Temp 97.5  F (36.4  C) (Tympanic)   Resp 18   Ht 1.715 m (5' 7.5\")   Wt 76.7 kg (169 lb)   SpO2 98%   BMI 26.08 kg/m    Exam shows two skin tears, one actively bleeding.  They are both 10 cm in length. We dressed this with Steri Strips and gauze.   I recommend he follow up with Viky in clinic.      I have reviewed the nursing notes.    I have reviewed the findings, diagnosis, plan and need for follow up with the patient.  Medical Decision Making  The patient's presentation was of moderate complexity (an acute complicated injury).    The patient's evaluation involved:  an assessment requiring an independent historian (see separate area of note for details)    The patient's management necessitated only low risk treatment.      Final diagnoses:   Skin tear of forearm without complication, right, initial encounter       1/25/2024   [facility] AND [facility], Bubba Newby MD  01/25/24 1914    "

## 2024-01-26 NOTE — DISCHARGE INSTRUCTIONS
Try to keep the dressing clean and dry until you see Viky Horne in clinic.     Thank you for choosing our Emergency Department for your care.     You may receive a phone call or letter for a survey about your care in our ED.  Please complete this as this is how we improve care for our patients.     If you have any questions after leaving the ED you can call or text me on my cell phone at [phone].  This does not mean that I am on call, but I will get back to you.  If you are not doing well please return to the ED.     Sincerely,    Dr Edmond Peters M.D.

## 2024-01-30 NOTE — PROGRESS NOTES

## 2024-01-31 NOTE — PROGRESS NOTES
[facility] OUTPATIENT WOC CONSULT    Assessment:     ICD-10-CM    1. Skin tear of left upper extremity  S41.112A       2. Skin tear of right upper extremity  S41.111A       3. 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 Anemia of chronic disease  D63.8       5. Immunocompromised patient (H24)  D84.9       6. Current chronic use of systemic steroids  Z79.52           Plan:   Leave dressing in place until follow-up clinic visit.  Follow up in wound clinic in 2 days.  Patient is up-to-date on tetanus.  No current evidence of wound infection or cellulitis.  Reviewed and discussed with patient that he is at increased risk of infection and poor wound healing secondary to comorbidities including immunocompromise state and chronic steroid use, he expresses understanding.  Monitor closely for any s/sx of wound worsening or evidence of infection as reviewed and discussed at visit.  Follow up urgently with any worsening or infection concerns.    Subjective:  This is a WOC consult as requested by emergency department physician Dr. Peters of a 89 year old male patient with a skin tears on right upper arm that occurred 2 weeks ago.  Patient had a fall at a hotel when his daughter had an appointment at [facility].  EMT was called and applied Steri-Strips and gauze dressing.  He noticed there was some bleeding through the bandage and was seen at local emergency department on January 25.  He states he is doing well and has no concerns with the current wounds but he actually has not looked at them.  He states during the night a couple of nights ago he did scratch his left forearm with his fingernail and it caused another skin tear.  He covered this with a large Band-Aid.  He has stage V chronic renal disease and is followed by nephrology.  Has anemia of chronic kidney disease as well.  He has not been interested in  starting dialysis as it would limit his quality of life.  He is on chronic daily steroids.  Last tetanus 2020.    Past Medical History:   Diagnosis Date    Acute kidney failure (H24)     04/2017    Essential (primary) hypertension     8/7/2012    Hyperlipidemia     No Comments Provided    Osteoarthritis of hip     No Comments Provided    Pityriasis rosea     No Comments Provided    Tachycardia     No Comments Provided    Unilateral inguinal hernia without obstruction or gangrene     8/12/2013,Right Inguinal hernia with incarceration, massive [[id number]][     Past Surgical History:   Procedure Laterality Date    CIRCUMCISION      03/14/2017,Dr. Heidi GREENBERG    OTHER SURGICAL HISTORY      98741.9,[place] SUBTALAR ATHROEREISIS,bone Spurs excision on Toe    OTHER SURGICAL HISTORY      8/20/13,,HERNIA REPAIR,Right,RIH with mesh    OTHER SURGICAL HISTORY      8/20/13,47459,GENITAL SURGERY MALE,Dorsal Slit    OTHER SURGICAL HISTORY      4/15/14,,HERNIA REPAIR,Left,LIH with mesh    OTHER SURGICAL HISTORY      6/30/14,04991.0,[place] TOTAL HIP ARTHROPLASTY,Left     Statins [statins]  Current Outpatient Medications   Medication    amLODIPine (NORVASC) 5 MG tablet    calcitRIOL (ROCALTROL) 0.25 MCG capsule    Cranberry-Vitamin C (CRANBERRY CONCENTRATE/VITAMINC) 66845-400 MG CAPS    darbepoetin miller (ARANESP) 100 MCG/0.5ML injection    famotidine (PEPCID) 20 MG tablet    finasteride (PROSCAR) 5 MG tablet    furosemide (LASIX) 40 MG tablet    insulin glargine (LANTUS SOLOSTAR) 100 UNIT/ML pen    insulin pen needle (B-D U/F) 31G X 8 MM miscellaneous    predniSONE (DELTASONE) 2.5 MG tablet    sodium bicarbonate 650 MG tablet    tamsulosin (FLOMAX) 0.4 MG capsule     Current Facility-Administered Medications   Medication    cyanocobalamin injection 1,000 mcg       Review of Systems:  Denies fever, chills, odorous and purulent drainage, redness and warmth    Objective:   BP (!) 148/86   Pulse 95   Temp 97.9  F (36.6  C) (Temporal)    "Resp 18   Ht 1.715 m (5' 7.5\")   Wt 78.5 kg (173 lb)   SpO2 100%   BMI 26.70 kg/m    Physical Exam     Pleasant gentleman no acute distress.  Affect normal.  Alert and pleasant.  Gauze wrapping over right forearm he is covered with brown dry drainage.  Bandage was easily loosened with saline.  Steri-Strips easily loosened and removed over wound at right medial forearm.  He had a large Band-Aid covering skin tear at left forearm that had small amount of red-brown dry drainage.  All wounds were cleansed with Anasept.    Wound Type:  Skin tear  Location:  Right medial forearm  Wound Measurements:  4.5 cm x 0.1 cm x 0.1 cm  Wound Base:  Pink, partial-thickness  Undermining:  None  Tunneling:  None  Drainage:  Small serosanguineous  Periwound Tissue:  No surrounding erythema, warmth, maceration.  There is some ecchymosis proximal to the skin tear  Debridement:  Autolytic  Treatment:  Wound irrigated with Anasept, Iodosorb gel applied followed by 4 inch Allevyn gentle border light dressing    Wound Type:  Skin tear  Location:  Right medial forearm  Wound Measurements:  5 cm x 6.5 cm x 0.1 cm  Wound Base:  Pink with 10% of wound covered in yellow soft slough, full-thickness  Undermining:  None  Tunneling:  None  Drainage:  Serosanguineous  Periwound Tissue:  No surrounding erythema, warmth or maceration  Debridement:  Autolytic and mechanical of a small amount of loosely adherent yellow slough over wound bed with Anasept moistened gauze  Treatment:  Wound irrigated with Anasept, Iodosorb gel applied, half of a 5 x 9 ABD applied followed by rolled gauze and secured with Medipore tape    Wound Type:  Skin tear  Location:  Right lateral forearm  Wound Measurements:  11 cm x 4.5 cm x 0.1 cm  Wound Base:  Pink with granulation islands developing and approximately 40% yellow soft slough, full-thickness  Undermining:  None  Tunneling:  None  Drainage:  Pink and light yellow nonodorous  Periwound Tissue:  No surrounding " erythema, warmth or maceration  Debridement:  Autolytic and mechanical debridement of soft loose yellow slough with Anasept moistened gauze  Treatment:  Wound irrigated with Anasept, Iodosorb gel applied, half of a 5 x 9 ABD applied followed by rolled gauze and secured with Medipore tape    Comorbid Conditions Or Complications To Healing:  Type 2 diabetes, stage V chronic kidney disease, anemia of chronic kidney disease, chronic oral steroids, immune compromised, advanced age    Nutritional Status:  Good    Labs:  November 2023  Albumin 3.9  January 2024   hemoglobin 10 g/dL, creatinine 8.15    Diagnostics:  No recent imaging of forearm        PAL Godinez   1/31/2024  10:42 AM

## 2024-01-31 NOTE — NURSING NOTE
"Chief Complaint   Patient presents with    WOUND CARE     Right Arm Skin Tear, ER 01/25/2024         Initial BP (!) 161/84 (BP Location: Left arm, Patient Position: Sitting, Cuff Size: Adult Regular)   Pulse 95   Temp 97.9  F (36.6  C) (Temporal)   Resp 18   Ht 1.715 m (5' 7.5\")   Wt 78.5 kg (173 lb)   SpO2 100%   BMI 26.70 kg/m   Estimated body mass index is 26.7 kg/m  as calculated from the following:    Height as of this encounter: 1.715 m (5' 7.5\").    Weight as of this encounter: 78.5 kg (173 lb).       FOOD SECURITY SCREENING QUESTIONS:    The next two questions are to help us understand your food security.  If you are feeling you need any assistance in this area, we have resources available to support you today.    Hunger Vital Signs:  Within the past 12 months we worried whether our food would run out before we got money to buy more. Never  Within the past 12 months the food we bought just didn't last and we didn't have money to get more. Never  Sigrid Sigala LPN on 1/31/2024 at 9:30 AM     Sigrid Sigaal   "

## 2024-02-02 NOTE — PROGRESS NOTES
[facility] OUTPATIENT WOC CONSULT    Assessment:     ICD-10-CM    1. Skin tear of left upper extremity  S41.112A       2. Skin tear of right upper extremity  S41.111A           Plan:   Leave dressing in place until follow-up clinic visit.  Follow up in wound clinic in 5 days.  Monitor closely for any s/sx of wound worsening or evidence of infection as reviewed and discussed at visit.  Follow up urgently with any worsening or infection concerns.    Subjective:  He is seen today to follow-up on skin tears of both arms.  He reports dressings staying in place.  He is not having any pain or drainage through bandages.  Doing well.    Past Medical History:   Diagnosis Date    Acute kidney failure (H24)     04/2017    Essential (primary) hypertension     8/7/2012    Hyperlipidemia     No Comments Provided    Osteoarthritis of hip     No Comments Provided    Pityriasis rosea     No Comments Provided    Tachycardia     No Comments Provided    Unilateral inguinal hernia without obstruction or gangrene     8/12/2013,Right Inguinal hernia with incarceration, massive [[id number]][     Past Surgical History:   Procedure Laterality Date    CIRCUMCISION      03/14/2017,Dr. Heidi GREENBERG    OTHER SURGICAL HISTORY      24841.9,[place] SUBTALAR ATHROEREISIS,bone Spurs excision on Toe    OTHER SURGICAL HISTORY      8/20/13,,HERNIA REPAIR,Right,RIH with mesh    OTHER SURGICAL HISTORY      8/20/13,74136,GENITAL SURGERY MALE,Dorsal Slit    OTHER SURGICAL HISTORY      4/15/14,,HERNIA REPAIR,Left,LIH with mesh    OTHER SURGICAL HISTORY      6/30/14,70694.0,[place] TOTAL HIP ARTHROPLASTY,Left     Statins [statins]  Current Outpatient Medications   Medication    amLODIPine (NORVASC) 5 MG tablet    calcitRIOL (ROCALTROL) 0.25 MCG capsule    Cranberry-Vitamin C (CRANBERRY CONCENTRATE/VITAMINC) 60989-610 MG CAPS    darbepoetin miller (ARANESP) 100 MCG/0.5ML injection    famotidine (PEPCID) 20 MG tablet    finasteride (PROSCAR) 5 MG tablet    furosemide  "(LASIX) 40 MG tablet    insulin glargine (LANTUS SOLOSTAR) 100 UNIT/ML pen    insulin pen needle (B-D U/F) 31G X 8 MM miscellaneous    predniSONE (DELTASONE) 2.5 MG tablet    sodium bicarbonate 650 MG tablet    tamsulosin (FLOMAX) 0.4 MG capsule     Current Facility-Administered Medications   Medication    cyanocobalamin injection 1,000 mcg       Review of Systems:  Denies fever, chills, odorous and purulent drainage, redness and warmth    Objective:   BP (!) 162/81 (BP Location: Right arm, Patient Position: Sitting, Cuff Size: Adult Regular)   Pulse 99   Temp 97.5  F (36.4  C) (Temporal)   Resp 20   Ht 1.715 m (5' 7.5\")   Wt 78.6 kg (173 lb 3.2 oz)   SpO2 100%   BMI 26.73 kg/m    Physical Exam     Pleasant gentleman no acute distress.  Affect normal.  Alert and pleasant.  Gauze wrapping over right forearm he is   Wound Type:  Skin tear  Location:  Right medial forearm closed      Wound Type:  Skin tear  Location:  Right medial forearm  Wound Measurements:  3.5 cm x 2.5 cm x 0.1 cm  Wound Base:  75% pink granulation tissue and 25% brown adherent eschar  Undermining:  None  Tunneling:  None  Drainage:  Serosanguineous  Periwound Tissue:  No surrounding erythema, warmth or maceration  Debridement:  Autolytic and mechanical of a small amount of loosely adherent yellow slough over wound bed with Anasept moistened gauze  Treatment:  Wound irrigated with Anasept, Iodosorb gel applied, half of a 5 x 9 ABD applied followed by rolled gauze and secured with Medipore tape    Wound Type:  Skin tear  Location:  Right lateral forearm  Wound Measurements:  Closed but still has soft ecchymosis  Drainage:  None  Debridement:  Autolytic and mechanical debridement of soft loose yellow slough with Anasept moistened gauze  Treatment:  Wound irrigated with Anasept, Iodosorb gel applied, half of a 5 x 9 ABD applied followed by rolled gauze and secured with Medipore tape      "

## 2024-02-02 NOTE — NURSING NOTE
"Chief Complaint   Patient presents with    WOUND CARE     Skin Tear of Forearms       Initial BP (!) 162/81 (BP Location: Right arm, Patient Position: Sitting, Cuff Size: Adult Regular)   Pulse 99   Temp 97.5  F (36.4  C) (Temporal)   Resp 20   Ht 1.715 m (5' 7.5\")   Wt 78.6 kg (173 lb 3.2 oz)   SpO2 100%   BMI 26.73 kg/m   Estimated body mass index is 26.73 kg/m  as calculated from the following:    Height as of this encounter: 1.715 m (5' 7.5\").    Weight as of this encounter: 78.6 kg (173 lb 3.2 oz).  Medication Review: complete    The next two questions are to help us understand your food security.  If you are feeling you need any assistance in this area, we have resources available to support you today.          1/31/2024   SDOH- Food Insecurity   Within the past 12 months, did you worry that your food would run out before you got money to buy more? N   Within the past 12 months, did the food you bought just not last and you didn t have money to get more? N           Sigrid Sigala      "

## 2024-02-07 NOTE — NURSING NOTE
"Chief Complaint   Patient presents with    WOUND CARE     Skin Tear       Initial BP (!) 160/79 (BP Location: Left arm, Patient Position: Sitting, Cuff Size: Adult Regular)   Pulse 95   Temp 97.4  F (36.3  C) (Temporal)   Resp 17   Ht 1.715 m (5' 7.5\")   Wt 76.5 kg (168 lb 9.6 oz)   SpO2 100%   BMI 26.02 kg/m   Estimated body mass index is 26.02 kg/m  as calculated from the following:    Height as of this encounter: 1.715 m (5' 7.5\").    Weight as of this encounter: 76.5 kg (168 lb 9.6 oz).  Medication Review: complete    The next two questions are to help us understand your food security.  If you are feeling you need any assistance in this area, we have resources available to support you today.          1/31/2024   SDOH- Food Insecurity   Within the past 12 months, did you worry that your food would run out before you got money to buy more? N   Within the past 12 months, did the food you bought just not last and you didn t have money to get more? N         Sigrid Sigala      "

## 2024-02-07 NOTE — PROGRESS NOTES
[facility] WO CONSULT    Assessment:     ICD-10-CM    1. Skin tear of right upper extremity  S41.111A DEBRIDEMENT WOUND UP TO 20 SQ CM      2. Skin tear of left upper extremity  S41.112A       3. 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 Immunocompromised patient (H24)  D84.9       5. Current chronic use of systemic steroids  Z79.52       6. CKD (chronic kidney disease) stage 5, GFR 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 ml/min (H)  N18.5           Plan:   Dressing change every 5 days.  Cleanse with soap and water, rinse clear and pat dry.  Apply 4 inch Allevyn gentle border light dressing.  Follow-up in wound clinic in 2 weeks, sooner with concerns.  Monitor closely for any s/sx of wound worsening or evidence of infection as reviewed and discussed at visit.  Follow up urgently with any worsening or infection concerns.    Subjective:  He is seen today to follow-up on skin tears of left upper extremity.  He reports dressings staying in place.  He is not having any pain or drainage through bandages.  Doing well.  Has stage V chronic kidney disease is on prednisone daily.  Immunocompromise.  Diabetes well-controlled.    Past Medical History:   Diagnosis Date    Acute kidney failure (H24)     04/2017    Essential (primary) hypertension     8/7/2012    Hyperlipidemia     No Comments Provided    Osteoarthritis of hip     No Comments Provided    Pityriasis rosea     No Comments Provided    Tachycardia     No Comments Provided    Unilateral inguinal hernia without obstruction or gangrene     8/12/2013,Right Inguinal hernia with incarceration, massive [[id number]][     Past Surgical History:   Procedure Laterality Date    CIRCUMCISION      03/14/2017,Dr. Heidi GERENBERG    OTHER SURGICAL HISTORY      22313.9,[place] SUBTALAR ATHROEREISIS,bone Spurs excision on Toe    OTHER SURGICAL HISTORY      8/20/13,,HERNIA REPAIR,Right,[facility]  "with mesh    OTHER SURGICAL HISTORY      8/20/13,83665,GENITAL SURGERY MALE,Dorsal Slit    OTHER SURGICAL HISTORY      4/15/14,,HERNIA REPAIR,Left,[facility] with mesh    OTHER SURGICAL HISTORY      6/30/14,18922.0,[place] TOTAL HIP ARTHROPLASTY,Left     Statins [statins]  Current Outpatient Medications   Medication    amLODIPine (NORVASC) 5 MG tablet    calcitRIOL (ROCALTROL) 0.25 MCG capsule    Cranberry-Vitamin C (CRANBERRY CONCENTRATE/VITAMINC) 39667-693 MG CAPS    darbepoetin miller (ARANESP) 100 MCG/0.5ML injection    famotidine (PEPCID) 20 MG tablet    finasteride (PROSCAR) 5 MG tablet    furosemide (LASIX) 40 MG tablet    insulin glargine (LANTUS SOLOSTAR) 100 UNIT/ML pen    insulin pen needle (B-D U/F) 31G X 8 MM miscellaneous    predniSONE (DELTASONE) 2.5 MG tablet    sodium bicarbonate 650 MG tablet    tamsulosin (FLOMAX) 0.4 MG capsule     Current Facility-Administered Medications   Medication    cyanocobalamin injection 1,000 mcg       Review of Systems:  Denies fever, chills, odorous and purulent drainage, redness and warmth    Objective:   BP (!) 160/79 (BP Location: Left arm, Patient Position: Sitting, Cuff Size: Adult Regular)   Pulse 95   Temp 97.4  F (36.3  C) (Temporal)   Resp 17   Ht 1.715 m (5' 7.5\")   Wt 76.5 kg (168 lb 9.6 oz)   SpO2 100%   BMI 26.02 kg/m    Physical Exam     Pleasant gentleman no acute distress.  Affect normal.  Alert and pleasant.  Left forearm skin tear continues to be closed.    Wound Type:  Skin tear  Location:  Right medial forearm  Wound Measurements:  closed    Wound Type:  Skin tear  Location:  Right lateral forearm  Wound Measurements:  1 x 2.5 x 0.1 cm and 1 x 3 x 0.1 cm  Drainage:  Small serosanguineous  Wound bed:  Covered with 100% brown adherent eschar.  Postdebridement 100% granulation tissue.  Debridement:  After permission granted by patient a 15 blade scalpel was used to sharply debride devitalized tissue.  Treatment:  Wound irrigated with Anasept then 4 inch " Allevyn gentle border light dressing applied.

## 2024-02-21 NOTE — PROGRESS NOTES
[facility] PROGRESS NOTE    Assessment:     ICD-10-CM    1. Skin tear of right upper extremity  S41.111A       2. Skin tear of left upper extremity  S41.112A           Plan:   Skin tears are all healed.  Follow-up as needed.      Subjective:  Patient here today to follow up on skin tear on right and left upper extremity.  He believes that these have closed.  There has been no drainage.  He is no longer doing dressing changes.        Past Medical History:   Diagnosis Date    Acute kidney failure (H24)     04/2017    Essential (primary) hypertension     8/7/2012    Hyperlipidemia     No Comments Provided    Osteoarthritis of hip     No Comments Provided    Pityriasis rosea     No Comments Provided    Tachycardia     No Comments Provided    Unilateral inguinal hernia without obstruction or gangrene     8/12/2013,Right Inguinal hernia with incarceration, massive [[id number]][     Past Surgical History:   Procedure Laterality Date    CIRCUMCISION      03/14/2017,Dr. Heidi GREENBERG    OTHER SURGICAL HISTORY      05983.9,[place] SUBTALAR ATHROEREISIS,bone Spurs excision on Toe    OTHER SURGICAL HISTORY      8/20/13,,HERNIA REPAIR,Right,[facility] with mesh    OTHER SURGICAL HISTORY      8/20/13,97219,GENITAL SURGERY MALE,Dorsal Slit    OTHER SURGICAL HISTORY      4/15/14,,HERNIA REPAIR,Left,[facility] with mesh    OTHER SURGICAL HISTORY      6/30/14,65340.0,[place] TOTAL HIP ARTHROPLASTY,Left     Statins [statins]  Current Outpatient Medications   Medication    amLODIPine (NORVASC) 5 MG tablet    calcitRIOL (ROCALTROL) 0.25 MCG capsule    Cranberry-Vitamin C (CRANBERRY CONCENTRATE/VITAMINC) 78943-370 MG CAPS    darbepoetin miller (ARANESP) 100 MCG/0.5ML injection    famotidine (PEPCID) 20 MG tablet    finasteride (PROSCAR) 5 MG tablet    furosemide (LASIX) 40 MG tablet    insulin glargine (LANTUS SOLOSTAR) 100 UNIT/ML pen    insulin pen needle (B-D U/F) 31G X 8 MM miscellaneous    predniSONE (DELTASONE) 2.5 MG tablet    sodium bicarbonate 650 MG  "tablet    tamsulosin (FLOMAX) 0.4 MG capsule     Current Facility-Administered Medications   Medication    cyanocobalamin injection 1,000 mcg       Review of Systems:  See HPI    Objective:   BP (!) 153/87 (BP Location: Right arm, Patient Position: Sitting, Cuff Size: Adult Regular)   Pulse 96   Temp 97.6  F (36.4  C) (Temporal)   Resp 18   Ht 1.715 m (5' 7.5\")   Wt 77.8 kg (171 lb 9.6 oz)   SpO2 100%   BMI 26.48 kg/m    Physical Exam     Pleasant gentleman no acute distress.  Affect normal.  Alert and oriented x 4.  Bilateral forearms show complete healing.      "

## 2024-02-21 NOTE — NURSING NOTE
"Chief Complaint   Patient presents with    WOUND CARE       Initial BP (!) 153/87 (BP Location: Right arm, Patient Position: Sitting, Cuff Size: Adult Regular)   Pulse 96   Temp 97.6  F (36.4  C) (Temporal)   Resp 18   Ht 1.715 m (5' 7.5\")   Wt 77.8 kg (171 lb 9.6 oz)   SpO2 100%   BMI 26.48 kg/m   Estimated body mass index is 26.48 kg/m  as calculated from the following:    Height as of this encounter: 1.715 m (5' 7.5\").    Weight as of this encounter: 77.8 kg (171 lb 9.6 oz).  Medication Review: complete    The next two questions are to help us understand your food security.  If you are feeling you need any assistance in this area, we have resources available to support you today.          1/31/2024   SDOH- Food Insecurity   Within the past 12 months, did you worry that your food would run out before you got money to buy more? N   Within the past 12 months, did the food you bought just not last and you didn t have money to get more? N         Sigrid Sigala      "

## 2024-02-27 NOTE — PROGRESS NOTES

## 2024-03-04 NOTE — PROGRESS NOTES
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, with long-term current use of insulin; Primary pauci-immune necrotizing and crescentic glomerulonephritis; Immunocompromised patient   PT Tx Diagnosis impaired functional mobility, balance, gait   Progress Note/Certification   Start of Care Date 12/06/23   Onset of illness/injury or Date of Surgery 11/15/23   Therapy Frequency 2x/week   Predicted Duration 12 weeks   Certification date from 02/29/24   Certification date to 05/24/24   Progress Note Completed Date 01/12/24   Supervision   PT Assistant Visit Number 2   PT Goal 1   Goal Identifier ambulation   Goal Description patient will ambulate 1000ft, without reaching out for the wall for support, in order to demonstrate safe community ambulation distances   Rationale to maximize safety and independence within the home;to maximize safety and independence within the community;to maximize safety and independence with performance of ADLs and functional tasks   Goal Progress not met - ambulated 420ft, CGA with gait belt, reached for wall 0x   Target Date 02/28/24   PT Goal 2   Goal Identifier functional strength   Goal Description patient will improve 30 sec sit<>stand to >8 reps to demonstrate increased functional strength   Rationale to maximize safety and independence with performance of ADLs and functional tasks;to maximize safety and independence within the home;to maximize safety and independence within the community   Goal Progress completed 9 reps in 30 sec, 1 UE support   Target Date 01/31/24   Date Met 03/01/24   PT Goal 3   Goal Identifier balance   Goal Description patient will demonstrate improved balance as evidenced by SLS time of >10 sec   Rationale to maximize safety and independence with  "performance of ADLs and functional tasks;to maximize safety and independence within the home;to maximize safety and independence within the community   Target Date 02/28/24   Goal Progress not met, continue progressing balance work   Subjective Report   Subjective Report Patient reports no new symptoms, continues to feel pretty good.   Objective Measure 1   Objective Measure strength   Details B LE grossly 4-/5   Objective Measure 2   Objective Measure gait   Details improved stride length, minimal to no wall surfing; continues to show decreased milvia   Objective Measure 3   Objective Measure 30 sec<>stand   Details x9 (able to complete 15 reps to fatigue)   Therapeutic Procedure/Exercise   Therapeutic Procedures: strength, endurance, ROM, flexibillity minutes (52122) 25   Ther Proc 1 LE strength   Ther Proc 1 - Details with 3lb ankle weights: march, LAQ, leg open/close, heel raise, toe raise; squats x10 while holding 10# weight in both hands; standing with 3lb ankle weights march, hip abd, ext x12/leg, heel raise, toe raise  (deferred:)   Skilled Intervention LE strength   Patient Response/Progress well tolerated, rest breaks less frequent   Therapeutic Activity   Therapeutic Activities: dynamic activities to improve functional performance minutes (13197) 20   Ther Act 1 activity tolerance, ambulation   Ther Act 1 - Details scifit 8 min, level 6; navigated 4 hurdles x6, CGA with gait belt; ambulated x150ft SBA  (increased milvia, decreased \"wall surfing\"; deferred due to time: hurdles lateral x4)   Ther Act 2 - Details   (deferred: staggered stance weight shift forward/backward; walking backwards 4 steps x5 with HHA x2. standing rows, shoulder ext, flex with red band; on airex foam rhomberg EO & EC, tandem, SLS, march)   Skilled Intervention ambulation, standing tolerance   Patient Response/Progress well tolerated   Education   Learner/Method Patient;Listening;Demonstration;Pictures/Video;No Barriers to Learning "   Plan   Home program HEP, print out given  ([employer] access code [email or web address])   Plan for next session activity tolerance, WB exercises, stairs - consider step ups, B LE strength - consider med x, B UE strength   Total Session Time   Timed Code Treatment Minutes 45   Total Treatment Time (sum of timed and untimed services) 45       PLAN  Continue therapy per current plan of care.    Beginning/End Dates of Progress Note Reporting Period:  01/12/24 to 03/04/2024    Referring Provider:  Melissa Nettles

## 2024-03-27 NOTE — PROGRESS NOTES

## 2024-04-09 NOTE — NURSING NOTE
"Chief Complaint   Patient presents with    Diabetes       Initial BP (!) 178/98   Pulse 101   Temp 97  F (36.1  C)   Resp 18   Ht 1.753 m (5' 9\")   Wt 79.5 kg (175 lb 3.2 oz)   SpO2 96%   BMI 25.87 kg/m   Estimated body mass index is 25.87 kg/m  as calculated from the following:    Height as of this encounter: 1.753 m (5' 9\").    Weight as of this encounter: 79.5 kg (175 lb 3.2 oz).  Medication Review: complete    The next two questions are to help us understand your food security.  If you are feeling you need any assistance in this area, we have resources available to support you today.          1/31/2024   SDOH- Food Insecurity   Within the past 12 months, did you worry that your food would run out before you got money to buy more? N   Within the past 12 months, did the food you bought just not last and you didn t have money to get more? N         Health Care Directive:  Patient does not have a Health Care Directive or Living Will: Discussed advance care planning with patient; however, patient declined at this time.    Sol Finney LPN      "

## 2024-04-09 NOTE — PROGRESS NOTES
Assessment & Plan     ICD-10-CM    1. 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 Benign essential hypertension  I10 amLODIPine (NORVASC) 5 MG tablet     furosemide (LASIX) 40 MG tablet      3. Bilateral edema of lower extremity  R60.0       4. CKD (chronic kidney disease) stage 5, GFR 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 ml/min (H)  N18.5 predniSONE (DELTASONE) 2.5 MG tablet      5. 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 Primary pauci-immune necrotizing and crescentic glomerulonephritis  N05.8 predniSONE (DELTASONE) 2.5 MG tablet    N05.7       7. Immunocompromised patient (H24)  D84.9 predniSONE (DELTASONE) 2.5 MG tablet      8. Bacteria in urine  R82.71 Urine Culture      9. Urinary retention  R33.9 tamsulosin (FLOMAX) 0.4 MG capsule         Patient presents for patient presents for follow-up multiple issues.    Bilateral lower extremity edema, currently stable.  Still urinating well.  Denies significant bladder issues.  Declines dialysis.  GFR is 5-6 range.    Hyperparathyroidism due to renal insufficiency.  Patient has autoimmune disorder with concentric glomerulonephritis.  Continues with chronic daily prednisone use.  Prednisone refilled today.  Still following regularly with nephrology.    Urinary retention, some improvement with Flomax.  States that he feels like he empties pretty well but occasionally does not feel like he fully into his bladder.  Sees be tolerating well.  No medication side effects reported.  Flomax refilled.    Bacteria noted in the urine, urine culture ordered and pending.  He is having minimal symptoms at this time.  Would hold off on initiating antibiotics until culture results return.    HYPERTENSION - Ongoing. Blood pressure is currently well controlled.  Medication side effects: None. Denies syncope or presyncope.  Continue current medications.    Medication list reviewed/updated. Refills completed as needed.      MIXED HYPERLIPIDEMIA.  Ongoing. LDL is at goal: No. Triglycerides are at goal: Yes.  Hopefully lifestyle modifications will improve cholesterol levels, otherwise will consider additional medication dose adjustments or medication changes.  -- Diet controlled.     Recent Labs   Lab Test 04/08/24  1050 11/30/23  0927   CHOL 157 128   HDL 42 40   * 71   TRIG 70 83        Chronic Kidney Disease, Stage 5 (GFR < 15), chronic, ongoing.  Kidney function had been slowly declining.  Encourage NSAID avoidance.       Type 2 Diabetes Mellitus, with nephropathy.  Blood sugar control has been good with minimal hyperglycemia. Doing well with diet, exercise, and insulin injections.  Medication list reviewed/updated. Refills completed as needed.    Complicating factors include but are not limited to: hypertension, hyperlipidemia, and chronic kidney disease.     Recent Labs   Lab Test 04/08/24  1050 11/30/23  0927 11/01/23  0540 10/31/23  0555 09/22/23  1749 08/22/23  0918   A1C 5.6 5.3  --   --   --  6.3*   * 71  --   --   --  95   HDL 42 40  --   --   --  36*   TRIG 70 83  --   --   --  105   ALT 10 11  --  13   < > 9   CR 9.39* 8.15*   < > 9.77*   < > 8.23*   GFRESTIMATED 5* 6*   < > 5*   < > 6*   POTASSIUM 5.3 5.7*   < > 4.4   < > 5.5*   TSH 10.24* 6.92*  --   --   --  6.61*   T4 0.89* 0.91  --   --   --  0.88*   WBC 10.6 12.5*   < > 11.0   < > 10.1   HGB 11.8* 10.6*   < > 9.0*   < > 10.1*    254   < > 201   < > 235   ALBUMIN 4.0 3.9  --  3.1*   < > 4.1    < > = values in this interval not displayed.     Hemoglobin A1c is well-controlled.  LDL is high and not at goal.  HDL and triglycerides are at goal.  ALT normal.  Creatinine is at baseline.  Potassium normal.  TSH is elevated, free T4 low.  Declines need for thyroid medication.  CBC shows mild anemia but has improved.  White blood cell count platelet count normal.  Albumin normal.      The  "longitudinal plan of care for the diagnosis(es)/condition(s) as documented were addressed during this visit. Due to the added complexity in care, I will continue to support Chaitanya in the subsequent management and with ongoing continuity of care.          + Minimal Dysuria - check urine culture, start antibiotics pending culture results.              BMI  Estimated body mass index is 25.87 kg/m  as calculated from the following:    Height as of this encounter: 1.753 m (5' 9\").    Weight as of this encounter: 79.5 kg (175 lb 3.2 oz).         Return in about 3 months (around 7/9/2024) for Annual Medicare Wellness Visit, + Get Diabetic labs prior to clinic appointment.      Valentino Machado MD  [facility]    Review of Systems   Constitutional:  Negative for chills, fatigue and fever.        Golfs 1x weekly in the summer and in the winter - curls 2x weekly and bowling 2x weekly    HENT:  Negative for congestion and hearing loss.    Eyes:  Negative for pain and visual disturbance.   Respiratory:  Negative for cough, shortness of breath and wheezing.    Cardiovascular:  Negative for chest pain and palpitations.   Gastrointestinal:  Negative for abdominal pain, diarrhea, nausea and vomiting.   Endocrine: Negative for cold intolerance and heat intolerance.   Genitourinary:  Positive for dysuria (Minimal intermittent dysuria). Negative for hematuria.        Reports urinating well. Denies bladder symptoms.    Musculoskeletal:  Positive for gait problem. Negative for arthralgias, back pain and myalgias.   Skin:  Negative for pallor.   Allergic/Immunologic: Negative for immunocompromised state.   Neurological:  Negative for dizziness and light-headedness.   Hematological:  Bruises/bleeds easily.   Psychiatric/Behavioral:  Negative for agitation and confusion.          Subjective   Chaitanya is a 89 year old, presenting for the following health issues:  Diabetes        4/9/2024    10:57 AM   Additional Questions " "  Roomed by Dante Finney LPN     History of Present Illness       CKD: He is not using over the counter pain medicine.     Hypertension: He presents for follow up of hypertension.  He does check blood pressure  regularly outside of the clinic. Outside blood pressures have been over 140/90. He follows a low salt diet.     He eats 4 or more servings of fruits and vegetables daily.He consumes 1 sweetened beverage(s) daily.He exercises with enough effort to increase his heart rate 9 or less minutes per day.  He exercises with enough effort to increase his heart rate 3 or less days per week.   He is taking medications regularly.                     Objective    /88   Pulse 101   Temp 97  F (36.1  C)   Resp 18   Ht 1.753 m (5' 9\")   Wt 79.5 kg (175 lb 3.2 oz)   SpO2 96%   BMI 25.87 kg/m    Body mass index is 25.87 kg/m .  Physical Exam  Constitutional:       General: He is not in acute distress.     Appearance: Normal appearance. He is well-developed. He is obese. He is not diaphoretic.   HENT:      Head: Normocephalic and atraumatic.   Eyes:      General: No scleral icterus.     Conjunctiva/sclera: Conjunctivae normal.   Cardiovascular:      Rate and Rhythm: Normal rate and regular rhythm.   Pulmonary:      Effort: Pulmonary effort is normal.      Breath sounds: Normal breath sounds.   Abdominal:      Palpations: Abdomen is soft.      Tenderness: There is no abdominal tenderness.   Musculoskeletal:         General: No deformity.      Cervical back: Neck supple.      Right lower leg: Edema present.      Left lower leg: Edema present.   Lymphadenopathy:      Cervical: No cervical adenopathy.   Skin:     General: Skin is warm and dry.      Findings: Bruising (bilateral arms) present. No rash.      Comments: + Dry skin   Neurological:      Mental Status: He is alert. Mental status is at baseline.   Psychiatric:         Mood and Affect: Mood normal.         Behavior: Behavior normal.                    Signed " Electronically by: Valentino Machado MD

## 2024-04-09 NOTE — PATIENT INSTRUCTIONS
Blood pressure is well controlled.   Diabetes is well controlled.     Medications refilled.   Labs are stable.       Lab on 04/08/2024   Component Date Value Ref Range Status    Color Urine 04/08/2024 Yellow  Colorless, Straw, Light Yellow, Yellow Final    Appearance Urine 04/08/2024 Cloudy (A)  Clear Final    Glucose Urine 04/08/2024 Negative  Negative mg/dL Final    Bilirubin Urine 04/08/2024 Negative  Negative Final    Ketones Urine 04/08/2024 Negative  Negative mg/dL Final    Specific Gravity Urine 04/08/2024 1.008  1.000 - 1.030 Final    Blood Urine 04/08/2024 Moderate (A)  Negative Final    pH Urine 04/08/2024 7.5  5.0 - 9.0 Final    Protein Albumin Urine 04/08/2024 100 (A)  Negative mg/dL Final    Urobilinogen Urine 04/08/2024 Normal  Normal, 2.0 mg/dL Final    Nitrite Urine 04/08/2024 Positive (A)  Negative Final    Leukocyte Esterase Urine 04/08/2024 Large (A)  Negative Final    RBC Urine 04/08/2024 17 (H)  <=2 /HPF Final    WBC Urine 04/08/2024 >182 (H)  <=5 /HPF Final    WBC Clumps Urine 04/08/2024 Present (A)  None Seen /HPF Final    TSH 04/08/2024 10.24 (H)  0.30 - 4.20 uIU/mL Final    Hemoglobin A1C 04/08/2024 5.6  4.0 - 6.2 % Final    WBC Count 04/08/2024 10.6  4.0 - 11.0 10e3/uL Final    RBC Count 04/08/2024 3.86 (L)  4.40 - 5.90 10e6/uL Final    Hemoglobin 04/08/2024 11.8 (L)  13.3 - 17.7 g/dL Final    Hematocrit 04/08/2024 36.7 (L)  40.0 - 53.0 % Final    MCV 04/08/2024 95  78 - 100 fL Final    MCH 04/08/2024 30.6  26.5 - 33.0 pg Final    MCHC 04/08/2024 32.2  31.5 - 36.5 g/dL Final    RDW 04/08/2024 14.2  10.0 - 15.0 % Final    Platelet Count 04/08/2024 276  150 - 450 10e3/uL Final    Creatinine Urine mg/dL 04/08/2024 30.9  mg/dL Final    The reference ranges have not been established in urine creatinine. The results should be integrated into the clinical context for interpretation.    Albumin Urine mg/L 04/08/2024 359.0  mg/L Final    The reference ranges have not been established in urine  albumin. The results should be integrated into the clinical context for interpretation.    Albumin Urine mg/g Cr 04/08/2024 1,161.81 (H)  0.00 - 17.00 mg/g Cr Final    Microalbuminuria is defined as an albumin:creatinine ratio of 17 to 299 for males and 25 to 299 for females. A ratio of albumin:creatinine of 300 or higher is indicative of overt proteinuria.  Due to biologic variability, positive results should be confirmed by a second, first-morning random or 24-hour timed urine specimen. If there is discrepancy, a third specimen is recommended. When 2 out of 3 results are in the microalbuminuria range, this is evidence for incipient nephropathy and warrants increased efforts at glucose control, blood pressure control, and institution of therapy with an angiotensin-converting-enzyme (ACE) inhibitor (if the patient can tolerate it).      Cholesterol 04/08/2024 157  <200 mg/dL Final    Triglycerides 04/08/2024 70  <150 mg/dL Final    Direct Measure HDL 04/08/2024 42  >=40 mg/dL Final    LDL Cholesterol Calculated 04/08/2024 101 (H)  <=100 mg/dL Final    Non HDL Cholesterol 04/08/2024 115  <130 mg/dL Final    Patient Fasting > 8hrs? 04/08/2024 Unknown   Final    Sodium 04/08/2024 139  135 - 145 mmol/L Final    Reference intervals for this test were updated on 09/26/2023 to more accurately reflect our healthy population. There may be differences in the flagging of prior results with similar values performed with this method. Interpretation of those prior results can be made in the context of the updated reference intervals.     Potassium 04/08/2024 5.3  3.4 - 5.3 mmol/L Final    Carbon Dioxide (CO2) 04/08/2024 20 (L)  22 - 29 mmol/L Final    Anion Gap 04/08/2024 17 (H)  7 - 15 mmol/L Final    Urea Nitrogen 04/08/2024 94.9 (H)  8.0 - 23.0 mg/dL Final    Creatinine 04/08/2024 9.39 (H)  0.67 - 1.17 mg/dL Final    GFR Estimate 04/08/2024 5 (L)  >60 mL/min/1.73m2 Final    Calcium 04/08/2024 8.9  8.8 - 10.2 mg/dL Final     Chloride 04/08/2024 102  98 - 107 mmol/L Final    Glucose 04/08/2024 110 (H)  70 - 99 mg/dL Final    Alkaline Phosphatase 04/08/2024 91  40 - 150 U/L Final    Reference intervals for this test were updated on 11/14/2023 to more accurately reflect our healthy population. There may be differences in the flagging of prior results with similar values performed with this method. Interpretation of those prior results can be made in the context of the updated reference intervals.    AST 04/08/2024 17  0 - 45 U/L Final    Reference intervals for this test were updated on 6/12/2023 to more accurately reflect our healthy population. There may be differences in the flagging of prior results with similar values performed with this method. Interpretation of those prior results can be made in the context of the updated reference intervals.    ALT 04/08/2024 10  0 - 70 U/L Final    Reference intervals for this test were updated on 6/12/2023 to more accurately reflect our healthy population. There may be differences in the flagging of prior results with similar values performed with this method. Interpretation of those prior results can be made in the context of the updated reference intervals.      Protein Total 04/08/2024 7.1  6.4 - 8.3 g/dL Final    Albumin 04/08/2024 4.0  3.5 - 5.2 g/dL Final    Bilirubin Total 04/08/2024 0.4  <=1.2 mg/dL Final    Free T4 04/08/2024 0.89 (L)  0.90 - 1.70 ng/dL Final        Aspects of Diabetes:   Recent Labs   Lab Test 04/08/24  1050 11/30/23  0927 11/01/23  0540 10/31/23  0555 09/22/23  1749 08/22/23  0918   A1C 5.6 5.3  --   --   --  6.3*   * 71  --   --   --  95   HDL 42 40  --   --   --  36*   TRIG 70 83  --   --   --  105   ALT 10 11  --  13   < > 9   CR 9.39* 8.15*   < > 9.77*   < > 8.23*   GFRESTIMATED 5* 6*   < > 5*   < > 6*   POTASSIUM 5.3 5.7*   < > 4.4   < > 5.5*   TSH 10.24* 6.92*  --   --   --  6.61*   T4 0.89* 0.91  --   --   --  0.88*   WBC 10.6 12.5*   < > 11.0   < > 10.1    HGB 11.8* 10.6*   < > 9.0*   < > 10.1*    254   < > 201   < > 235   ALBUMIN 4.0 3.9  --  3.1*   < > 4.1    < > = values in this interval not displayed.      Hemoglobin A1c  Goal range is under 8%. Best is 6.5 to 7   Blood Pressure 138/88 Goal to keep less than 140/90   Tobacco  reports that he quit smoking about 48 years ago. His smoking use included cigarettes. He has been exposed to tobacco smoke. He has never used smokeless tobacco. Goal to abstain from tobacco   Aspirin or Plavix Anti-platelet therapy Aspirin or Plavix reduces risk of heart disease and stroke  -- sometimes used with other blood thinners, depending on bleeding risk and risk factors.    ACE/ARB Specific blood pressure meds These medications can reduce risk of kidney disease   Cholesterol Statins (Lipitor, Crestor, vs others) Statins reduce risk of heart disease and stroke   Eye Exam -- Do Yearly -- Annual diabetic eye exam   Healthy weight Wt Readings from Last 4 Encounters:   04/09/24 79.5 kg (175 lb 3.2 oz)   02/21/24 77.8 kg (171 lb 9.6 oz)   02/07/24 76.5 kg (168 lb 9.6 oz)   02/02/24 78.6 kg (173 lb 3.2 oz)      Body mass index is 25.87 kg/m .  Goal BMI under 30, best is under 25.      -- Trying to exercise daily (goal at least 20 min/day) with moderate aerobic activity   -- Eat healthy (resources from ADA at http://www.diabetes.org/)   -- Taking good care of my feet. Consider seeing the Podiatrist   -- Check blood sugars as directed, record in log book and bring to every appointment    Insurance companies are grading you and I on your blood sugar control -- Goal is to get your A1c down to 7.9% or lower and NO Smoking!  -- Medicare and most insurance companies, will only cover Hemoglobin A1c labs to be rechecked every 91+ days.      Return for Diabetes labs and clinic follow-up appointment every 3 to 4 months.    Schedule lab only appointment --- A few days AFTER: 7/8/24   Schedule clinic appointment with Dr. Machado -- Same day as  labs, or 1-2 days later.

## 2024-04-16 NOTE — TELEPHONE ENCOUNTER
Please call patient and inquire about symptoms- he is likely colonized but if he is symptomatic we can only treat with IV antibiotics.

## 2024-04-16 NOTE — TELEPHONE ENCOUNTER
"Valentino Machado, please review patients culture and sensitivity report. Patient not taking any antibiotics. Unable to reach patient to discuss present symptoms (left message to call back).    \"Bacteria noted in the urine, urine culture ordered and pending.  He is having minimal symptoms at this time.  Would hold off on initiating antibiotics until culture results return.\" Report from 4/9/24       Urine Culture  Order: [id number]  Status: Final result       Visible to patient: No (inaccessible in MyChart)       Dx: Bacteria in urine    Specimen Information: Urine, NOS   0 Result Notes  Culture >100,000 CFU/mL Pseudomonas aeruginosa Abnormal            Resulting Agency: [facility]     Susceptibility    Pseudomonas aeruginosa (1)    Antibiotic Interpretation Sensitivity Method Status    Cefepime Susceptible <=2 JUAN Final    Ciprofloxacin Resistant >2 JUAN Final    Imipenem Susceptible <=1 JUAN Final    Levofloxacin Resistant 4 JUAN Final    Tobramycin Susceptible <=4 JUAN Final          Specimen Collected: 04/08/24 10:50 AM Last Resulted: 04/13/24 11:50 AM           "

## 2024-04-16 NOTE — TELEPHONE ENCOUNTER
Patient states he has mild dysuria - the same as when he saw PCP 4/9/24. Patient thinks he has the same infection as when he was hospitalized with sepsis because he is now noticing bilateral leg weakness. He could barely stand during that illness and the weakness is starting to feel just like it did then. Caitlin Herron RN on 4/16/2024 at 3:08 PM

## 2024-04-16 NOTE — TELEPHONE ENCOUNTER
"Discussed below with Dr. Machado who recommends patient should go to ER. Advised patient who states he cannot go in tonight because he just got done bowling. He has PT tomorrow, so he wants to wait until Thursday. Advised patient not to wait and reiterated the below information. Patient is agreeable to go in tomorrow morning instead. Advised patient to be sure to present \"now\" if symptoms were to suddenly worsen or new symptoms develop. Patient denies fever at this time. Caitlin Herron RN on 4/16/2024 at 3:40 PM    "

## 2024-04-16 NOTE — TELEPHONE ENCOUNTER
Patient states he is heading to the bowling alley and will not be back until after 3 p.m. Please call then.    Luz Son on 4/16/2024 at 11:27 AM

## 2024-04-17 NOTE — ED PROVIDER NOTES
History     Chief Complaint   Patient presents with    UTI     HPI  Altaf Chao is a 89 year old male with a PMH significant for iron deficiency anemia, DM-2 with CKD-5 not on dialysis, hyperlipidemia, HTN and frequent UTI's who presented to the ER for evaluation to see if he is developing sepsis.  He was seen in clinic on 4/9 and had some bacteria noted on his UA.  A culture was performed which returned positive for Pseudomonas aeruginosa resistant to fluoroquinolones.  The patient has minimal dysuria but is otherwise feeling good.  Has remained active as usual including bowling last evening.  There is a strong possibility he is colonized but as he has some dysuria and may be feeling a little weak it was recommended he be evaluated in the ER to be sure he is not developing sepsis again like he had in October 2023.  During the ER visit the patient reports he is in his usual state of health.  Denies fever, chills, weakness, decreased appetite.  He is only in the ER for evaluation as he was told to be seen.    Allergies:  Allergies   Allergen Reactions    Statins [Statins] Itching     -- Patient declined --       Problem List:    Patient Active Problem List    Diagnosis Date Noted    Sepsis due to urinary tract infection (H) 10/30/2023     Priority: Medium    Iron deficiency anemia, unspecified 10/11/2023     Priority: Medium    Occult closed fracture of right elbow with routine healing, subsequent encounter 06/01/2023     Priority: Medium    Serum phosphate elevated 07/27/2022     Priority: Medium    DNR (do not resuscitate) 04/21/2022     Priority: Medium    DNI (do not intubate) 04/21/2022     Priority: Medium    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) 01/19/2022     Priority: Medium    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, with long-term current use of insulin (H) 01/18/2021     Priority: Medium    Heartburn 07/12/2018     Priority: Medium     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     Priority: Medium    Anemia due to vitamin B12 deficiency 10/25/2017     Priority: Medium    Lymphedema of both lower extremities 06/23/2017     Priority: Medium    Immunocompromised patient (H24) 06/09/2017     Priority: Medium    Bilateral edema of lower extremity 05/11/2017     Priority: Medium    Steroid-induced hyperglycemia 05/04/2017     Priority: Medium    Anemia of chronic disease 05/03/2017     Priority: Medium    CKD (chronic kidney disease) stage 5, GFR 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 ml/min (H) 05/01/2017     Priority: Medium    Metabolic acidosis 04/25/2017     Priority: Medium    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     Priority: Medium    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     Priority: Medium    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     Priority: Medium    Hyperkalemia 04/03/2017     Priority: Medium    Hyponatremia 04/03/2017     Priority: Medium    Refusal of statin medication at discharge 02/17/2017     Priority: Medium    H/O total hip arthroplasty 06/30/2014     Priority: Medium    Mixed hyperlipidemia 04/10/2014     Priority: Medium    H/O bilateral inguinal hernia repair 09/04/2013     Priority: Medium    History of tobacco abuse 08/15/2013     Priority: Medium    Benign essential hypertension 08/07/2012     Priority: Medium    Pityriasis rosea 08/07/2012     Priority: Medium        Past Medical History:    Past Medical History:   Diagnosis Date    Acute kidney failure (H24)     Essential (primary) hypertension     Hyperlipidemia     Osteoarthritis of hip     Pityriasis rosea     Tachycardia     Unilateral inguinal hernia without obstruction or gangrene        Past Surgical History:    Past Surgical History:   Procedure Laterality Date    CIRCUMCISION      03/14/2017,Dr. Heidi GREENBERG    OTHER SURGICAL HISTORY      41943.9,UT SUBTALAR ATHROEREISIS,bone Spurs excision on Toe    OTHER SURGICAL HISTORY       13,,HERNIA REPAIR,Right,RIH with mesh    OTHER SURGICAL HISTORY      13,81925,GENITAL SURGERY MALE,Dorsal Slit    OTHER SURGICAL HISTORY      4/15/14,,HERNIA REPAIR,Left,LIH with mesh    OTHER SURGICAL HISTORY      14,20693.0,IL TOTAL HIP ARTHROPLASTY,Left       Family History:    Family History   Problem Relation Age of Onset    Other - See Comments Son 12        neuroblastoma at 11 yo  at 14.    Genetic Disorder No family hx of         Genetic,No known FHx of DM, CAD, CVA       Social History:  Marital Status:   [2]  Social History     Tobacco Use    Smoking status: Former     Current packs/day: 0.00     Types: Cigarettes     Quit date: 1976     Years since quittin.3     Passive exposure: Past    Smokeless tobacco: Never   Vaping Use    Vaping status: Never Used   Substance Use Topics    Alcohol use: Yes     Alcohol/week: 0.8 standard drinks of alcohol     Comment: maybe 1 or 2 month    Drug use: Never        Medications:    amLODIPine (NORVASC) 5 MG tablet  calcitRIOL (ROCALTROL) 0.25 MCG capsule  Cranberry-Vitamin C (CRANBERRY CONCENTRATE/VITAMINC) 00957-047 MG CAPS  darbepoetin miller (ARANESP) 100 MCG/0.5ML injection  famotidine (PEPCID) 20 MG tablet  finasteride (PROSCAR) 5 MG tablet  furosemide (LASIX) 40 MG tablet  insulin glargine (LANTUS SOLOSTAR) 100 UNIT/ML pen  insulin pen needle (B-D U/F) 31G X 8 MM miscellaneous  predniSONE (DELTASONE) 2.5 MG tablet  sodium bicarbonate 650 MG tablet  tamsulosin (FLOMAX) 0.4 MG capsule          Review of Systems   Constitutional:  Negative for activity change, appetite change, chills and fever.   HENT:  Positive for hearing loss. Negative for congestion, postnasal drip and trouble swallowing.    Eyes:  Negative for visual disturbance.   Respiratory:  Negative for cough and shortness of breath.    Cardiovascular:  Positive for leg swelling. Negative for chest pain.        Chronic edema.   Gastrointestinal:  Negative for  "abdominal pain, nausea and vomiting.   Genitourinary:  Positive for difficulty urinating and dysuria. Negative for decreased urine volume, flank pain and hematuria.   Musculoskeletal:  Positive for arthralgias, myalgias, neck pain and neck stiffness.        All chronic and stable.   Skin:  Negative for rash.   Neurological:  Negative for dizziness, weakness and light-headedness.   Psychiatric/Behavioral:  Negative for confusion and decreased concentration.        Physical Exam   BP: (!) 145/80  Pulse: 98  Temp: 97.3  F (36.3  C)  Resp: 15  Height: 175.3 cm (5' 9\")  Weight: 79.4 kg (175 lb)  SpO2: 99 %      Physical Exam  Vitals and nursing note reviewed.   Constitutional:       General: He is not in acute distress.     Appearance: Normal appearance. He is not ill-appearing.      Comments: Resting on the ER cart with HOB elevated 15 degrees. Able to answer questions with complete sentences.  Moves easily.   Cardiovascular:      Rate and Rhythm: Normal rate and regular rhythm.   Pulmonary:      Effort: Pulmonary effort is normal. No respiratory distress.   Abdominal:      General: Abdomen is flat. Bowel sounds are normal. There is no distension.      Palpations: Abdomen is soft.      Tenderness: There is no abdominal tenderness.   Musculoskeletal:      Cervical back: Neck supple.      Right lower leg: Edema present.      Left lower leg: Edema present.      Comments: 1+ firm edema bilateral lower legs.   Skin:     General: Skin is warm and dry.      Comments: Dry skin with some thickening and chronic venous stasis changes on both lower legs.  No cellulitis.   Neurological:      General: No focal deficit present.      Mental Status: He is alert and oriented to person, place, and time.      Cranial Nerves: No cranial nerve deficit.      Motor: No weakness.   Psychiatric:         Mood and Affect: Mood normal.         Behavior: Behavior normal.         Thought Content: Thought content normal.         Judgment: Judgment " normal.         ED Course   Check labs to look for acute infection with evidence of impending sepsis.  WBC normal at 8.2, procalcitonin and lactic acid normal.  Blood and urine cultures pending.  As he is feeling good, afebrile and without evidence of systemic disease will not start abx at this time.  Follow-up with his PCP or return to the ER if he starts to feel sick.     Procedures              Critical Care time:  none               No results found for this or any previous visit (from the past 24 hour(s)).      Medications - No data to display    Assessments & Plan (with Medical Decision Making)     I have reviewed the nursing notes.    I have reviewed the findings, diagnosis, plan and need for follow up with the patient.           Medical Decision Making  The patient's presentation was of low complexity (an acute and uncomplicated illness or injury).    The patient's evaluation involved:  ordering and/or review of 3+ test(s) in this encounter (see separate area of note for details)    The patient's management necessitated only low risk treatment.        Discharge Medication List as of 4/17/2024 11:31 AM          Final diagnoses:   Urinary tract infection due to Pseudomonas aeruginosa   CKD (chronic kidney disease) stage 5, GFR 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 ml/min (H)   Benign essential hypertension       4/17/2024   [facility]

## 2024-04-17 NOTE — ED TRIAGE NOTES
Pt here with wife.  Pt states that he was at Dr. Machado' office and gave a urine sample and apparently was positive.  Pt was told to come in immediately for an MRI.  It was unsure why he needs a MRI and writer could not find anything in his clinic notes that indicated an MRI.      Writer called Dr. Machado and he states the pt needs some lab work to make sure the pt isn't getting septic and/or needs IV antibiotics.

## 2024-04-19 NOTE — TELEPHONE ENCOUNTER
[facility]    Reason for call: Lab Result Notification     Lab Result (including Rx patient on, if applicable).  If culture, copy of lab report at bottom.  Lab Result: See below    No antibiotics prescribed    Creatinine Level (mg/dl)   Creatinine   Date Value Ref Range Status   04/17/2024 8.99 (H) 0.67 - 1.17 mg/dL Final   03/30/2021 5.38 (H) 0.70 - 1.30 mg/dL Final    Creatinine clearance (ml/min), if applicable    Serum creatinine: 8.99 mg/dL (H) 04/17/24 0944  Estimated creatinine clearance: 6.1 mL/min (A)     Patient's current Symptoms:   Patient denies symptoms.  Prefers to wait for the final culture report.     RN Recommendations/Instructions per [place] ED lab result protocol:   [facility] ED lab result protocol utilized: Urine culture    Patient/care giver notified to contact your PCP clinic or return to the Emergency department if your:  Symptoms return.  Symptoms worsen or other concerning symptoms.    LORNE KOHLI RN

## 2024-04-20 NOTE — TELEPHONE ENCOUNTER
[facility] [place]    Reason for call: Lab Result Notification     Lab Result (including Rx patient on, if applicable).  If culture, copy of lab report at bottom.  Lab Result: Final urine culture on 4/20/24 shows the presence of bacteria(s): 50,000 - 100,000 CFU/ML Pseudomonas aeruginosa  [facility] Emergency Dept discharge antibiotic: None  Recommendations in treatment per [facility] ED lab result Urine Culture protocol.    Creatinine Level (mg/dl)   Creatinine   Date Value Ref Range Status   04/17/2024 8.99 (H) 0.67 - 1.17 mg/dL Final   03/30/2021 5.38 (H) 0.70 - 1.30 mg/dL Final    Creatinine clearance (ml/min), if applicable    Serum creatinine: 8.99 mg/dL (H) 04/17/24 0944  Estimated creatinine clearance: 6.1 mL/min (A)     Patient's current Symptoms:   Pt denies development of any UTI symptoms or fever. Relayed result and discussed active infection versus colonization. Pt will monitor for symptoms and return to ED if symptoms of concern appear / worsen.     RN Recommendations/Instructions per [place] ED lab result protocol:   [facility] ED lab result protocol utilized: Urine Cx    Patient/care giver notified to contact your PCP clinic or return to the Emergency department if your:  Symptoms return.  Symptoms worsen or other concerning symptoms.    Chris Huerta RN

## 2024-04-29 NOTE — PROGRESS NOTES

## 2024-04-29 NOTE — PROGRESS NOTES
04/29/24 1508   Appointment Info   Signing clinician's name / credentials Ayah Reyes DPT   Visits Used 30   Medical Diagnosis Sepsis due to urinary tract infection; 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, with long-term current use of insulin; Primary pauci-immune necrotizing and crescentic glomerulonephritis; Immunocompromised patient   PT Tx Diagnosis impaired functional mobility, balance, gait   Progress Note/Certification   Start of Care Date 12/06/23   Onset of illness/injury or Date of Surgery 11/15/23   Therapy Frequency 2x/week   Predicted Duration 12 weeks   Certification date from 02/29/24   Certification date to 05/24/24   KX Modifier Statement Therapy services meets medical necessity requirements beyond the therapy threshold for ongoing care.   Progress Note Completed Date 03/04/24   Supervision   PT Assistant Visit Number 2   PT Goal 1   Goal Identifier ambulation   Goal Description patient will ambulate 1000ft, without reaching out for the wall for support, in order to demonstrate safe community ambulation distances   Rationale to maximize safety and independence within the home;to maximize safety and independence within the community;to maximize safety and independence with performance of ADLs and functional tasks   Goal Progress not met - ambulated 550ft, SBA with gait belt, reached for wall 0x   Target Date 02/28/24   PT Goal 2   Goal Identifier functional strength   Goal Description patient will improve 30 sec sit<>stand to >8 reps to demonstrate increased functional strength   Rationale to maximize safety and independence with performance of ADLs and functional tasks;to maximize safety and independence within the home;to maximize safety and independence within the community   Goal Progress completed 9 reps in 30 sec, 1 UE support   Target Date 01/31/24   Date Met 03/01/24   PT Goal 3   Goal Identifier balance   Goal Description patient will  "demonstrate improved balance as evidenced by SLS time of >10 sec   Rationale to maximize safety and independence with performance of ADLs and functional tasks;to maximize safety and independence within the home;to maximize safety and independence within the community   Goal Progress not met - SLS 5 sec bilaterally   Target Date 02/28/24   Subjective Report   Subjective Report Patient reports no falls since last visit. Notes he hasn't noticed any symptoms of infection including fever, continues to monitor.   Objective Measure 1   Objective Measure strength   Details B LE grossly 4-/5   Objective Measure 2   Objective Measure gait   Details improved stride length, minimal to no wall surfing; continues to show decreased milvia   Objective Measure 3   Objective Measure 30 sec<>stand   Details x9 (able to complete 15 reps to fatigue)   Therapeutic Procedure/Exercise   Therapeutic Procedures: strength, endurance, ROM, flexibility minutes (48695) 15   Ther Proc 1 LE strength   Ther Proc 1 - Details with 5lb ankle weights march, LAQ, leg open/close, heel raise,toe raise; standing march, hip abd, ext, heel raise; wall slides  (deferred:)   Patient Response/Progress fewer rest breaks required today   Therapeutic Activity   Therapeutic Activities: dynamic activities to improve functional performance minutes (44927) 20   Ther Act 1 activity tolerance, ambulation   Ther Act 1 - Details scifit 10min, level 6.5; step-over & back half roller  (deferred due to time: navigated 4 hurdles forward & lateral x6, CGA with gait belt; walking backwards 8 small steps x2; ambulated 380 carrying 10\" weighted ball in 1 arm, switching arms assisted, SBA-CGA with gait belt; 4in step forward & lateral step ups)   Ther Act 2 - Details   (deferred:  with HHA x2. standing rows, shoulder ext, flex with red band)   Skilled Intervention ambulation, standing tolerance   Patient Response/Progress   (took vitals at this time, values noted above, all WNL, " no dizziness or other symptoms, just weakness)   Neuromuscular Re-education   Neuromuscular re-ed of mvmt, balance, coord, kinesthetic sense, posture, proprioception minutes (38477) 15   Neuro Re-ed 1 balance   Neuro Re-ed 1 - Details rhomberg EO & EC, tandem, SLS; repeated on airex foam added staggered stance weight shift, march  (deferred:)   Education   Learner/Method Patient;Listening;Demonstration;Pictures/Video;No Barriers to Learning   Plan   Home program HEP, print out given  ([employer] access code [email or web address])   Plan for next session balance, activity tolerance, WB exercises, stairs - consider step ups, B LE strength - consider med x, B UE strength   Total Session Time   Timed Code Treatment Minutes 50   Total Treatment Time (sum of timed and untimed services) 50         PLAN  Continue therapy per current plan of care.    Beginning/End Dates of Progress Note Reporting Period:  03/04/24 to 04/29/2024    Referring Provider:  Melissa Nettles

## 2024-05-28 NOTE — PROGRESS NOTES

## 2024-06-25 NOTE — PROGRESS NOTES

## 2024-07-05 NOTE — DISCHARGE INSTRUCTIONS
Get plenty of fluids and rest.  As discussed, I think the wound appears well at this time, however I think you are at higher risk of having an infection develop in your lower extremities.  We discussed options including waiting and seeing versus short course of antibiotics, shared decision-making was utilized we will move forward with a short course of antibiotics keep the wound clean with gentle soap and water daily and bandage changes.  I expect gradual improvement of symptoms over the next 1 to 2 weeks, just be aware with your chronic lower extremity swelling you will take longer to heal than normal, follow-up with PCP for reassessment or return to the ED if there are worsening or concerning symptoms including increased redness, warmth, purulent drainage, swelling or increased fevers.

## 2024-07-05 NOTE — ED TRIAGE NOTES
"ED Nursing Triage Note (General)   ________________________________    Altaf Chao is a 89 year old Male that presents to triage via private vehicle from the  with complaints of needing a wound check.  Yesterday patient states he was getting out of his truck when he caught the back of his leg on the door.  Patient states he put a bandage over the leg when he got home yesterday, however, wife requested he come in today for wound evaluation to ensure he does not get an infection.  Bandage is CDI on arrival.   Significant symptoms had onset 24 hour(s) ago.  Vital signs:  Temp: 98.2  F (36.8  C) Temp src: Tympanic BP: 129/72 Pulse: 74   Resp: 20 SpO2: 98 %     Height: 175.3 cm (5' 9\") Weight: 79.4 kg (175 lb)  Estimated body mass index is 25.84 kg/m  as calculated from the following:    Height as of this encounter: 1.753 m (5' 9\").    Weight as of this encounter: 79.4 kg (175 lb).  PRE HOSPITAL PRIOR LIVING SITUATION Spouse      Triage Assessment (Adult)       Row Name 07/05/24 1225          Triage Assessment    Airway WDL WDL        Respiratory WDL    Respiratory WDL WDL        Skin Circulation/Temperature WDL    Skin Circulation/Temperature WDL X        Cardiac WDL    Cardiac WDL WDL     Cardiac Rhythm NSR        Peripheral/Neurovascular WDL    Peripheral Neurovascular WDL WDL        Cognitive/Neuro/Behavioral WDL    Cognitive/Neuro/Behavioral WDL WDL                     "

## 2024-07-05 NOTE — ED PROVIDER NOTES
History     Chief Complaint   Patient presents with    Wound Check     HPI  Altaf Chao is a 89 year old male who presents to the ED for evaluation of a wound check. presents to triage via private vehicle from the  with complaints of needing a wound check.  Yesterday patient states he was getting out of his truck when he caught the back of his leg on the door.  Patient states he put a bandage over the leg when he got home yesterday, however, wife requested he come in today for wound evaluation to ensure he does not get an infection.  Bandage is CDI on arrival.   Significant symptoms had onset 24 hour(s) ago.    Allergies:  Allergies   Allergen Reactions    Statins [Statins] Itching     -- Patient declined --       Problem List:    Patient Active Problem List    Diagnosis Date Noted    Sepsis due to urinary tract infection (H) 10/30/2023     Priority: Medium    Iron deficiency anemia, unspecified 10/11/2023     Priority: Medium    Occult closed fracture of right elbow with routine healing, subsequent encounter 06/01/2023     Priority: Medium    Serum phosphate elevated 07/27/2022     Priority: Medium    DNR (do not resuscitate) 04/21/2022     Priority: Medium    DNI (do not intubate) 04/21/2022     Priority: Medium    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) 01/19/2022     Priority: Medium    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, with long-term current use of insulin (H) 01/18/2021     Priority: Medium    Heartburn 07/12/2018     Priority: Medium    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     Priority: Medium    Anemia due to vitamin B12 deficiency 10/25/2017     Priority: Medium    Lymphedema of both lower extremities 06/23/2017     Priority: Medium    Immunocompromised patient (H24) 06/09/2017     Priority: Medium    Bilateral edema of lower extremity 05/11/2017     Priority: Medium    Steroid-induced hyperglycemia 05/04/2017     Priority: Medium    Anemia  of chronic disease 05/03/2017     Priority: Medium    CKD (chronic kidney disease) stage 5, GFR 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 ml/min (H) 05/01/2017     Priority: Medium    Metabolic acidosis 04/25/2017     Priority: Medium    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     Priority: Medium    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     Priority: Medium    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     Priority: Medium    Hyperkalemia 04/03/2017     Priority: Medium    Hyponatremia 04/03/2017     Priority: Medium    Refusal of statin medication at discharge 02/17/2017     Priority: Medium    H/O total hip arthroplasty 06/30/2014     Priority: Medium    Mixed hyperlipidemia 04/10/2014     Priority: Medium    H/O bilateral inguinal hernia repair 09/04/2013     Priority: Medium    History of tobacco abuse 08/15/2013     Priority: Medium    Benign essential hypertension 08/07/2012     Priority: Medium    Pityriasis rosea 08/07/2012     Priority: Medium        Past Medical History:    Past Medical History:   Diagnosis Date    Acute kidney failure (H24)     Essential (primary) hypertension     Hyperlipidemia     Osteoarthritis of hip     Pityriasis rosea     Tachycardia     Unilateral inguinal hernia without obstruction or gangrene        Past Surgical History:    Past Surgical History:   Procedure Laterality Date    CIRCUMCISION      03/14/2017,Dr. Heidi GREENBERG    OTHER SURGICAL HISTORY      93185.9,[facility] SUBTALAR ATHROEREISIS,bone Spurs excision on Toe    OTHER SURGICAL HISTORY      8/20/13,,HERNIA REPAIR,Right,RIH with mesh    OTHER SURGICAL HISTORY      8/20/13,72409,GENITAL SURGERY MALE,Dorsal Slit    OTHER SURGICAL HISTORY      4/15/14,,HERNIA REPAIR,Left,LIH with mesh    OTHER SURGICAL HISTORY      6/30/14,08113.0,[facility] TOTAL HIP ARTHROPLASTY,Left       Family History:    Family History   Problem Relation Age of Onset    Other - See Comments Son 12        neuroblastoma  "at 11 yo  at 14.    Genetic Disorder No family hx of         Genetic,No known FHx of DM, CAD, CVA       Social History:  Marital Status:   [2]  Social History     Tobacco Use    Smoking status: Former     Current packs/day: 0.00     Types: Cigarettes     Quit date: 1976     Years since quittin.5     Passive exposure: Past    Smokeless tobacco: Never   Vaping Use    Vaping status: Never Used   Substance Use Topics    Alcohol use: Yes     Alcohol/week: 0.8 standard drinks of alcohol     Comment: maybe 1 or 2 month    Drug use: Never        Medications:    cephALEXin (KEFLEX) 500 MG capsule  amLODIPine (NORVASC) 5 MG tablet  calcitRIOL (ROCALTROL) 0.25 MCG capsule  Cranberry-Vitamin C (CRANBERRY CONCENTRATE/VITAMINC) 74757-471 MG CAPS  darbepoetin miller (ARANESP) 100 MCG/0.5ML injection  famotidine (PEPCID) 20 MG tablet  finasteride (PROSCAR) 5 MG tablet  furosemide (LASIX) 40 MG tablet  insulin glargine (LANTUS SOLOSTAR) 100 UNIT/ML pen  insulin pen needle (B-D U/F) 31G X 8 MM miscellaneous  predniSONE (DELTASONE) 2.5 MG tablet  sodium bicarbonate 650 MG tablet  tamsulosin (FLOMAX) 0.4 MG capsule          Review of Systems   Skin:  Positive for wound.       Physical Exam   BP: 129/72  Pulse: 74  Temp: 98.2  F (36.8  C)  Resp: 20  Height: 175.3 cm (5' 9\")  Weight: 79.4 kg (175 lb)  SpO2: 98 %      Physical Exam  Constitutional:       General: He is not in acute distress.     Appearance: He is well-developed. He is not diaphoretic.   Pulmonary:      Effort: Pulmonary effort is normal.   Musculoskeletal:         General: No deformity.      Right lower leg: Edema present.      Left lower leg: Edema present.   Skin:     General: Skin is warm and dry.      Coloration: Skin is not jaundiced.      Findings: No rash.      Comments: Right lateral calf wedge laceration, scabbed over   Neurological:      Mental Status: He is alert. Mental status is at baseline.   Psychiatric:         Mood and Affect: Mood " normal.         Behavior: Behavior normal.         Thought Content: Thought content normal.         Judgment: Judgment normal.         ED Course        Procedures              Critical Care time:  none               No results found for this or any previous visit (from the past 24 hour(s)).    Medications - No data to display    Assessments & Plan (with Medical Decision Making)   Nontoxic and in NAD. He does have Right lateral calf wedge laceration, scabbed over. He is able to bear weight on that leg and has full ROM of RLE. He has chronic bilateral lower extremity edema with multiple areas of skin breakdown and scabs.  The area around his recent laceration is not warm, no obvious erythema female.  Does have slight bruising to the inferior/posterior portion of the wound.  He had cut his leg on the side of door coming out of the building, and this seems inconsistent with a retained foreign body or bony fracture and therefore no further imaging indicated today.    I have low suspicion for infection at this time, however does appear he has a history of diabetes and poor blood flow to lower extremities chronically and I think therefore is at higher risk of having infection.  I discussed options with the patient and his wife including waiting and seen versus prophylactic treatment with antibiotics.  They would like to have a short course of antibiotics and I will start him on Keflex.  He will continue to keep the wound clean with daily bandage changes and follow-up with PCP for reassessment.    Tetanus was up-to-date.    Strict return precautions are given to the pt, they will return if symptoms are worsening or concerning. The pt understands and agrees with the plan and they are discharged.     Pop Allen PA-C    I have reviewed the nursing notes.    I have reviewed the findings, diagnosis, plan and need for follow up with the patient.          New Prescriptions    CEPHALEXIN (KEFLEX) 500 MG CAPSULE    Take 1  capsule (500 mg) by mouth 4 times daily for 5 days       Final diagnoses:   Laceration of right lower extremity       7/5/2024   [facility] AND [facility]       Pop Allen PA  07/05/24 9787

## 2024-07-17 NOTE — PROGRESS NOTES
Patient is scheduled 7/25 for wellness.  Labs doen with [facility] 4/8/24 t4, cmp, lipid, cbc, A1c, albumin, tsh, and ua.   Labs orders are pending.   Yesi Poole LPN .............7/17/2024     9:43 AM

## 2024-07-18 NOTE — PROGRESS NOTES
DISCHARGE  Reason for Discharge: Patient has met all goals.    Equipment Issued: n/a    Discharge Plan: Patient to continue home program.    Referring Provider:  Melissa Nettles     06/12/24 8291   Appointment Info   Signing clinician's name / credentials Ayah Reyes DPT   Visits Used 35   Medical Diagnosis Sepsis due to urinary tract infection; 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, with long-term current use of insulin; Primary pauci-immune necrotizing and crescentic glomerulonephritis; Immunocompromised patient   PT Tx Diagnosis impaired functional mobility, balance, gait   Progress Note/Certification   Start of Care Date 12/06/23   Onset of illness/injury or Date of Surgery 11/15/23   Therapy Frequency 2x/week   Predicted Duration 12 weeks   Certification date from 02/29/24   Certification date to 05/24/24   KX Modifier Statement Therapy services meets medical necessity requirements beyond the therapy threshold for ongoing care.   Progress Note Completed Date 04/29/24   Supervision   PT Assistant Visit Number 4   PT Goal 1   Goal Identifier ambulation   Goal Description patient will ambulate 1000ft, without reaching out for the wall for support, in order to demonstrate safe community ambulation distances   Rationale to maximize safety and independence within the home;to maximize safety and independence within the community;to maximize safety and independence with performance of ADLs and functional tasks   Goal Progress Patient ambulated 1300ft over course of session, 800ft consecutively; no reaching for wall, SBA< even step length with minimal shuffling   Target Date 02/28/24   Date Met 06/12/24   PT Goal 2   Goal Identifier functional strength   Goal Description patient will improve 30 sec sit<>stand to >8 reps to demonstrate increased functional strength   Rationale to maximize safety and independence with performance of ADLs and functional tasks;to maximize  "safety and independence within the home;to maximize safety and independence within the community   Goal Progress completed 9 reps in 30 sec, 1 UE support   Target Date 01/31/24   Date Met 03/01/24   PT Goal 3   Goal Identifier balance   Goal Description patient will demonstrate improved balance as evidenced by SLS time of >10 sec   Rationale to maximize safety and independence with performance of ADLs and functional tasks;to maximize safety and independence within the home;to maximize safety and independence within the community   Goal Progress Patient able to balance for >10 sec with very light touch on bar 1 UE   Target Date 02/28/24   Date Met 06/12/24   Subjective Report   Subjective Report Patient reports he golfed again this Wednesday, states he felt ok while golfing, but after sitting having coffee afterward he had a harder time walking; notes that when he has to walk a lot he'll use a golf club like a cane.   Objective Measure 1   Objective Measure strength   Details B LE grossly 4-/5   Objective Measure 2   Objective Measure gait   Details improved stride length, minimal to no wall surfing; continues to show decreased milvia   Objective Measure 3   Objective Measure 30 sec<>stand   Details x9 (able to complete 15 reps to fatigue)   Therapeutic Procedure/Exercise   Ther Proc 1 - Details   (deferred: wall slides)   Therapeutic Activity   Therapeutic Activities: dynamic activities to improve functional performance minutes (48249) 35   Ther Act 1 activity tolerance, ambulation   Ther Act 1 - Details ambulated 800ft, 400ft, 100ft, SBA-CGA, occasional standing rest breaks, even step length with minimal shuffling, no reaching for wall. Verbal review of HEP to maintain strength gains.  (deferred due to time: navigated 4 hurdles forward/lateral x6, CGA with gait belt; walking backwards 8 small steps x2; ambulated 380 carrying 10\" ball in 1 arm, switching arms correction, SBA with gait belt; 4in step forward/lateral " "step up; step-over/back)   Ther Act 2 transfers   Ther Act 2 - Details sit<>stand x10, 9 in 30 sec  (deferred: standing rows, shoulder ext, flex with red band; standing ball toss/catch at rebounder with 6\" medicine ball to work on reaction time and balance reactions. squats x10 while holding 10\" weighted ball in both hands.)   Skilled Intervention ambulation, standing tolerance   Patient Response/Progress well tolerated  (took vitals at this time, values noted above, all WNL, no dizziness or other symptoms, just weakness)   Neuromuscular Re-education   Neuromuscular re-ed of mvmt, balance, coord, kinesthetic sense, posture, proprioception minutes (10868) 10   Neuro Re-ed 1 balance   Neuro Re-ed 1 - Details rhomberg EO & EC, tandem, staggered stance weight shift, SLS, march  (deferred: on airex foam)   Patient Response/Progress positive   Education   Learner/Method Patient;Listening;Demonstration;Pictures/Video;No Barriers to Learning   Plan   Home program HEP, print out given  ([employer] access code [email or web address])   Plan for next session f/u as needed   Total Session Time   Timed Code Treatment Minutes 45   Total Treatment Time (sum of timed and untimed services) 45       "

## 2024-07-22 NOTE — PROGRESS NOTES
Verified patient's first and last name, and . Patient stated reason for visit today is to receive a B12 injection. Patient denied any concerns with previous injections. B12 prepared and administered IM as ordered. Administration of medication documented in MAR (see MAR for further information regarding dose, lot #, NDC #, expiration date). Patient encouraged to wait in lobby for 15 minutes post-injection and notify staff immediately of any reaction.     Catherine Abreu RN ....................  2024   10:14 AM

## 2024-07-25 PROBLEM — R60.0 LOCALIZED EDEMA: Status: ACTIVE | Noted: 2024-01-01

## 2024-07-25 PROBLEM — E03.4 HYPOTHYROIDISM DUE TO ACQUIRED ATROPHY OF THYROID: Status: ACTIVE | Noted: 2024-01-01

## 2024-07-25 PROBLEM — B35.3 TINEA PEDIS OF RIGHT FOOT: Status: ACTIVE | Noted: 2024-01-01

## 2024-07-25 NOTE — PROGRESS NOTES
Assessment & Plan     ICD-10-CM    1. Benign essential hypertension  I10 amLODIPine (NORVASC) 5 MG tablet     furosemide (LASIX) 40 MG tablet      2. Heartburn  R12 famotidine (PEPCID) 20 MG tablet      3. Pseudomonas urinary tract infection  N39.0     B96.5       4. Urinary retention  R33.9 finasteride (PROSCAR) 5 MG tablet     tamsulosin (FLOMAX) 0.4 MG capsule      5. 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 Steroid-induced hyperglycemia  R73.9 insulin glargine (LANTUS SOLOSTAR) 100 UNIT/ML pen    T38.0X5A insulin pen needle (B-D U/F) 31G X 8 MM miscellaneous      7. CKD (chronic kidney disease) stage 5, GFR 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 ml/min (H)  N18.5 predniSONE (DELTASONE) 2.5 MG tablet      8. Primary pauci-immune necrotizing and crescentic glomerulonephritis  N05.8 predniSONE (DELTASONE) 2.5 MG tablet    N05.7       9. Acute crescentic glomerulonephritis  N00.7       10. Antineutrophil cytoplasmic antibody (ANCA) positive  R76.8       11. Immunocompromised patient (H24)  D84.9 predniSONE (DELTASONE) 2.5 MG tablet      12. Need for shingles vaccine  Z23       13. Need for vaccination against respiratory syncytial virus  Z29.11       14. Vaccine counseling  Z71.85 RSV vaccine, bivalent, ABRYSVO, (ABRYSVO) injection      15. Encounter for Medicare annual wellness exam  Z00.00       16. Mixed hyperlipidemia  E78.2       17. Anemia of chronic disease  D63.8       18. Hypothyroidism due to acquired atrophy of thyroid  E03.4 levothyroxine (SYNTHROID/LEVOTHROID) 50 MCG tablet      19. Tinea pedis of right foot  B35.3 tolnaftate (LAMISIL) 1 % external spray         Patient presents for Medicare annual wellness visit as well as follow-up multiple issues.    Recurrent Pseudomonas urinary  tract infection.  Has minimal symptoms at this time other than some mild dysuria.  Takes high-dose cranberry tablets twice daily.  The last 2 culture showed Pseudomonas resistant to ciprofloxacin and levofloxacin.  At this point he is not sick enough to pursue IV antibiotics and otherwise feels fine.  Likely colonization.  Close follow-up advised.    Given his urinary retention, continue Flomax and finasteride.  Updated prescription sent to pharmacy.    Heartburn, ongoing but controlled with famotidine, as long as he takes this medication regularly.  Needs refills.    Hyperglycemia due to steroids, type 2 diabetes with stage V chronic kidney disease.  History of autoimmune glomerulonephritis, ANCA, continues with daily prednisone.  Updated prescription sent to pharmacy.    Vaccines are due, orders placed.    Anemia of chronic kidney disease, continues with routine follow-up with nephrology clinic.    Hypothyroidism, new diagnosis, start levothyroxine 50 mcg daily.  Does report having issues with fatigue and malaise.    Right foot rash, tinea pedis noted.  Recommend topical Lamisil spray.  Prescription sent to pharmacy.  Okay to use topical antifungal powder or cream if desired.    HYPERTENSION - Ongoing. Blood pressure is currently well controlled.  Medication side effects: None. Denies syncope or presyncope.  Continue current medications.   Medication list reviewed/updated. Refills completed as needed.      MIXED HYPERLIPIDEMIA.  Ongoing. LDL is at goal: No. Triglycerides are at goal: Yes.  Hopefully lifestyle modifications will improve cholesterol levels, otherwise will consider additional medication dose adjustments or medication changes.  Medication side effects reported: None.   Continue current medications for now. Medication list reviewed/updated. Refills completed as needed.  Recent Labs   Lab Test 07/23/24  1605 04/08/24  1050   CHOL 161 157   HDL 47 42   * 101*   TRIG 63 70        Hypothyroidism -  New - start Synthroid 50 mcg daily.       Chronic Kidney Disease, Stage 5 (GFR < 15), chronic, ongoing.  Kidney function had been slowly declining.  Encourage NSAID avoidance.       Vaccine counseling completed.  Encourage routine / annual vaccinations.    Type 2 Diabetes Mellitus, with nephropathy, with neuropathy, Reports ongoing/previous: numbness.  Blood sugar control has been good with minimal hyperglycemia. Doing well with oral agents, exercise, and insulin injections.  Medication list reviewed/updated. Refills completed as needed.    Complicating factors include but are not limited to: hypertension, hyperlipidemia, and chronic kidney disease.     Recent Labs   Lab Test 07/23/24  1605 04/17/24  0944 04/08/24  1050 11/30/23  0927   A1C 5.2  --  5.6 5.3   *  --  101* 71   HDL 47  --  42 40   TRIG 63  --  70 83   ALT 9 11 10 11   CR 12.10* 8.99* 9.39* 8.15*   GFRESTIMATED 4* 5* 5* 6*   POTASSIUM 6.0* 5.1 5.3 5.7*   TSH 23.22*  --  10.24* 6.92*   T4 0.72*  --  0.89* 0.91   WBC 7.5 8.2 10.6 12.5*   HGB 10.0* 10.5* 11.8* 10.6*    338 276 254   ALBUMIN 3.6 3.7 4.0 3.9     Hemoglobin A1c is well-controlled.  LDL is high and not at goal.  HDL and triglycerides are at goal.  ALT normal.  Creatinine has worsened slightly.  GFR is 4.  Still making urine.  Potassium is 6.  Patient declines dialysis.    TSH is elevated with low free T4.  Start Synthroid replacement.  Hemoglobin is 10.  White blood cell count and platelet count are normal.  Albumin normal.             Counseling  Appropriate preventive services were addressed with this patient via screening, questionnaire, or discussion as appropriate for fall prevention, nutrition, physical activity, Tobacco-use cessation, weight loss and cognition.  Checklist reviewing preventive services available has been given to the patient.  Reviewed patient's diet, addressing concerns and/or questions.   Discussed possible causes of fatigue.       Return in about 3  months (around 10/25/2024) for - Labs every 91+ days, with DM Follow-up, Same Day or 1-2 days later with Dr. Machado.      Valentino Machado MD  [facility]    Review of Systems   Constitutional:  Negative for chills, fatigue and fever.        Golfs 1x weekly in the summer and in the winter - curls 2x weekly and bowling 2x weekly    HENT:  Negative for congestion and hearing loss.    Eyes:  Negative for pain and visual disturbance.   Respiratory:  Negative for cough, shortness of breath and wheezing.    Cardiovascular:  Negative for chest pain and palpitations.   Gastrointestinal:  Negative for abdominal pain, diarrhea, nausea and vomiting.   Endocrine: Negative for cold intolerance and heat intolerance.   Genitourinary:  Positive for difficulty urinating and dysuria (Minimal intermittent dysuria). Negative for hematuria.        Reports urinating well. Denies bladder symptoms.    Musculoskeletal:  Positive for gait problem. Negative for arthralgias, back pain and myalgias.   Skin:  Positive for rash. Negative for pallor.   Allergic/Immunologic: Negative for immunocompromised state.   Neurological:  Negative for dizziness and light-headedness.   Hematological:  Bruises/bleeds easily.   Psychiatric/Behavioral:  Negative for agitation and confusion.         Michelle Tavares is a 89 year old, presenting for the following health issues:  Medicare Visit        7/25/2024     9:52 AM   Additional Questions   Roomed by NELLA Vaughan   Accompanied by SERA     History of Present Illness       Diabetes:   He presents for follow up of diabetes.    He is not checking blood glucose.         He has no concerns regarding his diabetes at this time.  He is having burning in feet and excessive thirst.  The patient has not had a diabetic eye exam in the last 12 months.                            Objective    /62 (BP Location: Right arm, Patient Position: Sitting, Cuff Size: Adult Regular)   Pulse 99   Temp 96.8  F  "(36  C) (Temporal)   Resp 14   Ht 1.727 m (5' 8\")   Wt 76.2 kg (168 lb)   SpO2 99%   BMI 25.54 kg/m    Body mass index is 25.54 kg/m .  Physical Exam  Constitutional:       General: He is not in acute distress.     Appearance: Normal appearance. He is well-developed. He is obese. He is not diaphoretic.   HENT:      Head: Normocephalic and atraumatic.   Eyes:      General: No scleral icterus.     Conjunctiva/sclera: Conjunctivae normal.   Cardiovascular:      Rate and Rhythm: Normal rate and regular rhythm.   Pulmonary:      Effort: Pulmonary effort is normal.      Breath sounds: Normal breath sounds.   Abdominal:      Palpations: Abdomen is soft.      Tenderness: There is no abdominal tenderness.   Musculoskeletal:         General: No deformity.      Cervical back: Neck supple.      Right lower leg: Edema present.      Left lower leg: Edema present.   Lymphadenopathy:      Cervical: No cervical adenopathy.   Skin:     General: Skin is warm and dry.      Findings: Bruising (bilateral arms) and rash present.      Comments: + Dry skin   Neurological:      Mental Status: He is alert. Mental status is at baseline.   Psychiatric:         Mood and Affect: Mood normal.         Behavior: Behavior normal.                        Signed Electronically by: Valentino Machado MD    "

## 2024-07-25 NOTE — PROGRESS NOTES
"Preventive Care Visit  [facility]  Valentino Machado MD, Internal Medicine  Jul 25, 2024      Assessment & Plan     Encounter for Medicare annual wellness exam    -Colon cancer screening completed.     -PSA lab work never done.    -Immunizations: Pt is due for COVID vaccine, due for Shingles vaccine, however advised pt to complete Shingles vaccine at local pharmacy given Medicare insurance, and due for RSV vaccine, however advised pt to complete RSV vaccine at local pharmacy given Medicare insurance. Patient declines all vaccines besides RSV. He will get that in the fall.     -Education: Pt education provided on Home Safety and Fall Prevention  -Derm: Does patient regularly see dermatologist? No. No concerns.  -Refills pended for requested medications.  -Labs pended.   -ADL's: Golfs 1x weekly. Goes bowling 1x weekly. Curls during the winter but had to skip it last winter. Just finished 6 months of PT.   -Due: DM foot exam. He will see a foot RN later today. Will schedule eye exam, last exam was 6/1/23.    Diabetic Foot Screen:  Any complaints of increased pain or numbness ?  YES numbness.  Is there a foot ulcer now or a history of foot ulcer?  YES Left lateral side.   Does the foot have an abnormal shape?  YES left lateral side.   Are the nails thick, too long or ingrown?  YES see's a foot RN around every 8 weeks.   Are there any redness or open areas?  YES top of right foot.          Sensation Testing done at all points on the diagram with monofilament     Right Foot: Sensation Absent at the following points , 1, 2, 3, 4, 5, 8, 9, and 10  Left Foot: Sensation Absent at the following points , 1, 2, 3, 4, 5, 6, 7, 9, and 10       Performed by Valentino Machado MD    BMI  Estimated body mass index is 25.54 kg/m  as calculated from the following:    Height as of this encounter: 1.727 m (5' 8\").    Weight as of this encounter: 76.2 kg (168 lb).       Counseling  Appropriate preventive services were " addressed with this patient via screening, questionnaire, or discussion as appropriate for fall prevention, nutrition, physical activity, Tobacco-use cessation, weight loss and cognition.  Checklist reviewing preventive services available has been given to the patient.  Reviewed patient's diet, addressing concerns and/or questions.   Discussed possible causes of fatigue.     Work on weight loss  Regular exercise    No follow-ups on file.    Michelle Tavares is a 89 year old, presenting for the following:  Medicare Visit        7/25/2024     9:52 AM   Additional Questions   Roomed by NELLA Vaughan   Accompanied by SERA         Health Care Directive  Patient does not have a Health Care Directive or Living Will: Discussed advance care planning with patient; however, patient declined at this time.          7/25/2024   General Health   How would you rate your overall physical health? Good   Feel stress (tense, anxious, or unable to sleep) Not at all            7/25/2024   Nutrition   Diet: Diabetic            7/25/2024   Exercise   Days per week of moderate/strenous exercise 4 days            7/25/2024   Social Factors   Frequency of gathering with friends or relatives Three times a week   Worry food won't last until get money to buy more No   Food not last or not have enough money for food? No   Do you have housing? (Housing is defined as stable permanent housing and does not include staying ouside in a car, in a tent, in an abandoned building, in an overnight shelter, or couch-surfing.) Yes   Are you worried about losing your housing? No   Lack of transportation? No   Unable to get utilities (heat,electricity)? No            7/25/2024   Fall Risk   Fallen 2 or more times in the past year? No   Trouble with walking or balance? Yes   Gait Speed Test Interpretation Greater than 5.01 seconds - ABNORMAL              7/25/2024   Activities of Daily Living- Home Safety   Needs help with the following daily activites None of the  above   Safety concerns in the home None of the above            2024   Dental   Dentist two times every year? Yes            2024   Hearing Screening   Hearing concerns? None of the above            2024   Driving Risk Screening   Patient/family members have concerns about driving No            2024   General Alertness/Fatigue Screening   Have you been more tired than usual lately? (!) YES            2024   Urinary Incontinence Screening   Bothered by leaking urine in past 6 months No            2024   TB Screening   Were you born outside of the [place]? No            Today's PHQ-2 Score:       2024     9:41 AM   PHQ-2 (  [employer])   Q1: Little interest or pleasure in doing things 0   Q2: Feeling down, depressed or hopeless 0   PHQ-2 Score 0   Q1: Little interest or pleasure in doing things Not at all   Q2: Feeling down, depressed or hopeless Not at all   PHQ-2 Score 0           2024   Substance Use   Alcohol more than 3/day or more than 7/wk No   Do you have a current opioid prescription? No   How severe/bad is pain from 1 to 10? 0/10 (No Pain)   Do you use any other substances recreationally? (!) DECLINE        Social History     Tobacco Use    Smoking status: Former     Current packs/day: 0.00     Types: Cigarettes     Quit date: 1976     Years since quittin.5     Passive exposure: Past    Smokeless tobacco: Never   Vaping Use    Vaping status: Never Used   Substance Use Topics    Alcohol use: Yes     Alcohol/week: 0.8 standard drinks of alcohol     Comment: maybe 1 or 2 month    Drug use: Never             Reviewed and updated as needed this visit by Provider   Tobacco  Allergies  Meds  Problems  Med Hx  Surg Hx  Fam Hx     Sexual Activity            Current providers sharing in care for this patient include:  Patient Care Team:  Valentino Machdao MD as PCP - General (Internal Medicine)  Valentino Machado MD as Assigned PCP    The following health maintenance  "items are reviewed in Epic and correct as of today:  Health Maintenance   Topic Date Due    RSV VACCINE (Pregnancy & 60+) (1 - 1-dose 60+ series) Never done    EYE EXAM  07/30/2024 (Originally 6/1/2024)    INFLUENZA VACCINE (1) 09/01/2024    A1C  10/23/2024    BMP  10/23/2024    MICROALBUMIN  10/23/2024    HEMOGLOBIN  01/23/2025    LIPID  07/23/2025    MEDICARE ANNUAL WELLNESS VISIT  07/25/2025    DIABETIC FOOT EXAM  07/25/2025    FALL RISK ASSESSMENT  07/25/2025    ADVANCE CARE PLANNING  07/25/2029    DTAP/TDAP/TD IMMUNIZATION (2 - Td or Tdap) 12/27/2030    PARATHYROID  Completed    PHOSPHORUS  Completed    PHQ-2 (once per calendar year)  Completed    URINALYSIS  Completed    ALK PHOS  Completed    Pneumococcal Vaccine: 65+ Years  Addressed    IPV IMMUNIZATION  Aged Out    HPV IMMUNIZATION  Aged Out    MENINGITIS IMMUNIZATION  Aged Out    RSV MONOCLONAL ANTIBODY  Aged Out    ZOSTER IMMUNIZATION  Discontinued    COVID-19 Vaccine  Discontinued            Objective    Exam  /62 (BP Location: Right arm, Patient Position: Sitting, Cuff Size: Adult Regular)   Pulse 99   Temp 96.8  F (36  C) (Temporal)   Resp 14   Ht 1.727 m (5' 8\")   Wt 76.2 kg (168 lb)   SpO2 99%   BMI 25.54 kg/m     Estimated body mass index is 25.54 kg/m  as calculated from the following:    Height as of this encounter: 1.727 m (5' 8\").    Weight as of this encounter: 76.2 kg (168 lb).            7/25/2024   Mini Cog   Clock Draw Score 0 Abnormal   3 Item Recall 1 object recalled   Mini Cog Total Score 1                 Signed Electronically by: Valentino Machado MD    Answers submitted by the patient for this visit:  Diabetes Visit (Submitted on 7/25/2024)  Chief Complaint: Chronic problems general questions HPI Form  Frequency of checking blood sugars:: not at all  Diabetic concerns:: none  Paraesthesia present:: burning in feet, excessive thirst  Have you had a diabetic eye exam within the last year?: No    "

## 2024-07-25 NOTE — PATIENT INSTRUCTIONS
Blood pressure is well controlled.   Diabetes is well controlled.     Medications refilled.       Thyroid function is low.... Free T4 is low.   -- Start Synthroid 50 mcg once daily.     Labs are otherwise relatively stable.     Start Lamisil spray on your feet / toes - for ring worm.         Lab on 07/23/2024   Component Date Value Ref Range Status    Cholesterol 07/23/2024 161  <200 mg/dL Final    Triglycerides 07/23/2024 63  <150 mg/dL Final    Direct Measure HDL 07/23/2024 47  >=40 mg/dL Final    LDL Cholesterol Calculated 07/23/2024 101 (H)  <=100 mg/dL Final    Non HDL Cholesterol 07/23/2024 114  <130 mg/dL Final    Patient Fasting > 8hrs? 07/23/2024 Unknown   Final    TSH 07/23/2024 23.22 (H)  0.30 - 4.20 uIU/mL Final    Color Urine 07/23/2024 Dark Yellow (A)  Colorless, Straw, Light Yellow, Yellow Final    Appearance Urine 07/23/2024 Cloudy (A)  Clear Final    Glucose Urine 07/23/2024 Negative  Negative mg/dL Final    Bilirubin Urine 07/23/2024 Negative  Negative Final    Ketones Urine 07/23/2024 Negative  Negative mg/dL Final    Specific Gravity Urine 07/23/2024 1.005  1.000 - 1.030 Final    Blood Urine 07/23/2024 Small (A)  Negative Final    pH Urine 07/23/2024 8.0  5.0 - 9.0 Final    Protein Albumin Urine 07/23/2024 200 (A)  Negative mg/dL Final    Urobilinogen Urine 07/23/2024 Normal  Normal, 2.0 mg/dL Final    Nitrite Urine 07/23/2024 Positive (A)  Negative Final    Leukocyte Esterase Urine 07/23/2024 Large (A)  Negative Final    RBC Urine 07/23/2024 7 (H)  <=2 /HPF Final    WBC Urine 07/23/2024 >182 (H)  <=5 /HPF Final    Mucus Urine 07/23/2024 Present (A)  None Seen /LPF Final    Creatinine Urine mg/dL 07/23/2024 32.8  mg/dL Final    The reference ranges have not been established in urine creatinine. The results should be integrated into the clinical context for interpretation.    Albumin Urine mg/L 07/23/2024 252.3  mg/L Final    The reference ranges have not been established in urine albumin. The  results should be integrated into the clinical context for interpretation.    Albumin Urine mg/g Cr 07/23/2024 769.21 (H)  0.00 - 17.00 mg/g Cr Final    Microalbuminuria is defined as an albumin:creatinine ratio of 17 to 299 for males and 25 to 299 for females. A ratio of albumin:creatinine of 300 or higher is indicative of overt proteinuria.  Due to biologic variability, positive results should be confirmed by a second, first-morning random or 24-hour timed urine specimen. If there is discrepancy, a third specimen is recommended. When 2 out of 3 results are in the microalbuminuria range, this is evidence for incipient nephropathy and warrants increased efforts at glucose control, blood pressure control, and institution of therapy with an angiotensin-converting-enzyme (ACE) inhibitor (if the patient can tolerate it).      WBC Count 07/23/2024 7.5  4.0 - 11.0 10e3/uL Final    RBC Count 07/23/2024 3.39 (L)  4.40 - 5.90 10e6/uL Final    Hemoglobin 07/23/2024 10.0 (L)  13.3 - 17.7 g/dL Final    Hematocrit 07/23/2024 31.3 (L)  40.0 - 53.0 % Final    MCV 07/23/2024 92  78 - 100 fL Final    MCH 07/23/2024 29.5  26.5 - 33.0 pg Final    MCHC 07/23/2024 31.9  31.5 - 36.5 g/dL Final    RDW 07/23/2024 15.9 (H)  10.0 - 15.0 % Final    Platelet Count 07/23/2024 303  150 - 450 10e3/uL Final    Sodium 07/23/2024 141  135 - 145 mmol/L Final    Potassium 07/23/2024 6.0 (H)  3.4 - 5.3 mmol/L Final    Carbon Dioxide (CO2) 07/23/2024 28  22 - 29 mmol/L Final    Anion Gap 07/23/2024 19 (H)  7 - 15 mmol/L Final    Urea Nitrogen 07/23/2024 100.0 (H)  8.0 - 23.0 mg/dL Final    Creatinine 07/23/2024 12.10 (H)  0.67 - 1.17 mg/dL Final    GFR Estimate 07/23/2024 4 (L)  >60 mL/min/1.73m2 Final    eGFR calculated using 2021 CKD-EPI equation.    Calcium 07/23/2024 8.6 (L)  8.8 - 10.4 mg/dL Final    Reference intervals for this test were updated on 7/16/2024 to reflect our healthy population more accurately. There may be differences in the  flagging of prior results with similar values performed with this method. Those prior results can be interpreted in the context of the updated reference intervals.    Chloride 07/23/2024 94 (L)  98 - 107 mmol/L Final    Glucose 07/23/2024 148 (H)  70 - 99 mg/dL Final    Alkaline Phosphatase 07/23/2024 75  40 - 150 U/L Final    AST 07/23/2024 17  0 - 45 U/L Final    ALT 07/23/2024 9  0 - 70 U/L Final    Protein Total 07/23/2024 6.7  6.4 - 8.3 g/dL Final    Albumin 07/23/2024 3.6  3.5 - 5.2 g/dL Final    Bilirubin Total 07/23/2024 0.5  <=1.2 mg/dL Final    Patient Fasting > 8hrs? 07/23/2024 Unknown   Final    Hemoglobin A1C 07/23/2024 5.2  4.0 - 6.2 % Final    Free T4 07/23/2024 0.72 (L)  0.90 - 1.70 ng/dL Final      Aspects of Diabetes:   Recent Labs   Lab Test 07/23/24  1605 04/17/24  0944 04/08/24  1050 11/30/23  0927   A1C 5.2  --  5.6 5.3   *  --  101* 71   HDL 47  --  42 40   TRIG 63  --  70 83   ALT 9 11 10 11   CR 12.10* 8.99* 9.39* 8.15*   GFRESTIMATED 4* 5* 5* 6*   POTASSIUM 6.0* 5.1 5.3 5.7*   TSH 23.22*  --  10.24* 6.92*   T4 0.72*  --  0.89* 0.91   WBC 7.5 8.2 10.6 12.5*   HGB 10.0* 10.5* 11.8* 10.6*    338 276 254   ALBUMIN 3.6 3.7 4.0 3.9      Hemoglobin A1c  Goal range is under 8%. Best is 6.5 to 7   Blood Pressure 128/62 Goal to keep less than 140/90   Tobacco  reports that he quit smoking about 48 years ago. His smoking use included cigarettes. He has been exposed to tobacco smoke. He has never used smokeless tobacco. Goal to abstain from tobacco   Aspirin or Plavix Anti-platelet therapy Aspirin or Plavix reduces risk of heart disease and stroke  -- sometimes used with other blood thinners, depending on bleeding risk and risk factors.    ACE/ARB Specific blood pressure meds These medications can reduce risk of kidney disease   Cholesterol Statins (Lipitor, Crestor, vs others) Statins reduce risk of heart disease and stroke   Eye Exam -- Do Yearly -- Annual diabetic eye exam   Healthy  weight Wt Readings from Last 4 Encounters:   07/25/24 76.2 kg (168 lb)   07/05/24 79.4 kg (175 lb)   04/17/24 79.4 kg (175 lb)   04/09/24 79.5 kg (175 lb 3.2 oz)      Body mass index is 25.54 kg/m .  Goal BMI under 30, best is under 25.      -- Trying to exercise daily (goal at least 20 min/day) with moderate aerobic activity   -- Eat healthy (resources from ADA at http://www.diabetes.org/)   -- Taking good care of my feet. Consider seeing the Podiatrist   -- Check blood sugars as directed, record in log book and bring to every appointment    Insurance companies are grading you and I on your blood sugar control -- Goal is to get your A1c down to 7.9% or lower and NO Smoking!  -- Medicare and most insurance companies, will only cover Hemoglobin A1c labs to be rechecked every 91+ days.      Return for Diabetes labs and clinic follow-up appointment every 3 to 4 months.    Schedule lab only appointment --- A few days AFTER: 10/23/24   Schedule clinic appointment with Dr. Machado -- Same day as labs, or 1-2 days later.        Patient Education   Preventive Care Advice   This is general advice given by our system to help you stay healthy. However, your care team may have specific advice just for you. Please talk to your care team about your preventive care needs.  Nutrition  Eat 5 or more servings of fruits and vegetables each day.  Try wheat bread, brown rice and whole grain pasta (instead of white bread, rice, and pasta).  Get enough calcium and vitamin D. Check the label on foods and aim for 100% of the RDA (recommended daily allowance).  Lifestyle  Exercise at least 150 minutes each week  (30 minutes a day, 5 days a week).  Do muscle strengthening activities 2 days a week. These help control your weight and prevent disease.  No smoking.  Wear sunscreen to prevent skin cancer.  Have a dental exam and cleaning every 6 months.  Yearly exams  See your health care team every year to talk about:  Any changes in your  health.  Any medicines your care team has prescribed.  Preventive care, family planning, and ways to prevent chronic diseases.  Shots (vaccines)   HPV shots (up to age 26), if you've never had them before.  Hepatitis B shots (up to age 59), if you've never had them before.  COVID-19 shot: Get this shot when it's due.  Flu shot: Get a flu shot every year.  Tetanus shot: Get a tetanus shot every 10 years.  Pneumococcal, hepatitis A, and RSV shots: Ask your care team if you need these based on your risk.  Shingles shot (for age 50 and up)  General health tests  Diabetes screening:  Starting at age 35, Get screened for diabetes at least every 3 years.  If you are younger than age 35, ask your care team if you should be screened for diabetes.  Cholesterol test: At age 39, start having a cholesterol test every 5 years, or more often if advised.  Bone density scan (DEXA): At age 50, ask your care team if you should have this scan for osteoporosis (brittle bones).  Hepatitis C: Get tested at least once in your life.  STIs (sexually transmitted infections)  Before age 24: Ask your care team if you should be screened for STIs.  After age 24: Get screened for STIs if you're at risk. You are at risk for STIs (including HIV) if:  You are sexually active with more than one person.  You don't use condoms every time.  You or a partner was diagnosed with a sexually transmitted infection.  If you are at risk for HIV, ask about PrEP medicine to prevent HIV.  Get tested for HIV at least once in your life, whether you are at risk for HIV or not.  Cancer screening tests  Cervical cancer screening: If you have a cervix, begin getting regular cervical cancer screening tests starting at age 21.  Breast cancer scan (mammogram): If you've ever had breasts, begin having regular mammograms starting at age 40. This is a scan to check for breast cancer.  Colon cancer screening: It is important to start screening for colon cancer at age 45.  Have  a colonoscopy test every 10 years (or more often if you're at risk) Or, ask your provider about stool tests like a FIT test every year or Cologuard test every 3 years.  To learn more about your testing options, visit:   .  For help making a decision, visit:   [email or web address].  Prostate cancer screening test: If you have a prostate, ask your care team if a prostate cancer screening test (PSA) at age 55 is right for you.  Lung cancer screening: If you are a current or former smoker ages 50 to 80, ask your care team if ongoing lung cancer screenings are right for you.  For informational purposes only. Not to replace the advice of your health care provider. Copyright   2023 [employer]. All rights reserved. Clinically reviewed by the [employer] [place] [facility]. [employer] 905398 - REV 01/24.  Preventing Falls: Care Instructions  Injuries and health problems such as trouble walking or poor eyesight can increase your risk of falling. So can some medicines. But there are things you can do to help prevent falls. You can exercise to get stronger. You can also arrange your home to make it safer.    Talk to your doctor about the medicines you take. Ask if any of them increase the risk of falls and whether they can be changed or stopped.   Try to exercise regularly. It can help improve your strength and balance. This can help lower your risk of falling.     Practice fall safety and prevention.    Wear low-heeled shoes that fit well and give your feet good support. Talk to your doctor if you have foot problems that make this hard.  Carry a cellphone or wear a medical alert device that you can use to call for help.  Use stepladders instead of chairs to reach high objects. Don't climb if you're at risk for falls. Ask for help, if needed.  Wear the correct eyeglasses, if you need them.    Make your home safer.    Remove rugs, cords, clutter, and furniture from walkways.  Keep your house well lit. Use  "night-lights in hallways and bathrooms.  Install and use sturdy handrails on stairways.  Wear nonskid footwear, even inside. Don't walk barefoot or in socks without shoes.    Be safe outside.    Use handrails, curb cuts, and ramps whenever possible.  Keep your hands free by using a shoulder bag or backpack.  Try to walk in well-lit areas. Watch out for uneven ground, changes in pavement, and debris.  Be careful in the winter. Walk on the grass or gravel when sidewalks are slippery. Use de-icer on steps and walkways. Add non-slip devices to shoes.    Put grab bars and nonskid mats in your shower or tub and near the toilet. Try to use a shower chair or bath bench when bathing.   Get into a tub or shower by putting in your weaker leg first. Get out with your strong side first. Have a phone or medical alert device in the bathroom with you.   Where can you learn more?  Go to https://www.[employer].net/patiented  Enter G117 in the search box to learn more about \"Preventing Falls: Care Instructions.\"  Current as of: July 17, 2023               Content Version: 14.0    4956-3786 [employer].   Care instructions adapted under license by your healthcare professional. If you have questions about a medical condition or this instruction, always ask your healthcare professional. [employer] disclaims any warranty or liability for your use of this information.      Learning About Sleeping Well  What does sleeping well mean?     Sleeping well means getting enough sleep to feel good and stay healthy. How much sleep is enough varies among people.  The number of hours you sleep and how you feel when you wake up are both important. If you do not feel refreshed, you probably need more sleep. Another sign of not getting enough sleep is feeling tired during the day.  Experts recommend that adults get at least 7 or more hours of sleep per day. Children and older adults need more sleep.  Why is getting enough sleep " "important?  Getting enough quality sleep is a basic part of good health. When your sleep suffers, your physical health, mood, and your thoughts can suffer too. You may find yourself feeling more grumpy or stressed. Not getting enough sleep also can lead to serious problems, including injury, accidents, anxiety, and depression.  What might cause poor sleeping?  Many things can cause sleep problems, including:  Changes to your sleep schedule.  Stress. Stress can be caused by fear about a single event, such as giving a speech. Or you may have ongoing stress, such as worry about work or school.  Depression, anxiety, and other mental or emotional conditions.  Changes in your sleep habits or surroundings. This includes changes that happen where you sleep, such as noise, light, or sleeping in a different bed. It also includes changes in your sleep pattern, such as having jet lag or working a late shift.  Health problems, such as pain, breathing problems, and restless legs syndrome.  Lack of regular exercise.  Using alcohol, nicotine, or caffeine before bed.  How can you help yourself?  Here are some tips that may help you sleep more soundly and wake up feeling more refreshed.  Your sleeping area   Use your bedroom only for sleeping and sex. A bit of light reading may help you fall asleep. But if it doesn't, do your reading elsewhere in the house. Try not to use your TV, computer, smartphone, or tablet while you are in bed.  Be sure your bed is big enough to stretch out comfortably, especially if you have a sleep partner.  Keep your bedroom quiet, dark, and cool. Use curtains, blinds, or a sleep mask to block out light. To block out noise, use earplugs, soothing music, or a \"white noise\" machine.  Your evening and bedtime routine   Create a relaxing bedtime routine. You might want to take a warm shower or bath, or listen to soothing music.  Go to bed at the same time every night. And get up at the same time every morning, " "even if you feel tired.  What to avoid   Limit caffeine (coffee, tea, caffeinated sodas) during the day, and don't have any for at least 6 hours before bedtime.  Avoid drinking alcohol before bedtime. Alcohol can cause you to wake up more often during the night.  Try not to smoke or use tobacco, especially in the evening. Nicotine can keep you awake.  Limit naps during the day, especially close to bedtime.  Avoid lying in bed awake for too long. If you can't fall asleep or if you wake up in the middle of the night and can't get back to sleep within about 20 minutes, get out of bed and go to another room until you feel sleepy.  Avoid taking medicine right before bed that may keep you awake or make you feel hyper or energized. Your doctor can tell you if your medicine may do this and if you can take it earlier in the day.  If you can't sleep   Imagine yourself in a peaceful, pleasant scene. Focus on the details and feelings of being in a place that is relaxing.  Get up and do a quiet or boring activity until you feel sleepy.  Avoid drinking any liquids before going to bed to help prevent waking up often to use the bathroom.  Where can you learn more?  Go to https://www.[employer].net/patiented  Enter J942 in the search box to learn more about \"Learning About Sleeping Well.\"  Current as of: July 10, 2023               Content Version: 14.0    2540-7218 [employer].   Care instructions adapted under license by your healthcare professional. If you have questions about a medical condition or this instruction, always ask your healthcare professional. [employer] disclaims any warranty or liability for your use of this information.         "

## 2024-08-16 PROBLEM — R19.7 DIARRHEA: Status: ACTIVE | Noted: 2024-01-01

## 2024-08-16 PROBLEM — R19.7 DIARRHEA, UNSPECIFIED TYPE: Status: ACTIVE | Noted: 2024-01-01

## 2024-08-16 NOTE — ED TRIAGE NOTES
"ED Nursing Triage Note (General)   ________________________________    Altaf Chao is a 89 year old Male that presents to triage via private vehicle with complaints of weakness and diarrhea.  Patient states stools are brownish/red and dark in color. Patient states diarrhea and weakness has been persisting over the past week and feels it continues to get worse. Patient states he was seen in [place] for a routine apt on Monday and states hgb was 8.3, no interventions at that time. Patient states no abdominal pain, no hx of GI bleed or liver problems. Patient states multiple falls over the past week due to weakness.   Significant symptoms had onset 1 week(s) ago.  Vital signs:  Temp: 99.8  F (37.7  C) Temp src: Tympanic BP: 126/75 Pulse: 102   Resp: 20 SpO2: 96 %     Height: 172.7 cm (5' 8\") Weight: 76.2 kg (168 lb)  Estimated body mass index is 25.54 kg/m  as calculated from the following:    Height as of this encounter: 1.727 m (5' 8\").    Weight as of this encounter: 76.2 kg (168 lb).  PRE HOSPITAL PRIOR LIVING SITUATION Spouse      Triage Assessment (Adult)       Row Name 08/16/24 1119          Triage Assessment    Airway WDL WDL        Respiratory WDL    Respiratory WDL WDL        Skin Circulation/Temperature WDL    Skin Circulation/Temperature WDL WDL        Cardiac WDL    Cardiac WDL WDL     Cardiac Rhythm NSR        Peripheral/Neurovascular WDL    Peripheral Neurovascular WDL WDL        Cognitive/Neuro/Behavioral WDL    Cognitive/Neuro/Behavioral WDL WDL                     "

## 2024-08-16 NOTE — ED PROVIDER NOTES
[facility]  Emergency Department Visit Note    Diarrhea and Generalized Weakness      History of Present Illness     HPI:  Altaf Chao is a 89 year old male with CKD GFR 4 presenting with diarrhea and weakness. These symptoms started  1 week ago. He estimates that he has had 2 episodes of loose stools today. The patient does not have abdominal pain and symptoms are not partially alleviated by a bowel movement. No blood in stool. No nausea or vomiting. No chest pain, shortness of breath, fevers. The patient does not have sick contacts with similar symptoms. He has not been eating at restaurants, eating unusual foods, or eating food that may have been spoiled.  He has not used any antibiotics in the past month. His wife takes care of him at home but he is too weak and she can not assist him    Medications:  Prior to Admission medications    Medication Sig Last Dose Taking? Auth Provider Long Term End Date   amLODIPine (NORVASC) 5 MG tablet Take 1 tablet (5 mg) by mouth 2 times daily --- okay to take 10 mg once daily if BID is not covered ---   Valentino Machado MD Yes    calcitRIOL (ROCALTROL) 0.25 MCG capsule Take 1 capsule (0.25 mcg) by mouth daily -- Rx per Nephrology --   Valentino Machado MD Yes    Cranberry-Vitamin C (CRANBERRY CONCENTRATE/VITAMINC) 87117-446 MG CAPS Take 2 capsules by mouth daily - for UTI prevention   Valentino Machado MD     darbepoetin miller (ARANESP) 100 MCG/0.5ML injection 100 mcg every 28 days   Reported, Patient     famotidine (PEPCID) 20 MG tablet Take 1 tablet (20 mg) by mouth daily -- for heartburn (Use as needed)   Valentino Machado MD     finasteride (PROSCAR) 5 MG tablet Take 1 tablet (5 mg) by mouth daily   Valentino Machado MD     furosemide (LASIX) 40 MG tablet Take 1 tablet (40 mg) by mouth every morning   Valentino Machado MD Yes    insulin glargine (LANTUS SOLOSTAR) 100 UNIT/ML pen Inject 2 Units subcutaneously at bedtime   Valentino Machado MD Yes    insulin pen  needle (B-D U/F) 31G X 8 MM miscellaneous Infuse daily Use 1 pen needles daily   Valentino Machado MD     levothyroxine (SYNTHROID/LEVOTHROID) 50 MCG tablet Take 1 tablet (50 mcg) by mouth daily - Start 7/25/2024 - for thyroid   Valentino Machado MD Yes    predniSONE (DELTASONE) 2.5 MG tablet Take 1 tablet (2.5 mg) by mouth daily   Valentino Machado MD No    sodium bicarbonate 650 MG tablet Take 1,950 mg by mouth 4 times daily   Unknown, Entered By History     tamsulosin (FLOMAX) 0.4 MG capsule Take 1 capsule (0.4 mg) by mouth every evening   Valentino Machado MD     tolnaftate (LAMISIL) 1 % external spray Externally apply topically 3 times daily -- as needed for foot rash / ring worm   Valentino Machado MD         Allergies:  Allergies   Allergen Reactions    Statins [Statins] Itching     -- Patient declined --       Problem List:  Patient Active Problem List   Diagnosis    Acquired buried penis    Acute crescentic glomerulonephritis    Anemia due to vitamin B12 deficiency    Anemia of chronic disease    Antineutrophil cytoplasmic antibody (ANCA) positive    Bilateral edema of lower extremity    CKD (chronic kidney disease) stage 5, GFR 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 ml/min (H)    Mixed hyperlipidemia    Benign essential hypertension    H/O bilateral inguinal hernia repair    H/O total hip arthroplasty    History of tobacco abuse    Immunocompromised patient (H24)    Lymphedema of both lower extremities    Metabolic acidosis    Pityriasis rosea    Primary pauci-immune necrotizing and crescentic glomerulonephritis    Refusal of statin medication at discharge    Steroid-induced hyperglycemia    Heartburn    Hyperkalemia    Hyponatremia    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, with long-term current use of insulin (H)    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)    DNR (do not resuscitate)    DNI (do not intubate)    Serum phosphate elevated    Occult closed fracture of right elbow with  "routine healing, subsequent encounter    Iron deficiency anemia, unspecified    Localized edema    Hypothyroidism due to acquired atrophy of thyroid    Tinea pedis of right foot    Diarrhea    Diarrhea, unspecified type       Past Medical History:  Past Medical History:   Diagnosis Date    Acute kidney failure (H24)     2017    Essential (primary) hypertension     2012    Hyperlipidemia     No Comments Provided    Osteoarthritis of hip     No Comments Provided    Pityriasis rosea     No Comments Provided    Tachycardia     No Comments Provided    Unilateral inguinal hernia without obstruction or gangrene     2013,Right Inguinal hernia with incarceration, massive [[id number]][       Past Surgical History:  Past Surgical History:   Procedure Laterality Date    CIRCUMCISION      2017,Dr. Heidi GREENBERG    OTHER SURGICAL HISTORY      33944.9,SD SUBTALAR ATHROEREISIS,bone Spurs excision on Toe    OTHER SURGICAL HISTORY      13,,HERNIA REPAIR,Right,RIH with mesh    OTHER SURGICAL HISTORY      13,63937,GENITAL SURGERY MALE,Dorsal Slit    OTHER SURGICAL HISTORY      4/15/14,,HERNIA REPAIR,Left,LIH with mesh    OTHER SURGICAL HISTORY      14,19338.0,SD TOTAL HIP ARTHROPLASTY,Left       Social History:  Social History     Tobacco Use    Smoking status: Former     Current packs/day: 0.00     Types: Cigarettes     Quit date: 1976     Years since quittin.6     Passive exposure: Past    Smokeless tobacco: Never   Vaping Use    Vaping status: Never Used   Substance Use Topics    Alcohol use: Yes     Alcohol/week: 0.8 standard drinks of alcohol     Comment: maybe 1 or 2 month    Drug use: Never       Review of Systems:  Complete review of systems obtained and pertinent positive and negative findings noted in HPI. Review of systems otherwise negative.      Physical Exam     Vital signs: /75   Pulse 102   Temp 99.8  F (37.7  C) (Tympanic)   Resp 20   Ht 1.727 m (5' 8\")   Wt " 76.2 kg (168 lb)   SpO2 96%   BMI 25.54 kg/m      Physical Exam:    General: awake and alert, uncomfortable  HEENT: atraumatic, no scleral injection, no nasal discharge, neck supple  Chest: clear to auscultation bilaterally without wheezes or crackles, non labored respirations, symmetric chest rise  Cardiovascular: regular rate and rhythm, no murmurs or gallops  Abdomen: soft, nontender, no rebound or guarding, nondistended  Extremities: no deformities, edema, or tenderness   Skin: warm, dry, diffuse ecchymosis on arms and legs  Neuro: alert and oriented x 3, moving extremities x 4     Medical Decision Making & ED Course     Altaf Chao is a 89 year old male presenting with diarrhea. Differential includes gastroenteritis, enteritis, parasitic diarrhea, diverticulitis, appendicitis, colitis, mesenteric ischemia, inflammatory bowel disease, irritable bowel syndrome. Abdominal exam benign with no focal tenderness or peritoneal signs. Given the lack of blood in the stools or fever, this is less likely to be bacterial cause of enteritis or inflammatory bowel disease. Given the lack of localized/focal abdominal pain, this is less likely to be appendicitis, diverticulitis, or other surgical process. Symptoms and duration are most consistent with viral enteritis. Given IVF in the emergency department. Will need admission for IVF, PT/OT    I have reviewed the patients ECG, laboratory studies, imaging, and medical records.      Diagnosis & Disposition     Diagnosis:  1. CKD (chronic kidney disease) stage 5, GFR 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 ml/min (H)    2. Diarrhea, unspecified type    3. Vitamin B12 deficiency          Disposition:  Admit       Nataliya Cardoso MD  08/16/24 [id number]

## 2024-08-16 NOTE — PHARMACY-ADMISSION MEDICATION HISTORY
Pharmacist Admission Medication History    Admission medication history is complete. The information provided in this note is only as accurate as the sources available at the time of the update.    Information Source(s): Patient, Caregiver, Clinic records, and CareEverywhere/SureScripts via in-person    Pertinent Information: Patient's wife primarily knows medications, did confirm that patient received aranesp 8/12/24 as appropriate. Famotidine use is seldom.   -Confirmed patient is on lantus- 2 units  -Confirmed Bicarb dose is 4 tablets three times daily per essentia AVS + patient/wife confirmed    Changes made to PTA medication list:  Added: None  Deleted: B12  Changed: Bicarb from 1950 mg QID -> 2600 mg TID, aranesp from 100 mcg -> 150 mcg, lasix from 40 mg QAM -> 40 mg QAM PLUS 20 mg in the afternoon    Allergies reviewed with patient and updates made in EHR: yes    Medication History Completed By: My Stone RPH 8/16/2024 3:20 PM  PTA Med List   Medication Sig Last Dose    amLODIPine (NORVASC) 5 MG tablet Take 1 tablet (5 mg) by mouth 2 times daily --- okay to take 10 mg once daily if BID is not covered --- 8/15/2024 at HS (1200 + HS)    calcitRIOL (ROCALTROL) 0.25 MCG capsule Take 1 capsule (0.25 mcg) by mouth daily -- Rx per Nephrology -- 8/16/2024 at AM    Cranberry-Vitamin C (CRANBERRY CONCENTRATE/VITAMINC) 24427-600 MG CAPS Take 2 capsules by mouth daily - for UTI prevention 8/15/2024 at HS (1200 + HS)    famotidine (PEPCID) 20 MG tablet Take 1 tablet (20 mg) by mouth daily -- for heartburn (Use as needed) More than a month at PRN (no use)    finasteride (PROSCAR) 5 MG tablet Take 1 tablet (5 mg) by mouth daily 8/15/2024 at 1200    furosemide (LASIX) 40 MG tablet Take 40 mg by mouth every morning And one-HALF tablet (20 mg) at 4 pm 8/16/2024 at AM    insulin glargine (LANTUS SOLOSTAR) 100 UNIT/ML pen Inject 2 Units subcutaneously at bedtime 8/15/2024 at HS    levothyroxine (SYNTHROID/LEVOTHROID) 50  MCG tablet Take 1 tablet (50 mcg) by mouth daily - Start 7/25/2024 - for thyroid 8/15/2024 at 1200    predniSONE (DELTASONE) 2.5 MG tablet Take 1 tablet (2.5 mg) by mouth daily 8/16/2024 at AM    sodium bicarbonate 650 MG tablet Take 2,600 mg by mouth 3 times daily 8/16/2024 at AM    tamsulosin (FLOMAX) 0.4 MG capsule Take 1 capsule (0.4 mg) by mouth every evening 8/15/2024 at HS    tolnaftate (LAMISIL) 1 % external spray Externally apply topically 3 times daily -- as needed for foot rash / ring worm Past Month at PRN- not currently using

## 2024-08-16 NOTE — PROVIDER NOTIFICATION
08/16/24 1454   Initial Information   Patient Belongings remains with patient   Patient Belongings Remaining with Patient clothing;vision aids   Did you bring any home meds/supplements to the hospital?  No     [facility] will make every effort per our policy to help keep your items safe while in the hospital.  If you choose to keep any items at the bedside, we cannot be held responsible for any items that are lost or broken.        I have reviewed my belongings list on admission and verify that it is correct.     Patient signature_______________________________  Date/Time_____________________    2nd Staff person if patient unable to sign __________________________  Date/Time ______________________      I have received all my belongings noted above at discharge.    Patient signature________________________________  Date/Time  __________________________

## 2024-08-16 NOTE — H&P
"[facility]    History and Physical - Hospitalist Service       Date of Admission:  8/16/2024    Assessment & Plan      Altaf Chao is a 89 year old male admitted on 8/16/2024. He was admitted thru the ED on 8/16 for diarrhea / weakness    Acute Diarrhea  - presumably viral etiology   Plan:  Stool Pathogen / C dif studies  Imodium prn  Follow    ESRD / Crescenteric Glomerulonephritis  - GFR of 3, he is aware of his poor prognosis and does not want any dialysis or further aggressive care for this  - largest risk would be diarrhea induced dehydration, he appears only mildly dehydrated at this time  Plan:  IVFs  Follow labs    Anemia due to ESRD  - he is within is normal range of 8-10, recheck tomorrow     Hypothyroid  - last TSH on 7/23/24 23.22, just started on synthroid  - cont replacement, will recheck TSH    IDDM with ESRD, Diabetic Neuropathy  - last A1c 5.2  - no insulin at this time    GERD  - controlled, cont Pepcid     Abnormal UA  - asymptomatic  Plan:  Ucx  Start rocephin           Diet:  Regular, as tolerated  DVT Prophylaxis: Heparin SQ  Gates Catheter: Not present  Lines: None     Cardiac Monitoring: None  Code Status:  DNR/DNI    Clinically Significant Risk Factors Present on Admission             # Anion Gap Metabolic Acidosis: Highest Anion Gap = 19 mmol/L in last 2 days, will monitor and treat as appropriate  # Hypoalbuminemia: Lowest albumin = 3.1 g/dL at 8/16/2024 11:31 AM, will monitor as appropriate    # Acute Kidney Injury, unspecified: based on a >150% or 0.3 mg/dL increase in last creatinine compared to past 90 day average, will monitor renal function  # Hypertension: Noted on problem list    # Anemia: based on hgb <11       # Overweight: Estimated body mass index is 25.54 kg/m  as calculated from the following:    Height as of this encounter: 1.727 m (5' 8\").    Weight as of this encounter: 76.2 kg (168 lb).                    Disposition Plan     Medically Ready for " Discharge: Anticipated in 2-4 Days           Bethel Gleason MD  Hospitalist Service  [facility]  Securely message with [employer] (more info)  Text page via [facility] Paging/Directory     ______________________________________________________________________    Chief Complaint   Diarrhea    History is obtained from the patient / ER MD / Chart    History of Present Illness   Altaf Chao is a 89 year old male who has ESRD who presented with a 1 week history of non-bloody diarrhea.  He is getting progressively weak from it.  Given his advanced age he has been challenged getting to the bathroom.  No recent abx, no abd pain, No n/v, no known ill contacts.  No fevers/chills/sweats.      Past Medical History    Past Medical History:   Diagnosis Date    Acute kidney failure (H24)     04/2017    Essential (primary) hypertension     8/7/2012    Hyperlipidemia     No Comments Provided    Osteoarthritis of hip     No Comments Provided    Pityriasis rosea     No Comments Provided    Tachycardia     No Comments Provided    Unilateral inguinal hernia without obstruction or gangrene     8/12/2013,Right Inguinal hernia with incarceration, massive [[id number]][       Past Surgical History   Past Surgical History:   Procedure Laterality Date    CIRCUMCISION      03/14/2017,Dr. Heidi GREENBERG    OTHER SURGICAL HISTORY      97589.9,MT SUBTALAR ATHROEREISIS,bone Spurs excision on Toe    OTHER SURGICAL HISTORY      8/20/13,,HERNIA REPAIR,Right,RIH with mesh    OTHER SURGICAL HISTORY      8/20/13,60775,GENITAL SURGERY MALE,Dorsal Slit    OTHER SURGICAL HISTORY      4/15/14,,HERNIA REPAIR,Left,LIH with mesh    OTHER SURGICAL HISTORY      6/30/14,27601.0,MT TOTAL HIP ARTHROPLASTY,Left       Prior to Admission Medications   Prior to Admission Medications   Prescriptions Last Dose Informant Patient Reported? Taking?   Cranberry-Vitamin C (CRANBERRY CONCENTRATE/VITAMINC) 89049-526 MG CAPS   No No   Sig: Take 2 capsules by mouth  daily - for UTI prevention   amLODIPine (NORVASC) 5 MG tablet   No No   Sig: Take 1 tablet (5 mg) by mouth 2 times daily --- okay to take 10 mg once daily if BID is not covered ---   calcitRIOL (ROCALTROL) 0.25 MCG capsule   Yes No   Sig: Take 1 capsule (0.25 mcg) by mouth daily -- Rx per Nephrology --   darbepoetin miller (ARANESP) 100 MCG/0.5ML injection  Self Yes No   Si mcg every 28 days   famotidine (PEPCID) 20 MG tablet   No No   Sig: Take 1 tablet (20 mg) by mouth daily -- for heartburn (Use as needed)   finasteride (PROSCAR) 5 MG tablet   No No   Sig: Take 1 tablet (5 mg) by mouth daily   furosemide (LASIX) 40 MG tablet   No No   Sig: Take 1 tablet (40 mg) by mouth every morning   insulin glargine (LANTUS SOLOSTAR) 100 UNIT/ML pen   No No   Sig: Inject 2 Units subcutaneously at bedtime   insulin pen needle (B-D U/F) 31G X 8 MM miscellaneous   No No   Sig: Infuse daily Use 1 pen needles daily   levothyroxine (SYNTHROID/LEVOTHROID) 50 MCG tablet   No No   Sig: Take 1 tablet (50 mcg) by mouth daily - Start 2024 - for thyroid   predniSONE (DELTASONE) 2.5 MG tablet   No No   Sig: Take 1 tablet (2.5 mg) by mouth daily   sodium bicarbonate 650 MG tablet  Self Yes No   Sig: Take 1,950 mg by mouth 4 times daily   tamsulosin (FLOMAX) 0.4 MG capsule   No No   Sig: Take 1 capsule (0.4 mg) by mouth every evening   tolnaftate (LAMISIL) 1 % external spray   No No   Sig: Externally apply topically 3 times daily -- as needed for foot rash / ring worm      Facility-Administered Medications Last Administration Doses Remaining   cyanocobalamin injection 1,000 mcg 2024 10:14 AM            Review of Systems    The 10 point Review of Systems is negative other than noted in the HPI or here.      Physical Exam   Vital Signs: Temp: 99.8  F (37.7  C) Temp src: Tympanic BP: 126/75 Pulse: 102   Resp: 20 SpO2: 96 %      Weight: 168 lbs 0 oz    Constitutional: awake, alert, cooperative, no apparent distress, and appears  stated age  Hematologic / Lymphatic: no cervical lymphadenopathy  Respiratory: No increased work of breathing, good air exchange, clear to auscultation bilaterally, no crackles or wheezing  Cardiovascular: Normal apical impulse, regular rate and rhythm, normal S1 and S2, no S3 or S4, and no murmur noted  GI: No scars, normal bowel sounds, soft, non-distended, non-tender, no masses palpated, no hepatosplenomegally  Skin: scattered forearm brusing, with mild stasis dermatitis of BLE  Neuropsychiatric: General: normal, calm, and normal eye contact    Medical Decision Making       55 MINUTES SPENT BY ME on the date of service doing chart review, history, exam, documentation & further activities per the note.      Data     I have personally reviewed the following data over the past 24 hrs:    11.9 (H)  \   8.1 (L)   / 296     135 88 (L) 144.3 (H) /  155 (H)   4.6 28 12.92 (H) \     ALT: 10 AST: 22 AP: 68 TBILI: 0.5   ALB: 3.1 (L) TOT PROTEIN: 6.5 LIPASE: N/A     Procal: N/A CRP: N/A Lactic Acid: 1.8         Imaging results reviewed over the past 24 hrs:   No results found for this or any previous visit (from the past 24 hour(s)).

## 2024-08-16 NOTE — PLAN OF CARE
"Goal Outcome Evaluation:  Vital signs stable, afebrile, alert and oriented. Continued diarrhea. Assist of 1-2. Patient reports weakness. Patient denies any pain.       Plan of Care Reviewed With: patient    Overall Patient Progress: no changeOverall Patient Progress: no change    Outcome Evaluation: VSS, afebrile, diarrhea    Blood pressure 133/71, pulse 100, temperature 98.9  F (37.2  C), temperature source Tympanic, resp. rate 18, height 1.727 m (5' 8\"), weight 76.2 kg (168 lb), SpO2 95%.     Cleopatra Helms RN on 8/16/2024 at 5:57 PM   "

## 2024-08-16 NOTE — PROGRESS NOTES
"[place] [facility] ADMISSION NOTE    Patient admitted to room 313 at approximately 2:05 via bed from emergency room. Patient was accompanied by spouse.     Verbal SBAR report received from Corina prior to patient arrival.     Patient ambulated to bed with one assist. Patient alert and oriented X 3. The patient is not having any pain.  . Admission vital signs: Blood pressure 115/52, pulse 85, temperature 98.9  F (37.2  C), temperature source Tympanic, resp. rate 18, height 1.727 m (5' 8\"), weight 76.2 kg (168 lb), SpO2 96%. Patient and spouse were oriented to plan of care, call light, bed controls, tv, telephone, bathroom, and visiting hours.     Risk Assessment    The following safety risks were identified during admission: fall. Yellow risk band applied: NO.     Skin Initial Assessment    This writer admitted this patient and completed a full skin assessment and Mayank score in the Adult PCS flowsheet.   Photo documentation of skin problem and/or wound competed via [employer] application (located under Media):  N/A    Appropriate interventions initiated as needed.       Mayank Risk Assessment  Sensory Perception: 3-->slightly limited  Moisture: 3-->occasionally moist  Activity: 3-->walks occasionally  Mobility: 3-->slightly limited  Nutrition: 3-->adequate  Friction and Shear: 3-->no apparent problem  Mayank Score: 18  Moisture Interventions: Encourage regular toileting  Mattress: Standard gel/foam mattress (IsoFlex, Atmos Air, etc.)  Bed Frame: Standard width and length    Education    Patient has a Bent to Observation order: No  Observation education completed and documented: N/A      Cleopatra Helms RN      "

## 2024-08-17 NOTE — PROGRESS NOTES
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  - GFR of 3, he is aware of his poor prognosis and does not want any dialysis or further aggressive care for this  - largest risk would be diarrhea induced dehydration, he appears euvolemic currently   Plan:  IVFs  Follow labs  Continue home bicarb dosing     Anemia due to ESRD  - he is within is normal range of 8-10, his hgb of 7.6 today likely represent slight dilution  - no evidence of any bleeding and no further monitoring needed during this hospital stay     Hypothyroid  - last TSH on 7/23/24 23.22, just started on synthroid  TSH   Date Value Ref Range Status   08/16/2024 9.53 (H) 0.30 - 4.20 uIU/mL Final   10/31/2022 6.91 (H) 0.40 - 4.00 mU/L Final   - given how recently the synthroid was started, his TSH is probably still normalizing and I would not make any dose adjustment at this time    H/O IDDM with ESRD, Diabetic Neuropathy  - last A1c 5.2  - no insulin at this time, and I would not resume his lantus at discharge     HTN  - on norvasc as outpt, which is currently on hold  - given his limited remaining lifespan, importance of continuing this is questionable - however as he has remained \"stable\" on treatment plan this can probably be resumed     GERD  - controlled, cont Pepcid     Abnormal UA  - asymptomatic, however elected to start tx with rocephin   Plan:  Cont abx and await Ucx     BPH  - cont flomax and proscar           Diet: Combination Diet Regular Diet Adult    DVT Prophylaxis: Heparin SQ  Gates Catheter: Not present  Lines: None     Cardiac Monitoring: None  Code Status: No CPR- Do NOT Intubate      Clinically Significant Risk Factors Present on Admission             # Anion Gap " Metabolic Acidosis: Highest Anion Gap = 19 mmol/L in last 2 days, will monitor and treat as appropriate  # Hypoalbuminemia: Lowest albumin = 3.1 g/dL at 8/16/2024 11:31 AM, will monitor as appropriate     # Hypertension: Noted on problem list    # Anemia: based on hgb <11                        Disposition Plan     Medically Ready for Discharge: Anticipated in 2-4 Days             Bethel Gleason MD  Hospitalist Service  [facility]  Securely message with [employer] (more info)  Text page via [employer] Paging/Directory   ______________________________________________________________________    Interval History   Feels a little better, diarrhea still going on  No abd pain or other complaints     Physical Exam   Vital Signs: Temp: 99.7  F (37.6  C) Temp src: Tympanic BP: 132/86 Pulse: 97   Resp: 18 SpO2: 92 % O2 Device: None (Room air)    Weight: 163 lbs 12.8 oz    Constitutional: awake, alert, cooperative, no apparent distress, and appears stated age  Respiratory: No increased work of breathing, good air exchange, clear to auscultation bilaterally, no crackles or wheezing  Cardiovascular: Normal apical impulse, regular rate and rhythm, normal S1 and S2, no S3 or S4, and no murmur noted  GI: No scars, normal bowel sounds, soft, non-distended, non-tender, no masses palpated, no hepatosplenomegally  Neuropsychiatric: General: normal, calm, and normal eye contact    Medical Decision Making       25 MINUTES SPENT BY ME on the date of service doing chart review, history, exam, documentation & further activities per the note.      Data     I have personally reviewed the following data over the past 24 hrs:    10.2  \   7.6 (L)   / 285     137 93 (L) 138.7 (H) /  118 (H)   4.1 26 12.89 (H) \     ALT: 10 AST: 22 AP: 68 TBILI: 0.5   ALB: 3.1 (L) TOT PROTEIN: 6.5 LIPASE: N/A     TSH: 9.53 (H) T4: N/A A1C: N/A     Procal: N/A CRP: N/A Lactic Acid: 1.8         Imaging results reviewed over the past 24 hrs:   No results  found for this or any previous visit (from the past 24 hour(s)).

## 2024-08-17 NOTE — PLAN OF CARE
Goal: Absence of Hospital-Acquired Illness or Injury  Outcome: Progressing  Intervention: Identify and Manage Fall Risk    Intervention: Prevent Skin Injury    Intervention: Prevent and Manage VTE (Venous Thromboembolism) Risk    Intervention: Prevent Infection    Goal: Optimal Comfort and Wellbeing  Outcome: Progressing  Goal: Readiness for Transition of Care  Outcome: Progressing     Problem: Risk for Delirium  Goal: Optimal Coping  Outcome: Progressing  Goal: Improved Behavioral Control  Outcome: Progressing  Goal: Improved Attention and Thought Clarity  Outcome: Progressing  Goal: Improved Sleep  Outcome: Progressing     Problem: Skin Injury Risk Increased  Goal: Skin Health and Integrity  Outcome: Progressing  Intervention: Optimize Skin Protection     Goal Outcome Evaluation: Pt had frequent urination overnight. Slept well otherwise. Dressing applied to R calf as previously noted. Robitussin given for dry cough. Pt c/o dry heaves, zofran given. Pt had two loose stools overnight.       Plan of Care Reviewed With: patient    Overall Patient Progress: no changeOverall Patient Progress: no change

## 2024-08-17 NOTE — PROGRESS NOTES
Bleeding noted on R calf, where previous scab was. Gauze dressing applied. Picture uploaded to chart.

## 2024-08-17 NOTE — PLAN OF CARE
Problem: Fluid Volume Deficit  Goal: Fluid Balance  Outcome: Progressing   Goal Outcome Evaluation:     Patient has had 4 small/smear soft/loose stools throughout the day today. Also had a total of 3 documented voids, only two were measured for a total of 525 mL. IVF continued at 75 mL/hr.   Pt sleepy for majority of the day but wakes easily to voice. He is oriented to person, place, and time but was slow to respond to time.   Placed on 4 liters nasal cannula to keep SpO2 greater than 90%. He desaturates to as low as 82% when asleep and as mentioned above he slept a lot today. SpO2 reads 90-91% on RA when awake and upright. IV abx given as ordered.

## 2024-08-18 NOTE — PHARMACY - DISCHARGE MEDICATION RECONCILIATION AND EDUCATION
Pharmacy:  Discharge Counseling and Medication Reconciliation    [facility]  [address]  [place] [address]  [phone] (home)   89 year old male  PCP: Valentino Machado    Allergies: Statins [statins]    Discharge Counseling:    Pharmacist met with patient (and/or family) today to review the medication portion of the After Visit Summary (with an emphasis on NEW medications) and to address patient's questions/concerns.    Summary of Education: Reviewed new medications, stopping lantus (encouraged patient to follow up with PCP for complete discontinuation prior to throwing pens away), and no other changes to medications.  Retimed rocephin at nursing request to 1000 for discharge    Materials Provided:  MedCounselor sheets printed from Clinical Pharmacology on: Loperamide    Discharge Medication Reconciliation:    It has been determined that the patient has an adequate supply of medications available or which can be obtained from the patient's preferred pharmacy, which HE/SHE has confirmed as: [facility]    Thank you for the consult.    My Stone RPH........August 18, 2024 8:59 AM

## 2024-08-18 NOTE — PLAN OF CARE
"/72 (BP Location: Left arm)   Pulse 95   Temp 98.9  F (37.2  C) (Tympanic)   Resp 20   Ht 1.727 m (5' 8\")   Wt 76.2 kg (168 lb)   SpO2 92%   BMI 25.54 kg/m      VSS, afebrile, denies pain.  Alert and oriented.  Slept well between cares.  Sepsis protocol fired earlier this shift, lactic back at 1.3. Urinating without issue, at bedside using urinal.     Problem: Adult Inpatient Plan of Care  Goal: Plan of Care Review  Description: The Plan of Care Review/Shift note should be completed every shift.  The Outcome Evaluation is a brief statement about your assessment that the patient is improving, declining, or no change.  This information will be displayed automatically on your shift  note.  Outcome: Progressing  Goal: Patient-Specific Goal (Individualized)  Description: You can add care plan individualizations to a care plan. Examples of Individualization might be:  \"Parent requests to be called daily at 9am for status\", \"I have a hard time hearing out of my right ear\", or \"Do not touch me to wake me up as it startles  me\".  Outcome: Progressing  Goal: Absence of Hospital-Acquired Illness or Injury  Outcome: Progressing  Intervention: Identify and Manage Fall Risk  Recent Flowsheet Documentation  Taken 8/17/2024 2300 by Moraima Ball RN  Safety Promotion/Fall Prevention:   activity supervised   assistive device/personal items within reach   clutter free environment maintained   nonskid shoes/slippers when out of bed   room door open   room organization consistent   safety round/check completed  Intervention: Prevent and Manage VTE (Venous Thromboembolism) Risk  Recent Flowsheet Documentation  Taken 8/17/2024 2300 by Moraima Ball RN  VTE Prevention/Management: SCDs off (sequential compression devices)  Goal: Optimal Comfort and Wellbeing  Outcome: Progressing  Intervention: Provide Person-Centered Care  Recent Flowsheet Documentation  Taken 8/17/2024 2300 by Moraima Ball RN  Trust " Relationship/Rapport:   care explained   choices provided   emotional support provided  Goal: Readiness for Transition of Care  Outcome: Progressing     Problem: Risk for Delirium  Goal: Optimal Coping  Outcome: Progressing  Goal: Improved Behavioral Control  Outcome: Progressing  Intervention: Minimize Safety Risk  Recent Flowsheet Documentation  Taken 8/17/2024 2300 by Moraima Ball RN  Trust Relationship/Rapport:   care explained   choices provided   emotional support provided  Goal: Improved Attention and Thought Clarity  Outcome: Progressing  Goal: Improved Sleep  Outcome: Progressing     Problem: Skin Injury Risk Increased  Goal: Skin Health and Integrity  Outcome: Progressing  Intervention: Optimize Skin Protection  Recent Flowsheet Documentation  Taken 8/17/2024 2051 by Moraima Ball RN  Activity Management:   ambulated to bathroom   back to bed     Problem: Fluid Volume Deficit  Goal: Fluid Balance  Outcome: Progressing

## 2024-08-18 NOTE — DISCHARGE SUMMARY
"[facility]  Hospitalist Discharge Summary      Date of Admission:  8/16/2024  Date of Discharge:  8/18/2024  Discharging Provider: Bethel Gleason MD  Discharge Service: Hospitalist Service    Discharge Diagnoses   Acute Diarrhea, presumed viral infection  ESRD, declining dialysis     Clinically Significant Risk Factors     # Overweight: Estimated body mass index is 25.54 kg/m  as calculated from the following:    Height as of this encounter: 1.727 m (5' 8\").    Weight as of this encounter: 76.2 kg (168 lb).       Follow-ups Needed After Discharge   Follow-up Appointments     Follow-up and recommended labs and tests       Follow up with primary care provider, Valentino Machado, within 7 days for   hospital follow- up.  No follow up labs or test are needed.            Unresulted Labs Ordered in the Past 30 Days of this Admission       Date and Time Order Name Status Description    8/16/2024 12:48 PM Urine Culture In process     8/16/2024 12:38 PM Blood Culture Peripheral Blood Preliminary         These results will be followed up by [facility]    Discharge Disposition   Discharged to home  Condition at discharge: Terminal    Hospital Course   Acute Diarrhea - Resolved  - presumably viral etiology   - C dif negative, enteric panel negative  - just 1 small formed stool over night, he is tolerating food  - ok to discharge, can take imodium prn at home if needed    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  - GFR of 4, he is aware of his poor prognosis and does not want any dialysis or further aggressive care for this  - I anticipate he is in the last few weeks to months of his life, he understands and states he has lived a good life  - Continue home bicarb dosing, at some point hospice should be started    Anemia due to ESRD  - he is within is normal range of 8-10, his hgb of 7.6 today likely represent slight dilution  - no evidence of any bleeding and no further monitoring needed during this hospital stay "     Hypothyroid  - last TSH on 7/23/24 23.22, just started on synthroid  TSH   Date Value Ref Range Status   08/16/2024 9.53 (H) 0.30 - 4.20 uIU/mL Final   10/31/2022 6.91 (H) 0.40 - 4.00 mU/L Final   - given how recently the synthroid was started, his TSH is probably still normalizing and I would not make any dose adjustment at this time    H/O IDDM with ESRD, Diabetic Neuropathy  - last A1c 5.2  - no insulin at this time and with his outstanding A1c and only being ordered a couple units of lantus at home, given the overall picture I will stop his home insulin dose     HTN  - on norvasc as outpt, which I will continue at discharge  - I will defer to his primary provider whether or not to stop this given his very limited lifespan      GERD  - controlled, cont Pepcid     Abnormal UA  - asymptomatic, however elected to start tx with rocephin and received would should be sufficient of 3 total doses during this hospital stay  - his Ucx was still pending at discharge and I do not believe any further antibiotics are needed, as he did not have any symptoms with this abnormal finding    BPH  - cont flomax and proscar     Consultations This Hospital Stay   None    Code Status   No CPR- Do NOT Intubate    Time Spent on this Encounter   I, Bethel Gleason MD, personally saw the patient today and spent greater than 30 minutes discharging this patient.       Bethel Gleason MD  [facility]  [address]   [place] [address]  Phone: [phone]  Fax: [phone]  ______________________________________________________________________    Physical Exam   Vital Signs: Temp: 97.9  F (36.6  C) Temp src: Tympanic BP: 117/70 Pulse: 86   Resp: 18 SpO2: 96 % O2 Device: Nasal cannula Oxygen Delivery: 4 LPM  Weight: 168 lbs 0 oz  Respiratory: No increased work of breathing, good air exchange, clear to auscultation bilaterally, no crackles or wheezing  Cardiovascular: Normal apical impulse, regular rate and rhythm,  normal S1 and S2, no S3 or S4, and no murmur noted  Neuropsychiatric: General: normal, calm, and normal eye contact       Primary Care Physician   Valentino Machado    Discharge Orders      Reason for your hospital stay    Diarrhea     Follow-up and recommended labs and tests     Follow up with primary care provider, Valentino Machado, within 7 days for hospital follow- up.  No follow up labs or test are needed.     Activity    Your activity upon discharge: activity as tolerated     Diet    Follow this diet upon discharge: Orders Placed This Encounter      Combination Diet Regular Diet Adult       Significant Results and Procedures   Most Recent 3 CBC's:  Recent Labs   Lab Test 08/17/24  0622 08/16/24  1131 07/23/24  1605   WBC 10.2 11.9* 7.5   HGB 7.6* 8.1* 10.0*   MCV 89 90 92    296 303     Most Recent 3 BMP's:  Recent Labs   Lab Test 08/18/24  0717 08/17/24  0622 08/16/24  1131    137 135   POTASSIUM 4.0 4.1 4.6   CHLORIDE 93* 93* 88*   CO2 27 26 28   .3* 138.7* 144.3*   CR 12.17* 12.89* 12.92*   ANIONGAP 15 18* 19*   MARVA 7.9* 8.5* 8.5*   * 118* 155*   ,   Results for orders placed or performed during the hospital encounter of 12/18/23   XR Knee Left 3 Views    Narrative    Exam: XR KNEE LEFT 3 VIEWS     History:Male, age 89 years, fall with knee pain    Comparison:  6/4/2023    Technique: Three views are submitted.    Findings: Bones are osteopenic. No evidence of acute or subacute  fracture.  No evidence of dislocation.  Medial compartment joint space  narrowing. Heterogeneous density of the soft tissues suggesting  hematoma and possible laceration.           Impression    Impression:  No evidence of acute or subacute bony abnormality.    RONALD GHOTRA MD         SYSTEM ID:  [id number]       Discharge Medications   Current Discharge Medication List        START taking these medications    Details   loperamide (IMODIUM A-D) 2 MG tablet Take 1 tablet (2 mg) by mouth 4 times daily as needed  for diarrhea    Associated Diagnoses: Diarrhea of infectious origin           CONTINUE these medications which have CHANGED    Details   !! furosemide (LASIX) 40 MG tablet Take 1 tablet (40 mg) by mouth every morning  Qty: 90 tablet, Refills: 4    Associated Diagnoses: Benign essential hypertension       !! - Potential duplicate medications found. Please discuss with provider.        CONTINUE these medications which have NOT CHANGED    Details   amLODIPine (NORVASC) 5 MG tablet Take 1 tablet (5 mg) by mouth 2 times daily --- okay to take 10 mg once daily if BID is not covered ---  Qty: 180 tablet, Refills: 4    Comments: Prescription renewal, patient will call for refills.  Associated Diagnoses: Benign essential hypertension      calcitRIOL (ROCALTROL) 0.25 MCG capsule Take 1 capsule (0.25 mcg) by mouth daily -- Rx per Nephrology --      Cranberry-Vitamin C (CRANBERRY CONCENTRATE/VITAMINC) 43169-573 MG CAPS Take 2 capsules by mouth daily - for UTI prevention  Qty: 200 capsule, Refills: 4    Associated Diagnoses: Recurrent UTI (urinary tract infection); Incomplete bladder emptying      famotidine (PEPCID) 20 MG tablet Take 1 tablet (20 mg) by mouth daily -- for heartburn (Use as needed)  Qty: 90 tablet, Refills: 4    Comments: Prescription renewal, patient will call for refills.  Associated Diagnoses: Heartburn      finasteride (PROSCAR) 5 MG tablet Take 1 tablet (5 mg) by mouth daily  Qty: 90 tablet, Refills: 4    Comments: Prescription renewal, patient will call for refills.  Associated Diagnoses: Urinary retention      !! furosemide (LASIX) 40 MG tablet Take 40 mg by mouth every morning And one-HALF tablet (20 mg) at 4 pm      levothyroxine (SYNTHROID/LEVOTHROID) 50 MCG tablet Take 1 tablet (50 mcg) by mouth daily - Start 7/25/2024 - for thyroid  Qty: 90 tablet, Refills: 1    Associated Diagnoses: Hypothyroidism due to acquired atrophy of thyroid      predniSONE (DELTASONE) 2.5 MG tablet Take 1 tablet (2.5 mg) by  mouth daily  Qty: 90 tablet, Refills: 4    Comments: Prescription renewal, patient will call for refills.  Associated Diagnoses: CKD (chronic kidney disease) stage 5, GFR 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 ml/min (H); Primary pauci-immune necrotizing and crescentic glomerulonephritis; Immunocompromised patient (H24)      sodium bicarbonate 650 MG tablet Take 2,600 mg by mouth 3 times daily      tamsulosin (FLOMAX) 0.4 MG capsule Take 1 capsule (0.4 mg) by mouth every evening  Qty: 90 capsule, Refills: 4    Comments: Prescription renewal, patient will call for refills.  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 - Potential duplicate medications found. Please discuss with provider.        STOP taking these medications       insulin glargine (LANTUS SOLOSTAR) 100 UNIT/ML pen Comments:   Reason for Stopping:             Allergies   Allergies   Allergen Reactions    Statins [Statins] Itching     -- Patient declined --

## 2024-08-18 NOTE — PROGRESS NOTES
"Pt VSS, afebrile. Pt up in chair, going to eat lunch and waiting on ride. Pt ready for discharge. Pt stated \"ready to go home\". Wife and daughter aware and answered questions asked. Pt wife said he came to ED with walker and still needs to be found in facility.     /70 (BP Location: Left arm, Patient Position: Supine, Cuff Size: Adult Regular)   Pulse 86   Temp 97.9  F (36.6  C) (Tympanic)   Resp 18   Ht 1.727 m (5' 8\")   Wt 76.2 kg (168 lb)   SpO2 96%   BMI 25.54 kg/m      "

## 2024-08-19 NOTE — TELEPHONE ENCOUNTER
"Patient reports he saw his coffee cup and went to grab it and it wasn't there. This happened last time he was inpatient with medication treatments and he suspects it is a side effect. He once saw the ceiling slide down the side of the wall. The hallucinations during that admission only lasted a day, once he got home they resolved. Wife states it typically happens when he lays down or goes to bed. He asked her to get something from his desk that was not there. Patient did not have a hospital follow-up visit scheduled. Transferred to Scheduling; he will see EMANI Cornelius NP 8/21/24 at 11:20 AM. Advised spouse to monitor closely for new or worsening symptoms before then or if these symptoms persist rather than resolve within a day or two like they had previously. Caitlin Herron RN on 8/19/2024 at 12:19 PM      Transitions of Care Outreach  Chief Complaint   Patient presents with    Hospital F/U       Most Recent Admission Date: 8/16/2024   Most Recent Admission Diagnosis: Vitamin B12 deficiency - E53.8  CKD (chronic kidney disease) stage 5, GFR 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 ml/min (H) - N18.5  Diarrhea, unspecified type - R19.7     Most Recent Discharge Date: 8/18/2024   Most Recent Discharge Diagnosis: CKD (chronic kidney disease) stage 5, GFR 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 ml/min (H) - N18.5  Diarrhea, unspecified type - R19.7  Vitamin B12 deficiency - E53.8  Benign essential hypertension - I10  Diarrhea of infectious origin - A09     Transitions of Care Assessment    Discharge Assessment  How are you doing now that you are home?: Patient states, \"Pretty good.\" \"My legs are still giving me a little trouble getting up and down.\"  How are your symptoms? (Red Flag symptoms escalate to triage hotline per guidelines): Improved  Do you know how to contact your clinic care team if you have future questions or changes to your health status? : No (explained)  Does the patient have their discharge instructions? : Yes  Does the patient have questions regarding their " discharge instructions? : No  Were you started on any new medications or were there changes to any of your previous medications? : Yes  Does the patient have all of their medications?: No (see comment) (she still has to  loperamide)  Do you have questions regarding any of your medications? : No  Do you have all of your needed medical supplies or equipment (DME)?  (i.e. oxygen tank, CPAP, cane, etc.): Yes    Follow up Plan     Discharge Follow-Up  Discharge follow up appointment scheduled in alignment with recommended follow up timeframe or Transitions of Risk Category? (Low = within 30 days; Moderate= within 14 days; High= within 7 days): Yes    Future Appointments   Date Time Provider Department Center   8/21/2024 11:20 AM Roseann Cornelius APRN [facility]   9/16/2024 10:00 AM  INJECTION NURSE [facility]   10/29/2024 10:20 AM GH LAB [facility]   10/31/2024 10:00 AM Valentino Machado MD [facility]       Outpatient Plan as outlined on AVS reviewed with patient.    For any urgent concerns, please contact our 24 hour nurse triage line: [phone] ([phone])       Caitlin Herron RN

## 2024-08-19 NOTE — PROGRESS NOTES
Verified patient's first and last name, and . Patient stated reason for visit today is to receive a B12 injection. Patient denied any concerns with previous injections. B12 prepared and administered IM as ordered. Administration of medication documented in MAR (see MAR for further information regarding dose, lot #, NDC #, expiration date). Patient encouraged to wait in lobby for 15 minutes post-injection and notify staff immediately of any reaction.     Bud Paz RN ....................  2024   10:49 AM

## 2024-08-19 NOTE — PROGRESS NOTES
Previous order discontinued by pharmacy during hospitalization. New order placed.     Susan Saleh PA-C  8/19/2024  10:57 AM

## 2024-08-21 PROBLEM — R44.3 HALLUCINATIONS: Status: ACTIVE | Noted: 2024-01-01

## 2024-08-21 PROBLEM — R53.1 GENERALIZED WEAKNESS: Status: ACTIVE | Noted: 2024-01-01

## 2024-08-21 PROBLEM — R41.0 CONFUSION: Status: ACTIVE | Noted: 2024-01-01

## 2024-08-21 PROBLEM — N39.0 RECURRENT UTI: Status: ACTIVE | Noted: 2024-01-01

## 2024-08-21 PROBLEM — S51.819A SKIN TEAR OF FOREARM WITHOUT COMPLICATION, INITIAL ENCOUNTER: Status: ACTIVE | Noted: 2024-01-01
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
## 2024-08-21 NOTE — PROGRESS NOTES
"PRIMARY DIAGNOSIS: \"GENERIC\" NURSING  OUTPATIENT/OBSERVATION GOALS TO BE MET BEFORE DISCHARGE:  ADLs back to baseline: No    Activity and level of assistance: Up with maximum assistance. Consider SW and/or PT evaluation.     Pain status: Pain free.    Return to near baseline physical activity: No     Discharge Planner Nurse   Safe discharge environment identified: Yes  Barriers to discharge: No       Entered by: Tanmay Noonan RN 08/21/2024 4:54 PM     Please review provider order for any additional goals.   Nurse to notify provider when observation goals have been met and patient is ready for discharge.    /72   Pulse 89   Temp 97.6  F (36.4  C) (Tympanic)   Resp 18   Ht 1.727 m (5' 8\")   Wt 76.2 kg (168 lb)   SpO2 92%   BMI 25.54 kg/m      Pt AO x4, able to make needs known. Uses call light appropriately   VSS, tolerating RA  Tolerating diet order   Ast x2 w/ transfers at this time  Wounds documented     Tanmay Noonan RN on 8/21/2024 at 5:03 PM    "

## 2024-08-21 NOTE — H&P
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  He had been hospitalized from 8/16-18/2024 with presumed viral diarrhea.  He was trying to put his shoes on to go to his hospital follow-up appointment when he fell and sustained a skin tear to his left arm.  He reports he has also been having hallucinations, confusion and weakness.  In the ER he was noted to have fairly stable labs with his .2, Cr 12.20, GFR 4, Hgb 8.1.  X-rays of pelvis and ankle without fracture.  CXR rotated but CT cervical spine sees coarse right upper lobe mixed opacities along with high-grade spinal canal narrowing in the cervical spine.  Head CT without acute changes.  UA with moderate blood, large leuk esterase, > 182 WBCs.  Culture from 8/16 grew Alcaligenes faecalis susceptible to cefepime, Bactrim and Zosyn.  Due to general decline in condition he is placed in observation now for further evaluation.    Principal Problem:    Fall, initial encounter    Hallucinations    Confusion    Generalized weakness    Skin tear of forearm without complication, initial encounter       May still have some weakness from the diarrhea he had last week.  Has had CKD-5 with very high Cr since 4/2017.  He may be nearing the end of his functioning.  Will ask for PT, OT and discharge planning to evaluate.  May benefit from meeting with hospice although he has been very active his whole life.  Do not expect he is at his last moments but he does overall look much weaker and more frail and tired than when he was seen  by this provider in April 2024.  Has done home PT previously and would like to start again.  Admits his upper legs are weak and he now has difficulty getting up from a chair.    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  Does take bicarb, calcitriol, Aranesp and prednisone to treat symptoms.  His renal function has been very bad for a long time and it is surprising how well he does with these kinds of numbers.  Continue supportive cares.  Not sure why he is on Lasix (HTN?) but he does make some urine and renal function is not much worse so will continue for now.      Benign essential hypertension       Good control with amlodipine, Lasix and tamsulosin.  Continue.        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  Will not check POC glucose during his hospitalization.        Hypothyroidism due to acquired atrophy of thyroid       Just started on levothyroxine recently and his TSH has come down from 23.22 on 7/23/2024 to 9.53 by 8/16/2024.  Continue current dose of levothyroxine 50 mcg daily.      Recurrent UTI        May have not completely cleared from his last admission.  He received 3 doses of Rocephin but was not discharged on abx.  Restarted Rocephin as this is probably the easiest on the kidneys.       Observation Goals: -diagnostic tests and consults completed and resulted, -vital signs normal or at patient baseline, -tolerating oral intake to maintain hydration, -tolerating oral antibiotics or has plans for home infusion  "setup, -returns to baseline functional status, -safe disposition plan has been identified, Nurse to notify provider when observation goals have been met and patient is ready for discharge.  Diet: Regular Diet Adult    DVT Prophylaxis: Heparin SQ  Gates Catheter: Not present  Lines: None     Cardiac Monitoring: None  Code Status: No CPR- Do NOT Intubate    Clinically Significant Risk Factors Present on Admission              # Hypoalbuminemia: Lowest albumin = 3.1 g/dL at 8/21/2024 11:39 AM, will monitor as appropriate     # Hypertension: Noted on problem list      # Anemia: based on hgb <11       # Overweight: Estimated body mass index is 25.54 kg/m  as calculated from the following:    Height as of this encounter: 1.727 m (5' 8\").    Weight as of this encounter: 76.2 kg (168 lb).                    Disposition Plan     Medically Ready for Discharge: Anticipated in 2-4 Days           Melissa Nettles MD  Hospitalist Service  [facility]  Securely message with [employer] (more info)  Text page via [facility] Paging/Directory     ______________________________________________________________________    Chief Complaint   Fall    History is obtained from the patient, electronic health record, and emergency department physician    History of Present Illness   Altaf Chao is a 89 year old male with a PMH significant for HTN, hyperlipidemia, hypothyroidism, DM-2 with CKD-5 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  He had been hospitalized from 8/16-18/2024 with presumed viral diarrhea.  He was trying to put his shoes on to go to his hospital follow-up appointment when he fell and sustained a skin tear to his left arm.  He reports he has also been having hallucinations, confusion and weakness.  In the ER he was " noted to have fairly stable labs with his .2, Cr 12.20, GFR 4, Hgb 8.1.  X-rays of pelvis and ankle without fracture.  CXR rotated but CT cervical spine sees coarse right upper lobe mixed opacities along with high-grade spinal canal narrowing in the cervical spine.  Head CT without acute changes.  UA with moderate blood, large leuk esterase, > 182 WBCs.  Culture from 8/16 grew Alcaligenes faecalis susceptible to cefepime, Bactrim and Zosyn.  Due to general decline in condition he is placed in observation now for further evaluation.      Past Medical History    Past Medical History:   Diagnosis Date    Acute kidney failure (H24)     04/2017    Essential (primary) hypertension     8/7/2012    Hyperlipidemia     No Comments Provided    Osteoarthritis of hip     No Comments Provided    Pityriasis rosea     No Comments Provided    Tachycardia     No Comments Provided    Unilateral inguinal hernia without obstruction or gangrene     8/12/2013,Right Inguinal hernia with incarceration, massive [[id number]][       Past Surgical History   Past Surgical History:   Procedure Laterality Date    CIRCUMCISION      03/14/2017,Dr. Heidi GREENBERG    OTHER SURGICAL HISTORY      05779.9,OR SUBTALAR ATHROEREISIS,bone Spurs excision on Toe    OTHER SURGICAL HISTORY      8/20/13,,HERNIA REPAIR,Right,RIH with mesh    OTHER SURGICAL HISTORY      8/20/13,00859,GENITAL SURGERY MALE,Dorsal Slit    OTHER SURGICAL HISTORY      4/15/14,,HERNIA REPAIR,Left,LIH with mesh    OTHER SURGICAL HISTORY      6/30/14,72901.0,OR TOTAL HIP ARTHROPLASTY,Left       Prior to Admission Medications   Prior to Admission Medications   Prescriptions Last Dose Informant Patient Reported? Taking?   Cranberry-Vitamin C (CRANBERRY CONCENTRATE/VITAMINC) 32783-858 MG CAPS 8/20/2024 at Noon Self, Spouse/Significant Other, Daughter No Yes   Sig: Take 2 capsules by mouth daily - for UTI prevention   amLODIPine (NORVASC) 5 MG tablet 8/21/2024 at AM Self,  Spouse/Significant Other, Daughter No Yes   Sig: Take 1 tablet (5 mg) by mouth 2 times daily --- okay to take 10 mg once daily if BID is not covered ---   calcitRIOL (ROCALTROL) 0.25 MCG capsule 8/21/2024 at AM Self, Spouse/Significant Other, Daughter Yes Yes   Sig: Take 1 capsule (0.25 mcg) by mouth daily -- Rx per Nephrology --   darbepoetin miller (ARANESP) 150 MCG/0.3ML SOSY injection Past Month Self, Spouse/Significant Other, Daughter Yes Yes   Sig: Inject 150 mcg subcutaneously every 28 days   finasteride (PROSCAR) 5 MG tablet 8/20/2024 at Noon Self, Spouse/Significant Other, Daughter No Yes   Sig: Take 1 tablet (5 mg) by mouth daily   furosemide (LASIX) 40 MG tablet 8/21/2024 at AM Self, Spouse/Significant Other, Daughter Yes Yes   Sig: Take 40 mg by mouth every morning And one-HALF tablet (20 mg) at 4 pm   insulin pen needle (B-D U/F) 31G X 8 MM miscellaneous  Self, Spouse/Significant Other, Daughter No No   Sig: Infuse daily Use 1 pen needles daily   levothyroxine (SYNTHROID/LEVOTHROID) 50 MCG tablet 8/20/2024 at noon Self, Spouse/Significant Other, Daughter No Yes   Sig: Take 1 tablet (50 mcg) by mouth daily - Start 7/25/2024 - for thyroid   loperamide (IMODIUM A-D) 2 MG tablet Not Needed Self, Spouse/Significant Other, Daughter No Yes   Sig: Take 1 tablet (2 mg) by mouth 4 times daily as needed for diarrhea   predniSONE (DELTASONE) 2.5 MG tablet 8/20/2024 at noon Self, Spouse/Significant Other, Daughter No Yes   Sig: Take 1 tablet (2.5 mg) by mouth daily   sodium bicarbonate 650 MG tablet 8/21/2024 at AM Self, Spouse/Significant Other, Daughter Yes Yes   Sig: Take 2,600 mg by mouth 3 times daily   tamsulosin (FLOMAX) 0.4 MG capsule 8/20/2024 at 1600 Self, Spouse/Significant Other, Daughter No Yes   Sig: Take 1 capsule (0.4 mg) by mouth every evening   terbinafine (LAMISIL) 1 % external cream 8/20/2024 Daughter, Self, Spouse/Significant Other Yes Yes   Sig: Apply topically 2 times daily.   tolnaftate  (LAMISIL) 1 % external spray Past Week Self, Spouse/Significant Other, Daughter No Yes   Sig: Externally apply topically 3 times daily -- as needed for foot rash / ring worm      Facility-Administered Medications Last Administration Doses Remaining   cyanocobalamin injection 1,000 mcg 2024 11:02 AM            Review of Systems    The 5 point Review of Systems is negative other than noted in the HPI or here.     Social History   I have reviewed this patient's social history and updated it with pertinent information if needed.  Social History     Tobacco Use    Smoking status: Former     Current packs/day: 0.00     Types: Cigarettes     Quit date: 1976     Years since quittin.6     Passive exposure: Past    Smokeless tobacco: Never   Vaping Use    Vaping status: Never Used   Substance Use Topics    Alcohol use: Yes     Alcohol/week: 0.8 standard drinks of alcohol     Comment: maybe 1 or 2 month    Drug use: Never         Family History   I have reviewed this patient's family history and updated it with pertinent information if needed.  Family History   Problem Relation Age of Onset    Other - See Comments Son 12        neuroblastoma at 11 yo  at 14.    Genetic Disorder No family hx of         Genetic,No known FHx of DM, CAD, CVA         Allergies   Allergies   Allergen Reactions    Statins [Statins] Itching     -- Patient declined --        Physical Exam   Vital Signs: Temp: 97.6  F (36.4  C) Temp src: Tympanic BP: 135/72 Pulse: 89   Resp: 18 SpO2: 92 % O2 Device: None (Room air)    Weight: 168 lbs 0 oz    Constitutional: awake, alert, cooperative, no apparent distress, appears stated age, and normal weight, overall looks much more frail and tired than he did at his last admission  Eyes: pupils equal, round and reactive to light, extra-ocular muscles intact, and conjunctiva normal  ENT: normocephalic, atraumatic  Respiratory: no increased work of breathing although he only speaks a few words at a  time, fair air exchange and clear to auscultation  Cardiovascular: regular rate and rhythm, normal S1 and S2, and no murmur noted  GI: normal bowel sounds, soft, non-distended, and non-tender  Skin: bruising on right forearm more than the rest of his body but scattered bruises noted, left arm with evidence of bleeding with gauze covering his entire forearm, steri strip on right index finger MCP, gauze on right mid lower leg  Musculoskeletal: full range of motion noted  motor strength is 5 out of 5 all extremities bilaterally but weaker than he has been at other admissions  Neurologic: Mental Status Exam:  Fund of Knowledge:  normal  Attention/Concentration:  normal  Language:  normal  Cranial Nerves:  cranial nerves II-XII are grossly intact  Motor Exam:  moves all extremities well and symmetrically  Neuropsychiatric: General: normal, calm, and normal eye contact  Level of consciousness: alert / normal  Affect: normal, pleasant, and full  Orientation: oriented to self, place, time and situation  Memory and insight: normal, memory for past and recent events intact, and thought process normal    Medical Decision Making             Data     I have personally reviewed the following data over the past 24 hrs:    10.0  \   8.1 (L)   / 318     136 90 (L) 127.2 (H) /  122 (H)   4.9 28 12.20 (H) \     ALT: 16 AST: 32 AP: 75 TBILI: 0.3   ALB: 3.1 (L) TOT PROTEIN: 6.0 (L) LIPASE: N/A     Procal: N/A CRP: N/A Lactic Acid: 2.0       INR:  1.05 PTT:  32   D-dimer:  N/A Fibrinogen:  N/A       Imaging results reviewed over the past 24 hrs:   Recent Results (from the past 24 hour(s))   XR Pelvis Port 1/2 Views    Narrative    PROCEDURE:  XR PELVIS PORT 1/2 VIEWS    HISTORY: Trauma - Pelvic Injury    COMPARISON: CT abdomen pelvis 9/22/2023    TECHNIQUE:  XR PELVIS PORT 1 VIEW    FINDINGS:   Postsurgical changes of bilateral total hip arthroplasties. No  evidence of hardware fracture or failure. No acute osseous fracture.  Joints are  appropriately aligned. No suspicious osseous lesion.    No foreign body or subcutaneous emphysema. Vascular calcifications.        Impression    IMPRESSION:   No acute osseous abnormality.    TAMMY CARDOZA MD         SYSTEM ID:  [id number]   XR Chest Port 1 View    Narrative    PROCEDURE:  XR CHEST PORT 1 VIEW    HISTORY: Trauma - Chest Injury    COMPARISON: Chest radiographs 10/30/2023, 6/10/2017    FINDINGS: Single view chest radiograph    Cardiomediastinal silhouette is within normal limits. Tortuous  atherosclerotic aorta with unchanged presumed hiatal hernia.  No effusion or pneumothorax.  Asymmetric right interstitial opacities  and scattered peripheral mixed opacities  No suspicious osseous lesion or subdiaphragmatic free air.      Impression    IMPRESSION:    Asymmetric right lung opacities, which likely represent posttraumatic  change based on history, but could represent infection or edema.    TAMMY CRADOZA MD         SYSTEM ID:  [id number]   XR Ankle Port Left 2 Views    Narrative    PROCEDURE:  XR ANKLE PORT LEFT 2 VIEWS    HISTORY: lateral left ankle pain after a fall    COMPARISON: No relevant priors available for comparison    TECHNIQUE:  XR ANKLE PORT LEFT 2 VIEWS    FINDINGS:   No acute fracture or dislocation is identified. No suspicious osseous  lesion. The joint spaces are preserved. Degenerative changes about the  medial malleolus. Calcaneal plantar enthesopathy.    No foreign body or subcutaneous emphysema. Lower extremity  subcutaneous edema. Vascular calcifications.        Impression    IMPRESSION:   No acute osseous abnormality.    TAMMY CARDOZA MD         SYSTEM ID:  [id number]   CT Head w/o Contrast    Narrative    EXAM: CT HEAD W/O CONTRAST, 8/21/2024 12:55 PM    HISTORY: fall from standing position, on blood thinners    COMPARISON: CT head 6/4/2023    TECHNIQUE:   Imaging protocol: Multiplanar CT images of the head without  intravenous contrast.   Acquisition: This CT exam was performed using one  or more the  following dose reduction techniques: automated exposure control,  adjustment of the mA and/or kV according to patient size, and/or  iterative reconstruction technique.    FINDINGS:  No intracranial hemorrhage, mass effect, or midline shift. The  ventricles are proportionate to the cerebral sulci. Multifocal patchy  to confluent foci of hypodensity in the periventricular and  subcortical white matter, which is nonspecific, likely related to  chronic small vessel ischemic disease given the patient's age. No  acute loss of gray-white matter differentiation. Atherosclerotic  arterial calcifications.    No acute osseous abnormality. Moderate polypoid left maxillary sinus  mucosal thickening. Pneumatized left pterygoid process with aerated  secretions. Mastoid air cells are clear. Bilateral pseudophakia.       Impression    IMPRESSION:  No acute intracranial abnormality.      TAMMY CARDOZA MD         SYSTEM ID:  [id number]   CT Cervical Spine w/o Contrast    Narrative    EXAM: CT CERVICAL SPINE W/O CONTRAST 8/21/2024 12:59 PM    PROVIDED HISTORY: Fall from standing position, on blood thinners    COMPARISON: CT cervical spine 6/4/2023    TECHNIQUE:   Imaging protocol: Using multidetector thin collimation helical  acquisition technique, axial, coronal and sagittal CT images through  the cervical spine were obtained without intravenous contrast.   Acquisition: This CT exam was performed using one or more the  following dose reduction techniques: automated exposure control,  adjustment of the mA and/or kV according to patient size, and/or  iterative reconstruction technique.    FINDINGS:  Grade 1 anterolisthesis of C5 on C6 and C7 on T1. Straightening of the  normal cervical lordotic curvature. No findings of acute fracture or  traumatic subluxation. Unchanged superior endplate C6 and T1 limbus  vertebrae. No prevertebral edema. There is moderate to advanced disc  space loss C6-7. Partial ankylosis of C4-5 vertebral  bodies.  Multilevel degenerative endplate changes with uncinate and facet  hypertrophy. Degenerative changes are associated with varying degrees  of neural foraminal stenosis. Mild spinal canal narrowing C5-6.  No  high-grade spinal canal stenosis at any level.     Small right pleural effusion with coarse right upper lobe mixed  opacities.      Impression    IMPRESSION:   No acute fracture or traumatic subluxation.    Multilevel cervical spondylosis without high-grade spinal canal  narrowing.    Small right pleural effusion with coarse right upper lobe mixed  opacities.    TAMMY CARDOZA MD         SYSTEM ID:  [id number]

## 2024-08-21 NOTE — ED TRIAGE NOTES
"ED Nursing Triage Note (General)   ________________________________    Altaf Chao is a 89 year old Male that presents to triage via [facility] with complaints of a fall resulting in a laceration to the L) arm.  Per report, patient was recently discharged from the hospital and was attempting to put shoes on for a follow up apt when he fell landing on his L) arm.  EMS applied dressing to the arm, however, states large skin tear.  Wife also reports visual hallucinations and confusion since coming home on abx.  Patient lives at home with wife and daughter.   Significant symptoms had onset 1 hour(s) ago.  Vital signs:  Temp: 97.4  F (36.3  C) Temp src: Tympanic BP: 123/66 Pulse: 98   Resp: 20 SpO2: 95 %     Height: 172.7 cm (5' 8\") Weight: 76.2 kg (168 lb)  Estimated body mass index is 25.54 kg/m  as calculated from the following:    Height as of this encounter: 1.727 m (5' 8\").    Weight as of this encounter: 76.2 kg (168 lb).  PRE HOSPITAL PRIOR LIVING SITUATION Spouse and Children      Triage Assessment (Adult)       Row Name 08/21/24 1121          Triage Assessment    Airway WDL WDL        Respiratory WDL    Respiratory WDL WDL        Skin Circulation/Temperature WDL    Skin Circulation/Temperature WDL X        Cardiac WDL    Cardiac WDL WDL     Cardiac Rhythm NSR        Peripheral/Neurovascular WDL    Peripheral Neurovascular WDL WDL        Cognitive/Neuro/Behavioral WDL    Cognitive/Neuro/Behavioral WDL X     Level of Consciousness confused        Pupils (CN II)    Pupil PERRLA yes     Pupil Size Left 3 mm     Pupil Size Right 3 mm        Rody Coma Scale    Best Eye Response 4-->(E4) spontaneous     Best Motor Response 6-->(M6) obeys commands     Best Verbal Response 4-->(V4) confused     Rody Coma Scale Score 14                     "

## 2024-08-21 NOTE — ED PROVIDER NOTES
History     Chief Complaint   Patient presents with    Trauma    Fall    Laceration     HPI  Altaf Chao is a 89 year old male who presents via ambulance with complaints of a fall resulting in a laceration to his left arm.  Patient reports he was bending down to tie his shoes when he fell on his left side.  He was on the way to clinic for a follow-up appointment.  He was recently hospitalized and discharged 3 days ago for acute diarrhea and end-stage renal disease.  He resides with his wife and daughter.  Patient's wife reports patient is having visual hallucinations and increased confusion since coming home on the antibiotic 3 days ago.  He is complaining of left ankle pain.  He denies hitting his head.  Denies LOC.  He does have a large skin tear on his left forearm. See copied and pasted note from recent hospitalization below.       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  - GFR of 4, he is aware of his poor prognosis and does not want any dialysis or further aggressive care for this  - I anticipate he is in the last few weeks to months of his life, he understands and states he has lived a good life  - Continue home bicarb dosing, at some point hospice should be started     Anemia due to ESRD  - he is within is normal range of 8-10, his hgb of 7.6 today likely represent slight dilution  - no evidence of any bleeding and no further monitoring needed during this hospital stay      Hypothyroid  - last TSH on 7/23/24 23.22, just started on synthroid        TSH   Date Value Ref Range Status   08/16/2024 9.53 (H) 0.30 - 4.20 uIU/mL Final   10/31/2022 6.91 (H) 0.40 - 4.00 mU/L Final   - given how recently the synthroid was started, his TSH is probably still normalizing and I  would not make any dose adjustment at this time     H/O IDDM with ESRD, Diabetic Neuropathy  - last A1c 5.2  - no insulin at this time and with his outstanding A1c and only being ordered a couple units of lantus at home, given the overall picture I will stop his home insulin dose      HTN  - on norvasc as outpt, which I will continue at discharge  - I will defer to his primary provider whether or not to stop this given his very limited lifespan       GERD  - controlled, cont Pepcid      Abnormal UA  - asymptomatic, however elected to start tx with rocephin and received would should be sufficient of 3 total doses during this hospital stay  - his Ucx was still pending at discharge and I do not believe any further antibiotics are needed, as he did not have any symptoms with this abnormal finding     BPH  - cont flomax and proscar       Allergies:  Allergies   Allergen Reactions    Statins [Statins] Itching     -- Patient declined --       Problem List:    Patient Active Problem List    Diagnosis Date Noted    Hallucinations 08/21/2024     Priority: Medium    Confusion 08/21/2024     Priority: Medium    Generalized weakness 08/21/2024     Priority: Medium    Fall, initial encounter 08/21/2024     Priority: Medium    Skin tear of forearm without complication, initial encounter 08/21/2024     Priority: Medium    Diarrhea 08/16/2024     Priority: Medium    Diarrhea, unspecified type 08/16/2024     Priority: Medium    Hypothyroidism due to acquired atrophy of thyroid 07/25/2024     Priority: Medium    Tinea pedis of right foot 07/25/2024     Priority: Medium    Localized edema 01/08/2024     Priority: Medium    Iron deficiency anemia, unspecified 10/11/2023     Priority: Medium    Occult closed fracture of right elbow with routine healing, subsequent encounter 06/01/2023     Priority: Medium    Serum phosphate elevated 07/27/2022     Priority: Medium    DNR (do not resuscitate) 04/21/2022     Priority: Medium    DNI (do not  intubate) 04/21/2022     Priority: Medium    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) 01/19/2022     Priority: Medium    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, with long-term current use of insulin (H) 01/18/2021     Priority: Medium    Heartburn 07/12/2018     Priority: Medium    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     Priority: Medium    Anemia due to vitamin B12 deficiency 10/25/2017     Priority: Medium    Lymphedema of both lower extremities 06/23/2017     Priority: Medium    Immunocompromised patient (H24) 06/09/2017     Priority: Medium    Bilateral edema of lower extremity 05/11/2017     Priority: Medium    Steroid-induced hyperglycemia 05/04/2017     Priority: Medium    Anemia of chronic disease 05/03/2017     Priority: Medium    CKD (chronic kidney disease) stage 5, GFR 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 ml/min (H) 05/01/2017     Priority: Medium    Metabolic acidosis 04/25/2017     Priority: Medium    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     Priority: Medium    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     Priority: Medium    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     Priority: Medium    Hyperkalemia 04/03/2017     Priority: Medium    Hyponatremia 04/03/2017     Priority: Medium    Refusal of statin medication at discharge 02/17/2017     Priority: Medium    H/O total hip arthroplasty 06/30/2014     Priority: Medium    Mixed hyperlipidemia 04/10/2014     Priority: Medium    H/O bilateral inguinal hernia repair 09/04/2013     Priority: Medium    History of tobacco abuse 08/15/2013     Priority: Medium    Benign essential hypertension 08/07/2012     Priority: Medium    Pityriasis rosea 08/07/2012     Priority: Medium        Past Medical History:    Past Medical History:   Diagnosis Date    Acute kidney failure (H24)     Essential (primary) hypertension     Hyperlipidemia     Osteoarthritis of hip      Pityriasis rosea     Tachycardia     Unilateral inguinal hernia without obstruction or gangrene        Past Surgical History:    Past Surgical History:   Procedure Laterality Date    CIRCUMCISION      2017,Dr. Heidi GREENBERG    OTHER SURGICAL HISTORY      58128.9,TX SUBTALAR ATHROEREISIS,bone Spurs excision on Toe    OTHER SURGICAL HISTORY      13,,HERNIA REPAIR,Right,RIH with mesh    OTHER SURGICAL HISTORY      13,82812,GENITAL SURGERY MALE,Dorsal Slit    OTHER SURGICAL HISTORY      4/15/14,,HERNIA REPAIR,Left,LIH with mesh    OTHER SURGICAL HISTORY      14,66265.0,TX TOTAL HIP ARTHROPLASTY,Left       Family History:    Family History   Problem Relation Age of Onset    Other - See Comments Son 12        neuroblastoma at 11 yo  at 14.    Genetic Disorder No family hx of         Genetic,No known FHx of DM, CAD, CVA       Social History:  Marital Status:   [2]  Social History     Tobacco Use    Smoking status: Former     Current packs/day: 0.00     Types: Cigarettes     Quit date: 1976     Years since quittin.6     Passive exposure: Past    Smokeless tobacco: Never   Vaping Use    Vaping status: Never Used   Substance Use Topics    Alcohol use: Yes     Alcohol/week: 0.8 standard drinks of alcohol     Comment: maybe 1 or 2 month    Drug use: Never        Medications:    amLODIPine (NORVASC) 5 MG tablet  calcitRIOL (ROCALTROL) 0.25 MCG capsule  Cranberry-Vitamin C (CRANBERRY CONCENTRATE/VITAMINC) 23503-127 MG CAPS  darbepoetin miller (ARANESP) 150 MCG/0.3ML SOSY injection  finasteride (PROSCAR) 5 MG tablet  furosemide (LASIX) 40 MG tablet  levothyroxine (SYNTHROID/LEVOTHROID) 50 MCG tablet  loperamide (IMODIUM A-D) 2 MG tablet  predniSONE (DELTASONE) 2.5 MG tablet  sodium bicarbonate 650 MG tablet  tamsulosin (FLOMAX) 0.4 MG capsule  terbinafine (LAMISIL) 1 % external cream  tolnaftate (LAMISIL) 1 % external spray  insulin pen needle (B-D U/F) 31G X 8 MM  "miscellaneous          Review of Systems   Skin:  Positive for wound.   Neurological:  Positive for weakness.   Psychiatric/Behavioral:  Positive for confusion and hallucinations.    All other systems reviewed and are negative.      Physical Exam   BP: 123/66  Pulse: 98  Temp: 97.4  F (36.3  C)  Resp: 20  Height: 172.7 cm (5' 8\")  Weight: 76.2 kg (168 lb)  SpO2: 95 %      Physical Exam  Vitals and nursing note reviewed.   Constitutional:       General: He is not in acute distress.     Appearance: He is ill-appearing.   HENT:      Head: Normocephalic.      Nose: Nose normal.      Mouth/Throat:      Mouth: Mucous membranes are moist.      Pharynx: No posterior oropharyngeal erythema.   Eyes:      Extraocular Movements: Extraocular movements intact.      Conjunctiva/sclera: Conjunctivae normal.   Cardiovascular:      Rate and Rhythm: Normal rate and regular rhythm.      Pulses: Normal pulses.      Heart sounds: Normal heart sounds.   Pulmonary:      Effort: Pulmonary effort is normal.      Breath sounds: Normal breath sounds.   Chest:      Chest wall: No tenderness.   Abdominal:      General: Bowel sounds are normal.      Palpations: Abdomen is soft.      Tenderness: There is no abdominal tenderness.   Musculoskeletal:         General: Normal range of motion.      Cervical back: Normal range of motion and neck supple.      Right lower leg: Edema present.      Left lower leg: Edema present.   Skin:     General: Skin is warm and dry.             Comments: Large skin tear to left forearm.    Neurological:      General: No focal deficit present.      Mental Status: He is alert.   Psychiatric:         Cognition and Memory: Memory is impaired.                            Results for orders placed or performed during the hospital encounter of 08/21/24 (from the past 24 hour(s))   [place] Draw    Narrative    The following orders were created for panel order [place] Draw.  Procedure                               Abnormality     "     Status                     ---------                               -----------         ------                     Extra Blue Top Tube[[id number]]                              Final result               Extra Red Top Tube[[id number]]                               Final result               Extra Green Top (Lithium...[[id number]]                      Final result               Extra Green Top (Lithium...[[id number]]                      Final result               Extra Purple Top Tube[[id number]]                            Final result                 Please view results for these tests on the individual orders.   Extra Blue Top Tube   Result Value Ref Range    Hold Specimen JIC    Extra Red Top Tube   Result Value Ref Range    Hold Specimen JIC    Extra Green Top (Lithium Heparin) Tube   Result Value Ref Range    Hold Specimen JIC    Extra Green Top (Lithium Heparin) Tube   Result Value Ref Range    Hold Specimen JIC    Extra Purple Top Tube   Result Value Ref Range    Hold Specimen JIC    CBC with platelets differential    Narrative    The following orders were created for panel order CBC with platelets differential.  Procedure                               Abnormality         Status                     ---------                               -----------         ------                     CBC with platelets and d...[[id number]]  Abnormal            Final result                 Please view results for these tests on the individual orders.   Comprehensive metabolic panel   Result Value Ref Range    Sodium 136 135 - 145 mmol/L    Potassium 4.9 3.4 - 5.3 mmol/L    Carbon Dioxide (CO2) 28 22 - 29 mmol/L    Anion Gap 18 (H) 7 - 15 mmol/L    Urea Nitrogen 127.2 (H) 8.0 - 23.0 mg/dL    Creatinine 12.20 (H) 0.67 - 1.17 mg/dL    GFR Estimate 4 (L) >60 mL/min/1.73m2    Calcium 8.4 (L) 8.8 - 10.4 mg/dL    Chloride 90 (L) 98 - 107 mmol/L    Glucose 122 (H) 70 - 99 mg/dL    Alkaline Phosphatase 75 40 - 150 U/L    AST 32 0 - 45 U/L     ALT 16 0 - 70 U/L    Protein Total 6.0 (L) 6.4 - 8.3 g/dL    Albumin 3.1 (L) 3.5 - 5.2 g/dL    Bilirubin Total 0.3 <=1.2 mg/dL   INR   Result Value Ref Range    INR 1.05 0.85 - 1.15   Partial thromboplastin time   Result Value Ref Range    aPTT 32 22 - 38 Seconds   Alcohol ethyl   Result Value Ref Range    Alcohol ethyl <0.01 <=0.01 g/dL   Lactic Acid Whole Blood with 1X Repeat in 2 HR when >2   Result Value Ref Range    Lactic Acid, Initial 2.0 0.7 - 2.0 mmol/L   CBC with platelets and differential   Result Value Ref Range    WBC Count 10.0 4.0 - 11.0 10e3/uL    RBC Count 2.80 (L) 4.40 - 5.90 10e6/uL    Hemoglobin 8.1 (L) 13.3 - 17.7 g/dL    Hematocrit 25.2 (L) 40.0 - 53.0 %    MCV 90 78 - 100 fL    MCH 28.9 26.5 - 33.0 pg    MCHC 32.1 31.5 - 36.5 g/dL    RDW 13.8 10.0 - 15.0 %    Platelet Count 318 150 - 450 10e3/uL    % Neutrophils 81 %    % Lymphocytes 12 %    % Monocytes 6 %    % Eosinophils 0 %    % Basophils 0 %    % Immature Granulocytes 1 %    NRBCs per 100 WBC 0 <1 /100    Absolute Neutrophils 8.0 1.6 - 8.3 10e3/uL    Absolute Lymphocytes 1.2 0.8 - 5.3 10e3/uL    Absolute Monocytes 0.6 0.0 - 1.3 10e3/uL    Absolute Eosinophils 0.0 0.0 - 0.7 10e3/uL    Absolute Basophils 0.0 0.0 - 0.2 10e3/uL    Absolute Immature Granulocytes 0.1 <=0.4 10e3/uL    Absolute NRBCs 0.0 10e3/uL   XR Pelvis Port 1/2 Views    Narrative    PROCEDURE:  XR PELVIS PORT 1/2 VIEWS    HISTORY: Trauma - Pelvic Injury    COMPARISON: CT abdomen pelvis 9/22/2023    TECHNIQUE:  XR PELVIS PORT 1 VIEW    FINDINGS:   Postsurgical changes of bilateral total hip arthroplasties. No  evidence of hardware fracture or failure. No acute osseous fracture.  Joints are appropriately aligned. No suspicious osseous lesion.    No foreign body or subcutaneous emphysema. Vascular calcifications.        Impression    IMPRESSION:   No acute osseous abnormality.    TAMMY CARDOZA MD         SYSTEM ID:  [id number]   XR Chest Port 1 View    Narrative    PROCEDURE:   XR CHEST PORT 1 VIEW    HISTORY: Trauma - Chest Injury    COMPARISON: Chest radiographs 10/30/2023, 6/10/2017    FINDINGS: Single view chest radiograph    Cardiomediastinal silhouette is within normal limits. Tortuous  atherosclerotic aorta with unchanged presumed hiatal hernia.  No effusion or pneumothorax.  Asymmetric right interstitial opacities  and scattered peripheral mixed opacities  No suspicious osseous lesion or subdiaphragmatic free air.      Impression    IMPRESSION:    Asymmetric right lung opacities, which likely represent posttraumatic  change based on history, but could represent infection or edema.    TAMMY CARDOZA MD         SYSTEM ID:  [id number]   XR Ankle Port Left 2 Views    Narrative    PROCEDURE:  XR ANKLE PORT LEFT 2 VIEWS    HISTORY: lateral left ankle pain after a fall    COMPARISON: No relevant priors available for comparison    TECHNIQUE:  XR ANKLE PORT LEFT 2 VIEWS    FINDINGS:   No acute fracture or dislocation is identified. No suspicious osseous  lesion. The joint spaces are preserved. Degenerative changes about the  medial malleolus. Calcaneal plantar enthesopathy.    No foreign body or subcutaneous emphysema. Lower extremity  subcutaneous edema. Vascular calcifications.        Impression    IMPRESSION:   No acute osseous abnormality.    TAMMY CARDOZA MD         SYSTEM ID:  [id number]   CT Head w/o Contrast    Narrative    EXAM: CT HEAD W/O CONTRAST, 8/21/2024 12:55 PM    HISTORY: fall from standing position, on blood thinners    COMPARISON: CT head 6/4/2023    TECHNIQUE:   Imaging protocol: Multiplanar CT images of the head without  intravenous contrast.   Acquisition: This CT exam was performed using one or more the  following dose reduction techniques: automated exposure control,  adjustment of the mA and/or kV according to patient size, and/or  iterative reconstruction technique.    FINDINGS:  No intracranial hemorrhage, mass effect, or midline shift. The  ventricles are proportionate  to the cerebral sulci. Multifocal patchy  to confluent foci of hypodensity in the periventricular and  subcortical white matter, which is nonspecific, likely related to  chronic small vessel ischemic disease given the patient's age. No  acute loss of gray-white matter differentiation. Atherosclerotic  arterial calcifications.    No acute osseous abnormality. Moderate polypoid left maxillary sinus  mucosal thickening. Pneumatized left pterygoid process with aerated  secretions. Mastoid air cells are clear. Bilateral pseudophakia.       Impression    IMPRESSION:  No acute intracranial abnormality.      TAMMY CARDOZA MD         SYSTEM ID:  [id number]   CT Cervical Spine w/o Contrast    Narrative    EXAM: CT CERVICAL SPINE W/O CONTRAST 8/21/2024 12:59 PM    PROVIDED HISTORY: Fall from standing position, on blood thinners    COMPARISON: CT cervical spine 6/4/2023    TECHNIQUE:   Imaging protocol: Using multidetector thin collimation helical  acquisition technique, axial, coronal and sagittal CT images through  the cervical spine were obtained without intravenous contrast.   Acquisition: This CT exam was performed using one or more the  following dose reduction techniques: automated exposure control,  adjustment of the mA and/or kV according to patient size, and/or  iterative reconstruction technique.    FINDINGS:  Grade 1 anterolisthesis of C5 on C6 and C7 on T1. Straightening of the  normal cervical lordotic curvature. No findings of acute fracture or  traumatic subluxation. Unchanged superior endplate C6 and T1 limbus  vertebrae. No prevertebral edema. There is moderate to advanced disc  space loss C6-7. Partial ankylosis of C4-5 vertebral bodies.  Multilevel degenerative endplate changes with uncinate and facet  hypertrophy. Degenerative changes are associated with varying degrees  of neural foraminal stenosis. Mild spinal canal narrowing C5-6.  No  high-grade spinal canal stenosis at any level.     Small right pleural  effusion with coarse right upper lobe mixed  opacities.      Impression    IMPRESSION:   No acute fracture or traumatic subluxation.    Multilevel cervical spondylosis without high-grade spinal canal  narrowing.    Small right pleural effusion with coarse right upper lobe mixed  opacities.    TAMMY CARDOZA MD         SYSTEM ID:  [id number]   UA with Microscopic reflex to Culture    Specimen: Urine, Midstream   Result Value Ref Range    Color Urine Yellow Colorless, Straw, Light Yellow, Yellow    Appearance Urine Cloudy (A) Clear    Glucose Urine 100 (A) Negative mg/dL    Bilirubin Urine Negative Negative    Ketones Urine Negative Negative mg/dL    Specific Gravity Urine 1.009 1.000 - 1.030    Blood Urine Moderate (A) Negative    pH Urine 8.0 5.0 - 9.0    Protein Albumin Urine 100 (A) Negative mg/dL    Urobilinogen Urine Normal Normal, 2.0 mg/dL    Nitrite Urine Negative Negative    Leukocyte Esterase Urine Large (A) Negative    Mucus Urine Present (A) None Seen /LPF    RBC Urine 42 (H) <=2 /HPF    WBC Urine >182 (H) <=5 /HPF    Narrative    Urine Culture ordered based on laboratory criteria       Medications   lidocaine 1 % 0.1-1 mL (has no administration in time range)   lidocaine (LMX4) cream (has no administration in time range)   sodium chloride (PF) 0.9% PF flush 3 mL ( Intracatheter Canceled Entry 8/21/24 1222)   sodium chloride (PF) 0.9% PF flush 3 mL (has no administration in time range)   sodium chloride 0.9% BOLUS 1,000 mL (1,000 mLs Intravenous $New Bag 8/21/24 1231)     Followed by   sodium chloride 0.9 % infusion (has no administration in time range)   cefTRIAXone (ROCEPHIN) 1 g vial to attach to  mL bag for ADULTS or NS 50 mL bag for PEDS (has no administration in time range)   acetaminophen (TYLENOL) tablet 975 mg (975 mg Oral $Given 8/21/24 1236)       Assessments & Plan (with Medical Decision Making)  Altaf Chao is a 89 year old male who presents via ambulance with complaints of a fall  "resulting in a laceration to his left arm.  Patient reports he was bending down to tie his shoes when he fell on his left side.  He was on the way to clinic for a follow-up appointment.  He was recently hospitalized and discharged 3 days ago for acute diarrhea and end-stage renal disease.  He resides with his wife and daughter.  Patient's wife reports patient is having visual hallucinations and increased confusion since coming home on the antibiotic 3 days ago.  He is complaining of left ankle pain.  He denies hitting his head.  Denies LOC.  He does have a large skin tear on his left forearm. See copied and pasted note from recent hospitalization below.   VS in the ED. /69   Pulse 89   Temp 97.4  F (36.3  C) (Tympanic)   Resp 20   Ht 1.727 m (5' 8\")   Wt 76.2 kg (168 lb)   SpO2 92%   BMI 25.54 kg/m  Awake and pleasantly confused. No focal neuro deficits. Generalized weakness on exam. HR regular. LS clear anteriorly. Gave tylenol and IVF.   Diagnostics:    X-ray: CXR- Asymmetric right lung opacities, which likely represent posttraumatic  change based on history, but could represent infection or edema.    CT scan:   CT head w/o contrast - negative.   CT cervical w/o contrast- negative for acute fracture.     EKG  Rhythm- sinus rhythm with premature atrial complexes   Rate- 95  Axis-right superior axis deviation   Any abnormal   Changes noted in AVR. No significant changes when compared to previous (5/1/2017).   I have independently reviewed and interpreted today's EKG, pending cardiologist over read.    ED Course as of 08/21/24 1527   Wed Aug 21, 2024   1218 Creatinine(!): 12.20  Stable when compared to previous    1218 Urea Nitrogen(!): 127.2  ESRD, declining dialysis during recent admission.    1218 Hemoglobin(!): 8.1  Stable when compared to previous.    1408 Spoke with Dr. Nettles who graciously accepts the pt for observation.    1459 Lactic Acid: 2.0   1523 Leukocyte Esterase Urine(!): Large   1523 " "WBC Urine(!): >182  Recurrent UTI's will treat with Ceftriaxone given poor kidney function.      Patient Vitals for the past 24 hrs:   BP Temp Temp src Pulse Resp SpO2 Height Weight   08/21/24 1330 131/69 -- -- 89 -- 92 % -- --   08/21/24 1300 131/73 -- -- 87 -- 93 % -- --   08/21/24 1235 124/75 -- -- 93 -- 95 % -- --   08/21/24 1125 123/66 97.4  F (36.3  C) Tympanic 98 20 95 % 1.727 m (5' 8\") 76.2 kg (168 lb)     Altaf is a 89-year-old male evaluated today after a fall from standing position.  He reports weakness since being discharged from the hospital 3 days ago.  He was tying his shoe and fell on his left side.  CT of head, cervical and x-rays with no acute findings or injuries.  Large skin tear to left forearm cleansed with normal saline.  Skin reapproximated as much as possible and Steri-Strips applied.  EKG with no acute findings.  History of ESRD, and declining dialysis. Labs are stable compared to previous 4 days ago. CXR reveals right lung opacities which likely represents posttraumatic change based on history, however could represent infection or edema. No hypoxia. I suspect he has a lung contusion. No chest tenderness on palpation. Given his weakness, increased confusion, and hallucinations will plan to admit to the hospital. He feels he is too weak to go home. Spoke with Dr. Nettles who graciously accepts the pt for observation.  Urinalysis implies a urinary tract infection.  In reviewing history it looks like he is having recurrent UTIs.  Last urine culture (8/16/24) alcaligenes faecalis. Susceptible to zosyn however given his ESRD will give ceftriaxone.      I have reviewed the nursing notes.    I have reviewed the findings, diagnosis, plan and need for follow up with the patient.    Medical Decision Making  The patient's presentation was of moderate complexity (an acute illness with systemic symptoms).    The patient's evaluation involved:  an assessment requiring an independent historian (see " separate area of note for details)  review of 3+ test result(s) ordered prior to this encounter (see separate area of note for details)  ordering and/or review of 3+ test(s) in this encounter (see separate area of note for details)    The patient's management necessitated moderate risk (prescription drug management including medications given in the ED) and high risk (a decision regarding hospitalization).    Final diagnoses:   Generalized weakness   Confusion   Hallucinations   Fall, initial encounter   CKD (chronic kidney disease) stage 5, GFR 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 ml/min (H)   Skin tear of forearm without complication, initial encounter   Recurrent UTI       8/21/2024   [facility]       WilkesQuin germain, APRN CNP  08/21/24 1528

## 2024-08-21 NOTE — PHARMACY-ADMISSION MEDICATION HISTORY
Pharmacist Admission Medication History    Admission medication history is complete. The information provided in this note is only as accurate as the sources available at the time of the update.    Information Source(s): Patient, Family member, and CareEverywhere/[employer] via in-person and phone    Pertinent Information: Talked with wife and patient who needed some prompting to recognize names/indications. Patient has not needed loperimide since recent 8/18/24 discharge. Patient has tolnaftate spray but spray tip broke so they were using terbinafine Cream TID (supposed to be BID).  Attests that last aranesp INJ was last week (8/12/24 per [facility] note)     Changes made to PTA medication list:  Added: Terbinafine Cream,   Deleted: Famotidine - not using,   Changed: None    Allergies reviewed with patient: yes    Medication History Completed By: Thai [facility]t [facility] 8/21/2024 2:57 PM    Current Facility-Administered Medications for the 8/21/24 encounter (Hospital Encounter)   Medication    cyanocobalamin injection 1,000 mcg     PTA Med List   Medication Sig Last Dose    amLODIPine (NORVASC) 5 MG tablet Take 1 tablet (5 mg) by mouth 2 times daily --- okay to take 10 mg once daily if BID is not covered --- 8/21/2024 at AM    calcitRIOL (ROCALTROL) 0.25 MCG capsule Take 1 capsule (0.25 mcg) by mouth daily -- Rx per Nephrology -- 8/21/2024 at AM    Cranberry-Vitamin C (CRANBERRY CONCENTRATE/VITAMINC) 23954-894 MG CAPS Take 2 capsules by mouth daily - for UTI prevention 8/20/2024 at Noon    darbepoetin miller (ARANESP) 150 MCG/0.3ML SOSY injection Inject 150 mcg subcutaneously every 28 days Past Month    finasteride (PROSCAR) 5 MG tablet Take 1 tablet (5 mg) by mouth daily 8/20/2024 at Noon    furosemide (LASIX) 40 MG tablet Take 40 mg by mouth every morning And one-HALF tablet (20 mg) at 4 pm 8/21/2024 at AM    levothyroxine (SYNTHROID/LEVOTHROID) 50 MCG tablet Take 1 tablet (50 mcg) by mouth daily - Start 7/25/2024 -  for thyroid 8/20/2024 at noon    loperamide (IMODIUM A-D) 2 MG tablet Take 1 tablet (2 mg) by mouth 4 times daily as needed for diarrhea Not Needed    predniSONE (DELTASONE) 2.5 MG tablet Take 1 tablet (2.5 mg) by mouth daily 8/20/2024 at noon    sodium bicarbonate 650 MG tablet Take 2,600 mg by mouth 3 times daily 8/21/2024 at AM    tamsulosin (FLOMAX) 0.4 MG capsule Take 1 capsule (0.4 mg) by mouth every evening 8/20/2024 at 1600    terbinafine (LAMISIL) 1 % external cream Apply topically 2 times daily. 8/20/2024    tolnaftate (LAMISIL) 1 % external spray Externally apply topically 3 times daily -- as needed for foot rash / ring worm Past Week

## 2024-08-22 NOTE — PROGRESS NOTES
Interdisciplinary Discharge Planning Note    Anticipated Discharge Date: 2-3 days     Anticipated Discharge Location: SNF vs. Home w/ home care or hospice    Clinical Needs Before Discharge:  stable functional status and PT/OT eval     Treatment Needs After Discharge:  homecare, hospice, and rehab (PT, OT, ST)     Potential Barriers to Discharge: None Identified     ANNE Max  8/22/2024,  12:27 PM

## 2024-08-22 NOTE — PLAN OF CARE
PRIMARY DIAGNOSIS: Fall  OUTPATIENT/OBSERVATION GOALS TO BE MET BEFORE DISCHARGE:  ADLs back to baseline: No    Activity and level of assistance: Up with standby assistance.    Pain status: Improved with use of alternative comfort measures i.e.: ice and positioning    Return to near baseline physical activity: No     Discharge Planner Nurse   Safe discharge environment identified: Yes  Barriers to discharge: Yes       Entered by: Gudelia Soto RN 08/22/2024 6:46 AM     Please review provider order for any additional goals.   Nurse to notify provider when observation goals have been met and patient is ready for discharge.Goal Outcome Evaluation:      Plan of Care Reviewed With: patient    Overall Patient Progress: no changeOverall Patient Progress: no change    Outcome Evaluation: Generlized weakness, int confusion. VSS. Wounds drsg.

## 2024-08-22 NOTE — PROGRESS NOTES
SAFETY CHECKLIST  ID Bands and Risk clasps correct and in place (DNR, Fall risk, Allergy, Latex, Limb):  Yes  All Lines Reconciled and labeled correctly: Yes  Whiteboard updated:Yes  Environmental interventions: Yes  Verify Tele #:  Not on tele

## 2024-08-22 NOTE — PLAN OF CARE
PRIMARY DIAGNOSIS: Fall   OUTPATIENT/OBSERVATION GOALS TO BE MET BEFORE DISCHARGE:  ADLs back to baseline: Yes    Activity and level of assistance: Up with standby assistance.    Pain status: Pain free.    Return to near baseline physical activity: Yes     Discharge Planner Nurse   Safe discharge environment identified: No  Barriers to discharge: Yes       Entered by: Gudelia Soto RN 08/22/2024 6:43 AM     Please review provider order for any additional goals.   Nurse to notify provider when observation goals have been met and patient is ready for discharge.Goal Outcome Evaluation:  Pt is A&O with some int confusion. VSS. Afebrile. GENEVIEVE. Neuros intact, grasps equal and moderate. Telfa and dry guaze changed on left wrist, steri strips in place with scant sanguinous drainage.     Plan of Care Reviewed With: patient    Overall Patient Progress: no changeOverall Patient Progress: no change    Outcome Evaluation: Generlized weakness, int confusion. VSS. Wounds drsg.

## 2024-08-22 NOTE — PLAN OF CARE
PRIMARY DIAGNOSIS: Fall  OUTPATIENT/OBSERVATION GOALS TO BE MET BEFORE DISCHARGE:  ADLs back to baseline: No    Activity and level of assistance: Assist of two, pivot    Pain status: Pain free.    Return to near baseline physical activity: No     Discharge Planner Nurse   Safe discharge environment identified: Yes  Barriers to discharge: Yes       Entered by: Thompson Sanchez RN 08/22/2024 3:38 PM     Please review provider order for any additional goals.   Nurse to notify provider when observation goals have been met and patient is ready for discharge.

## 2024-08-22 NOTE — PROGRESS NOTES
08/22/24 1246   Appointment Info   Signing Clinician's Name / Credentials (PT) [place] [facility]   Living Environment   People in Home child(rio), adult;spouse   Current Living Arrangements house   Home Accessibility no concerns;stairs to enter home;stairs within home   Number of Stairs, Main Entrance 3   Stair Railings, Main Entrance railings safe and in good condition   Number of Stairs, Within Home, Primary greater than 10 stairs   Stair Railings, Within Home, Primary railings safe and in good condition   Transportation Anticipated family or friend will provide   Self-Care   Usual Activity Tolerance fair   Current Activity Tolerance poor   Equipment Currently Used at Home walker, rolling   Fall history within last six months yes   Number of times patient has fallen within last six months 4   General Information   Referring Physician Yoon   Patient/Family Therapy Goals Statement (PT) possibly short term rehab prior to returning home with family   Existing Precautions/Restrictions fall   Weight-Bearing Status - LLE full weight-bearing   Weight-Bearing Status - RLE full weight-bearing   Cognition   Affect/Mental Status (Cognition) WFL   Orientation Status (Cognition) oriented to;person;place;situation   Follows Commands (Cognition) WFL   Cognitive Status Comments patient experiencing some visual hallucinations   Pain Assessment   Patient Currently in Pain No   Integumentary/Edema   Integumentary/Edema Comments skin tears on UEs   Posture    Posture Forward head position;Protracted shoulders   Range of Motion (ROM)   Range of Motion ROM is WFL   Strength (Manual Muscle Testing)   Strength Comments grossly 3/5 grade LEs   Bed Mobility   Impairments Contributing to Impaired Bed Mobility decreased strength   Comment, (Bed Mobility) maximal assist of 1-2   Transfers   Impairments Contributing to Impaired Transfers impaired balance;decreased strength   Comment, (Transfers) sit to stand with moderate assist of 1-2;  stand pivot with minimal assist of 1-2 using a Fww   Gait/Stairs (Locomotion)   [place] Level (Gait) moderate assist (50% patient effort)   Assistive Device (Gait) walker, front-wheeled   Distance in Feet (Gait) 3-4   Pattern (Gait) step-to   Comment, (Gait/Stairs) decreased standing stability placing patient at increased risk for falls   Balance   Balance Comments poor static and dynamic stability   Sensory Examination   Sensory Perception WFL   Coordination   Coordination no deficits were identified   Muscle Tone   Muscle Tone no deficits were identified   Clinical Impression   Criteria for Skilled Therapeutic Intervention Yes, treatment indicated   PT Diagnosis (PT) impaired mobility   Influenced by the following impairments fatigue and weakness   Functional limitations due to impairments activity/gait tolerance and stability   Clinical Presentation (PT Evaluation Complexity) stable   Clinical Decision Making (Complexity) low complexity   Planned Therapy Interventions (PT) bed mobility training;gait training;transfer training   Risk & Benefits of therapy have been explained evaluation/treatment results reviewed;risks/benefits reviewed;patient   PT Total Evaluation Time   PT Eval, Low Complexity Minutes (21372) 15   Physical Therapy Goals   PT Frequency Daily   PT Predicted Duration/Target Date for Goal Attainment 08/27/24   PT Goals Bed Mobility;Transfers;Gait   PT: Bed Mobility Supervision/stand-by assist   PT: Transfers Supervision/stand-by assist;Assistive device   PT: Gait Supervision/stand-by assist;Rolling walker;10 feet   PT Discharge Planning   PT Plan Continue PT   PT Discharge Recommendation (DC Rec) Transitional Care Facility   PT Rationale for DC Rec to promote strength, stability and safe mobility   PT Brief overview of current status Pleasant and fatigued/weak patient requiring assistance with all mobilities; good family support; he will benefit from continued PT in short term rehab prior to  returning home   PT Equipment Needed at Discharge walker, rolling

## 2024-08-22 NOTE — PLAN OF CARE
PRIMARY DIAGNOSIS: Fall  OUTPATIENT/OBSERVATION GOALS TO BE MET BEFORE DISCHARGE:  ADLs back to baseline: No    Activity and level of assistance: Up with standby assistance.    Pain status: Pain with activity    Return to near baseline physical activity: No     Discharge Planner Nurse   Safe discharge environment identified: Yes  Barriers to discharge: Yes       Entered by: Gudelia Soto RN 08/22/2024 6:45 AM     Please review provider order for any additional goals.   Nurse to notify provider when observation goals have been met and patient is ready for discharge.Goal Outcome Evaluation:      Plan of Care Reviewed With: patient    Overall Patient Progress: no changeOverall Patient Progress: no change    Outcome Evaluation: Generlized weakness, int confusion. VSS. Wounds drsg.

## 2024-08-22 NOTE — PLAN OF CARE
PRIMARY DIAGNOSIS: Fall  OUTPATIENT/OBSERVATION GOALS TO BE MET BEFORE DISCHARGE:  ADLs back to baseline: No    Activity and level of assistance: Assist of two, pivot    Pain status: Pain free.    Return to near baseline physical activity: No     Discharge Planner Nurse   Safe discharge environment identified: Yes  Barriers to discharge: Yes       Entered by: Thompson Sanchez RN 08/22/2024 4:31 PM     Please review provider order for any additional goals.   Nurse to notify provider when observation goals have been met and patient is ready for discharge.

## 2024-08-22 NOTE — PLAN OF CARE
Patient has intermittent confusion. VSS. LS clear. Edema in lower extremities. Oral lasix. Gates inserted today due to retention (see previous note). Adequate output in Gates. Dressing changed on left arm. Generalized weakness. Assist of two, pivot, with belt and walker.     Goal Outcome Evaluation:      Plan of Care Reviewed With: patient    Overall Patient Progress: no changeOverall Patient Progress: no change

## 2024-08-22 NOTE — PROGRESS NOTES
:     Met with patient and wife in room to discuss discharge planning needs. Patient stated that he is agreeable to short term rehab. Patient stated that he has been to [facility] previously and would like a referral sent to them. A referral has been sent and Sharlene at  has been notified.     Pt/family was given the Medicare Compare list for SNF, with associated star ratings to assist with choice for referrals/discharge planning Yes    Education was given to pt/family that star ratings are updated/maintained by Medicare and can be reviewed by visiting www.medicare.gov Yes      ANNE Max on 8/22/2024 at 1:52 PM

## 2024-08-22 NOTE — PROGRESS NOTES
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  He had been hospitalized from 8/16-18/2024 with presumed viral diarrhea.  He was trying to put his shoes on to go to his hospital follow-up appointment when he fell and sustained a skin tear to his left arm.  He reports he has also been having hallucinations, confusion and weakness.  In the ER he was noted to have fairly stable labs with his .2, Cr 12.20, GFR 4, Hgb 8.1.  X-rays of pelvis and ankle without fracture.  CXR rotated but CT cervical spine sees coarse right upper lobe mixed opacities along with high-grade spinal canal narrowing in the cervical spine.  Head CT without acute changes.  UA with moderate blood, large leuk esterase, > 182 WBCs.  Culture from 8/16 grew Alcaligenes faecalis susceptible to cefepime, Bactrim and Zosyn.  Due to general decline in condition he is placed in observation now for further evaluation.    Principal Problem:    Fall, initial encounter    Urinary retention    Hallucinations    Confusion    Generalized weakness    Skin tear of forearm without complication, initial encounter       May still have some weakness from the diarrhea he had last week but seemed to be better at the time of discharge and the next day, then worse.  Has had CKD-5 with very high Cr since 4/2017.  He may be nearing the end of his functioning.  Will ask for PT, OT and discharge planning to evaluate.  May benefit from meeting with hospice although he has been very active his whole life.  Do not expect he is at his  last moments but he does overall look much weaker and more frail and tired than when he was seen by this provider in April 2024.  Has done home PT previously and would like to start again.  Admits his upper legs are weak and he now has difficulty getting up from a chair.  Attempted to get him up with the nurse this morning and he was unable to stand fully before he started to wilt back down to the bed.  Had 740 ml post void residual urine on bladder scan so required higuera catheter placement for urinary retention.    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  Does take bicarb, calcitriol, Aranesp and prednisone to treat symptoms.  His renal function has been very bad for a long time and it is surprising how well he does with these kinds of numbers.  Continue supportive cares.  Not sure why he is on Lasix (HTN?) but he does make some urine and renal function is not much worse so will continue for now.      Benign essential hypertension       Good control with amlodipine, Lasix and tamsulosin.  Continue.        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  Will not check POC glucose during his hospitalization.        Hypothyroidism due to acquired atrophy of thyroid       Just started on levothyroxine recently and his TSH has come down from 23.22 on 7/23/2024 to 9.53 by 8/16/2024.  Continue current dose of levothyroxine 50 mcg daily.      Recurrent UTI        May have not completely cleared from his last admission.  He received 3 doses  "of Rocephin but was not discharged on abx.  Restarted Rocephin as this is probably the easiest on the kidneys.  New UC in process.       Observation Goals: -diagnostic tests and consults completed and resulted, -vital signs normal or at patient baseline, -tolerating oral intake to maintain hydration, -tolerating oral antibiotics or has plans for home infusion setup, -returns to baseline functional status, -safe disposition plan has been identified, Nurse to notify provider when observation goals have been met and patient is ready for discharge.  Diet: Regular Diet Adult    DVT Prophylaxis: Heparin SQ  Gates Catheter: Not present  Lines: None     Cardiac Monitoring: None  Code Status: No CPR- Do NOT Intubate      Clinically Significant Risk Factors Present on Admission              # Hypoalbuminemia: Lowest albumin = 3.1 g/dL at 8/21/2024 11:39 AM, will monitor as appropriate     # Hypertension: Noted on problem list    # Anemia: based on hgb <11       # Overweight: Estimated body mass index is 26.3 kg/m  as calculated from the following:    Height as of this encounter: 1.727 m (5' 8\").    Weight as of this encounter: 78.5 kg (173 lb).                    Disposition Plan     Medically Ready for Discharge: Anticipated Tomorrow but needs placement             Melissa Nettles MD  Hospitalist Service  [facility]  Securely message with [employer] (more info)  Text page via [facility] Paging/Directory   ______________________________________________________________________    Interval History   Feeling better.  Wife and daughter visiting.  He did not even get out of bed during his last hospitalization but seemed OK 3 days PTA when he came to clinic for his B12 injection.  Then by the day of admission he was not able to get up even with both their help.  Talks about doing PT for 6 months in the past and liked the bike starting with tolerating 2 minutes and working up to 10 minutes but his walking never got " better.  Family hoping he can go home with home health, maybe adding hospice.    Physical Exam   Vital Signs: Temp: 97.3  F (36.3  C) Temp src: Tympanic BP: 133/69 Pulse: 98   Resp: 18 SpO2: 92 % O2 Device: None (Room air)    Weight: 173 lbs 0 oz    Constitutional: awake, alert, cooperative, no apparent distress, appears stated age, and normal weight, overall looks much more frail and tired than he did at his last admission and fell asleep during conversation today.  Voice weak and only speaks a few words before taking another breath  Eyes: pupils equal, round and reactive to light, extra-ocular muscles intact, and conjunctiva normal  ENT: normocephalic, atraumatic  Respiratory: no increased work of breathing although he only speaks a few words at a time, fair air exchange and clear to auscultation  Cardiovascular: regular rate and rhythm, normal S1 and S2, and no murmur noted  GI: normal bowel sounds, soft, non-distended, and non-tender  Skin: bruising on right forearm more than the rest of his body but scattered bruises noted, left arm with evidence of bleeding with gauze covering his entire forearm, steri strip on right index finger MCP and on left forearm skin tear, gauze on right mid lower leg  Musculoskeletal: full range of motion noted  motor strength is 5 out of 5 all extremities bilaterally but weaker than he has been at other admissions  Neurologic: Mental Status Exam:  Fund of Knowledge:  normal  Attention/Concentration:  normal  Language:  normal  Cranial Nerves:  cranial nerves II-XII are grossly intact  Motor Exam:  moves all extremities well and symmetrically  Neuropsychiatric: General: normal, calm, and normal eye contact  Level of consciousness: alert / normal  Affect: normal, pleasant, and full  Orientation: oriented to self, place, time and situation  Memory and insight: normal, memory for past and recent events intact, and thought process normal    Medical Decision Making             Data     I  have personally reviewed the following data over the past 24 hrs:    9.6  \   8.0 (L)   / 270     135 94 (L) 128.0 (H) /  106 (H)   5.1 23 11.84 (H) \     Procal: N/A CRP: N/A Lactic Acid: N/A

## 2024-08-22 NOTE — PLAN OF CARE
PRIMARY DIAGNOSIS: Fall  OUTPATIENT/OBSERVATION GOALS TO BE MET BEFORE DISCHARGE:  ADLs back to baseline: No    Activity and level of assistance: Assist of two, pivot    Pain status: Pain free.    Return to near baseline physical activity: No     Discharge Planner Nurse   Safe discharge environment identified: Yes  Barriers to discharge: Yes       Entered by: Thompson Sanchez RN 08/22/2024 3:39 PM     Please review provider order for any additional goals.   Nurse to notify provider when observation goals have been met and patient is ready for discharge.

## 2024-08-23 NOTE — PROGRESS NOTES
:     Reached out to Sharlene at [facility] to discuss patients referral. Waiting for a call back.     ANNE Max on 8/23/2024 at 10:15 AM     :     Patient is no longer in need of STR. Sharlene at [facility] has been notified.     An inpatient hospice consult has been placed. Referral has been faxed to [facility]. Call placed to Ghislaine at [facility] to make her aware of the referral. They will screen patient.     ANNE Max on 8/23/2024 at 1:24 PM     :     Spoke with Ana at [facility]. Patient does not meet [facility] at this time.     ANNE Max on 8/23/2024 at 2:49 PM     :     Spoke with patient and family to discuss discharge planning needs. Patient and family have decided that they would like short term rehab. They are aware that [facility] does not have bed availability. They have agreed to send a referral to [facility]. A referral has been sent.     ANNE Max on 8/23/2024 at 3:19 PM

## 2024-08-23 NOTE — PROGRESS NOTES
Patient presenting with increasing confusion and hallucinations. Had a minor restless NOC. CCR Q 2hours. Gates in place with adequate output.     O2 sats in mid 80's. Donned O2 for comfort and to keep sats >90%. Patient eventually refused oxygen.

## 2024-08-23 NOTE — PLAN OF CARE
PRIMARY DIAGNOSIS: Fall/Hallucination  OUTPATIENT/OBSERVATION GOALS TO BE MET BEFORE DISCHARGE:  ADLs back to baseline: No    Activity and level of assistance: bedbound    Pain status: Pain free.    Return to near baseline physical activity: No     Discharge Planner Nurse   Safe discharge environment identified: No  Barriers to discharge: Yes       Entered by: Gudelia Soto RN 08/23/2024 3:53 PM     Please review provider order for any additional goals.   Nurse to notify provider when observation goals have been met and patient is ready for discharge.Goal Outcome Evaluation:      Plan of Care Reviewed With: patient    Overall Patient Progress: no changeOverall Patient Progress: no change    Outcome Evaluation: Increased confusion, thickened liquids, CCRQ2H

## 2024-08-23 NOTE — PLAN OF CARE
"Goal Outcome Evaluation:  Patients vss. /66 (BP Location: Right arm, Patient Position: Semi-Carver's, Cuff Size: Adult Regular)   Pulse 95   Temp 98.2  F (36.8  C) (Tympanic)   Resp 18   Ht 1.727 m (5' 8\")   Wt 80.1 kg (176 lb 9.6 oz)   SpO2 93%   BMI 26.85 kg/m   Patients O2 dropping to the mid 80's on room air. Patient agreeable to wear oxygen after discussing the rationale with patient. On 3L at this time O2 in the low to mid 90's. Patient has a dry weak cough. Skin tear to left calf and left forearm. Areas cleaned and redressed. Patient is lethargic with confusion, disorientation to time place and situation.                        "

## 2024-08-23 NOTE — PLAN OF CARE
PRIMARY DIAGNOSIS: Fall/Hallucination   OUTPATIENT/OBSERVATION GOALS TO BE MET BEFORE DISCHARGE:  ADLs back to baseline: No    Activity and level of assistance: bedbound    Pain status: Pain free.    Return to near baseline physical activity: No     Discharge Planner Nurse   Safe discharge environment identified: No  Barriers to discharge: Yes       Entered by: Gudelia Soto RN 08/23/2024 3:52 PM     Please review provider order for any additional goals.   Nurse to notify provider when observation goals have been met and patient is ready for discharge.Goal Outcome Evaluation:    Plan of Care Reviewed With: patient    Overall Patient Progress: no changeOverall Patient Progress: no change    Outcome Evaluation: Increased confusion, thickened liquids, CCRQ2H

## 2024-08-23 NOTE — PROGRESS NOTES
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  He had been hospitalized from 8/16-18/2024 with presumed viral diarrhea.  He was trying to put his shoes on to go to his hospital follow-up appointment when he fell and sustained a skin tear to his left arm.  He reports he has also been having hallucinations, confusion and weakness.  In the ER he was noted to have fairly stable labs with his .2, Cr 12.20, GFR 4, Hgb 8.1.  X-rays of pelvis and ankle without fracture.  CXR rotated but CT cervical spine sees coarse right upper lobe mixed opacities along with high-grade spinal canal narrowing in the cervical spine.  Head CT without acute changes.  UA with moderate blood, large leuk esterase, > 182 WBCs.  Culture from 8/16 grew Alcaligenes faecalis susceptible to cefepime, Bactrim and Zosyn.  Due to general decline in condition he is placed in observation now for further evaluation.    Principal Problem:    Fall, initial encounter    Urinary retention    Hallucinations    Confusion    Generalized weakness    Skin tear of forearm without complication, initial encounter       May still have some weakness from the diarrhea he had last week but seemed to be better at the time of discharge and the next day, then worse.  Has had CKD-5 with very high Cr since 4/2017.  He may be nearing the end of his functioning.  Will ask for PT, OT and discharge planning to evaluate.  May benefit from meeting with hospice although he has been very active his whole life.  Do not expect he is at his  last moments but he does overall look much weaker and more frail and tired than when he was seen by this provider in April 2024.  Has done home PT previously and would like to start again.  Had 740 ml post void residual urine on bladder scan so required higuera catheter placement for urinary retention.  Now with increasing weakness will leave in for today.  With increased sedation and recurring hallucinations will check ABG to see if he is hypercapnic.  Serum CO2 low normal yesterday but recheck renal function today to see if suddenly worse.    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  Does take bicarb, calcitriol, Aranesp and prednisone to treat symptoms.  His renal function has been very bad for a long time and it is surprising how well he does with these kinds of numbers.  Continue supportive cares.  Not sure why he is on Lasix (HTN?) but he does make some urine and renal function is not much worse so will continue for now.  He is having continued decline, now with hypoxia, increased weakness, hallucinations.  May be uremic now but labs are not far off his previous baseline.  Continue supportive cares.      Benign essential hypertension       Good control with amlodipine, Lasix and tamsulosin.  Continue.        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  Will not check POC glucose during his hospitalization.        Hypothyroidism due to acquired atrophy of thyroid       Just started on levothyroxine recently and his TSH has come  "down from 23.22 on 7/23/2024 to 9.53 by 8/16/2024.  Continue current dose of levothyroxine 50 mcg daily.      Recurrent UTI        May have not completely cleared from his last admission.  He received 3 doses of Rocephin but was not discharged on abx.  Restarted Rocephin as this is probably the easiest on the kidneys.  New UC in process.       Observation Goals: -diagnostic tests and consults completed and resulted, -vital signs normal or at patient baseline, -tolerating oral intake to maintain hydration, -tolerating oral antibiotics or has plans for home infusion setup, -returns to baseline functional status, -safe disposition plan has been identified, Nurse to notify provider when observation goals have been met and patient is ready for discharge.  Diet: Regular Diet Adult    DVT Prophylaxis: Heparin SQ  Gates Catheter: PRESENT, indication: Acute retention or obstruction  Lines: None     Cardiac Monitoring: None  Code Status: No CPR- Do NOT Intubate      Clinically Significant Risk Factors Present on Admission              # Hypoalbuminemia: Lowest albumin = 3.1 g/dL at 8/21/2024 11:39 AM, will monitor as appropriate     # Hypertension: Noted on problem list    # Anemia: based on hgb <11       # Overweight: Estimated body mass index is 26.85 kg/m  as calculated from the following:    Height as of this encounter: 1.727 m (5' 8\").    Weight as of this encounter: 80.1 kg (176 lb 9.6 oz).                    Disposition Plan     Medically Ready for Discharge: Anticipated in 2-4 Days             Melissa Nettles MD  Hospitalist Service  [facility]  Securely message with [employer] (more info)  Text page via [employer] [facility] Paging/Directory   ______________________________________________________________________    Interval History   Nursing notes indicate he was having hallucinations and increased confusion.  He was more lethargic and was hypoxic with O2 sats in the 80's but refused to wear O2.  Aware he had a " wild night.    Physical Exam   Vital Signs: Temp: 98.2  F (36.8  C) Temp src: Tympanic BP: 121/66 Pulse: 95   Resp: 18 SpO2: 93 % O2 Device: Nasal cannula Oxygen Delivery: 3 LPM  Weight: 176 lbs 9.6 oz    Constitutional: very sleepy and did not awaken fully during the visit, cooperative, no apparent distress, appears stated age, and normal weight, overall looks much more frail and tired than he did at his last admission or even 2 days ago, fell asleep during conversation again today.  Voice weak, speech slurred like sleepy and has a dry mouth and only speaks a few words before taking another breath  Eyes: pupils equal, round and reactive to light, extra-ocular muscles intact, and conjunctiva normal  ENT: normocephalic, atraumatic  Respiratory: no increased work of breathing although he only speaks a few words at a time, fair air exchange and clear to auscultation  Cardiovascular: regular rate and rhythm, normal S1 and S2, and no murmur noted  GI: normal bowel sounds, soft, non-distended, and non-tender  Skin: bruising on right forearm more than the rest of his body but scattered bruises noted, left arm with evidence of bleeding with gauze covering his entire forearm, steri strip on right index finger MCP and on left forearm skin tear, gauze on right mid lower leg  Musculoskeletal: full range of motion noted  motor strength is 5 out of 5 all extremities bilaterally but weaker than he has been at other admissions  Neurologic: Mental Status Exam:  Fund of Knowledge:  normal  Attention/Concentration:  normal  Language:  normal  Cranial Nerves:  cranial nerves II-XII are grossly intact  Motor Exam:  moves all extremities well and symmetrically  Neuropsychiatric: General: normal, calm, and normal eye contact when he is awake, other times he seems to be staring off  Level of consciousness: more sleepy today  Affect: normal, pleasant, and full  Orientation: oriented to self, place, time and situation  Memory and insight:  normal, memory for past and recent events intact, and thought process normal but not as alert as previously    Medical Decision Making

## 2024-08-23 NOTE — PROGRESS NOTES
Interdisciplinary Discharge Planning Note    Anticipated Discharge Date:2-3 days    Anticipated Discharge Location: SNF     Clinical Needs Before Discharge:   PT/OT, medical stability      Treatment Needs After Discharge:  rehab (PT, OT, ST)    Potential Barriers to Discharge: None Identified     ANNE Max  8/23/2024,  12:00 PM

## 2024-08-23 NOTE — PROGRESS NOTES
"Update from RN patient placed on oxygen.  Patient removed oxygen and refused to have it put back on.  Patient is currently mid 80s on room air.  Patient still refusing oxygen.  Attempted to educate patient on oxygen needs.  Patient stated \"I am fine without it, just go away.\"  "

## 2024-08-23 NOTE — PROGRESS NOTES
"PRIMARY DIAGNOSIS: \"GENERIC\" NURSING  OUTPATIENT/OBSERVATION GOALS TO BE MET BEFORE DISCHARGE:  ADLs back to baseline: No    Activity and level of assistance: Up with maximum assistance. Consider SW and/or PT evaluation.     Pain status: Pain free.    Return to near baseline physical activity: No     Discharge Planner Nurse   Safe discharge environment identified: Yes  Barriers to discharge: Yes       Entered by: Elizabeth Ontiveros RN 08/23/2024 7:43 AM     Please review provider order for any additional goals.   Nurse to notify provider when observation goals have been met and patient is ready for discharge.  "

## 2024-08-23 NOTE — PROGRESS NOTES
Patient approached by PT/OT. However, due to increased patient fatigue/somnolence, PT/OT will be deferred until tomorrow to promote mobility and ADL performance.

## 2024-08-23 NOTE — PROVIDER NOTIFICATION
08/23/24 1336   Valuables   Patient Belongings remains with patient   Patient Belongings Remaining with Patient clothing;vision aids   Did you bring any home meds/supplements to the hospital?  No     [facility] will make every effort per our policy to help keep your items safe while in the hospital.  If you choose to keep any items at the bedside, we cannot be held responsible for any items that are lost or broken.      List items sent to safe: none    I have reviewed my belongings list on admission and verify that it is correct.     Patient signature_______________________________  Date/Time_____________________    2nd Staff person if patient unable to sign __________________________  Date/Time ______________________      I have received all my belongings noted above at discharge.    Patient signature________________________________  Date/Time  __________________________

## 2024-08-23 NOTE — PLAN OF CARE
Goal Outcome Evaluation:    Temp: 97  F (36.1  C) Temp src: Tympanic BP: 120/67 Pulse: 97   Resp: 16 SpO2: (!) 85 % O2 Device: None (Room air)      Patient lethargic most of the NOC shift; continuing confusion and hallucinations. CCR Q 2, boosting as needed and monitoring vitals.

## 2024-08-24 NOTE — PLAN OF CARE
PRIMARY DIAGNOSIS: Fall/Hallucinations  OUTPATIENT/OBSERVATION GOALS TO BE MET BEFORE DISCHARGE:  ADLs back to baseline: No    Activity and level of assistance: Up with maximum assistance. Consider SW and/or PT evaluation.     Pain status: Pain free.    Return to near baseline physical activity: No     Discharge Planner Nurse   Safe discharge environment identified: No  Barriers to discharge: Yes       Entered by: Gudelia Soto RN 08/24/2024 6:38 PM     Please review provider order for any additional goals.   Nurse to notify provider when observation goals have been met and patient is ready for discharge.Goal Outcome Evaluation:      Plan of Care Reviewed With: patient    Overall Patient Progress: improvingOverall Patient Progress: improving    Outcome Evaluation: Pt more alert today. Up in chair. Eating and drinking. Int confusion.

## 2024-08-24 NOTE — PLAN OF CARE
"PRIMARY DIAGNOSIS: \"GENERIC\" NURSING  OUTPATIENT/OBSERVATION GOALS TO BE MET BEFORE DISCHARGE:  ADLs back to baseline: No    Activity and level of assistance: Bed bound     Pain status: Pain free.    Return to near baseline physical activity: No     Discharge Planner Nurse   Safe discharge environment identified: No  Barriers to discharge: Yes       Entered by: Mia Sneed RN 08/24/2024 7:01 AM     Please review provider order for any additional goals.   Nurse to notify provider when observation goals have been met and patient is ready for discharge.  "

## 2024-08-24 NOTE — PROGRESS NOTES
SAFETY CHECKLIST  ID Bands and Risk clasps correct and in place (DNR, Fall risk, Allergy, Latex, Limb):  Yes  All Lines Reconciled and labeled correctly: Yes  Whiteboard updated:Yes  Environmental interventions: Yes  Verify Tele #: [email or web address]     6 b.) How many servings of High Fiber / Whole Grain Foods to you have each day ( 1 serving = 1 cup cold cereal, 1/2 cup cooked cereal, 1 slice bread):  1 per day

## 2024-08-24 NOTE — PLAN OF CARE
PRIMARY DIAGNOSIS: Fall/Hallucinations  OUTPATIENT/OBSERVATION GOALS TO BE MET BEFORE DISCHARGE:  ADLs back to baseline: No    Activity and level of assistance: Up with maximum assistance. Consider SW and/or PT evaluation.     Pain status: Pain free.    Return to near baseline physical activity: No     Discharge Planner Nurse   Safe discharge environment identified: No  Barriers to discharge: Yes       Entered by: Gudelia Soto RN 08/24/2024 6:37 PM     Please review provider order for any additional goals.   Nurse to notify provider when observation goals have been met and patient is ready for discharge.Goal Outcome Evaluation:      Plan of Care Reviewed With: patient    Overall Patient Progress: improvingOverall Patient Progress: improving    Outcome Evaluation: Pt more alert today. Up in chair. Eating and drinking. Int confusion.

## 2024-08-24 NOTE — PROGRESS NOTES
[facility]      OUTPATIENT OCCUPATIONAL THERAPY  EVALUATION  PLAN OF TREATMENT FOR OUTPATIENT REHABILITATION  (COMPLETE FOR INITIAL CLAIMS ONLY)  Patient's Last Name, First Name, M.I.  YOB: 1934  Altaf Chao                          Provider's Name  [facility] Medical Record No.  [id number]                               Onset Date:   08/21/24   Start of Care Date:   08/24/24     Type:     ___PT   _X_OT   ___SLP Medical Diagnosis:   fall                        OT Diagnosis:  (P) impaired self cares, mobility   Visits from SOC:  1   _________________________________________________________________________________  Plan of Treatment/Functional Goals    Planned Interventions: (P) ADL retraining, balance training, strengthening, transfer training   Goals: See Occupational Therapy Goals on Care Plan in [facility] electronic health record.    Therapy Frequency: (P) Daily  Predicted Duration of Therapy Intervention: (P) 08/26/24  _________________________________________________________________________________    I CERTIFY THE NEED FOR THESE SERVICES FURNISHED UNDER        THIS PLAN OF TREATMENT AND WHILE UNDER MY CARE .             Physician Signature               Date    X_____________________________________________________                   ,                   Initial Assessment        See Occupational Therapy evaluation dated   in Epic electronic health record.

## 2024-08-24 NOTE — PLAN OF CARE
PRIMARY DIAGNOSIS: Fall/Hallucinations   OUTPATIENT/OBSERVATION GOALS TO BE MET BEFORE DISCHARGE:  ADLs back to baseline: No    Activity and level of assistance: Bed bound    Pain status: Pain free.    Return to near baseline physical activity: No     Discharge Planner Nurse   Safe discharge environment identified: No  Barriers to discharge: Yes       Entered by: Gudelia Soto RN 08/23/2024 7:12 PM     Please review provider order for any additional goals.   Nurse to notify provider when observation goals have been met and patient is ready for discharge.Goal Outcome Evaluation:  Pt increased confusion and difficulty communicating. VSS at this time. Pt does not appear to be in pain. Skin tears to left forearm and right shin, scattered bruising and scabs. Pt requiring thickened liquids due to difficulty swallowing, pills crushed in pudding. Decreased urine output. Loss of IV at end of shift, MD notified and stated if unable to insert IV do not call in CRNA, IM rocephin ordered.       Plan of Care Reviewed With: patient    Overall Patient Progress: no changeOverall Patient Progress: no change    Outcome Evaluation: Increased confusion, thickened liquids, CCRQ2H

## 2024-08-24 NOTE — PLAN OF CARE
PRIMARY DIAGNOSIS: Fall/Hallucinations   OUTPATIENT/OBSERVATION GOALS TO BE MET BEFORE DISCHARGE:  ADLs back to baseline: No    Activity and level of assistance: Up with maximum assistance. Consider SW and/or PT evaluation.     Pain status: Pain free.    Return to near baseline physical activity: No     Discharge Planner Nurse   Safe discharge environment identified: No  Barriers to discharge: Yes       Entered by: Gudelia Soto RN 08/24/2024 6:36 PM     Please review provider order for any additional goals.   Nurse to notify provider when observation goals have been met and patient is ready for discharge.Goal Outcome Evaluation:  Pt has int confusion, but has been more alert today. VSS. Afebrile. Denies pain and N/V. LS clear. Pt is eating and drinking more today, does well with thickened liquids and softer foods. Pt pivoted to chair. Gates patent with minimal output on this shift, urine is clear and pale yellow. Skin tears healing, dressing changed to left forearm, clean and intact at this time. Dressing to right shin removed and left open to air. Pt did refuse 1400 medication due to being too tired, all other medications administered crushed in pudding.     Plan of Care Reviewed With: patient    Overall Patient Progress: improvingOverall Patient Progress: improving    Outcome Evaluation: Pt more alert today. Up in chair. Eating and drinking. Int confusion.

## 2024-08-24 NOTE — PROGRESS NOTES
08/24/24 1000   Appointment Info   Signing Clinician's Name / Credentials (OT) Marilyn Tapia   Living Environment   People in Home child(rio), adult;spouse   Current Living Arrangements house   Home Accessibility no concerns;stairs to enter home;stairs within home   Number of Stairs, Main Entrance 3   Stair Railings, Main Entrance railings safe and in good condition   Number of Stairs, Within Home, Primary greater than 10 stairs   Stair Railings, Within Home, Primary railings safe and in good condition   Transportation Anticipated family or friend will provide   Self-Care   Usual Activity Tolerance fair   Current Activity Tolerance poor   Equipment Currently Used at Home walker, rolling   Fall history within last six months yes   Number of times patient has fallen within last six months 4   General Information   Existing Precautions/Restrictions oxygen therapy device and L/min  (3 lpm)   General Observations and Info pt has large scabbed wound on left forearm from previous fall   Cognitive Status Examination   Orientation Status person;place  (new month and year, not date or day)   Follows Commands follows one-step commands   Pain Assessment   Patient Currently in Pain No   Posture   Posture kyphosis   Bed Mobility   Bed Mobility supine-sit   Supine-Sit [place] (Bed Mobility) maximum assist (25% patient effort);2 person assist   Assistive Device (Bed Mobility) bed rails;draw sheet   Transfers   Transfers bed-chair transfer;sit-stand transfer   Transfer Skill: Bed to Chair/Chair to Bed   Bed-Chair [place] (Transfers) maximum assist (25% patient effort);2 person assist   Transfer Comments pivot transfer with 2 person assist   Sit-Stand Transfer   Sit-Stand [place] (Transfers) moderate assist (50% patient effort)   Assistive Device (Sit-Stand Transfers) walker, front-wheeled   Clinical Impression   Criteria for Skilled Therapeutic Interventions Met (OT) Yes, treatment indicated   OT Diagnosis impaired  self cares, mobility   OT Problem List-Impairments impacting ADL activity tolerance impaired;balance;mobility;strength   Assessment of Occupational Performance 1-3 Performance Deficits   Identified Performance Deficits self cares, mobility   Planned Therapy Interventions (OT) ADL retraining;balance training;strengthening;transfer training   Clinical Decision Making Complexity (OT) problem focused assessment/low complexity   Risk & Benefits of therapy have been explained evaluation/treatment results reviewed   OT Total Evaluation Time   OT Eval, Low Complexity Minutes (99954) 15   OT Goals   Therapy Frequency (OT) Daily   OT Predicted Duration/Target Date for Goal Attainment 08/26/24   OT Goals Bed Mobility;Toilet Transfer/Toileting;Hygiene/Grooming   OT: Hygiene/Grooming minimal assist   OT: Bed Mobility Minimal assist   OT: Toilet Transfer/Toileting Minimal assist   Interventions   Interventions Quick Adds Therapeutic Activity   Therapeutic Activities   Therapeutic Activity Minutes (72818) 25   Symptoms noted during/after treatment fatigue;shortness of breath   Treatment Detail/Skilled Intervention pt agreeable to get up to chair for breakfast; bed mobility max assist x 2 to edge of bed, sit to stand with FWW and mod assist x 2, very shaky legs, fearful of falling. Pt required max assist x 2 for pivot transfer from bed to chair.   OT Discharge Planning   OT Plan Continue OT   OT Discharge Recommendation (DC Rec) Transitional Care Facility   OT Rationale for DC Rec pt is unable to return to independent living due to increased need for all ADLs, transfers.  Has not been able to functionally ambulate.   OT Brief overview of current status pt pleasant, deconditioned.  Mod-max assist x 2 for all self cares and transfers due to weakness.

## 2024-08-24 NOTE — PLAN OF CARE
PRIMARY DIAGNOSIS: Fall/Hallucination  OUTPATIENT/OBSERVATION GOALS TO BE MET BEFORE DISCHARGE:  ADLs back to baseline: No    Activity and level of assistance: Up with maximum assistance. Consider SW and/or PT evaluation.     Pain status: Pain free.    Return to near baseline physical activity: No     Discharge Planner Nurse   Safe discharge environment identified: No  Barriers to discharge: Yes       Entered by: Gudelia Soto RN 08/24/2024 6:36 PM     Please review provider order for any additional goals.   Nurse to notify provider when observation goals have been met and patient is ready for discharge.Goal Outcome Evaluation:      Plan of Care Reviewed With: patient    Overall Patient Progress: improvingOverall Patient Progress: improving    Outcome Evaluation: Pt more alert today. Up in chair. Eating and drinking. Int confusion.

## 2024-08-24 NOTE — PROGRESS NOTES
08/24/24 1100   Appointment Info   Signing Clinician's Name / Credentials (PT) Jania Rodriguez PT   Interventions   Interventions Quick Adds Therapeutic Activity   Therapeutic Activity   Therapeutic Activities: dynamic activities to improve functional performance Minutes (20238) 25   Symptoms Noted During/After Treatment Fatigue   Treatment Detail/Skilled Intervention Mod to max assist of 2 for supine to sit. Sat on edge of bed. Mod to max assist of 2 for sit to stand X2 with FWW. Transferred with max assist of 2 with stand by of another for stand pivot transfer from bed to recliner. Positioned in recliner for comfort. Significant weakness with transfers; fearful. Pleasant and cooperative.   PT Discharge Planning   PT Plan Continue PT   PT Discharge Recommendation (DC Rec) Transitional Care Facility   PT Rationale for DC Rec to maximize strength and safe mobility skills   PT Brief overview of current status Pt requires mod to max assist of 2 for bed mobility and transfers. Performed stand pivot transfer today from bed to chair with max assist of 2 with significant weakness noted. Pt  cooperative and pleasant but very fearful of falling with transfers.   Total Session Time   Timed Code Treatment Minutes 25   Total Session Time (sum of timed and untimed services) 25     Jania Rodriguez PT on 8/24/2024 at 11:14 AM

## 2024-08-24 NOTE — PLAN OF CARE
"PRIMARY DIAGNOSIS: \"GENERIC\" NURSING  OUTPATIENT/OBSERVATION GOALS TO BE MET BEFORE DISCHARGE:  ADLs back to baseline: No    Activity and level of assistance: bed bound    Pain status: Pain free.    Return to near baseline physical activity: No     Discharge Planner Nurse   Safe discharge environment identified: No  Barriers to discharge: Yes       Entered by: Mia Sneed RN 08/24/2024 7:19 AM     Please review provider order for any additional goals.   Nurse to notify provider when observation goals have been met and patient is ready for discharge.  "

## 2024-08-24 NOTE — PLAN OF CARE
"PRIMARY DIAGNOSIS: \"GENERIC\" NURSING  OUTPATIENT/OBSERVATION GOALS TO BE MET BEFORE DISCHARGE:  ADLs back to baseline: No    Activity and level of assistance: bed bound    Pain status: Pain free.    Return to near baseline physical activity: No     Discharge Planner Nurse   Safe discharge environment identified: No  Barriers to discharge: Yes       Entered by: Mia Sneed RN 08/24/2024 7:20 AM     Please review provider order for any additional goals.   Nurse to notify provider when observation goals have been met and patient is ready for discharge.    "

## 2024-08-24 NOTE — PROGRESS NOTES
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  He had been hospitalized from 8/16-18/2024 with presumed viral diarrhea.  He was trying to put his shoes on to go to his hospital follow-up appointment when he fell and sustained a skin tear to his left arm.  He reports he has also been having hallucinations, confusion and weakness.  In the ER he was noted to have fairly stable labs with his .2, Cr 12.20, GFR 4, Hgb 8.1.  X-rays of pelvis and ankle without fracture.  CXR rotated but CT cervical spine sees coarse right upper lobe mixed opacities along with high-grade spinal canal narrowing in the cervical spine.  Head CT without acute changes.  UA with moderate blood, large leuk esterase, > 182 WBCs.  Culture from 8/16 grew Alcaligenes faecalis susceptible to cefepime, Bactrim and Zosyn.  Due to general decline in condition he was placed in observation for further evaluation.    Principal Problem:    Fall, initial encounter    Urinary retention    Hallucinations    Confusion    Generalized weakness    Skin tear of forearm without complication, initial encounter       May still have some weakness from the diarrhea he had last week but seemed to be better at the time of discharge and the next day, then worse.  Has had CKD-5 with very high Cr since 4/2017.  He may be nearing the end of his functioning.  Will ask for PT, OT and discharge planning to evaluate.  Agreeable to meeting with hospice although he has been very active his whole life.  Had post void residual urine on  bladder scan so required higuera catheter placement for urinary retention.  Now with increasing weakness will leave in.      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  Does take bicarb, calcitriol, Aranesp and prednisone to treat symptoms.  His renal function has been very bad for a long time and it is surprising how well he does with these kinds of numbers.  Continue supportive cares.  Not sure why he is on Lasix (HTN?) but he does make some urine and renal function is not much worse so will continue for now.  He is having continued decline, now with hypoxia, increased weakness, hallucinations.  May be uremic now but labs are not far off his previous baseline.  Continue supportive cares.      Benign essential hypertension       Good control with amlodipine, Lasix and tamsulosin.  BP lower now so will discontinue amlodipine.        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  Will not check POC glucose during his hospitalization.        Hypothyroidism due to acquired atrophy of thyroid       Just started on levothyroxine recently and his TSH has come down from 23.22 on 7/23/2024 to 9.53 by 8/16/2024.  Continue current dose of levothyroxine 50 mcg daily.      Recurrent UTI        May have not completely cleared from his last admission.  He received 3 doses of Rocephin but was not discharged on abx.  Restarted Rocephin as this is probably the easiest on the kidneys.  New UC from 8/21 still in process.       Observation Goals: -diagnostic  "tests and consults completed and resulted, -vital signs normal or at patient baseline, -tolerating oral intake to maintain hydration, -tolerating oral antibiotics or has plans for home infusion setup, -returns to baseline functional status, -safe disposition plan has been identified, Nurse to notify provider when observation goals have been met and patient is ready for discharge.  Diet: Regular Diet Adult    DVT Prophylaxis: Heparin SQ  Gates Catheter: PRESENT, indication: Acute retention or obstruction  Lines: None     Cardiac Monitoring: None  Code Status: No CPR- Do NOT Intubate      Clinically Significant Risk Factors Present on Admission              # Hypoalbuminemia: Lowest albumin = 2.5 g/dL at 8/23/2024 11:02 AM, will monitor as appropriate     # Hypertension: Noted on problem list         # Overweight: Estimated body mass index is 27.14 kg/m  as calculated from the following:    Height as of this encounter: 1.727 m (5' 8\").    Weight as of this encounter: 81 kg (178 lb 8 oz).                    Disposition Plan     Medically Ready for Discharge: Anticipated in 2-4 Days             Melissa Nettles MD  Hospitalist Service  [facility]  Securely message with [employer] (more info)  Text page via [facility] Paging/Directory   ______________________________________________________________________    Interval History   Feeling better today.  Did not feel good yesterday and admits he thought that might be the end for him.  Was too weak to even talk yesterday but today able to drink and interested in eating.  Discussed that it seems his mind and will are strong but his body is tired.  Agreeable to hospice consult to help his wife and daughter deal with his loss when the time comes.    Physical Exam   Vital Signs: Temp: 98  F (36.7  C) Temp src: Tympanic BP: 107/60 Pulse: 91   Resp: 16 SpO2: 94 % O2 Device: Nasal cannula Oxygen Delivery: 3 LPM  Weight: 178 lbs 8 oz    Constitutional: easily awakened, " alert, cooperative, no apparent distress, appears stated age, and normal weight, overall looks much more frail and tired than he did at his last admission  Eyes: pupils equal, round and reactive to light, extra-ocular muscles intact, and conjunctiva normal  ENT: normocephalic, atraumatic  Respiratory: no increased work of breathing although he only speaks a few words at a time, fair air exchange and clear to auscultation  Cardiovascular: regular rate and rhythm, normal S1 and S2, and no murmur noted  GI: normal bowel sounds, soft, non-distended, and non-tender  Skin: bruising on right forearm more than the rest of his body but scattered bruises noted, left arm with evidence of bleeding with gauze covering his entire forearm, steri strip on right index finger MCP and on left forearm skin tear, gauze on right mid lower leg  Musculoskeletal: full range of motion noted  motor strength is 4 out of 5 all extremities bilaterally, weaker than he has been at other admissions  Neurologic: Mental Status Exam:  Fund of Knowledge:  normal  Attention/Concentration:  normal  Language:  normal  Cranial Nerves:  cranial nerves II-XII are grossly intact  Motor Exam:  moves all extremities well and symmetrically  Neuropsychiatric: General: normal, calm, and normal eye contact   Level of consciousness: alert  Affect: normal, pleasant, and full  Orientation: oriented to self, place, time and situation  Memory and insight: normal, memory for past and recent events intact, and thought process normal    Medical Decision Making             Data

## 2024-08-25 NOTE — PROGRESS NOTES
08/25/24 1200   Appointment Info   Signing Clinician's Name / Credentials (PT) Jania Rodriguez PT   Interventions   Interventions Quick Adds Therapeutic Activity   Therapeutic Activity   Therapeutic Activities: dynamic activities to improve functional performance Minutes (43721) 20   Symptoms Noted During/After Treatment Fatigue   Treatment Detail/Skilled Intervention Pt up in recliner. Stood with mod assist of 1 -2 with FWW X 2. Able to take several steps with walker and mod assist of 1-2. Fatigues very quickly. Standing posture and gait crouched.   PT Discharge Planning   PT Plan Continue PT   PT Discharge Recommendation (DC Rec) Transitional Care Facility   PT Rationale for DC Rec to maximize strength and safe mobility skills   PT Brief overview of current status Pt improved today with ability to perform sit to stand from recliner with mod assist of 1-2 with FWW. Able to take several steps with walker with mod assist of 1-2. Very weak and fatigues very quickly. Pleasant, cooperative and motivated.   Total Session Time   Timed Code Treatment Minutes 20   Total Session Time (sum of timed and untimed services) 20     Jania Rodriguez PT on 8/25/2024 at 12:10 PM

## 2024-08-25 NOTE — PROGRESS NOTES
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  He had been hospitalized from 8/16-18/2024 with presumed viral diarrhea.  He was trying to put his shoes on to go to his hospital follow-up appointment when he fell and sustained a skin tear to his left arm.  He reports he has also been having hallucinations, confusion and weakness.  In the ER he was noted to have fairly stable labs with his .2, Cr 12.20, GFR 4, Hgb 8.1.  X-rays of pelvis and ankle without fracture.  CXR rotated but CT cervical spine sees coarse right upper lobe mixed opacities along with high-grade spinal canal narrowing in the cervical spine.  Head CT without acute changes.  UA with moderate blood, large leuk esterase, > 182 WBCs.  Culture from 8/16 grew Alcaligenes faecalis susceptible to cefepime, Bactrim and Zosyn.  UC from 8/21 returned growing Pseudomonas aeruginosa resistant to fluoroquinolones.  Due to general decline in condition he was placed in observation for further evaluation.    Principal Problem:    Fall, initial encounter    Urinary retention    Hallucinations    Confusion    Generalized weakness    Skin tear of forearm without complication, initial encounter       May still have some weakness from the diarrhea he had with his last admission last week but seemed to be better at the time of discharge and the next day, then worse.  Has had CKD-5 with very high Cr since 4/2017.  He may be nearing the end of his functioning.  Continue PT, OT and discharge planning to find short-term rehab if  he is strong enough.  Agreeable to meeting with hospice although he has been very active his whole life.  Had post void residual urine on bladder scan so required higuera catheter placement for urinary retention.  Now with increasing weakness will leave in.      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  Does take bicarb, calcitriol, Aranesp and prednisone to treat symptoms.  His renal function has been very bad for a long time and it is surprising how well he does with these kinds of numbers.  Continue supportive cares.  He is on Lasix (for HTN?) but he does make some urine and renal function is not much worse so will continue for now.  He is having continued decline with some hypoxia, increased weakness, more tired and hallucinations.  May be uremic now but labs are not far off his previous baseline.  Continue supportive cares.  Plan hospice referral at discharge.      Benign essential hypertension       Good control with amlodipine, Lasix and tamsulosin.  BP lower now so will discontinue amlodipine.        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  Will not check POC glucose during his hospitalization.        Hypothyroidism due to acquired atrophy of thyroid       Just started on levothyroxine recently and his TSH has come down from 23.22 on 7/23/2024 to 9.53 by 8/16/2024.  Continue current dose of levothyroxine 50 mcg daily.      Recurrent UTI        Restarted Rocephin at the time of admission as his last UC was sensitive to this  "and it is probably the easiest on the kidneys.  New UC from  finally returned on  positive for Pseudomonas.  Sensitive to cefepime so started, renally dosed.  Having problems maintaining IV access so may have limited treatment options.       Observation Goals: -diagnostic tests and consults completed and resulted, -vital signs normal or at patient baseline, -tolerating oral intake to maintain hydration, -tolerating oral antibiotics or has plans for home infusion setup, -returns to baseline functional status, -safe disposition plan has been identified, Nurse to notify provider when observation goals have been met and patient is ready for discharge.  Diet: Regular Diet Adult    DVT Prophylaxis: Heparin SQ  Gates Catheter: PRESENT, indication: Acute retention or obstruction  Lines: None     Cardiac Monitoring: None  Code Status: No CPR- Do NOT Intubate      Clinically Significant Risk Factors Present on Admission              # Hypoalbuminemia: Lowest albumin = 2.5 g/dL at 2024 11:02 AM, will monitor as appropriate     # Hypertension: Noted on problem list         # Overweight: Estimated body mass index is 26.29 kg/m  as calculated from the following:    Height as of this encounter: 1.727 m (5' 8\").    Weight as of this encounter: 78.4 kg (172 lb 14.4 oz).                    Disposition Plan     Medically Ready for Discharge: Anticipated in 2-4 Days             Melissa Nettles MD  Hospitalist Service  [facility]  Securely message with [employer] (more info)  Text page via [facility] Paging/Directory   ______________________________________________________________________    Interval History   Feeling good.  Voice better when he drinks milk.  Talking about [employer], getting communion, doing  dishes and working with other people at [employer].  Remains positive and optimistic.  Continues to be very weak and knows he will need to do rehab before returning home.    Physical Exam   Vital Signs: " Temp: 97  F (36.1  C) Temp src: Tympanic BP: 125/63 Pulse: 89   Resp: 16 SpO2: 97 % O2 Device: Nasal cannula Oxygen Delivery: 3 LPM  Weight: 172 lbs 14.4 oz    Constitutional: easily awakened, alert, cooperative, no apparent distress, appears stated age, and normal weight, overall looks more frail and tired than he did yesterday and much worse since his last admission  Eyes: pupils equal, round and reactive to light, extra-ocular muscles intact, and conjunctiva normal  ENT: normocephalic, atraumatic  Respiratory: no increased work of breathing although he only speaks a few words at a time, fair air exchange and clear to auscultation, voice weaker again today  Cardiovascular: regular rate and rhythm, normal S1 and S2, and no murmur noted  GI: normal bowel sounds, soft, non-distended, and non-tender  Skin: bruising on arms more than the rest of his body but scattered bruises noted, left arm with evidence of bleeding with gauze covering his entire forearm, steri strips on left forearm skin tear  Musculoskeletal: full range of motion noted but very weak and slow, not moving much  motor strength is 4 out of 5 all extremities bilaterally, weaker than he has been at other admissions and weaker than yesterday but better than the day before  Neurologic: Mental Status Exam:  Fund of Knowledge:  normal  Attention/Concentration:  normal  Language:  normal  Cranial Nerves:  cranial nerves II-XII are grossly intact  Motor Exam:  moves all extremities well and symmetrically  Neuropsychiatric: General: normal, calm, and normal eye contact   Level of consciousness: alert  Affect: normal, pleasant, and full  Orientation: oriented to self, place, time and situation  Memory and insight: normal, memory for past and recent events intact, and thought process normal    Medical Decision Making             Data   Most Recent 6 Bacteria Isolates From Any Culture (See EPIC Reports for Culture Details):  Recent Labs   Lab Test 03/30/21  1144    CULT >100,000 colonies/mL  Pseudomonas aeruginosa  Some antibiotics have been suppressed due to the known inducible beta lactamase activity.   Please contact Microbiology for more information.  *     Most Recent Urinalysis:  Recent Labs   Lab Test 08/21/24  1423 03/30/21  1144 06/20/19  1228   COLOR Yellow   < > Yellow   APPEARANCE Cloudy*   < > Slightly Cloudy   URINEGLC 100*   < > Negative   URINEBILI Negative   < > Negative   URINEKETONE Negative   < > Negative   SG 1.009   < > 1.010   UBLD Moderate*   < > Small*   URINEPH 8.0   < > 5.5   PROTEIN 100*   < > Trace*   UROBILINOGEN  --   --  0.2   NITRITE Negative   < > Positive*   LEUKEST Large*   < > Large*   RBCU 42*   < > 5-10*   WBCU >182*   < > *    < > = values in this interval not displayed.

## 2024-08-25 NOTE — PLAN OF CARE
Goal Outcome Evaluation:  Pt int confusion, alert to self. VSS. Afebrile. LS clear. New dime sized skin tear to right upper, tagaderm placed. Dressing to left forearm intact. New IV via ultrasound placed in left upper arm due to previous IV displacement. Pt up to chair throughout day.     Plan of Care Reviewed With: patient    Overall Patient Progress: improvingOverall Patient Progress: improving    Outcome Evaluation: Increased strength, mobility and alertness.

## 2024-08-25 NOTE — PLAN OF CARE
"PRIMARY DIAGNOSIS: \"GENERIC\" NURSING  OUTPATIENT/OBSERVATION GOALS TO BE MET BEFORE DISCHARGE:  ADLs back to baseline: No    Activity and level of assistance: Up with maximum assistance. Consider SW and/or PT evaluation.     Pain status: Pain free.    Return to near baseline physical activity: No     Discharge Planner Nurse   Safe discharge environment identified: No  Barriers to discharge: Yes       Entered by: Mia Sneed RN 08/25/2024 1:18 AM     Please review provider order for any additional goals.   Nurse to notify provider when observation goals have been met and patient is ready for discharge.    "

## 2024-08-25 NOTE — PROGRESS NOTES
08/25/24 9923   Appointment Info   Signing Clinician's Name / Credentials (OT) Marilyn Tapia OTR/L   Interventions   Interventions Quick Adds Self-Care/Home Management   Self-Care/Home Management   Self-Care/Home Mgmt/ADL, Compensatory, Meal Prep Minutes (31044) 25   Symptoms Noted During/After Treatment (Meal Preparation/Planning Training) none   Treatment Detail/Skilled Intervention pt completed light hygeine, gown and brief change while seated in recliner; pt required min assist overall to wash UB, max assist for LB; max assist to jasiel clean brief   [place] Level (Bathing Training) moderate assist (50% patient effort)   Assistance (Bathing Training) 1 person assist   [place] Level (Upper Body Dressing Training) minimum assist (75% patient effort)   Assistance (Upper Body Dressing Training) 1 person assist   Lower Body Dressing Training Assistance maximum assist (25% patient effort)   Lower Body Dressing Training Assistance 1 person assist   OT Discharge Planning   OT Plan Continue OT   OT Discharge Recommendation (DC Rec) Transitional Care Facility   OT Rationale for DC Rec pt will benefit from continued rehab to promote increased strength and independence.  Pt is unable to return to independent living due to increased need for assist.   OT Brief overview of current status pt improved cognition and strength today; pleasant and cooperative; able to stand with min assist x 2; requires mas assist for LB cares, min assist UB

## 2024-08-25 NOTE — PLAN OF CARE
"PRIMARY DIAGNOSIS: \"GENERIC\" NURSING  OUTPATIENT/OBSERVATION GOALS TO BE MET BEFORE DISCHARGE:  ADLs back to baseline: No    Activity and level of assistance: Up with maximum assistance. Consider SW and/or PT evaluation.     Pain status: Improved-controlled with oral pain medications.    Return to near baseline physical activity: No     Discharge Planner Nurse   Safe discharge environment identified: No  Barriers to discharge: Yes       Entered by: Mia Sneed RN 08/25/2024 4:23 AM     Please review provider order for any additional goals.   Nurse to notify provider when observation goals have been met and patient is ready for discharge.  "

## 2024-08-25 NOTE — PLAN OF CARE
"PRIMARY DIAGNOSIS: \"GENERIC\" NURSING  OUTPATIENT/OBSERVATION GOALS TO BE MET BEFORE DISCHARGE:  ADLs back to baseline: No    Activity and level of assistance: Up with maximum assistance. Consider SW and/or PT evaluation.     Pain status: Pain free.    Return to near baseline physical activity: No     Discharge Planner Nurse   Safe discharge environment identified: No  Barriers to discharge: Yes       Entered by: Mia Sneed RN 08/25/2024 6:39 AM     Please review provider order for any additional goals.   Nurse to notify provider when observation goals have been met and patient is ready for discharge.  "
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
## 2024-08-26 PROBLEM — Z99.2 CHRONIC KIDNEY DISEASE REQUIRING CHRONIC DIALYSIS (H): Status: ACTIVE | Noted: 2024-01-01

## 2024-08-26 PROBLEM — R82.90 NONSPECIFIC FINDING ON EXAMINATION OF URINE: Status: ACTIVE | Noted: 2024-01-01

## 2024-08-26 PROBLEM — Z91.81 PERSONAL HISTORY OF FALL: Status: ACTIVE | Noted: 2024-01-01

## 2024-08-26 PROBLEM — S51.812A LACERATION OF LEFT FOREARM, INITIAL ENCOUNTER: Status: ACTIVE | Noted: 2024-01-01
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
## 2024-08-26 PROBLEM — N18.6 CHRONIC KIDNEY DISEASE REQUIRING CHRONIC DIALYSIS (H): Status: ACTIVE | Noted: 2024-01-01

## 2024-08-26 NOTE — PLAN OF CARE
"PRIMARY DIAGNOSIS: \"GENERIC\" NURSING  OUTPATIENT/OBSERVATION GOALS TO BE MET BEFORE DISCHARGE:  ADLs back to baseline: No    Activity and level of assistance: Up with maximum assistance. Consider SW and/or PT evaluation.     Pain status: Improved-controlled with oral pain medications.    Return to near baseline physical activity: No     Discharge Planner Nurse   Safe discharge environment identified: No  Barriers to discharge: Yes, pending placement vs hospice        Entered by: Mia Sneed RN 08/25/2024 11:07 PM     Please review provider order for any additional goals.   Nurse to notify provider when observation goals have been met and patient is ready for discharge.  "

## 2024-08-26 NOTE — PROGRESS NOTES
Interdisciplinary Discharge Planning Note    Anticipated Discharge Date:8/27-8/28    Anticipated Discharge Location: SNF    Clinical Needs Before Discharge:   medical stability, abx      Treatment Needs After Discharge:  rehab (PT, OT, ST) vs. Hospice     Potential Barriers to Discharge: None Identified     ANNE Max  8/26/2024,  12:22 PM

## 2024-08-26 NOTE — PROGRESS NOTES
:     Spoke with Marisabel at [facility] and she stated that they can offer patient at bed for tomorrow.     Update given to family and they are accepting the bed offer. Family is aware that a deposit needs to be paid prior to patient admitting to their facility.     ANNE Max on 8/26/2024 at 1:36 PM     :     [facility] will provide transportation for patient at 1600. They will borrow a wheelchair.     ANNE Max on 8/26/2024 at 2:00 PM

## 2024-08-26 NOTE — PLAN OF CARE
"PRIMARY DIAGNOSIS: \"GENERIC\" NURSING  OUTPATIENT/OBSERVATION GOALS TO BE MET BEFORE DISCHARGE:  ADLs back to baseline: No    Activity and level of assistance: Up with maximum assistance. Consider SW and/or PT evaluation.     Pain status: Pain free.    Return to near baseline physical activity: No     Discharge Planner Nurse   Safe discharge environment identified: No  Barriers to discharge: Yes, pending placement vs hospice        Entered by: Mia Sneed RN 08/26/2024 1:45 AM     Please review provider order for any additional goals.   Nurse to notify provider when observation goals have been met and patient is ready for discharge.  "

## 2024-08-26 NOTE — PROGRESS NOTES
Changed pt diet to bite sized and soft, as well as slightly thickened liquids. Pt was coughing on liquids. Given Thick water and tolerated well.

## 2024-08-26 NOTE — PLAN OF CARE
"PRIMARY DIAGNOSIS: \"GENERIC\" NURSING  OUTPATIENT/OBSERVATION GOALS TO BE MET BEFORE DISCHARGE:  ADLs back to baseline: No    Activity and level of assistance: Up with maximum assistance. Consider SW and/or PT evaluation.     Pain status: Pain free.    Return to near baseline physical activity: No     Discharge Planner Nurse   Safe discharge environment identified: No  Barriers to discharge: Yes, pending placement vs Hospice.        Entered by: Mia Sneed RN 08/26/2024 6:17 AM     Please review provider order for any additional goals.   Nurse to notify provider when observation goals have been met and patient is ready for discharge.  " Unable to tolerate CPAP, has attempted per Pulmonology    --Does not sleep flat, unable to tolerate due to breathing and chronic back pain  --In recovery, keep head of stretcher elevated >30 degrees

## 2024-08-26 NOTE — PLAN OF CARE
"Goal Outcome Evaluation:      Plan of Care Reviewed With: patient    Overall Patient Progress: improvingOverall Patient Progress: improving         Pt pleasantly confused, VSS, afebrile. Pt up to recliner with walker and gait belt assist x2. Left forearm dressing changed, new ABD and netting. Alleivan applied to back of right upper arm due to a small skin tear of unknown origin. Gates patent and draining cloudy yellow urine.     /65 (BP Location: Right arm, Patient Position: Semi-Carver's, Cuff Size: Adult Regular)   Pulse 84   Temp 98.4  F (36.9  C) (Tympanic)   Resp 16   Ht 1.727 m (5' 8\")   Wt 78.1 kg (172 lb 3.2 oz)   SpO2 94%   BMI 26.18 kg/m      "

## 2024-08-26 NOTE — PROGRESS NOTES
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  He had been hospitalized from 8/16-18/2024 with presumed viral diarrhea.  He was trying to put his shoes on to go to his hospital follow-up appointment when he fell and sustained a skin tear to his left arm.  He reports he has also been having hallucinations, confusion and weakness.  In the ER he was noted to have fairly stable labs with his .2, Cr 12.20, GFR 4, Hgb 8.1.  X-rays of pelvis and ankle without fracture.  CXR rotated but CT cervical spine sees coarse right upper lobe mixed opacities along with high-grade spinal canal narrowing in the cervical spine.  Head CT without acute changes.  UA with moderate blood, large leuk esterase, > 182 WBCs.  Culture from 8/16 grew Alcaligenes faecalis susceptible to cefepime, Bactrim and Zosyn.  UC from 8/21 returned growing Pseudomonas aeruginosa resistant to fluoroquinolones.  Due to general decline in condition he was placed in observation for further evaluation.      Fall, initial encounter    Urinary retention    Hallucinations    Confusion    Generalized weakness    Skin tear of forearm without complication, initial encounter  - suspect he is very close to end of his life, with CKD5 being primary etiology  - PT, OT and discharge planning to find short-term rehab if he is strong enough.    - Had post void residual urine on bladder scan so required higuera catheter placement for urinary retention.  Now with increasing weakness will leave in.  Plan is to discharge to hospice  referral      Anemia of chronic disease    CKD (chronic kidney disease) stage 5, GFR 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 ml/min (H)    Primary pauci-immune necrotizing and crescentic glomerulonephritis    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)       Not interested in dialysis as he wants to live his life with quality.  Does take bicarb, calcitriol, Aranesp and prednisone to treat symptoms.  His renal function has been very bad for a long time and it is surprising how well he does with these kinds of numbers.  Continue supportive cares.  He is on Lasix (for HTN?) but he does make some urine and renal function is not much worse so will continue for now.  He is having continued decline with some hypoxia, increased weakness, more tired and hallucinations.  May be uremic now but labs are not far off his previous baseline.  Continue supportive cares.  Plan hospice referral at discharge.      Benign essential hypertension       Good control with amlodipine, Lasix and tamsulosin.  BP lower now so discontinued amlodipine.        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  Will not check POC glucose during his hospitalization.        Hypothyroidism due to acquired atrophy of thyroid       Just started on levothyroxine recently and his TSH has come down from 23.22 on 7/23/2024 to 9.53 by 8/16/2024.  Continue current dose of levothyroxine 50 mcg daily.      Recurrent UTI        Restarted Rocephin at the time of admission as his last UC was sensitive to this and it is probably the easiest on the kidneys.  New UC from 8/21 finally returned on 8/25 positive for Pseudomonas.  Sensitive to cefepime so started, renally dosed.       Observation Goals: -diagnostic tests and consults completed and resulted, -vital signs normal or  "at patient baseline, -tolerating oral intake to maintain hydration, -tolerating oral antibiotics or has plans for home infusion setup, -returns to baseline functional status, -safe disposition plan has been identified, Nurse to notify provider when observation goals have been met and patient is ready for discharge.  Diet: Regular Diet Adult    DVT Prophylaxis: Heparin SQ  Gates Catheter: PRESENT, indication: Acute retention or obstruction  Lines: None     Cardiac Monitoring: None  Code Status: No CPR- Do NOT Intubate      Clinically Significant Risk Factors Present on Admission              # Hypoalbuminemia: Lowest albumin = 2.5 g/dL at 8/23/2024 11:02 AM, will monitor as appropriate     # Hypertension: Noted on problem list         # Overweight: Estimated body mass index is 26.18 kg/m  as calculated from the following:    Height as of this encounter: 1.727 m (5' 8\").    Weight as of this encounter: 78.1 kg (172 lb 3.2 oz).                    Disposition Plan     Medically Ready for Discharge: Anticipated in 2-4 Days             Bethel Gleason MD  Hospitalist Service  [facility]  Securely message with [email or web address] (more info)  Text page via [employer] Paging/Directory   ______________________________________________________________________    Interval History   Very weak this morning, states he is having a hard time talking    Physical Exam   Vital Signs: Temp: 96.9  F (36.1  C) Temp src: Tympanic BP: 120/63 Pulse: 90   Resp: 15 SpO2: 94 % O2 Device: None (Room air) Oxygen Delivery: 1 LPM  Weight: 172 lbs 3.2 oz    Constitutional: easily awakened, alert, cooperative, no apparent distress, appears stated age, and normal weight, overall looks more frail and tired   ENT: normocephalic, atraumatic  Respiratory: no increased work of breathing although he only speaks a few words at a time, fair air exchange and clear to auscultation, voice weaker again today  Cardiovascular: regular rate and rhythm, normal S1 and " S2, and no murmur noted  GI: normal bowel sounds, soft, non-distended, and non-tender  Skin: bruising on arms more than the rest of his body but scattered bruises noted, left arm with evidence of bleeding with gauze covering his entire forearm, steri strips on left forearm skin tear  Neuropsychiatric: General: normal, calm, and normal eye contact   Level of consciousness: alert  Affect: normal, pleasant, and full  Orientation: oriented to self, place, time and situation  Medical Decision Making             Data   Most Recent 6 Bacteria Isolates From Any Culture (See EPIC Reports for Culture Details):    Most Recent Urinalysis:  Recent Labs   Lab Test 08/21/24  1423 03/30/21  1144 06/20/19  1228   COLOR Yellow   < > Yellow   APPEARANCE Cloudy*   < > Slightly Cloudy   URINEGLC 100*   < > Negative   URINEBILI Negative   < > Negative   URINEKETONE Negative   < > Negative   SG 1.009   < > 1.010   UBLD Moderate*   < > Small*   URINEPH 8.0   < > 5.5   PROTEIN 100*   < > Trace*   UROBILINOGEN  --   --  0.2   NITRITE Negative   < > Positive*   LEUKEST Large*   < > Large*   RBCU 42*   < > 5-10*   WBCU >182*   < > *    < > = values in this interval not displayed.

## 2024-08-26 NOTE — PROGRESS NOTES
:     Spoke with Marisabel at [facility]. She stated that they are currently screening patient for short term rehab. Patient will be private pay. The cost is $5000. Call placed to patients wife to discuss this. She is agreeable to paying the $5000 down payment.    ANNE Max on 8/26/2024 at 9:05 AM     :     Spoke with patients wife and daughter to discuss discharge planning needs. They asked questions regarding pricing at [facility]. They would like to know if [facility] [place] has availability.     Spoke with Donald at [facility] and she stated they are at full capacity due to low staffing. Update given to family.    ANNE Max on 8/26/2024 at 11:08 AM

## 2024-08-26 NOTE — PLAN OF CARE
PRIMARY DIAGNOSIS: UTI/Falls  OUTPATIENT/OBSERVATION GOALS TO BE MET BEFORE DISCHARGE:  ADLs back to baseline: No    Activity and level of assistance: Up with maximum assistance. Consider SW and/or PT evaluation.     Pain status: Pain free.    Return to near baseline physical activity: No     Discharge Planner Nurse   Safe discharge environment identified: No  Barriers to discharge: Yes       Entered by: Elle Wynn RN 08/26/2024 9:02 AM     Please review provider order for any additional goals.   Nurse to notify provider when observation goals have been met and patient is ready for discharge.Goal Outcome Evaluation:

## 2024-08-26 NOTE — PLAN OF CARE
PRIMARY DIAGNOSIS: UTI/FALLS  OUTPATIENT/OBSERVATION GOALS TO BE MET BEFORE DISCHARGE:  ADLs back to baseline: No    Activity and level of assistance: Up with maximum assistance. Consider SW and/or PT evaluation.     Pain status: Pain free.    Return to near baseline physical activity: No     Discharge Planner Nurse   Safe discharge environment identified: Yes  Barriers to discharge: Yes       Entered by: Elle Wynn RN 08/26/2024 11:13 AM     Please review provider order for any additional goals.   Nurse to notify provider when observation goals have been met and patient is ready for discharge.Goal Outcome Evaluation:

## 2024-08-26 NOTE — PROGRESS NOTES
"Was notified by staff about skin stear on backside of right upper arm Staff did not know how it happened. Asked pt about how it happened and stated \" I don't remember, it tears very easily.\"  Approx. 2cm long and 3cm wide. Allevian applied.   "

## 2024-08-26 NOTE — PHARMACY-CONSULT NOTE
Pharmacy- Renal Dose Adjustment    Patient Active Problem List   Diagnosis    Acquired buried penis    Acute crescentic glomerulonephritis    Anemia due to vitamin B12 deficiency    Anemia of chronic disease    Antineutrophil cytoplasmic antibody (ANCA) positive    Bilateral edema of lower extremity    CKD (chronic kidney disease) stage 5, GFR 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 ml/min (H)    Mixed hyperlipidemia    Benign essential hypertension    H/O bilateral inguinal hernia repair    H/O total hip arthroplasty    History of tobacco abuse    Immunocompromised patient (H24)    Lymphedema of both lower extremities    Metabolic acidosis    Pityriasis rosea    Primary pauci-immune necrotizing and crescentic glomerulonephritis    Refusal of statin medication at discharge    Steroid-induced hyperglycemia    Heartburn    Hyperkalemia    Hyponatremia    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, with long-term current use of insulin (H)    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)    DNR (do not resuscitate)    DNI (do not intubate)    Serum phosphate elevated    Occult closed fracture of right elbow with routine healing, subsequent encounter    Iron deficiency anemia, unspecified    Localized edema    Hypothyroidism due to acquired atrophy of thyroid    Tinea pedis of right foot    Diarrhea    Diarrhea, unspecified type    Hallucinations    Confusion    Generalized weakness    Fall, initial encounter    Skin tear of forearm without complication, initial encounter    Recurrent UTI - Pseudomonas        Relevant Labs:  Recent Labs   Lab Test 08/24/24  0528 08/22/24  0643 08/21/24  1139   WBC  --  9.6 10.0   HGB  --  8.0* 8.1*    270 318        CrCl: 4.4 mL/min      Intake/Output Summary (Last 24 hours) at 8/26/2024 0905  Last data filed at 8/26/2024 0615  Gross per 24 hour   Intake 240 ml   Output 750 ml   Net -510 ml          Per Renal Dose Adjustment Protocol, will adjust:  Cefepime  to 500 mg Q24h for urinary tract infection indication, and CrCl <10 mL/min      Will continue to follow and make adjustments accordingly. Thank You.    [facility] ....................  8/26/2024   9:05 AM

## 2024-08-26 NOTE — PROGRESS NOTES
Chart reviewed per MST screen: unsure about wt loss. Pt has been tolerating his diet. Intakes % for the 2 meal intakes recorded. Please monitor and record. Down graded diet to soft and bite sized with slightly thick fluids (level 1). Monitor intakes and diet tolerance. Will make changes as needed. Follow up in 1-5 days.   Diet: soft and bite sized, level 1 fluids  Intakes: %, please record amount taken   Wt Readings from Last 10 Encounters:   08/26/24 78.1 kg (172 lb 3.2 oz)   08/18/24 76.2 kg (168 lb)   07/25/24 76.2 kg (168 lb)   07/05/24 79.4 kg (175 lb)   04/17/24 79.4 kg (175 lb)   04/09/24 79.5 kg (175 lb 3.2 oz)   02/21/24 77.8 kg (171 lb 9.6 oz)   02/07/24 76.5 kg (168 lb 9.6 oz)   02/02/24 78.6 kg (173 lb 3.2 oz)   01/31/24 78.5 kg (173 lb)      Terrie Castro RD on 8/26/2024 at 4:12 PM

## 2024-08-27 NOTE — PHARMACY
[facility]  Part of [address] [address]    August 27, 2024    Dear Pharmacist,    Your customer, Altaf Chao, born on 4/9/1934, was recently discharged from [facility].  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 Starting 8/28/24, thru 8/31/24  Refills: 0     Cranberry Concentrate/VitaminC 45117-315 MG Caps  Used for: Recurrent UTI (urinary tract infection), Incomplete bladder emptying  Generic drug: Cranberry-Vitamin C      Dose: 2 capsule  Take 2 capsules by mouth daily - for UTI prevention  Quantity: 200 capsule  Refills: 4     darbepoetin miller 150 MCG/0.3ML Sosy injection  Commonly known as: ARANESP      Dose: 150 mcg  Inject 150 mcg subcutaneously every 28 days  Refills: 0     finasteride 5 MG tablet  Commonly known as: PROSCAR  Used for: Urinary retention      Dose: 1 tablet  Take 1 tablet (5 mg) by mouth daily  Quantity: 90 tablet  Refills: 4     furosemide 40 MG tablet  Commonly known as: LASIX      Dose: 40 mg  Take 40 mg by mouth every morning And one-HALF tablet (20 mg) at 4 pm  Refills: 0     levothyroxine 50 MCG tablet  Commonly known as: SYNTHROID/LEVOTHROID  Used for:  Hypothyroidism due to acquired atrophy of thyroid      Dose: 50 mcg  Take 1 tablet (50 mcg) by mouth daily - Start 7/25/2024 - for thyroid  Quantity: 90 tablet  Refills: 1     loperamide 2 MG tablet  Commonly known as: IMODIUM A-D  Used for: Diarrhea of infectious origin      Dose: 2 mg  Take 1 tablet (2 mg) by mouth 4 times daily as needed for diarrhea  Refills: 0     predniSONE 2.5 MG tablet  Commonly known as: DELTASONE  Used for: CKD (chronic kidney disease) stage 5, GFR 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 ml/min (H), Primary pauci-immune necrotizing and crescentic glomerulonephritis, Immunocompromised patient (H24)      Dose: 2.5 mg  Take 1 tablet (2.5 mg) by mouth daily  Quantity: 90 tablet  Refills: 4     sodium bicarbonate 650 MG tablet      Dose: 2,600 mg  Take 2,600 mg by mouth 3 times daily  Refills: 0     tamsulosin 0.4 MG capsule  Commonly known as: FLOMAX  Used for: Urinary retention      Dose: 0.4 mg  Take 1 capsule (0.4 mg) by mouth every evening  Quantity: 90 capsule  Refills: 4     terbinafine 1 % external cream  Commonly known as: lamISIL      Apply topically 2 times daily.  Refills: 0     tolnaftate 1 % external spray  Commonly known as: lamISIL  Used for: Tinea pedis of right foot      Externally apply topically 3 times daily -- as needed for foot rash / ring worm  Quantity: 130 g  Refills: 4            STOP taking      amLODIPine 5 MG tablet  Commonly known as: NORVASC                 We also reviewed Altaf Chao's allergy list and updated it as needed:  Allergies: Statins [statins]    Thank you for continuing to care for Altaf Chao.  We look forward to working together with you in the future.    Sincerely,  [facility]  \

## 2024-08-27 NOTE — PROGRESS NOTES
:     Plan for patient to discharge to [facility] for short term rehab today at 1600. [facility] will provide transportation and they will borrow a wheelchair.     PAS Completed: [id number]    Spoke with patient and family to provide discharge update. Family will pay the down payment prior to patient arriving at [facility].     Spoke with Marisabel at [facility] to update on patients discharge plan. Questioned if patient can have IM abx injections. Marisabel stated they can do this but will need to check the cost.     ANNE Max on 8/27/2024 at 8:54 AM     :     Confirmed with Marisabel that they are able to give patient IM abx injections.     No further needs from  at this time.     ANNE Max on 8/27/2024 at 10:11 AM

## 2024-08-27 NOTE — PLAN OF CARE
Admitted 8/21/2024 for recurrent UTI and a fall.  Pleasantly confused, VSS, afebrile.  Pt moved from chair to bed utilizing EZ stand.  Left arm dressing CDI, Allevyn dsg changed on right arm as it was saturated with dried blood, from a skin tear that occurred on day shift.  Gates patent, draining cloudy yellow urine.  Thickened water as pt does cough a lot when its not.  Due to recent decline will be going to [facility] today at 1600 with [facility] transport.      Goal Outcome Evaluation:      Plan of Care Reviewed With: patient    Overall Patient Progress: improvingOverall Patient Progress: improving    Outcome Evaluation: increased strength, mobility and alertness, afebrile

## 2024-08-27 NOTE — PLAN OF CARE
"Goal Outcome Evaluation:      Plan of Care Reviewed With: patient    Overall Patient Progress: improvingOverall Patient Progress: improving         Pt pleasantly confused, assist of 2 with gait belt and walker. Skin dressings clean dry and intact. VSS, afebrile. Ready for discharge when [facility] can transport.      /58 (BP Location: Right arm, Patient Position: Semi-Carver's, Cuff Size: Adult Regular)   Pulse 72   Temp 98.6  F (37  C) (Tympanic)   Resp 16   Ht 1.727 m (5' 8\")   Wt 79.3 kg (174 lb 14.4 oz)   SpO2 96%   BMI 26.59 kg/m      "

## 2024-08-27 NOTE — DISCHARGE SUMMARY
"[facility]  Hospitalist Discharge Summary      Date of Admission:  8/21/2024  Date of Discharge:  8/27/2024  Discharging Provider: Bethel Gleason MD  Discharge Service: Hospitalist Service    Discharge Diagnoses   Pseudomonas UTI    Clinically Significant Risk Factors     # Overweight: Estimated body mass index is 26.59 kg/m  as calculated from the following:    Height as of this encounter: 1.727 m (5' 8\").    Weight as of this encounter: 79.3 kg (174 lb 14.4 oz).       Follow-ups Needed After Discharge   Follow-up Appointments     Follow Up and recommended labs and tests      Follow up with Nursing home physician.  No follow up labs or test are   needed.                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  He had been hospitalized from 8/16-18/2024 with presumed viral diarrhea.  He was trying to put his shoes on to go to his hospital follow-up appointment when he fell and sustained a skin tear to his left arm.  He reports he has also been having hallucinations, confusion and weakness.  In the ER he was noted to have fairly stable labs with his .2, Cr 12.20, GFR 4, Hgb 8.1.  X-rays of pelvis and ankle without fracture.  CXR rotated but CT cervical spine sees coarse right upper lobe mixed opacities along with high-grade spinal canal narrowing in the cervical spine.  Head CT without acute changes.  UA with moderate blood, large leuk esterase, > 182 WBCs.  Culture from 8/16 grew Alcaligenes faecalis susceptible to cefepime, Bactrim and Zosyn.  UC from 8/21 returned growing " Pseudomonas aeruginosa for which he was placed on cefepime, and he will be discharged on this IM to complete a 7d course      Fall / Urinary retention / Hallucinations / Generalized weakness due to Acute UTI  - he is very close to end of his life, with CKD5 being primary etiology  - an acute Pseudomonas UTI was the likely etiology for this acute admissione   - he was given IV cefepime during this hospital stay, and will discharge on this IM   - he is being discharged to a NH for STR, and if he further declines the family is receptive to hospice discussions  - He had post void residual urine on bladder scan so required higuera catheter placement for urinary retention.  Now with increasing weakness will leave this in at discharge.      Anemia of chronic disease / CKD (chronic kidney disease) stage 5 /Primary pauci-immune necrotizing and crescentic glomerulonephritis / 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)  - he is not interested in dialysis as he wants to live his life with quality.    - Does take bicarb, calcitriol, Aranesp and prednisone to treat symptoms.    - His renal function has been very bad for a long time and it is surprising how well he does with these kinds of numbers - his GFR was at baseline during this stay with it being ~3-4.    - he is being discharged on his prior to admission renal regimen, however his norvasc is being discontinued as his BP has trended down and he no longer needs this med  - the family / patient are open to end of life discussions, however at this point they do not beliee hospice would provide any further needed services at this time  - I had very open discussions with this patient that I believe he is in the last few weeks of his life, and he understands     Hypothyroidism due to acquired atrophy of thyroid       Just started on levothyroxine recently and his TSH has come down from 23.22 on 7/23/2024 to 9.53 by 8/16/2024.  Continue current dose of levothyroxine 50 mcg  daily.      Consultations This Hospital Stay   CARE MANAGEMENT / SOCIAL WORK IP CONSULT  PHYSICAL THERAPY ADULT IP CONSULT  OCCUPATIONAL THERAPY ADULT IP CONSULT  SOCIAL WORK IP CONSULT  [facility] INPATIENT HOSPICE ADULT CONSULT  SOCIAL WORK IP CONSULT  PHYSICAL THERAPY ADULT IP CONSULT  OCCUPATIONAL THERAPY ADULT IP CONSULT    Code Status   No CPR- Do NOT Intubate    Time Spent on this Encounter   I, Bethel Gleason MD, personally saw the patient today and spent greater than 30 minutes discharging this patient.       Bethel Gleason MD  [facility]  [address]  [place] [address]  Phone: [phone]  Fax: [phone]  ______________________________________________________________________    Physical Exam   Vital Signs: Temp: 98.6  F (37  C) Temp src: Tympanic BP: 121/58 Pulse: 72   Resp: 16 SpO2: 96 % O2 Device: None (Room air)    Weight: 174 lbs 14.4 oz  ----------------------------------------------------------------------------------------       Primary Care Physician   Valentino Machado    Discharge Orders      General info for SNF    Length of Stay Estimate: Short Term Care: Estimated # of Days <30  Condition at Discharge: Declining  Level of care:skilled   Rehabilitation Potential: Fair  Admission H&P remains valid and up-to-date: Yes  Recent Chemotherapy: N/A  Use Nursing Home Standing Orders: Yes     Mantoux instructions    Give two-step Mantoux (PPD) Per Facility Policy Yes     Reason for your hospital stay    UTI     Activity - Up with assistive device     Activity - Up with nursing assistance     Activity - Ambulate in hallway    Every shift     Follow Up and recommended labs and tests    Follow up with Nursing home physician.  No follow up labs or test are needed.     No CPR- Do NOT Intubate     Physical Therapy Adult Consult    Evaluate and treat as clinically indicated.    Reason:  Weakness, Strength and gait training     Occupational Therapy Adult Consult    Evaluate and treat as  clinically indicated.    Reason:  ADLs, gait and strength training     Fall precautions     Diet    Follow this diet upon discharge: Current Diet:Orders Placed This Encounter      Soft & Bite Sized Diet (level 6) Slightly Thick (level 1)       Significant Results and Procedures   Most Recent 3 CBC's:  Recent Labs   Lab Test 08/27/24  0534 08/24/24  0528 08/22/24  0643 08/21/24  1139 08/17/24  0622   WBC  --   --  9.6 10.0 10.2   HGB  --   --  8.0* 8.1* 7.6*   MCV  --   --  89 90 89    307 270 318 285     Most Recent 3 BMP's:  Recent Labs   Lab Test 08/23/24  1102 08/22/24  0643 08/21/24  1139    135 136   POTASSIUM 4.5 5.1 4.9   CHLORIDE 93* 94* 90*   CO2 26 23 28   .9* 128.0* 127.2*   CR 12.22* 11.84* 12.20*   ANIONGAP 17* 18* 18*   MARVA 7.9* 8.2* 8.4*   GLC 91 106* 122*   ,   Results for orders placed or performed during the hospital encounter of 08/21/24   XR Chest Port 1 View    Narrative    PROCEDURE:  XR CHEST PORT 1 VIEW    HISTORY: Trauma - Chest Injury    COMPARISON: Chest radiographs 10/30/2023, 6/10/2017    FINDINGS: Single view chest radiograph    Cardiomediastinal silhouette is within normal limits. Tortuous  atherosclerotic aorta with unchanged presumed hiatal hernia.  No effusion or pneumothorax.  Asymmetric right interstitial opacities  and scattered peripheral mixed opacities  No suspicious osseous lesion or subdiaphragmatic free air.      Impression    IMPRESSION:    Asymmetric right lung opacities, which likely represent posttraumatic  change based on history, but could represent infection or edema.    TAMMY CARDOZA MD         SYSTEM ID:  [id number]   CT Head w/o Contrast    Narrative    EXAM: CT HEAD W/O CONTRAST, 8/21/2024 12:55 PM    HISTORY: fall from standing position, on blood thinners    COMPARISON: CT head 6/4/2023    TECHNIQUE:   Imaging protocol: Multiplanar CT images of the head without  intravenous contrast.   Acquisition: This CT exam was performed using one or more  the  following dose reduction techniques: automated exposure control,  adjustment of the mA and/or kV according to patient size, and/or  iterative reconstruction technique.    FINDINGS:  No intracranial hemorrhage, mass effect, or midline shift. The  ventricles are proportionate to the cerebral sulci. Multifocal patchy  to confluent foci of hypodensity in the periventricular and  subcortical white matter, which is nonspecific, likely related to  chronic small vessel ischemic disease given the patient's age. No  acute loss of gray-white matter differentiation. Atherosclerotic  arterial calcifications.    No acute osseous abnormality. Moderate polypoid left maxillary sinus  mucosal thickening. Pneumatized left pterygoid process with aerated  secretions. Mastoid air cells are clear. Bilateral pseudophakia.       Impression    IMPRESSION:  No acute intracranial abnormality.      TAMMY CARDOZA MD         SYSTEM ID:  [id number]   CT Cervical Spine w/o Contrast    Narrative    EXAM: CT CERVICAL SPINE W/O CONTRAST 8/21/2024 12:59 PM    PROVIDED HISTORY: Fall from standing position, on blood thinners    COMPARISON: CT cervical spine 6/4/2023    TECHNIQUE:   Imaging protocol: Using multidetector thin collimation helical  acquisition technique, axial, coronal and sagittal CT images through  the cervical spine were obtained without intravenous contrast.   Acquisition: This CT exam was performed using one or more the  following dose reduction techniques: automated exposure control,  adjustment of the mA and/or kV according to patient size, and/or  iterative reconstruction technique.    FINDINGS:  Grade 1 anterolisthesis of C5 on C6 and C7 on T1. Straightening of the  normal cervical lordotic curvature. No findings of acute fracture or  traumatic subluxation. Unchanged superior endplate C6 and T1 limbus  vertebrae. No prevertebral edema. There is moderate to advanced disc  space loss C6-7. Partial ankylosis of C4-5 vertebral  bodies.  Multilevel degenerative endplate changes with uncinate and facet  hypertrophy. Degenerative changes are associated with varying degrees  of neural foraminal stenosis. Mild spinal canal narrowing C5-6.  No  high-grade spinal canal stenosis at any level.     Small right pleural effusion with coarse right upper lobe mixed  opacities.      Impression    IMPRESSION:   No acute fracture or traumatic subluxation.    Multilevel cervical spondylosis without high-grade spinal canal  narrowing.    Small right pleural effusion with coarse right upper lobe mixed  opacities.    TAMMY CARDOZA MD         SYSTEM ID:  [id number]   XR Ankle Port Left 2 Views    Narrative    PROCEDURE:  XR ANKLE PORT LEFT 2 VIEWS    HISTORY: lateral left ankle pain after a fall    COMPARISON: No relevant priors available for comparison    TECHNIQUE:  XR ANKLE PORT LEFT 2 VIEWS    FINDINGS:   No acute fracture or dislocation is identified. No suspicious osseous  lesion. The joint spaces are preserved. Degenerative changes about the  medial malleolus. Calcaneal plantar enthesopathy.    No foreign body or subcutaneous emphysema. Lower extremity  subcutaneous edema. Vascular calcifications.        Impression    IMPRESSION:   No acute osseous abnormality.    TAMMY CARDOZA MD         SYSTEM ID:  [id number]   XR Pelvis Port 1/2 Views    Narrative    PROCEDURE:  XR PELVIS PORT 1/2 VIEWS    HISTORY: Trauma - Pelvic Injury    COMPARISON: CT abdomen pelvis 9/22/2023    TECHNIQUE:  XR PELVIS PORT 1 VIEW    FINDINGS:   Postsurgical changes of bilateral total hip arthroplasties. No  evidence of hardware fracture or failure. No acute osseous fracture.  Joints are appropriately aligned. No suspicious osseous lesion.    No foreign body or subcutaneous emphysema. Vascular calcifications.        Impression    IMPRESSION:   No acute osseous abnormality.    TAMMY CARDOZA MD         SYSTEM ID:  [id number]       Discharge Medications   Current Discharge Medication List         CONTINUE these medications which have NOT CHANGED    Details   calcitRIOL (ROCALTROL) 0.25 MCG capsule Take 1 capsule (0.25 mcg) by mouth daily -- Rx per Nephrology --      ceFEPIme (MAXIPIME) 1 g SOLR injection Inject 500 mg into the muscle every 24 hours. Starting 8/28/24, thru 8/31/24      Cranberry-Vitamin C (CRANBERRY CONCENTRATE/VITAMINC) 26642-063 MG CAPS Take 2 capsules by mouth daily - for UTI prevention  Qty: 200 capsule, Refills: 4    Associated Diagnoses: Recurrent UTI (urinary tract infection); Incomplete bladder emptying      darbepoetin miller (ARANESP) 150 MCG/0.3ML SOSY injection Inject 150 mcg subcutaneously every 28 days      finasteride (PROSCAR) 5 MG tablet Take 1 tablet (5 mg) by mouth daily  Qty: 90 tablet, Refills: 4    Comments: Prescription renewal, patient will call for refills.  Associated Diagnoses: Urinary retention      furosemide (LASIX) 40 MG tablet Take 40 mg by mouth every morning And one-HALF tablet (20 mg) at 4 pm      levothyroxine (SYNTHROID/LEVOTHROID) 50 MCG tablet Take 1 tablet (50 mcg) by mouth daily - Start 7/25/2024 - for thyroid  Qty: 90 tablet, Refills: 1    Associated Diagnoses: Hypothyroidism due to acquired atrophy of thyroid      loperamide (IMODIUM A-D) 2 MG tablet Take 1 tablet (2 mg) by mouth 4 times daily as needed for diarrhea    Associated Diagnoses: Diarrhea of infectious origin      predniSONE (DELTASONE) 2.5 MG tablet Take 1 tablet (2.5 mg) by mouth daily  Qty: 90 tablet, Refills: 4    Comments: Prescription renewal, patient will call for refills.  Associated Diagnoses: CKD (chronic kidney disease) stage 5, GFR 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 ml/min (H); Primary pauci-immune necrotizing and crescentic glomerulonephritis; Immunocompromised patient (H24)      sodium bicarbonate 650 MG tablet Take 2,600 mg by mouth 3 times daily      tamsulosin (FLOMAX) 0.4 MG capsule Take 1 capsule (0.4 mg) by mouth every evening  Qty: 90 capsule, Refills: 4    Comments: Prescription renewal,  patient will call for refills.  Associated Diagnoses: Urinary retention      terbinafine (LAMISIL) 1 % external cream Apply topically 2 times daily.      tolnaftate (LAMISIL) 1 % external spray Externally apply topically 3 times daily -- as needed for foot rash / ring worm  Qty: 130 g, Refills: 4    Associated Diagnoses: Tinea pedis of right foot      insulin pen needle (B-D U/F) 31G X 8 MM miscellaneous Infuse daily Use 1 pen needles daily  Qty: 100 each, Refills: 3    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, with long-term current use of insulin (H); Steroid-induced hyperglycemia           STOP taking these medications       amLODIPine (NORVASC) 5 MG tablet Comments:   Reason for Stopping:             Allergies   Allergies   Allergen Reactions    Statins [Statins] Itching     -- Patient declined --

## 2024-08-27 NOTE — PHARMACY - DISCHARGE MEDICATION RECONCILIATION AND EDUCATION
Pharmacy:  Discharge Counseling and Medication Reconciliation    Altaf HORACE Jeremie  [address]  [place] [address]  [phone] (home)   89 year old male  PCP: Valentino Machado    Allergies: Statins [statins]    Discharge Counseling:    Pharmacist did not meet with patient/family today as patient is discharging to [facility] where all medications will be handled/administered for patient.      Discharge Medication Reconciliation:    It has been determined that the patient has an adequate supply of medications available or which can be obtained from the patient's preferred pharmacy, which HE/SHE has confirmed as: [facility] [An updated medication list will be faxed to the patient's pharmacy.]    Thank you for the consult.    My Stone RP........August 27, 2024 9:32 AM

## 2024-08-28 NOTE — TELEPHONE ENCOUNTER
Patient will follow-up with providers at the [facility]. Caitlin Herron RN on 8/28/2024 at 9:25 AM
